# Patient Record
Sex: MALE | Race: WHITE | Employment: OTHER | URBAN - METROPOLITAN AREA
[De-identification: names, ages, dates, MRNs, and addresses within clinical notes are randomized per-mention and may not be internally consistent; named-entity substitution may affect disease eponyms.]

---

## 2017-01-03 ENCOUNTER — ANTI-COAG VISIT (OUTPATIENT)
Dept: INTERNAL MEDICINE | Age: 82
End: 2017-01-03

## 2017-01-03 DIAGNOSIS — I35.9 AORTIC VALVE DISORDER: ICD-10-CM

## 2017-01-03 LAB — INR BLD: 2.4

## 2017-01-10 ENCOUNTER — ANTI-COAG VISIT (OUTPATIENT)
Dept: INTERNAL MEDICINE | Age: 82
End: 2017-01-10

## 2017-01-10 DIAGNOSIS — I35.9 AORTIC VALVE DISORDER: ICD-10-CM

## 2017-01-10 LAB — INR BLD: 2.5

## 2017-01-16 ENCOUNTER — ANTI-COAG VISIT (OUTPATIENT)
Dept: INTERNAL MEDICINE | Age: 82
End: 2017-01-16

## 2017-01-16 DIAGNOSIS — I35.9 AORTIC VALVE DISORDER: ICD-10-CM

## 2017-01-16 LAB — INR BLD: 1.9

## 2017-01-25 ENCOUNTER — ANTI-COAG VISIT (OUTPATIENT)
Dept: INTERNAL MEDICINE | Age: 82
End: 2017-01-25

## 2017-01-25 DIAGNOSIS — I35.9 AORTIC VALVE DISORDER: ICD-10-CM

## 2017-01-25 LAB — INR BLD: 2.2

## 2017-01-30 ENCOUNTER — HOSPITAL ENCOUNTER (OUTPATIENT)
Dept: ONCOLOGY | Age: 82
Discharge: HOME | End: 2017-01-30
Attending: INTERNAL MEDICINE | Admitting: INTERNAL MEDICINE

## 2017-01-30 VITALS
HEART RATE: 60 BPM | DIASTOLIC BLOOD PRESSURE: 79 MMHG | RESPIRATION RATE: 16 BRPM | TEMPERATURE: 97 F | SYSTOLIC BLOOD PRESSURE: 135 MMHG

## 2017-01-31 ENCOUNTER — ANTI-COAG VISIT (OUTPATIENT)
Dept: INTERNAL MEDICINE | Age: 82
End: 2017-01-31

## 2017-01-31 DIAGNOSIS — I35.9 AORTIC VALVE DISORDER: ICD-10-CM

## 2017-01-31 LAB — INR BLD: 2.2

## 2017-01-31 RX ORDER — ATORVASTATIN CALCIUM 10 MG/1
TABLET, FILM COATED ORAL
Qty: 30 TABLET | Refills: 2 | Status: SHIPPED | OUTPATIENT
Start: 2017-01-31 | End: 2017-05-01 | Stop reason: SDUPTHER

## 2017-02-07 ENCOUNTER — ANTI-COAG VISIT (OUTPATIENT)
Dept: INTERNAL MEDICINE | Age: 82
End: 2017-02-07

## 2017-02-07 DIAGNOSIS — I35.9 AORTIC VALVE DISORDER: ICD-10-CM

## 2017-02-07 LAB — INR BLD: 2.3

## 2017-02-20 ENCOUNTER — ANTI-COAG VISIT (OUTPATIENT)
Dept: INTERNAL MEDICINE | Age: 82
End: 2017-02-20

## 2017-02-20 DIAGNOSIS — I35.9 AORTIC VALVE DISORDER: ICD-10-CM

## 2017-02-20 LAB — INR BLD: 2.3

## 2017-02-27 ENCOUNTER — ANTI-COAG VISIT (OUTPATIENT)
Dept: INTERNAL MEDICINE | Age: 82
End: 2017-02-27

## 2017-02-27 DIAGNOSIS — I35.9 AORTIC VALVE DISORDER: ICD-10-CM

## 2017-02-27 LAB — INR BLD: 2.6

## 2017-03-06 ENCOUNTER — ANTI-COAG VISIT (OUTPATIENT)
Dept: INTERNAL MEDICINE | Age: 82
End: 2017-03-06

## 2017-03-06 DIAGNOSIS — I35.9 AORTIC VALVE DISORDER: ICD-10-CM

## 2017-03-06 LAB — INR BLD: 1.5

## 2017-03-13 ENCOUNTER — ANTI-COAG VISIT (OUTPATIENT)
Dept: INTERNAL MEDICINE | Age: 82
End: 2017-03-13

## 2017-03-13 DIAGNOSIS — I35.9 AORTIC VALVE DISORDER: ICD-10-CM

## 2017-03-13 LAB — INR BLD: 2

## 2017-03-20 ENCOUNTER — ANTI-COAG VISIT (OUTPATIENT)
Dept: INTERNAL MEDICINE | Age: 82
End: 2017-03-20

## 2017-03-20 DIAGNOSIS — I35.9 AORTIC VALVE DISORDER: ICD-10-CM

## 2017-03-20 LAB — INR BLD: 2.4

## 2017-03-27 ENCOUNTER — ANTI-COAG VISIT (OUTPATIENT)
Dept: INTERNAL MEDICINE | Age: 82
End: 2017-03-27

## 2017-03-27 DIAGNOSIS — I35.9 AORTIC VALVE DISORDER: ICD-10-CM

## 2017-03-27 LAB — INR BLD: 1.9

## 2017-04-04 ENCOUNTER — ANTI-COAG VISIT (OUTPATIENT)
Dept: INTERNAL MEDICINE | Age: 82
End: 2017-04-04

## 2017-04-04 DIAGNOSIS — I35.9 AORTIC VALVE DISORDER: ICD-10-CM

## 2017-04-04 LAB — INR BLD: 2.2

## 2017-04-11 ENCOUNTER — ANTI-COAG VISIT (OUTPATIENT)
Dept: INTERNAL MEDICINE | Age: 82
End: 2017-04-11

## 2017-04-11 DIAGNOSIS — I35.9 AORTIC VALVE DISORDER: ICD-10-CM

## 2017-04-11 LAB — INR BLD: 2.7

## 2017-04-19 ENCOUNTER — ANTI-COAG VISIT (OUTPATIENT)
Dept: INTERNAL MEDICINE | Age: 82
End: 2017-04-19

## 2017-04-19 DIAGNOSIS — I35.9 AORTIC VALVE DISORDER: ICD-10-CM

## 2017-04-19 LAB — INR BLD: 1.9

## 2017-04-24 ENCOUNTER — ANTI-COAG VISIT (OUTPATIENT)
Dept: INTERNAL MEDICINE | Age: 82
End: 2017-04-24

## 2017-04-24 DIAGNOSIS — I35.9 AORTIC VALVE DISORDER: ICD-10-CM

## 2017-04-24 LAB — INR BLD: 2.3

## 2017-04-25 RX ORDER — WARFARIN SODIUM 5 MG/1
TABLET ORAL
Qty: 90 TABLET | Refills: 0 | Status: SHIPPED | OUTPATIENT
Start: 2017-04-25 | End: 2017-10-24 | Stop reason: SDUPTHER

## 2017-05-01 RX ORDER — ATORVASTATIN CALCIUM 10 MG/1
TABLET, FILM COATED ORAL
Qty: 30 TABLET | Refills: 1 | Status: SHIPPED | OUTPATIENT
Start: 2017-05-01 | End: 2017-06-28 | Stop reason: SDUPTHER

## 2017-05-04 DIAGNOSIS — Z85.46 HISTORY OF PROSTATE CANCER: Primary | ICD-10-CM

## 2017-05-09 ENCOUNTER — ANTI-COAG VISIT (OUTPATIENT)
Dept: INTERNAL MEDICINE | Age: 82
End: 2017-05-09

## 2017-05-09 DIAGNOSIS — I35.9 AORTIC VALVE DISORDER: ICD-10-CM

## 2017-05-09 LAB — INR BLD: 1.9

## 2017-05-15 ENCOUNTER — ANTI-COAG VISIT (OUTPATIENT)
Dept: INTERNAL MEDICINE | Age: 82
End: 2017-05-15

## 2017-05-15 DIAGNOSIS — I35.9 AORTIC VALVE DISORDER: ICD-10-CM

## 2017-05-15 LAB — INR BLD: 2

## 2017-05-16 RX ORDER — OMEPRAZOLE 40 MG/1
CAPSULE, DELAYED RELEASE ORAL
Qty: 30 CAPSULE | Refills: 3 | Status: SHIPPED | OUTPATIENT
Start: 2017-05-16 | End: 2017-09-13 | Stop reason: SDUPTHER

## 2017-05-17 DIAGNOSIS — Z85.46 HISTORY OF PROSTATE CANCER: ICD-10-CM

## 2017-05-17 LAB — PROSTATE SPECIFIC ANTIGEN: <0.01 NG/ML (ref 0–4)

## 2017-05-23 ENCOUNTER — ANTI-COAG VISIT (OUTPATIENT)
Dept: INTERNAL MEDICINE | Age: 82
End: 2017-05-23

## 2017-05-23 DIAGNOSIS — I35.9 AORTIC VALVE DISORDER: ICD-10-CM

## 2017-05-23 LAB — INR BLD: 2.5

## 2017-05-29 LAB — INR BLD: 1.9

## 2017-05-31 ENCOUNTER — ANTI-COAG VISIT (OUTPATIENT)
Dept: INTERNAL MEDICINE | Age: 82
End: 2017-05-31

## 2017-05-31 DIAGNOSIS — I35.9 AORTIC VALVE DISORDER: ICD-10-CM

## 2017-06-15 ENCOUNTER — TELEPHONE (OUTPATIENT)
Dept: INTERNAL MEDICINE | Age: 82
End: 2017-06-15

## 2017-06-16 ENCOUNTER — TELEPHONE (OUTPATIENT)
Dept: INTERNAL MEDICINE | Age: 82
End: 2017-06-16

## 2017-06-16 ENCOUNTER — ANTI-COAG VISIT (OUTPATIENT)
Dept: INTERNAL MEDICINE | Age: 82
End: 2017-06-16

## 2017-06-16 DIAGNOSIS — I35.9 AORTIC VALVE DISORDER: ICD-10-CM

## 2017-06-16 LAB — INR BLD: 2

## 2017-06-22 ENCOUNTER — OFFICE VISIT (OUTPATIENT)
Dept: INTERNAL MEDICINE | Age: 82
End: 2017-06-22

## 2017-06-22 VITALS
DIASTOLIC BLOOD PRESSURE: 82 MMHG | HEIGHT: 64 IN | WEIGHT: 125 LBS | SYSTOLIC BLOOD PRESSURE: 122 MMHG | BODY MASS INDEX: 21.34 KG/M2

## 2017-06-22 DIAGNOSIS — D63.1: Primary | ICD-10-CM

## 2017-06-22 DIAGNOSIS — J45.909 UNCOMPLICATED ASTHMA, UNSPECIFIED ASTHMA SEVERITY: ICD-10-CM

## 2017-06-22 DIAGNOSIS — M81.0 OSTEOPOROSIS, SENILE: ICD-10-CM

## 2017-06-22 DIAGNOSIS — N18.30 CHRONIC KIDNEY DISEASE, STAGE III (MODERATE) (HCC): ICD-10-CM

## 2017-06-22 DIAGNOSIS — N18.2: Primary | ICD-10-CM

## 2017-06-22 PROCEDURE — 4005F PHARM THX FOR OP RXD: CPT | Performed by: INTERNAL MEDICINE

## 2017-06-22 PROCEDURE — 99213 OFFICE O/P EST LOW 20 MIN: CPT | Performed by: INTERNAL MEDICINE

## 2017-06-22 PROCEDURE — G8420 CALC BMI NORM PARAMETERS: HCPCS | Performed by: INTERNAL MEDICINE

## 2017-06-22 PROCEDURE — 4040F PNEUMOC VAC/ADMIN/RCVD: CPT | Performed by: INTERNAL MEDICINE

## 2017-06-22 PROCEDURE — 1123F ACP DISCUSS/DSCN MKR DOCD: CPT | Performed by: INTERNAL MEDICINE

## 2017-06-22 PROCEDURE — 1036F TOBACCO NON-USER: CPT | Performed by: INTERNAL MEDICINE

## 2017-06-22 PROCEDURE — G8427 DOCREV CUR MEDS BY ELIG CLIN: HCPCS | Performed by: INTERNAL MEDICINE

## 2017-06-26 ENCOUNTER — ANTI-COAG VISIT (OUTPATIENT)
Dept: INTERNAL MEDICINE | Age: 82
End: 2017-06-26

## 2017-06-26 DIAGNOSIS — I35.9 AORTIC VALVE DISORDER: ICD-10-CM

## 2017-06-26 LAB
A/G RATIO: 1.4 (ref 1.1–2.2)
ALBUMIN SERPL-MCNC: 4.1 G/DL (ref 3.4–5)
ALP BLD-CCNC: 49 U/L (ref 40–129)
ALT SERPL-CCNC: 12 U/L (ref 10–40)
ANION GAP SERPL CALCULATED.3IONS-SCNC: 15 MMOL/L (ref 3–16)
AST SERPL-CCNC: 22 U/L (ref 15–37)
BILIRUB SERPL-MCNC: 0.5 MG/DL (ref 0–1)
BUN BLDV-MCNC: 38 MG/DL (ref 7–20)
CALCIUM SERPL-MCNC: 9.1 MG/DL (ref 8.3–10.6)
CHLORIDE BLD-SCNC: 101 MMOL/L (ref 99–110)
CO2: 24 MMOL/L (ref 21–32)
CREAT SERPL-MCNC: 1.4 MG/DL (ref 0.8–1.3)
GFR AFRICAN AMERICAN: 59
GFR NON-AFRICAN AMERICAN: 49
GLOBULIN: 2.9 G/DL
GLUCOSE BLD-MCNC: 99 MG/DL (ref 70–99)
INR BLD: 2.4
POTASSIUM SERPL-SCNC: 4.8 MMOL/L (ref 3.5–5.1)
SODIUM BLD-SCNC: 140 MMOL/L (ref 136–145)
TOTAL PROTEIN: 7 G/DL (ref 6.4–8.2)

## 2017-06-28 RX ORDER — ATORVASTATIN CALCIUM 10 MG/1
TABLET, FILM COATED ORAL
Qty: 30 TABLET | Refills: 0 | Status: SHIPPED | OUTPATIENT
Start: 2017-06-28 | End: 2017-07-28 | Stop reason: SDUPTHER

## 2017-07-03 ENCOUNTER — ANTI-COAG VISIT (OUTPATIENT)
Dept: INTERNAL MEDICINE | Age: 82
End: 2017-07-03

## 2017-07-03 DIAGNOSIS — I35.9 AORTIC VALVE DISORDER: ICD-10-CM

## 2017-07-03 LAB — INR BLD: 2.2

## 2017-07-07 ENCOUNTER — ANTI-COAG VISIT (OUTPATIENT)
Dept: INTERNAL MEDICINE | Age: 82
End: 2017-07-07

## 2017-07-07 DIAGNOSIS — I35.9 AORTIC VALVE DISORDER: ICD-10-CM

## 2017-07-07 LAB — INR BLD: 2.3

## 2017-07-14 ENCOUNTER — ANTI-COAG VISIT (OUTPATIENT)
Dept: INTERNAL MEDICINE | Age: 82
End: 2017-07-14

## 2017-07-14 DIAGNOSIS — I35.9 AORTIC VALVE DISORDER: ICD-10-CM

## 2017-07-14 LAB — INR BLD: 2.3

## 2017-07-21 ENCOUNTER — ANTI-COAG VISIT (OUTPATIENT)
Dept: INTERNAL MEDICINE | Age: 82
End: 2017-07-21

## 2017-07-21 DIAGNOSIS — I35.9 AORTIC VALVE DISORDER: ICD-10-CM

## 2017-07-21 LAB — INR BLD: 2.3

## 2017-07-31 ENCOUNTER — ANTI-COAG VISIT (OUTPATIENT)
Dept: INTERNAL MEDICINE | Age: 82
End: 2017-07-31

## 2017-07-31 DIAGNOSIS — I35.9 AORTIC VALVE DISORDER: ICD-10-CM

## 2017-07-31 LAB — INR BLD: 2.5

## 2017-07-31 RX ORDER — ATORVASTATIN CALCIUM 10 MG/1
TABLET, FILM COATED ORAL
Qty: 30 TABLET | Refills: 0 | Status: SHIPPED | OUTPATIENT
Start: 2017-07-31 | End: 2017-08-31 | Stop reason: SDUPTHER

## 2017-08-04 ENCOUNTER — TELEPHONE (OUTPATIENT)
Dept: INTERNAL MEDICINE | Age: 82
End: 2017-08-04

## 2017-08-04 ENCOUNTER — ANTI-COAG VISIT (OUTPATIENT)
Dept: INTERNAL MEDICINE | Age: 82
End: 2017-08-04

## 2017-08-04 DIAGNOSIS — I35.9 AORTIC VALVE DISORDER: ICD-10-CM

## 2017-08-04 LAB — INR BLD: 2.2

## 2017-08-11 LAB — INR BLD: 2.4

## 2017-08-18 ENCOUNTER — ANTI-COAG VISIT (OUTPATIENT)
Dept: INTERNAL MEDICINE | Age: 82
End: 2017-08-18

## 2017-08-25 ENCOUNTER — ANTI-COAG VISIT (OUTPATIENT)
Dept: INTERNAL MEDICINE | Age: 82
End: 2017-08-25

## 2017-08-25 DIAGNOSIS — I35.9 AORTIC VALVE DISORDER: ICD-10-CM

## 2017-08-25 LAB — INR BLD: 2

## 2017-08-28 ENCOUNTER — HOSPITAL ENCOUNTER (OUTPATIENT)
Dept: ONCOLOGY | Age: 82
Discharge: HOME | End: 2017-08-31
Attending: INTERNAL MEDICINE | Admitting: INTERNAL MEDICINE

## 2017-08-28 VITALS
HEART RATE: 58 BPM | SYSTOLIC BLOOD PRESSURE: 129 MMHG | TEMPERATURE: 97.6 F | RESPIRATION RATE: 18 BRPM | DIASTOLIC BLOOD PRESSURE: 76 MMHG

## 2017-08-31 RX ORDER — ATORVASTATIN CALCIUM 10 MG/1
TABLET, FILM COATED ORAL
Qty: 30 TABLET | Refills: 5 | Status: SHIPPED | OUTPATIENT
Start: 2017-08-31 | End: 2017-12-28 | Stop reason: SDUPTHER

## 2017-09-09 ENCOUNTER — ANTI-COAG VISIT (OUTPATIENT)
Dept: INTERNAL MEDICINE | Age: 82
End: 2017-09-09

## 2017-09-09 DIAGNOSIS — I35.9 AORTIC VALVE DISORDER: ICD-10-CM

## 2017-09-09 LAB — INR BLD: 2.3

## 2017-09-13 RX ORDER — OMEPRAZOLE 40 MG/1
CAPSULE, DELAYED RELEASE ORAL
Qty: 30 CAPSULE | Refills: 2 | Status: SHIPPED | OUTPATIENT
Start: 2017-09-13 | End: 2017-12-12 | Stop reason: SDUPTHER

## 2017-09-18 ENCOUNTER — ANTI-COAG VISIT (OUTPATIENT)
Dept: INTERNAL MEDICINE | Age: 82
End: 2017-09-18

## 2017-09-18 DIAGNOSIS — I35.9 AORTIC VALVE DISORDER: ICD-10-CM

## 2017-09-18 LAB — INR BLD: 2.4

## 2017-09-22 ENCOUNTER — ANTI-COAG VISIT (OUTPATIENT)
Dept: INTERNAL MEDICINE | Age: 82
End: 2017-09-22

## 2017-09-22 DIAGNOSIS — I35.9 AORTIC VALVE DISORDER: ICD-10-CM

## 2017-09-22 LAB — INR BLD: 2.9

## 2017-10-03 ENCOUNTER — ANTI-COAG VISIT (OUTPATIENT)
Dept: INTERNAL MEDICINE | Age: 82
End: 2017-10-03

## 2017-10-03 DIAGNOSIS — I35.9 AORTIC VALVE DISORDER: ICD-10-CM

## 2017-10-03 LAB — INR BLD: 1.6

## 2017-10-05 ENCOUNTER — OFFICE VISIT (OUTPATIENT)
Dept: CARDIOLOGY CLINIC | Age: 82
End: 2017-10-05

## 2017-10-05 ENCOUNTER — ANTI-COAG VISIT (OUTPATIENT)
Dept: INTERNAL MEDICINE | Age: 82
End: 2017-10-05

## 2017-10-05 VITALS
WEIGHT: 118 LBS | DIASTOLIC BLOOD PRESSURE: 70 MMHG | BODY MASS INDEX: 20.25 KG/M2 | HEART RATE: 68 BPM | SYSTOLIC BLOOD PRESSURE: 130 MMHG

## 2017-10-05 DIAGNOSIS — I35.9 AORTIC VALVE DISORDER: Primary | ICD-10-CM

## 2017-10-05 DIAGNOSIS — I35.9 AORTIC VALVE DISORDER: ICD-10-CM

## 2017-10-05 LAB — INR BLD: 2.4

## 2017-10-05 PROCEDURE — 99213 OFFICE O/P EST LOW 20 MIN: CPT | Performed by: INTERNAL MEDICINE

## 2017-10-05 PROCEDURE — G8420 CALC BMI NORM PARAMETERS: HCPCS | Performed by: INTERNAL MEDICINE

## 2017-10-05 PROCEDURE — G8484 FLU IMMUNIZE NO ADMIN: HCPCS | Performed by: INTERNAL MEDICINE

## 2017-10-05 PROCEDURE — G8427 DOCREV CUR MEDS BY ELIG CLIN: HCPCS | Performed by: INTERNAL MEDICINE

## 2017-10-05 PROCEDURE — 1036F TOBACCO NON-USER: CPT | Performed by: INTERNAL MEDICINE

## 2017-10-05 PROCEDURE — 1123F ACP DISCUSS/DSCN MKR DOCD: CPT | Performed by: INTERNAL MEDICINE

## 2017-10-05 PROCEDURE — 4040F PNEUMOC VAC/ADMIN/RCVD: CPT | Performed by: INTERNAL MEDICINE

## 2017-10-05 NOTE — MR AVS SNAPSHOT
warfarin (COUMADIN) 1 MG tablet Take 1 mg by mouth.    sildenafil (VIAGRA) 50 MG tablet Take 50 mg by mouth as needed for Erectile Dysfunction. vitamin B-12 (CYANOCOBALAMIN) 1000 MCG tablet Take 1,000 mcg by mouth daily. docusate sodium (COLACE) 100 MG capsule Take 100 mg by mouth daily. 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)    History of esophageal cancer    History of prostate cancer    Chronic kidney disease, stage III (moderate)    Personal history of esophageal cancer    Osteoporosis, senile    Hernia, femoral, bilateral    Patient underweight    Asthma    Aortic valve disorder    Psychosexual dysfunction with inhibited sexual excitement    Other and unspecified hyperlipidemia      Immunizations as of 10/5/2017     Name Date    Influenza Virus Vaccine 11/8/2011, 10/28/2010    Influenza, High Dose 9/16/2016, 10/14/2015, 11/6/2014, 10/14/2013, 10/18/2012    Pneumococcal 13-valent Conjugate ([id number]) 7/7/2015    Pneumococcal Polysaccharide ([id number]) 10/20/2013    Tdap (Boostrix, Adacel) 5/12/2014    Zoster 9/20/2013      Preventive Care        Date Due    Yearly Flu Vaccine (1) 9/1/2017    Tetanus Combination Vaccine (2 - Td) 5/12/2024            [employer] Signup           Our records indicate that you have an active [employer] account. You can view your After Visit Summary by going to [email or web address] and logging in with your [employer] username and password. If you don't have a [employer] username and password but a parent or guardian has access to your record, the parent or guardian should login with their own [employer] username and password and access your record to view the After Visit Summary. Additional Information  If you have questions, please contact the physician practice where you receive care. Remember, [employer] is NOT to be used for urgent needs. For medical emergencies, dial 911. For questions regarding your [employer] account call [phone]. If you have a clinical question, please call your doctor's office.

## 2017-10-06 NOTE — PROGRESS NOTES
Subjective:      Patient ID: Nikita Santizo is a [de-identified] y.o. male. CC S/P AVR  HPI Mr. Manuela Dougherty is here for a 1 year cardiac follow up. Patient is doing well overall, no cardiac complaints. Exercises daily. Denies chest pain, shortness of breath, syncope, orthopnea, palpitations, or dizziness. Still has issues with weight and inability to eat large meals. Allergies   Allergen Reactions    No Known Allergies         Social History     Social History    Marital status:      Spouse name: N/A    Number of children: N/A    Years of education: N/A     Occupational History    Not on file. Social History Main Topics    Smoking status: Never Smoker    Smokeless tobacco: Never Used    Alcohol use 0.0 oz/week     0 Standard drinks or equivalent per week      Comment: occassionally    Drug use: No    Sexual activity: Not on file     Other Topics Concern    Not on file     Social History Narrative        Patient has a family history includes Elevated Lipids in his father; Heart Disease in his mother; Hypertension in his father; Other in his father and mother; Stroke in his father. Patient  has a past medical history of Anemia; Aortic valve disorders; Aspiration pneumonia ([address] Erectile dysfunction; Esophageal cancer ([address] Hernia, femoral, bilateral; Hyperlipidemia; Osteoporosis, senile; Pancreatitis; Patient underweight; Prostate cancer ([address] and Unspecified essential hypertension. Current Outpatient Prescriptions   Medication Sig Dispense Refill    omeprazole (PRILOSEC) 40 MG delayed release capsule TAKE ONE CAPSULE BY MOUTH DAILY 30 capsule 2    atorvastatin (LIPITOR) 10 MG tablet TAKE ONE TABLET BY MOUTH DAILY 30 tablet 5    warfarin (COUMADIN) 5 MG tablet TAKE ONE TABLET BY MOUTH ONCE NIGHTLY 90 tablet 0    Magnesium 65 MG TABS Take by mouth      Coenzyme Q10 (COQ10) 400 MG CAPS Take 1 capsule by mouth      Simethicone 80 MG TABS Take 80 tablets by mouth 3 times daily.  (Patient taking

## 2017-10-13 LAB — INR BLD: 2.4

## 2017-10-17 ENCOUNTER — ANTI-COAG VISIT (OUTPATIENT)
Dept: INTERNAL MEDICINE | Age: 82
End: 2017-10-17

## 2017-10-17 DIAGNOSIS — I35.9 AORTIC VALVE DISORDER: ICD-10-CM

## 2017-10-25 ENCOUNTER — NURSE ONLY (OUTPATIENT)
Dept: INTERNAL MEDICINE | Age: 82
End: 2017-10-25

## 2017-10-25 DIAGNOSIS — Z23 NEED FOR INFLUENZA VACCINATION: Primary | ICD-10-CM

## 2017-10-25 PROCEDURE — G0008 ADMIN INFLUENZA VIRUS VAC: HCPCS | Performed by: INTERNAL MEDICINE

## 2017-10-25 PROCEDURE — 90662 IIV NO PRSV INCREASED AG IM: CPT | Performed by: INTERNAL MEDICINE

## 2017-10-26 RX ORDER — WARFARIN SODIUM 5 MG/1
TABLET ORAL
Qty: 90 TABLET | Refills: 0 | Status: SHIPPED | OUTPATIENT
Start: 2017-10-26 | End: 2018-04-11 | Stop reason: SDUPTHER

## 2017-10-27 LAB — INR BLD: 1.9

## 2017-10-30 ENCOUNTER — ANTI-COAG VISIT (OUTPATIENT)
Dept: INTERNAL MEDICINE | Age: 82
End: 2017-10-30

## 2017-10-30 DIAGNOSIS — I35.9 AORTIC VALVE DISORDER: ICD-10-CM

## 2017-11-17 ENCOUNTER — ANTI-COAG VISIT (OUTPATIENT)
Dept: INTERNAL MEDICINE | Age: 82
End: 2017-11-17

## 2017-11-17 DIAGNOSIS — I35.9 AORTIC VALVE DISORDER: ICD-10-CM

## 2017-11-17 LAB — INR BLD: 2.1

## 2017-11-25 LAB — INR BLD: 3.3

## 2017-11-27 ENCOUNTER — ANTI-COAG VISIT (OUTPATIENT)
Dept: INTERNAL MEDICINE | Age: 82
End: 2017-11-27

## 2017-11-27 DIAGNOSIS — I35.9 AORTIC VALVE DISORDER: ICD-10-CM

## 2017-12-01 ENCOUNTER — ANTI-COAG VISIT (OUTPATIENT)
Dept: INTERNAL MEDICINE | Age: 82
End: 2017-12-01

## 2017-12-01 DIAGNOSIS — I35.9 AORTIC VALVE DISORDER: ICD-10-CM

## 2017-12-01 LAB — INR BLD: 1.8

## 2017-12-12 RX ORDER — OMEPRAZOLE 40 MG/1
CAPSULE, DELAYED RELEASE ORAL
Qty: 30 CAPSULE | Refills: 3 | Status: SHIPPED | OUTPATIENT
Start: 2017-12-12 | End: 2018-04-11 | Stop reason: SDUPTHER

## 2017-12-14 ENCOUNTER — ANTI-COAG VISIT (OUTPATIENT)
Dept: INTERNAL MEDICINE | Age: 82
End: 2017-12-14

## 2017-12-14 DIAGNOSIS — I35.9 AORTIC VALVE DISORDER: ICD-10-CM

## 2017-12-14 LAB — INR BLD: 3.1

## 2017-12-15 LAB — INR BLD: 1.9

## 2017-12-18 ENCOUNTER — ANTI-COAG VISIT (OUTPATIENT)
Dept: INTERNAL MEDICINE | Age: 82
End: 2017-12-18

## 2017-12-18 DIAGNOSIS — I35.9 AORTIC VALVE DISORDER: ICD-10-CM

## 2017-12-22 ENCOUNTER — ANTI-COAG VISIT (OUTPATIENT)
Dept: INTERNAL MEDICINE | Age: 82
End: 2017-12-22

## 2017-12-22 DIAGNOSIS — I35.9 AORTIC VALVE DISORDER: ICD-10-CM

## 2017-12-22 LAB — INR BLD: 2.6

## 2017-12-28 ENCOUNTER — OFFICE VISIT (OUTPATIENT)
Dept: INTERNAL MEDICINE | Age: 82
End: 2017-12-28

## 2017-12-28 VITALS
WEIGHT: 122 LBS | BODY MASS INDEX: 20.83 KG/M2 | HEIGHT: 64 IN | SYSTOLIC BLOOD PRESSURE: 120 MMHG | DIASTOLIC BLOOD PRESSURE: 80 MMHG

## 2017-12-28 DIAGNOSIS — Z85.46 HISTORY OF PROSTATE CANCER: ICD-10-CM

## 2017-12-28 DIAGNOSIS — J45.909 UNCOMPLICATED ASTHMA, UNSPECIFIED ASTHMA SEVERITY, UNSPECIFIED WHETHER PERSISTENT: ICD-10-CM

## 2017-12-28 DIAGNOSIS — Z00.00 WELL ADULT EXAM: Primary | ICD-10-CM

## 2017-12-28 DIAGNOSIS — N18.30 CHRONIC KIDNEY DISEASE, STAGE III (MODERATE) (HCC): ICD-10-CM

## 2017-12-28 DIAGNOSIS — N18.9 ANEMIA ASSOCIATED WITH CHRONIC RENAL FAILURE: ICD-10-CM

## 2017-12-28 DIAGNOSIS — D63.1 ANEMIA ASSOCIATED WITH CHRONIC RENAL FAILURE: ICD-10-CM

## 2017-12-28 DIAGNOSIS — E78.5 HYPERLIPIDEMIA, UNSPECIFIED HYPERLIPIDEMIA TYPE: ICD-10-CM

## 2017-12-28 DIAGNOSIS — Z85.01 HISTORY OF ESOPHAGEAL CANCER: ICD-10-CM

## 2017-12-28 DIAGNOSIS — R53.83 OTHER FATIGUE: ICD-10-CM

## 2017-12-28 PROCEDURE — 93000 ELECTROCARDIOGRAM COMPLETE: CPT | Performed by: INTERNAL MEDICINE

## 2017-12-28 PROCEDURE — G0439 PPPS, SUBSEQ VISIT: HCPCS | Performed by: INTERNAL MEDICINE

## 2017-12-28 RX ORDER — ATORVASTATIN CALCIUM 10 MG/1
TABLET, FILM COATED ORAL
Qty: 90 TABLET | Refills: 3 | Status: SHIPPED | OUTPATIENT
Start: 2017-12-28 | End: 2018-12-27 | Stop reason: SDUPTHER

## 2017-12-28 ASSESSMENT — PATIENT HEALTH QUESTIONNAIRE - PHQ9
1. LITTLE INTEREST OR PLEASURE IN DOING THINGS: 0
SUM OF ALL RESPONSES TO PHQ QUESTIONS 1-9: 0
SUM OF ALL RESPONSES TO PHQ9 QUESTIONS 1 & 2: 0
2. FEELING DOWN, DEPRESSED OR HOPELESS: 0

## 2017-12-28 NOTE — PROGRESS NOTES
Annual Wellness Visit     Patient:  Randi Tate                                               : 1935  Age: [de-identified] y.o. MRN: [id number]  Date : 2017      CHIEF COMPLAINT: Randi Tate is a [de-identified] y.o. male who presents for : Physical exam    1. Well adult exam  Gen. he feels okay denies any chest pain shortness of breath or any other problems does have some instability in his knee request of brace for this  - EKG 12 lead  - CBC Auto Differential  - Comprehensive Metabolic Panel  - Lipid Panel  - PSA  - TSH without Reflex  - Urinalysis  - Vitamin D 25 Hydroxy  - Elastic Bandages & Supports (KNEE BRACE/HINGED BARS SMALL) MISC; 1 Device by Does not apply route daily  Dispense: 1 each; Refill: 0    2. Uncomplicated asthma, unspecified asthma severity, unspecified whether persistent  This problem is stable to require rescue inhaler    3. 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 History of esophageal cancer  His problem is been stable for a number of years and is been followed by oncology  - CBC Auto Differential  - Comprehensive Metabolic Panel  - Lipid Panel  - PSA  - TSH without Reflex  - Urinalysis  - Vitamin D 25 Hydroxy    5. History of prostate cancer  Problem is stable followed by urology  - PSA    6. Chronic kidney disease, stage III (moderate)  No urinary symptoms  - CBC Auto Differential  - Comprehensive Metabolic Panel  - Lipid Panel  - PSA  - TSH without Reflex  - Urinalysis  - Vitamin D 25 Hydroxy    7. Other fatigue  's note some chronic fatigue  - CBC Auto Differential  - Comprehensive Metabolic Panel  - Lipid Panel  - PSA  - TSH without Reflex  - Urinalysis  - Vitamin D 25 Hydroxy    8.  Hyperlipidemia, unspecified hyperlipidemia type  No complaints no myalgias  - CBC Auto Differential  - Comprehensive Metabolic Panel  - Lipid Panel  - PSA  - TSH without Reflex  - Urinalysis  - Vitamin D 25 Hydroxy        Patient Active Problem List    Diagnosis Date Noted    Anemia associated with chronic renal failure ([facility] 02/02/2011     Priority: High    History of esophageal cancer 09/26/2016    History of prostate cancer 09/26/2016    Chronic kidney disease, stage III (moderate) 11/30/2015    Personal history of esophageal cancer 11/30/2015    Osteoporosis, senile 05/20/2014    Hernia, femoral, bilateral 05/12/2014    Patient underweight 08/08/2013    Asthma 07/20/2010    Aortic valve disorder 07/20/2010     Mechanical aortic valve placed in 2006      Psychosexual dysfunction with inhibited sexual excitement 07/20/2010    Other and unspecified hyperlipidemia 07/20/2010       Constitutional:  Denies fever or chills   Eyes:  Denies change in visual acuity   HENT:  Denies nasal congestion or sore throat   Respiratory:  Denies cough or shortness of breath   Cardiovascular:  Denies chest pain or edema   GI:  Denies abdominal pain, nausea, vomiting, bloody stools or diarrhea   :  Denies dysuria   Musculoskeletal:  Denies back pain or joint pain   Integument:  Denies rash   Neurologic:  Denies headache, focal weakness or sensory changes   Endocrine:  Denies polyuria or polydipsia   Lymphatic:  Denies swollen glands   Psychiatric:  Denies depression or anxiety     Past Medical History:        Diagnosis Date    Anemia     Aortic valve disorders     stenosis    Aspiration pneumonia ([facility] 4/27/2015    Erectile dysfunction     Esophageal cancer ([facility] 10/18/2012    Hernia, femoral, bilateral 5/12/2014    Hyperlipidemia     Osteoporosis, senile 5/20/2014    Pancreatitis 8/1/2013    Patient underweight 8/8/2013    Prostate cancer ([facility]     Unspecified essential hypertension        Past Surgical History:        Procedure Laterality Date    APPENDECTOMY      as a child    BONE GRAFT      for saddle nose    CARDIAC VALVE REPLACEMENT

## 2017-12-28 NOTE — TELEPHONE ENCOUNTER
Refill request for atorvastatin medication. Name of [employer]    Last visit - 6/22/17     Pending visit - TODAY!      Last refill -8/31/17

## 2017-12-29 ENCOUNTER — ANTI-COAG VISIT (OUTPATIENT)
Dept: INTERNAL MEDICINE | Age: 82
End: 2017-12-29

## 2017-12-29 DIAGNOSIS — I35.9 AORTIC VALVE DISORDER: ICD-10-CM

## 2017-12-29 LAB — INR BLD: 2.3

## 2018-01-03 LAB
A/G RATIO: 1.2 (ref 1.1–2.2)
ALBUMIN SERPL-MCNC: 3.8 G/DL (ref 3.4–5)
ALP BLD-CCNC: 61 U/L (ref 40–129)
ALT SERPL-CCNC: 15 U/L (ref 10–40)
ANION GAP SERPL CALCULATED.3IONS-SCNC: 14 MMOL/L (ref 3–16)
AST SERPL-CCNC: 23 U/L (ref 15–37)
BASOPHILS ABSOLUTE: 0 K/UL (ref 0–0.2)
BASOPHILS RELATIVE PERCENT: 0.7 %
BILIRUB SERPL-MCNC: 0.5 MG/DL (ref 0–1)
BILIRUBIN URINE: NEGATIVE
BLOOD, URINE: NEGATIVE
BUN BLDV-MCNC: 39 MG/DL (ref 7–20)
CALCIUM SERPL-MCNC: 9.3 MG/DL (ref 8.3–10.6)
CHLORIDE BLD-SCNC: 100 MMOL/L (ref 99–110)
CHOLESTEROL, TOTAL: 165 MG/DL (ref 0–199)
CLARITY: CLEAR
CO2: 28 MMOL/L (ref 21–32)
COLOR: YELLOW
CREAT SERPL-MCNC: 1.4 MG/DL (ref 0.8–1.3)
EOSINOPHILS ABSOLUTE: 0.1 K/UL (ref 0–0.6)
EOSINOPHILS RELATIVE PERCENT: 2.5 %
GFR AFRICAN AMERICAN: 59
GFR NON-AFRICAN AMERICAN: 49
GLOBULIN: 3.1 G/DL
GLUCOSE BLD-MCNC: 76 MG/DL (ref 70–99)
GLUCOSE URINE: NEGATIVE MG/DL
HCT VFR BLD CALC: 40.8 % (ref 40.5–52.5)
HDLC SERPL-MCNC: 83 MG/DL (ref 40–60)
HEMOGLOBIN: 13.1 G/DL (ref 13.5–17.5)
KETONES, URINE: NEGATIVE MG/DL
LDL CHOLESTEROL CALCULATED: 62 MG/DL
LEUKOCYTE ESTERASE, URINE: NEGATIVE
LYMPHOCYTES ABSOLUTE: 1.8 K/UL (ref 1–5.1)
LYMPHOCYTES RELATIVE PERCENT: 33.4 %
MCH RBC QN AUTO: 27.6 PG (ref 26–34)
MCHC RBC AUTO-ENTMCNC: 32.1 G/DL (ref 31–36)
MCV RBC AUTO: 86.2 FL (ref 80–100)
MICROSCOPIC EXAMINATION: NORMAL
MONOCYTES ABSOLUTE: 0.5 K/UL (ref 0–1.3)
MONOCYTES RELATIVE PERCENT: 10.1 %
NEUTROPHILS ABSOLUTE: 2.9 K/UL (ref 1.7–7.7)
NEUTROPHILS RELATIVE PERCENT: 53.3 %
NITRITE, URINE: NEGATIVE
PDW BLD-RTO: 15.1 % (ref 12.4–15.4)
PH UA: 6.5
PLATELET # BLD: 123 K/UL (ref 135–450)
PMV BLD AUTO: 10.1 FL (ref 5–10.5)
POTASSIUM SERPL-SCNC: 4.7 MMOL/L (ref 3.5–5.1)
PROSTATE SPECIFIC ANTIGEN: <0.01 NG/ML (ref 0–4)
PROTEIN UA: NEGATIVE MG/DL
RBC # BLD: 4.73 M/UL (ref 4.2–5.9)
SODIUM BLD-SCNC: 142 MMOL/L (ref 136–145)
SPECIFIC GRAVITY UA: 1.02
TOTAL PROTEIN: 6.9 G/DL (ref 6.4–8.2)
TRIGL SERPL-MCNC: 101 MG/DL (ref 0–150)
TSH SERPL DL<=0.05 MIU/L-ACNC: 4.35 UIU/ML (ref 0.27–4.2)
URINE TYPE: NORMAL
UROBILINOGEN, URINE: 1 E.U./DL
VITAMIN D 25-HYDROXY: 45.4 NG/ML
VLDLC SERPL CALC-MCNC: 20 MG/DL
WBC # BLD: 5.4 K/UL (ref 4–11)

## 2018-01-08 ENCOUNTER — ANTI-COAG VISIT (OUTPATIENT)
Dept: INTERNAL MEDICINE | Age: 83
End: 2018-01-08

## 2018-01-08 DIAGNOSIS — I35.9 AORTIC VALVE DISORDER: ICD-10-CM

## 2018-01-08 LAB — INR BLD: 2.2

## 2018-01-12 ENCOUNTER — ANTI-COAG VISIT (OUTPATIENT)
Dept: INTERNAL MEDICINE | Age: 83
End: 2018-01-12

## 2018-01-12 DIAGNOSIS — I35.9 AORTIC VALVE DISORDER: ICD-10-CM

## 2018-01-12 LAB — INR BLD: 2.5

## 2018-01-19 ENCOUNTER — ANTI-COAG VISIT (OUTPATIENT)
Dept: INTERNAL MEDICINE | Age: 83
End: 2018-01-19

## 2018-01-19 DIAGNOSIS — I35.9 AORTIC VALVE DISORDER: ICD-10-CM

## 2018-01-19 LAB — INR BLD: 1.9

## 2018-01-26 LAB — INR BLD: 3.1

## 2018-01-29 ENCOUNTER — ANTI-COAG VISIT (OUTPATIENT)
Dept: INTERNAL MEDICINE | Age: 83
End: 2018-01-29

## 2018-01-29 DIAGNOSIS — I35.9 AORTIC VALVE DISORDER: ICD-10-CM

## 2018-02-02 ENCOUNTER — ANTI-COAG VISIT (OUTPATIENT)
Dept: INTERNAL MEDICINE | Age: 83
End: 2018-02-02

## 2018-02-02 DIAGNOSIS — I35.9 AORTIC VALVE DISORDER: ICD-10-CM

## 2018-02-02 LAB — INR BLD: 1.9

## 2018-02-12 ENCOUNTER — ANTI-COAG VISIT (OUTPATIENT)
Dept: INTERNAL MEDICINE | Age: 83
End: 2018-02-12

## 2018-02-12 ENCOUNTER — TELEPHONE (OUTPATIENT)
Dept: INTERNAL MEDICINE | Age: 83
End: 2018-02-12

## 2018-02-12 DIAGNOSIS — I35.9 AORTIC VALVE DISORDER: ICD-10-CM

## 2018-02-12 LAB — INR BLD: 2.9

## 2018-02-16 LAB — INR BLD: 2.3

## 2018-02-19 ENCOUNTER — ANTI-COAG VISIT (OUTPATIENT)
Dept: INTERNAL MEDICINE | Age: 83
End: 2018-02-19

## 2018-02-19 DIAGNOSIS — I35.9 AORTIC VALVE DISORDER: ICD-10-CM

## 2018-02-23 ENCOUNTER — ANTI-COAG VISIT (OUTPATIENT)
Dept: INTERNAL MEDICINE | Age: 83
End: 2018-02-23

## 2018-02-23 DIAGNOSIS — I35.9 AORTIC VALVE DISORDER: ICD-10-CM

## 2018-02-23 LAB — INR BLD: 2.7

## 2018-02-26 ENCOUNTER — HOSPITAL ENCOUNTER (OUTPATIENT)
Dept: ONCOLOGY | Age: 83
Discharge: HOME | End: 2018-02-28
Attending: INTERNAL MEDICINE | Admitting: INTERNAL MEDICINE

## 2018-02-26 VITALS
SYSTOLIC BLOOD PRESSURE: 130 MMHG | HEART RATE: 63 BPM | DIASTOLIC BLOOD PRESSURE: 76 MMHG | TEMPERATURE: 96.8 F | RESPIRATION RATE: 18 BRPM

## 2018-02-26 NOTE — PLAN OF CARE
Problem: SAFETY  Goal: Free from accidental physical injury    Intervention: ASSESS FOR FALL RISK  Pt is a medium fall risk. Explained fall risk precautions to pt and rationale behind their use to keep the patient safe. Belongings are in reach. Pt encouraged to notify staff for any and all assistance. Staff present in tx suite throughout entirety of pts treatment to monitor and protect from falls. Assistance provided when ambulating to restroom utilizing Stay With Me. Problem: KNOWLEDGE DEFICIT  Goal: Patient/S.O. demonstrates understanding of disease process, treatment plan, medications, and discharge instructions. Intervention: ASSESS BASELINE KNOWLEDGE  Pt seen and assessed [address] today for Prolia injection per orders from [facility]. Pt tolerated infusion well and without incident. Pt verbalizes understanding of discharge instructions. Discharged ambulatory to home with wife.

## 2018-03-01 ENCOUNTER — HOSPITAL ENCOUNTER (OUTPATIENT)
Dept: ONCOLOGY | Age: 83
Discharge: HOME | End: 2018-03-31
Attending: INTERNAL MEDICINE | Admitting: INTERNAL MEDICINE

## 2018-03-05 ENCOUNTER — ANTI-COAG VISIT (OUTPATIENT)
Dept: INTERNAL MEDICINE | Age: 83
End: 2018-03-05

## 2018-03-05 DIAGNOSIS — I35.9 AORTIC VALVE DISORDER: ICD-10-CM

## 2018-03-05 LAB — INR BLD: 1.9

## 2018-03-09 ENCOUNTER — ANTI-COAG VISIT (OUTPATIENT)
Dept: INTERNAL MEDICINE | Age: 83
End: 2018-03-09

## 2018-03-09 ENCOUNTER — TELEPHONE (OUTPATIENT)
Dept: INTERNAL MEDICINE | Age: 83
End: 2018-03-09

## 2018-03-09 DIAGNOSIS — I35.9 AORTIC VALVE DISORDER: ICD-10-CM

## 2018-03-09 LAB — INR BLD: 1.2

## 2018-03-19 ENCOUNTER — ANTI-COAG VISIT (OUTPATIENT)
Dept: INTERNAL MEDICINE | Age: 83
End: 2018-03-19

## 2018-03-19 DIAGNOSIS — I35.9 AORTIC VALVE DISORDER: ICD-10-CM

## 2018-03-19 LAB — INTERNATIONAL NORMALIZATION RATIO, POC: 2

## 2018-03-26 ENCOUNTER — ANTI-COAG VISIT (OUTPATIENT)
Dept: INTERNAL MEDICINE | Age: 83
End: 2018-03-26

## 2018-03-26 DIAGNOSIS — I35.9 AORTIC VALVE DISORDER: ICD-10-CM

## 2018-03-26 LAB — INTERNATIONAL NORMALIZATION RATIO, POC: 3.3

## 2018-04-02 DIAGNOSIS — R79.89 ABNORMAL TSH: Primary | ICD-10-CM

## 2018-04-03 ENCOUNTER — ANTI-COAG VISIT (OUTPATIENT)
Dept: INTERNAL MEDICINE | Age: 83
End: 2018-04-03

## 2018-04-03 DIAGNOSIS — I35.9 AORTIC VALVE DISORDER: ICD-10-CM

## 2018-04-03 DIAGNOSIS — R79.89 ABNORMAL TSH: ICD-10-CM

## 2018-04-03 LAB
INTERNATIONAL NORMALIZATION RATIO, POC: 2.3
TSH SERPL DL<=0.05 MIU/L-ACNC: 2.91 UIU/ML (ref 0.27–4.2)

## 2018-04-06 LAB — INTERNATIONAL NORMALIZATION RATIO, POC: 2.9

## 2018-04-10 ENCOUNTER — ANTI-COAG VISIT (OUTPATIENT)
Dept: INTERNAL MEDICINE | Age: 83
End: 2018-04-10

## 2018-04-10 DIAGNOSIS — I35.9 AORTIC VALVE DISORDER: ICD-10-CM

## 2018-04-11 RX ORDER — WARFARIN SODIUM 5 MG/1
TABLET ORAL
Qty: 90 TABLET | Refills: 0 | Status: SHIPPED | OUTPATIENT
Start: 2018-04-11 | End: 2018-09-21 | Stop reason: SDUPTHER

## 2018-04-11 RX ORDER — OMEPRAZOLE 40 MG/1
CAPSULE, DELAYED RELEASE ORAL
Qty: 30 CAPSULE | Refills: 2 | Status: SHIPPED | OUTPATIENT
Start: 2018-04-11 | End: 2018-05-10 | Stop reason: SDUPTHER

## 2018-04-12 ENCOUNTER — OFFICE VISIT (OUTPATIENT)
Dept: INTERNAL MEDICINE | Age: 83
End: 2018-04-12

## 2018-04-12 VITALS
SYSTOLIC BLOOD PRESSURE: 112 MMHG | BODY MASS INDEX: 20.83 KG/M2 | WEIGHT: 122 LBS | DIASTOLIC BLOOD PRESSURE: 80 MMHG | HEIGHT: 64 IN

## 2018-04-12 DIAGNOSIS — Z85.01 HISTORY OF ESOPHAGEAL CANCER: ICD-10-CM

## 2018-04-12 DIAGNOSIS — I35.9 AORTIC VALVE DISORDER: Primary | ICD-10-CM

## 2018-04-12 DIAGNOSIS — N18.30 CHRONIC KIDNEY DISEASE, STAGE III (MODERATE) (HCC): ICD-10-CM

## 2018-04-12 DIAGNOSIS — D63.1 ANEMIA ASSOCIATED WITH CHRONIC RENAL FAILURE: ICD-10-CM

## 2018-04-12 DIAGNOSIS — R79.89 ABNORMAL TSH: ICD-10-CM

## 2018-04-12 DIAGNOSIS — E78.5 HYPERLIPIDEMIA, UNSPECIFIED HYPERLIPIDEMIA TYPE: ICD-10-CM

## 2018-04-12 DIAGNOSIS — Z85.46 HISTORY OF PROSTATE CANCER: ICD-10-CM

## 2018-04-12 DIAGNOSIS — J45.909 UNCOMPLICATED ASTHMA, UNSPECIFIED ASTHMA SEVERITY, UNSPECIFIED WHETHER PERSISTENT: ICD-10-CM

## 2018-04-12 DIAGNOSIS — N18.9 ANEMIA ASSOCIATED WITH CHRONIC RENAL FAILURE: ICD-10-CM

## 2018-04-12 PROCEDURE — 99213 OFFICE O/P EST LOW 20 MIN: CPT | Performed by: INTERNAL MEDICINE

## 2018-04-12 PROCEDURE — 1036F TOBACCO NON-USER: CPT | Performed by: INTERNAL MEDICINE

## 2018-04-12 PROCEDURE — 4040F PNEUMOC VAC/ADMIN/RCVD: CPT | Performed by: INTERNAL MEDICINE

## 2018-04-12 PROCEDURE — G8427 DOCREV CUR MEDS BY ELIG CLIN: HCPCS | Performed by: INTERNAL MEDICINE

## 2018-04-12 PROCEDURE — 1123F ACP DISCUSS/DSCN MKR DOCD: CPT | Performed by: INTERNAL MEDICINE

## 2018-04-12 PROCEDURE — G8420 CALC BMI NORM PARAMETERS: HCPCS | Performed by: INTERNAL MEDICINE

## 2018-04-13 ENCOUNTER — ANTI-COAG VISIT (OUTPATIENT)
Dept: INTERNAL MEDICINE | Age: 83
End: 2018-04-13

## 2018-04-13 DIAGNOSIS — I35.9 AORTIC VALVE DISORDER: ICD-10-CM

## 2018-04-13 LAB — INTERNATIONAL NORMALIZATION RATIO, POC: 1.9

## 2018-04-20 ENCOUNTER — ANTI-COAG VISIT (OUTPATIENT)
Dept: INTERNAL MEDICINE | Age: 83
End: 2018-04-20

## 2018-04-20 DIAGNOSIS — I35.9 AORTIC VALVE DISORDER: ICD-10-CM

## 2018-04-20 LAB — INTERNATIONAL NORMALIZATION RATIO, POC: 2.5

## 2018-05-01 ENCOUNTER — ANTI-COAG VISIT (OUTPATIENT)
Dept: INTERNAL MEDICINE | Age: 83
End: 2018-05-01

## 2018-05-01 DIAGNOSIS — I35.9 AORTIC VALVE DISORDER: ICD-10-CM

## 2018-05-01 LAB — INR BLD: 2.2

## 2018-05-04 DIAGNOSIS — Z85.46 HISTORY OF PROSTATE CANCER: Primary | ICD-10-CM

## 2018-05-10 RX ORDER — OMEPRAZOLE 40 MG/1
CAPSULE, DELAYED RELEASE ORAL
Qty: 30 CAPSULE | Refills: 3 | Status: SHIPPED | OUTPATIENT
Start: 2018-05-10 | End: 2019-03-20 | Stop reason: CLARIF

## 2018-05-14 ENCOUNTER — ANTI-COAG VISIT (OUTPATIENT)
Dept: INTERNAL MEDICINE | Age: 83
End: 2018-05-14

## 2018-05-14 DIAGNOSIS — I35.9 AORTIC VALVE DISORDER: ICD-10-CM

## 2018-05-14 LAB — INTERNATIONAL NORMALIZATION RATIO, POC: 2

## 2018-05-14 PROCEDURE — 85610 PROTHROMBIN TIME: CPT | Performed by: INTERNAL MEDICINE

## 2018-05-17 DIAGNOSIS — Z85.46 HISTORY OF PROSTATE CANCER: ICD-10-CM

## 2018-05-17 LAB — PROSTATE SPECIFIC ANTIGEN: <0.01 NG/ML (ref 0–4)

## 2018-05-25 ENCOUNTER — ANTI-COAG VISIT (OUTPATIENT)
Dept: INTERNAL MEDICINE | Age: 83
End: 2018-05-25

## 2018-05-25 DIAGNOSIS — I35.9 AORTIC VALVE DISORDER: ICD-10-CM

## 2018-05-25 LAB — INR BLD: 2.5

## 2018-06-01 ENCOUNTER — ANTI-COAG VISIT (OUTPATIENT)
Dept: INTERNAL MEDICINE | Age: 83
End: 2018-06-01

## 2018-06-01 DIAGNOSIS — I35.9 AORTIC VALVE DISORDER: ICD-10-CM

## 2018-06-01 LAB — INR BLD: 2.2

## 2018-06-08 LAB — INR BLD: 2.9

## 2018-06-11 ENCOUNTER — ANTI-COAG VISIT (OUTPATIENT)
Dept: INTERNAL MEDICINE | Age: 83
End: 2018-06-11

## 2018-06-11 DIAGNOSIS — I35.9 AORTIC VALVE DISORDER: ICD-10-CM

## 2018-06-13 ENCOUNTER — OFFICE VISIT (OUTPATIENT)
Dept: INTERNAL MEDICINE | Age: 83
End: 2018-06-13

## 2018-06-13 VITALS
WEIGHT: 117 LBS | HEIGHT: 64 IN | SYSTOLIC BLOOD PRESSURE: 122 MMHG | DIASTOLIC BLOOD PRESSURE: 74 MMHG | BODY MASS INDEX: 19.97 KG/M2

## 2018-06-13 DIAGNOSIS — Z98.890 H/O AORTIC VALVE REPAIR: Primary | ICD-10-CM

## 2018-06-13 DIAGNOSIS — Z86.79 H/O AORTIC VALVE REPAIR: Primary | ICD-10-CM

## 2018-06-13 DIAGNOSIS — Z85.01 HISTORY OF ESOPHAGEAL CANCER: ICD-10-CM

## 2018-06-13 DIAGNOSIS — Z85.46 HISTORY OF PROSTATE CANCER: ICD-10-CM

## 2018-06-13 PROCEDURE — 4040F PNEUMOC VAC/ADMIN/RCVD: CPT | Performed by: INTERNAL MEDICINE

## 2018-06-13 PROCEDURE — G8420 CALC BMI NORM PARAMETERS: HCPCS | Performed by: INTERNAL MEDICINE

## 2018-06-13 PROCEDURE — G8427 DOCREV CUR MEDS BY ELIG CLIN: HCPCS | Performed by: INTERNAL MEDICINE

## 2018-06-13 PROCEDURE — 1036F TOBACCO NON-USER: CPT | Performed by: INTERNAL MEDICINE

## 2018-06-13 PROCEDURE — 99213 OFFICE O/P EST LOW 20 MIN: CPT | Performed by: INTERNAL MEDICINE

## 2018-06-13 PROCEDURE — 1123F ACP DISCUSS/DSCN MKR DOCD: CPT | Performed by: INTERNAL MEDICINE

## 2018-06-18 ENCOUNTER — ANTI-COAG VISIT (OUTPATIENT)
Dept: INTERNAL MEDICINE | Age: 83
End: 2018-06-18

## 2018-06-18 DIAGNOSIS — I35.9 AORTIC VALVE DISORDER: ICD-10-CM

## 2018-06-18 LAB — INR BLD: 2.4

## 2018-06-22 ENCOUNTER — ANTI-COAG VISIT (OUTPATIENT)
Dept: INTERNAL MEDICINE | Age: 83
End: 2018-06-22

## 2018-06-22 DIAGNOSIS — I35.9 AORTIC VALVE DISORDER: ICD-10-CM

## 2018-06-22 LAB — INR BLD: 2.4

## 2018-07-02 ENCOUNTER — ANTI-COAG VISIT (OUTPATIENT)
Dept: INTERNAL MEDICINE | Age: 83
End: 2018-07-02

## 2018-07-02 DIAGNOSIS — I35.9 AORTIC VALVE DISORDER: ICD-10-CM

## 2018-07-02 LAB — INTERNATIONAL NORMALIZATION RATIO, POC: 2.7

## 2018-07-06 ENCOUNTER — ANTI-COAG VISIT (OUTPATIENT)
Dept: INTERNAL MEDICINE | Age: 83
End: 2018-07-06

## 2018-07-06 DIAGNOSIS — I35.9 AORTIC VALVE DISORDER: ICD-10-CM

## 2018-07-06 LAB — INR BLD: 1.8

## 2018-07-13 ENCOUNTER — ANTI-COAG VISIT (OUTPATIENT)
Dept: INTERNAL MEDICINE | Age: 83
End: 2018-07-13

## 2018-07-13 DIAGNOSIS — I35.9 AORTIC VALVE DISORDER: ICD-10-CM

## 2018-07-13 LAB — INR BLD: 2.5

## 2018-07-19 ENCOUNTER — ANTI-COAG VISIT (OUTPATIENT)
Dept: INTERNAL MEDICINE | Age: 83
End: 2018-07-19

## 2018-07-19 ENCOUNTER — TELEPHONE (OUTPATIENT)
Dept: INTERNAL MEDICINE | Age: 83
End: 2018-07-19

## 2018-07-19 DIAGNOSIS — I35.9 AORTIC VALVE DISORDER: ICD-10-CM

## 2018-07-19 LAB — INR BLD: 1.4

## 2018-07-27 LAB — INR BLD: 2.6

## 2018-07-30 ENCOUNTER — ANTI-COAG VISIT (OUTPATIENT)
Dept: INTERNAL MEDICINE | Age: 83
End: 2018-07-30

## 2018-07-30 DIAGNOSIS — I35.9 AORTIC VALVE DISORDER: ICD-10-CM

## 2018-08-03 ENCOUNTER — ANTI-COAG VISIT (OUTPATIENT)
Dept: INTERNAL MEDICINE | Age: 83
End: 2018-08-03

## 2018-08-03 DIAGNOSIS — I35.9 AORTIC VALVE DISORDER: ICD-10-CM

## 2018-08-03 LAB — INR BLD: 2.1

## 2018-08-13 ENCOUNTER — ANTI-COAG VISIT (OUTPATIENT)
Dept: INTERNAL MEDICINE | Age: 83
End: 2018-08-13

## 2018-08-13 DIAGNOSIS — I35.9 AORTIC VALVE DISORDER: ICD-10-CM

## 2018-08-13 LAB — INR BLD: 2.4

## 2018-08-16 NOTE — TELEPHONE ENCOUNTER
From: Kelle Matias MD  Sent: 8/15/2018 6:25 PM EDT  Subject: Medication Renewal Request    Yoko Galvin. Mimi Power would like a refill of the following medications:     denosumab (PROLIA) 60 MG/ML SOLN SC injection Daylin Bean MD]   Patient Comment: Time for the next injextion for Bárbara Sanchez and me.     Preferred pharmacy: Other [facility]

## 2018-08-17 ENCOUNTER — ANTI-COAG VISIT (OUTPATIENT)
Dept: INTERNAL MEDICINE | Age: 83
End: 2018-08-17

## 2018-08-17 DIAGNOSIS — I35.9 AORTIC VALVE DISORDER: ICD-10-CM

## 2018-08-17 LAB — INR BLD: 2.6

## 2018-09-08 ENCOUNTER — HOSPITAL ENCOUNTER (OUTPATIENT)
Dept: ONCOLOGY | Age: 83
Setting detail: INFUSION SERIES
Discharge: HOME | End: 2018-09-08
Payer: MEDICARE

## 2018-09-08 VITALS
RESPIRATION RATE: 18 BRPM | HEART RATE: 63 BPM | SYSTOLIC BLOOD PRESSURE: 122 MMHG | DIASTOLIC BLOOD PRESSURE: 76 MMHG | TEMPERATURE: 97.6 F

## 2018-09-08 PROCEDURE — 6360000002 HC RX W HCPCS: Performed by: INTERNAL MEDICINE

## 2018-09-08 PROCEDURE — 96372 THER/PROPH/DIAG INJ SC/IM: CPT | Performed by: NURSE PRACTITIONER

## 2018-09-08 RX ADMIN — DENOSUMAB 60 MG: 60 INJECTION SUBCUTANEOUS at 11:32

## 2018-09-08 NOTE — PLAN OF CARE
Problem: SAFETY  Goal: Free from accidental physical injury  Pt is a medium fall risk. Explained fall risk precautions to pt and rationale behind their use to keep the patient safe. Belongings are in reach. Pt encouraged to notify staff for any and all assistance. Staff present in tx suite throughout entirety of pts treatment to monitor and protect from falls. Assistance provided when ambulating to restroom utilizing Stay With Me. Problem: KNOWLEDGE DEFICIT  Goal: Patient/S.O. demonstrates understanding of disease process, treatment plan, medications, and discharge instructions. Pt seen and assessed [address] today for Prolia injection per orders from [facility]. Pt tolerated infusion well and without incident. Pt verbalizes understanding of discharge instructions. Discharged ambulatory to home with wife.

## 2018-09-15 LAB — INR BLD: 2.6

## 2018-09-17 ENCOUNTER — ANTI-COAG VISIT (OUTPATIENT)
Dept: INTERNAL MEDICINE | Age: 83
End: 2018-09-17

## 2018-09-17 DIAGNOSIS — I35.9 AORTIC VALVE DISORDER: ICD-10-CM

## 2018-09-21 RX ORDER — WARFARIN SODIUM 5 MG/1
TABLET ORAL
Qty: 90 TABLET | Refills: 0 | Status: SHIPPED | OUTPATIENT
Start: 2018-09-21 | End: 2019-03-27 | Stop reason: SDUPTHER

## 2018-09-28 ENCOUNTER — ANTI-COAG VISIT (OUTPATIENT)
Dept: INTERNAL MEDICINE CLINIC | Age: 83
End: 2018-09-28

## 2018-09-28 DIAGNOSIS — I35.9 AORTIC VALVE DISORDER: ICD-10-CM

## 2018-09-28 LAB — INR BLD: 2.2

## 2018-10-05 ENCOUNTER — ANTI-COAG VISIT (OUTPATIENT)
Dept: INTERNAL MEDICINE CLINIC | Age: 83
End: 2018-10-05

## 2018-10-05 DIAGNOSIS — I35.9 AORTIC VALVE DISORDER: ICD-10-CM

## 2018-10-05 LAB — INR BLD: 2.5

## 2018-10-08 RX ORDER — OMEPRAZOLE 40 MG/1
CAPSULE, DELAYED RELEASE ORAL
Qty: 30 CAPSULE | Refills: 5 | Status: SHIPPED | OUTPATIENT
Start: 2018-10-08 | End: 2019-05-06 | Stop reason: SDUPTHER

## 2018-10-15 ENCOUNTER — OFFICE VISIT (OUTPATIENT)
Dept: CARDIOLOGY CLINIC | Age: 83
End: 2018-10-15
Payer: MEDICARE

## 2018-10-15 ENCOUNTER — ANTI-COAG VISIT (OUTPATIENT)
Dept: INTERNAL MEDICINE CLINIC | Age: 83
End: 2018-10-15

## 2018-10-15 VITALS
WEIGHT: 117 LBS | HEIGHT: 63 IN | BODY MASS INDEX: 20.73 KG/M2 | RESPIRATION RATE: 16 BRPM | DIASTOLIC BLOOD PRESSURE: 80 MMHG | OXYGEN SATURATION: 99 % | HEART RATE: 66 BPM | SYSTOLIC BLOOD PRESSURE: 102 MMHG

## 2018-10-15 DIAGNOSIS — Z95.2 S/P AVR (AORTIC VALVE REPLACEMENT): Primary | ICD-10-CM

## 2018-10-15 DIAGNOSIS — E78.5 HYPERLIPIDEMIA, UNSPECIFIED HYPERLIPIDEMIA TYPE: ICD-10-CM

## 2018-10-15 DIAGNOSIS — I35.9 AORTIC VALVE DISORDER: ICD-10-CM

## 2018-10-15 DIAGNOSIS — N18.30 STAGE 3 CHRONIC KIDNEY DISEASE (HCC): ICD-10-CM

## 2018-10-15 LAB — INR BLD: 2

## 2018-10-15 PROCEDURE — 1123F ACP DISCUSS/DSCN MKR DOCD: CPT | Performed by: INTERNAL MEDICINE

## 2018-10-15 PROCEDURE — G8420 CALC BMI NORM PARAMETERS: HCPCS | Performed by: INTERNAL MEDICINE

## 2018-10-15 PROCEDURE — G8482 FLU IMMUNIZE ORDER/ADMIN: HCPCS | Performed by: INTERNAL MEDICINE

## 2018-10-15 PROCEDURE — 99213 OFFICE O/P EST LOW 20 MIN: CPT | Performed by: INTERNAL MEDICINE

## 2018-10-15 PROCEDURE — 4040F PNEUMOC VAC/ADMIN/RCVD: CPT | Performed by: INTERNAL MEDICINE

## 2018-10-15 PROCEDURE — 93000 ELECTROCARDIOGRAM COMPLETE: CPT | Performed by: INTERNAL MEDICINE

## 2018-10-15 PROCEDURE — G8427 DOCREV CUR MEDS BY ELIG CLIN: HCPCS | Performed by: INTERNAL MEDICINE

## 2018-10-15 PROCEDURE — 1101F PT FALLS ASSESS-DOCD LE1/YR: CPT | Performed by: INTERNAL MEDICINE

## 2018-10-15 PROCEDURE — 1036F TOBACCO NON-USER: CPT | Performed by: INTERNAL MEDICINE

## 2018-10-15 NOTE — PROGRESS NOTES
Elastic Bandages & Supports (KNEE BRACE/HINGED BARS SMALL) MISC, 1 Device by Does not apply route daily, Disp: 1 each, Rfl: 0    Magnesium 65 MG TABS, Take by mouth, Disp: , Rfl:     Coenzyme Q10 (COQ10) 400 MG CAPS, Take 1 capsule by mouth, Disp: , Rfl:     Simethicone 80 MG TABS, Take 80 tablets by mouth 3 times daily. (Patient taking differently: Take 125 mg by mouth 3 times daily. ), Disp: 90 each, Rfl: 0    warfarin (COUMADIN) 1 MG tablet, Take 1 mg by mouth., Disp: , Rfl:     sildenafil (VIAGRA) 50 MG tablet, Take 50 mg by mouth as needed for Erectile Dysfunction. , Disp: , Rfl:     vitamin B-12 (CYANOCOBALAMIN) 1000 MCG tablet, Take 1,000 mcg by mouth daily. , Disp: , Rfl:     docusate sodium (COLACE) 100 MG capsule, Take 100 mg by mouth daily. One  Capsule daily, Disp: , Rfl:     polyethylene glycol (GLYCOLAX) powder, Take 17 g by mouth 2 times daily , Disp: , Rfl:     denosumab (PROLIA) 60 MG/ML SOLN SC injection, Inject 1 mL into the skin once for 1 dose SENILE OSTEOPOROSIS 733.01, Disp: 1 mL, Rfl: 0    Assessment:  82 yr old male here for new patient visit. 1. S/P AVR (aortic valve replacement)    2. Aortic valve disorder    3. Hyperlipidemia, unspecified hyperlipidemia type    4. Stage 3 chronic kidney disease ([address]      Plan:  -Get echo  -Ashley in 6 mo, me in 1 year  -Cont warfarin; goal INR 2-3  -Cont atorvastatin    Charlie Willams MD, [facility], [place]

## 2018-10-18 ENCOUNTER — ANTI-COAG VISIT (OUTPATIENT)
Dept: INTERNAL MEDICINE CLINIC | Age: 83
End: 2018-10-18

## 2018-10-18 DIAGNOSIS — I35.9 AORTIC VALVE DISORDER: ICD-10-CM

## 2018-10-18 LAB — INR BLD: 2.2

## 2018-10-26 ENCOUNTER — ANTI-COAG VISIT (OUTPATIENT)
Dept: INTERNAL MEDICINE CLINIC | Age: 83
End: 2018-10-26

## 2018-10-26 DIAGNOSIS — I35.9 AORTIC VALVE DISORDER: ICD-10-CM

## 2018-10-26 LAB — INR BLD: 3.9

## 2018-11-12 ENCOUNTER — ANTI-COAG VISIT (OUTPATIENT)
Dept: INTERNAL MEDICINE CLINIC | Age: 83
End: 2018-11-12

## 2018-11-12 DIAGNOSIS — I35.9 AORTIC VALVE DISORDER: ICD-10-CM

## 2018-11-12 LAB — INR BLD: 1.8

## 2018-11-16 LAB — INR BLD: 1.9

## 2018-11-19 ENCOUNTER — HOSPITAL ENCOUNTER (OUTPATIENT)
Dept: NON INVASIVE DIAGNOSTICS | Age: 83
Discharge: HOME | End: 2018-11-19
Payer: MEDICARE

## 2018-11-19 LAB
LV EF: 58 %
LVEF MODALITY: NORMAL

## 2018-11-19 PROCEDURE — 93306 TTE W/DOPPLER COMPLETE: CPT

## 2018-11-27 ENCOUNTER — ANTI-COAG VISIT (OUTPATIENT)
Dept: INTERNAL MEDICINE CLINIC | Age: 83
End: 2018-11-27

## 2018-11-27 DIAGNOSIS — I35.9 AORTIC VALVE DISORDER: ICD-10-CM

## 2018-12-07 LAB — INR BLD: 2.3

## 2018-12-10 ENCOUNTER — ANTI-COAG VISIT (OUTPATIENT)
Dept: INTERNAL MEDICINE CLINIC | Age: 83
End: 2018-12-10

## 2018-12-10 DIAGNOSIS — I35.9 AORTIC VALVE DISORDER: ICD-10-CM

## 2018-12-17 ENCOUNTER — OFFICE VISIT (OUTPATIENT)
Dept: INTERNAL MEDICINE CLINIC | Age: 83
End: 2018-12-17
Payer: MEDICARE

## 2018-12-17 VITALS
WEIGHT: 113 LBS | BODY MASS INDEX: 20.02 KG/M2 | DIASTOLIC BLOOD PRESSURE: 78 MMHG | SYSTOLIC BLOOD PRESSURE: 110 MMHG | HEIGHT: 63 IN

## 2018-12-17 DIAGNOSIS — E78.5 DYSLIPIDEMIA: ICD-10-CM

## 2018-12-17 DIAGNOSIS — N18.30 CHRONIC KIDNEY DISEASE, STAGE III (MODERATE) (HCC): ICD-10-CM

## 2018-12-17 DIAGNOSIS — D63.1 ANEMIA ASSOCIATED WITH CHRONIC RENAL FAILURE: ICD-10-CM

## 2018-12-17 DIAGNOSIS — N18.30 CHRONIC KIDNEY DISEASE, STAGE III (MODERATE) (HCC): Primary | ICD-10-CM

## 2018-12-17 DIAGNOSIS — N18.9 ANEMIA ASSOCIATED WITH CHRONIC RENAL FAILURE: ICD-10-CM

## 2018-12-17 DIAGNOSIS — Z00.00 ROUTINE GENERAL MEDICAL EXAMINATION AT A HEALTH CARE FACILITY: ICD-10-CM

## 2018-12-17 DIAGNOSIS — Z95.2 STATUS POST AORTIC VALVE REPLACEMENT: ICD-10-CM

## 2018-12-17 PROCEDURE — G0439 PPPS, SUBSEQ VISIT: HCPCS | Performed by: HOSPITALIST

## 2018-12-17 PROCEDURE — 4040F PNEUMOC VAC/ADMIN/RCVD: CPT | Performed by: HOSPITALIST

## 2018-12-17 PROCEDURE — G8482 FLU IMMUNIZE ORDER/ADMIN: HCPCS | Performed by: HOSPITALIST

## 2018-12-17 ASSESSMENT — PATIENT HEALTH QUESTIONNAIRE - PHQ9
SUM OF ALL RESPONSES TO PHQ QUESTIONS 1-9: 0
SUM OF ALL RESPONSES TO PHQ QUESTIONS 1-9: 0

## 2018-12-17 ASSESSMENT — ANXIETY QUESTIONNAIRES: GAD7 TOTAL SCORE: 0

## 2018-12-17 ASSESSMENT — LIFESTYLE VARIABLES: HOW OFTEN DO YOU HAVE A DRINK CONTAINING ALCOHOL: 0

## 2018-12-18 ENCOUNTER — TELEPHONE (OUTPATIENT)
Dept: INTERNAL MEDICINE CLINIC | Age: 83
End: 2018-12-18

## 2018-12-18 LAB
A/G RATIO: 1.7 (ref 1.1–2.2)
ALBUMIN SERPL-MCNC: 4.3 G/DL (ref 3.4–5)
ALP BLD-CCNC: 51 U/L (ref 40–129)
ALT SERPL-CCNC: 14 U/L (ref 10–40)
ANION GAP SERPL CALCULATED.3IONS-SCNC: 15 MMOL/L (ref 3–16)
AST SERPL-CCNC: 22 U/L (ref 15–37)
BASOPHILS ABSOLUTE: 0 K/UL (ref 0–0.2)
BASOPHILS RELATIVE PERCENT: 0.9 %
BILIRUB SERPL-MCNC: 0.5 MG/DL (ref 0–1)
BUN BLDV-MCNC: 42 MG/DL (ref 7–20)
CALCIUM SERPL-MCNC: 9.3 MG/DL (ref 8.3–10.6)
CHLORIDE BLD-SCNC: 100 MMOL/L (ref 99–110)
CHOLESTEROL, TOTAL: 157 MG/DL (ref 0–199)
CO2: 28 MMOL/L (ref 21–32)
CREAT SERPL-MCNC: 1.6 MG/DL (ref 0.8–1.3)
EOSINOPHILS ABSOLUTE: 0.1 K/UL (ref 0–0.6)
EOSINOPHILS RELATIVE PERCENT: 1.2 %
FOLATE: 19.41 NG/ML (ref 4.78–24.2)
GFR AFRICAN AMERICAN: 50
GFR NON-AFRICAN AMERICAN: 42
GLOBULIN: 2.6 G/DL
GLUCOSE BLD-MCNC: 81 MG/DL (ref 70–99)
HCT VFR BLD CALC: 41 % (ref 40.5–52.5)
HDLC SERPL-MCNC: 82 MG/DL (ref 40–60)
HEMOGLOBIN: 13.5 G/DL (ref 13.5–17.5)
IRON SATURATION: 25 % (ref 20–50)
IRON: 68 UG/DL (ref 59–158)
LDL CHOLESTEROL CALCULATED: 58 MG/DL
LYMPHOCYTES ABSOLUTE: 1.5 K/UL (ref 1–5.1)
LYMPHOCYTES RELATIVE PERCENT: 30.1 %
MCH RBC QN AUTO: 29.7 PG (ref 26–34)
MCHC RBC AUTO-ENTMCNC: 33 G/DL (ref 31–36)
MCV RBC AUTO: 90 FL (ref 80–100)
MONOCYTES ABSOLUTE: 0.4 K/UL (ref 0–1.3)
MONOCYTES RELATIVE PERCENT: 9 %
NEUTROPHILS ABSOLUTE: 2.9 K/UL (ref 1.7–7.7)
NEUTROPHILS RELATIVE PERCENT: 58.8 %
PDW BLD-RTO: 14.5 % (ref 12.4–15.4)
PLATELET # BLD: 113 K/UL (ref 135–450)
PMV BLD AUTO: 11.5 FL (ref 5–10.5)
POTASSIUM SERPL-SCNC: 4.6 MMOL/L (ref 3.5–5.1)
RBC # BLD: 4.55 M/UL (ref 4.2–5.9)
SODIUM BLD-SCNC: 143 MMOL/L (ref 136–145)
TOTAL IRON BINDING CAPACITY: 269 UG/DL (ref 260–445)
TOTAL PROTEIN: 6.9 G/DL (ref 6.4–8.2)
TRIGL SERPL-MCNC: 87 MG/DL (ref 0–150)
TSH SERPL DL<=0.05 MIU/L-ACNC: 3.33 UIU/ML (ref 0.27–4.2)
VLDLC SERPL CALC-MCNC: 17 MG/DL
WBC # BLD: 4.9 K/UL (ref 4–11)

## 2018-12-21 ENCOUNTER — ANTI-COAG VISIT (OUTPATIENT)
Dept: INTERNAL MEDICINE CLINIC | Age: 83
End: 2018-12-21

## 2018-12-21 DIAGNOSIS — I35.9 AORTIC VALVE DISORDER: ICD-10-CM

## 2018-12-21 LAB — INR BLD: 2.4

## 2018-12-28 LAB — INR BLD: 2.4

## 2018-12-28 RX ORDER — ATORVASTATIN CALCIUM 10 MG/1
TABLET, FILM COATED ORAL
Qty: 90 TABLET | Refills: 2 | Status: SHIPPED | OUTPATIENT
Start: 2018-12-28 | End: 2019-09-23 | Stop reason: SDUPTHER

## 2019-01-02 ENCOUNTER — ANTI-COAG VISIT (OUTPATIENT)
Dept: INTERNAL MEDICINE CLINIC | Age: 84
End: 2019-01-02

## 2019-01-02 DIAGNOSIS — I35.9 AORTIC VALVE DISORDER: ICD-10-CM

## 2019-01-04 ENCOUNTER — ANTI-COAG VISIT (OUTPATIENT)
Dept: INTERNAL MEDICINE CLINIC | Age: 84
End: 2019-01-04

## 2019-01-04 DIAGNOSIS — I35.9 AORTIC VALVE DISORDER: ICD-10-CM

## 2019-01-04 LAB — INR BLD: 1.7

## 2019-01-11 ENCOUNTER — ANTI-COAG VISIT (OUTPATIENT)
Dept: INTERNAL MEDICINE CLINIC | Age: 84
End: 2019-01-11

## 2019-01-11 DIAGNOSIS — I35.9 AORTIC VALVE DISORDER: ICD-10-CM

## 2019-01-11 LAB — INR BLD: 3

## 2019-01-15 ENCOUNTER — TELEPHONE (OUTPATIENT)
Dept: INTERNAL MEDICINE CLINIC | Age: 84
End: 2019-01-15

## 2019-01-18 ENCOUNTER — ANTI-COAG VISIT (OUTPATIENT)
Dept: INTERNAL MEDICINE CLINIC | Age: 84
End: 2019-01-18

## 2019-01-18 DIAGNOSIS — I35.9 AORTIC VALVE DISORDER: ICD-10-CM

## 2019-01-18 LAB — INR BLD: 1.8

## 2019-02-08 LAB — INR BLD: 2.6

## 2019-02-19 ENCOUNTER — ANTI-COAG VISIT (OUTPATIENT)
Dept: INTERNAL MEDICINE CLINIC | Age: 84
End: 2019-02-19

## 2019-02-19 DIAGNOSIS — I35.9 AORTIC VALVE DISORDER: ICD-10-CM

## 2019-02-22 ENCOUNTER — ANTI-COAG VISIT (OUTPATIENT)
Dept: INTERNAL MEDICINE CLINIC | Age: 84
End: 2019-02-22

## 2019-02-22 DIAGNOSIS — I35.9 AORTIC VALVE DISORDER: ICD-10-CM

## 2019-02-22 LAB — INR BLD: 2.5

## 2019-03-09 ENCOUNTER — HOSPITAL ENCOUNTER (OUTPATIENT)
Dept: ONCOLOGY | Age: 84
Setting detail: INFUSION SERIES
Discharge: HOME | End: 2019-03-09
Payer: MEDICARE

## 2019-03-09 VITALS
SYSTOLIC BLOOD PRESSURE: 125 MMHG | HEART RATE: 67 BPM | TEMPERATURE: 96.9 F | RESPIRATION RATE: 17 BRPM | DIASTOLIC BLOOD PRESSURE: 74 MMHG

## 2019-03-09 PROCEDURE — 6360000002 HC RX W HCPCS: Performed by: INTERNAL MEDICINE

## 2019-03-09 PROCEDURE — 96372 THER/PROPH/DIAG INJ SC/IM: CPT

## 2019-03-09 RX ADMIN — DENOSUMAB 60 MG: 60 INJECTION SUBCUTANEOUS at 11:41

## 2019-03-09 NOTE — PLAN OF CARE
Problem: Falls - Risk of:  Goal: Will remain free from falls  Description  Will remain free from falls  Outcome: Completed  Note:   Pt is a Med fall risk. Explained fall risk precautions to pt & pt's wife and rationale behind their use to keep the patient safe. Belongings are in reach. Pt encouraged to notify staff for any and all assistance. Staff present in tx suite throughout entirety of pts treatment to monitor and protect from falls. Assistance provided when ambulating to restroom utilizing Stay With Me. Problem: KNOWLEDGE DEFICIT  Goal: Patient/S.O. demonstrates understanding of disease process, treatment plan, medications, and discharge instructions. Outcome: Completed  Note:   Pt seen and assessed at [address] [facility] for Prolia, 60 mg injection per orders from Dr. Viky Tyler. Administered per [facility] policy. Pt tolerated injection well and without incident. Pt verbalizes understanding of discharge instructions. Discharged ambulatory to home with pt's wife.

## 2019-03-19 ENCOUNTER — ANTI-COAG VISIT (OUTPATIENT)
Dept: INTERNAL MEDICINE CLINIC | Age: 84
End: 2019-03-19

## 2019-03-19 DIAGNOSIS — I35.9 AORTIC VALVE DISORDER: ICD-10-CM

## 2019-03-19 LAB — INR BLD: 2.9

## 2019-03-20 ENCOUNTER — OFFICE VISIT (OUTPATIENT)
Dept: INTERNAL MEDICINE CLINIC | Age: 84
End: 2019-03-20
Payer: MEDICARE

## 2019-03-20 ENCOUNTER — TELEPHONE (OUTPATIENT)
Dept: INTERNAL MEDICINE CLINIC | Age: 84
End: 2019-03-20

## 2019-03-20 VITALS
WEIGHT: 118 LBS | HEIGHT: 63 IN | BODY MASS INDEX: 20.91 KG/M2 | DIASTOLIC BLOOD PRESSURE: 72 MMHG | SYSTOLIC BLOOD PRESSURE: 120 MMHG

## 2019-03-20 DIAGNOSIS — M79.662 TENDERNESS OF LEFT CALF: Primary | ICD-10-CM

## 2019-03-20 PROCEDURE — 99213 OFFICE O/P EST LOW 20 MIN: CPT | Performed by: INTERNAL MEDICINE

## 2019-03-20 PROCEDURE — 1101F PT FALLS ASSESS-DOCD LE1/YR: CPT | Performed by: INTERNAL MEDICINE

## 2019-03-20 PROCEDURE — G8427 DOCREV CUR MEDS BY ELIG CLIN: HCPCS | Performed by: INTERNAL MEDICINE

## 2019-03-20 PROCEDURE — 1036F TOBACCO NON-USER: CPT | Performed by: INTERNAL MEDICINE

## 2019-03-20 PROCEDURE — 1123F ACP DISCUSS/DSCN MKR DOCD: CPT | Performed by: INTERNAL MEDICINE

## 2019-03-20 PROCEDURE — G8482 FLU IMMUNIZE ORDER/ADMIN: HCPCS | Performed by: INTERNAL MEDICINE

## 2019-03-20 PROCEDURE — 4040F PNEUMOC VAC/ADMIN/RCVD: CPT | Performed by: INTERNAL MEDICINE

## 2019-03-20 PROCEDURE — G8420 CALC BMI NORM PARAMETERS: HCPCS | Performed by: INTERNAL MEDICINE

## 2019-03-20 ASSESSMENT — PATIENT HEALTH QUESTIONNAIRE - PHQ9
SUM OF ALL RESPONSES TO PHQ9 QUESTIONS 1 & 2: 0
SUM OF ALL RESPONSES TO PHQ QUESTIONS 1-9: 0
2. FEELING DOWN, DEPRESSED OR HOPELESS: 0
1. LITTLE INTEREST OR PLEASURE IN DOING THINGS: 0
SUM OF ALL RESPONSES TO PHQ QUESTIONS 1-9: 0

## 2019-03-21 ENCOUNTER — HOSPITAL ENCOUNTER (OUTPATIENT)
Dept: VASCULAR LAB | Age: 84
Discharge: HOME | End: 2019-03-21
Payer: MEDICARE

## 2019-03-21 DIAGNOSIS — M79.662 TENDERNESS OF LEFT CALF: ICD-10-CM

## 2019-03-21 PROCEDURE — 93971 EXTREMITY STUDY: CPT

## 2019-03-27 RX ORDER — WARFARIN SODIUM 5 MG/1
TABLET ORAL
Qty: 90 TABLET | Refills: 1 | Status: SHIPPED | OUTPATIENT
Start: 2019-03-27 | End: 2020-04-07

## 2019-03-29 ENCOUNTER — ANTI-COAG VISIT (OUTPATIENT)
Dept: INTERNAL MEDICINE CLINIC | Age: 84
End: 2019-03-29

## 2019-03-29 DIAGNOSIS — I35.9 AORTIC VALVE DISORDER: ICD-10-CM

## 2019-03-29 LAB — INR BLD: 2.4

## 2019-04-12 ENCOUNTER — ANTI-COAG VISIT (OUTPATIENT)
Dept: INTERNAL MEDICINE CLINIC | Age: 84
End: 2019-04-12

## 2019-04-12 DIAGNOSIS — I35.9 AORTIC VALVE DISORDER: ICD-10-CM

## 2019-04-12 LAB — INR BLD: 2.7

## 2019-04-19 LAB — INR BLD: 1.8

## 2019-04-22 ENCOUNTER — ANTI-COAG VISIT (OUTPATIENT)
Dept: INTERNAL MEDICINE CLINIC | Age: 84
End: 2019-04-22

## 2019-04-22 DIAGNOSIS — I35.9 AORTIC VALVE DISORDER: ICD-10-CM

## 2019-04-29 ENCOUNTER — OFFICE VISIT (OUTPATIENT)
Dept: CARDIOLOGY CLINIC | Age: 84
End: 2019-04-29
Payer: MEDICARE

## 2019-04-29 VITALS
DIASTOLIC BLOOD PRESSURE: 76 MMHG | OXYGEN SATURATION: 98 % | BODY MASS INDEX: 20.73 KG/M2 | WEIGHT: 117 LBS | SYSTOLIC BLOOD PRESSURE: 132 MMHG | HEART RATE: 81 BPM

## 2019-04-29 DIAGNOSIS — I35.9 AORTIC VALVE DISORDER: Primary | ICD-10-CM

## 2019-04-29 DIAGNOSIS — Z95.1 S/P CABG (CORONARY ARTERY BYPASS GRAFT): ICD-10-CM

## 2019-04-29 DIAGNOSIS — N18.30 STAGE 3 CHRONIC KIDNEY DISEASE (HCC): ICD-10-CM

## 2019-04-29 DIAGNOSIS — E78.5 HYPERLIPIDEMIA, UNSPECIFIED HYPERLIPIDEMIA TYPE: ICD-10-CM

## 2019-04-29 DIAGNOSIS — Z95.2 S/P AVR (AORTIC VALVE REPLACEMENT): ICD-10-CM

## 2019-04-29 PROCEDURE — 93000 ELECTROCARDIOGRAM COMPLETE: CPT | Performed by: INTERNAL MEDICINE

## 2019-04-29 PROCEDURE — 99214 OFFICE O/P EST MOD 30 MIN: CPT | Performed by: INTERNAL MEDICINE

## 2019-04-29 NOTE — PROGRESS NOTES
fatigue, or night sweats. No abnormal changes in weight. HEENT: No blurry or decreased vision. No changes in hearing, nasal discharge or sore throat. Cardiovascular: See HPI. No cramping in legs or buttocks when walking. Respiratory: Recovered from URI. Gastrointestinal: No abdominal pain, hematochezia, melana, or history of GI ulcers. Genito-Urinary: No dysuria or hematuria. No urgency or polyuria. Musculoskeletal: No complaints of joint pain, joint swelling or muscular weakness/soreness. Neurological: No dizziness or headaches. No numbness/tingling, speech problems or weakness. No history of a stroke or TIA. + gait instability. Psychological: No anxiety or depression  Hematological and Lymphatic: No abnormal bleeding or bruising, blood clots, jaundice. Endocrine: No malaise/lethargy, palpitations, polydipsia/polyuria, temperature intolerance or unexpected weight changes. Skin: No rashes or non-healing ulcers. Physical Exam:  /76   Pulse 81   Wt 117 lb (53.1 kg)   SpO2 98%   BMI 20.73 kg/m²   General (appearance):  Alert, awake, thin, NAD  Eyes: eomi, anicteric  Neck: supple, no JVD  Ears/Nose/Mouth/Thorat: No cyanosis  CV: RRR, Soft mechanical S2 and soft RADHA. Respiratory:  CTAB, no stridor, no w/r/c  GI: soft, NT, ND, no peritoneal signs  Skin: Warm, dry. No rashes  Neuro/Psych: Alert and oriented x3. Appropriate behavior  Ext:  No c/c.  No edema  Pulses:  +2 carotid, no carotid bruit    Lab Results   Component Value Date    WBC 4.9 12/17/2018    HGB 13.5 12/17/2018    HCT 41.0 12/17/2018    MCV 90.0 12/17/2018     (L) 12/17/2018     Lab Results   Component Value Date     12/17/2018    K 4.6 12/17/2018     12/17/2018    CO2 28 12/17/2018    BUN 42 (H) 12/17/2018    CREATININE 1.6 (H) 12/17/2018    GLUCOSE 81 12/17/2018    CALCIUM 9.3 12/17/2018    PROT 6.9 12/17/2018    LABALBU 4.3 12/17/2018    BILITOT 0.5 12/17/2018    ALKPHOS 51 12/17/2018    AST 22 12/17/2018 ALT 14 12/17/2018    LABGLOM 42 (A) 12/17/2018    GFRAA 50 (A) 12/17/2018    AGRATIO 1.7 12/17/2018    GLOB 2.6 12/17/2018     Lab Results   Component Value Date    INR 1.80 04/19/2019    INR 2.70 04/12/2019    INR 2.40 03/29/2019    PROTIME 13.0 (H) 09/21/2015    PROTIME 19.4 (H) 05/01/2015    PROTIME 22.9 (H) 04/30/2015       Lab Results   Component Value Date    CHOL 157 12/17/2018    CHOL 165 01/03/2018    CHOL 150 08/24/2015     Lab Results   Component Value Date    TRIG 87 12/17/2018    TRIG 101 01/03/2018    TRIG 84 08/24/2015     Lab Results   Component Value Date    HDL 82 (H) 12/17/2018    HDL 83 (H) 01/03/2018    HDL 84 (H) 08/24/2015     Lab Results   Component Value Date    LDLCALC 58 12/17/2018    LDLCALC 62 01/03/2018    LDLCALC 49 08/24/2015     Lab Results   Component Value Date    LABVLDL 17 12/17/2018    LABVLDL 20 01/03/2018    LABVLDL 17 08/24/2015     Lab Results   Component Value Date    CHOLHDLRATIO 1.8 (L) 05/14/2014    CHOLHDLRATIO 2.1 01/10/2012     11/2018 TTE: EF 55-60%. Mechanical valve. MG 7. Mild AR. RVSP 22    5/2012 Echo: EF 50-55%. Mechanical aortic valve. MAC. L pleural effusion      Current Outpatient Medications:     warfarin (COUMADIN) 5 MG tablet, TAKE ONE TABLET BY MOUTH ONCE NIGHTLY, Disp: 90 tablet, Rfl: 1    atorvastatin (LIPITOR) 10 MG tablet, TAKE ONE TABLET BY MOUTH DAILY, Disp: 90 tablet, Rfl: 2    omeprazole (PRILOSEC) 40 MG delayed release capsule, TAKE ONE CAPSULE BY MOUTH DAILY, Disp: 30 capsule, Rfl: 5    Elastic Bandages & Supports (KNEE BRACE/HINGED BARS SMALL) MISC, 1 Device by Does not apply route daily, Disp: 1 each, Rfl: 0    Magnesium 65 MG TABS, Take by mouth, Disp: , Rfl:     Coenzyme Q10 (COQ10) 400 MG CAPS, Take 1 capsule by mouth, Disp: , Rfl:     Simethicone 80 MG TABS, Take 80 tablets by mouth 3 times daily.  (Patient taking differently: Take 125 mg by mouth 3 times daily. ), Disp: 90 each, Rfl: 0    warfarin (COUMADIN) 1 MG tablet, Take 1 mg by mouth., Disp: , Rfl:     sildenafil (VIAGRA) 50 MG tablet, Take 50 mg by mouth as needed for Erectile Dysfunction. , Disp: , Rfl:     vitamin B-12 (CYANOCOBALAMIN) 1000 MCG tablet, Take 1,000 mcg by mouth daily. , Disp: , Rfl:     docusate sodium (COLACE) 100 MG capsule, Take 100 mg by mouth daily. One  Capsule daily, Disp: , Rfl:     polyethylene glycol (GLYCOLAX) powder, Take 17 g by mouth 2 times daily , Disp: , Rfl:     denosumab (PROLIA) 60 MG/ML SOLN SC injection, Inject 1 mL into the skin once for 1 dose SENILE OSTEOPOROSIS 733.01, Disp: 1 mL, Rfl: 0    Assessment:  82 yr old male here for f/u.  1. Aortic valve disorder    2. Stage 3 chronic kidney disease ([facility]    3. Hyperlipidemia, unspecified hyperlipidemia type    4. S/P AVR (aortic valve replacement)    5. S/P CABG (coronary artery bypass graft)        Plan:  -No med changes. Had GI bleeding issue in past, doing well on just warfarin (vavle and gi standpoint). Won't add asa  -Cont statin  -F/U 6 mo  -May get knee surgery, if so will need Lexiscan and need valve a/c addressed.

## 2019-05-03 ENCOUNTER — ANTI-COAG VISIT (OUTPATIENT)
Dept: INTERNAL MEDICINE CLINIC | Age: 84
End: 2019-05-03

## 2019-05-03 DIAGNOSIS — I35.9 AORTIC VALVE DISORDER: ICD-10-CM

## 2019-05-03 LAB — INR BLD: 1.8

## 2019-05-06 ENCOUNTER — TELEPHONE (OUTPATIENT)
Dept: INTERNAL MEDICINE CLINIC | Age: 84
End: 2019-05-06

## 2019-05-06 RX ORDER — OMEPRAZOLE 40 MG/1
CAPSULE, DELAYED RELEASE ORAL
Qty: 30 CAPSULE | Refills: 2 | Status: SHIPPED | OUTPATIENT
Start: 2019-05-06 | End: 2019-08-03 | Stop reason: SDUPTHER

## 2019-05-06 NOTE — LETTER
[facility] [address]  [address] [facility], [address] [place] [id number]  Phone: [phone]  Fax: [phone]    Joey Arauz MD        May 6, 2019     Patient: Moy Berkowitz MD   YOB: 1935   Date of Visit: 5/6/2019       To Whom It May Concern: It is my medical opinion that Juno Echols requires a disability parking placard for the following reasons: unable to walk 200 feet without assistance/assistive device due to: Osteoarthritis and history of patella fracture. Duration of need: 5 years    If you have any questions or concerns, please don't hesitate to call.     Sincerely,        Joey Arauz MD   State [phone]

## 2019-05-06 NOTE — TELEPHONE ENCOUNTER
Completed. Letter placed at  for . Patient notified through my chart. If he wants it mailed he can let us know.

## 2019-05-19 LAB — INR BLD: 2

## 2019-05-22 ENCOUNTER — ANTI-COAG VISIT (OUTPATIENT)
Dept: INTERNAL MEDICINE CLINIC | Age: 84
End: 2019-05-22

## 2019-05-22 DIAGNOSIS — I35.9 AORTIC VALVE DISORDER: ICD-10-CM

## 2019-05-25 LAB — INR BLD: 2.6

## 2019-06-03 ENCOUNTER — ANTI-COAG VISIT (OUTPATIENT)
Dept: INTERNAL MEDICINE CLINIC | Age: 84
End: 2019-06-03

## 2019-06-03 DIAGNOSIS — I35.9 AORTIC VALVE DISORDER: ICD-10-CM

## 2019-06-03 LAB — INR BLD: 1.7

## 2019-06-07 ENCOUNTER — ANTI-COAG VISIT (OUTPATIENT)
Dept: INTERNAL MEDICINE CLINIC | Age: 84
End: 2019-06-07

## 2019-06-07 DIAGNOSIS — I35.9 AORTIC VALVE DISORDER: ICD-10-CM

## 2019-06-07 LAB — INR BLD: 2.1

## 2019-06-17 ENCOUNTER — OFFICE VISIT (OUTPATIENT)
Dept: INTERNAL MEDICINE CLINIC | Age: 84
End: 2019-06-17
Payer: MEDICARE

## 2019-06-17 ENCOUNTER — ANTI-COAG VISIT (OUTPATIENT)
Dept: INTERNAL MEDICINE CLINIC | Age: 84
End: 2019-06-17

## 2019-06-17 VITALS
WEIGHT: 115 LBS | DIASTOLIC BLOOD PRESSURE: 64 MMHG | BODY MASS INDEX: 20.38 KG/M2 | SYSTOLIC BLOOD PRESSURE: 122 MMHG | HEIGHT: 63 IN

## 2019-06-17 DIAGNOSIS — M17.12 OSTEOARTHRITIS OF LEFT KNEE, UNSPECIFIED OSTEOARTHRITIS TYPE: ICD-10-CM

## 2019-06-17 DIAGNOSIS — N18.30 CHRONIC KIDNEY DISEASE, STAGE III (MODERATE) (HCC): ICD-10-CM

## 2019-06-17 DIAGNOSIS — Z95.2 STATUS POST AORTIC VALVE REPLACEMENT: ICD-10-CM

## 2019-06-17 DIAGNOSIS — G62.9 PERIPHERAL POLYNEUROPATHY: ICD-10-CM

## 2019-06-17 DIAGNOSIS — I25.708 CORONARY ARTERY DISEASE OF BYPASS GRAFT OF NATIVE HEART WITH STABLE ANGINA PECTORIS (HCC): Primary | ICD-10-CM

## 2019-06-17 DIAGNOSIS — Z12.5 PROSTATE CANCER SCREENING: ICD-10-CM

## 2019-06-17 DIAGNOSIS — Z85.46 HISTORY OF PROSTATE CANCER: ICD-10-CM

## 2019-06-17 DIAGNOSIS — Z85.01 HISTORY OF ESOPHAGEAL CANCER: ICD-10-CM

## 2019-06-17 DIAGNOSIS — I35.9 AORTIC VALVE DISORDER: ICD-10-CM

## 2019-06-17 LAB — INR BLD: 2.9

## 2019-06-17 PROCEDURE — 99213 OFFICE O/P EST LOW 20 MIN: CPT | Performed by: INTERNAL MEDICINE

## 2019-06-17 NOTE — PROGRESS NOTES
Follow Up Visit  Established Patient Visit    Patient:  Renee Fitch MD                                               : 1935  Age: 82 y.o. 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 male who presents for :  Scheduled followup     Chief Complaint   Patient presents with    Chronic Kidney Disease     CAD s/p CABG x2 in , aortic stenosis 2/2 bicuspid aortic valve s/p mechanical AVR in , HTN, HLD, CKD3, esophageal ca s/p esophagectomy, prostate ca. Renee Fitch MD states that he has been doing well. He was thinking about getting his left knee replaced, went to an orthopedist and then realized that he's got a few comorbid conditions that he wanted looked at prior to surgery. He also stated that he got an EMG test from his orthopedist for generalized muscular atrophy who found that the patient has peripheral neuropathy in his bilateral legs and some in his arms. He otherwise states that he is doing well and has no other acute issues. Denies chest pain, shortness of breath, nausea, vomiting, or diarrhea.      Past Medical History:   Diagnosis Date    Anemia     Aortic valve disorders     stenosis    Aspiration pneumonia ([address] 2015    Erectile dysfunction     Esophageal cancer ([address] 10/18/2012    Hernia, femoral, bilateral 2014    Hyperlipidemia     Osteoporosis, senile 2014    Pancreatitis 2013    Patient underweight 2013    Prostate cancer ([address]     Unspecified essential hypertension        Past Surgical History:   Procedure Laterality Date    APPENDECTOMY      as a child    BONE GRAFT      for saddle nose    CARDIAC VALVE REPLACEMENT  2006    aorta    CHOLECYSTECTOMY      COLONOSCOPY  2009    COLONOSCOPY  10/05/15    CORONARY ARTERY BYPASS GRAFT  2006    ESOPHAGUS SURGERY      EYE SURGERY  &     cataract bilat removal     GASTRECTOMY      PROSTATE SURGERY      seeds    TONSILLECTOMY  US PROSTATE/TRANSRECTAL VOL STUDY BRACHYTHERAPY         Current Outpatient Medications   Medication Sig Dispense Refill    omeprazole (PRILOSEC) 40 MG delayed release capsule TAKE ONE CAPSULE BY MOUTH DAILY 30 capsule 2    warfarin (COUMADIN) 5 MG tablet TAKE ONE TABLET BY MOUTH ONCE NIGHTLY 90 tablet 1    atorvastatin (LIPITOR) 10 MG tablet TAKE ONE TABLET BY MOUTH DAILY 90 tablet 2    Magnesium 65 MG TABS Take by mouth      Coenzyme Q10 (COQ10) 400 MG CAPS Take 1 capsule by mouth      Simethicone 80 MG TABS Take 80 tablets by mouth 3 times daily. (Patient taking differently: Take 125 mg by mouth 3 times daily. ) 90 each 0    warfarin (COUMADIN) 1 MG tablet Take 1 mg by mouth.  sildenafil (VIAGRA) 50 MG tablet Take 50 mg by mouth as needed for Erectile Dysfunction.  vitamin B-12 (CYANOCOBALAMIN) 1000 MCG tablet Take 1,000 mcg by mouth daily.  docusate sodium (COLACE) 100 MG capsule Take 100 mg by mouth daily. One  Capsule daily      polyethylene glycol (GLYCOLAX) powder Take 17 g by mouth 2 times daily       denosumab (PROLIA) 60 MG/ML SOLN SC injection Inject 1 mL into the skin once for 1 dose SENILE OSTEOPOROSIS 733.01 1 mL 0     No current facility-administered medications for this visit. Physical Exam   /64   Ht 5' 3\" (1.6 m)   Wt 115 lb (52.2 kg)   BMI 20.37 kg/m²     Physical Exam   Constitutional: He is oriented to person, place, and time. He appears well-developed and well-nourished. No distress. Eyes: Pupils are equal, round, and reactive to light. EOM are normal.   Neck: Normal range of motion. Neck supple. No JVD present. No tracheal deviation present. No thyromegaly present. Cardiovascular: Normal rate, regular rhythm, normal heart sounds and intact distal pulses. Mechanical heart sound appreciated    Pulmonary/Chest: Effort normal and breath sounds normal.   Abdominal: Soft. Bowel sounds are normal.   Lymphadenopathy:     He has no cervical adenopathy. Neurological: He is alert and oriented to person, place, and time. Skin: Skin is warm and dry. Capillary refill takes less than 2 seconds. No rash noted. He is not diaphoretic. No erythema. No pallor. Vitals reviewed. Assessment/ plan:     Cari Anguiano was seen today for chronic kidney disease. Diagnoses and all orders for this visit:    Coronary 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 ([facility]  -     Comprehensive Metabolic Panel; Future  -     CBC Auto Differential; Future  -     Lipid Panel; Future    Status post aortic valve replacement - stable    Peripheral polyneuropathy  -     VITAMIN B12 & FOLATE; Future  -     Comprehensive Metabolic Panel; Future  -     CBC Auto Differential; Future  -     TSH without Reflex; Future    Osteoarthritis of left knee, unspecified osteoarthritis type  -     [facility] [place]    Chronic kidney disease, stage III (moderate) (HCC) - stable    History of esophageal cancer    History of prostate cancer    Prostate cancer screening  -     Psa screening; Future      Juice Hoffmann, [address] [facility] [place]

## 2019-06-17 NOTE — PROGRESS NOTES
Subjective:      Patient ID: Chloe Cho MD is a 82 y.o. male with PMHx significant for CKD w/ 2o anemia, CAD s/p two vessel CABG, mechanical aortic valve replacement on coumadin and esophageal and prostate cancer who is here for a follow-up of his     HPI    Review of Systems    Objective:   Physical Exam    Assessment:      ***      Plan:      ***        Gina Rodriguez MD

## 2019-06-19 ENCOUNTER — TELEPHONE (OUTPATIENT)
Dept: INTERNAL MEDICINE CLINIC | Age: 84
End: 2019-06-19

## 2019-06-19 DIAGNOSIS — G62.9 PERIPHERAL POLYNEUROPATHY: ICD-10-CM

## 2019-06-19 DIAGNOSIS — I25.708 CORONARY ARTERY DISEASE OF BYPASS GRAFT OF NATIVE HEART WITH STABLE ANGINA PECTORIS (HCC): ICD-10-CM

## 2019-06-19 DIAGNOSIS — Z12.5 PROSTATE CANCER SCREENING: ICD-10-CM

## 2019-06-19 LAB
A/G RATIO: 1.4 (ref 1.1–2.2)
ALBUMIN SERPL-MCNC: 3.9 G/DL (ref 3.4–5)
ALP BLD-CCNC: 56 U/L (ref 40–129)
ALT SERPL-CCNC: 21 U/L (ref 10–40)
ANION GAP SERPL CALCULATED.3IONS-SCNC: 11 MMOL/L (ref 3–16)
AST SERPL-CCNC: 30 U/L (ref 15–37)
BASOPHILS ABSOLUTE: 0 K/UL (ref 0–0.2)
BASOPHILS RELATIVE PERCENT: 0.7 %
BILIRUB SERPL-MCNC: 0.4 MG/DL (ref 0–1)
BUN BLDV-MCNC: 36 MG/DL (ref 7–20)
CALCIUM SERPL-MCNC: 8.8 MG/DL (ref 8.3–10.6)
CHLORIDE BLD-SCNC: 103 MMOL/L (ref 99–110)
CHOLESTEROL, TOTAL: 162 MG/DL (ref 0–199)
CO2: 29 MMOL/L (ref 21–32)
CREAT SERPL-MCNC: 1.4 MG/DL (ref 0.8–1.3)
EOSINOPHILS ABSOLUTE: 0.1 K/UL (ref 0–0.6)
EOSINOPHILS RELATIVE PERCENT: 1.6 %
FOLATE: >20 NG/ML (ref 4.78–24.2)
GFR AFRICAN AMERICAN: 59
GFR NON-AFRICAN AMERICAN: 49
GLOBULIN: 2.7 G/DL
GLUCOSE BLD-MCNC: 86 MG/DL (ref 70–99)
HCT VFR BLD CALC: 40.4 % (ref 40.5–52.5)
HDLC SERPL-MCNC: 87 MG/DL (ref 40–60)
HEMOGLOBIN: 13.3 G/DL (ref 13.5–17.5)
LDL CHOLESTEROL CALCULATED: 64 MG/DL
LYMPHOCYTES ABSOLUTE: 1.6 K/UL (ref 1–5.1)
LYMPHOCYTES RELATIVE PERCENT: 29.9 %
MCH RBC QN AUTO: 29.8 PG (ref 26–34)
MCHC RBC AUTO-ENTMCNC: 32.9 G/DL (ref 31–36)
MCV RBC AUTO: 90.5 FL (ref 80–100)
MONOCYTES ABSOLUTE: 0.6 K/UL (ref 0–1.3)
MONOCYTES RELATIVE PERCENT: 10.6 %
NEUTROPHILS ABSOLUTE: 3.1 K/UL (ref 1.7–7.7)
NEUTROPHILS RELATIVE PERCENT: 57.2 %
PDW BLD-RTO: 14.9 % (ref 12.4–15.4)
PLATELET # BLD: 123 K/UL (ref 135–450)
PMV BLD AUTO: 11.3 FL (ref 5–10.5)
POTASSIUM SERPL-SCNC: 5 MMOL/L (ref 3.5–5.1)
PROSTATE SPECIFIC ANTIGEN: <0.01 NG/ML (ref 0–4)
RBC # BLD: 4.47 M/UL (ref 4.2–5.9)
SODIUM BLD-SCNC: 143 MMOL/L (ref 136–145)
TOTAL PROTEIN: 6.6 G/DL (ref 6.4–8.2)
TRIGL SERPL-MCNC: 54 MG/DL (ref 0–150)
TSH SERPL DL<=0.05 MIU/L-ACNC: 4.42 UIU/ML (ref 0.27–4.2)
VITAMIN B-12: 1468 PG/ML (ref 211–911)
VLDLC SERPL CALC-MCNC: 11 MG/DL
WBC # BLD: 5.4 K/UL (ref 4–11)

## 2019-06-19 NOTE — TELEPHONE ENCOUNTER
Non-Urgent Medical Question     From  Jen Bowden MD To  [facility] Practice Support Sent  6/19/2019  2:28 PM   ----- Message from [facility] [address], Processor sent at 6/19/2019  2:28 PM EDT -----     At Dr. Elana Schulte recommendation I contacted physical therapy to evaluate the potentail functional improvement that left knee replacement might provide.  I was told that they could suggest a strengthening program for me but no advice on benefits or suitability of knee replacement surgery.  Suggestions?

## 2019-07-05 LAB — INR BLD: 1.8

## 2019-07-08 ENCOUNTER — ANTI-COAG VISIT (OUTPATIENT)
Dept: INTERNAL MEDICINE CLINIC | Age: 84
End: 2019-07-08

## 2019-07-08 DIAGNOSIS — I35.9 AORTIC VALVE DISORDER: ICD-10-CM

## 2019-07-11 ENCOUNTER — HOSPITAL ENCOUNTER (OUTPATIENT)
Dept: PHYSICAL THERAPY | Age: 84
Setting detail: THERAPIES SERIES
Discharge: HOME | End: 2019-07-11
Payer: MEDICARE

## 2019-07-11 PROCEDURE — 97110 THERAPEUTIC EXERCISES: CPT

## 2019-07-11 PROCEDURE — 97161 PT EVAL LOW COMPLEX 20 MIN: CPT

## 2019-07-11 NOTE — FLOWSHEET NOTE
Physical Therapy Daily Treatment Note  Date:  2019    Patient Name:  Juan Granados MD  \"Terrance\"  :  1935  MRN: [id number]  Restrictions/Precautions:  anemia, esophageal CA, osteoporosis, prostate CA, HTN, aortic valve replacement, CABG, 2 small femoral hernias, peripheral neuropathy  Medical/Treatment Diagnosis Information:  · Diagnosis: Osteoarthritis of left knee, unspecified osteoarthritis type M17.12   · Treatment Diagnosis: weakness, gait abnormality  Insurance/Certification information:  PT Insurance Information: A[employer]na Medicare, $40 copay, BMN  Physician Information:  Referring Practitioner: Dr. Paul Montoya MD Follow-up:  ?  Plan of care signed (Y/N):  Sent to Dr via [facility]   Visit# / total visits:    Pain level: 0/10     Progress Note: []  Yes  [x]  No  Next due by: Visit #10      Subjective: 19: Pt reports about 10 years ago had aggressive chemo for esophageal CA and then started noticing generalized muscle atrophy about 8 years ago and also with decreased balance since radiation. However, in the last couple of years the weakness has been more progressive especially to L knee and in the last year has had to take steps one at a time (ascends leading with R and descends leading with L) and has used a cane on rare occasion outside home. He does have a 2WW but has not had to use yet. He states he uses the shopping cart at grocery as a walker and previously did not have to do this. Pt reports weakness specifically to L knee and had an x-ray which showed \"bone on bone\" per pt. Unsure if will have a knee replacement as is having no pain. Pt reports no pain or paresthesias. 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      Objective:19  Observation:    Palpation: no tenderness to palpation  Observation: pt ambulates without AD with gait speed: 1.88 ft/sec with increased B knee flexion L

## 2019-07-11 NOTE — PLAN OF CARE
Fair  [] Poor       Electronically signed by: Dionicio Ibarra PT, DPT [id number]        If you have any questions or concerns, please don't hesitate to call.   Thank you for your referral.      Physician Signature:________________________________Date:__________________  By signing above, therapists plan is approved by physician

## 2019-07-12 ENCOUNTER — ANTI-COAG VISIT (OUTPATIENT)
Dept: INTERNAL MEDICINE CLINIC | Age: 84
End: 2019-07-12

## 2019-07-12 DIAGNOSIS — I35.9 AORTIC VALVE DISORDER: ICD-10-CM

## 2019-07-12 LAB — INR BLD: 2.7

## 2019-07-19 ENCOUNTER — ANTI-COAG VISIT (OUTPATIENT)
Dept: INTERNAL MEDICINE CLINIC | Age: 84
End: 2019-07-19

## 2019-07-19 DIAGNOSIS — I35.9 AORTIC VALVE DISORDER: ICD-10-CM

## 2019-07-19 LAB — INR BLD: 1.8

## 2019-07-26 ENCOUNTER — ANTI-COAG VISIT (OUTPATIENT)
Dept: INTERNAL MEDICINE CLINIC | Age: 84
End: 2019-07-26

## 2019-07-26 DIAGNOSIS — I35.9 AORTIC VALVE DISORDER: ICD-10-CM

## 2019-07-26 LAB — INR BLD: 2.1

## 2019-07-31 ENCOUNTER — HOSPITAL ENCOUNTER (OUTPATIENT)
Dept: PHYSICAL THERAPY | Age: 84
Setting detail: THERAPIES SERIES
Discharge: HOME | End: 2019-07-31
Payer: MEDICARE

## 2019-07-31 PROCEDURE — 97110 THERAPEUTIC EXERCISES: CPT

## 2019-08-02 ENCOUNTER — ANTI-COAG VISIT (OUTPATIENT)
Dept: INTERNAL MEDICINE CLINIC | Age: 84
End: 2019-08-02

## 2019-08-02 DIAGNOSIS — I35.9 AORTIC VALVE DISORDER: ICD-10-CM

## 2019-08-02 LAB — INR BLD: 3

## 2019-08-05 RX ORDER — OMEPRAZOLE 40 MG/1
CAPSULE, DELAYED RELEASE ORAL
Qty: 30 CAPSULE | Refills: 1 | Status: SHIPPED | OUTPATIENT
Start: 2019-08-05 | End: 2019-10-02 | Stop reason: SDUPTHER

## 2019-08-16 LAB — INR BLD: 2

## 2019-08-19 ENCOUNTER — ANTI-COAG VISIT (OUTPATIENT)
Dept: INTERNAL MEDICINE CLINIC | Age: 84
End: 2019-08-19

## 2019-08-19 DIAGNOSIS — I35.9 AORTIC VALVE DISORDER: ICD-10-CM

## 2019-08-26 ENCOUNTER — HOSPITAL ENCOUNTER (OUTPATIENT)
Dept: PHYSICAL THERAPY | Age: 84
Setting detail: THERAPIES SERIES
Discharge: HOME | End: 2019-08-26
Payer: MEDICARE

## 2019-08-26 PROCEDURE — 97110 THERAPEUTIC EXERCISES: CPT

## 2019-08-26 NOTE — FLOWSHEET NOTE
Physical Therapy Daily Treatment Note  Date:  2019    Patient Name:  Marybel MD Cuate  \"Terrance\"  :  1935  MRN: [id number]  Restrictions/Precautions:  anemia, esophageal CA, osteoporosis, prostate CA, HTN, aortic valve replacement, CABG, 2 small femoral hernias, peripheral neuropathy  Medical/Treatment Diagnosis Information:  · Diagnosis: Osteoarthritis of left knee, unspecified osteoarthritis type M17.12   · Treatment Diagnosis: weakness, gait abnormality  Insurance/Certification information:  PT Insurance Information: A[employer]na Medicare, $40 copay, BMN  Physician Information:  Referring Practitioner: Dr. Nydia Jurado MD Follow-up:  19  Plan of care signed (Y/N):  Y  Visit# / total visits:  3/5  Pain level: 0/10     Progress Note: []  Yes  [x]  No  Next due by: Visit #10      Subjective: 19: Pt reports about 10 years ago had aggressive chemo for esophageal CA and then started noticing generalized muscle atrophy about 8 years ago and also with decreased balance since radiation. However, in the last couple of years the weakness has been more progressive especially to L knee and in the last year has had to take steps one at a time (ascends leading with R and descends leading with L) and has used a cane on rare occasion outside home. He does have a 2WW but has not had to use yet. He states he uses the shopping cart at grocery as a walker and previously did not have to do this. Pt reports weakness specifically to L knee and had an x-ray which showed \"bone on bone\" per pt. Unsure if will have a knee replacement as is having no pain. Pt reports no pain or paresthesias. 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 19: Pt reports no complaints after IE.      19: Pt reports that he feels his neuropathy is getting worse making his walking more difficult.   Has used walker on one occasion below    Prognosis: [x] Good [] Fair  [] Poor    Patient Requires Follow-up: [x] Yes  [] No    Goals:  Short term goals  Time Frame for Short term goals: 4 weeks  Short term goal 1: Pt will demo good understanding and knowledge of initial HEP MET  Short term goal 2: B HS 90-90 (-) 50 deg to allow for improved gait pattern with less knee flexion nearly met  Short term goal 3: Strength L HS 4+/5, hip flexion and quad 4/5 to allow for weakness sensation improved 50% MMT MET, weakness sensation not met  Long term goals  Time Frame for Long term goals : 8 weeks  Long term goal 1: Pt will demo good understanding and knowledge of HEP progressions MET  Long term goal 2: AROM B knee ext 5 deg, B HS (-) 35 deg, L prone knee flexion 110 deg to allow for improved gait pattern partially met  Long term goal 3: Strength L knee/hip flexion 5/5, B hip abd/ext/VMO 4+/5 to allow for reciprocal steps, no recent use of cane with ambulation partially met  Long term goal 4: Pt ambulates with gait speed 2.21 ft/sec with improved step lengths B gait speed partially met, step lengths not met     Plan:   [x] Continue per plan of care [] Alter current plan (see comments)  [] Plan of care initiated [x] Hold pending MD visit [] Discharge    Plan for Next Session:  Review HEP, add exercises as tolerated    Electronically signed by:   Guerda Harden, JOVANT [id number]

## 2019-08-26 NOTE — PROGRESS NOTES
Outpatient Physical Therapy  Phone: [phone] Fax: [phone]    To: Dr. Venkata Mascorro     From: Chapman Phoenix, PT, DPT [id number]   Date: 2019  Patient: Trinity Matthew MD     : 1935 MRN: [id number]  Medical Diagnosis:  Osteoarthritis of left knee, unspecified osteoarthritis type M17.12   Treatment Diagnosis: weakness, gait abnormality       Physical Therapy Progress Note    Time Period for Report:  19 - 19    Total Visits to date:   3 Cancels/No-shows to date:  0    Plan of Care/Treatment to date:  [x] Therapeutic Exercise (Review/Progress HEP and provide verbal/tactile cueing for activities related to strengthening, flexibility,  endurance, ROM.)       [] Therapeutic Activity (Provide verbal/tactile cueing for dynamic activities to promote functional tasks.)          [] Gait Training (Provide verbal/tactile/visual cueing for facilitation of normalized gait pattern without or with the least restrictive AD to decrease pain and/or risk for falling.)          [] Neuromuscular Re-education (Review/Progress HEP and provide verbal/tactile cueing for activities related to improving balance, coordination, kinesthetic sense, posture, motor skill, proprioception.)         [] Manual Therapy (Provide manual therapy to mobilize soft tissue/joints for the purpose of modulating pain, promoting relaxation, increasing ROM, reducing/eliminating soft tissue swelling/inflammation/tightness, improving soft tissue extensibility)                [] Modalities (For modulating pain/tenderness/paresthesias, reducing swelling/inflammation/tightness, improving soft tissue extensibility, and/or to increase muscle tone/strength):     [] Ultrasound  [] Electrical Stimulation        [] Cervical Traction [] Lumbar Traction    ? [] Cold/hotpack [] Iontophoresis   Other:      []          []     Significant Findings At Last Visit/Comments:    · No current pain.   Pt reports that he feels his neuropathy is getting worse

## 2019-08-30 ENCOUNTER — ANTI-COAG VISIT (OUTPATIENT)
Dept: INTERNAL MEDICINE CLINIC | Age: 84
End: 2019-08-30

## 2019-08-30 DIAGNOSIS — I35.9 AORTIC VALVE DISORDER: ICD-10-CM

## 2019-08-30 LAB — INR BLD: 2

## 2019-09-06 ENCOUNTER — ANTI-COAG VISIT (OUTPATIENT)
Dept: INTERNAL MEDICINE CLINIC | Age: 84
End: 2019-09-06

## 2019-09-06 DIAGNOSIS — I35.9 AORTIC VALVE DISORDER: ICD-10-CM

## 2019-09-06 LAB — INR BLD: 2.3

## 2019-09-13 ENCOUNTER — ANTI-COAG VISIT (OUTPATIENT)
Dept: INTERNAL MEDICINE CLINIC | Age: 84
End: 2019-09-13

## 2019-09-13 DIAGNOSIS — I35.9 AORTIC VALVE DISORDER: ICD-10-CM

## 2019-09-13 LAB — INR BLD: 2.2

## 2019-09-16 ENCOUNTER — OFFICE VISIT (OUTPATIENT)
Dept: INTERNAL MEDICINE CLINIC | Age: 84
End: 2019-09-16
Payer: MEDICARE

## 2019-09-16 VITALS
HEIGHT: 63 IN | WEIGHT: 114 LBS | DIASTOLIC BLOOD PRESSURE: 72 MMHG | BODY MASS INDEX: 20.2 KG/M2 | SYSTOLIC BLOOD PRESSURE: 120 MMHG

## 2019-09-16 DIAGNOSIS — N18.30 CHRONIC KIDNEY DISEASE, STAGE III (MODERATE) (HCC): ICD-10-CM

## 2019-09-16 DIAGNOSIS — Z23 NEED FOR INFLUENZA VACCINATION: ICD-10-CM

## 2019-09-16 DIAGNOSIS — J45.909 UNCOMPLICATED ASTHMA, UNSPECIFIED ASTHMA SEVERITY, UNSPECIFIED WHETHER PERSISTENT: ICD-10-CM

## 2019-09-16 DIAGNOSIS — Z95.2 S/P AVR (AORTIC VALVE REPLACEMENT): ICD-10-CM

## 2019-09-16 DIAGNOSIS — Z85.01 HISTORY OF ESOPHAGEAL CANCER: ICD-10-CM

## 2019-09-16 DIAGNOSIS — Z85.46 HISTORY OF PROSTATE CANCER: ICD-10-CM

## 2019-09-16 DIAGNOSIS — E55.9 VITAMIN D DEFICIENCY: ICD-10-CM

## 2019-09-16 DIAGNOSIS — Z00.00 ROUTINE GENERAL MEDICAL EXAMINATION AT A HEALTH CARE FACILITY: Primary | ICD-10-CM

## 2019-09-16 PROCEDURE — 90653 IIV ADJUVANT VACCINE IM: CPT | Performed by: INTERNAL MEDICINE

## 2019-09-16 PROCEDURE — G0008 ADMIN INFLUENZA VIRUS VAC: HCPCS | Performed by: INTERNAL MEDICINE

## 2019-09-16 PROCEDURE — G0439 PPPS, SUBSEQ VISIT: HCPCS | Performed by: INTERNAL MEDICINE

## 2019-09-16 ASSESSMENT — PATIENT HEALTH QUESTIONNAIRE - PHQ9
SUM OF ALL RESPONSES TO PHQ QUESTIONS 1-9: 0
SUM OF ALL RESPONSES TO PHQ QUESTIONS 1-9: 0

## 2019-09-16 ASSESSMENT — LIFESTYLE VARIABLES
HOW MANY STANDARD DRINKS CONTAINING ALCOHOL DO YOU HAVE ON A TYPICAL DAY: 0
HAS A RELATIVE, FRIEND, DOCTOR, OR ANOTHER HEALTH PROFESSIONAL EXPRESSED CONCERN ABOUT YOUR DRINKING OR SUGGESTED YOU CUT DOWN: 0
HOW OFTEN DURING THE LAST YEAR HAVE YOU NEEDED AN ALCOHOLIC DRINK FIRST THING IN THE MORNING TO GET YOURSELF GOING AFTER A NIGHT OF HEAVY DRINKING: 0
AUDIT-C TOTAL SCORE: 1
HOW OFTEN DO YOU HAVE A DRINK CONTAINING ALCOHOL: 1
HOW OFTEN DURING THE LAST YEAR HAVE YOU FOUND THAT YOU WERE NOT ABLE TO STOP DRINKING ONCE YOU HAD STARTED: 0
HOW OFTEN DURING THE LAST YEAR HAVE YOU HAD A FEELING OF GUILT OR REMORSE AFTER DRINKING: 0
HAVE YOU OR SOMEONE ELSE BEEN INJURED AS A RESULT OF YOUR DRINKING: 0
HOW OFTEN DURING THE LAST YEAR HAVE YOU FAILED TO DO WHAT WAS NORMALLY EXPECTED FROM YOU BECAUSE OF DRINKING: 0
HOW OFTEN DO YOU HAVE SIX OR MORE DRINKS ON ONE OCCASION: 0
HOW OFTEN DURING THE LAST YEAR HAVE YOU BEEN UNABLE TO REMEMBER WHAT HAPPENED THE NIGHT BEFORE BECAUSE YOU HAD BEEN DRINKING: 0
AUDIT TOTAL SCORE: 1

## 2019-09-16 NOTE — PROGRESS NOTES
Medicare Annual Wellness Visit  Name: Adelina Tolentino MD Chicho Pereyra Date: 2019   MRN: [id number] Sex: Male   Age: 82 y.o. Ethnicity: Non-/Non    : 1935 Race: Yash Iraheta MD is here for Medicare AWV    Screenings for behavioral, psychosocial and functional/safety risks, and cognitive dysfunction are all negative except as indicated below. These results, as well as other patient data from the [address] form, are documented in Flowsheets linked to this Encounter. Allergies   Allergen Reactions    No Known Allergies      Prior to Visit Medications    Medication Sig Taking? Authorizing Provider   omeprazole (PRILOSEC) 40 MG delayed release capsule TAKE ONE CAPSULE BY MOUTH DAILY Yes Joleen Billings MD   warfarin (COUMADIN) 5 MG tablet TAKE ONE TABLET BY MOUTH ONCE NIGHTLY Yes Joleen Billings MD   atorvastatin (LIPITOR) 10 MG tablet TAKE ONE TABLET BY MOUTH DAILY Yes Joleen Billings MD   Magnesium 65 MG TABS Take by mouth Yes Historical Provider, MD   Coenzyme Q10 (COQ10) 400 MG CAPS Take 1 capsule by mouth Yes Historical Provider, MD   Simethicone 80 MG TABS Take 80 tablets by mouth 3 times daily. Patient taking differently: Take 125 mg by mouth 3 times daily. Yes JS Andrade - CNS   warfarin (COUMADIN) 1 MG tablet Take 1 mg by mouth. Yes Historical Provider, MD   sildenafil (VIAGRA) 50 MG tablet Take 50 mg by mouth as needed for Erectile Dysfunction. Yes Historical Provider, MD   vitamin B-12 (CYANOCOBALAMIN) 1000 MCG tablet Take 1,000 mcg by mouth daily. Yes Historical Provider, MD   docusate sodium (COLACE) 100 MG capsule Take 100 mg by mouth daily.  One  Capsule daily Yes Historical Provider, MD   polyethylene glycol (GLYCOLAX) powder Take 17 g by mouth 2 times daily  Yes Historical Provider, MD   denosumab (PROLIA) 60 MG/ML SOLN SC injection Inject 1 mL into the skin once for 1 dose SENILE OSTEOPOROSIS 733.01  Joleen Billings MD     Past Medical History: 07/06/2018, 09/11/2018        Health Maintenance   Topic Date Due    Flu vaccine (1) 09/01/2019    Annual Wellness Visit (AWV)  12/17/2019    Potassium monitoring  06/19/2020    Creatinine monitoring  06/19/2020    DTaP/Tdap/Td vaccine (2 - Td) 05/12/2024    Shingles Vaccine  Completed    Pneumococcal 65+ years Vaccine  Completed     Recommendations for Preventive Services Due: see orders and patient instructions/AVS.  . Recommended screening schedule for the next 5-10 years is provided to the patient in written form: see Patient Instructions/AVS.    Claudia Posadas was seen today for medicare awv.     Diagnoses and all orders for this visit:    Routine general medical examination at a health care facility

## 2019-09-16 NOTE — PROGRESS NOTES
VALVE REPLACEMENT  2006    aorta    CHOLECYSTECTOMY      COLONOSCOPY  11/2009    COLONOSCOPY  10/05/15    CORONARY ARTERY BYPASS GRAFT  2006    ESOPHAGUS SURGERY      EYE SURGERY  1990& 1992    cataract bilat removal     GASTRECTOMY      PROSTATE SURGERY      seeds    TONSILLECTOMY      US PROSTATE/TRANSRECTAL VOL STUDY BRACHYTHERAPY         Family History:  Family History   Problem Relation Age of Onset    Other Mother         bleeding peptic ulcer    Heart Disease Mother     Other Father         cardiovascular disease    Stroke Father     Elevated Lipids Father     Hypertension Father        Social History:  Social History     Socioeconomic History    Marital status:      Spouse name: None    Number of children: None    Years of education: None    Highest education level: None   Occupational History    None   Social Needs    Financial resource strain: None    Food insecurity:     Worry: None     Inability: None    Transportation needs:     Medical: None     Non-medical: None   Tobacco Use    Smoking status: Never Smoker    Smokeless tobacco: Never Used   Substance and Sexual Activity    Alcohol use:  Yes     Alcohol/week: 0.0 standard drinks     Comment: occassionally    Drug use: No    Sexual activity: None   Lifestyle    Physical activity:     Days per week: None     Minutes per session: None    Stress: None   Relationships    Social connections:     Talks on phone: None     Gets together: None     Attends Rastafari service: None     Active member of club or organization: None     Attends meetings of clubs or organizations: None     Relationship status: None    Intimate partner violence:     Fear of current or ex partner: None     Emotionally abused: None     Physically abused: None     Forced sexual activity: None   Other Topics Concern    None   Social History Narrative    None         Allergies:  No known allergies    Current Medications:    Prior to Admission organomegaly, no mass, no rebound, no guarding   :  No costovertebral angle tenderness   Musculoskeletal:  No edema, no tenderness, no deformities. Back- no tenderness  Integument:  Well hydrated, no rash   Lymphatic:  No lymphadenopathy noted   Neurologic:  Alert & oriented x 3, CN 2-12 normal, normal motor function, normal sensory function, no focal deficits noted   Psychiatric:  Speech and behavior appropriate   Rectal exam reveals enlarged prostate heme-negative stool    Vitals: /72   Ht 5' 3\" (1.6 m)   Wt 114 lb (51.7 kg)   BMI 20.19 kg/m²     Body mass index is 20.19 kg/m².   Wt Readings from Last 3 Encounters:   09/16/19 114 lb (51.7 kg)   06/17/19 115 lb (52.2 kg)   04/29/19 117 lb (53.1 kg)         LABS:    CBC:   Lab Results   Component Value Date    WBC 5.4 06/19/2019    HGB 13.3 (L) 06/19/2019    HCT 40.4 (L) 06/19/2019    MCV 90.5 06/19/2019     (L) 06/19/2019           Lab Results   Component Value Date    IRON 68 12/17/2018    TIBC 269 12/17/2018    FERRITIN 13.5 (L) 03/27/2017    FOLATE >20.00 06/19/2019    GDJGVYHP15 1468 (H) 06/19/2019                                                             BMP:    Lab Results   Component Value Date     06/19/2019    K 5.0 06/19/2019     06/19/2019    CO2 29 06/19/2019       LFT's:   Lab Results   Component Value Date    ALT 21 06/19/2019    AST 30 06/19/2019    ALKPHOS 56 06/19/2019    BILITOT 0.4 06/19/2019       Lipids:   Lab Results   Component Value Date    CHOL 162 06/19/2019    HDL 87 (H) 06/19/2019    LDLCALC 64 06/19/2019    TRIG 54 06/19/2019       INR:   Lab Results   Component Value Date    INR 2.20 09/13/2019    INR 2.30 09/06/2019    INR 2.00 08/30/2019    PROTIME 13.0 (H) 09/21/2015    PROTIME 19.4 (H) 05/01/2015    PROTIME 22.9 (H) 04/30/2015       U/A:  Lab Results   Component Value Date    LABMICR Not Indicated 01/03/2018        No results found for: LABA1C     Lab Results   Component Value Date    CREATININE 1.4 (H) 06/19/2019       -----------------------------------------------------------------     Assessment/Plan:   1. Routine general medical examination at a health care facility  Screening labs he is to get a DEXA scan continue present meds encouraged to continue to exercise we will check a DEXA scan and if DEXA scan is stable we will stop Prolia  - DEXA BONE DENSITY 2 SITES; Future  - CBC Auto Differential; Future  - Comprehensive Metabolic Panel; Future  - Lipid Panel; Future  - TSH without Reflex; Future  - Urinalysis; Future  - Vitamin D 25 Hydroxy; Future  - PSA, Prostatic Specific Antigen; Future    2. S/P AVR (aortic valve replacement)  Problem is stable followed by cardiology  - DEXA BONE DENSITY 2 SITES; Future  - CBC Auto Differential; Future  - Comprehensive Metabolic Panel; Future  - Lipid Panel; Future  - TSH without Reflex; Future  - Urinalysis; Future  - Vitamin D 25 Hydroxy; Future  - PSA, Prostatic Specific Antigen; Future    3. Chronic kidney disease, stage III (moderate) (HCC)  Problem stable check above labs continue present meds  - DEXA BONE DENSITY 2 SITES; Future  - CBC Auto Differential; Future  - Comprehensive Metabolic Panel; Future  - Lipid Panel; Future  - TSH without Reflex; Future  - Urinalysis; Future  - Vitamin D 25 Hydroxy; Future  - PSA, Prostatic Specific Antigen; Future    4. Uncomplicated asthma, unspecified asthma severity, unspecified whether persistent  Problem is stable continue present meds  - DEXA BONE DENSITY 2 SITES; Future  - CBC Auto Differential; Future  - Comprehensive Metabolic Panel; Future  - Lipid Panel; Future  - TSH without Reflex; Future  - Urinalysis; Future  - Vitamin D 25 Hydroxy; Future  - PSA, Prostatic Specific Antigen; Future    5. History of prostate cancer  PSA  - PSA, Prostatic Specific Antigen; Future    6. History of esophageal cancer  Problem is stable continue to monitor    7.  Vitamin D deficiency  Problem is stable check above labs  - Vitamin D 25

## 2019-09-18 ENCOUNTER — HOSPITAL ENCOUNTER (OUTPATIENT)
Dept: GENERAL RADIOLOGY | Age: 84
Discharge: HOME | End: 2019-09-18
Payer: MEDICARE

## 2019-09-18 DIAGNOSIS — Z00.00 ROUTINE GENERAL MEDICAL EXAMINATION AT A HEALTH CARE FACILITY: ICD-10-CM

## 2019-09-18 DIAGNOSIS — Z85.46 HISTORY OF PROSTATE CANCER: ICD-10-CM

## 2019-09-18 DIAGNOSIS — Z95.2 S/P AVR (AORTIC VALVE REPLACEMENT): ICD-10-CM

## 2019-09-18 DIAGNOSIS — J45.909 UNCOMPLICATED ASTHMA, UNSPECIFIED ASTHMA SEVERITY, UNSPECIFIED WHETHER PERSISTENT: ICD-10-CM

## 2019-09-18 DIAGNOSIS — N18.30 CHRONIC KIDNEY DISEASE, STAGE III (MODERATE) (HCC): ICD-10-CM

## 2019-09-18 DIAGNOSIS — E55.9 VITAMIN D DEFICIENCY: ICD-10-CM

## 2019-09-18 LAB
A/G RATIO: 1.8 (ref 1.1–2.2)
ALBUMIN SERPL-MCNC: 4.2 G/DL (ref 3.4–5)
ALP BLD-CCNC: 65 U/L (ref 40–129)
ALT SERPL-CCNC: 16 U/L (ref 10–40)
ANION GAP SERPL CALCULATED.3IONS-SCNC: 14 MMOL/L (ref 3–16)
AST SERPL-CCNC: 23 U/L (ref 15–37)
BASOPHILS ABSOLUTE: 0 K/UL (ref 0–0.2)
BASOPHILS RELATIVE PERCENT: 0.6 %
BILIRUB SERPL-MCNC: 0.5 MG/DL (ref 0–1)
BILIRUBIN URINE: NEGATIVE
BLOOD, URINE: NEGATIVE
BUN BLDV-MCNC: 41 MG/DL (ref 7–20)
CALCIUM SERPL-MCNC: 8.8 MG/DL (ref 8.3–10.6)
CHLORIDE BLD-SCNC: 100 MMOL/L (ref 99–110)
CHOLESTEROL, TOTAL: 162 MG/DL (ref 0–199)
CLARITY: CLEAR
CO2: 26 MMOL/L (ref 21–32)
COLOR: YELLOW
CREAT SERPL-MCNC: 1.4 MG/DL (ref 0.8–1.3)
EOSINOPHILS ABSOLUTE: 0.1 K/UL (ref 0–0.6)
EOSINOPHILS RELATIVE PERCENT: 1.8 %
GFR AFRICAN AMERICAN: 59
GFR NON-AFRICAN AMERICAN: 48
GLOBULIN: 2.3 G/DL
GLUCOSE BLD-MCNC: 81 MG/DL (ref 70–99)
GLUCOSE URINE: NEGATIVE MG/DL
HCT VFR BLD CALC: 42.4 % (ref 40.5–52.5)
HDLC SERPL-MCNC: 87 MG/DL (ref 40–60)
HEMOGLOBIN: 13.9 G/DL (ref 13.5–17.5)
KETONES, URINE: NEGATIVE MG/DL
LDL CHOLESTEROL CALCULATED: 59 MG/DL
LEUKOCYTE ESTERASE, URINE: NEGATIVE
LYMPHOCYTES ABSOLUTE: 1.9 K/UL (ref 1–5.1)
LYMPHOCYTES RELATIVE PERCENT: 29.4 %
MCH RBC QN AUTO: 29.4 PG (ref 26–34)
MCHC RBC AUTO-ENTMCNC: 32.9 G/DL (ref 31–36)
MCV RBC AUTO: 89.3 FL (ref 80–100)
MICROSCOPIC EXAMINATION: NORMAL
MONOCYTES ABSOLUTE: 0.6 K/UL (ref 0–1.3)
MONOCYTES RELATIVE PERCENT: 9.3 %
NEUTROPHILS ABSOLUTE: 3.8 K/UL (ref 1.7–7.7)
NEUTROPHILS RELATIVE PERCENT: 58.9 %
NITRITE, URINE: NEGATIVE
PDW BLD-RTO: 14.2 % (ref 12.4–15.4)
PH UA: 7.5 (ref 5–8)
PLATELET # BLD: 104 K/UL (ref 135–450)
PMV BLD AUTO: 11.2 FL (ref 5–10.5)
POTASSIUM SERPL-SCNC: 4.7 MMOL/L (ref 3.5–5.1)
PROSTATE SPECIFIC ANTIGEN: <0.01 NG/ML (ref 0–4)
PROTEIN UA: NEGATIVE MG/DL
RBC # BLD: 4.74 M/UL (ref 4.2–5.9)
SODIUM BLD-SCNC: 140 MMOL/L (ref 136–145)
SPECIFIC GRAVITY UA: 1.02 (ref 1–1.03)
TOTAL PROTEIN: 6.5 G/DL (ref 6.4–8.2)
TRIGL SERPL-MCNC: 80 MG/DL (ref 0–150)
TSH SERPL DL<=0.05 MIU/L-ACNC: 5.3 UIU/ML (ref 0.27–4.2)
URINE TYPE: NORMAL
UROBILINOGEN, URINE: 1 E.U./DL
VITAMIN D 25-HYDROXY: 47.3 NG/ML
VLDLC SERPL CALC-MCNC: 16 MG/DL
WBC # BLD: 6.4 K/UL (ref 4–11)

## 2019-09-18 PROCEDURE — 77080 DXA BONE DENSITY AXIAL: CPT

## 2019-09-24 RX ORDER — ATORVASTATIN CALCIUM 10 MG/1
TABLET, FILM COATED ORAL
Qty: 90 TABLET | Refills: 1 | Status: SHIPPED | OUTPATIENT
Start: 2019-09-24 | End: 2020-03-23

## 2019-09-27 ENCOUNTER — ANTI-COAG VISIT (OUTPATIENT)
Dept: INTERNAL MEDICINE CLINIC | Age: 84
End: 2019-09-27

## 2019-09-27 DIAGNOSIS — I35.9 AORTIC VALVE DISORDER: ICD-10-CM

## 2019-09-27 LAB — INR BLD: 2

## 2019-09-30 ENCOUNTER — HOSPITAL ENCOUNTER (OUTPATIENT)
Dept: PHYSICAL THERAPY | Age: 84
Setting detail: THERAPIES SERIES
Discharge: HOME | End: 2019-09-30
Payer: MEDICARE

## 2019-10-02 RX ORDER — OMEPRAZOLE 40 MG/1
CAPSULE, DELAYED RELEASE ORAL
Qty: 30 CAPSULE | Refills: 3 | Status: SHIPPED | OUTPATIENT
Start: 2019-10-02 | End: 2020-02-03

## 2019-10-04 ENCOUNTER — ANTI-COAG VISIT (OUTPATIENT)
Dept: INTERNAL MEDICINE CLINIC | Age: 84
End: 2019-10-04

## 2019-10-04 DIAGNOSIS — I35.9 AORTIC VALVE DISORDER: ICD-10-CM

## 2019-10-04 LAB — INR BLD: 2

## 2019-10-11 LAB — INR BLD: 2.3

## 2019-10-15 ENCOUNTER — ANTI-COAG VISIT (OUTPATIENT)
Dept: INTERNAL MEDICINE CLINIC | Age: 84
End: 2019-10-15

## 2019-10-15 DIAGNOSIS — I35.9 AORTIC VALVE DISORDER: ICD-10-CM

## 2019-10-28 ENCOUNTER — ANTI-COAG VISIT (OUTPATIENT)
Dept: INTERNAL MEDICINE CLINIC | Age: 84
End: 2019-10-28

## 2019-10-28 DIAGNOSIS — I35.9 AORTIC VALVE DISORDER: ICD-10-CM

## 2019-10-28 LAB — INR BLD: 2.5

## 2019-11-18 ENCOUNTER — ANTI-COAG VISIT (OUTPATIENT)
Dept: INTERNAL MEDICINE CLINIC | Age: 84
End: 2019-11-18

## 2019-11-18 DIAGNOSIS — I35.9 AORTIC VALVE DISORDER: ICD-10-CM

## 2019-11-18 LAB — INR BLD: 2.2

## 2019-11-22 ENCOUNTER — ANTI-COAG VISIT (OUTPATIENT)
Dept: INTERNAL MEDICINE CLINIC | Age: 84
End: 2019-11-22

## 2019-11-22 DIAGNOSIS — I35.9 AORTIC VALVE DISORDER: ICD-10-CM

## 2019-11-22 LAB — INR BLD: 2.7

## 2019-11-27 ENCOUNTER — OFFICE VISIT (OUTPATIENT)
Dept: CARDIOLOGY CLINIC | Age: 84
End: 2019-11-27
Payer: MEDICARE

## 2019-11-27 VITALS
SYSTOLIC BLOOD PRESSURE: 120 MMHG | WEIGHT: 112.2 LBS | BODY MASS INDEX: 19.88 KG/M2 | HEART RATE: 60 BPM | DIASTOLIC BLOOD PRESSURE: 72 MMHG

## 2019-11-27 DIAGNOSIS — E78.5 HYPERLIPIDEMIA, UNSPECIFIED HYPERLIPIDEMIA TYPE: ICD-10-CM

## 2019-11-27 DIAGNOSIS — Z95.2 S/P AVR (AORTIC VALVE REPLACEMENT): Primary | ICD-10-CM

## 2019-11-27 DIAGNOSIS — I25.10 CORONARY ARTERY DISEASE INVOLVING NATIVE CORONARY ARTERY OF NATIVE HEART WITHOUT ANGINA PECTORIS: ICD-10-CM

## 2019-11-27 DIAGNOSIS — N18.30 STAGE 3 CHRONIC KIDNEY DISEASE (HCC): ICD-10-CM

## 2019-11-27 DIAGNOSIS — Z95.1 S/P CABG (CORONARY ARTERY BYPASS GRAFT): ICD-10-CM

## 2019-11-27 PROCEDURE — 99214 OFFICE O/P EST MOD 30 MIN: CPT | Performed by: INTERNAL MEDICINE

## 2019-11-29 LAB — INR BLD: 2.1

## 2019-12-02 ENCOUNTER — ANTI-COAG VISIT (OUTPATIENT)
Dept: INTERNAL MEDICINE CLINIC | Age: 84
End: 2019-12-02

## 2019-12-02 DIAGNOSIS — I35.9 AORTIC VALVE DISORDER: ICD-10-CM

## 2019-12-06 ENCOUNTER — ANTI-COAG VISIT (OUTPATIENT)
Dept: INTERNAL MEDICINE CLINIC | Age: 84
End: 2019-12-06

## 2019-12-06 DIAGNOSIS — I35.9 AORTIC VALVE DISORDER: ICD-10-CM

## 2019-12-06 LAB — INR BLD: 2

## 2019-12-20 ENCOUNTER — ANTI-COAG VISIT (OUTPATIENT)
Dept: INTERNAL MEDICINE CLINIC | Age: 84
End: 2019-12-20

## 2019-12-20 DIAGNOSIS — I35.9 AORTIC VALVE DISORDER: ICD-10-CM

## 2019-12-30 ENCOUNTER — ANTI-COAG VISIT (OUTPATIENT)
Dept: INTERNAL MEDICINE CLINIC | Age: 84
End: 2019-12-30

## 2019-12-30 DIAGNOSIS — I35.9 AORTIC VALVE DISORDER: ICD-10-CM

## 2019-12-30 LAB — INR BLD: 2.6

## 2020-01-06 ENCOUNTER — ANTI-COAG VISIT (OUTPATIENT)
Dept: INTERNAL MEDICINE CLINIC | Age: 85
End: 2020-01-06

## 2020-01-06 LAB — INR BLD: 2

## 2020-01-10 LAB — INR BLD: 2.3

## 2020-01-13 ENCOUNTER — ANTI-COAG VISIT (OUTPATIENT)
Dept: INTERNAL MEDICINE CLINIC | Age: 85
End: 2020-01-13

## 2020-01-17 ENCOUNTER — ANTI-COAG VISIT (OUTPATIENT)
Dept: INTERNAL MEDICINE CLINIC | Age: 85
End: 2020-01-17

## 2020-01-17 LAB — INR BLD: 2.7

## 2020-01-31 ENCOUNTER — ANTI-COAG VISIT (OUTPATIENT)
Dept: INTERNAL MEDICINE CLINIC | Age: 85
End: 2020-01-31

## 2020-01-31 LAB — INR BLD: 2.5

## 2020-02-03 RX ORDER — OMEPRAZOLE 40 MG/1
CAPSULE, DELAYED RELEASE ORAL
Qty: 30 CAPSULE | Refills: 2 | Status: SHIPPED | OUTPATIENT
Start: 2020-02-03 | End: 2020-04-30

## 2020-02-07 LAB — INR BLD: 1.6

## 2020-02-14 ENCOUNTER — ANTI-COAG VISIT (OUTPATIENT)
Dept: INTERNAL MEDICINE CLINIC | Age: 85
End: 2020-02-14

## 2020-02-14 ENCOUNTER — TELEPHONE (OUTPATIENT)
Dept: INTERNAL MEDICINE CLINIC | Age: 85
End: 2020-02-14

## 2020-02-14 LAB — INR BLD: 2.4

## 2020-02-17 ENCOUNTER — ANTI-COAG VISIT (OUTPATIENT)
Dept: INTERNAL MEDICINE CLINIC | Age: 85
End: 2020-02-17

## 2020-02-28 ENCOUNTER — ANTI-COAG VISIT (OUTPATIENT)
Dept: INTERNAL MEDICINE CLINIC | Age: 85
End: 2020-02-28

## 2020-02-28 LAB — INR BLD: 2.1

## 2020-03-02 ENCOUNTER — ANTI-COAG VISIT (OUTPATIENT)
Dept: INTERNAL MEDICINE CLINIC | Age: 85
End: 2020-03-02

## 2020-03-02 LAB — INR BLD: 2.8

## 2020-03-06 ENCOUNTER — ANTI-COAG VISIT (OUTPATIENT)
Dept: INTERNAL MEDICINE CLINIC | Age: 85
End: 2020-03-06

## 2020-03-06 LAB — INR BLD: 2.1

## 2020-03-23 RX ORDER — ATORVASTATIN CALCIUM 10 MG/1
TABLET, FILM COATED ORAL
Qty: 90 TABLET | Refills: 0 | Status: SHIPPED | OUTPATIENT
Start: 2020-03-23 | End: 2020-06-22

## 2020-03-27 ENCOUNTER — ANTI-COAG VISIT (OUTPATIENT)
Dept: INTERNAL MEDICINE CLINIC | Age: 85
End: 2020-03-27

## 2020-03-27 LAB — INR BLD: 2.1

## 2020-04-03 ENCOUNTER — ANTI-COAG VISIT (OUTPATIENT)
Dept: INTERNAL MEDICINE CLINIC | Age: 85
End: 2020-04-03

## 2020-04-03 LAB — INR BLD: 2.2

## 2020-04-07 RX ORDER — WARFARIN SODIUM 5 MG/1
TABLET ORAL
Qty: 90 TABLET | Refills: 1 | Status: SHIPPED | OUTPATIENT
Start: 2020-04-07 | End: 2021-03-30

## 2020-04-13 ENCOUNTER — ANTI-COAG VISIT (OUTPATIENT)
Dept: INTERNAL MEDICINE CLINIC | Age: 85
End: 2020-04-13

## 2020-04-13 LAB — INR BLD: 2

## 2020-04-17 ENCOUNTER — ANTI-COAG VISIT (OUTPATIENT)
Dept: INTERNAL MEDICINE CLINIC | Age: 85
End: 2020-04-17

## 2020-04-17 LAB — INR BLD: 2

## 2020-04-24 ENCOUNTER — ANTI-COAG VISIT (OUTPATIENT)
Dept: INTERNAL MEDICINE CLINIC | Age: 85
End: 2020-04-24

## 2020-04-24 LAB — INR BLD: 2.3

## 2020-04-30 RX ORDER — OMEPRAZOLE 40 MG/1
CAPSULE, DELAYED RELEASE ORAL
Qty: 30 CAPSULE | Refills: 1 | Status: SHIPPED | OUTPATIENT
Start: 2020-04-30 | End: 2020-06-29

## 2020-05-01 ENCOUNTER — ANTI-COAG VISIT (OUTPATIENT)
Dept: INTERNAL MEDICINE CLINIC | Age: 85
End: 2020-05-01

## 2020-05-01 LAB — INR BLD: 2.4

## 2020-05-18 ENCOUNTER — ANTI-COAG VISIT (OUTPATIENT)
Dept: INTERNAL MEDICINE CLINIC | Age: 85
End: 2020-05-18

## 2020-05-18 LAB — INR BLD: 1.9

## 2020-05-27 ENCOUNTER — VIRTUAL VISIT (OUTPATIENT)
Dept: INTERNAL MEDICINE CLINIC | Age: 85
End: 2020-05-27
Payer: MEDICARE

## 2020-05-27 VITALS — HEIGHT: 63 IN | BODY MASS INDEX: 18.78 KG/M2 | WEIGHT: 106 LBS

## 2020-05-27 PROCEDURE — 99213 OFFICE O/P EST LOW 20 MIN: CPT | Performed by: INTERNAL MEDICINE

## 2020-05-27 PROCEDURE — G8510 SCR DEP NEG, NO PLAN REQD: HCPCS | Performed by: INTERNAL MEDICINE

## 2020-05-27 SDOH — ECONOMIC STABILITY: FOOD INSECURITY: WITHIN THE PAST 12 MONTHS, THE FOOD YOU BOUGHT JUST DIDN'T LAST AND YOU DIDN'T HAVE MONEY TO GET MORE.: NEVER TRUE

## 2020-05-27 SDOH — ECONOMIC STABILITY: TRANSPORTATION INSECURITY
IN THE PAST 12 MONTHS, HAS LACK OF TRANSPORTATION KEPT YOU FROM MEETINGS, WORK, OR FROM GETTING THINGS NEEDED FOR DAILY LIVING?: NO

## 2020-05-27 SDOH — ECONOMIC STABILITY: INCOME INSECURITY: HOW HARD IS IT FOR YOU TO PAY FOR THE VERY BASICS LIKE FOOD, HOUSING, MEDICAL CARE, AND HEATING?: NOT HARD AT ALL

## 2020-05-27 SDOH — ECONOMIC STABILITY: FOOD INSECURITY: WITHIN THE PAST 12 MONTHS, YOU WORRIED THAT YOUR FOOD WOULD RUN OUT BEFORE YOU GOT MONEY TO BUY MORE.: NEVER TRUE

## 2020-05-27 SDOH — ECONOMIC STABILITY: TRANSPORTATION INSECURITY
IN THE PAST 12 MONTHS, HAS THE LACK OF TRANSPORTATION KEPT YOU FROM MEDICAL APPOINTMENTS OR FROM GETTING MEDICATIONS?: NO

## 2020-05-27 ASSESSMENT — PATIENT HEALTH QUESTIONNAIRE - PHQ9
SUM OF ALL RESPONSES TO PHQ QUESTIONS 1-9: 0
SUM OF ALL RESPONSES TO PHQ QUESTIONS 1-9: 0
2. FEELING DOWN, DEPRESSED OR HOPELESS: 0
SUM OF ALL RESPONSES TO PHQ9 QUESTIONS 1 & 2: 0
1. LITTLE INTEREST OR PLEASURE IN DOING THINGS: 0

## 2020-05-27 NOTE — PROGRESS NOTES
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 Authorizing Provider   omeprazole (PRILOSEC) 40 MG delayed release capsule TAKE 1 CAPSULE(S) BY MOUTH DAILY Yes Hermes Smith MD   warfarin (COUMADIN) 5 MG tablet TAKE ONE TABLET BY MOUTH ONCE NIGHTLY Yes Hermes Smith MD   atorvastatin (LIPITOR) 10 MG tablet TAKE ONE TABLET BY MOUTH DAILY Yes Hermes Smith MD   Magnesium 65 MG TABS Take by mouth Yes Historical Provider, MD   Coenzyme Q10 (COQ10) 400 MG CAPS Take 1 capsule by mouth Yes Historical Provider, MD   Simethicone 80 MG TABS Take 80 tablets by mouth 3 times daily. Patient taking differently: Take 125 mg by mouth 3 times daily. Yes JS Mathews - NAN   warfarin (COUMADIN) 1 MG tablet Take 1 mg by mouth. Yes Historical Provider, MD   sildenafil (VIAGRA) 50 MG tablet Take 50 mg by mouth as needed for Erectile Dysfunction. Yes Historical Provider, MD   vitamin B-12 (CYANOCOBALAMIN) 1000 MCG tablet Take 1,000 mcg by mouth daily. Yes Historical Provider, MD   docusate sodium (COLACE) 100 MG capsule Take 100 mg by mouth daily. One  Capsule daily Yes Historical Provider, MD   polyethylene glycol (GLYCOLAX) powder Take 17 g by mouth 2 times daily  Yes Historical Provider, MD       Social History     Tobacco Use    Smoking status: Never Smoker    Smokeless tobacco: Never Used   Substance Use Topics    Alcohol use:  Yes     Alcohol/week: 0.0 standard drinks     Comment: occassionally    Drug use: No        Past Medical History:   Diagnosis Date    Anemia     Aortic valve disorders     stenosis    Aspiration pneumonia ([facility] 4/27/2015    Erectile dysfunction     Esophageal cancer ([facility] 10/18/2012    Hernia, femoral, bilateral 5/12/2014    Hyperlipidemia     Osteoporosis, senile 5/20/2014    Pancreatitis 8/1/2013    Patient underweight 8/8/2013    Prostate cancer ([facility]     Unspecified essential hypertension        PHYSICAL EXAMINATION:  [ INSTRUCTIONS:  \"[x]\" Indicates a positive item  \"[]\" Indicates a negative item  -- DELETE ALL ITEMS NOT EXAMINED]  Vital Signs: (As obtained by patient/caregiver or practitioner observation)    Blood pressure-  Heart rate-    Respiratory rate-    Temperature-  Pulse oximetry-     Constitutional: [x] Appears well-developed and well-nourished [x] No apparent distress      [] Abnormal-   Mental status  [x] Alert and awake  [x] Oriented to person/place/time [x]Able to follow commands      Eyes:  EOM    []  Normal  [] Abnormal-  Sclera  []  Normal  [] Abnormal -         Discharge []  None visible  [] Abnormal -    HENT:   [] Normocephalic, atraumatic.   [] Abnormal   [] Mouth/Throat: Mucous membranes are moist.     External Ears [] Normal  [] Abnormal-     Neck: [] No visualized mass     Pulmonary/Chest: [x] Respiratory effort normal.  [x] No visualized signs of difficulty breathing or respiratory distress        [] Abnormal-      Musculoskeletal:   [] Normal gait with no signs of ataxia         [] Normal range of motion of neck        [] Abnormal-       Neurological:        [x] No Facial Asymmetry (Cranial nerve 7 motor function) (limited exam to video visit)          [] No gaze palsy        [] Abnormal-         Skin:        [] No significant exanthematous lesions or discoloration noted on facial skin         [] Abnormal-            Psychiatric:       [x] Normal Affect [x] No Hallucinations        [] Abnormal-     Other pertinent observable physical exam findings-     ASSESSMENT/PLAN:  Chronic kidney disease clinically stable at present  . Coronary artery disease status post aortic valve replacement stable at present  We will repeat blood tests in 3 months follow-up in 3 months we will get PSA and TSH as well    No follow-ups on file. 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 A caregiver was present when appropriate. Due to this being a TeleHealth encounter (During [facility] public [facility] emergency), evaluation of the following organ systems was limited: Vitals/Constitutional/EENT/Resp/CV/GI//MS/Neuro/Skin/Heme-Lymph-Imm. Pursuant to the emergency declaration under the [address] [place] [address], [address] [place] [address] authority and the [employer] Act, this Virtual Visit was conducted with patient's (and/or legal guardian's) consent, to reduce the patient's risk of exposure to COVID-19 and provide necessary medical care. The patient (and/or legal guardian) has also been advised to contact this office for worsening conditions or problems, and seek emergency medical treatment and/or call 911 if deemed necessary. Patient identification was verified at the start of the visit: No    Total time spent on this encounter: Not billed by time    Services were provided through a video synchronous discussion virtually to substitute for in-person clinic visit. Patient and provider were located at their individual homes. --Luis Boogie MD on 5/27/2020 at 1:08 PM    An electronic signature was used to authenticate this note.

## 2020-06-03 ENCOUNTER — VIRTUAL VISIT (OUTPATIENT)
Dept: CARDIOLOGY CLINIC | Age: 85
End: 2020-06-03
Payer: MEDICARE

## 2020-06-03 VITALS
HEART RATE: 55 BPM | BODY MASS INDEX: 18.1 KG/M2 | SYSTOLIC BLOOD PRESSURE: 122 MMHG | DIASTOLIC BLOOD PRESSURE: 76 MMHG | WEIGHT: 106 LBS | HEIGHT: 64 IN

## 2020-06-03 PROCEDURE — 99214 OFFICE O/P EST MOD 30 MIN: CPT | Performed by: INTERNAL MEDICINE

## 2020-06-03 NOTE — PROGRESS NOTES
distress        [] Abnormal-      Musculoskeletal:   [x] Normal gait with no signs of ataxia         [x] Normal range of motion of neck        [] Abnormal-       Neurological:        [x] No Facial Asymmetry (Cranial nerve 7 motor function) (limited exam to video visit)          [x] No gaze palsy        [] Abnormal-         Skin:        [x] No significant exanthematous lesions or discoloration noted on facial skin         [] Abnormal-            Psychiatric:       [x] Normal Affect [x] No Hallucinations        [] Abnormal-     Other pertinent observable physical exam findings:        Lab Results   Component Value Date    WBC 6.4 09/18/2019    HGB 13.9 09/18/2019    HCT 42.4 09/18/2019    MCV 89.3 09/18/2019     (L) 09/18/2019     Lab Results   Component Value Date     09/18/2019    K 4.7 09/18/2019     09/18/2019    CO2 26 09/18/2019    BUN 41 (H) 09/18/2019    CREATININE 1.4 (H) 09/18/2019    GLUCOSE 81 09/18/2019    CALCIUM 8.8 09/18/2019    PROT 6.5 09/18/2019    LABALBU 4.2 09/18/2019    BILITOT 0.5 09/18/2019    ALKPHOS 65 09/18/2019    AST 23 09/18/2019    ALT 16 09/18/2019    LABGLOM 48 (A) 09/18/2019    GFRAA 59 (A) 09/18/2019    AGRATIO 1.8 09/18/2019    GLOB 2.3 09/18/2019     Lab Results   Component Value Date    INR 1.90 05/18/2020    INR 2.40 05/01/2020    INR 2.30 04/24/2020    PROTIME 13.0 (H) 09/21/2015    PROTIME 19.4 (H) 05/01/2015    PROTIME 22.9 (H) 04/30/2015       Lab Results   Component Value Date    CHOL 162 09/18/2019    CHOL 162 06/19/2019    CHOL 157 12/17/2018     Lab Results   Component Value Date    TRIG 80 09/18/2019    TRIG 54 06/19/2019    TRIG 87 12/17/2018     Lab Results   Component Value Date    HDL 87 (H) 09/18/2019    HDL 87 (H) 06/19/2019    HDL 82 (H) 12/17/2018     Lab Results   Component Value Date    LDLCALC 59 09/18/2019    LDLCALC 64 06/19/2019    LDLCALC 58 12/17/2018     Lab Results   Component Value Date    LABVLDL 16 09/18/2019    LABVLDL 11

## 2020-06-10 ENCOUNTER — HOSPITAL ENCOUNTER (OUTPATIENT)
Dept: NON INVASIVE DIAGNOSTICS | Age: 85
Discharge: HOME | End: 2020-06-10
Payer: MEDICARE

## 2020-06-10 LAB
LV EF: 58 %
LVEF MODALITY: NORMAL

## 2020-06-10 PROCEDURE — 93306 TTE W/DOPPLER COMPLETE: CPT

## 2020-06-16 ENCOUNTER — TELEPHONE (OUTPATIENT)
Dept: INTERNAL MEDICINE CLINIC | Age: 85
End: 2020-06-16

## 2020-06-22 RX ORDER — ATORVASTATIN CALCIUM 10 MG/1
TABLET, FILM COATED ORAL
Qty: 90 TABLET | Refills: 1 | Status: SHIPPED | OUTPATIENT
Start: 2020-06-22 | End: 2020-06-24

## 2020-06-24 RX ORDER — ATORVASTATIN CALCIUM 10 MG/1
TABLET, FILM COATED ORAL
Qty: 90 TABLET | Refills: 2 | Status: SHIPPED | OUTPATIENT
Start: 2020-06-24 | End: 2020-12-29 | Stop reason: SDUPTHER

## 2020-06-29 RX ORDER — OMEPRAZOLE 40 MG/1
CAPSULE, DELAYED RELEASE ORAL
Qty: 30 CAPSULE | Refills: 3 | Status: SHIPPED | OUTPATIENT
Start: 2020-06-29 | End: 2020-10-30

## 2020-07-10 ENCOUNTER — ANTI-COAG VISIT (OUTPATIENT)
Dept: INTERNAL MEDICINE CLINIC | Age: 85
End: 2020-07-10

## 2020-07-10 LAB — INR BLD: 2.5

## 2020-07-13 NOTE — TELEPHONE ENCOUNTER
Prescription Question     From   Elisabeth Muhammad MD  To   [facility] Practice Support  Sent   7/10/2020  5:33 PM    I need an Amoxacillin Rx for Dental visits, 500 mg., #40 (Mechanical aortic valve). [facility] Dental office says they need a form from you. [facility] pharmacy phone number is [phone].      Thanks, Alexei Thrasher

## 2020-08-05 ENCOUNTER — TELEPHONE (OUTPATIENT)
Dept: INTERNAL MEDICINE CLINIC | Age: 85
End: 2020-08-05

## 2020-08-05 RX ORDER — AMOXICILLIN 500 MG/1
CAPSULE ORAL
Qty: 40 CAPSULE | Refills: 0 | Status: ON HOLD
Start: 2020-08-05 | End: 2020-11-19 | Stop reason: HOSPADM

## 2020-08-05 NOTE — TELEPHONE ENCOUNTER
Patients dental office is in need of a script re-faxed to [phone] for Amoxicillin before dental procedures. Please advise.

## 2020-08-07 ENCOUNTER — ANTI-COAG VISIT (OUTPATIENT)
Dept: INTERNAL MEDICINE CLINIC | Age: 85
End: 2020-08-07

## 2020-08-07 LAB — INR BLD: 1.9

## 2020-08-17 ENCOUNTER — ANTI-COAG VISIT (OUTPATIENT)
Dept: INTERNAL MEDICINE CLINIC | Age: 85
End: 2020-08-17

## 2020-08-17 LAB — INR BLD: 2.7

## 2020-08-18 ENCOUNTER — HOSPITAL ENCOUNTER (INPATIENT)
Age: 85
LOS: 2 days | Discharge: HOME | DRG: 389 | End: 2020-08-21
Attending: EMERGENCY MEDICINE | Admitting: SURGERY
Payer: MEDICARE

## 2020-08-18 LAB
ALBUMIN SERPL-MCNC: 4.3 G/DL (ref 3.4–5)
ALP BLD-CCNC: 84 U/L (ref 40–129)
ALT SERPL-CCNC: 13 U/L (ref 10–40)
ANION GAP SERPL CALCULATED.3IONS-SCNC: 14 MMOL/L (ref 3–16)
AST SERPL-CCNC: 23 U/L (ref 15–37)
BASE EXCESS VENOUS: 5.5 MMOL/L (ref -2–3)
BASOPHILS ABSOLUTE: 0 K/UL (ref 0–0.2)
BASOPHILS RELATIVE PERCENT: 0.6 %
BILIRUB SERPL-MCNC: 0.6 MG/DL (ref 0–1)
BILIRUBIN DIRECT: <0.2 MG/DL (ref 0–0.3)
BILIRUBIN, INDIRECT: NORMAL MG/DL (ref 0–1)
BUN BLDV-MCNC: 29 MG/DL (ref 7–20)
CALCIUM SERPL-MCNC: 10.4 MG/DL (ref 8.3–10.6)
CARBOXYHEMOGLOBIN: 1.1 % (ref 0–1.5)
CHLORIDE BLD-SCNC: 96 MMOL/L (ref 99–110)
CO2: 28 MMOL/L (ref 21–32)
CREAT SERPL-MCNC: 1.3 MG/DL (ref 0.8–1.3)
EOSINOPHILS ABSOLUTE: 0 K/UL (ref 0–0.6)
EOSINOPHILS RELATIVE PERCENT: 0.3 %
GFR AFRICAN AMERICAN: >60
GFR NON-AFRICAN AMERICAN: 53
GLUCOSE BLD-MCNC: 118 MG/DL (ref 70–99)
HCO3 VENOUS: 31.4 MMOL/L (ref 24–28)
HCT VFR BLD CALC: 42.6 % (ref 40.5–52.5)
HEMOGLOBIN, VEN, REDUCED: 63.4 %
HEMOGLOBIN: 14.3 G/DL (ref 13.5–17.5)
LACTIC ACID: 1.4 MMOL/L (ref 0.4–2)
LIPASE: 171 U/L (ref 13–60)
LYMPHOCYTES ABSOLUTE: 1.1 K/UL (ref 1–5.1)
LYMPHOCYTES RELATIVE PERCENT: 17.4 %
MCH RBC QN AUTO: 29.9 PG (ref 26–34)
MCHC RBC AUTO-ENTMCNC: 33.5 G/DL (ref 31–36)
MCV RBC AUTO: 89.3 FL (ref 80–100)
METHEMOGLOBIN VENOUS: 1 % (ref 0–1.5)
MONOCYTES ABSOLUTE: 0.4 K/UL (ref 0–1.3)
MONOCYTES RELATIVE PERCENT: 6.4 %
NEUTROPHILS ABSOLUTE: 4.9 K/UL (ref 1.7–7.7)
NEUTROPHILS RELATIVE PERCENT: 75.3 %
O2 SAT, VEN: 35 %
PCO2, VEN: 52.2 MMHG (ref 41–51)
PDW BLD-RTO: 14.1 % (ref 12.4–15.4)
PH VENOUS: 7.4 (ref 7.35–7.45)
PLATELET # BLD: 106 K/UL (ref 135–450)
PMV BLD AUTO: 10.6 FL (ref 5–10.5)
PO2, VEN: 27.9 MMHG (ref 25–40)
POTASSIUM REFLEX MAGNESIUM: 4.8 MMOL/L (ref 3.5–5.1)
RBC # BLD: 4.77 M/UL (ref 4.2–5.9)
SODIUM BLD-SCNC: 138 MMOL/L (ref 136–145)
TCO2 CALC VENOUS: 33 MMOL/L
TOTAL PROTEIN: 8 G/DL (ref 6.4–8.2)
WBC # BLD: 6.5 K/UL (ref 4–11)

## 2020-08-18 PROCEDURE — 83690 ASSAY OF LIPASE: CPT

## 2020-08-18 PROCEDURE — 80076 HEPATIC FUNCTION PANEL: CPT

## 2020-08-18 PROCEDURE — 99284 EMERGENCY DEPT VISIT MOD MDM: CPT

## 2020-08-18 PROCEDURE — 80048 BASIC METABOLIC PNL TOTAL CA: CPT

## 2020-08-18 PROCEDURE — 82803 BLOOD GASES ANY COMBINATION: CPT

## 2020-08-18 PROCEDURE — 2580000003 HC RX 258: Performed by: EMERGENCY MEDICINE

## 2020-08-18 PROCEDURE — 36415 COLL VENOUS BLD VENIPUNCTURE: CPT

## 2020-08-18 PROCEDURE — 85610 PROTHROMBIN TIME: CPT

## 2020-08-18 PROCEDURE — 83605 ASSAY OF LACTIC ACID: CPT

## 2020-08-18 PROCEDURE — 96374 THER/PROPH/DIAG INJ IV PUSH: CPT

## 2020-08-18 PROCEDURE — 96375 TX/PRO/DX INJ NEW DRUG ADDON: CPT

## 2020-08-18 PROCEDURE — 6360000002 HC RX W HCPCS: Performed by: EMERGENCY MEDICINE

## 2020-08-18 PROCEDURE — 85025 COMPLETE CBC W/AUTO DIFF WBC: CPT

## 2020-08-18 RX ORDER — ONDANSETRON 2 MG/ML
4 INJECTION INTRAMUSCULAR; INTRAVENOUS ONCE
Status: COMPLETED | OUTPATIENT
Start: 2020-08-18 | End: 2020-08-18

## 2020-08-18 RX ORDER — PROMETHAZINE HYDROCHLORIDE 25 MG/ML
12.5 INJECTION, SOLUTION INTRAMUSCULAR; INTRAVENOUS ONCE
Status: COMPLETED | OUTPATIENT
Start: 2020-08-18 | End: 2020-08-18

## 2020-08-18 RX ORDER — ONDANSETRON 2 MG/ML
4 INJECTION INTRAMUSCULAR; INTRAVENOUS ONCE
Status: DISCONTINUED | OUTPATIENT
Start: 2020-08-18 | End: 2020-08-21 | Stop reason: HOSPADM

## 2020-08-18 RX ORDER — 0.9 % SODIUM CHLORIDE 0.9 %
1000 INTRAVENOUS SOLUTION INTRAVENOUS ONCE
Status: COMPLETED | OUTPATIENT
Start: 2020-08-18 | End: 2020-08-18

## 2020-08-18 RX ADMIN — ONDANSETRON 4 MG: 2 INJECTION INTRAMUSCULAR; INTRAVENOUS at 21:20

## 2020-08-18 RX ADMIN — SODIUM CHLORIDE 1000 ML: 9 INJECTION, SOLUTION INTRAVENOUS at 21:20

## 2020-08-18 RX ADMIN — PROMETHAZINE HYDROCHLORIDE 12.5 MG: 25 INJECTION INTRAMUSCULAR; INTRAVENOUS at 23:15

## 2020-08-18 ASSESSMENT — PAIN DESCRIPTION - PAIN TYPE: TYPE: ACUTE PAIN

## 2020-08-18 ASSESSMENT — PAIN DESCRIPTION - LOCATION: LOCATION: ABDOMEN

## 2020-08-18 ASSESSMENT — ENCOUNTER SYMPTOMS
COUGH: 0
CONSTIPATION: 0
EYES NEGATIVE: 1
RESPIRATORY NEGATIVE: 1
DIARRHEA: 0
NAUSEA: 1
SHORTNESS OF BREATH: 0
ABDOMINAL PAIN: 1
VOMITING: 1

## 2020-08-18 ASSESSMENT — PAIN SCALES - GENERAL: PAINLEVEL_OUTOF10: 3

## 2020-08-18 ASSESSMENT — PAIN DESCRIPTION - ORIENTATION: ORIENTATION: LEFT;UPPER

## 2020-08-19 ENCOUNTER — APPOINTMENT (OUTPATIENT)
Dept: CT IMAGING | Age: 85
DRG: 389 | End: 2020-08-19
Payer: MEDICARE

## 2020-08-19 PROBLEM — K56.609 SBO (SMALL BOWEL OBSTRUCTION) (HCC): Status: ACTIVE | Noted: 2020-08-19

## 2020-08-19 LAB
ALBUMIN SERPL-MCNC: 3.5 G/DL (ref 3.4–5)
ANION GAP SERPL CALCULATED.3IONS-SCNC: 13 MMOL/L (ref 3–16)
ANTI-XA UNFRAC HEPARIN: 0.15 IU/ML (ref 0.3–0.7)
ANTI-XA UNFRAC HEPARIN: 0.53 IU/ML (ref 0.3–0.7)
APTT: 43.6 SEC (ref 24.2–36.2)
BUN BLDV-MCNC: 27 MG/DL (ref 7–20)
CALCIUM SERPL-MCNC: 9.6 MG/DL (ref 8.3–10.6)
CHLORIDE BLD-SCNC: 97 MMOL/L (ref 99–110)
CO2: 27 MMOL/L (ref 21–32)
CREAT SERPL-MCNC: 1.2 MG/DL (ref 0.8–1.3)
GFR AFRICAN AMERICAN: >60
GFR NON-AFRICAN AMERICAN: 58
GLUCOSE BLD-MCNC: 116 MG/DL (ref 70–99)
HCT VFR BLD CALC: 43.6 % (ref 40.5–52.5)
HEMOGLOBIN: 14.4 G/DL (ref 13.5–17.5)
INR BLD: 1.56 (ref 0.86–1.14)
INR BLD: 1.71 (ref 0.86–1.14)
MAGNESIUM: 1.9 MG/DL (ref 1.8–2.4)
MCH RBC QN AUTO: 30 PG (ref 26–34)
MCHC RBC AUTO-ENTMCNC: 33.1 G/DL (ref 31–36)
MCV RBC AUTO: 90.6 FL (ref 80–100)
PDW BLD-RTO: 13.9 % (ref 12.4–15.4)
PHOSPHORUS: 4.3 MG/DL (ref 2.5–4.9)
PLATELET # BLD: 101 K/UL (ref 135–450)
PMV BLD AUTO: 10.4 FL (ref 5–10.5)
POTASSIUM SERPL-SCNC: 4.1 MMOL/L (ref 3.5–5.1)
PROTHROMBIN TIME: 18.2 SEC (ref 10–13.2)
PROTHROMBIN TIME: 19.9 SEC (ref 10–13.2)
RBC # BLD: 4.81 M/UL (ref 4.2–5.9)
SODIUM BLD-SCNC: 137 MMOL/L (ref 136–145)
WBC # BLD: 10.3 K/UL (ref 4–11)

## 2020-08-19 PROCEDURE — 6360000002 HC RX W HCPCS: Performed by: STUDENT IN AN ORGANIZED HEALTH CARE EDUCATION/TRAINING PROGRAM

## 2020-08-19 PROCEDURE — 2580000003 HC RX 258: Performed by: STUDENT IN AN ORGANIZED HEALTH CARE EDUCATION/TRAINING PROGRAM

## 2020-08-19 PROCEDURE — 2700000000 HC OXYGEN THERAPY PER DAY

## 2020-08-19 PROCEDURE — 36415 COLL VENOUS BLD VENIPUNCTURE: CPT

## 2020-08-19 PROCEDURE — U0003 INFECTIOUS AGENT DETECTION BY NUCLEIC ACID (DNA OR RNA); SEVERE ACUTE RESPIRATORY SYNDROME CORONAVIRUS 2 (SARS-COV-2) (CORONAVIRUS DISEASE [COVID-19]), AMPLIFIED PROBE TECHNIQUE, MAKING USE OF HIGH THROUGHPUT TECHNOLOGIES AS DESCRIBED BY CMS-2020-01-R: HCPCS

## 2020-08-19 PROCEDURE — 85027 COMPLETE CBC AUTOMATED: CPT

## 2020-08-19 PROCEDURE — 80069 RENAL FUNCTION PANEL: CPT

## 2020-08-19 PROCEDURE — 6360000004 HC RX CONTRAST MEDICATION: Performed by: EMERGENCY MEDICINE

## 2020-08-19 PROCEDURE — 85520 HEPARIN ASSAY: CPT

## 2020-08-19 PROCEDURE — 85610 PROTHROMBIN TIME: CPT

## 2020-08-19 PROCEDURE — 94150 VITAL CAPACITY TEST: CPT

## 2020-08-19 PROCEDURE — 1200000000 HC SEMI PRIVATE

## 2020-08-19 PROCEDURE — 99221 1ST HOSP IP/OBS SF/LOW 40: CPT | Performed by: INTERNAL MEDICINE

## 2020-08-19 PROCEDURE — 2580000003 HC RX 258: Performed by: INTERNAL MEDICINE

## 2020-08-19 PROCEDURE — 99223 1ST HOSP IP/OBS HIGH 75: CPT | Performed by: SURGERY

## 2020-08-19 PROCEDURE — 85730 THROMBOPLASTIN TIME PARTIAL: CPT

## 2020-08-19 PROCEDURE — 94761 N-INVAS EAR/PLS OXIMETRY MLT: CPT

## 2020-08-19 PROCEDURE — 83735 ASSAY OF MAGNESIUM: CPT

## 2020-08-19 PROCEDURE — 74174 CTA ABD&PLVS W/CONTRAST: CPT

## 2020-08-19 RX ORDER — HEPARIN SODIUM 5000 [USP'U]/ML
60 INJECTION, SOLUTION INTRAVENOUS; SUBCUTANEOUS PRN
Status: DISCONTINUED | OUTPATIENT
Start: 2020-08-19 | End: 2020-08-21 | Stop reason: ALTCHOICE

## 2020-08-19 RX ORDER — SODIUM CHLORIDE, SODIUM LACTATE, POTASSIUM CHLORIDE, CALCIUM CHLORIDE 600; 310; 30; 20 MG/100ML; MG/100ML; MG/100ML; MG/100ML
INJECTION, SOLUTION INTRAVENOUS CONTINUOUS
Status: DISCONTINUED | OUTPATIENT
Start: 2020-08-19 | End: 2020-08-19

## 2020-08-19 RX ORDER — DEXTROSE, SODIUM CHLORIDE, SODIUM LACTATE, POTASSIUM CHLORIDE, AND CALCIUM CHLORIDE 5; .6; .31; .03; .02 G/100ML; G/100ML; G/100ML; G/100ML; G/100ML
INJECTION, SOLUTION INTRAVENOUS CONTINUOUS
Status: DISCONTINUED | OUTPATIENT
Start: 2020-08-19 | End: 2020-08-21

## 2020-08-19 RX ORDER — SODIUM CHLORIDE 0.9 % (FLUSH) 0.9 %
10 SYRINGE (ML) INJECTION EVERY 12 HOURS SCHEDULED
Status: DISCONTINUED | OUTPATIENT
Start: 2020-08-19 | End: 2020-08-21 | Stop reason: HOSPADM

## 2020-08-19 RX ORDER — HEPARIN SODIUM 5000 [USP'U]/ML
30 INJECTION, SOLUTION INTRAVENOUS; SUBCUTANEOUS PRN
Status: DISCONTINUED | OUTPATIENT
Start: 2020-08-19 | End: 2020-08-21 | Stop reason: ALTCHOICE

## 2020-08-19 RX ORDER — SODIUM CHLORIDE 0.9 % (FLUSH) 0.9 %
10 SYRINGE (ML) INJECTION PRN
Status: DISCONTINUED | OUTPATIENT
Start: 2020-08-19 | End: 2020-08-21 | Stop reason: HOSPADM

## 2020-08-19 RX ORDER — SODIUM CHLORIDE, SODIUM LACTATE, POTASSIUM CHLORIDE, CALCIUM CHLORIDE 600; 310; 30; 20 MG/100ML; MG/100ML; MG/100ML; MG/100ML
INJECTION, SOLUTION INTRAVENOUS CONTINUOUS
Status: DISCONTINUED | OUTPATIENT
Start: 2020-08-19 | End: 2020-08-19 | Stop reason: ALTCHOICE

## 2020-08-19 RX ORDER — MAGNESIUM SULFATE 1 G/100ML
1 INJECTION INTRAVENOUS ONCE
Status: COMPLETED | OUTPATIENT
Start: 2020-08-19 | End: 2020-08-19

## 2020-08-19 RX ORDER — ATORVASTATIN CALCIUM 10 MG/1
10 TABLET, FILM COATED ORAL DAILY
Status: DISCONTINUED | OUTPATIENT
Start: 2020-08-19 | End: 2020-08-19

## 2020-08-19 RX ORDER — HEPARIN SODIUM 10000 [USP'U]/100ML
16 INJECTION, SOLUTION INTRAVENOUS CONTINUOUS
Status: DISCONTINUED | OUTPATIENT
Start: 2020-08-19 | End: 2020-08-21

## 2020-08-19 RX ORDER — HEPARIN SODIUM 5000 [USP'U]/ML
5000 INJECTION, SOLUTION INTRAVENOUS; SUBCUTANEOUS EVERY 8 HOURS SCHEDULED
Status: DISCONTINUED | OUTPATIENT
Start: 2020-08-19 | End: 2020-08-19

## 2020-08-19 RX ADMIN — SODIUM CHLORIDE, POTASSIUM CHLORIDE, SODIUM LACTATE AND CALCIUM CHLORIDE: 600; 310; 30; 20 INJECTION, SOLUTION INTRAVENOUS at 04:21

## 2020-08-19 RX ADMIN — SODIUM CHLORIDE, POTASSIUM CHLORIDE, SODIUM LACTATE AND CALCIUM CHLORIDE: 600; 310; 30; 20 INJECTION, SOLUTION INTRAVENOUS at 13:46

## 2020-08-19 RX ADMIN — IOHEXOL 50 ML: 240 INJECTION, SOLUTION INTRATHECAL; INTRAVASCULAR; INTRAVENOUS; ORAL at 00:51

## 2020-08-19 RX ADMIN — MAGNESIUM SULFATE HEPTAHYDRATE 1 G: 1 INJECTION, SOLUTION INTRAVENOUS at 11:19

## 2020-08-19 RX ADMIN — SODIUM CHLORIDE, SODIUM LACTATE, POTASSIUM CHLORIDE, CALCIUM CHLORIDE, AND DEXTROSE MONOHYDRATE: 600; 310; 30; 20; 5 INJECTION, SOLUTION INTRAVENOUS at 21:37

## 2020-08-19 RX ADMIN — HEPARIN SODIUM 12 UNITS/KG/HR: 10000 INJECTION, SOLUTION INTRAVENOUS at 07:39

## 2020-08-19 RX ADMIN — HEPARIN SODIUM 1450 UNITS: 5000 INJECTION INTRAVENOUS; SUBCUTANEOUS at 12:36

## 2020-08-19 RX ADMIN — IOPAMIDOL 80 ML: 755 INJECTION, SOLUTION INTRAVENOUS at 00:52

## 2020-08-19 ASSESSMENT — PAIN DESCRIPTION - FREQUENCY: FREQUENCY: CONTINUOUS

## 2020-08-19 ASSESSMENT — PAIN DESCRIPTION - LOCATION: LOCATION: NOSE;THROAT

## 2020-08-19 ASSESSMENT — PAIN DESCRIPTION - ORIENTATION: ORIENTATION: RIGHT

## 2020-08-19 ASSESSMENT — PAIN SCALES - GENERAL
PAINLEVEL_OUTOF10: 0
PAINLEVEL_OUTOF10: 2
PAINLEVEL_OUTOF10: 0
PAINLEVEL_OUTOF10: 0

## 2020-08-19 ASSESSMENT — PAIN DESCRIPTION - PAIN TYPE: TYPE: ACUTE PAIN

## 2020-08-19 ASSESSMENT — PAIN DESCRIPTION - ONSET: ONSET: ON-GOING

## 2020-08-19 ASSESSMENT — PAIN DESCRIPTION - DESCRIPTORS: DESCRIPTORS: DULL

## 2020-08-19 NOTE — ED NOTES
Bed: A12-12  Expected date:   Expected time:   Means of arrival:   Comments:  Dami Sandhu RN  08/18/20 2027

## 2020-08-19 NOTE — ED PROVIDER NOTES
[address] [facility] ENCOUNTER          ATTENDING PHYSICIAN NOTE       Date of evaluation: 8/18/2020    ADDENDUM:      Care of this patient was assumed from Dr. Jamil Barrientos. The patient was seen for Abdominal Pain (LUQ); Emesis; and Nausea  . The patient's initial evaluation and plan have been discussed with the prior provider who initially evaluated the patient. Nursing Notes, Past Medical Hx, Past Surgical Hx, Social Hx, Allergies, and Family Hx were all reviewed. Diagnostic Results     RADIOLOGY:  CTA ABDOMEN PELVIS W CONTRAST   Final Result   1. Postsurgical changes consistent with esophagectomy and gastric pull-   through procedure again suspected. There is fluid distention of the    intrathoracic gastric conduit and fluid distention of the intraperitoneal    gastric remnant and proximal small bowel in the left abdomen with a    probable transition point noted in the presumed proximal jejunum    immediately beyond the ligament of Treitz. Findings are most consistent    with a proximal small bowel obstruction. No pneumoperitoneum. 2.  Patchy opacities in the posterior lung bases with fluid opacification    of several bronchial segments serving the left lower lobe. Findings are    more prominent than expected for subsegmental atelectasis and subtle    bibasilar pneumonia or aspiration is suspected. 3.  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 Eccentric atheromatous material    noted along the posterior margin. No evidence for extravasation or acute    abnormality.               LABS:   Results for orders placed or performed during the hospital encounter of 08/18/20   CBC auto differential   Result Value Ref Range    WBC 6.5 4.0 - 11.0 K/uL    RBC 4.77 4.20 - 5.90 M/uL    Hemoglobin 14.3 13.5 - 17.5 g/dL    Hematocrit 42.6 40.5 - 52.5 %    MCV 89.3 80.0 - 100.0 fL    MCH 29.9 26.0 - 34.0 pg    MCHC 33.5 31.0 - 36.0 g/dL    RDW 14.1 12.4 - 15.4 %    Platelets 914 (L) 126 - 450 K/uL    MPV 10.6 (H) 5.0 - 10.5 fL    Neutrophils % 75.3 %    Lymphocytes % 17.4 %    Monocytes % 6.4 %    Eosinophils % 0.3 %    Basophils % 0.6 %    Neutrophils Absolute 4.9 1.7 - 7.7 K/uL    Lymphocytes Absolute 1.1 1.0 - 5.1 K/uL    Monocytes Absolute 0.4 0.0 - 1.3 K/uL    Eosinophils Absolute 0.0 0.0 - 0.6 K/uL    Basophils Absolute 0.0 0.0 - 0.2 K/uL   Basic Metabolic Panel w/ Reflex to MG   Result Value Ref Range    Sodium 138 136 - 145 mmol/L    Potassium reflex Magnesium 4.8 3.5 - 5.1 mmol/L    Chloride 96 (L) 99 - 110 mmol/L    CO2 28 21 - 32 mmol/L    Anion Gap 14 3 - 16    Glucose 118 (H) 70 - 99 mg/dL    BUN 29 (H) 7 - 20 mg/dL    CREATININE 1.3 0.8 - 1.3 mg/dL    GFR Non- 53 (A) >60    GFR African American >60 >60    Calcium 10.4 8.3 - 10.6 mg/dL   Hepatic function panel (LFTs)   Result Value Ref Range    Total Protein 8.0 6.4 - 8.2 g/dL    Alb 4.3 3.4 - 5.0 g/dL    Alkaline Phosphatase 84 40 - 129 U/L    ALT 13 10 - 40 U/L    AST 23 15 - 37 U/L    Total Bilirubin 0.6 0.0 - 1.0 mg/dL    Bilirubin, Direct <0.2 0.0 - 0.3 mg/dL    Bilirubin, Indirect see below 0.0 - 1.0 mg/dL   Lipase   Result Value Ref Range    Lipase 171.0 (H) 13.0 - 60.0 U/L   Blood gas, venous (Lab)   Result Value Ref Range    pH, Star 7.397 7.350 - 7.450    pCO2, Star 52.2 (H) 41.0 - 51.0 mmHg    pO2, Star 27.9 25.0 - 40.0 mmHg    HCO3, Venous 31.4 (H) 24.0 - 28.0 mmol/L    Base Excess, Star 5.5 (H) -2.0 - 3.0 mmol/L    O2 Sat, Star 35 Not established %    Carboxyhemoglobin 1.1 0.0 - 1.5 %    MetHgb, Star 1.0 0.0 - 1.5 %    TC02 (Calc), Star 33 mmol/L    Hemoglobin, Star, Reduced 63.40 %   Lactate, plasma   Result Value Ref Range    Lactic Acid 1.4 0.4 - 2.0 mmol/L       RECENT VITALS:  BP: (!) 166/91, Temp: 97.9 °F (36.6 °C), Pulse: 77, Resp: 18, SpO2: 96 %     Procedures         ED Course     The patient was given the following medications:  Orders Placed

## 2020-08-19 NOTE — PROGRESS NOTES
Physician Progress Note      PATIENT:               Madelyn Molina  [facility] #:                  [id number]  :                       1935  ADMIT DATE:       2020 8:27 PM  [facility] DATE:  RESPONDING  PROVIDER #:        Enrico Tracey MD          QUERY TEXT:    Dear IM Consult,    Patient admitted with 18.19. If possible, please document in progress notes   and discharge summary if you are evaluating and /or treating any of the   following: The medical record reflects the following:  Risk Factors: SBO, presenting wt: 106 lbs, PMH underweight, hx esophageal and   prostate cancer, now NPO  Clinical Indicators: BMI 18.19,  4 days of decreased appetite and nausea  Treatment: I&O, wts,  Options provided:  -- Protein calorie malnutrition mild  -- Protein calorie malnutrition moderate  -- Protein calorie malnutrition severe  -- Underweight with BMI 18.19  -- Other - I will add my own diagnosis  -- Disagree - Not applicable / Not valid  -- Disagree - Clinically unable to determine / Unknown  -- Refer to Clinical Documentation Reviewer    PROVIDER RESPONSE TEXT:    This patient has mild protein calorie malnutrition.     Query created by: Stas Zhao on 2020 2:57 PM      Electronically signed by:  Enrico Tracey MD 2020 3:02 PM

## 2020-08-19 NOTE — PROGRESS NOTES
Pt A/O x4. VSS. Pt on 2L O2, lung sounds clear. Abdomen is soft, flat, and nontender. Bowel sounds hypoactive. NG to R nare to low continuous suction with moderate brown output. Pt denies having any pain at this time. Pt is voiding appropriately, has not been passing gas or BM at this time. Pt is x1 assist with walker. Pt is weak and unsteady on his feet. LR at 125, heparin drip currently infusing at 5.8ml/hr. Pt currently resting comfortably in chair with wife at bedside. Will continue to monitor.      Electronically signed by Xu Vo RN on 8/19/2020 at 10:34 AM

## 2020-08-19 NOTE — ED PROVIDER NOTES
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 He has a history of gastroparesis since his surgical procedure in 2010, although he states that his symptoms are generally short in duration, with vomiting that lasts only a few hours before resolving. He has been medically managed for this, but per his report has not required presentation to the emergency department or hospitalization for his gastroparesis symptoms. Patient states that he felt that he began to experience incomplete emptying of the stomach on Friday, and has had very little to eat or drink since the onset of his symptoms, but did not begin vomiting until today. He describes that he for some months now has been experiencing intermittent primarily right upper quadrant abdominal pain, which he feels is sometimes improved when he lies on his right side. Given the weight loss that he has experienced since his cancer diagnosis and treatments, he notes that he has some concern for the possibility of SMA syndrome. He currently is experiencing abdominal pain that he describes as being primarily in the epigastric to left upper quadrant, which she rates 3 out of 10 in intensity, and states has been present since he began vomiting.   He denies any changes in bowel habits, states that his last bowel movement was around 4:00 this afternoon and to normal, and states that he is passing gas. He denies any changes in urination. He denies any chest pain or shortness of breath. He denies any recent infectious symptoms. Patient states that the symptoms he is experiencing today and over the weekend are similar to prior episodes of gastroparesis, but more prolonged in duration. Review of Systems     Review of Systems   Constitutional: Positive for appetite change. Negative for activity change, chills, diaphoresis, fatigue and fever. HENT: Negative. Eyes: Negative. Respiratory: Negative. Negative for cough and shortness of breath. Cardiovascular: Negative. Negative for chest pain. Gastrointestinal: Positive for abdominal pain, nausea and vomiting. Negative for constipation and diarrhea. Genitourinary: Negative. Negative for difficulty urinating, frequency and urgency. Musculoskeletal: Negative. Skin: Negative. Neurological: Negative. Negative for dizziness, weakness, light-headedness and headaches. Hematological: Negative. Psychiatric/Behavioral: Negative. Past Medical, Surgical, Family, and Social History     He has a past medical history of Anemia, Aortic valve disorders, Aspiration pneumonia ([address] Erectile dysfunction, Esophageal cancer ([address] Hernia, femoral, bilateral, Hyperlipidemia, Osteoporosis, senile, Pancreatitis, Patient underweight, Prostate cancer ([address] and Unspecified essential hypertension. He has a past surgical history that includes Coronary artery bypass graft (2006); Cardiac valve replacement (2006); eye surgery (1990& 1992); Appendectomy; bone graft; gastrectomy; Cholecystectomy; Colonoscopy (11/2009); Prostate surgery; Tonsillectomy; Esophagus surgery; US Prostate/Transrectal/Vol Collins Brachyth; and Colonoscopy (10/05/15). His family history includes Elevated Lipids in his father; Heart Disease in his mother; Hypertension in his father;  Other heard.    Abdomen: Concave, soft and nondistended. There are well-healed but rather extensive surgical incisions from the patient's prior surgical interventions. There is moderate tenderness to palpation across the upper abdomen, somewhat more prominent in the left upper quadrant, without guarding or rebound tenderness. No masses or hepatosplenomegaly. Bowel sounds are somewhat decreased in the upper abdomen, but present in the lower abdomen. Skin: Warm and dry, without rashes or ecchymoses, lacerations or abrasions. Neuro: Alert and oriented x3. No focal neurologic deficits are noted. Extremities: Warm and well-perfused, without clubbing, cyanosis, or edema. The patient moves all extremities equally. Psych: The patient's mood and affect are generally within normal limits for their presentation.       Diagnostic Results       RADIOLOGY:  CTA ABDOMEN PELVIS W CONTRAST    (Results Pending)       LABS:   Results for orders placed or performed during the hospital encounter of 08/18/20   CBC auto differential   Result Value Ref Range    WBC 6.5 4.0 - 11.0 K/uL    RBC 4.77 4.20 - 5.90 M/uL    Hemoglobin 14.3 13.5 - 17.5 g/dL    Hematocrit 42.6 40.5 - 52.5 %    MCV 89.3 80.0 - 100.0 fL    MCH 29.9 26.0 - 34.0 pg    MCHC 33.5 31.0 - 36.0 g/dL    RDW 14.1 12.4 - 15.4 %    Platelets 769 (L) 460 - 450 K/uL    MPV 10.6 (H) 5.0 - 10.5 fL    Neutrophils % 75.3 %    Lymphocytes % 17.4 %    Monocytes % 6.4 %    Eosinophils % 0.3 %    Basophils % 0.6 %    Neutrophils Absolute 4.9 1.7 - 7.7 K/uL    Lymphocytes Absolute 1.1 1.0 - 5.1 K/uL    Monocytes Absolute 0.4 0.0 - 1.3 K/uL    Eosinophils Absolute 0.0 0.0 - 0.6 K/uL    Basophils Absolute 0.0 0.0 - 0.2 K/uL   Basic Metabolic Panel w/ Reflex to MG   Result Value Ref Range    Sodium 138 136 - 145 mmol/L    Potassium reflex Magnesium 4.8 3.5 - 5.1 mmol/L    Chloride 96 (L) 99 - 110 mmol/L    CO2 28 21 - 32 mmol/L    Anion Gap 14 3 - 16    Glucose 118 (H) 70 - 99 mg/dL BUN 29 (H) 7 - 20 mg/dL    CREATININE 1.3 0.8 - 1.3 mg/dL    GFR Non- 53 (A) >60    GFR African American >60 >60    Calcium 10.4 8.3 - 10.6 mg/dL   Hepatic function panel (LFTs)   Result Value Ref Range    Total Protein 8.0 6.4 - 8.2 g/dL    Alb 4.3 3.4 - 5.0 g/dL    Alkaline Phosphatase 84 40 - 129 U/L    ALT 13 10 - 40 U/L    AST 23 15 - 37 U/L    Total Bilirubin 0.6 0.0 - 1.0 mg/dL    Bilirubin, Direct <0.2 0.0 - 0.3 mg/dL    Bilirubin, Indirect see below 0.0 - 1.0 mg/dL   Lipase   Result Value Ref Range    Lipase 171.0 (H) 13.0 - 60.0 U/L   Blood gas, venous (Lab)   Result Value Ref Range    pH, Star 7.397 7.350 - 7.450    pCO2, Star 52.2 (H) 41.0 - 51.0 mmHg    pO2, Star 27.9 25.0 - 40.0 mmHg    HCO3, Venous 31.4 (H) 24.0 - 28.0 mmol/L    Base Excess, Star 5.5 (H) -2.0 - 3.0 mmol/L    O2 Sat, Star 35 Not established %    Carboxyhemoglobin 1.1 0.0 - 1.5 %    MetHgb, Star 1.0 0.0 - 1.5 %    TC02 (Calc), Star 33 mmol/L    Hemoglobin, Star, Reduced 63.40 %   Lactate, plasma   Result Value Ref Range    Lactic Acid 1.4 0.4 - 2.0 mmol/L         RECENT VITALS:  BP: (!) 190/112, Temp: 97.9 °F (36.6 °C), Pulse: 77, Resp: 18, SpO2: 97 %     Procedures         ED Course     Nursing Notes, Past Medical Hx, Past Surgical Hx, Social Hx, Allergies, and Family Hx were reviewed.     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 His laboratory evaluation shows a mildly elevated lipase, although this has been present in the past, and stable chronic kidney disease, but is otherwise unrevealing. The patient and his primary care provider are interested in cross-sectional imaging, which is reasonable. The patient himself is somewhat concerned for the possibility of SMA syndrome, therefore a contrast bolus will be timed to evaluate the mesenteric vasculature. At this time, the patient's care is being given over to the oncoming physician, who will follow-up on the results of this study, and will likely either further manage any surgical findings on imaging, or admit the patient to his primary care provider's service for further management of his symptoms and possible gastroenterology consultation. (Please note that portions of this note were completed with voice recognition software.   Efforts were made to edit the dictations but occasionally words are mis-transcribed.)     Berlin Wiley MD  08/18/20 4403

## 2020-08-19 NOTE — PLAN OF CARE
Problem: Falls - Risk of:  Goal: Will remain free from falls  Description: Will remain free from falls  8/19/2020 1035 by Castro Hurst RN  Outcome: Ongoing  Note: Pt is x1 assist with walker. Pt is weak and unsteady on his feet. Bed/chair alarm are on.  socks are on pt and call light is within reach. Problem: Skin Integrity:  Goal: Will show no infection signs and symptoms  Description: Will show no infection signs and symptoms  Outcome: Ongoing  Note: Pt is afebrile with no open wounds or skin breakdown. Pt repositions himself regularly. Will continue to monitor. Problem: Bowel/Gastric:  Goal: Ability to achieve a regular elimination pattern will improve  Description: Ability to achieve a regular elimination pattern will improve  8/19/2020 1035 by Castro Hurst RN  Outcome: Ongoing  Note: Pt has NG to R nare hooked up to low continuous suction. Pt has no passed gas or had a BM yet. Abdomen is soft and nontender. Will continue to monitor.

## 2020-08-19 NOTE — PLAN OF CARE
Problem: Falls - Risk of:  Goal: Will remain free from falls  Description: Will remain free from falls  Note: Pt high fall risk. A&O, VSS. Unsteady and weak. Bed in lowest position, call light and belongings within reach, bed alarm on, room free from clutter. Up w/ assistance w/ walker. Continuing to hourly round to reduce fall risks. Problem: Bowel/Gastric:  Goal: Ability to achieve a regular elimination pattern will improve  Description: Ability to achieve a regular elimination pattern will improve  Note: NG intact to R nare for SBO. To CLWS. Securement device in place. Hypoactive bowel tones.

## 2020-08-19 NOTE — H&P
allergies      Medications:   Home Meds  No current facility-administered medications on file prior to encounter. Current Outpatient Medications on File Prior to Encounter   Medication Sig Dispense Refill    amoxicillin (AMOXIL) 500 MG capsule Take 4 capsules by mouth one hour prior to each dental procedure. 40 capsule 0    omeprazole (PRILOSEC) 40 MG delayed release capsule TAKE ONE CAPSULE BY MOUTH DAILY 30 capsule 3    atorvastatin (LIPITOR) 10 MG tablet TAKE 1 TABLET BY MOUTH DAILY 90 tablet 2    warfarin (COUMADIN) 5 MG tablet TAKE ONE TABLET BY MOUTH ONCE NIGHTLY 90 tablet 1    Magnesium 65 MG TABS Take by mouth      Coenzyme Q10 (COQ10) 400 MG CAPS Take 1 capsule by mouth      Simethicone 80 MG TABS Take 80 tablets by mouth 3 times daily. (Patient taking differently: Take 125 mg by mouth 3 times daily. ) 90 each 0    warfarin (COUMADIN) 1 MG tablet Take 1 mg by mouth.  sildenafil (VIAGRA) 50 MG tablet Take 50 mg by mouth as needed for Erectile Dysfunction.  vitamin B-12 (CYANOCOBALAMIN) 1000 MCG tablet Take 1,000 mcg by mouth daily.  docusate sodium (COLACE) 100 MG capsule Take 100 mg by mouth daily. One  Capsule daily      polyethylene glycol (GLYCOLAX) powder Take 17 g by mouth 2 times daily          Current Meds  ondansetron (ZOFRAN) injection 4 mg, Once          Family History:   Family History   Problem Relation Age of Onset    Other Mother         bleeding peptic ulcer    Heart Disease Mother     Other Father         cardiovascular disease    Stroke Father     Elevated Lipids Father     Hypertension Father          Social History:   TOBACCO:   reports that he has never smoked. He has never used smokeless tobacco.  ETOH:   reports current alcohol use. DRUGS:   reports no history of drug use. Review of Systems:   A 14 point review of systems was conducted, significant findings as noted in HPI. All other systems negative.         Physical exam:    Vitals:    08/18/20 2130 08/18/20 2230 08/18/20 2300 08/19/20 0100   BP: (!) 170/87 (!) 164/94 (!) 168/98 (!) 166/91   Pulse:       Resp:       Temp:       TempSrc:       SpO2: 95% 98% 96%        General appearance: alert, vomiting in ED stretcher, grooming appropriate  Eyes: no scleral icterus  Neck: trachea midline, no JVD, no lymphadenopathy  Chest/Lungs: CTAB, normal effort  Cardiovascular: RRR, well perfused  Abdomen: soft, minimally tender, non-distended, no guarding/rigidity  Skin: warm and dry, no rashes  Extremities: no edema, no cyanosis  Neuro: A&Ox3, no focal deficits, sensation intact      Labs:    CBC:   Recent Labs     08/18/20 2127   WBC 6.5   HGB 14.3   HCT 42.6   MCV 89.3   *     BMP:   Recent Labs     08/18/20 2127      K 4.8   CL 96*   CO2 28   BUN 29*   CREATININE 1.3     PT/INR:   Recent Labs     08/17/20   INR 2.70     APTT: No results for input(s): APTT in the last 72 hours. Liver Profile:  Lab Results   Component Value Date    AST 23 08/18/2020    ALT 13 08/18/2020    BILIDIR <0.2 08/18/2020    BILITOT 0.6 08/18/2020    ALKPHOS 84 08/18/2020     Lab Results   Component Value Date    CHOL 162 09/18/2019    HDL 87 09/18/2019    TRIG 80 09/18/2019     UA:   Lab Results   Component Value Date    COLORU YELLOW 09/18/2019    PHUR 7.5 09/18/2019    LABCAST 0-1 Fine Gran. 04/27/2015    WBCUA 0-2 04/27/2015    RBCUA 3-5 04/27/2015    MUCUS PRESENT 05/14/2014    BACTERIA 2+ 04/27/2015    CLARITYU Clear 09/18/2019    SPECGRAV 1.019 09/18/2019    LEUKOCYTESUR Negative 09/18/2019    UROBILINOGEN 1.0 09/18/2019    BILIRUBINUR Negative 09/18/2019    BILIRUBINUR Small 05/28/2012    BLOODU Negative 09/18/2019    GLUCOSEU Negative 09/18/2019    GLUCOSEU Negative 05/28/2012    AMORPHOUS 1+ 04/27/2015         Imaging:   CTA ABDOMEN PELVIS W CONTRAST   Final Result   1. Postsurgical changes consistent with esophagectomy and gastric pull-   through procedure again suspected.   There is fluid distention of the intrathoracic gastric conduit and fluid distention of the intraperitoneal    gastric remnant and proximal small bowel in the left abdomen with a    probable transition point noted in the presumed proximal jejunum    immediately beyond the ligament of Treitz. Findings are most consistent    with a proximal small bowel obstruction. No pneumoperitoneum. 2.  Patchy opacities in the posterior lung bases with fluid opacification    of several bronchial segments serving the left lower lobe. Findings are    more prominent than expected for subsegmental atelectasis and subtle    bibasilar pneumonia or aspiration is suspected. 3.  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 Eccentric atheromatous material    noted along the posterior margin. No evidence for extravasation or acute    abnormality. Assessment/Plan: This is a 82 y.o. male with a history of osteoporosis, pancreatitis, prostate cancer, aortic valve replacement, and esophageal cancer s/p esophagectomy and gastric pull through procedure that presents with nausea and vomiting. CT abdomen and pelvis shows fluid distention in intrathoracic gastric conduit, intraperitoneal gastric remnant, and proximal small bowel consistent with small bowel obstruction.     - Patient will be admitted to the surgery service.  - NPO. NG tube placed by surgery team in ED. NG to LWS. - IVF: LR @ 125. Strict I&Os. - Discontinue coumadin. Ordered INR. Will F/U. INR should be kept between 2 and 3 for hx of aortic valve replacement. Possibly will start heparin gtt. - Consult Dr. Kwasi Wetzel for small bowel obstruction.   - Encourage ambulation and IS use. Anne Miner MD  General Surgery, PGY1  08/19/20  3:31 AM  [phone]     I saw and independently examined the patient today.  I agree with the history of present illness, past medical/surgical histories, family history, social history,

## 2020-08-19 NOTE — PROGRESS NOTES
06/26/2017    HGB 14.4 08/19/2020    HCT 43.6 08/19/2020    MCV 90.6 08/19/2020    MCH 30.0 08/19/2020    MCHC 33.1 08/19/2020    RDW 13.9 08/19/2020     08/19/2020    MPV 10.4 08/19/2020     CMP:    Lab Results   Component Value Date     08/19/2020    K 4.1 08/19/2020    K 4.8 08/18/2020    CL 97 08/19/2020    CO2 27 08/19/2020    BUN 27 08/19/2020    CREATININE 1.2 08/19/2020    GFRAA >60 08/19/2020    GFRAA >60 05/10/2013    AGRATIO 1.8 09/18/2019    LABGLOM 58 08/19/2020    LABGLOM >60>60 01/10/2012    GLUCOSE 116 08/19/2020    GLUCOSE 123 09/26/2016    PROT 8.0 08/18/2020    PROT 6.8 09/26/2016    LABALBU 3.5 08/19/2020    CALCIUM 9.6 08/19/2020    BILITOT 0.6 08/18/2020    ALKPHOS 84 08/18/2020    AST 23 08/18/2020    ALT 13 08/18/2020         ASSESSMENT AND PLAN      Active Problems:    SBO (small bowel obstruction) (HCC)  Plan: Conservative treatment  AVR agree with heparin while npo

## 2020-08-19 NOTE — CARE COORDINATION
Case Management Assessment           Initial Evaluation                Date / Time of Evaluation: 8/19/2020 4:53 PM                 Assessment Completed by: Mary Lu    Patient Name: Wendy Lowery MD     YOB: 1935  Diagnosis: SBO (small bowel obstruction) ([facility] [U91.546]  SBO (small bowel obstruction) ([facility] [I43.705]     Date / Time: 8/18/2020  8:27 PM    Patient Admission Status: Inpatient    If patient is discharged prior to next notation, then this note serves as note for discharge by case management. Current PCP: Brittany Chaudhari MD  Clinic Patient: No    Chart Reviewed: Yes  Patient/ Family Interviewed: Yes    Initial assessment completed at bedside with: pt    Hospitalization in the last 30 days: No    Emergency Contacts:  Extended Emergency Contact Information  Primary Emergency Contact: Yvrose Ricketts  Address: [address] [place] [address], [address] [place] [address] Phone: [phone]  Work Phone: [phone]  Mobile Phone: [phone]  Relation: Spouse  Secondary Emergency Contact: [address] [facility] Phone: [phone]  Relation: Child    Advance Directives:   Code Status: Full [address]: No    Financial  Payor: Keon Costello / Plan: Amee Diego PPO / Product Type: Medicare /     Pre-cert required for SNF: Yes    Pharmacy    [facility] [address], [place]  [place]  [address] [id number]  Phone: [phone] Fax: [address] [place] [address], [address] [facility] [phone] Basia Wilksand [phone]  [address] [address]  Phone: [phone] Fax: [phone]      Potential assistance Purchasing Medications: Potential Assistance Purchasing Medications: No  Does Patient want to participate in local refill/ meds to beds program?: No    Meds To Beds General Rules:  1.  Can ONLY be done Monday- Friday between Transition of Care is related to the following treatment goals of SBO (small bowel obstruction) ([facility] [K56.609]  SBO (small bowel obstruction) ([facility] [K56.609]    The Patient and/or patient representative Britney Monterroso and his family were provided with a choice of provider and agrees with the discharge plan Yes    Freedom of choice list was provided with basic dialogue that supports the patient's individualized plan of care/goals and shares the quality data associated with the providers.  Not Indicated    Care Transition patient: Yes    Catalina Moreno RN  [facility]  Case Management Department  Ph: [phone]   Fax: [phone]

## 2020-08-20 LAB
ABO/RH: NORMAL
ALBUMIN SERPL-MCNC: 3.3 G/DL (ref 3.4–5)
ANION GAP SERPL CALCULATED.3IONS-SCNC: 8 MMOL/L (ref 3–16)
ANTI-XA UNFRAC HEPARIN: 0.08 IU/ML (ref 0.3–0.7)
ANTI-XA UNFRAC HEPARIN: 0.24 IU/ML (ref 0.3–0.7)
ANTI-XA UNFRAC HEPARIN: 0.51 IU/ML (ref 0.3–0.7)
ANTIBODY SCREEN: NORMAL
BASOPHILS ABSOLUTE: 0 K/UL (ref 0–0.2)
BASOPHILS RELATIVE PERCENT: 0.6 %
BUN BLDV-MCNC: 25 MG/DL (ref 7–20)
CALCIUM SERPL-MCNC: 9.3 MG/DL (ref 8.3–10.6)
CHLORIDE BLD-SCNC: 101 MMOL/L (ref 99–110)
CO2: 30 MMOL/L (ref 21–32)
CREAT SERPL-MCNC: 1.1 MG/DL (ref 0.8–1.3)
EOSINOPHILS ABSOLUTE: 0 K/UL (ref 0–0.6)
EOSINOPHILS RELATIVE PERCENT: 0.4 %
GFR AFRICAN AMERICAN: >60
GFR NON-AFRICAN AMERICAN: >60
GLUCOSE BLD-MCNC: 126 MG/DL (ref 70–99)
HCT VFR BLD CALC: 37.6 % (ref 40.5–52.5)
HEMOGLOBIN: 12.5 G/DL (ref 13.5–17.5)
LYMPHOCYTES ABSOLUTE: 1 K/UL (ref 1–5.1)
LYMPHOCYTES RELATIVE PERCENT: 13.5 %
MAGNESIUM: 2 MG/DL (ref 1.8–2.4)
MCH RBC QN AUTO: 30 PG (ref 26–34)
MCHC RBC AUTO-ENTMCNC: 33.2 G/DL (ref 31–36)
MCV RBC AUTO: 90.3 FL (ref 80–100)
MONOCYTES ABSOLUTE: 0.5 K/UL (ref 0–1.3)
MONOCYTES RELATIVE PERCENT: 6.3 %
NEUTROPHILS ABSOLUTE: 5.7 K/UL (ref 1.7–7.7)
NEUTROPHILS RELATIVE PERCENT: 79.2 %
PDW BLD-RTO: 14 % (ref 12.4–15.4)
PHOSPHORUS: 3 MG/DL (ref 2.5–4.9)
PLATELET # BLD: 74 K/UL (ref 135–450)
PLATELET SLIDE REVIEW: ABNORMAL
PMV BLD AUTO: 11.1 FL (ref 5–10.5)
POTASSIUM SERPL-SCNC: 4 MMOL/L (ref 3.5–5.1)
RBC # BLD: 4.17 M/UL (ref 4.2–5.9)
SODIUM BLD-SCNC: 139 MMOL/L (ref 136–145)
WBC # BLD: 7.2 K/UL (ref 4–11)

## 2020-08-20 PROCEDURE — 97116 GAIT TRAINING THERAPY: CPT

## 2020-08-20 PROCEDURE — 86900 BLOOD TYPING SEROLOGIC ABO: CPT

## 2020-08-20 PROCEDURE — 94150 VITAL CAPACITY TEST: CPT

## 2020-08-20 PROCEDURE — 83735 ASSAY OF MAGNESIUM: CPT

## 2020-08-20 PROCEDURE — 97161 PT EVAL LOW COMPLEX 20 MIN: CPT

## 2020-08-20 PROCEDURE — 99231 SBSQ HOSP IP/OBS SF/LOW 25: CPT | Performed by: INTERNAL MEDICINE

## 2020-08-20 PROCEDURE — 85025 COMPLETE CBC W/AUTO DIFF WBC: CPT

## 2020-08-20 PROCEDURE — 97530 THERAPEUTIC ACTIVITIES: CPT

## 2020-08-20 PROCEDURE — 36415 COLL VENOUS BLD VENIPUNCTURE: CPT

## 2020-08-20 PROCEDURE — 1200000000 HC SEMI PRIVATE

## 2020-08-20 PROCEDURE — 86901 BLOOD TYPING SEROLOGIC RH(D): CPT

## 2020-08-20 PROCEDURE — 2580000003 HC RX 258: Performed by: STUDENT IN AN ORGANIZED HEALTH CARE EDUCATION/TRAINING PROGRAM

## 2020-08-20 PROCEDURE — 85520 HEPARIN ASSAY: CPT

## 2020-08-20 PROCEDURE — 6360000002 HC RX W HCPCS: Performed by: STUDENT IN AN ORGANIZED HEALTH CARE EDUCATION/TRAINING PROGRAM

## 2020-08-20 PROCEDURE — 86850 RBC ANTIBODY SCREEN: CPT

## 2020-08-20 PROCEDURE — 99232 SBSQ HOSP IP/OBS MODERATE 35: CPT | Performed by: SURGERY

## 2020-08-20 PROCEDURE — 80069 RENAL FUNCTION PANEL: CPT

## 2020-08-20 RX ADMIN — SODIUM CHLORIDE, SODIUM LACTATE, POTASSIUM CHLORIDE, CALCIUM CHLORIDE, AND DEXTROSE MONOHYDRATE: 600; 310; 30; 20; 5 INJECTION, SOLUTION INTRAVENOUS at 06:43

## 2020-08-20 RX ADMIN — HEPARIN SODIUM 2900 UNITS: 5000 INJECTION INTRAVENOUS; SUBCUTANEOUS at 11:01

## 2020-08-20 RX ADMIN — HEPARIN SODIUM 1450 UNITS: 5000 INJECTION INTRAVENOUS; SUBCUTANEOUS at 18:53

## 2020-08-20 RX ADMIN — HEPARIN SODIUM 16 UNITS/KG/HR: 10000 INJECTION, SOLUTION INTRAVENOUS at 18:53

## 2020-08-20 ASSESSMENT — PAIN SCALES - GENERAL
PAINLEVEL_OUTOF10: 0

## 2020-08-20 ASSESSMENT — PAIN DESCRIPTION - ORIENTATION: ORIENTATION: RIGHT

## 2020-08-20 ASSESSMENT — PAIN DESCRIPTION - ONSET: ONSET: ON-GOING

## 2020-08-20 ASSESSMENT — PAIN DESCRIPTION - FREQUENCY: FREQUENCY: CONTINUOUS

## 2020-08-20 ASSESSMENT — PAIN DESCRIPTION - DESCRIPTORS: DESCRIPTORS: DULL

## 2020-08-20 ASSESSMENT — PAIN DESCRIPTION - PAIN TYPE: TYPE: ACUTE PAIN

## 2020-08-20 ASSESSMENT — PAIN DESCRIPTION - LOCATION: LOCATION: NOSE;THROAT

## 2020-08-20 NOTE — PROGRESS NOTES
POC:    Evaluated NG tube after 4 hours of clamping. < 100 cc residual evacuated after placing back to suction. NGT removed at bedside without difficulty. Patient will be placed on a CLD. Instructed to go slow with diet. Will continue to monitor    ALLA Novak NP-C  [phone]  Resident Support Staff

## 2020-08-20 NOTE — PLAN OF CARE
Nutrition Problem #1: Inadequate oral intake  Intervention: Food and/or Nutrient Delivery: Continue Current Diet, Start Oral Nutrition Supplement  Nutritional Goals: Patient will tolerate and consume 50% or greater of all meals and supplements

## 2020-08-20 NOTE — PROGRESS NOTES
General Surgery   Daily Progress Note  Patient: Carol Ospina MD      CC: Nausea and vomiting    SUBJECTIVE:  Patient rested well overnight. He denies nausea or vomiting this morning and has been taking sips over the day yesterday. Passing flatus but denies BM at this time. ROS:   A 14 point review of systems was conducted, significant findings as noted above. All other systems negative. OBJECTIVE:    PHYSICAL EXAM:    Vitals:    08/19/20 2012 08/19/20 2145 08/19/20 2349 08/20/20 0354   BP: 130/85  123/75 119/72   Pulse: 70  74 73   Resp: 16 16 16 16   Temp: 97.2 °F (36.2 °C)  97.4 °F (36.3 °C) 98 °F (36.7 °C)   TempSrc: Axillary  Oral Oral   SpO2: 97% 97% 93% 93%   Weight:       Height:           General appearance: alert, no acute distress, grooming appropriate  Neuro: A&Ox3, no focal deficits, sensation intact  HEENT: NG tube to LWS, with gastric output, EOMI, trachea midline, symmetric, no JVD, neck supple  Chest/Lungs: CTAB, normal effort, no accessory muscle use, on RA  Cardiovascular: RRR,  well perfused. Abdomen: soft, non-tender, non-distended, no guarding/rigidity  : No giraldo, grossly normal.  Extremities: no edema, no cyanosis    LABS:   Recent Labs     08/18/20 2127 08/19/20  0528   WBC 6.5 10.3   HGB 14.3 14.4   HCT 42.6 43.6   MCV 89.3 90.6   * 101*        Recent Labs     08/18/20 2127 08/19/20  0626    137   K 4.8 4.1   CL 96* 97*   CO2 28 27   PHOS  --  4.3   BUN 29* 27*   CREATININE 1.3 1.2        Recent Labs     08/18/20 2127   AST 23   ALT 13   BILIDIR <0.2   BILITOT 0.6   ALKPHOS 84        Recent Labs     08/18/20 2127   LIPASE 171.0*        Recent Labs     08/18/20 2127 08/19/20  0626   PROT 8.0  --    INR 1.56* 1.71*   APTT  --  43.6*      No results for input(s): CKTOTAL, CKMB, CKMBINDEX, TROPONINI in the last 72 hours.       ASSESSMENT & PLAN:   This is a 82 y.o. male with a history of osteoporosis, pancreatitis, prostate cancer, aortic valve replacement, and esophageal cancer s/p esophagectomy and gastric pull through procedure that presents with nausea and vomiting. CT abdomen and pelvis shows fluid distention in intrathoracic gastric conduit, intraperitoneal gastric remnant, and proximal small bowel consistent with small bowel obstruction. - will consider CT vs. Removing NG tube later today. - Patient on Heparin gtt for history of AVR  - Continue to monitor for return of bowel funtion    Lou Cox DO, MS  PGY1, General Surgery  08/20/20  6:22 AM  [phone]    I have personally performed the medical history, physical exam and medical decision making and agree with all pertinent clinical information unless otherwise noted.      Leonel Curiel MD  Surgery Attending

## 2020-08-20 NOTE — FLOWSHEET NOTE
08/19/20 2103   Encounter Summary   Services provided to: Patient   Referral/Consult From: Rody   Continue Visiting   (es 8/19)   Complexity of Encounter Moderate   Length of Encounter 45 minutes   Routine   Type Initial   Assessment Approachable; Hopeful   Intervention Active listening;Explored feelings, thoughts, concerns; Discussed illness/injury and it's impact; Discussed belief system/[employer] practices/gabby;Prayer   Outcome Receptive;Engaged in conversation

## 2020-08-20 NOTE — PLAN OF CARE
Problem: Bowel/Gastric:  Goal: Ability to achieve a regular elimination pattern will improve  Description: Ability to achieve a regular elimination pattern will improve  8/20/2020 1309 by Forest Crump, RN  Outcome: Ongoing  Note: Pt has passed gas. Will continue to monitor. Problem: Falls - Risk of:  Goal: Will remain free from falls  Description: Will remain free from falls  8/20/2020 1309 by Forest Crump, RN  Outcome: Ongoing  Note: Pt bed in low position and alarm on. Alarm on chair as well. Non skid socks on. Belongings, bedside table, and call light within reach. 2/4 side rails up. Will continue to monitor.

## 2020-08-20 NOTE — PROGRESS NOTES
24-hr.       Objective          AROM RLE (degrees)  RLE AROM: WNL  AROM LLE (degrees)  LLE AROM : WNL  Strength RLE  Strength RLE: WFL  Strength LLE  Strength LLE: WFL        Bed mobility  Supine to Sit: Stand by assistance(HOB elevated)  Scooting: Stand by assistance(seated)  Transfers  Sit to Stand: Contact guard assistance(verbal cues)  Stand to sit: Contact guard assistance(verbal cues)  Ambulation  Ambulation?: Yes  Ambulation 1  Surface: level tile  Device: Rolling Walker  Assistance: Contact guard assistance  Quality of Gait: flexed posture (pt has scoliosis)  Gait Deviations: Decreased step length  Distance: 15 ft x 3, 150 ft  Comments: cues for positioning in RW, no LOB. pt stated he felt \"a littile weak\" & that he was functioning at  \"70%\" of his baseline.   Stairs/Curb  Stairs?: No     Balance  Sitting - Static: Good  Sitting - Dynamic: Good  Standing - Static: Good  Standing - Dynamic: Good;-(with RW)        Plan   Plan  Times per week: 2-5  Current Treatment Recommendations: Functional Mobility Training, Transfer Training, Endurance Training, Gait Training, Stair training, Equipment Evaluation, Education, & procurement  Safety Devices  Type of devices: Call light within reach, Chair alarm in place, Left in chair, Nurse notified                                                     AM-PAC Score  AM-PAC Inpatient Mobility Raw Score : 19 (08/20/20 1411)  -PAC Inpatient T-Scale Score : 45.44 (08/20/20 1411)  Mobility Inpatient CMS 0-100% Score: 41.77 (08/20/20 1411)  Mobility Inpatient CMS G-Code Modifier : CK (08/20/20 1411)          Goals  Short term goals  Time Frame for Short term goals: D/C  Short term goal 1: supine<->sit SUPV  Short term goal 2: sit<->stand SBA  Short term goal 3: Amb 150 ft with RW SBA  Short term goal 4: pt will tolerate stair assessment as able  Patient Goals   Patient goals : to go home       Therapy Time   Individual Concurrent Group Co-treatment   Time In 1213         Time Out

## 2020-08-20 NOTE — PROGRESS NOTES
Pt is alert and oriented. Vsstable. No c/o pain. Iv removed from Left ac d/t bleeding. Will attempt second iv unless surgery agrees. SBA with walker. Voiding without issue. Surgery took NG out this am. Pt passing gas. Will continue to monitor.

## 2020-08-20 NOTE — CARE COORDINATION
CM following, pt had NG removed started on CLD, treating SBO conservatively, pt will need PT OT evals for DC recs. Awaiting return GI function.    Electronically signed by Jonny Rosales RN on 8/20/2020 at 1:36 PM  [phone]

## 2020-08-20 NOTE — PROGRESS NOTES
Hospital Medicine  Progress Note    Chief Complaint: Abdominal Pain (LUQ); Emesis; and Nausea      SUBJECTIVE: Patient is improved. There are not new complaints. Tolerating NG clamping    OBJECTIVE      Medications    Infusion Medications    heparin (porcine) 10 Units/kg/hr (20 0212)    dextrose 5% in lactated ringers 100 mL/hr at 20 0643     Scheduled Medications    sodium chloride flush  10 mL Intravenous 2 times per day    ondansetron  4 mg Intravenous Once       Physical   Temperature:  Current - Temp: 97.8 °F (36.6 °C); Max - Temp  Av.7 °F (36.5 °C)  Min: 97.2 °F (36.2 °C)  Max: 98 °F (36.7 °C)    Respiratory Rate : Resp  Av.2  Min: 16  Max: 18    Pulse Range: Pulse  Av  Min: 70  Max: 87    Blood Presuure Range:  Systolic (36VCY), BKL:784 , Min:119 , IIW:822   ; Diastolic (88BKO), JDU:15, Min:66, Max:85      Pulse ox Range: SpO2  Av.9 %  Min: 93 %  Max: 100 %    24hr I & O:      Intake/Output Summary (Last 24 hours) at 2020 0844  Last data filed at 2020 9652  Gross per 24 hour   Intake 90 ml   Output 1700 ml   Net -1610 ml       Constitutional:  awake, alert, cooperative, no apparent distress, and appears stated age  Eyes:  pupils equal, round and reactive to light  ENT:  normocepalic, without obvious abnormality  NECK:  supple, symmetrical, trachea midline  HEMATOLOGIC/LYMPHATICS:  no cervical lymphadenopathy  LUNGS:  No increased work of breathing, good air exchange, clear to auscultation bilaterally, no crackles or wheezing  CARDIOVASCULAR: regular rate and rhythm, S1, S2 normal, no murmur, click, rub or gallop  ABDOMEN:  soft, nondistended and  nontenderness MUSCULOSKELETAL:  There is no redness, warmth, or swelling of the joints. Full range of motion noted. Motor strength is 5 out of 5 all extremities bilaterally.   Tone is normal.  SKIN:  normal skin color, texture, turgor    Data      CBC:   Lab Results   Component Value Date    WBC 10.3 2020    RBC 4.81 08/19/2020    RBC 4.41 06/26/2017    HGB 14.4 08/19/2020    HCT 43.6 08/19/2020    MCV 90.6 08/19/2020    MCH 30.0 08/19/2020    MCHC 33.1 08/19/2020    RDW 13.9 08/19/2020     08/19/2020    MPV 10.4 08/19/2020     CMP:    Lab Results   Component Value Date     08/20/2020    K 4.0 08/20/2020    K 4.8 08/18/2020     08/20/2020    CO2 30 08/20/2020    BUN 25 08/20/2020    CREATININE 1.1 08/20/2020    GFRAA >60 08/20/2020    GFRAA >60 05/10/2013    AGRATIO 1.8 09/18/2019    LABGLOM >60 08/20/2020    LABGLOM >60>60 01/10/2012    GLUCOSE 126 08/20/2020    GLUCOSE 123 09/26/2016    PROT 8.0 08/18/2020    PROT 6.8 09/26/2016    LABALBU 3.3 08/20/2020    CALCIUM 9.3 08/20/2020    BILITOT 0.6 08/18/2020    ALKPHOS 84 08/18/2020    AST 23 08/18/2020    ALT 13 08/18/2020         ASSESSMENT AND PLAN      Active Problems:    SBO (small bowel obstruction) ([facility])  Plan: Improved as per general surgery  S/p AVR continue heparin

## 2020-08-20 NOTE — PROGRESS NOTES
EVALUATION:  Evaluation:Goals set   Goals:Goals: Patient will tolerate and consume 50% or greater of all meals and supplements  Monitoring: Diet Tolerance  or Nutrition Progression      OBJECTIVE DATA:  · Nutrition-Focused Physical Findings: NG removed  · Wounds None      Past Medical History:   Diagnosis Date    Anemia     Aortic valve disorders     stenosis    Aspiration pneumonia (HCC) 4/27/2015    Erectile dysfunction     Esophageal cancer ([facility] 10/18/2012    Hernia, femoral, bilateral 5/12/2014    Hyperlipidemia     Osteoporosis, senile 5/20/2014    Pancreatitis 8/1/2013    Patient underweight 8/8/2013    Prostate cancer ([facility]     Unspecified essential hypertension         ANTHROPOMETRICS  Current Height: 5' 4\" (162.6 cm)  Current Weight: 106 lb (48.1 kg)    Admission weight: 106 lb (48.1 kg)  Ideal Bodyweight 130 lb       BMI BMI (Calculated): 18.2    Wt Readings from Last 50 Encounters:   08/19/20 106 lb (48.1 kg)   06/03/20 106 lb (48.1 kg)   05/27/20 106 lb (48.1 kg)   11/27/19 112 lb 3.2 oz (50.9 kg)   09/16/19 114 lb (51.7 kg)     COMPARATIVE STANDARDS  Estimated Total Kcals/Day : 30-35 Current Bodyweight (48 kg) 2086-5993 kcal    Estimated Total Protein (g/day) : 1.3-1.5 Current Bodyweight (48 kg) 62-72g/day  Estimated Daily Total Fluid (ml/day): 1500 mL per day     Food / Nutrition-Related History  Pre-Admission / Home Diet:  Pre-Admission/Home Diet: General   Home Supplements / Herbals:    none noted  Food Restrictions / Cultural Requests:    none noted    Diet Orders / Intake / Nutrition Support  Current diet/supplement order: DIET CLEAR LIQUID;     NSG Recorded PO:   PO Fluids P.O.: 120 mL  PO Intake: 1-25%  PO Supplement: None   PO Supplement Intake: None   IVF: D5 and LR @100 ml/hr     NUTRITION RISK LEVEL: Risk Level: High     Lori Fam RD, LD  [place]:  [phone]  Office:  [phone]

## 2020-08-20 NOTE — PROGRESS NOTES
POC Note  Clamp Trial Progress    Patient denies nausea and vomiting at this time. Patient's NG tube removed from suction and patient NG tube secured on his gown. I explained the clamp trial to the patient. Encouraged to ambulate. Told to keep the head of bed elevated during the course of the clamp trial.    - Clamp trial start time: 0740  - Plan to hook back up to suction for residuals in 4 hours at 1140  - Collection chamber reading 150 at the beginning of the clamp trial.  - Patient advised to let his nurse know if he feels nausea during the course of the clamp trial.  - Please call with any questions or issues.     Emiliano Casillas DO, MS  PGY1, General Surgery  08/20/20  7:41 AM  [phone]

## 2020-08-20 NOTE — PLAN OF CARE
Problem: Falls - Risk of:  Goal: Will remain free from falls  Description: Will remain free from falls  Outcome: Ongoing  Note: Pt is A&Ox4, is high fall risk, Pt educated and encouraged to use call light to notify and wait for assistance from staff before getting OOB, pt uses call light appropriately, has made no unsafe movements, no attempts to get OOB without assistance. Pt's bed alarm remained on throughout shift, bed in lowest position, 2/4 side rails up, call light and bedside table within reach. No falls so far this shift, will continue to monitor. Problem: Bowel/Gastric:  Goal: Ability to achieve a regular elimination pattern will improve  Description: Ability to achieve a regular elimination pattern will improve  Outcome: Ongoing  Note: NGT remains in place to [facility]. Pt remains NPO. Abd flat on assessment. Hypoactive bowel sounds.  Will cont to monitor

## 2020-08-20 NOTE — PROGRESS NOTES
VSS, a/o x4. Pt denies pain overnight/ nausea. Verbalizing hungry, wanting a soda. Reminded pt of NPO w ice chips. Provided w ice chips/ popsicle. NGT to R nare in place to CLWS. Using urinal @ bedside, OOB x1 w walker. Tolerated fairly well, unsteady on feet- bed alarm engaged while pt in bed. Pt w heparin gtt in place- q6h antiXa per protocol. IVF infusing through L forearm IV. Resting comfortably w eyes closed @ this time. Bed remains in lowest position, call light at side. Continues to call out appropriately when needing assistance. All needs met.  Will cont to monitor

## 2020-08-21 VITALS
HEART RATE: 97 BPM | TEMPERATURE: 98.5 F | BODY MASS INDEX: 18.1 KG/M2 | DIASTOLIC BLOOD PRESSURE: 67 MMHG | HEIGHT: 64 IN | RESPIRATION RATE: 16 BRPM | WEIGHT: 106 LBS | SYSTOLIC BLOOD PRESSURE: 109 MMHG | OXYGEN SATURATION: 94 %

## 2020-08-21 LAB
ALBUMIN SERPL-MCNC: 3.3 G/DL (ref 3.4–5)
ANION GAP SERPL CALCULATED.3IONS-SCNC: 7 MMOL/L (ref 3–16)
ANTI-XA UNFRAC HEPARIN: 0.31 IU/ML (ref 0.3–0.7)
ANTI-XA UNFRAC HEPARIN: 0.33 IU/ML (ref 0.3–0.7)
BASOPHILS ABSOLUTE: 0.1 K/UL (ref 0–0.2)
BASOPHILS RELATIVE PERCENT: 1.1 %
BUN BLDV-MCNC: 18 MG/DL (ref 7–20)
CALCIUM SERPL-MCNC: 9.2 MG/DL (ref 8.3–10.6)
CHLORIDE BLD-SCNC: 102 MMOL/L (ref 99–110)
CO2: 31 MMOL/L (ref 21–32)
CREAT SERPL-MCNC: 1.1 MG/DL (ref 0.8–1.3)
EOSINOPHILS ABSOLUTE: 0 K/UL (ref 0–0.6)
EOSINOPHILS RELATIVE PERCENT: 0.6 %
GFR AFRICAN AMERICAN: >60
GFR NON-AFRICAN AMERICAN: >60
GLUCOSE BLD-MCNC: 115 MG/DL (ref 70–99)
HCT VFR BLD CALC: 35.4 % (ref 40.5–52.5)
HEMOGLOBIN: 11.8 G/DL (ref 13.5–17.5)
INR BLD: 1.66 (ref 0.86–1.14)
LYMPHOCYTES ABSOLUTE: 1.1 K/UL (ref 1–5.1)
LYMPHOCYTES RELATIVE PERCENT: 16.4 %
MAGNESIUM: 1.7 MG/DL (ref 1.8–2.4)
MCH RBC QN AUTO: 29.9 PG (ref 26–34)
MCHC RBC AUTO-ENTMCNC: 33.4 G/DL (ref 31–36)
MCV RBC AUTO: 89.3 FL (ref 80–100)
MONOCYTES ABSOLUTE: 0.5 K/UL (ref 0–1.3)
MONOCYTES RELATIVE PERCENT: 7.9 %
NEUTROPHILS ABSOLUTE: 4.9 K/UL (ref 1.7–7.7)
NEUTROPHILS RELATIVE PERCENT: 74 %
PDW BLD-RTO: 13.8 % (ref 12.4–15.4)
PHOSPHORUS: 2.5 MG/DL (ref 2.5–4.9)
PLATELET # BLD: 82 K/UL (ref 135–450)
PMV BLD AUTO: 10.2 FL (ref 5–10.5)
POTASSIUM SERPL-SCNC: 3.9 MMOL/L (ref 3.5–5.1)
PROTHROMBIN TIME: 19.3 SEC (ref 10–13.2)
RBC # BLD: 3.97 M/UL (ref 4.2–5.9)
SODIUM BLD-SCNC: 140 MMOL/L (ref 136–145)
WBC # BLD: 6.6 K/UL (ref 4–11)

## 2020-08-21 PROCEDURE — 36415 COLL VENOUS BLD VENIPUNCTURE: CPT

## 2020-08-21 PROCEDURE — 85025 COMPLETE CBC W/AUTO DIFF WBC: CPT

## 2020-08-21 PROCEDURE — 99232 SBSQ HOSP IP/OBS MODERATE 35: CPT | Performed by: SURGERY

## 2020-08-21 PROCEDURE — 2580000003 HC RX 258: Performed by: STUDENT IN AN ORGANIZED HEALTH CARE EDUCATION/TRAINING PROGRAM

## 2020-08-21 PROCEDURE — 99231 SBSQ HOSP IP/OBS SF/LOW 25: CPT | Performed by: INTERNAL MEDICINE

## 2020-08-21 PROCEDURE — 80069 RENAL FUNCTION PANEL: CPT

## 2020-08-21 PROCEDURE — 97165 OT EVAL LOW COMPLEX 30 MIN: CPT

## 2020-08-21 PROCEDURE — 83735 ASSAY OF MAGNESIUM: CPT

## 2020-08-21 PROCEDURE — 85610 PROTHROMBIN TIME: CPT

## 2020-08-21 PROCEDURE — 6360000002 HC RX W HCPCS: Performed by: INTERNAL MEDICINE

## 2020-08-21 PROCEDURE — 85520 HEPARIN ASSAY: CPT

## 2020-08-21 RX ORDER — DOCUSATE SODIUM 100 MG/1
100 CAPSULE, LIQUID FILLED ORAL 2 TIMES DAILY
Status: DISCONTINUED | OUTPATIENT
Start: 2020-08-21 | End: 2020-08-21 | Stop reason: HOSPADM

## 2020-08-21 RX ORDER — POLYETHYLENE GLYCOL 3350 17 G/17G
17 POWDER, FOR SOLUTION ORAL 2 TIMES DAILY
Status: DISCONTINUED | OUTPATIENT
Start: 2020-08-21 | End: 2020-08-21 | Stop reason: HOSPADM

## 2020-08-21 RX ORDER — BISACODYL 10 MG
10 SUPPOSITORY, RECTAL RECTAL ONCE
Status: DISCONTINUED | OUTPATIENT
Start: 2020-08-21 | End: 2020-08-21 | Stop reason: HOSPADM

## 2020-08-21 RX ORDER — WARFARIN SODIUM 5 MG/1
5 TABLET ORAL DAILY
Status: DISCONTINUED | OUTPATIENT
Start: 2020-08-21 | End: 2020-08-21 | Stop reason: HOSPADM

## 2020-08-21 RX ADMIN — SODIUM CHLORIDE, SODIUM LACTATE, POTASSIUM CHLORIDE, CALCIUM CHLORIDE, AND DEXTROSE MONOHYDRATE: 600; 310; 30; 20; 5 INJECTION, SOLUTION INTRAVENOUS at 00:11

## 2020-08-21 RX ADMIN — ENOXAPARIN SODIUM 40 MG: 40 INJECTION SUBCUTANEOUS at 12:04

## 2020-08-21 ASSESSMENT — PAIN SCALES - GENERAL: PAINLEVEL_OUTOF10: 0

## 2020-08-21 NOTE — PROGRESS NOTES
Occupational Therapy   Occupational Therapy Initial Assessment/tx/discharge  Date: 2020   Patient Name: Elisabeth Muhammad MD  MRN: [id number]     : 1935    Date of Service: 2020    Discharge Recommendations:  Elisabeth Muhammad MD scored a  on the -[facility] ADL Inpatient form. At this time, no further OT is recommended upon discharge due to pt's level of indep. Recommend patient returns to prior setting with prior services. OT Equipment Recommendations  Other: OT educated pt on shower chair and where to obtain, if interested in it for the future. Assessment   Assessment: Pt is functioning close to baseline level:  indep with ADLs, indep with funtional transfers/mobility with rolling walker. No acute OT needs, d/c OT. Recommend discharge home with PRN A from wife-no OT at discharge. Decision Making: Low Complexity  OT Education: OT Role;Transfer Training  Patient Education: pt verbalized and demonstrated understanding  REQUIRES OT FOLLOW UP: No           Patient Diagnosis(es): The encounter diagnosis was Small bowel obstruction ([address] has a past medical history of Anemia, Aortic valve disorders, Aspiration pneumonia (HCC), Erectile dysfunction, Esophageal cancer (HCC), Hernia, femoral, bilateral, Hyperlipidemia, Osteoporosis, senile, Pancreatitis, Patient underweight, Prostate cancer ([address] and Unspecified essential hypertension. has a past surgical history that includes Coronary artery bypass graft (); Cardiac valve replacement (); eye surgery (& ); Appendectomy; bone graft; gastrectomy; Cholecystectomy; Colonoscopy (2009); Prostate surgery; Tonsillectomy; Esophagus surgery;  Prostate/Transrectal/Vol Collins Brachyth; and Colonoscopy (10/05/15).            Restrictions  Position Activity Restriction  Other position/activity restrictions: up as tolerated    Subjective   General  Chart Reviewed: Yes  Patient assessed for rehabilitation services?: Yes  Additional Pertinent Hx: 82 y.o. M to ED w/ c/o abdominal pain, nausea vomiting. Hospital Course: Small bowel obstruction on CT scan. General surgery consulted. NG to LWS. PMH: osteoporosis, pancreatitis, prostate cancer, aortic valve replacement, and esophageal cancer s/p esophagectomy and gastric pull through procedure. Family / Caregiver Present: Yes(wife)  Referring Practitioner: Dr. Nabor Crawley  Diagnosis: SBO  Subjective  Subjective: Pt in chair, donning socks and shoes, stating he is being discharged soon. Pt asked to go through quick OT eval.  Pt's pain-denies pain  Social/Functional History  Social/Functional History  Lives With: Spouse  Type of Home: (condo)  Home Layout: One level(pt goes to basement for exercie room)  Home Access: Stairs to enter without rails  Entrance Stairs - Number of Steps: 1  Bathroom Shower/Tub: Walk-in shower  Bathroom Toilet: Handicap height  Bathroom Equipment: Grab bars in shower  Home Equipment: Cane, 4 wheeled walker, Standard walker  ADL Assistance: Independent  Homemaking Responsibilities: No(wife does all)  Ambulation Assistance: Independent  Transfer Assistance: Independent  Active : Yes  Occupation: Retired  Type of occupation: pathologist, 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 Objective              Toilet Transfers  Toilet - Technique: Ambulating(with rolling walker and S-to/from bathroom)  Equipment Used: Standard toilet  Toilet Transfer: Independent  Shower Transfers  Shower - Transfer Type: To and From  Shower - Technique: Ambulating  Shower Transfers: Independent  Shower Transfers Comments: with use of grab bar  ADL  Grooming: Independent(washing hands at sink)  LE Dressing: Independent(donning B socks and shoes)           Transfers  Sit to stand: Independent(cues for hand placement)  Stand to sit:  Independent                       LUE AROM (degrees)  LUE General AROM: ~100 shoulder flex, elbow to hand=WNL  RUE AROM (degrees)  RUE General AROM: ~100 shoulder flex, elbow to hand =WNL  LUE Strength  Gross LUE Strength: WFL  RUE Strength  Gross RUE Strength: WFL     Hand Dominance  Hand Dominance: Right     Patient participated in OT eval and tx including ADLs and transfer        Plan   Plan  Plan Comment: d/c OT    G-Code     OutComes Score                                                  AM-PAC Score        AM-PAC Inpatient Daily Activity Raw Score: 24 (08/21/20 1331)  AM-PAC Inpatient ADL T-Scale Score : 57.54 (08/21/20 1331)  ADL Inpatient CMS 0-100% Score: 0 (08/21/20 1331)  ADL Inpatient CMS G-Code Modifier : CH (08/21/20 1331)              Therapy Time   Individual Concurrent Group Co-treatment   Time In 1315         Time Out 1330         Minutes 15           Timed Code Treatment Minutes:   15    Total Treatment Minutes:  [facility], OTR/L [address]

## 2020-08-21 NOTE — PROGRESS NOTES
Hospital Medicine  Progress Note    Chief Complaint: Abdominal Pain (LUQ); Emesis; and Nausea,s/p AVR      SUBJECTIVE: Patient is improved. There are not new complaints. NG out and tolerating po    OBJECTIVE      Medications    Infusion Medications    heparin (porcine) 16 Units/kg/hr (20 5873)     Scheduled Medications    polyethylene glycol  17 g Oral BID    docusate sodium  100 mg Oral BID    bisacodyl  10 mg Rectal Once    warfarin  5 mg Oral Daily    sodium chloride flush  10 mL Intravenous 2 times per day    ondansetron  4 mg Intravenous Once       Physical   Temperature:  Current - Temp: 97.5 °F (36.4 °C); Max - Temp  Av.9 °F (36.6 °C)  Min: 97.5 °F (36.4 °C)  Max: 98.3 °F (36.8 °C)    Respiratory Rate : Resp  Avg: 15.6  Min: 14  Max: 16    Pulse Range: Pulse  Av.5  Min: 68  Max: 84    Blood Presuure Range:  Systolic (41MZX), PFK:166 , Min:113 , QLA:977   ; Diastolic (38WPL), AUSTYN:13, Min:72, Max:78      Pulse ox Range: SpO2  Av %  Min: 93 %  Max: 97 %    24hr I & O:      Intake/Output Summary (Last 24 hours) at 2020 0856  Last data filed at 2020 0558  Gross per 24 hour   Intake 540 ml   Output 1125 ml   Net -585 ml       Constitutional:  awake, alert, cooperative, no apparent distress, and appears stated age  Eyes:  pupils equal, round and reactive to light  ENT:  normocepalic, without obvious abnormality  NECK:  supple, symmetrical, trachea midline  HEMATOLOGIC/LYMPHATICS:  no cervical lymphadenopathy  LUNGS:  No increased work of breathing, good air exchange, clear to auscultation bilaterally, no crackles or wheezing  CARDIOVASCULAR: regular rate and rhythm, S1, S2 normal, no murmur, click, rub or gallop  ABDOMEN:  soft and nondistended  MUSCULOSKELETAL:  There is no redness, warmth, or swelling of the joints. Full range of motion noted. Motor strength is 5 out of 5 all extremities bilaterally.   Tone is normal.  SKIN:  normal skin color, texture, turgor    Data CBC:   Lab Results   Component Value Date    WBC 6.6 08/21/2020    RBC 3.97 08/21/2020    RBC 4.41 06/26/2017    HGB 11.8 08/21/2020    HCT 35.4 08/21/2020    MCV 89.3 08/21/2020    MCH 29.9 08/21/2020    MCHC 33.4 08/21/2020    RDW 13.8 08/21/2020    PLT 82 08/21/2020    MPV 10.2 08/21/2020     BMP:    Lab Results   Component Value Date     08/21/2020    K 3.9 08/21/2020    K 4.8 08/18/2020     08/21/2020    CO2 31 08/21/2020    BUN 18 08/21/2020    LABALBU 3.3 08/21/2020    CREATININE 1.1 08/21/2020    CALCIUM 9.2 08/21/2020    GFRAA >60 08/21/2020    GFRAA >60 05/10/2013    LABGLOM >60 08/21/2020    LABGLOM >60>60 01/10/2012    GLUCOSE 115 08/21/2020    GLUCOSE 123 09/26/2016         ASSESSMENT AND PLAN      Active Problems:    SBO (small bowel obstruction) ([facility])  Plan: Imporved and tolerating diet  S/p AVR if discharged will need bridging of Lovenox for 5 days and restart coumadin

## 2020-08-21 NOTE — PROGRESS NOTES
Patient Aox4 and able to make needs known; compliant with all medications as ordered; VSS; RRR: bowel sounds 4Q; loose soft bowl movement as of note; ambulates with walker SBA +1; heparin gtt d/c'd per MAR; bed in lowest position call light within reach; increased frequency of rounds; meds-to -beds ordered; will monitor for d/c.       Electronically signed by Peyton Suero RN on 8/21/20 at 12:56 PM EDT

## 2020-08-21 NOTE — PROGRESS NOTES
CLINICAL PHARMACY NOTE: MEDS TO [address] Select Patient?: No  Total # of Prescriptions Filled: 1   The following medications were delivered to the patient:  · Enoxaparin 40mg/0.4ml  Total # of Interventions Completed: 1  Time Spent (min): 60    Additional Documentation:

## 2020-08-21 NOTE — DISCHARGE SUMMARY
Discharge Summary      Patient:    Luis Montanez MD    Admit Date:   8/18/2020  8:27 PM    Discharge Date:   8/21/2020    Admitting Physician:   Stephanie Ojeda MD     Discharge Physician:   same    Admitting Diagnosis:  SBO    Discharge Diagnosis:   Same    Past Medical History:   Diagnosis Date    Anemia     Aortic valve disorders     stenosis    Aspiration pneumonia ([facility] 4/27/2015    Erectile dysfunction     Esophageal cancer ([facility] 10/18/2012    Hernia, femoral, bilateral 5/12/2014    Hyperlipidemia     Osteoporosis, senile 5/20/2014    Pancreatitis 8/1/2013    Patient underweight 8/8/2013    Prostate cancer ([facility]     Unspecified essential hypertension         Indication for Admission:   SBO with nause and vomiting. Hospital Course:   HD1: 82 y. o. male with history osteoporosis, pancreatitis, prostate cancer, aortic valve replacement, and esophageal cancer s/p esophagectomy and gastric pull through procedure. Patient presents with 4 days of decreased appetite and nausea. NG tube was placed, IVF started. Started on heparin gtt for his AVR history. HD2: Patient began to pass flatus but denied bowel movements. Stated that nausea had improved. Patient passed a clamp trial for 4 hours and NG tube was removed. We continued to monitor for bowel function. HD3: Patient continued on Hep gtt, and was started on Coumadin. The patient will be sent home with Lovenox to bridge his Coumdin for 5 days at which point he will only take Coumadin. Patient was discharged in stable condition. Will leave home with his wife. Discharge physical exam:  General appearance: alert, no acute distress, grooming appropriate  Neuro: A&Ox3, no focal deficits, sensation intact  HEENT: Trachea midline, symmetric, no JVD, neck supple  Chest/Lungs: CTAB, normal effort, no accessory muscle use, on RA  Cardiovascular: RRR,  well perfused.    Abdomen: soft, non-tender, non-distended, no guarding/rigidity  : No giraldo, grossly normal.  Extremities: no edema, no cyanosis, mild ecchymosis on upper extremities. Disposition:    Home    Condition at discharge:  Stable    Discharge Instructions:  See separate form    Patient Instructions:      Medication List      START taking these medications    enoxaparin 60 MG/0.6ML injection  Commonly known as:  LOVENOX  Inject 0.5 mLs into the skin every 12 hours for 5 days        CHANGE how you take these medications    Simethicone 80 MG Tabs  Take 80 tablets by mouth 3 times daily. What changed:  how much to take        CONTINUE taking these medications    amoxicillin 500 MG capsule  Commonly known as:  AMOXIL  Take 4 capsules by mouth one hour prior to each dental procedure. atorvastatin 10 MG tablet  Commonly known as:  LIPITOR  TAKE 1 TABLET BY MOUTH DAILY     CoQ10 400 MG Caps     docusate sodium 100 MG capsule  Commonly known as:  COLACE     Magnesium 65 MG Tabs     omeprazole 40 MG delayed release capsule  Commonly known as:  PRILOSEC  TAKE ONE CAPSULE BY MOUTH DAILY     polyethylene glycol 17 GM/SCOOP powder  Commonly known as:  GLYCOLAX     Viagra 50 MG tablet  Generic drug:  sildenafil     vitamin B-12 1000 MCG tablet  Commonly known as:  CYANOCOBALAMIN     * warfarin 1 MG tablet  Commonly known as:  COUMADIN  Take as directed. If you are unsure how to take this medication, talk to your nurse or doctor. * warfarin 5 MG tablet  Commonly known as:  COUMADIN  Take as directed. If you are unsure how to take this medication, talk to your nurse or doctor. Original instructions:  TAKE ONE TABLET BY MOUTH ONCE NIGHTLY         * This list has 2 medication(s) that are the same as other medications prescribed for you. Read the directions carefully, and ask your doctor or other care provider to review them with you.                Where to Get Your Medications      You can get these medications from any pharmacy    Bring a paper prescription for each of these

## 2020-08-21 NOTE — PROGRESS NOTES
VSS, a/o x4. Denies pain/nausea. Tolerating clear liquid diet, verbalizing appetite. Using urinal @ bedside. abd soft/flat on assessment. Endorses passing gas, no BM overnight. IVF infusing. Heparin gtt in place-latest antiXA 0.31; is @ goal, no rate change indicated @ this time. Pt resting comfortably overnight. Bed remains in lowest position, call light at side. Bed alarm engaged. No unsafe movements made. Pt OOB x1 w walker in place. Pt continues to call out appropriately when needing assistance. All needs met.  Will cont to monitor

## 2020-08-21 NOTE — PROGRESS NOTES
General Surgery   Daily Progress Note  Patient: eWndy Lowery MD      CC: Nausea and vomiting    SUBJECTIVE:  Patient rested well overnight. Tolerating a CLD without any nausea and vomiting. Denies bowel movements. Passing flatus. Making appropriate urine. ROS:   A 14 point review of systems was conducted, significant findings as noted above. All other systems negative. OBJECTIVE:    PHYSICAL EXAM:    Vitals:    08/20/20 1518 08/20/20 1931 08/20/20 2320 08/21/20 0304   BP: 127/75 113/75 132/75 120/72   Pulse: 73 73 84 80   Resp: 15 16 16 16   Temp: 97.7 °F (36.5 °C) 98.1 °F (36.7 °C) 98.3 °F (36.8 °C) 97.9 °F (36.6 °C)   TempSrc: Oral Oral Oral Oral   SpO2: 96% 93% 96% 95%   Weight:       Height:           General appearance: alert, no acute distress, grooming appropriate  Neuro: A&Ox3, no focal deficits, sensation intact  HEENT: Trachea midline, symmetric, no JVD, neck supple  Chest/Lungs: CTAB, normal effort, no accessory muscle use, on RA  Cardiovascular: RRR,  well perfused. Abdomen: soft, non-tender, non-distended, no guarding/rigidity  : No giraldo, grossly normal.  Extremities: no edema, no cyanosis    LABS:   Recent Labs     08/19/20  0528 08/20/20  0510   WBC 10.3 7.2   HGB 14.4 12.5*   HCT 43.6 37.6*   MCV 90.6 90.3   * 74*        Recent Labs     08/19/20  0626 08/20/20  0510    139   K 4.1 4.0   CL 97* 101   CO2 27 30   PHOS 4.3 3.0   BUN 27* 25*   CREATININE 1.2 1.1        Recent Labs     08/18/20 2127   AST 23   ALT 13   BILIDIR <0.2   BILITOT 0.6   ALKPHOS 84        Recent Labs     08/18/20 2127   LIPASE 171.0*        Recent Labs     08/18/20 2127 08/19/20 0626   PROT 8.0  --    INR 1.56* 1.71*   APTT  --  43.6*      No results for input(s): CKTOTAL, CKMB, CKMBINDEX, TROPONINI in the last 72 hours.       ASSESSMENT & PLAN:   This is a 82 y.o. male with a history of osteoporosis, pancreatitis, prostate cancer, aortic valve replacement, and esophageal cancer s/p esophagectomy and gastric pull through procedure with SBO.     - NG tube removed yesterday. On soft diet. - Patient on Heparin gtt for history of AVR. Starting coumadin today. Checking INR this morning. If patient leaves, will send on therapeutic Lovenox. - Continue to monitor for return of bowel function. Miralax suppository today. - Anticipate discharge today. Candice Gay MD  General Surgery, PGY1  08/21/20  6:37 AM  [phone]    I have personally performed the medical history, physical exam and medical decision making and agree with all pertinent clinical information unless otherwise noted.      Diet modification discussed    Sagar Beaver MD  Surgery Attending

## 2020-08-21 NOTE — TELEPHONE ENCOUNTER
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 Call returned, spoke to Aaron Caller.

## 2020-08-21 NOTE — PLAN OF CARE
Problem: Falls - Risk of:  Goal: Will remain free from falls  Description: Will remain free from falls  8/21/2020 1223 by Debbie Gillette RN  Outcome: Ongoing  Note: Call light within reach; bed/chair alarm engaged  8/21/2020 0252 by Mayra Dash RN  Outcome: Ongoing  Note: Pt is A&Ox4, is high fall risk, Pt educated and encouraged to use call light to notify and wait for assistance from staff before getting OOB, pt uses call light appropriately, has made no unsafe movements, no attempts to get OOB without assistance. Pt's bed alarm remained on throughout shift, bed in lowest position, 2/4 side rails up, call light and bedside table within reach. No falls so far this shift, will continue to monitor. Problem: Skin Integrity:  Goal: Will show no infection signs and symptoms  Description: Will show no infection signs and symptoms  8/21/2020 1223 by Debbie Gillette RN  Outcome: Ongoing  Note: VSS; RRR  8/21/2020 0252 by Mayra Dash RN  Outcome: Ongoing  Note: Pt's skin was assessed this shift and was found to be intact. Pt is able to turn themselves as needed and encouraged to turn frequently while awake. Pt is continent and is rarely moist. No evidence of any skin breakdown so far this shift. Will continue to monitor. Problem: Bowel/Gastric:  Goal: Ability to achieve a regular elimination pattern will improve  Description: Ability to achieve a regular elimination pattern will improve  8/21/2020 0252 by Mayra Dash RN  Outcome: Ongoing  Note: Pt tolerating clear liquid diet, verbalizing appetite and eager for breakfast. Denies nausea/emesis. No BM overnight per pt.

## 2020-08-21 NOTE — PROGRESS NOTES
Patient assisted with this RN to bathroom with safe use of walker.        Electronically signed by Shelby Mccartney RN on 8/21/20 at 12:13 PM EDT

## 2020-08-21 NOTE — CARE COORDINATION
Case Management Assessment            Discharge Note                    Date / Time of Note: 8/21/2020 12:40 PM                  Discharge Note Completed by: Zayra Gan    Patient Name: Arjun Baer MD   YOB: 1935  Diagnosis: SBO (small bowel obstruction) ([facility] [J11.028]  SBO (small bowel obstruction) ([facility] [V71.483]   Date / Time: 8/18/2020  8:27 PM    Current PCP: Jarek Bailey MD  Clinic patient: No    Hospitalization in the last 30 days: No    Advance Directives:  Code Status: Full Code  [place] DNR form completed and on chart: No    Financial:  Payor: Sangeetha Half / Plan: Alexsandra Bang PPO / Product Type: Medicare /      Pharmacy:    [facility] [place] [facility] Zafar  [place]  [phone]  Phone: [phone] Fax: [address] [place] [address], [facility] [phone] Trey Leader [phone]  [address] [place] [address]  Phone: [phone] Fax: [phone]      Assistance purchasing medications?: Potential Assistance Purchasing Medications: No  Assistance provided by Case Management: None at this time    Does patient want to participate in local refill/ meds to beds program?: No    Meds To Beds General Rules:  1. Can ONLY be done Monday- Friday between 8:30am-5pm  2. Prescription(s) must be in pharmacy by 3pm to be filled same day  3. Copy of patient's insurance/ prescription drug card and patient face sheet must be sent along with the prescription(s)  4. Cost of Rx cannot be added to hospital bill. If financial assistance is needed, please contact unit  or ;  or  CANNOT provide pharmacy voucher for patients co-pays  5.  Patients can then  the prescription on their way out of the hospital at discharge, or pharmacy can deliver to the bedside if staff is available. (payment due at time of pick-up or delivery - cash, check, or card accepted)     Able to afford home medications/ co-pay costs: Yes    ADLS:  Current PT AM-PAC Score: 19 /24  Current OT AM-PAC Score:   /24      DISCHARGE Disposition: Home with [address] [facility] [address] [facility] [address]: [facility]     LOC at discharge: Not Applicable  [address] Completed: Not Indicated    Notification completed in HENS/PAS?:  Not Applicable    IMM Completed:   Not Indicated    Transportation:  Transportation PLAN for discharge: family   Mode of Transport: [address] ordered at discharge: Yes  [address]: [facility] care  Phone: [phone]  Fax: [phone]  Orders faxed: Yes  Spoke with Myrna [facility]:  DME Provider: Jose Rodriguez obtained during hospitalization: walker    Home Oxygen and Respiratory Equipment:  Oxygen needed at discharge?: Not [address]: Not Applicable  Portable tank available for discharge?: Not Indicated    Dialysis:  Dialysis patient: No    Dialysis Center:  Not Applicable    Additional CM Notes: pt from home with wife, walker delivered from [facility] set up with [facility], spoke with Myrna they will follow at ID    The Plan for Transition of Care is related to the following treatment goals of SBO (small bowel obstruction) ([address] [K56.609]  SBO (small bowel obstruction) ([address] [D10.870]    The Patient and/or patient representative Stu Becker and his family were provided with a choice of provider and agrees with the discharge plan Yes    Freedom of choice list was provided with basic dialogue that supports the patient's individualized plan of care/goals and shares the quality data associated with the providers.  Yes    Care Transitions patient: Yes    Christa Jimenez RN  [facility]  Case Management Department  Ph: [phone]  Fax: [phone]

## 2020-08-24 ENCOUNTER — TELEPHONE (OUTPATIENT)
Dept: INTERNAL MEDICINE CLINIC | Age: 85
End: 2020-08-24

## 2020-08-24 LAB — SARS-COV-2, NAA: NOT DETECTED

## 2020-08-26 ENCOUNTER — OFFICE VISIT (OUTPATIENT)
Dept: INTERNAL MEDICINE CLINIC | Age: 85
End: 2020-08-26
Payer: MEDICARE

## 2020-08-26 VITALS
DIASTOLIC BLOOD PRESSURE: 68 MMHG | TEMPERATURE: 98.2 F | WEIGHT: 105 LBS | BODY MASS INDEX: 18.61 KG/M2 | SYSTOLIC BLOOD PRESSURE: 119 MMHG | HEART RATE: 75 BPM | HEIGHT: 63 IN

## 2020-08-26 PROBLEM — K56.609 SBO (SMALL BOWEL OBSTRUCTION) (HCC): Status: RESOLVED | Noted: 2020-08-19 | Resolved: 2020-08-26

## 2020-08-26 PROBLEM — I25.708 CORONARY ARTERY DISEASE OF BYPASS GRAFT OF NATIVE HEART WITH STABLE ANGINA PECTORIS (HCC): Status: ACTIVE | Noted: 2020-08-26

## 2020-08-26 PROCEDURE — G0439 PPPS, SUBSEQ VISIT: HCPCS | Performed by: INTERNAL MEDICINE

## 2020-08-26 RX ORDER — SODIUM CHLORIDE 9 MG/ML
INJECTION, SOLUTION INTRAVENOUS CONTINUOUS
Status: CANCELLED | OUTPATIENT
Start: 2020-08-27

## 2020-08-26 RX ORDER — 0.9 % SODIUM CHLORIDE 0.9 %
250 INTRAVENOUS SOLUTION INTRAVENOUS ONCE
Status: CANCELLED | OUTPATIENT
Start: 2020-08-27

## 2020-08-26 RX ORDER — SODIUM CHLORIDE 0.9 % (FLUSH) 0.9 %
5 SYRINGE (ML) INJECTION PRN
Status: CANCELLED | OUTPATIENT
Start: 2020-08-27

## 2020-08-26 RX ORDER — HEPARIN SODIUM (PORCINE) LOCK FLUSH IV SOLN 100 UNIT/ML 100 UNIT/ML
500 SOLUTION INTRAVENOUS PRN
Status: CANCELLED | OUTPATIENT
Start: 2020-08-27

## 2020-08-26 RX ORDER — DIPHENHYDRAMINE HYDROCHLORIDE 50 MG/ML
50 INJECTION INTRAMUSCULAR; INTRAVENOUS ONCE
Status: CANCELLED | OUTPATIENT
Start: 2020-08-27

## 2020-08-26 RX ORDER — EPINEPHRINE 1 MG/ML
0.3 INJECTION, SOLUTION, CONCENTRATE INTRAVENOUS PRN
Status: CANCELLED | OUTPATIENT
Start: 2020-08-27

## 2020-08-26 RX ORDER — ZOLEDRONIC ACID 5 MG/100ML
5 INJECTION, SOLUTION INTRAVENOUS ONCE
Status: CANCELLED | OUTPATIENT
Start: 2020-08-27

## 2020-08-26 RX ORDER — SODIUM CHLORIDE 0.9 % (FLUSH) 0.9 %
10 SYRINGE (ML) INJECTION PRN
Status: CANCELLED | OUTPATIENT
Start: 2020-08-27

## 2020-08-26 RX ORDER — METHYLPREDNISOLONE SODIUM SUCCINATE 125 MG/2ML
125 INJECTION, POWDER, LYOPHILIZED, FOR SOLUTION INTRAMUSCULAR; INTRAVENOUS ONCE
Status: CANCELLED | OUTPATIENT
Start: 2020-08-27

## 2020-08-26 ASSESSMENT — PATIENT HEALTH QUESTIONNAIRE - PHQ9
SUM OF ALL RESPONSES TO PHQ QUESTIONS 1-9: 0
SUM OF ALL RESPONSES TO PHQ QUESTIONS 1-9: 0

## 2020-08-26 ASSESSMENT — LIFESTYLE VARIABLES: HOW OFTEN DO YOU HAVE A DRINK CONTAINING ALCOHOL: 0

## 2020-08-26 NOTE — PROGRESS NOTES
Annual Wellness Visit     Patient:  Sandy Mena MD                                               : 1935  Age: 82 y.o. 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 S/P AVR (aortic valve replacement)  This problem is stable he is currently on Coumadin after bridging with Lovenox after he had a small bowel obstruction this is completely resolved  - CBC Auto Differential; Future  - Comprehensive Metabolic Panel; Future  - Lipid Panel; Future  - Psa screening; Future  - TSH without Reflex; Future  - Vitamin D 25 Hydroxy; Future    2. Coronary 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 ([facility]  Problem is stable no cardiac symptoms  - CBC Auto Differential; Future  - Comprehensive Metabolic Panel; Future  - Lipid Panel; Future  - Psa screening; Future  - TSH without Reflex; Future  - Vitamin D 25 Hydroxy; Future    3. Uncomplicated asthma, unspecified asthma severity, unspecified whether persistent  Problem is stable    4. Chronic kidney disease, stage III (moderate) (HCC)  Problem is gotten better last creatinine is 1.1  - CBC Auto Differential; Future  - Comprehensive Metabolic Panel; Future  - Lipid Panel; Future  - Psa screening; Future  - TSH without Reflex; Future  - Vitamin D 25 Hydroxy; Future    5. Aortic valve disorder  As above status post aortic valve replacement  - CBC Auto Differential; Future  - Comprehensive Metabolic Panel; Future  - Lipid Panel; Future  - Psa screening; Future  - TSH without Reflex; Future  - Vitamin D 25 Hydroxy; Future    6. Osteoporosis, senile  Has not continued his Prolia will start him on Reclast    7. History of prostate cancer  This problem is stable    8. Personal history of esophageal cancer  Problem is stable status post resection    9.  PE (physical exam), annual  Only feels okay recently admitted for small bowel obstruction now resolved he has disorders     stenosis    Aspiration pneumonia ([facility] 4/27/2015    Erectile dysfunction     [facility] ([facility] 10/18/2012    Hernia, femoral, bilateral 5/12/2014    Hyperlipidemia     Osteoporosis, senile 5/20/2014    Pancreatitis 8/1/2013    Patient underweight 8/8/2013    Prostate cancer ([facility]     Unspecified essential hypertension        Past Surgical History:        Procedure Laterality Date    APPENDECTOMY      as a child    BONE GRAFT      for saddle nose    CARDIAC VALVE REPLACEMENT  2006    aorta    CHOLECYSTECTOMY      COLONOSCOPY  11/2009    COLONOSCOPY  10/05/15    CORONARY ARTERY BYPASS GRAFT  2006    ESOPHAGUS SURGERY      EYE SURGERY  1990& 1992    cataract bilat removal     GASTRECTOMY      PROSTATE SURGERY      seeds    TONSILLECTOMY      US PROSTATE/TRANSRECTAL VOL STUDY BRACHYTHERAPY         Family History:  Family History   Problem Relation Age of Onset    Other Mother         bleeding peptic ulcer    Heart Disease Mother     Other Father         cardiovascular disease    Stroke Father     Elevated Lipids Father     Hypertension Father        Social History:  Social History     Socioeconomic History    Marital status:      Spouse name: None    Number of children: None    Years of education: None    Highest education level: None   Occupational History    None   Social Needs    Financial resource strain: Not hard at all   Alexander-Yonis insecurity     Worry: Never true     Inability: Never true    Transportation needs     Medical: No     Non-medical: No   Tobacco Use    Smoking status: Never Smoker    Smokeless tobacco: Never Used   Substance and Sexual Activity    Alcohol use:  Yes     Alcohol/week: 0.0 standard drinks     Comment: occassionally    Drug use: No    Sexual activity: None   Lifestyle    Physical activity     Days per week: None     Minutes per session: None    Stress: None   Relationships    Social connections     Talks on phone: None Gets together: None     Attends Mosque service: None     Active member of club or organization: None     Attends meetings of clubs or organizations: None     Relationship status: None    Intimate partner violence     Fear of current or ex partner: None     Emotionally abused: None     Physically abused: None     Forced sexual activity: None   Other Topics Concern    None   Social History Narrative    None         Allergies:  No known allergies    Current Medications:    Prior to Admission medications    Medication Sig Start Date End Date Taking? Authorizing Provider   amoxicillin (AMOXIL) 500 MG capsule Take 4 capsules by mouth one hour prior to each dental procedure. 8/5/20  Yes Vicky Everett MD   omeprazole (PRILOSEC) 40 MG delayed release capsule TAKE ONE CAPSULE BY MOUTH DAILY 6/29/20  Yes Vicky Everett MD   atorvastatin (LIPITOR) 10 MG tablet TAKE 1 TABLET BY MOUTH DAILY 6/24/20  Yes Vicky Everett MD   warfarin (COUMADIN) 5 MG tablet TAKE ONE TABLET BY MOUTH ONCE NIGHTLY 4/7/20  Yes Vicky Everett MD   Magnesium 65 MG TABS Take by mouth   Yes Historical Provider, MD   Coenzyme Q10 (COQ10) 400 MG CAPS Take 1 capsule by mouth   Yes Historical Provider, MD   Simethicone 80 MG TABS Take 80 tablets by mouth 3 times daily. Patient taking differently: Take 125 mg by mouth 3 times daily. 3/23/15  Yes JS Go - CNS   warfarin (COUMADIN) 1 MG tablet Take 1 mg by mouth. Yes Historical Provider, MD   sildenafil (VIAGRA) 50 MG tablet Take 50 mg by mouth as needed for Erectile Dysfunction. Yes Historical Provider, MD   vitamin B-12 (CYANOCOBALAMIN) 1000 MCG tablet Take 1,000 mcg by mouth daily. Yes Historical Provider, MD   docusate sodium (COLACE) 100 MG capsule Take 100 mg by mouth daily.  One  Capsule daily 7/20/10  Yes Historical Provider, MD   polyethylene glycol (GLYCOLAX) powder Take 17 g by mouth 2 times daily    Yes Historical Provider, MD           Physical Exam:      Constitutional:  Well developed, thin d, no acute distress, non-toxic appearance   Eyes:  PERRL, conjunctiva normal   HENT:  Atraumatic, external ears normal, nose normal, oropharynx moist, no pharyngeal exudates. Neck- normal range of motion, no tenderness, supple   Respiratory:  No respiratory distress, normal breath sounds, no rales, no wheezing   Cardiovascular:  Normal rate, normal rhythm, no murmurs, no gallops, no rubs   GI:  Soft, nondistended, normal bowel sounds, nontender, no organomegaly, no mass, no rebound, no guarding   :  No costovertebral angle tenderness   Musculoskeletal:  No edema, no tenderness, no deformities. Back- no tenderness  Integument:  Well hydrated, no rash   Lymphatic:  No lymphadenopathy noted   Neurologic:  Alert & oriented x 3, CN 2-12 normal, normal motor function, normal sensory function, no focal deficits noted gait appears osteoporotic  Psychiatric:  Speech and behavior appropriate   Rectal exam reveals no prostate heme-negative stool    Vitals: /68   Pulse 75   Temp 98.2 °F (36.8 °C)   Ht 5' 3\" (1.6 m)   Wt 105 lb (47.6 kg)   BMI 18.60 kg/m²     Body mass index is 18.6 kg/m².   Wt Readings from Last 3 Encounters:   08/26/20 105 lb (47.6 kg)   08/19/20 106 lb (48.1 kg)   06/03/20 106 lb (48.1 kg)         LABS:    CBC:   Lab Results   Component Value Date    WBC 6.6 08/21/2020    HGB 11.8 (L) 08/21/2020    HCT 35.4 (L) 08/21/2020    MCV 89.3 08/21/2020    PLT 82 (L) 08/21/2020           Lab Results   Component Value Date    IRON 68 12/17/2018    TIBC 269 12/17/2018    FERRITIN 13.5 (L) 03/27/2017    FOLATE >20.00 06/19/2019    JSVILEPA47 1468 (H) 06/19/2019                                                             BMP:    Lab Results   Component Value Date     08/21/2020    K 3.9 08/21/2020     08/21/2020    CO2 31 08/21/2020       LFT's:   Lab Results   Component Value Date    ALT 13 08/18/2020    AST 23 08/18/2020    ALKPHOS 84 08/18/2020    BILITOT 0.6 08/18/2020 of prostate cancer  Check PSA    8. Personal history of esophageal cancer  This problem is stable had a CT scan with a small bowel obstruction without evidence of recurrence    9. PE (physical exam), annual  Check screening labs he is up-to-date on his immunizations and will get a flu shot once available as well as a COVID vaccine  - CBC Auto Differential; Future  - Comprehensive Metabolic Panel; Future  - Lipid Panel; Future  - Psa screening; Future  - TSH without Reflex; Future  - Vitamin D 25 Hydroxy; Future    10. Vitamin D deficiency  Check above labs  - Vitamin D 25 Hydroxy; Future    11. Fatigue, unspecified type  Check above labs  - TSH without Reflex;  Future

## 2020-08-26 NOTE — PROGRESS NOTES
Medicare Annual Wellness Visit  Name: Arjun Baer MD [address] [place] Date: 2020   MRN: <[id number]> Sex: Male   Age: 82 y.o. Ethnicity: Non-/Non    : 1935 Race: Eddie Guadarrama MD is here for Medicare AWV    Screenings for behavioral, psychosocial and functional/safety risks, and cognitive dysfunction are all negative except as indicated below. These results, as well as other patient data from the [address] form, are documented in Flowsheets linked to this Encounter. Allergies   Allergen Reactions    No Known Allergies        Prior to Visit Medications    Medication Sig Taking? Authorizing Provider   amoxicillin (AMOXIL) 500 MG capsule Take 4 capsules by mouth one hour prior to each dental procedure. Yes Jarek Bailey MD   omeprazole (PRILOSEC) 40 MG delayed release capsule TAKE ONE CAPSULE BY MOUTH DAILY Yes Jarek Bailey MD   atorvastatin (LIPITOR) 10 MG tablet TAKE 1 TABLET BY MOUTH DAILY Yes Jarek Bailey MD   warfarin (COUMADIN) 5 MG tablet TAKE ONE TABLET BY MOUTH ONCE NIGHTLY Yes Jarek Bailey MD   Magnesium 65 MG TABS Take by mouth Yes Historical Provider, MD   Coenzyme Q10 (COQ10) 400 MG CAPS Take 1 capsule by mouth Yes Historical Provider, MD   Simethicone 80 MG TABS Take 80 tablets by mouth 3 times daily. Patient taking differently: Take 125 mg by mouth 3 times daily. Yes JS Freeman   warfarin (COUMADIN) 1 MG tablet Take 1 mg by mouth. Yes Historical Provider, MD   sildenafil (VIAGRA) 50 MG tablet Take 50 mg by mouth as needed for Erectile Dysfunction. Yes Historical Provider, MD   vitamin B-12 (CYANOCOBALAMIN) 1000 MCG tablet Take 1,000 mcg by mouth daily. Yes Historical Provider, MD   docusate sodium (COLACE) 100 MG capsule Take 100 mg by mouth daily.  One  Capsule daily Yes Historical Provider, MD   polyethylene glycol (GLYCOLAX) powder Take 17 g by mouth 2 times daily  Yes Historical Provider, MD       Past Medical History: nourished, in no acute distress  Skin: warm and dry, no rash or erythema  Head: normocephalic and atraumatic  Eyes: pupils equal, round, and reactive to light, extraocular eye movements intact, conjunctivae normal  ENT: tympanic membrane, external ear and ear canal normal bilaterally, nose without deformity, nasal mucosa and turbinates normal without polyps  Neck: supple and non-tender without mass, no thyromegaly or thyroid nodules, no cervical lymphadenopathy  Pulmonary/Chest: clear to auscultation bilaterally- no wheezes, rales or rhonchi, normal air movement, no respiratory distress  Cardiovascular: normal rate, regular rhythm, normal S1 and S2, no murmurs, rubs, clicks, or gallops, distal pulses intact, no carotid bruits  Abdomen: soft, non-tender, non-distended, normal bowel sounds, no masses or organomegaly  Extremities: no cyanosis, clubbing or edema  Musculoskeletal: normal range of motion, no joint swelling, deformity or tenderness  Neurologic: reflexes normal and symmetric, no cranial nerve deficit, gait, coordination and speech normal    Patient's complete Health Risk Assessment and screening values have been reviewed and are found in Flowsheets. The following problems were reviewed today and where indicated follow up appointments were made and/or referrals ordered. Positive Risk Factor Screenings with Interventions:     ADL:  ADLs  In the past 7 days, did you need help from others to perform any of the following everyday activities? Eating, dressing, grooming, bathing, toileting, or walking/balance?: (!) Walking/Balance  In the past 7 days, did you need help from others to take care of any of the following?  Laundry, housekeeping, banking/finances, shopping, telephone use, food preparation, transportation, or taking medications?: (!) Shopping  ADL Interventions:  · Patient declines any further evaluation/treatment for this issue    Personalized Preventive Plan   Current Health Maintenance

## 2020-08-27 DIAGNOSIS — Z00.00 PE (PHYSICAL EXAM), ANNUAL: ICD-10-CM

## 2020-08-27 DIAGNOSIS — I25.708 CORONARY ARTERY DISEASE OF BYPASS GRAFT OF NATIVE HEART WITH STABLE ANGINA PECTORIS (HCC): ICD-10-CM

## 2020-08-27 DIAGNOSIS — I35.9 AORTIC VALVE DISORDER: ICD-10-CM

## 2020-08-27 DIAGNOSIS — R53.83 FATIGUE, UNSPECIFIED TYPE: ICD-10-CM

## 2020-08-27 DIAGNOSIS — Z95.2 S/P AVR (AORTIC VALVE REPLACEMENT): ICD-10-CM

## 2020-08-27 DIAGNOSIS — E55.9 VITAMIN D DEFICIENCY: ICD-10-CM

## 2020-08-27 DIAGNOSIS — N18.30 CHRONIC KIDNEY DISEASE, STAGE III (MODERATE) (HCC): ICD-10-CM

## 2020-08-27 LAB
A/G RATIO: 1.2 (ref 1.1–2.2)
ALBUMIN SERPL-MCNC: 3.7 G/DL (ref 3.4–5)
ALP BLD-CCNC: 65 U/L (ref 40–129)
ALT SERPL-CCNC: 13 U/L (ref 10–40)
ANION GAP SERPL CALCULATED.3IONS-SCNC: 13 MMOL/L (ref 3–16)
AST SERPL-CCNC: 21 U/L (ref 15–37)
BASOPHILS ABSOLUTE: 0 K/UL (ref 0–0.2)
BASOPHILS RELATIVE PERCENT: 0.8 %
BILIRUB SERPL-MCNC: 0.6 MG/DL (ref 0–1)
BUN BLDV-MCNC: 32 MG/DL (ref 7–20)
CALCIUM SERPL-MCNC: 9.5 MG/DL (ref 8.3–10.6)
CHLORIDE BLD-SCNC: 98 MMOL/L (ref 99–110)
CHOLESTEROL, TOTAL: 143 MG/DL (ref 0–199)
CO2: 28 MMOL/L (ref 21–32)
CREAT SERPL-MCNC: 1.3 MG/DL (ref 0.8–1.3)
EOSINOPHILS ABSOLUTE: 0.2 K/UL (ref 0–0.6)
EOSINOPHILS RELATIVE PERCENT: 3.2 %
GFR AFRICAN AMERICAN: >60
GFR NON-AFRICAN AMERICAN: 53
GLOBULIN: 3 G/DL
GLUCOSE BLD-MCNC: 92 MG/DL (ref 70–99)
HCT VFR BLD CALC: 35.9 % (ref 40.5–52.5)
HDLC SERPL-MCNC: 62 MG/DL (ref 40–60)
HEMOGLOBIN: 11.9 G/DL (ref 13.5–17.5)
LDL CHOLESTEROL CALCULATED: 62 MG/DL
LYMPHOCYTES ABSOLUTE: 2 K/UL (ref 1–5.1)
LYMPHOCYTES RELATIVE PERCENT: 34.6 %
MCH RBC QN AUTO: 29.7 PG (ref 26–34)
MCHC RBC AUTO-ENTMCNC: 33.2 G/DL (ref 31–36)
MCV RBC AUTO: 89.4 FL (ref 80–100)
MONOCYTES ABSOLUTE: 0.6 K/UL (ref 0–1.3)
MONOCYTES RELATIVE PERCENT: 10.7 %
NEUTROPHILS ABSOLUTE: 2.9 K/UL (ref 1.7–7.7)
NEUTROPHILS RELATIVE PERCENT: 50.7 %
PDW BLD-RTO: 13.7 % (ref 12.4–15.4)
PLATELET # BLD: 166 K/UL (ref 135–450)
PMV BLD AUTO: 10 FL (ref 5–10.5)
POTASSIUM SERPL-SCNC: 4.4 MMOL/L (ref 3.5–5.1)
PROSTATE SPECIFIC ANTIGEN: <0.01 NG/ML (ref 0–4)
RBC # BLD: 4.02 M/UL (ref 4.2–5.9)
SODIUM BLD-SCNC: 139 MMOL/L (ref 136–145)
TOTAL PROTEIN: 6.7 G/DL (ref 6.4–8.2)
TRIGL SERPL-MCNC: 95 MG/DL (ref 0–150)
TSH SERPL DL<=0.05 MIU/L-ACNC: 8.18 UIU/ML (ref 0.27–4.2)
VITAMIN D 25-HYDROXY: 90.4 NG/ML
VLDLC SERPL CALC-MCNC: 19 MG/DL
WBC # BLD: 5.7 K/UL (ref 4–11)

## 2020-08-28 ENCOUNTER — TELEPHONE (OUTPATIENT)
Dept: INTERNAL MEDICINE CLINIC | Age: 85
End: 2020-08-28

## 2020-08-28 DIAGNOSIS — R53.83 FATIGUE, UNSPECIFIED TYPE: ICD-10-CM

## 2020-08-28 LAB
INR BLD: 1.9
T4 FREE: 1.1 NG/DL (ref 0.9–1.8)
THYROID PEROXIDASE (TPO) ABS: 9 IU/ML

## 2020-08-28 NOTE — TELEPHONE ENCOUNTER
Non-Urgent Medical Question     From   Merlinda Comas, MD  To   [facility] Practice Support  Sent   8/28/2020  9:02 AM    My TSH result is high.  Do I need to take Thyroid medication?      Thanks, Sheila Frost

## 2020-08-31 ENCOUNTER — PATIENT MESSAGE (OUTPATIENT)
Dept: INTERNAL MEDICINE CLINIC | Age: 85
End: 2020-08-31

## 2020-08-31 ENCOUNTER — TELEPHONE (OUTPATIENT)
Dept: INTERNAL MEDICINE CLINIC | Age: 85
End: 2020-08-31

## 2020-08-31 NOTE — TELEPHONE ENCOUNTER
Non-Urgent Medical Question     From   Estefani Hines MD  To   [facility] Practice Support  Sent   8/31/2020  9:33 AM    What are your suggestions for flu shots?  Did I see that [facility] had a flu shot center?      Thanks, Marietta Mora

## 2020-09-01 ENCOUNTER — ANTI-COAG VISIT (OUTPATIENT)
Dept: INTERNAL MEDICINE CLINIC | Age: 85
End: 2020-09-01

## 2020-09-02 ENCOUNTER — TELEPHONE (OUTPATIENT)
Dept: INTERNAL MEDICINE CLINIC | Age: 85
End: 2020-09-02

## 2020-09-02 NOTE — TELEPHONE ENCOUNTER
From   Jia Zhao MD  To   [facility]   9/1/2020  5:47 PM    Any news?      Thanks, Gwen Ospina

## 2020-09-03 ENCOUNTER — HOSPITAL ENCOUNTER (OUTPATIENT)
Dept: ONCOLOGY | Age: 85
Setting detail: INFUSION SERIES
Discharge: HOME | End: 2020-09-03
Payer: MEDICARE

## 2020-09-03 VITALS — WEIGHT: 104.72 LBS | BODY MASS INDEX: 18.55 KG/M2

## 2020-09-03 DIAGNOSIS — M81.0 OSTEOPOROSIS, SENILE: Primary | ICD-10-CM

## 2020-09-03 DIAGNOSIS — R63.6 PATIENT UNDERWEIGHT: ICD-10-CM

## 2020-09-03 PROBLEM — D50.9 IRON DEFICIENCY ANEMIA: Status: ACTIVE | Noted: 2020-09-03

## 2020-09-03 PROCEDURE — 96365 THER/PROPH/DIAG IV INF INIT: CPT

## 2020-09-03 RX ORDER — DIPHENHYDRAMINE HYDROCHLORIDE 50 MG/ML
50 INJECTION INTRAMUSCULAR; INTRAVENOUS ONCE
Status: CANCELLED | OUTPATIENT
Start: 2020-09-03

## 2020-09-03 RX ORDER — SODIUM CHLORIDE 9 MG/ML
INJECTION, SOLUTION INTRAVENOUS CONTINUOUS
Status: CANCELLED | OUTPATIENT
Start: 2020-09-03

## 2020-09-03 RX ORDER — ZOLEDRONIC ACID 5 MG/100ML
5 INJECTION, SOLUTION INTRAVENOUS ONCE
Status: CANCELLED | OUTPATIENT
Start: 2020-09-03

## 2020-09-03 RX ORDER — 0.9 % SODIUM CHLORIDE 0.9 %
250 INTRAVENOUS SOLUTION INTRAVENOUS ONCE
Status: DISCONTINUED | OUTPATIENT
Start: 2020-09-03 | End: 2020-09-04 | Stop reason: HOSPADM

## 2020-09-03 RX ORDER — ZOLEDRONIC ACID 5 MG/100ML
5 INJECTION, SOLUTION INTRAVENOUS ONCE
Status: DISCONTINUED | OUTPATIENT
Start: 2020-09-03 | End: 2020-09-03

## 2020-09-03 RX ORDER — SODIUM CHLORIDE 0.9 % (FLUSH) 0.9 %
5 SYRINGE (ML) INJECTION PRN
Status: CANCELLED | OUTPATIENT
Start: 2020-09-03

## 2020-09-03 RX ORDER — HEPARIN SODIUM (PORCINE) LOCK FLUSH IV SOLN 100 UNIT/ML 100 UNIT/ML
500 SOLUTION INTRAVENOUS PRN
Status: CANCELLED | OUTPATIENT
Start: 2020-09-03

## 2020-09-03 RX ORDER — METHYLPREDNISOLONE SODIUM SUCCINATE 125 MG/2ML
125 INJECTION, POWDER, LYOPHILIZED, FOR SOLUTION INTRAMUSCULAR; INTRAVENOUS ONCE
Status: CANCELLED | OUTPATIENT
Start: 2020-09-03

## 2020-09-03 RX ORDER — SODIUM CHLORIDE 0.9 % (FLUSH) 0.9 %
10 SYRINGE (ML) INJECTION PRN
Status: CANCELLED | OUTPATIENT
Start: 2020-09-03

## 2020-09-03 RX ORDER — 0.9 % SODIUM CHLORIDE 0.9 %
250 INTRAVENOUS SOLUTION INTRAVENOUS ONCE
Status: CANCELLED | OUTPATIENT
Start: 2020-09-03

## 2020-09-03 RX ORDER — CALCIUM CARBONATE 500(1250)
500 TABLET ORAL DAILY
Status: ON HOLD | COMMUNITY
End: 2021-06-13

## 2020-09-03 NOTE — TELEPHONE ENCOUNTER
subclinic hypo   Does not need treated unless greater then 10  If fatgiue will treat   Recheck in 6 months

## 2020-09-04 ENCOUNTER — ANTI-COAG VISIT (OUTPATIENT)
Dept: INTERNAL MEDICINE CLINIC | Age: 85
End: 2020-09-04

## 2020-09-04 LAB — INR BLD: 1.8

## 2020-09-10 ENCOUNTER — TELEPHONE (OUTPATIENT)
Dept: INTERNAL MEDICINE CLINIC | Age: 85
End: 2020-09-10

## 2020-09-10 ENCOUNTER — NURSE ONLY (OUTPATIENT)
Dept: INTERNAL MEDICINE CLINIC | Age: 85
End: 2020-09-10
Payer: MEDICARE

## 2020-09-10 VITALS — TEMPERATURE: 97.8 F

## 2020-09-10 PROCEDURE — 90694 VACC AIIV4 NO PRSRV 0.5ML IM: CPT | Performed by: INTERNAL MEDICINE

## 2020-09-10 PROCEDURE — G0008 ADMIN INFLUENZA VIRUS VAC: HCPCS | Performed by: INTERNAL MEDICINE

## 2020-09-10 RX ORDER — METHYLPREDNISOLONE SODIUM SUCCINATE 125 MG/2ML
125 INJECTION, POWDER, LYOPHILIZED, FOR SOLUTION INTRAMUSCULAR; INTRAVENOUS ONCE
Status: CANCELLED | OUTPATIENT
Start: 2020-09-10

## 2020-09-10 RX ORDER — DIPHENHYDRAMINE HYDROCHLORIDE 50 MG/ML
50 INJECTION INTRAMUSCULAR; INTRAVENOUS ONCE
Status: CANCELLED | OUTPATIENT
Start: 2020-09-10

## 2020-09-10 RX ORDER — EPINEPHRINE 1 MG/ML
0.3 INJECTION, SOLUTION, CONCENTRATE INTRAVENOUS PRN
Status: CANCELLED | OUTPATIENT
Start: 2020-09-10

## 2020-09-10 RX ORDER — SODIUM CHLORIDE 9 MG/ML
INJECTION, SOLUTION INTRAVENOUS CONTINUOUS
Status: CANCELLED | OUTPATIENT
Start: 2020-09-10

## 2020-09-10 NOTE — PROGRESS NOTES
Vaccine Information Sheet, \"Influenza - Inactivated\"  given to Rose Mary Barba MD, or parent/legal guardian of  Rose Mary Barba MD and verbalized understanding. Patient responses:    Have you ever had a reaction to a flu vaccine? No  Do you have any current illness? No  Have you ever had [place] Syndrome? No  Do you have a serious allergy to any of the follow: Neomycin, Polymyxin, Thimerosal, eggs or egg products? No    Flu vaccine given per order. Please see immunization tab. Risks and benefits explained. Current VIS given.       Immunizations Administered     Name Date Dose Route    Influenza, Quadv, adjuvanted, 65 yrs +, IM, PF (Fluad) 9/10/2020 0.5 mL Intramuscular    Site: Deltoid- Left    Lot: [id number]    NDC: 55027-810-22

## 2020-09-10 NOTE — TELEPHONE ENCOUNTER
Pt received flu shot today. Pt states Dr. Patrick Rodrigez wanted him to start getting Prolia injections again. Pt states he has not had this shot in 1 year. Not sure what he needs to do to start getting them again. Please call and let pt know.

## 2020-09-10 NOTE — TELEPHONE ENCOUNTER
Non-Urgent Medical Question     From   Dayna Chance MD  To   [facility] Practice Support  Sent   9/10/2020  2:27 PM    I need a visit for a Prolia shot.      Thanks, Rosenda Bradford

## 2020-09-10 NOTE — TELEPHONE ENCOUNTER
Message has been sent to the patient regarding how to schedule his Prolia injection. Therapy plan has been placed.

## 2020-09-10 NOTE — PROGRESS NOTES
[address]  Ade Hastings MD    HPI: Edson Zhang is here today as a hospital follow up for a small bowel obstruction. Patient presented to the ER with complaint's of decreased appetite and nausea times 4 days with vomiting starting at around noon on the day of admission and present for 8 or 9 hours. Patient is a retired pathologist, with a history of esophageal cancer (T3 N1) S/P resection status post a pull-through procedure in 2010. He also has a history of gastroparesis since his surgical procedure in 2010. Patient recently started on Reclast for Osteoporosis. Currently patient denies abdominal pain, nausea, vomiting and constipation. Past Medical History:   Diagnosis Date    Anemia     Aortic valve disorders     stenosis    Aspiration pneumonia (HCC) 4/27/2015    Erectile dysfunction     Esophageal cancer ([address] 10/18/2012    Hernia, femoral, bilateral 5/12/2014    Hyperlipidemia     Osteoporosis, senile 5/20/2014    Pancreatitis 8/1/2013    Patient underweight 8/8/2013    Prostate cancer ([address]     Unspecified essential hypertension      Past Surgical History:   Procedure Laterality Date    APPENDECTOMY      as a child    BONE GRAFT      for saddle nose    CARDIAC VALVE REPLACEMENT  2006    aorta    CHOLECYSTECTOMY      COLONOSCOPY  11/2009    COLONOSCOPY  10/05/15    CORONARY ARTERY BYPASS GRAFT  2006    ESOPHAGUS SURGERY      EYE SURGERY  1990& 1992    cataract bilat removal     GASTRECTOMY      PROSTATE SURGERY      seeds    TONSILLECTOMY      US PROSTATE/TRANSRECTAL VOL STUDY BRACHYTHERAPY       Social:   Social History     Tobacco Use    Smoking status: Never Smoker    Smokeless tobacco: Never Used   Substance Use Topics    Alcohol use:  Yes     Alcohol/week: 0.0 standard drinks     Comment: occassionally    Drug use: No     Family History   Problem Relation Age of Onset    Other Mother         bleeding peptic ulcer    Heart Disease Mother    Edin Matos Other Father         cardiovascular disease    Stroke Father     Elevated Lipids Father     Hypertension Father      Allergies: No known allergies  Current Outpatient Medications   Medication Sig Dispense Refill    calcium carbonate (OSCAL) 500 MG TABS tablet Take 500 mg by mouth daily      amoxicillin (AMOXIL) 500 MG capsule Take 4 capsules by mouth one hour prior to each dental procedure. 40 capsule 0    omeprazole (PRILOSEC) 40 MG delayed release capsule TAKE ONE CAPSULE BY MOUTH DAILY 30 capsule 3    atorvastatin (LIPITOR) 10 MG tablet TAKE 1 TABLET BY MOUTH DAILY 90 tablet 2    warfarin (COUMADIN) 5 MG tablet TAKE ONE TABLET BY MOUTH ONCE NIGHTLY 90 tablet 1    Magnesium 65 MG TABS Take by mouth      Coenzyme Q10 (COQ10) 400 MG CAPS Take 1 capsule by mouth      Simethicone 80 MG TABS Take 80 tablets by mouth 3 times daily. (Patient taking differently: Take 125 mg by mouth 3 times daily. ) 90 each 0    warfarin (COUMADIN) 1 MG tablet Take 1 mg by mouth.  sildenafil (VIAGRA) 50 MG tablet Take 50 mg by mouth as needed for Erectile Dysfunction.  vitamin B-12 (CYANOCOBALAMIN) 1000 MCG tablet Take 1,000 mcg by mouth daily.  docusate sodium (COLACE) 100 MG capsule Take 100 mg by mouth daily. One  Capsule daily      polyethylene glycol (GLYCOLAX) powder Take 17 g by mouth 2 times daily        No current facility-administered medications for this visit. Review of Systems: See HPI  All other systems reviewed and are negative. There were no vitals filed for this visit. Wt Readings from Last 3 Encounters:   09/03/20 104 lb 11.5 oz (47.5 kg)   08/26/20 105 lb (47.6 kg)   08/19/20 106 lb (48.1 kg)     There is no height or weight on file to calculate BMI. Physical Exam:   Constitutional: He is oriented to person, place, and time. He appears well-developed and well-nourished. No distress. HENT: Moist mucus membranes  Head: Normocephalic and atraumatic.    Eyes: EOM are normal. Pupils

## 2020-09-11 ENCOUNTER — ANTI-COAG VISIT (OUTPATIENT)
Dept: INTERNAL MEDICINE CLINIC | Age: 85
End: 2020-09-11

## 2020-09-11 LAB — INR BLD: 3

## 2020-09-15 ENCOUNTER — OFFICE VISIT (OUTPATIENT)
Dept: SURGERY | Age: 85
End: 2020-09-15
Payer: MEDICARE

## 2020-09-15 VITALS
OXYGEN SATURATION: 99 % | BODY MASS INDEX: 18.27 KG/M2 | HEART RATE: 59 BPM | DIASTOLIC BLOOD PRESSURE: 76 MMHG | HEIGHT: 64 IN | SYSTOLIC BLOOD PRESSURE: 134 MMHG | WEIGHT: 107 LBS | TEMPERATURE: 96.9 F

## 2020-09-15 PROBLEM — Z87.898 HISTORY OF DISEASE: Status: ACTIVE | Noted: 2020-09-15

## 2020-09-15 PROBLEM — D68.2 FACTOR XII DEFICIENCY (HCC): Status: ACTIVE | Noted: 2020-09-15

## 2020-09-15 PROBLEM — D64.9 ANEMIA: Status: ACTIVE | Noted: 2020-09-15

## 2020-09-15 PROCEDURE — 99214 OFFICE O/P EST MOD 30 MIN: CPT | Performed by: SURGERY

## 2020-09-15 RX ORDER — CALCIUM CARBONATE/VITAMIN D3 600MG-62.5
600 CAPSULE ORAL 2 TIMES DAILY
COMMUNITY

## 2020-09-17 ENCOUNTER — HOSPITAL ENCOUNTER (OUTPATIENT)
Dept: ONCOLOGY | Age: 85
Setting detail: INFUSION SERIES
Discharge: HOME | End: 2020-09-17
Payer: MEDICARE

## 2020-09-17 VITALS
HEART RATE: 66 BPM | RESPIRATION RATE: 16 BRPM | SYSTOLIC BLOOD PRESSURE: 136 MMHG | DIASTOLIC BLOOD PRESSURE: 79 MMHG | TEMPERATURE: 98 F

## 2020-09-17 DIAGNOSIS — N18.30 CHRONIC KIDNEY DISEASE, STAGE III (MODERATE) (HCC): Primary | ICD-10-CM

## 2020-09-17 DIAGNOSIS — M81.0 OSTEOPOROSIS, SENILE: ICD-10-CM

## 2020-09-17 PROCEDURE — 96372 THER/PROPH/DIAG INJ SC/IM: CPT

## 2020-09-17 PROCEDURE — 6360000002 HC RX W HCPCS: Performed by: INTERNAL MEDICINE

## 2020-09-17 RX ORDER — SODIUM CHLORIDE 9 MG/ML
INJECTION, SOLUTION INTRAVENOUS CONTINUOUS
Status: CANCELLED | OUTPATIENT
Start: 2021-03-18

## 2020-09-17 RX ORDER — DIPHENHYDRAMINE HYDROCHLORIDE 50 MG/ML
50 INJECTION INTRAMUSCULAR; INTRAVENOUS ONCE
Status: CANCELLED | OUTPATIENT
Start: 2021-03-18

## 2020-09-17 RX ORDER — METHYLPREDNISOLONE SODIUM SUCCINATE 125 MG/2ML
125 INJECTION, POWDER, LYOPHILIZED, FOR SOLUTION INTRAMUSCULAR; INTRAVENOUS ONCE
Status: CANCELLED | OUTPATIENT
Start: 2021-03-18

## 2020-09-17 RX ADMIN — DENOSUMAB 60 MG: 60 INJECTION SUBCUTANEOUS at 14:25

## 2020-09-17 NOTE — PLAN OF CARE
Problem: SAFETY  Goal: Free from accidental physical injury  Outcome: Completed  Note: Pt is a High fall risk, pt using walker  Explained fall risk precautions to pt and rationale behind their use to keep the patient safe. Belongings are in reach. Pt encouraged to notify staff for any and all assistance. Staff present in tx suite throughout entirety of pts treatment to monitor and protect from falls. Assistance provided when ambulating to restroom utilizing Stay With Me. Problem: KNOWLEDGE DEFICIT  Goal: Patient/S.O. demonstrates understanding of disease process, treatment plan, medications, and discharge instructions. Outcome: Completed  Note: Pt seen and assessed at [address] [facility] for Prolia, 60mg injection per orders from Dr. BR[facility] Administered per [facility] policy. Pt tolerated 1 injection to Right arm well and without incident. Pt verbalizes understanding of discharge instructions. Discharged ambulatory using four-wheel walker per self.

## 2020-10-09 ENCOUNTER — ANTI-COAG VISIT (OUTPATIENT)
Dept: INTERNAL MEDICINE CLINIC | Age: 85
End: 2020-10-09

## 2020-10-09 LAB — INR BLD: 1.7

## 2020-10-30 RX ORDER — OMEPRAZOLE 40 MG/1
CAPSULE, DELAYED RELEASE ORAL
Qty: 30 CAPSULE | Refills: 2 | Status: SHIPPED | OUTPATIENT
Start: 2020-10-30 | End: 2021-02-08

## 2020-11-17 ENCOUNTER — HOSPITAL ENCOUNTER (OUTPATIENT)
Dept: CT IMAGING | Age: 85
Discharge: HOME | DRG: 641 | End: 2020-11-17
Payer: MEDICARE

## 2020-11-17 LAB
GFR AFRICAN AMERICAN: 50
GFR NON-AFRICAN AMERICAN: 41
PERFORMED ON: ABNORMAL
POC CREATININE: 1.6 MG/DL (ref 0.8–1.3)
POC SAMPLE TYPE: ABNORMAL

## 2020-11-17 PROCEDURE — 6360000004 HC RX CONTRAST MEDICATION: Performed by: SURGERY

## 2020-11-17 PROCEDURE — 82565 ASSAY OF CREATININE: CPT

## 2020-11-17 PROCEDURE — 74177 CT ABD & PELVIS W/CONTRAST: CPT

## 2020-11-17 RX ADMIN — IOPAMIDOL 80 ML: 755 INJECTION, SOLUTION INTRAVENOUS at 14:04

## 2020-11-18 ENCOUNTER — APPOINTMENT (OUTPATIENT)
Dept: GENERAL RADIOLOGY | Age: 85
DRG: 641 | End: 2020-11-18
Payer: MEDICARE

## 2020-11-18 ENCOUNTER — TELEPHONE (OUTPATIENT)
Dept: INTERNAL MEDICINE CLINIC | Age: 85
End: 2020-11-18

## 2020-11-18 ENCOUNTER — HOSPITAL ENCOUNTER (INPATIENT)
Age: 85
LOS: 1 days | Discharge: NURSING, REHAB OR LONG-TERM CARE | DRG: 641 | End: 2020-11-19
Attending: EMERGENCY MEDICINE | Admitting: SURGERY
Payer: MEDICARE

## 2020-11-18 ENCOUNTER — TELEPHONE (OUTPATIENT)
Dept: SURGERY | Age: 85
End: 2020-11-18

## 2020-11-18 PROBLEM — R10.9 ABDOMINAL PAIN: Status: ACTIVE | Noted: 2020-11-18

## 2020-11-18 LAB
A/G RATIO: 1.1 (ref 1.1–2.2)
A/G RATIO: 1.2 (ref 1.1–2.2)
ALBUMIN SERPL-MCNC: 3.9 G/DL (ref 3.4–5)
ALBUMIN SERPL-MCNC: 3.9 G/DL (ref 3.4–5)
ALP BLD-CCNC: 52 U/L (ref 40–129)
ALP BLD-CCNC: 58 U/L (ref 40–129)
ALT SERPL-CCNC: 11 U/L (ref 10–40)
ALT SERPL-CCNC: 14 U/L (ref 10–40)
ANION GAP SERPL CALCULATED.3IONS-SCNC: 10 MMOL/L (ref 3–16)
ANION GAP SERPL CALCULATED.3IONS-SCNC: 9 MMOL/L (ref 3–16)
AST SERPL-CCNC: 21 U/L (ref 15–37)
AST SERPL-CCNC: 38 U/L (ref 15–37)
BASOPHILS ABSOLUTE: 0 K/UL (ref 0–0.2)
BASOPHILS RELATIVE PERCENT: 0.5 %
BILIRUB SERPL-MCNC: 0.7 MG/DL (ref 0–1)
BILIRUB SERPL-MCNC: 0.7 MG/DL (ref 0–1)
BUN BLDV-MCNC: 31 MG/DL (ref 7–20)
BUN BLDV-MCNC: 34 MG/DL (ref 7–20)
CALCIUM SERPL-MCNC: 9.2 MG/DL (ref 8.3–10.6)
CALCIUM SERPL-MCNC: 9.6 MG/DL (ref 8.3–10.6)
CHLORIDE BLD-SCNC: 97 MMOL/L (ref 99–110)
CHLORIDE BLD-SCNC: 98 MMOL/L (ref 99–110)
CO2: 28 MMOL/L (ref 21–32)
CO2: 29 MMOL/L (ref 21–32)
CREAT SERPL-MCNC: 1.2 MG/DL (ref 0.8–1.3)
CREAT SERPL-MCNC: 1.3 MG/DL (ref 0.8–1.3)
EOSINOPHILS ABSOLUTE: 0 K/UL (ref 0–0.6)
EOSINOPHILS RELATIVE PERCENT: 0.1 %
GFR AFRICAN AMERICAN: >60
GFR AFRICAN AMERICAN: >60
GFR NON-AFRICAN AMERICAN: 53
GFR NON-AFRICAN AMERICAN: 58
GLOBULIN: 3.3 G/DL
GLOBULIN: 3.6 G/DL
GLUCOSE BLD-MCNC: 86 MG/DL (ref 70–99)
GLUCOSE BLD-MCNC: 95 MG/DL (ref 70–99)
HCT VFR BLD CALC: 40.9 % (ref 40.5–52.5)
HEMOGLOBIN: 13 G/DL (ref 13.5–17.5)
INR BLD: 2.38 (ref 0.86–1.14)
LACTIC ACID: 1.1 MMOL/L (ref 0.4–2)
LIPASE: 21 U/L (ref 13–60)
LYMPHOCYTES ABSOLUTE: 1.5 K/UL (ref 1–5.1)
LYMPHOCYTES RELATIVE PERCENT: 17 %
MCH RBC QN AUTO: 29 PG (ref 26–34)
MCHC RBC AUTO-ENTMCNC: 31.8 G/DL (ref 31–36)
MCV RBC AUTO: 91.1 FL (ref 80–100)
MONOCYTES ABSOLUTE: 0.7 K/UL (ref 0–1.3)
MONOCYTES RELATIVE PERCENT: 7.6 %
NEUTROPHILS ABSOLUTE: 6.6 K/UL (ref 1.7–7.7)
NEUTROPHILS RELATIVE PERCENT: 74.8 %
PDW BLD-RTO: 14.8 % (ref 12.4–15.4)
PLATELET # BLD: 107 K/UL (ref 135–450)
PMV BLD AUTO: 10.7 FL (ref 5–10.5)
POTASSIUM REFLEX MAGNESIUM: 5.6 MMOL/L (ref 3.5–5.1)
POTASSIUM SERPL-SCNC: 3.9 MMOL/L (ref 3.5–5.1)
PROTHROMBIN TIME: 27.9 SEC (ref 10–13.2)
RBC # BLD: 4.49 M/UL (ref 4.2–5.9)
SODIUM BLD-SCNC: 134 MMOL/L (ref 136–145)
SODIUM BLD-SCNC: 137 MMOL/L (ref 136–145)
TOTAL PROTEIN: 7.2 G/DL (ref 6.4–8.2)
TOTAL PROTEIN: 7.5 G/DL (ref 6.4–8.2)
WBC # BLD: 8.8 K/UL (ref 4–11)

## 2020-11-18 PROCEDURE — 96361 HYDRATE IV INFUSION ADD-ON: CPT

## 2020-11-18 PROCEDURE — 85610 PROTHROMBIN TIME: CPT

## 2020-11-18 PROCEDURE — 99223 1ST HOSP IP/OBS HIGH 75: CPT | Performed by: SURGERY

## 2020-11-18 PROCEDURE — 99283 EMERGENCY DEPT VISIT LOW MDM: CPT

## 2020-11-18 PROCEDURE — 85025 COMPLETE CBC W/AUTO DIFF WBC: CPT

## 2020-11-18 PROCEDURE — 74022 RADEX COMPL AQT ABD SERIES: CPT

## 2020-11-18 PROCEDURE — C9113 INJ PANTOPRAZOLE SODIUM, VIA: HCPCS | Performed by: STUDENT IN AN ORGANIZED HEALTH CARE EDUCATION/TRAINING PROGRAM

## 2020-11-18 PROCEDURE — 96365 THER/PROPH/DIAG IV INF INIT: CPT

## 2020-11-18 PROCEDURE — 6360000002 HC RX W HCPCS: Performed by: STUDENT IN AN ORGANIZED HEALTH CARE EDUCATION/TRAINING PROGRAM

## 2020-11-18 PROCEDURE — 6370000000 HC RX 637 (ALT 250 FOR IP): Performed by: STUDENT IN AN ORGANIZED HEALTH CARE EDUCATION/TRAINING PROGRAM

## 2020-11-18 PROCEDURE — G0378 HOSPITAL OBSERVATION PER HR: HCPCS

## 2020-11-18 PROCEDURE — 83605 ASSAY OF LACTIC ACID: CPT

## 2020-11-18 PROCEDURE — 36415 COLL VENOUS BLD VENIPUNCTURE: CPT

## 2020-11-18 PROCEDURE — 83690 ASSAY OF LIPASE: CPT

## 2020-11-18 PROCEDURE — 96375 TX/PRO/DX INJ NEW DRUG ADDON: CPT

## 2020-11-18 PROCEDURE — 80053 COMPREHEN METABOLIC PANEL: CPT

## 2020-11-18 PROCEDURE — 2580000003 HC RX 258: Performed by: STUDENT IN AN ORGANIZED HEALTH CARE EDUCATION/TRAINING PROGRAM

## 2020-11-18 PROCEDURE — 1200000000 HC SEMI PRIVATE

## 2020-11-18 PROCEDURE — 96366 THER/PROPH/DIAG IV INF ADDON: CPT

## 2020-11-18 RX ORDER — SODIUM CHLORIDE 0.9 % (FLUSH) 0.9 %
10 SYRINGE (ML) INJECTION EVERY 12 HOURS SCHEDULED
Status: DISCONTINUED | OUTPATIENT
Start: 2020-11-18 | End: 2020-11-19 | Stop reason: HOSPADM

## 2020-11-18 RX ORDER — SODIUM CHLORIDE, SODIUM LACTATE, POTASSIUM CHLORIDE, CALCIUM CHLORIDE 600; 310; 30; 20 MG/100ML; MG/100ML; MG/100ML; MG/100ML
INJECTION, SOLUTION INTRAVENOUS CONTINUOUS
Status: DISCONTINUED | OUTPATIENT
Start: 2020-11-18 | End: 2020-11-19

## 2020-11-18 RX ORDER — 0.9 % SODIUM CHLORIDE 0.9 %
1000 INTRAVENOUS SOLUTION INTRAVENOUS ONCE
Status: COMPLETED | OUTPATIENT
Start: 2020-11-18 | End: 2020-11-18

## 2020-11-18 RX ORDER — WARFARIN SODIUM 5 MG/1
2.5 TABLET ORAL
Status: COMPLETED | OUTPATIENT
Start: 2020-11-18 | End: 2020-11-18

## 2020-11-18 RX ORDER — HYDRALAZINE HYDROCHLORIDE 20 MG/ML
5 INJECTION INTRAMUSCULAR; INTRAVENOUS EVERY 6 HOURS PRN
Status: DISCONTINUED | OUTPATIENT
Start: 2020-11-18 | End: 2020-11-18

## 2020-11-18 RX ORDER — LABETALOL HYDROCHLORIDE 5 MG/ML
5 INJECTION, SOLUTION INTRAVENOUS EVERY 6 HOURS PRN
Status: DISCONTINUED | OUTPATIENT
Start: 2020-11-18 | End: 2020-11-19

## 2020-11-18 RX ORDER — ONDANSETRON 2 MG/ML
4 INJECTION INTRAMUSCULAR; INTRAVENOUS EVERY 6 HOURS PRN
Status: DISCONTINUED | OUTPATIENT
Start: 2020-11-18 | End: 2020-11-19 | Stop reason: HOSPADM

## 2020-11-18 RX ORDER — SODIUM CHLORIDE 0.9 % (FLUSH) 0.9 %
10 SYRINGE (ML) INJECTION PRN
Status: DISCONTINUED | OUTPATIENT
Start: 2020-11-18 | End: 2020-11-19 | Stop reason: HOSPADM

## 2020-11-18 RX ORDER — PANTOPRAZOLE SODIUM 40 MG/10ML
40 INJECTION, POWDER, LYOPHILIZED, FOR SOLUTION INTRAVENOUS DAILY
Status: DISCONTINUED | OUTPATIENT
Start: 2020-11-18 | End: 2020-11-19

## 2020-11-18 RX ORDER — ONDANSETRON 4 MG/1
4 TABLET, ORALLY DISINTEGRATING ORAL EVERY 8 HOURS PRN
Status: DISCONTINUED | OUTPATIENT
Start: 2020-11-18 | End: 2020-11-19 | Stop reason: HOSPADM

## 2020-11-18 RX ORDER — WARFARIN SODIUM 5 MG/1
5 TABLET ORAL NIGHTLY
Status: DISCONTINUED | OUTPATIENT
Start: 2020-11-18 | End: 2020-11-18 | Stop reason: DRUGHIGH

## 2020-11-18 RX ADMIN — SODIUM CHLORIDE 1000 ML: 0.9 INJECTION, SOLUTION INTRAVENOUS at 14:31

## 2020-11-18 RX ADMIN — SODIUM CHLORIDE, SODIUM LACTATE, POTASSIUM CHLORIDE, AND CALCIUM CHLORIDE: 600; 310; 30; 20 INJECTION, SOLUTION INTRAVENOUS at 20:34

## 2020-11-18 RX ADMIN — Medication 10 ML: at 21:35

## 2020-11-18 RX ADMIN — WARFARIN SODIUM 2.5 MG: 5 TABLET ORAL at 23:16

## 2020-11-18 RX ADMIN — PANTOPRAZOLE SODIUM 40 MG: 40 INJECTION, POWDER, FOR SOLUTION INTRAVENOUS at 21:35

## 2020-11-18 ASSESSMENT — ENCOUNTER SYMPTOMS
VOMITING: 1
FACIAL SWELLING: 0
TROUBLE SWALLOWING: 0
RESPIRATORY NEGATIVE: 1
ABDOMINAL PAIN: 1
ABDOMINAL DISTENTION: 0
NAUSEA: 1
APNEA: 0
EYE DISCHARGE: 0

## 2020-11-18 NOTE — H&P
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 He also noted some dull epigastric pain associated with his symptoms. He has had only liquids to eat/drink since his symptoms began. He doesn't believe he is passing flatus but did have a large BM yesterday. Today his symptoms persisted but have gradually improved over the past several hours. He denies any fever, chills, chest pain, or hematemesis. 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    Pancreatitis 8/1/2013    Patient underweight 8/8/2013    Prostate cancer ([address]     Unspecified essential hypertension        Past Surgical History:        Procedure Laterality Date    APPENDECTOMY      as a child    BONE GRAFT      for saddle nose    CARDIAC VALVE REPLACEMENT  2006    aorta    CHOLECYSTECTOMY      COLONOSCOPY  11/2009    COLONOSCOPY  10/05/15    CORONARY ARTERY BYPASS GRAFT  2006    ESOPHAGUS SURGERY      EYE SURGERY  1990& 1992    cataract bilat removal     GASTRECTOMY      PROSTATE SURGERY      seeds    TONSILLECTOMY      US PROSTATE/TRANSRECTAL VOL STUDY BRACHYTHERAPY         Allergies:    No known allergies    Medications:   Home Meds  No current facility-administered medications on file prior to encounter.       Current Outpatient Medications on File Prior to Encounter   Medication Sig Dispense Refill    omeprazole (PRILOSEC) 40 MG delayed release capsule TAKE ONE CAPSULE BY MOUTH DAILY 30 capsule 2    Alpha Lipoic Acid-Biotin 300-333 MG-MCG CAPS 300 mg      calcium carbonate (OSCAL) 500 MG TABS tablet Take 500 mg by mouth daily      amoxicillin (AMOXIL) 500 MG capsule Take 4 capsules by mouth one hour prior to each dental procedure. (Patient not taking: Reported on 9/15/2020) 40 capsule 0    atorvastatin (LIPITOR) 10 MG tablet TAKE 1 TABLET BY MOUTH DAILY 90 tablet 2    warfarin (COUMADIN) 5 MG tablet TAKE ONE TABLET BY MOUTH ONCE NIGHTLY 90 tablet 1    Magnesium 65 MG TABS Take by mouth      Coenzyme Q10 (COQ10) 400 MG CAPS Take 1 capsule by mouth      Simethicone 80 MG TABS Take 80 tablets by mouth 3 times daily. (Patient taking differently: Take 125 mg by mouth 3 times daily. ) 90 each 0    warfarin (COUMADIN) 1 MG tablet Take 1 mg by mouth.  sildenafil (VIAGRA) 50 MG tablet Take 50 mg by mouth as needed for Erectile Dysfunction.  vitamin B-12 (CYANOCOBALAMIN) 1000 MCG tablet Take 1,000 mcg by mouth daily.  docusate sodium (COLACE) 100 MG capsule Take 100 mg by mouth daily. One  Capsule daily      polyethylene glycol (GLYCOLAX) powder Take 17 g by mouth 2 times daily          Current Meds  0.9 % sodium chloride bolus, Once        Family History:   Family History   Problem Relation Age of Onset    Other Mother         bleeding peptic ulcer    Heart Disease Mother     Other Father         cardiovascular disease    Stroke Father     Elevated Lipids Father     Hypertension Father        Social History:   TOBACCO:   reports that he has never smoked. He has never used smokeless tobacco.  ETOH:   reports current alcohol use. DRUGS:   reports no history of drug use. Review of Systems:      Constitutional: Negative except for weakness. HENT: Negative. Eyes: Negative. Respiratory: Negative. Cardiovascular: Negative. Gastrointestinal: Negative except for nausea, vomiting, pain  Genitourinary: Negative. Musculoskeletal: Negative. Skin: Negative.   Endocrine: Negative. Allergic/Immunologic: Negative. Neurological: Negative. Hematological: Negative. Psychiatric/Behavioral: Negative. Physical exam:    Vitals:    20 1230 20 1247   BP: (!) 179/95    Pulse: 68    Resp: 16    SpO2: 98%    Weight:  107 lb (48.5 kg)   Height:  5' 3.5\" (1.613 m)       General appearance: alert, no acute distress, grooming appropriate  HEENT: Normocephalic, atraumatic; EOMI; moist mucous membranes  Neck: trachea midline, no JVD, no lymphadenopathy  Chest/Lungs: Normal inspiratory effort, symmetric chest rise, no accessory muscle use  Cardiovascular: Regular rate and rhythm; perfusing extremities  Abdomen: soft, non-tender, non-distended, no guarding/rigidity; well-healed upper midline and right subcostal scars  Skin: warm and dry, no rashes  Extremities: no edema, no cyanosis  Neuro: A&Ox3, no gross sensory or motor neuro deficits      Labs:    CBC:   Recent Labs     20  1258   WBC 8.8   HGB 13.0*   HCT 40.9   MCV 91.1   *     BMP:   Recent Labs     20  1411 20  1258   NA  --  134*   K  --  5.6*   CL  --  97*   CO2  --  28   BUN  --  34*   CREATININE 1.6* 1.2     Liver Profile:   Lab Results   Component Value Date    AST 38 2020    ALT 14 2020    BILIDIR <0.2 2020    BILITOT 0.7 2020    ALKPHOS 52 2020     Lab Results   Component Value Date    CHOL 143 2020    HDL 62 2020    TRIG 95 2020     PT/INR:   Recent Labs     20  1258   PROTIME 27.9*   INR 2.38*       Imagin20 - CT Abd/Pelvis w IV/PO Contrast: Slightly increased distention involving the stomach and proximal small bowel with redemonstration of a likely transition point in the left mid abdomen.        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 Hypertensive today but otherwise unremarkable vitals. Labs significant for hyperkalemia with INR within therapeutic range. - Labs within normal limits with mild dehydration give 1 liter bolus with improvement in symptoms   - improved abdominal pain and no emesis and tolerating p.o. intake in the ED   -  abdominal XR with improved dilation of bowel however noted to have dilated loop of colon. - Discussed with patient option for admission vs. Outpatient management. Given COVID pandemic will plan to manage as an outpatient. Plan to follow up with Dr. Gabriella Barclay  - Recommend liquids at this time and advancing to soft diet with low fiber once abdominal symptoms have totally resolved. - Patient, plan discuss. d and evlauted with  Dr. Gabriella Barclay. Plan to call Dr. Gabriella Barclay with an update tomorrow     Amina Justin MD, PGY-1  11/18/20  1:54 PM  [phone]    Addendum: Patient trying to ambulate to the bathroom but noted to be extremely weak and unable to ambulate. Therefore patient is agreeable to stay overnight for observation. Will have PT/OT eval patient     Tung Valles MD  11/18/2020  4:50 PM  [phone]    I saw and independently examined the patient today. I agree with the history of present illness, past medical/surgical histories, family history, social history, medication list and allergies as listed. The review of systems is as noted above. My physical exam confirms the findings listed above. Review of labs, pathology reports, radiology reports and medical records confirm the findings noted above. I edited the note where appropriate.     Discussed about diagnosis and management    Rom Garcia MD  Surgery Attending

## 2020-11-18 NOTE — ED TRIAGE NOTES
Had a CT of abdomen yesterday for vomiting which started yesterday.   Patient states that Dr. Leesa Pride phoned him last night and said that he had a possible GI and instructed him to come here and have MD call him (GI )for instructions

## 2020-11-18 NOTE — ED NOTES
Patient stood up out of bed to use urinal, patient extremely weak unable to stand without 2 assist. Unsteady gait and unsteady on his feet. Constantly shaking legs/arms with movement. Patient assisted back into bed by RN, cleaned and changed into depends. MD notified, see new orders.       Allanesha Smith-Narcisse, RN  11/18/20 0465

## 2020-11-18 NOTE — TELEPHONE ENCOUNTER
Spoke with pt and his wife. They will come to ER as soon as possible for labs, IVF and likely admission.

## 2020-11-18 NOTE — CONSULTS
Internal Medicine  PGY 1  History & Physical      CC     History Obtained From:  patient    HISTORY OF PRESENT ILLNESS:  Patient is a 81 yo M w/ a pmhx of aortic valve replacement and esophageal adenocarcinoma w/ esophagectomy and gastrectomy followed by chemo & radiation tx. Pt states he has had peripheral neuropathy since tx, and recurrent SBOs since August 2020. Pt presents today to the ED after having 4 episodes of vomiting, the first one at 5 AM yesterday associated w/ nausea and muscle weakness. Pt states he fell on his way to the bathroom yesterday and was too weak to get back into bed. Had a CT completed yesterday after his second episode of vomiting which was concerning for SBO. Pt felt significant weakness this morning and states its been the most weak he has ever felt which prompted him to come into the ED. Pt reports peripheral neuropathy since treatment w/ cisplatin but states this weakness is unrelated and is more significant. Upon arrival, pt reports he had a bowel movement w/ some resolution of weakness after receiving 1 L of IV bolus normal saline.  Pt reports he was able to drink OJ, and carnation this morning and intermittent fluid throughout today Pt denies fever, chills, hematochezia, numbness or tingling, dizziness.          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    Pancreatitis 8/1/2013    Patient underweight 8/8/2013    Prostate cancer ([address]     Unspecified essential hypertension    ·     Past Surgical History:        Procedure Laterality Date    APPENDECTOMY      as a child    BONE GRAFT      for saddle nose    CARDIAC VALVE REPLACEMENT  2006    aorta    CHOLECYSTECTOMY      COLONOSCOPY  11/2009    COLONOSCOPY  10/05/15    CORONARY ARTERY BYPASS GRAFT  2006    ESOPHAGUS SURGERY      EYE SURGERY  1990& 1992    cataract bilat removal     GASTRECTOMY      PROSTATE SURGERY      seeds    TONSILLECTOMY      US PROSTATE/TRANSRECTAL VOL STUDY BRACHYTHERAPY     ·     Medications Priorto Admission:    · Not in a hospital admission. Allergies:  No known allergies    Social History:   · TOBACCO:   reports that he has never smoked. He has never used smokeless tobacco.  · ETOH:   reports current alcohol use. · DRUGS : none  · Patient currently lives with family wife  ·   Family History:       Problem Relation Age of Onset    Other Mother         bleeding peptic ulcer    Heart Disease Mother     Other Father         cardiovascular disease    Stroke Father     Elevated Lipids Father     Hypertension Father    ·     Review of Systems   Constitutional: Positive for appetite change and fatigue. HENT: Negative. Respiratory: Negative. Cardiovascular: Negative. Gastrointestinal: Positive for nausea. Endocrine: Negative. Musculoskeletal: Negative. Neurological: Positive for weakness. Psychiatric/Behavioral: Negative. ROS: A 10 point review of systems was conducted, significant findings as noted in HPI. Physical Exam  Constitutional:       Appearance: Normal appearance. HENT:      Head: Normocephalic and atraumatic. Nose: Nose normal.   Eyes:      Extraocular Movements: Extraocular movements intact. Conjunctiva/sclera: Conjunctivae normal.      Pupils: Pupils are equal, round, and reactive to light. Neck:      Musculoskeletal: Normal range of motion. Cardiovascular:      Rate and Rhythm: Normal rate and regular rhythm. Pulmonary:      Effort: Pulmonary effort is normal.      Breath sounds: Normal breath sounds. Abdominal:      General: Bowel sounds are normal.      Palpations: Abdomen is soft. Tenderness: There is no abdominal tenderness. Musculoskeletal: Normal range of motion. Neurological:      General: No focal deficit present. Mental Status: He is alert and oriented to person, place, and time. Psychiatric:         Mood and Affect: Mood normal.         Behavior: Behavior normal.         Thought Content: Thought content normal.         Judgment: Judgment normal.       Physical exam:       Vitals:    11/18/20 1715   BP:    Pulse:    Resp:    SpO2: 95%       DATA:    Labs:  CBC:   Recent Labs     11/18/20  1258   WBC 8.8   HGB 13.0*   HCT 40.9   *       BMP:   Recent Labs     11/17/20  1411 11/18/20  1258 11/18/20  1359   NA  --  134* 137   K  --  5.6* 3.9   CL  --  97* 98*   CO2  --  28 29   BUN  --  34* 31*   CREATININE 1.6* 1.2 1.3   GLUCOSE  --  95 86     LFT's:   Recent Labs     11/18/20  1258 11/18/20  1359   AST 38* 21   ALT 14 11   BILITOT 0.7 0.7   ALKPHOS 52 58     Troponin: No results for input(s): TROPONINI in the last 72 hours. BNP:No results for input(s): BNP in the last 72 hours. ABGs: No results for input(s): PHART, BIP2YXV, PO2ART in the last 72 hours. INR:   Recent Labs     11/18/20  1258   INR 2.38*       U/A:No results for input(s): NITRITE, COLORU, PHUR, LABCAST, WBCUA, RBCUA, MUCUS, TRICHOMONAS, YEAST, BACTERIA, CLARITYU, SPECGRAV, LEUKOCYTESUR, UROBILINOGEN, BILIRUBINUR, BLOODU, GLUCOSEU, AMORPHOUS in the last 72 hours. Invalid input(s): KETONESU    XR ACUTE ABD SERIES CHEST 1 VW   Final Result      1. Significantly elevated left hemidiaphragm with left basilar mild atelectasis. 2. Oral contrast in nondilated small bowel loops in the left upper quadrant. 3. Diffuse gaseous distention of large bowel suggestive of nonspecific ileus. ASSESSMENT AND PLAN:      Mr. Magi George is an 82 y.o M w/ pmhx esophageal adenocarcinoma s/p esophagectomy/chemoradiation and aortic valve replacement on [facility] presenting with weakness, N/V.      Small bowel obstruction  Pt has hx of SBO with most recent one on 08/2018  - Surgery following   - patient has extensive hx of SBO that usually resolve with supportive care  - 1 L IVF given in ED for mild dehydration  - Abdominal XR showed improved dilation of bowel  - Patient was able to have normal BM in ED  - on clear liquid diet per surgery   - LR IVF 75 ml/hr      Generalized weakness  TSH was 8 @ 8/27/2020  - PTOT eval  - f/u TSH   - will monitor overnight      HTN   - does not take anything at home   - hydralazine prn for SBP > 160    GERD  - Protonix 40 mg daily     Aortic valve replacement for Bicuspid Aortic valve  Patient underwent this procedure at the age of 73  - on warfarin at home    Will discuss with attending physician      Code Status:Full code  FEN: Clear liquid diet  PPX: warfarin   DISPO: Bard Simon MD PGY-1   11/18/2020,  5:47 PM

## 2020-11-18 NOTE — ED NOTES
Report given to [facility] [place], RN. All questions asked, answered.       Allanesha Smith-Narcisse, RN  11/18/20 4723

## 2020-11-18 NOTE — TELEPHONE ENCOUNTER
Patient states that he has been very weak and thinks that he has low potassium.     Please call patient at [phone]

## 2020-11-19 VITALS
OXYGEN SATURATION: 95 % | WEIGHT: 107 LBS | DIASTOLIC BLOOD PRESSURE: 74 MMHG | TEMPERATURE: 98 F | BODY MASS INDEX: 18.27 KG/M2 | RESPIRATION RATE: 16 BRPM | HEIGHT: 64 IN | HEART RATE: 88 BPM | SYSTOLIC BLOOD PRESSURE: 122 MMHG

## 2020-11-19 PROBLEM — R55 POSTURAL DIZZINESS WITH NEAR SYNCOPE: Status: ACTIVE | Noted: 2020-11-19

## 2020-11-19 PROBLEM — R42 POSTURAL DIZZINESS WITH NEAR SYNCOPE: Status: ACTIVE | Noted: 2020-11-19

## 2020-11-19 LAB
ALBUMIN SERPL-MCNC: 3.2 G/DL (ref 3.4–5)
ANION GAP SERPL CALCULATED.3IONS-SCNC: 8 MMOL/L (ref 3–16)
BASOPHILS ABSOLUTE: 0 K/UL (ref 0–0.2)
BASOPHILS RELATIVE PERCENT: 0.4 %
BUN BLDV-MCNC: 27 MG/DL (ref 7–20)
BURR CELLS: ABNORMAL
CALCIUM SERPL-MCNC: 8.6 MG/DL (ref 8.3–10.6)
CHLORIDE BLD-SCNC: 103 MMOL/L (ref 99–110)
CO2: 27 MMOL/L (ref 21–32)
CREAT SERPL-MCNC: 1.1 MG/DL (ref 0.8–1.3)
EOSINOPHILS ABSOLUTE: 0 K/UL (ref 0–0.6)
EOSINOPHILS RELATIVE PERCENT: 0.8 %
GFR AFRICAN AMERICAN: >60
GFR NON-AFRICAN AMERICAN: >60
GLUCOSE BLD-MCNC: 82 MG/DL (ref 70–99)
HCT VFR BLD CALC: 35.3 % (ref 40.5–52.5)
HEMOGLOBIN: 11.5 G/DL (ref 13.5–17.5)
INR BLD: 2.42 (ref 0.86–1.14)
LYMPHOCYTES ABSOLUTE: 1.1 K/UL (ref 1–5.1)
LYMPHOCYTES RELATIVE PERCENT: 18.9 %
MAGNESIUM: 1.8 MG/DL (ref 1.8–2.4)
MCH RBC QN AUTO: 29.2 PG (ref 26–34)
MCHC RBC AUTO-ENTMCNC: 32.7 G/DL (ref 31–36)
MCV RBC AUTO: 89.3 FL (ref 80–100)
MONOCYTES ABSOLUTE: 0.6 K/UL (ref 0–1.3)
MONOCYTES RELATIVE PERCENT: 10.1 %
NEUTROPHILS ABSOLUTE: 4.1 K/UL (ref 1.7–7.7)
NEUTROPHILS RELATIVE PERCENT: 69.8 %
OVALOCYTES: ABNORMAL
PDW BLD-RTO: 14.7 % (ref 12.4–15.4)
PHOSPHORUS: 3.2 MG/DL (ref 2.5–4.9)
PLATELET # BLD: 91 K/UL (ref 135–450)
PLATELET SLIDE REVIEW: ABNORMAL
PMV BLD AUTO: 10.3 FL (ref 5–10.5)
POTASSIUM SERPL-SCNC: 4 MMOL/L (ref 3.5–5.1)
PROTHROMBIN TIME: 28.3 SEC (ref 10–13.2)
RBC # BLD: 3.96 M/UL (ref 4.2–5.9)
REASON FOR REJECTION: NORMAL
REJECTED TEST: NORMAL
SARS-COV-2, NAAT: NOT DETECTED
SODIUM BLD-SCNC: 138 MMOL/L (ref 136–145)
WBC # BLD: 5.9 K/UL (ref 4–11)

## 2020-11-19 PROCEDURE — 85025 COMPLETE CBC W/AUTO DIFF WBC: CPT

## 2020-11-19 PROCEDURE — 85610 PROTHROMBIN TIME: CPT

## 2020-11-19 PROCEDURE — U0002 COVID-19 LAB TEST NON-CDC: HCPCS

## 2020-11-19 PROCEDURE — 80069 RENAL FUNCTION PANEL: CPT

## 2020-11-19 PROCEDURE — 97162 PT EVAL MOD COMPLEX 30 MIN: CPT

## 2020-11-19 PROCEDURE — 6360000002 HC RX W HCPCS: Performed by: STUDENT IN AN ORGANIZED HEALTH CARE EDUCATION/TRAINING PROGRAM

## 2020-11-19 PROCEDURE — 99232 SBSQ HOSP IP/OBS MODERATE 35: CPT | Performed by: SURGERY

## 2020-11-19 PROCEDURE — 83735 ASSAY OF MAGNESIUM: CPT

## 2020-11-19 PROCEDURE — 94664 DEMO&/EVAL PT USE INHALER: CPT

## 2020-11-19 PROCEDURE — 99221 1ST HOSP IP/OBS SF/LOW 40: CPT | Performed by: INTERNAL MEDICINE

## 2020-11-19 PROCEDURE — 36415 COLL VENOUS BLD VENIPUNCTURE: CPT

## 2020-11-19 PROCEDURE — 97116 GAIT TRAINING THERAPY: CPT

## 2020-11-19 PROCEDURE — 6370000000 HC RX 637 (ALT 250 FOR IP): Performed by: STUDENT IN AN ORGANIZED HEALTH CARE EDUCATION/TRAINING PROGRAM

## 2020-11-19 PROCEDURE — 97530 THERAPEUTIC ACTIVITIES: CPT

## 2020-11-19 PROCEDURE — 97535 SELF CARE MNGMENT TRAINING: CPT

## 2020-11-19 PROCEDURE — 94640 AIRWAY INHALATION TREATMENT: CPT

## 2020-11-19 PROCEDURE — G0378 HOSPITAL OBSERVATION PER HR: HCPCS

## 2020-11-19 PROCEDURE — 97166 OT EVAL MOD COMPLEX 45 MIN: CPT

## 2020-11-19 RX ORDER — CASTOR OIL AND BALSAM, PERU 788; 87 MG/G; MG/G
OINTMENT TOPICAL 2 TIMES DAILY
Status: DISCONTINUED | OUTPATIENT
Start: 2020-11-19 | End: 2020-11-19 | Stop reason: HOSPADM

## 2020-11-19 RX ORDER — MAGNESIUM SULFATE IN WATER 40 MG/ML
2 INJECTION, SOLUTION INTRAVENOUS ONCE
Status: COMPLETED | OUTPATIENT
Start: 2020-11-19 | End: 2020-11-19

## 2020-11-19 RX ORDER — ATORVASTATIN CALCIUM 10 MG/1
10 TABLET, FILM COATED ORAL DAILY
Status: DISCONTINUED | OUTPATIENT
Start: 2020-11-19 | End: 2020-11-19 | Stop reason: HOSPADM

## 2020-11-19 RX ORDER — PANTOPRAZOLE SODIUM 40 MG/1
40 TABLET, DELAYED RELEASE ORAL
Status: DISCONTINUED | OUTPATIENT
Start: 2020-11-19 | End: 2020-11-19 | Stop reason: HOSPADM

## 2020-11-19 RX ORDER — DOCUSATE SODIUM 100 MG/1
100 CAPSULE, LIQUID FILLED ORAL DAILY
Status: DISCONTINUED | OUTPATIENT
Start: 2020-11-19 | End: 2020-11-19 | Stop reason: HOSPADM

## 2020-11-19 RX ORDER — CALCIUM CARBONATE 500(1250)
500 TABLET ORAL DAILY
Status: DISCONTINUED | OUTPATIENT
Start: 2020-11-19 | End: 2020-11-19 | Stop reason: HOSPADM

## 2020-11-19 RX ADMIN — PANTOPRAZOLE SODIUM 40 MG: 40 TABLET, DELAYED RELEASE ORAL at 06:01

## 2020-11-19 RX ADMIN — MAGNESIUM SULFATE HEPTAHYDRATE 2 G: 40 INJECTION, SOLUTION INTRAVENOUS at 08:23

## 2020-11-19 RX ADMIN — DOCUSATE SODIUM 100 MG: 100 CAPSULE, LIQUID FILLED ORAL at 08:24

## 2020-11-19 RX ADMIN — CALCIUM 500 MG: 500 TABLET ORAL at 08:24

## 2020-11-19 RX ADMIN — ATORVASTATIN CALCIUM 10 MG: 10 TABLET, FILM COATED ORAL at 08:23

## 2020-11-19 NOTE — CARE COORDINATION
CM following, after working with PT OT recs for SNF, spoke with pt wants to go to [facility], referral made to [facility]# [id number], Rapid COVID ordered.   Electronically signed by Nanci Morris RN on 11/19/2020 at 10:45 AM  [phone]

## 2020-11-19 NOTE — PROGRESS NOTES
Hospital Medicine  Progress Note    Chief Complaint: Emesis, nearsyncope      SUBJECTIVE: Patient is improved. There are not new complaints. No resurrent vomiting    OBJECTIVE      Medications    Infusion Medications   Scheduled Medications    pantoprazole  40 mg Oral QAM AC    atorvastatin  10 mg Oral Daily    calcium elemental  500 mg Oral Daily    docusate sodium  100 mg Oral Daily    magnesium oxide  400 mg Oral Daily    magnesium sulfate  2 g Intravenous Once    sodium chloride flush  10 mL Intravenous 2 times per day    warfarin (COUMADIN) daily dosing (placeholder)   Other See Admin Instructions       Physical   Temperature:  Current - Temp: 97.9 °F (36.6 °C); Max - Temp  Av.1 °F (36.7 °C)  Min: 97.7 °F (36.5 °C)  Max: 99 °F (37.2 °C)    Respiratory Rate : Resp  Av  Min: 16  Max: 16    Pulse Range: Pulse  Av.8  Min: 60  Max: 79    Blood Presuure Range:  Systolic (29EKK), WSH:866 , Min:127 , PNL:484   ; Diastolic (27QWY), CGC:60, Min:70, Max:106      Pulse ox Range: SpO2  Av.8 %  Min: 92 %  Max: 98 %    24hr I & O:      Intake/Output Summary (Last 24 hours) at 2020 0831  Last data filed at 2020 6736  Gross per 24 hour   Intake 700 ml   Output 900 ml   Net -200 ml       Constitutional:  awake, alert, cooperative, no apparent distress, and appears stated age  Eyes:  pupils equal, round and reactive to light  ENT:  normocepalic, without obvious abnormality  NECK:  supple, symmetrical, trachea midline  HEMATOLOGIC/LYMPHATICS:  no cervical lymphadenopathy  LUNGS:  No increased work of breathing, good air exchange, clear to auscultation bilaterally, no crackles or wheezing  CARDIOVASCULAR: regular rate and rhythm, S1, S2 normal, no murmur, click, rub or gallop  ABDOMEN:  soft, non-tender, without masses or organomegaly  MUSCULOSKELETAL:  There is no redness, warmth, or swelling of the joints. Full range of motion noted.   Motor strength is 5 out of 5 all extremities bilaterally.   Tone is normal.  SKIN:  normal skin color, texture, turgor    Data      CBC:   Lab Results   Component Value Date    WBC 8.8 11/18/2020    RBC 4.49 11/18/2020    RBC 4.41 06/26/2017    HGB 13.0 11/18/2020    HCT 40.9 11/18/2020    MCV 91.1 11/18/2020    MCH 29.0 11/18/2020    MCHC 31.8 11/18/2020    RDW 14.8 11/18/2020     11/18/2020    MPV 10.7 11/18/2020     CMP:    Lab Results   Component Value Date     11/19/2020    K 4.0 11/19/2020    K 5.6 11/18/2020     11/19/2020    CO2 27 11/19/2020    BUN 27 11/19/2020    CREATININE 1.1 11/19/2020    GFRAA >60 11/19/2020    GFRAA >60 05/10/2013    AGRATIO 1.2 11/18/2020    LABGLOM >60 11/19/2020    LABGLOM >60>60 01/10/2012    GLUCOSE 82 11/19/2020    GLUCOSE 123 09/26/2016    PROT 7.2 11/18/2020    PROT 6.8 09/26/2016    LABALBU 3.2 11/19/2020    CALCIUM 8.6 11/19/2020    BILITOT 0.7 11/18/2020    ALKPHOS 58 11/18/2020    AST 21 11/18/2020    ALT 11 11/18/2020         ASSESSMENT AND PLAN      Principal Problem:    Abdominal pain  Plan: Now resolved  Active Problems:    Nausea & vomiting  Plan:resolved    Postural dizziness with near syncope  Plan: improved, with fluids, if OK with PT/OT can discharge and will follow up in 2 weeks in the office

## 2020-11-19 NOTE — DISCHARGE INSTR - COC
Continuity of Care Form    Patient Name: Armando Crawley MD   :  1935  MRN:  [id number]    Admit date:  2020  Discharge date:  2020    Code Status Order: Full Code   Advance Directives:   [facility] Documentation       Date/Time Healthcare Directive Type of Healthcare Directive Copy in [address] Agent's Name Healthcare Agent's Phone Number    203  --  --  --  --  Patricia Carpio  --    20  Yes, patient has an advance directive for healthcare treatment  Durable power of  for health care; Health care treatment directive; Living will  Yes, copy in chart  Healthcare power of   --  --            Admitting Physician:  Jessica Canela MD  PCP: Toni Hein MD    Discharging Nurse: [facility] Unit/Room#: 9801/2411-22  Discharging Unit Phone Number: [phone]    Emergency Contact:   Extended Emergency Contact Information  Primary Emergency Contact: Yvrose Ricketts  Address: [address] [place] [address] Phone: [phone]  Work Phone: [phone]  Mobile Phone: [phone]  Relation: Spouse  Secondary Emergency Contact: [address] [place] [address] Phone: [phone]  Relation: Child    Past Surgical History:  Past Surgical History:   Procedure Laterality Date    APPENDECTOMY      as a child    BONE GRAFT      for saddle nose    CARDIAC VALVE REPLACEMENT  2006    aorta    CHOLECYSTECTOMY      COLONOSCOPY  2009    COLONOSCOPY  10/05/15    CORONARY ARTERY BYPASS GRAFT  2006    ESOPHAGUS SURGERY      EYE SURGERY  &     cataract bilat removal     GASTRECTOMY      PROSTATE SURGERY      seeds    TONSILLECTOMY      US PROSTATE/TRANSRECTAL VOL STUDY BRACHYTHERAPY         Immunization History:   Immunization History   Administered Date(s) Administered    Influenza 10/01/2009    Influenza Virus Vaccine 10/28/2010, 2011    Influenza, High Dose (Fluzone 65 yrs and older) 10/18/2012, 10/14/2013, 11/06/2014, 10/14/2015, 09/16/2016, 10/25/2017, 09/11/2018    Influenza, Marybel Finner, adjuvanted, 65 yrs +, IM, PF (Fluad) 09/10/2020    Influenza, Triv, inactivated, subunit, adjuvanted, IM (Fluad 65 yrs and older) 09/16/2019    Pneumococcal Conjugate 13-valent ([id number]) 07/07/2015    Pneumococcal Polysaccharide ([id number]) 01/01/2006, 01/01/2007, 10/20/2013    Tdap (Boostrix, Adacel) 05/12/2014    Zoster Live (Zostavax) 09/20/2013    Zoster Recombinant (Shingrix) 07/06/2018, 09/11/2018       Active Problems:  Patient Active Problem List   Diagnosis Code    Asthma J45.909    Aortic valve disorder I35.9    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    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, D63.1    Nausea & vomiting R11.2    Patient underweight R63.6    Hernia, femoral, bilateral K41.20    Osteoporosis, senile M81.0    Chronic kidney disease, stage III (moderate) N18.30    Personal history of esophageal cancer Z85.01    History of esophageal cancer Z85.01    History of prostate cancer Z85.46    S/P AVR (aortic valve replacement) Z95.2    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    Gastroparesis K31.84    Iron deficiency anemia D50.9    Anemia D64.9    Essential hypertension I10    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    Foreign body accidentally left during a procedure T81.509A    History of disease Z87.898    Abdominal pain R10.9    Postural dizziness with near syncope R42, R55       Isolation/Infection:   Isolation            No Isolation          Patient Infection Status       Infection Onset Added Last Indicated Last Indicated By Review Planned Expiration Resolved Resolved By    None active    Resolved    COVID-19 Rule Out 08/19/20 08/19/20 08/19/20 COVID-19 (Ordered)   08/20/20 Natali Victor RN            Nurse Assessment:  Last Vital Signs: /80   Pulse 79   Temp 97.9 °F (36.6 °C) (Oral)   Resp 16   Ht 5' 3.5\" (1.613 m)   Wt 107 lb (48.5 kg)   SpO2 96%   BMI 18.66 kg/m²     Last documented pain score (0-10 scale):    Last Weight:   Wt Readings from Last 1 Encounters:   11/18/20 107 lb (48.5 kg)     Mental Status:  oriented, alert, coherent, logical, thought processes intact and able to concentrate and follow conversation    IV Access:  - None    Nursing Mobility/ADLs:  Walking   Assisted  Transfer  Assisted  Bathing  Assisted  Dressing  Assisted  Toileting  Assisted  Feeding  [facility] Delivery   whole    Wound Care Documentation and Therapy:        Elimination:  Continence:   · Bowel: Yes  · Bladder: Yes  Urinary Catheter: None   Colostomy/Ileostomy/Ileal Conduit: No        Date of Last BM: 11/19/2020    Intake/Output Summary (Last 24 hours) at 11/19/2020 0832  Last data filed at 11/19/2020 0817  Gross per 24 hour   Intake 700 ml   Output 900 ml   Net -200 ml     I/O last 3 completed shifts: In: 360 [P.O.:360]  Out: 900 [Urine:900]    Safety Concerns: At Risk for Falls    Impairments/Disabilities:      None    Nutrition Therapy:  Current Nutrition Therapy:   - Oral Diet:  Low Fiber    Routes of Feeding: Oral  Liquids: No Restrictions  Daily Fluid Restriction: no  Last Modified Barium Swallow with Video (Video Swallowing Test): not done    Treatments at the Time of Hospital Discharge:   Respiratory Treatments: none  Oxygen Therapy:  is not on home oxygen therapy.   Ventilator:    - No ventilator support    Rehab Therapies: Physical Therapy and Occupational Therapy  Weight Bearing Status/Restrictions: No weight bearing restirctions  Other Medical Equipment (for information only, NOT a DME order):  walker  Other Treatments: none    Patient's personal belongings (please select all that are sent with patient):  None    RN SIGNATURE:  Electronically signed by Reinaldo Lindo RN on 11/19/20 at 12:09 PM EST    CASE MANAGEMENT/SOCIAL WORK SECTION    Inpatient Status Date: ***    Readmission Risk Assessment Score:  Readmission Risk              Risk of Unplanned Readmission:        17           Discharging to Facility/ [address], [address] [place] [address]       Phone: [phone]       Fax: [phone]        ·     / signature: Electronically signed by Flavia Blair RN on 11/19/20 at 11:24 AM EST    PHYSICIAN SECTION    Prognosis: Good    Condition at Discharge: Stable    Rehab Potential (if transferring to Rehab): Good    Recommended Labs or Other Treatments After Discharge: PT OT eval and treat. Patient will need to continue to work with PT/OT 3-5 times a week while inpatient    Physician Certification: I certify the above information and transfer of Gifty Yoo MD  is necessary for the continuing treatment of the diagnosis listed and that he requires [facility] for less 30 days.      Update Admission H&P: No change in H&P    PHYSICIAN SIGNATURE:  Rom Garcia MD

## 2020-11-19 NOTE — PROGRESS NOTES
Occupational Therapy   Occupational Therapy Initial Assessment and Treatment  Date: 2020   Patient Name: Beena Maravilla MD  MRN: [id number]     : 1935    Date of Service: 2020    Discharge Recommendations: Beena Maravilla MD scored a 16/24 on the AM-PAC ADL Inpatient form. Current research shows that an AM-PAC score of 17 or less is typically not associated with a discharge to the patient's home setting. Based on the patient's AM-PAC score and their current ADL deficits, it is recommended that the patient have 3-5 sessions per week of Occupational Therapy at d/c to increase the patient's independence. Please see assessment section for further patient specific details. If patient discharges prior to next session this note will serve as a discharge summary. Please see below for the latest assessment towards goals. Assessment   Performance deficits / Impairments: Decreased functional mobility ; Decreased ADL status; Decreased endurance;Decreased high-level IADLs;Decreased balance  Assessment: pt functioning below baseline at this time. Pt requires CGA to [address] with ambulation using a RW, Min-ModA with functional mobility/transfers sit<>stand. Pt decreased endurance and activity tolerance with mobility. Pt requires assist with most ADLs at this time. Pt would benefit from continued skilled OT services to increase mobility, safety, ADL/IADL, and overall independence. Treatment Diagnosis: above listed deficits  Prognosis: Good  Decision Making: Medium Complexity  OT Education: OT Role;Plan of Care  Patient Education: verb understanding  REQUIRES OT FOLLOW UP: Yes  Activity Tolerance  Activity Tolerance: Patient limited by fatigue  Safety Devices  Safety Devices in place: Yes  Type of devices: All fall risk precautions in place;Gait belt;Call light within reach; Chair alarm in place; Left in chair;Nurse notified           Patient Diagnosis(es): The primary encounter diagnosis was Bilious vomiting with nausea. A diagnosis of Generalized abdominal pain was also pertinent to this visit. has a past medical history of Anemia, Aortic valve disorders, Aspiration pneumonia (HCC), Erectile dysfunction, Esophageal cancer (HCC), Hernia, femoral, bilateral, Hyperlipidemia, Osteoporosis, senile, Pancreatitis, Patient underweight, Prostate cancer ([address] and Unspecified essential hypertension. has a past surgical history that includes Coronary artery bypass graft (2006); Cardiac valve replacement (2006); eye surgery (1990& 1992); Appendectomy; bone graft; gastrectomy; Cholecystectomy; Colonoscopy (11/2009); Prostate surgery; Tonsillectomy; Esophagus surgery; US Prostate/Transrectal/Vol Collins Brachyth; and Colonoscopy (10/05/15). Treatment Diagnosis: above listed deficits      Restrictions  Restrictions/Precautions  Restrictions/Precautions: Fall Risk, Up as Tolerated  Position Activity Restriction  Other position/activity restrictions: up as isauro prn    Subjective   General  Additional Pertinent Hx: Pt. is a 82 y.o. male  male with history of esophageal adenocarcinoma s/p esophagectomy/chemoradiation and aortic valve replacement on AC who presents to [facility] with nausea, vomiting, and abdominal pain with outside CT concerning for SBO (small bowel obstruction). Also presenting with AFIB and Peripheral neuropathy.   Diagnosis: abdominal pain  Subjective  Subjective: pt in bed upon arrival, agreeable to therapy session  Patient Currently in Pain: Denies  Vital Signs  Temp: 97.8 °F (36.6 °C)  Temp Source: Oral  Pulse: 93  Heart Rate Source: Monitor  Resp: 16  BP: 121/69  BP Location: Left upper arm  BP Upper/Lower: Upper  MAP (mmHg): 86  Patient Position: Sitting  Level of Consciousness: Alert  MEWS Score: 1  Patient Currently in Pain: Denies  Oxygen Therapy  SpO2: 95 %  Pulse Oximeter Device Mode: Intermittent  Pulse Oximeter Device Location: Finger  O2 Device: None (Room air)  Social/Functional History  Social/Functional History  Lives With: Spouse  Type of Home: (CONDO)  Home Layout: Two level, Able to Live on Main level with bedroom/bathroom(exercise room at basement)  Home Access: Stairs to enter without rails  Entrance Stairs - Number of Steps: 1 GEORGES; flight to lower level that he doesn't need to use right away  Bathroom Shower/Tub: Walk-in shower  Bathroom Toilet: Handicap height  Bathroom Equipment: Grab bars in shower  Home Equipment: Cane, 4 wheeled walker, Rolling walker, Reacher  ADL Assistance: Independent  Homemaking Assistance: Needs assistance(wife does most- he makes his own breakfast/lunch; once every 2 weeks cleaning lady)  Ambulation Assistance: Independent(short distances with a cane when out; no device in house but often furniture walks)  Transfer Assistance: Independent  Active : Yes  Occupation: Retired  Leisure & Hobbies: art/portraits  Additional Comments: 1 fall prior to admit- too weak to get up & had to lie on floor. Normally exercises daily at home- rowing machine, weights       Objective        Orientation  Overall Orientation Status: Within Normal Limits     Balance  Sitting Balance: Supervision  Standing Balance: Contact guard assistance(CGA with RW)  Standing Balance  Time: ~5-7 min  Activity: to and from bathroom x2, fxl mob in room, sit to stand from bed, chair, toilet  Functional Mobility  Functional - Mobility Device: Rolling Walker  Assist Level: Moderate assistance(CGA to ModA every ~10' pt knees would buckle/fatigue with RW.)  Functional Mobility Comments: Pt CGA to [address] to and from bathroom x2, fxl mob in room, sit to stand from bed, chair, toilet. Pt CGA to [address] with fxl mobility/transfers, every ~10' pt knees would buckle/fatigue with RW. Toilet Transfers  Toilet - Technique: Ambulating  Equipment Used: Standard toilet  Toilet Transfer: Minimal assistance; Moderate assistance  ADL  UE Dressing: Minimal assistance(gown change)  LE Dressing: (donned/doffed Treatment Minutes:  Paulo Glasgow, OTR/L

## 2020-11-19 NOTE — PROGRESS NOTES
Patient alert and oriented x4. VSS. Patient denies any pain at this time. Patient has had two bowel movements this shift and voiding without difficulty. Patient tolerated low fiber diet. Patient IV NSL. Now resting comfortably with no needs. Will continue to monitor.

## 2020-11-19 NOTE — PROGRESS NOTES
Surgery Daily Progress Note  Sujey Quevedo MD  CC:  Generalized weakness  Subjective :  Had a BM. Passing flatus. No nausea or vomiting. Has a n appetite. Voiding      Objective    Infusions:   lactated ringers 75 mL/hr at 11/18/20 2034        I/O:I/O last 3 completed shifts: In: 240 [P.O.:240]  Out: 400 [Urine:400]           Wt Readings from Last 1 Encounters:   11/18/20 107 lb (48.5 kg)                 LABS:    Recent Labs     11/18/20  1258   WBC 8.8   HGB 13.0*   HCT 40.9   MCV 91.1   *        Recent Labs     11/18/20  1258 11/18/20  1359   * 137   K 5.6* 3.9   CL 97* 98*   CO2 28 29   BUN 34* 31*   CREATININE 1.2 1.3        Recent Labs     11/18/20  1258 11/18/20  1359   AST 38* 21   ALT 14 11   BILITOT 0.7 0.7   ALKPHOS 52 58        Recent Labs     11/18/20  1258   LIPASE 21.0        Recent Labs     11/18/20  1258 11/18/20  1359 11/19/20  0518   PROT 7.5 7.2  --    INR 2.38*  --  2.42*      No results for input(s): CKTOTAL, CKMB, CKMBINDEX, TROPONINI in the last 72 hours. Exam:/70   Pulse 60   Temp 98 °F (36.7 °C) (Oral)   Resp 16   Ht 5' 3.5\" (1.613 m)   Wt 107 lb (48.5 kg)   SpO2 95%   BMI 18.66 kg/m²   General appearance: alert, appears stated age and cooperative  Lungs: clear to auscultation bilaterally  Heart: regular rate and rhythm, S1, S2  Abdomen: soft,  nontender; bowel sounds present      ASSESSMENT/PLAN: Pt. is a 82 y.o. male  male with history of esophageal adenocarcinoma s/p esophagectomy/chemoradiation and aortic valve replacement on AC who presents to [facility] with nausea, vomiting, and abdominal pain with outside CT concerning for SBO. Hypertensive today but otherwise unremarkable vitals. Labs significant for hyperkalemia with INR within therapeutic range.       PT/OT today  Low fiber diet with supplement  Dr. Martha Pavon to see  D/C once cleared with PT/OT      Jaime Noel 11/19/2020 6:19 AM  [phone]    I have personally performed the medical history, physical exam and medical decision making and agree with all pertinent clinical information unless otherwise noted.      Yin Seaman MD  Surgery Attending

## 2020-11-19 NOTE — PROGRESS NOTES
Report called to [facility]. Report given to [facility]. No questions at this time.

## 2020-11-19 NOTE — PROGRESS NOTES
Physical Therapy    Facility/Department: [address] [place] [address] 5 [facility]  Initial Assessment/Treatment    NAME: David Jacques MD  : 1935  MRN: [id number]    Date of Service: 2020    Discharge Recommendations: David Jacques MD scored a 15/24 on the AM-PAC short mobility form. Current research shows that an AM-PAC score of 17 or less is typically not associated with a discharge to the patient's home setting. Based on the patient's AM-PAC score and their current functional mobility deficits, it is recommended that the patient have 3-5 sessions per week of Physical Therapy at d/c to increase the patient's independence. Please see assessment section for further patient specific details. If patient discharges prior to next session this note will serve as a discharge summary. Please see below for the latest assessment towards goals. PT Equipment Recommendations  Equipment Needed: (defer)    Assessment   Body structures, Functions, Activity limitations: Decreased functional mobility ; Decreased strength;Decreased endurance;Decreased balance  Assessment: 83 yo admittied with SBO, weakness & recent fall. Pt's level of assist fluctuated during PT eval today- at times needing CGA, but at times needing moderate A. LEs appear to occasionally give out with no notice & tremble at times. Pt appears well below his baseline of independent ambulator at home & someone who exercises frequently. Currently needing A to stand & walk with walker. Did have a fall recently at home & pt states was too fatigued to get off the floor & had to sleep there. Recommend ongoing IP PT at IN. Treatment Diagnosis: impaired mobility  Prognosis: Good  Decision Making: Medium Complexity  PT Education: PT Role;Goals; General Safety;Gait Training;Transfer Training  Patient Education: verbalized understanding  REQUIRES PT FOLLOW UP: Yes  Activity Tolerance  Activity Tolerance: Patient Tolerated treatment well  Activity Tolerance: did not appear overly fatigued with activity;large diarrhea 2x during session- required A cleaning up/changing brief       Patient Diagnosis(es): The primary encounter diagnosis was Bilious vomiting with nausea. A diagnosis of Generalized abdominal pain was also pertinent to this visit. has a past medical history of Anemia, Aortic valve disorders, Aspiration pneumonia (HCC), Erectile dysfunction, Esophageal cancer (HCC), Hernia, femoral, bilateral, Hyperlipidemia, Osteoporosis, senile, Pancreatitis, Patient underweight, Prostate cancer ([address] and Unspecified essential hypertension. has a past surgical history that includes Coronary artery bypass graft (2006); Cardiac valve replacement (2006); eye surgery (1990& 1992); Appendectomy; bone graft; gastrectomy; Cholecystectomy; Colonoscopy (11/2009); Prostate surgery; Tonsillectomy; Esophagus surgery; US Prostate/Transrectal/Vol Collins Brachyth; and Colonoscopy (10/05/15). Restrictions  Restrictions/Precautions  Restrictions/Precautions: Fall Risk, Up as Tolerated  Position Activity Restriction  Other position/activity restrictions: up as isauro prn  Vision/Hearing  Vision: Impaired  Vision Exceptions: Wears glasses for reading  Hearing: Within functional limits     Subjective  General  Chart Reviewed: Yes  Additional Pertinent Hx: Adm 11/18- 82 y.o. male who presents nausea vomiting abdominal pain. pmhx of aortic valve replacement and esophageal adenocarcinoma w/ esophagectomy and gastrectomy followed by chemo & radiation tx. Recurrent SBO since August.  Family / Caregiver Present: No  Referring Practitioner: Quynh Lira MD  Diagnosis: abdominal pain  Follows Commands: Within Functional Limits  Subjective  Subjective: Found in bed, agreeable to therapy. 'I was expecting you. \"  Pain Screening  Patient Currently in Pain: Denies  Vital Signs  Patient Currently in Pain: Denies       Orientation  Orientation  Overall Orientation Status: Within Functional Limits  Social/Functional History  Social/Functional History  Lives With: Spouse  Type of Home: (CONDO)  Home Layout: Two level, Able to Live on Main level with bedroom/bathroom(exercise room at basement)  Home Access: Stairs to enter without rails  Entrance Stairs - Number of Steps: 1 GEORGES; flight to lower level that he doesn't need to use right away  Bathroom Shower/Tub: Walk-in shower  Bathroom Toilet: Handicap height  Bathroom Equipment: Grab bars in shower  Home Equipment: Cane, 4 wheeled walker, Rolling walker, Reacher  ADL Assistance: Independent  Homemaking Assistance: Needs assistance(wife does most- he makes his own breakfast/lunch; once every 2 weeks cleaning lady)  Ambulation Assistance: Independent(short distances with a cane when out; no device in house but often furniture walks)  Transfer Assistance: Independent  Active : Yes  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 Normally exercises daily at home- rowing machine, weights  Cognition        Objective          AROM RLE (degrees)  RLE AROM: WFL  AROM LLE (degrees)  LLE AROM : WFL  LLE General AROM: lacks full knee extension  Strength RLE  Strength RLE: WFL  Strength LLE  Strength LLE: WFL        Bed mobility  Supine to Sit: Stand by assistance(HOB elev)  Transfers  Sit to Stand: Minimal Assistance; Moderate Assistance;Contact guard assistance(CGA from chair; Min/Mod A from toilet 2x)  Stand to sit: Contact guard assistance(cues for hand placement)  Bed to Chair: Minimal assistance; Moderate assistance(MIn-Mod A stand pivot bed>chair)  Ambulation  Ambulation?: Yes  More Ambulation?: Yes  Ambulation 1  Surface: level tile  Device: Rolling Walker  Assistance: Contact guard assistance; Moderate assistance  Quality of Gait: flexed knees, flexed posture overall; occasionally knees will give out requiring mod A to help pt maintain balance  Gait Deviations: Decreased step length;Decreased step height  Distance: 20', 10' x 3, 50'  Comments: Pt reports his knees giving out is fairly new & thinks it might be related to his neuropathy. Ambulation 2  Surface - 2: level tile  Device 2: No device  Assistance 2: Minimal assistance; Moderate assistance  Quality of Gait 2: wide CHRIS; knees giving out & shaking  Distance: 3' bed>chair  Stairs/Curb  Stairs?: No     Balance  Sitting - Static: Good(independent sitting EOB)    Treatment included gait/transfer training, pt education.       Plan   Plan  Times per week: 2-5  Current Treatment Recommendations: Balance Training, Functional Mobility Training, Transfer Training, Gait Training, Stair training, Strengthening  Safety Devices  Type of devices: Call light within reach, Chair alarm in place, Nurse notified, Left in chair                                                      AM-PAC Score  AM-PAC Inpatient Mobility Raw Score : 15 (11/19/20 1242)  AM-PAC Inpatient T-Scale Score : 39.45 (11/19/20 1242)  Mobility Inpatient CMS 0-100% Score: 57.7 (11/19/20 1242)  Mobility Inpatient CMS G-Code Modifier : CK (11/19/20 1242)          Goals  Short term goals  Time Frame for Short term goals: dc  Short term goal 1: Transfers S from all surfaces  Short term goal 2: Ambulate [de-identified]' with RW S  Short term goal 3: up/down 1 step CGA  Patient Goals   Patient goals : prefers to go home but willing to try SNF if that is felt to be best       Therapy Time   Individual Concurrent Group Co-treatment   Time In 0915         Time Out 1020         Minutes 65           Timed Code Treatment Minutes:   40    Total Treatment Minutes:  69 Leilani Dennis, [facility]

## 2020-11-19 NOTE — CONSULTS
Clinical Pharmacy Consult Note    Admit date: 11/18/2020    Subjective/Objective:  Pharmacy is consulted to dose warfarin per Dr. Angela Vaz anticoagulation regimen:  2.5mg daily      Date INR Warfarin Dose   11/18 2.38                       Recent Labs     11/18/20  1258 11/18/20  1359   * 137   K 5.6* 3.9   CL 97* 98*   CO2 28 29   BUN 34* 31*   CREATININE 1.2 1.3   GLUCOSE 95 86       CrCl cannot be calculated (Unknown ideal weight.). Lab Results   Component Value Date    WBC 8.8 11/18/2020    HGB 13.0 (L) 11/18/2020    HCT 40.9 11/18/2020    MCV 91.1 11/18/2020     (L) 11/18/2020       Lab Results   Component Value Date    PROTIME 27.9 (H) 11/18/2020    INR 2.38 (H) 11/18/2020       Height:  5' 3.5\" (161.3 cm)  Weight:  107 lb (48.5 kg)        Assessment/Plan:  1. Anticoagulation:  Aortic valve   · 2-3  · 2.5mg x1  · Medication profile reviewed for potential drug interactions. No significant drug interactions with warfarin noted. · Daily INR will be monitored and dose adjustments made as needed. Please call with any questions. Thanks for consulting pharmacy!   Campbell Jacobs, PharmDAVIDE   11/18/2020 10:30 PM

## 2020-11-19 NOTE — CONSULTS
Consulted to dose Warfarin by Dr. Clementina Noel - see pharmacy progress note from [facility] (pharmacy student) with my attestation re: plan for today.   Please call with questions--  Thanks--  Gary Ansari, PharmD, [place], [facility]  [phone] or [id number] (\[facility] [place] [facility]\"")   11/19/2020 2:55 PM

## 2020-11-19 NOTE — PLAN OF CARE
Problem: Falls - Risk of:  Goal: Will remain free from falls  Description: Will remain free from falls  Outcome: Ongoing  Note: Patient in chair with chair alarm on. Patient has call light and bedside table next to him. Patient denies any pain or needs at this time. Will continue to monitor.

## 2020-11-19 NOTE — PROGRESS NOTES
Clinical Pharmacy Consult Note    Admit date: 11/18/2020    Subjective/Objective:   81 yo M w/ a pmhx of aortic valve replacement and esophageal adenocarcinoma w/ esophagectomy and gastrectomy followed by chemo & radiation tx. Presented to the ED after having 4 episodes of vomiting, the first one at 5 AM on 11/17associated w/ nausea and muscle weakness. Pt states he fell on his way to the bathroom and was too weak to get back into bed. He also noted some dull epigastric pain associated with his symptoms. He has had only liquids to eat/drink since his symptoms began. Pharmacy is consulted to dose warfarin per Dr. Luis Galo anticoagulation regimen:  2.5mg daily      Date INR Warfarin Dose   11/18 2.38 2.5 mg   11/19 2.42 2.5 mg                  Recent Labs     11/18/20  1359 11/19/20  0518    138   K 3.9 4.0   CL 98* 103   CO2 29 27   BUN 31* 27*   CREATININE 1.3 1.1   GLUCOSE 86 82       CrCl cannot be calculated (Unknown ideal weight.). Lab Results   Component Value Date    WBC 5.9 11/19/2020    HGB 11.5 (L) 11/19/2020    HCT 35.3 (L) 11/19/2020    MCV 89.3 11/19/2020    PLT 91 (L) 11/19/2020       Lab Results   Component Value Date    PROTIME 28.3 (H) 11/19/2020    INR 2.42 (H) 11/19/2020       Height:  5' 3.5\" (161.3 cm)  Weight:  107 lb (48.5 kg)        Assessment/Plan:  1. Anticoagulation:  Aortic valve disorder  · INR goal: 2-3  · Continue home dose of 2.5 mg   · INR today and yesterday in goal range  · Status improving- no nausea or vomiting and has appetite   · Medication profile reviewed for potential drug interactions. No significant drug interactions with warfarin noted. · Daily INR will be monitored and dose adjustments made as needed. · At discharge, recommend continuing home dose of 2.5 mg daily. Recommend INR be checked on Monday at [facility]. Please call with any questions. Thanks for consulting pharmacy!   Nael Beck  PharmD Candidate 2021 11/19/2020 9:09 AM

## 2020-11-19 NOTE — PROGRESS NOTES
Pt resting comfortably overnight, denies pain. VSS, afib on telemetry monitoring. A/o x4. Pt w x1 large soft, formed BM overnight. Voiding freely. Pt tolerating clear liquid diet. Denies nausea/emesis. Pt w IVF infusing. Resting comfortably w eyes closed. Bed remains in lowest position, call light within reach. All needs met.  Will cont to monitor

## 2020-11-19 NOTE — PROGRESS NOTES
.4 Eyes Admission Assessment     I agree as the admission nurse that 2 RN's have performed a thorough Head to Toe Skin Assessment on the patient. ALL assessment sites listed below have been assessed on admission. Areas assessed by both nurses:   [x]   Head, Face, and Ears   [x]   Shoulders, Back, and Chest  [x]   Arms, Elbows, and Hands   [x]   Coccyx, Sacrum, and Ischium (blanchable redness to coccyx, skin tear noted on center of coccyx)  [x]   Legs, Feet, and Heels        Does the Patient have Skin Breakdown?   Yes        Hunter Prevention initiated:  Yes   Wound Care Orders initiated:  Yes      [address]  nurse consulted for Pressure Injury (Stage 3,4, Unstageable, DTI, NWPT, and Complex wounds) or Hunter score 18 or lower:  NA      Nurse 1 eSignature: Electronically signed by Norma Nieves RN on 11/18/20 at 11:55 PM EST    SHARE this note so that the co-signing nurse is able to place an eSignature    Nurse 2 eSignature: Electronically signed by Anna So RN on 11/19/20 at 6:07 AM EST

## 2020-11-19 NOTE — CARE COORDINATION
Case Management Assessment            Discharge Note                    Date / Time of Note: 11/19/2020 11:25 AM                  Discharge Note Completed by: Gucci Virk    Patient Name: Sujey Quevedo MD   YOB: 1935  Diagnosis: Abdominal pain [R10.9]   Date / Time: 11/18/2020 12:20 PM    Current PCP: Michael Mendez MD  Clinic patient: No    Hospitalization in the last 30 days: No    Advance Directives:  Code Status: Full Code  [place] DNR form completed and on chart: Not Indicated    Financial:  Payor: Calista Sorenson / Plan: Andrew Weston PPO / Product Type: Medicare /      Pharmacy:    [facility] [address], [place]  [place]  [address] [id number]  Phone: [phone] Fax: 190 [facility], 4 [facility] [phone] Richie Stokes [phone]  6 [facility]  [address] [place] [address]  Phone: [phone] Fax: [phone]      Assistance purchasing medications?: Potential Assistance Purchasing Medications: No  Assistance provided by Case Management: None at this time    Does patient want to participate in local refill/ meds to beds program?: Yes    Meds To Beds General Rules:  1. Can ONLY be done Monday- Friday between 8:30am-5pm  2. Prescription(s) must be in pharmacy by 3pm to be filled same day  3. Copy of patient's insurance/ prescription drug card and patient face sheet must be sent along with the prescription(s)  4. Cost of Rx cannot be added to hospital bill. If financial assistance is needed, please contact unit  or ;  or  CANNOT provide pharmacy voucher for patients co-pays  5.  Patients can then  the prescription on their way out of the hospital at discharge, or pharmacy can deliver to the bedside if staff is available. (payment due at time of pick-up or delivery - cash, check, or card accepted)     Able to afford home medications/ co-pay costs: Yes    ADLS:  Current PT AM-PAC Score:   /24  Current OT AM-PAC Score:   /24      DISCHARGE Disposition: [place] (SNF): [facility]. Phone: [phone] Fax: [phone]    LOC at discharge: Skilled  [address] Completed: Yes    Notification completed in HENS/PAS?:  Yes : CM has completed HENS online through secure website [facility] Document ID #: [id number]    IMM Completed:   NA    Transportation:  Transportation PLAN for discharge: EMS transportation   Mode of Transport: Ambulance stretcher - BLS  Reason for medical transport: Other: [place] Deconditioned, high fall risk  Name of [address]: [address]  Phone: [phone]  Time of Transport: 330pm    Transport form completed: Yes    Home Care: Additional CM Notes: PT from home with wife, will DC to [facility] for SNF, HENS completed #[id number], COVID r/o, First care to transport at [address] [place] [address] for Transition of Care is related to the following treatment goals of Abdominal pain [R10.9]    The Patient and/or patient representative [place] and his family were provided with a choice of provider and agrees with the discharge plan Yes    Freedom of choice list was provided with basic dialogue that supports the patient's individualized plan of care/goals and shares the quality data associated with the providers.  Yes    Care Transitions patient: Yes    Maria E Avila RN  [facility] [email or web address]  Case Management Department  Ph: [phone]  Fax: [phone]

## 2020-11-23 ENCOUNTER — TELEPHONE (OUTPATIENT)
Dept: INTERNAL MEDICINE CLINIC | Age: 85
End: 2020-11-23

## 2020-11-23 ENCOUNTER — ANTI-COAG VISIT (OUTPATIENT)
Dept: INTERNAL MEDICINE CLINIC | Age: 85
End: 2020-11-23

## 2020-11-23 ENCOUNTER — TELEPHONE (OUTPATIENT)
Dept: SURGERY | Age: 85
End: 2020-11-23

## 2020-11-23 LAB — INR BLD: 3

## 2020-11-23 NOTE — TELEPHONE ENCOUNTER
Pt was admitted to [facility] on 11.18.20 for abdominal pain  Pt was discharged to [facility]  Pt's wife, Jose Martin Tate, said pt is doing really well and would like to come home  She is not sure how to get him home? Does our office need to call the care center?     Pt has f/u with Dr Elva Stanton on 12.4    Please call Andreea Roy at: [phone]  Or Cell: [phone]

## 2020-11-23 NOTE — TELEPHONE ENCOUNTER
----- Message from Cristino Emmaunels sent at 11/21/2020 10:33 AM EST -----  Subject: Message to Provider    QUESTIONS  Information for Provider? Admitted to [facility] on 11/   then he was transferred to nursing home 11/20/20. pt. not getting PT in   nursing home. patient wants to know if he would be better off at home   where he can get PT.  ---------------------------------------------------------------------------  --------------  [address]  What is the best way for the office to contact you? OK to leave message on   voicemail  Preferred Call Back Phone Number? [phone]  ---------------------------------------------------------------------------  --------------  SCRIPT ANSWERS  Relationship to Patient?  Self

## 2020-11-24 ENCOUNTER — PATIENT MESSAGE (OUTPATIENT)
Dept: INTERNAL MEDICINE CLINIC | Age: 85
End: 2020-11-24

## 2020-11-24 NOTE — TELEPHONE ENCOUNTER
Call returned to patient. Why are you not receiving physical therapy at nursing facility? Yes you would be better off at home where you could receive physical therapy/    Message left for the patient.

## 2020-11-30 ENCOUNTER — TELEPHONE (OUTPATIENT)
Dept: INTERNAL MEDICINE CLINIC | Age: 85
End: 2020-11-30

## 2020-11-30 RX ORDER — PHYTONADIONE 5 MG/1
5 TABLET ORAL ONCE
Qty: 1 TABLET | Refills: 0 | Status: ON HOLD | OUTPATIENT
Start: 2020-11-30 | End: 2021-02-18 | Stop reason: HOSPADM

## 2020-11-30 NOTE — TELEPHONE ENCOUNTER
Prescription Question     From   Lajune Dubin, MD  To   [facility] [address]   11/24/2020  4:01 PM    I'm in a re-hab place.  My INR is 4.7, FAR too long.  I will go home on Thursday morning.  Can you write a script for vitamin K at [facility] ([phone])?  I will check my INR daily at home.

## 2020-12-07 ENCOUNTER — ANTI-COAG VISIT (OUTPATIENT)
Dept: INTERNAL MEDICINE CLINIC | Age: 85
End: 2020-12-07

## 2020-12-07 LAB — INR BLD: 1.8

## 2020-12-07 NOTE — DISCHARGE SUMMARY
Physician Discharge Summary     Patient ID:  John Alvarenga MD  [id number]  82 y.o.  1935    Admit date: 11/18/2020    Discharge date and time: 11/19/2020  4:54 PM     Admitting Physician: Rita Salazar MD     Discharge Physician: same    Admission Diagnoses: Abdominal pain [R10.9]    Discharge Diagnoses: same    Admission Condition: fair    Discharged Condition: good    Indication for Admission: weakness    Hospital Course: 81 y/o gentlemen admitted with the c/o abdominal pain. The patient has a history of esophageal adenocarcinoma s/o esophagectomy/chemoradiation and aortic valve replacement on AC. He presented to an outside hospital with the above complaints where a CT was obtained. It was concerning for Sbo. The patient receives his care here and decided to come to the ER. It was attempted to treat this patient as an outpatient. Unfortunately, he became very weak in the ER. Attempted to ambulate to the bathroom by himself and was found to be unable to ambulate self. He was admitted for possible placement. Admit day # 1 - PT/OT evaluated patient and was given a score of 15/24. Case management interviewed patient to determine where he would like to be placed. The patient decided on [place] ([facility]. for SNF. The patient was discharged without difficulty. He understands he will need a follow up appointment with Dr. Stone Campbell in 2 weeks    Consults: none        Treatments: IV hydration    Discharge Exam:  Exam:/70   Pulse 60   Temp 98 °F (36.7 °C) (Oral)   Resp 16   Ht 5' 3.5\" (1.613 m)   Wt 107 lb (48.5 kg)   SpO2 95%   BMI 18.66 kg/m²   General appearance: alert, appears stated age and cooperative  Lungs: clear to auscultation bilaterally  Heart: regular rate and rhythm, S1, S2  Abdomen: soft,  nontender; bowel sounds present    Disposition: SNF    In process/preliminary results:  Outstanding Order Results     No orders found from 10/20/2020 to 11/19/2020.           Patient Instructions:   Discharge Medication List as of 11/19/2020 12:24 PM      CONTINUE these medications which have NOT CHANGED    Details   omeprazole (PRILOSEC) 40 MG delayed release capsule TAKE ONE CAPSULE BY MOUTH DAILY, Disp-30 capsule,R-2Normal      Alpha Lipoic Acid-Biotin 300-333 MG-MCG CAPS 300 mgHistorical Med      calcium carbonate (OSCAL) 500 MG TABS tablet Take 500 mg by mouth dailyHistorical Med      atorvastatin (LIPITOR) 10 MG tablet TAKE 1 TABLET BY MOUTH DAILY, Disp-90 tablet,R-2Normal      !! warfarin (COUMADIN) 5 MG tablet TAKE ONE TABLET BY MOUTH ONCE NIGHTLY, Disp-90 tablet, R-1Normal      Magnesium 65 MG TABS Take by mouth      Coenzyme Q10 (COQ10) 400 MG CAPS Take 1 capsule by mouth      Simethicone 80 MG TABS Take 80 tablets by mouth 3 times daily. , Disp-90 each, R-0      !! warfarin (COUMADIN) 1 MG tablet Take 1 mg by mouth.      sildenafil (VIAGRA) 50 MG tablet Take 50 mg by mouth as needed for Erectile Dysfunction. vitamin B-12 (CYANOCOBALAMIN) 1000 MCG tablet Take 1,000 mcg by mouth daily. docusate sodium (COLACE) 100 MG capsule Take 100 mg by mouth daily. One  Capsule daily      polyethylene glycol (GLYCOLAX) powder Take 17 g by mouth 2 times daily        ! ! - Potential duplicate medications found. Please discuss with provider. STOP taking these medications       amoxicillin (AMOXIL) 500 MG capsule Comments:   Reason for Stopping:             Activity: Patient continues with daily PT/OT 3 x a week for endurence/strength  Diet: regular diet  Wound Care: none needed    Follow-up with Dr. Wandy Watson in 2 weeks. Signed:      ALLA Manzanares NP-C  [phone]  Resident Support Staff    12/7/2020  1:04 PM

## 2020-12-08 ENCOUNTER — TELEMEDICINE (OUTPATIENT)
Dept: SURGERY | Age: 85
End: 2020-12-08

## 2020-12-08 ENCOUNTER — VIRTUAL VISIT (OUTPATIENT)
Dept: SURGERY | Age: 85
End: 2020-12-08
Payer: MEDICARE

## 2020-12-08 DIAGNOSIS — K56.609 SBO (SMALL BOWEL OBSTRUCTION) (HCC): Primary | ICD-10-CM

## 2020-12-08 DIAGNOSIS — K56.609 SMALL BOWEL OBSTRUCTION (HCC): Primary | ICD-10-CM

## 2020-12-08 PROCEDURE — 99441 PR PHYS/QHP TELEPHONE EVALUATION 5-10 MIN: CPT | Performed by: SURGERY

## 2020-12-10 ENCOUNTER — TELEPHONE (OUTPATIENT)
Dept: INTERNAL MEDICINE CLINIC | Age: 85
End: 2020-12-10

## 2020-12-10 NOTE — TELEPHONE ENCOUNTER
Trupti Denton with [facility] called stating patient is requesting discharge from home care, as of today. She states he is doing fine and there is no real problems.     Please call to advise

## 2020-12-11 ENCOUNTER — ANTI-COAG VISIT (OUTPATIENT)
Dept: INTERNAL MEDICINE CLINIC | Age: 85
End: 2020-12-11

## 2020-12-11 LAB — INR BLD: 2.1

## 2020-12-18 ENCOUNTER — OFFICE VISIT (OUTPATIENT)
Dept: INTERNAL MEDICINE CLINIC | Age: 85
End: 2020-12-18
Payer: MEDICARE

## 2020-12-18 VITALS
SYSTOLIC BLOOD PRESSURE: 120 MMHG | DIASTOLIC BLOOD PRESSURE: 70 MMHG | BODY MASS INDEX: 19.18 KG/M2 | WEIGHT: 110 LBS | TEMPERATURE: 97.3 F

## 2020-12-18 DIAGNOSIS — E03.9 HYPOTHYROIDISM, UNSPECIFIED TYPE: ICD-10-CM

## 2020-12-18 DIAGNOSIS — R10.13 EPIGASTRIC PAIN: ICD-10-CM

## 2020-12-18 PROCEDURE — 99213 OFFICE O/P EST LOW 20 MIN: CPT | Performed by: INTERNAL MEDICINE

## 2020-12-18 NOTE — PROGRESS NOTES
Outpatient Clinic Established Patient Note    Patient: Bernard Whalen MD  : 1935 (10 y.o.)  Date: 2020    CC: Follow-up hospitalization    HPI:      Follow-up of his intermittent bowel obstructions none MiraLAX twice daily and feels much better    Home Meds:  Prior to Visit Medications    Medication Sig Taking? Authorizing Provider   phytonadione (VITAMIN K) 5 MG tablet Take 1 tablet by mouth once for 1 dose  Naun Pendleton MD   omeprazole (PRILOSEC) 40 MG delayed release capsule TAKE ONE CAPSULE BY MOUTH DAILY  Naun Pendleton MD   Alpha Lipoic Acid-Biotin 300-333 MG-MCG CAPS 300 mg  Historical Provider, MD   calcium carbonate (OSCAL) 500 MG TABS tablet Take 500 mg by mouth daily  Historical Provider, MD   atorvastatin (LIPITOR) 10 MG tablet TAKE 1 TABLET BY MOUTH DAILY  Naun Pendleton MD   warfarin (COUMADIN) 5 MG tablet TAKE ONE TABLET BY MOUTH ONCE NIGHTLY  Naun Pendleton MD   Magnesium 65 MG TABS Take by mouth  Historical Provider, MD   Coenzyme Q10 (COQ10) 400 MG CAPS Take 1 capsule by mouth  Historical Provider, MD   Simethicone 80 MG TABS Take 80 tablets by mouth 3 times daily. Patient taking differently: Take 125 mg by mouth 3 times daily. JS Foley - CNS   warfarin (COUMADIN) 1 MG tablet Take 1 mg by mouth. Historical Provider, MD   sildenafil (VIAGRA) 50 MG tablet Take 50 mg by mouth as needed for Erectile Dysfunction. Historical Provider, MD   vitamin B-12 (CYANOCOBALAMIN) 1000 MCG tablet Take 1,000 mcg by mouth daily. Historical Provider, MD   docusate sodium (COLACE) 100 MG capsule Take 100 mg by mouth daily. One  Capsule daily  Historical Provider, MD   polyethylene glycol (GLYCOLAX) powder Take 17 g by mouth 2 times daily   Historical Provider, MD       Allergies:    No known allergies    There are no preventive care reminders to display for this patient.     Immunization History   Administered Date(s) Administered    Influenza 10/01/2009    Influenza Virus Vaccine 10/28/2010, 11/08/2011    Influenza, High Dose (Fluzone 65 yrs and older) 10/18/2012, 10/14/2013, 11/06/2014, 10/14/2015, 09/16/2016, 10/25/2017, 09/11/2018    Influenza, Quadv, adjuvanted, 65 yrs +, IM, PF (Fluad) 09/10/2020    Influenza, Triv, inactivated, subunit, adjuvanted, IM (Fluad 65 yrs and older) 09/16/2019    Pneumococcal Conjugate 13-valent ([id number]) 07/07/2015    Pneumococcal Polysaccharide ([id number]) 01/01/2006, 01/01/2007, 10/20/2013    Tdap (Boostrix, Adacel) 05/12/2014    Zoster Live (Zostavax) 09/20/2013    Zoster Recombinant (Shingrix) 07/06/2018, 09/11/2018       Review of Systems  A 10-organ Review Of Systems was obtained and otherwise unremarkable except as per HPI. Data: Old records have been reviewed electronically. Past Medical History:    Past Medical History:   Diagnosis Date    Anemia     Aortic valve disorders     stenosis    Aspiration pneumonia ([address] 4/27/2015    Erectile dysfunction     Esophageal cancer ([facility] 10/18/2012    Hernia, femoral, bilateral 5/12/2014    Hyperlipidemia     Osteoporosis, senile 5/20/2014    Pancreatitis 8/1/2013    Patient underweight 8/8/2013    Prostate cancer ([facility]     Unspecified essential hypertension        Past Surgical History:  Past Surgical History:   Procedure Laterality Date    APPENDECTOMY      as a child    BONE GRAFT      for saddle nose    CARDIAC VALVE REPLACEMENT  2006    aorta    CHOLECYSTECTOMY      COLONOSCOPY  11/2009    COLONOSCOPY  10/05/15    CORONARY ARTERY BYPASS GRAFT  2006    ESOPHAGUS SURGERY      EYE SURGERY  1990& 1992    cataract bilat removal     GASTRECTOMY      PROSTATE SURGERY      seeds    TONSILLECTOMY      US PROSTATE/TRANSRECTAL VOL STUDY BRACHYTHERAPY         Home Meds:  Prior to Visit Medications    Medication Sig Taking?  Authorizing Provider   phytonadione (VITAMIN K) 5 MG tablet Take 1 tablet by mouth once for 1 dose  Santino Thomas MD   omeprazole (PRILOSEC) 40 MG delayed release capsule TAKE ONE CAPSULE BY MOUTH DAILY  Beverlee Gowers, MD   Alpha Lipoic Acid-Biotin 300-333 MG-MCG CAPS 300 mg  Historical Provider, MD   calcium carbonate (OSCAL) 500 MG TABS tablet Take 500 mg by mouth daily  Historical Provider, MD   atorvastatin (LIPITOR) 10 MG tablet TAKE 1 TABLET BY MOUTH DAILY  Beverlee Gowers, MD   warfarin (COUMADIN) 5 MG tablet TAKE ONE TABLET BY MOUTH ONCE NIGHTLY  Beverlee Gowers, MD   Magnesium 65 MG TABS Take by mouth  Historical Provider, MD   Coenzyme Q10 (COQ10) 400 MG CAPS Take 1 capsule by mouth  Historical Provider, MD   Simethicone 80 MG TABS Take 80 tablets by mouth 3 times daily. Patient taking differently: Take 125 mg by mouth 3 times daily. JS Paniagua - CNS   warfarin (COUMADIN) 1 MG tablet Take 1 mg by mouth. Historical Provider, MD   sildenafil (VIAGRA) 50 MG tablet Take 50 mg by mouth as needed for Erectile Dysfunction. Historical Provider, MD   vitamin B-12 (CYANOCOBALAMIN) 1000 MCG tablet Take 1,000 mcg by mouth daily. Historical Provider, MD   docusate sodium (COLACE) 100 MG capsule Take 100 mg by mouth daily. One  Capsule daily  Historical Provider, MD   polyethylene glycol (GLYCOLAX) powder Take 17 g by mouth 2 times daily   Historical Provider, MD       Allergies:    No known allergies    Family History:       Problem Relation Age of Onset    Other Mother         bleeding peptic ulcer    Heart Disease Mother     Other Father         cardiovascular disease    Stroke Father     Elevated Lipids Father     Hypertension Father          PHYSICAL EXAM:  There were no vitals taken for this visit.   Physical Exam  HEENT negative  Lungs clear decreased breath sounds at the base  Heart exam grade 1/6 systolic ejection murmur  Abdomen soft without tenderness  Assessment & Plan:    Stable postop we will get blood studies and follow-up in 3 months if labs okay  Problem List Items Addressed This Visit     None          Follow-up 3 months    There are no Patient Instructions on file for this visit.      _______________  Esme Morocho MD, 12/18/2020 2:54 PM

## 2020-12-19 LAB
A/G RATIO: 1.4 (ref 1.1–2.2)
ALBUMIN SERPL-MCNC: 4.1 G/DL (ref 3.4–5)
ALP BLD-CCNC: 77 U/L (ref 40–129)
ALT SERPL-CCNC: 13 U/L (ref 10–40)
ANION GAP SERPL CALCULATED.3IONS-SCNC: 13 MMOL/L (ref 3–16)
AST SERPL-CCNC: 20 U/L (ref 15–37)
BASOPHILS ABSOLUTE: 0 K/UL (ref 0–0.2)
BASOPHILS RELATIVE PERCENT: 0.6 %
BILIRUB SERPL-MCNC: 0.4 MG/DL (ref 0–1)
BUN BLDV-MCNC: 36 MG/DL (ref 7–20)
CALCIUM SERPL-MCNC: 9.2 MG/DL (ref 8.3–10.6)
CHLORIDE BLD-SCNC: 101 MMOL/L (ref 99–110)
CO2: 28 MMOL/L (ref 21–32)
CREAT SERPL-MCNC: 1.1 MG/DL (ref 0.8–1.3)
EOSINOPHILS ABSOLUTE: 0 K/UL (ref 0–0.6)
EOSINOPHILS RELATIVE PERCENT: 0.5 %
GFR AFRICAN AMERICAN: >60
GFR NON-AFRICAN AMERICAN: >60
GLOBULIN: 3 G/DL
GLUCOSE BLD-MCNC: 99 MG/DL (ref 70–99)
HCT VFR BLD CALC: 35 % (ref 40.5–52.5)
HEMOGLOBIN: 11.5 G/DL (ref 13.5–17.5)
LYMPHOCYTES ABSOLUTE: 1.5 K/UL (ref 1–5.1)
LYMPHOCYTES RELATIVE PERCENT: 19.5 %
MCH RBC QN AUTO: 29 PG (ref 26–34)
MCHC RBC AUTO-ENTMCNC: 32.8 G/DL (ref 31–36)
MCV RBC AUTO: 88.5 FL (ref 80–100)
MONOCYTES ABSOLUTE: 0.9 K/UL (ref 0–1.3)
MONOCYTES RELATIVE PERCENT: 11.2 %
NEUTROPHILS ABSOLUTE: 5.4 K/UL (ref 1.7–7.7)
NEUTROPHILS RELATIVE PERCENT: 68.2 %
PDW BLD-RTO: 14.7 % (ref 12.4–15.4)
PLATELET # BLD: 127 K/UL (ref 135–450)
PMV BLD AUTO: 10.4 FL (ref 5–10.5)
POTASSIUM SERPL-SCNC: 4.4 MMOL/L (ref 3.5–5.1)
RBC # BLD: 3.96 M/UL (ref 4.2–5.9)
SODIUM BLD-SCNC: 142 MMOL/L (ref 136–145)
TOTAL PROTEIN: 7.1 G/DL (ref 6.4–8.2)
TSH SERPL DL<=0.05 MIU/L-ACNC: 3.33 UIU/ML (ref 0.27–4.2)
WBC # BLD: 7.9 K/UL (ref 4–11)

## 2020-12-29 RX ORDER — ATORVASTATIN CALCIUM 10 MG/1
TABLET, FILM COATED ORAL
Qty: 90 TABLET | Refills: 3 | Status: SHIPPED | OUTPATIENT
Start: 2020-12-29 | End: 2020-12-30

## 2020-12-30 RX ORDER — ATORVASTATIN CALCIUM 10 MG/1
TABLET, FILM COATED ORAL
Qty: 90 TABLET | Refills: 0 | Status: SHIPPED | OUTPATIENT
Start: 2020-12-30 | End: 2022-01-03 | Stop reason: SDUPTHER

## 2021-01-01 LAB — INR BLD: 2.8

## 2021-01-03 NOTE — PROGRESS NOTES
TELEHEALTH EVALUATION -- Audio/Visual (During - public health emergency)    HPI:    Porter Jain MD (:  1935) has requested an audio/video evaluation for the following concern(s): hospital follow up    Couldn't connect on DOXY due to technical issues. Visit performed over phone. No other complaints. Review of systems is otherwise negative    Past medical history, Past surgical history, Social history, Family history and allergies reviewed and updated. O/E:   Constitutional: Patient is oriented to person, place, and time. No distress. Pulmonary/Chest: Effort normal. No respiratory distress. No audible additional breath sounds. Neurological: Grossly intact motor and sensory systems on limited exam  Psychiatric: He has a normal mood and affect. His behavior is normal. Judgment and thought content normal.     ASSESSMENT/PLAN:     Diagnosis Orders   1. SBO (small bowel obstruction) ([facility]       I discussed with the Porter Jain MD about the diagnosis and management options. Based on the available information patient appears to be doing well from his recent hospitalization for bowel obstruction. I explained him about diet modification and symptoms to watch for. All the questions of the patient are answered to his apparent satisfaction. Patient verbalized understanding of the management plan. Porter Jain MD is a 82 y.o. male being evaluated by a Virtual Visit encounter to address concerns as mentioned above. A caregiver was present when appropriate. Due to this being a TeleHealth encounter (During - public health emergency), evaluation of the some organ systems was limited. This Virtual Visit was conducted with patient's (and/or legal guardian's) consent. The patient (and/or legal guardian) has also been advised to contact this office for worsening conditions or problems, and seek emergency medical treatment and/or call 911 if deemed necessary.      An electronic signature was used to authenticate this note.

## 2021-01-04 ENCOUNTER — ANTI-COAG VISIT (OUTPATIENT)
Dept: INTERNAL MEDICINE CLINIC | Age: 86
End: 2021-01-04

## 2021-01-04 DIAGNOSIS — I35.9 AORTIC VALVE DISORDER: ICD-10-CM

## 2021-01-12 ENCOUNTER — ANTI-COAG VISIT (OUTPATIENT)
Dept: INTERNAL MEDICINE CLINIC | Age: 86
End: 2021-01-12

## 2021-01-12 DIAGNOSIS — I35.9 AORTIC VALVE DISORDER: ICD-10-CM

## 2021-01-12 LAB — INR BLD: 2

## 2021-01-15 ENCOUNTER — ANTI-COAG VISIT (OUTPATIENT)
Dept: INTERNAL MEDICINE CLINIC | Age: 86
End: 2021-01-15

## 2021-01-15 DIAGNOSIS — I35.9 AORTIC VALVE DISORDER: ICD-10-CM

## 2021-01-15 LAB — INR BLD: 2.5

## 2021-02-07 NOTE — PROGRESS NOTES
Unable to connect over virtual video platform. Called over phone. Discussed at length. Feeling well. No symptoms of obstruction. Symptoms that need attention discussed.

## 2021-02-08 ENCOUNTER — ANTI-COAG VISIT (OUTPATIENT)
Dept: INTERNAL MEDICINE CLINIC | Age: 86
End: 2021-02-08

## 2021-02-08 DIAGNOSIS — I35.9 AORTIC VALVE DISORDER: ICD-10-CM

## 2021-02-08 LAB — INR BLD: 2.8

## 2021-02-08 RX ORDER — OMEPRAZOLE 40 MG/1
CAPSULE, DELAYED RELEASE ORAL
Qty: 30 CAPSULE | Refills: 3 | Status: SHIPPED | OUTPATIENT
Start: 2021-02-08 | End: 2021-05-10

## 2021-02-10 ENCOUNTER — TELEPHONE (OUTPATIENT)
Dept: INTERNAL MEDICINE CLINIC | Age: 86
End: 2021-02-10

## 2021-02-10 DIAGNOSIS — E03.9 HYPOTHYROIDISM, UNSPECIFIED TYPE: Primary | ICD-10-CM

## 2021-02-10 DIAGNOSIS — N18.30 STAGE 3 CHRONIC KIDNEY DISEASE, UNSPECIFIED WHETHER STAGE 3A OR 3B CKD (HCC): ICD-10-CM

## 2021-02-10 DIAGNOSIS — N18.9 ANEMIA ASSOCIATED WITH CHRONIC RENAL FAILURE: ICD-10-CM

## 2021-02-10 DIAGNOSIS — D63.1 ANEMIA ASSOCIATED WITH CHRONIC RENAL FAILURE: ICD-10-CM

## 2021-02-10 RX ORDER — METHYLPREDNISOLONE SODIUM SUCCINATE 125 MG/2ML
125 INJECTION, POWDER, LYOPHILIZED, FOR SOLUTION INTRAMUSCULAR; INTRAVENOUS ONCE
Status: CANCELLED | OUTPATIENT
Start: 2021-02-10 | End: 2021-02-10

## 2021-02-10 RX ORDER — SODIUM CHLORIDE 9 MG/ML
INJECTION, SOLUTION INTRAVENOUS CONTINUOUS
Status: CANCELLED | OUTPATIENT
Start: 2021-02-10

## 2021-02-10 RX ORDER — DIPHENHYDRAMINE HYDROCHLORIDE 50 MG/ML
50 INJECTION INTRAMUSCULAR; INTRAVENOUS ONCE
Status: CANCELLED | OUTPATIENT
Start: 2021-02-10 | End: 2021-02-10

## 2021-02-10 RX ORDER — EPINEPHRINE 1 MG/ML
0.3 INJECTION, SOLUTION, CONCENTRATE INTRAVENOUS PRN
Status: CANCELLED | OUTPATIENT
Start: 2021-02-10

## 2021-02-10 NOTE — TELEPHONE ENCOUNTER
Non-Urgent Medical Question    From   Darnell Black MD \"[place]\" To   [facility] [address]   2/9/2021  6:10 PM   I need a Prolia appointment. Darlin Santana also want to get blood tests prior to my 2/26 appointment with Dr. Alex Nino include a CBC.  I think that I am anemic (very little energy and dark stools for over one month).      Thanks, Ok Sachin

## 2021-02-12 ENCOUNTER — ANTI-COAG VISIT (OUTPATIENT)
Dept: INTERNAL MEDICINE CLINIC | Age: 86
End: 2021-02-12

## 2021-02-12 DIAGNOSIS — I35.9 AORTIC VALVE DISORDER: ICD-10-CM

## 2021-02-12 LAB — INR BLD: 2.4

## 2021-02-15 DIAGNOSIS — E03.9 HYPOTHYROIDISM, UNSPECIFIED TYPE: ICD-10-CM

## 2021-02-15 DIAGNOSIS — N18.30 STAGE 3 CHRONIC KIDNEY DISEASE, UNSPECIFIED WHETHER STAGE 3A OR 3B CKD (HCC): ICD-10-CM

## 2021-02-15 DIAGNOSIS — N18.9 ANEMIA ASSOCIATED WITH CHRONIC RENAL FAILURE: ICD-10-CM

## 2021-02-15 DIAGNOSIS — D63.1 ANEMIA ASSOCIATED WITH CHRONIC RENAL FAILURE: ICD-10-CM

## 2021-02-16 ENCOUNTER — ANESTHESIA EVENT (OUTPATIENT)
Dept: ENDOSCOPY | Age: 86
DRG: 378 | End: 2021-02-16
Payer: MEDICARE

## 2021-02-16 ENCOUNTER — HOSPITAL ENCOUNTER (INPATIENT)
Age: 86
LOS: 2 days | Discharge: HOME | DRG: 378 | End: 2021-02-18
Attending: EMERGENCY MEDICINE | Admitting: EMERGENCY MEDICINE
Payer: MEDICARE

## 2021-02-16 ENCOUNTER — APPOINTMENT (OUTPATIENT)
Dept: GENERAL RADIOLOGY | Age: 86
DRG: 378 | End: 2021-02-16
Payer: MEDICARE

## 2021-02-16 ENCOUNTER — ANESTHESIA (OUTPATIENT)
Dept: ENDOSCOPY | Age: 86
DRG: 378 | End: 2021-02-16
Payer: MEDICARE

## 2021-02-16 VITALS — DIASTOLIC BLOOD PRESSURE: 71 MMHG | OXYGEN SATURATION: 100 % | TEMPERATURE: 96.8 F | SYSTOLIC BLOOD PRESSURE: 135 MMHG

## 2021-02-16 DIAGNOSIS — K92.2 UPPER GI BLEED: Primary | ICD-10-CM

## 2021-02-16 DIAGNOSIS — D64.9 ANEMIA, UNSPECIFIED TYPE: ICD-10-CM

## 2021-02-16 LAB
A/G RATIO: 1.2 (ref 1.1–2.2)
ABO/RH: NORMAL
ALBUMIN SERPL-MCNC: 3.8 G/DL (ref 3.4–5)
ALP BLD-CCNC: 51 U/L (ref 40–129)
ALT SERPL-CCNC: 11 U/L (ref 10–40)
ANION GAP SERPL CALCULATED.3IONS-SCNC: 13 MMOL/L (ref 3–16)
ANION GAP SERPL CALCULATED.3IONS-SCNC: 7 MMOL/L (ref 3–16)
ANTIBODY SCREEN: NORMAL
APTT: 49.3 SEC (ref 24.2–36.2)
AST SERPL-CCNC: 27 U/L (ref 15–37)
BASOPHILS ABSOLUTE: 0 K/UL (ref 0–0.2)
BASOPHILS ABSOLUTE: 0 K/UL (ref 0–0.2)
BASOPHILS ABSOLUTE: 0.1 K/UL (ref 0–0.2)
BASOPHILS RELATIVE PERCENT: 0.6 %
BASOPHILS RELATIVE PERCENT: 0.7 %
BASOPHILS RELATIVE PERCENT: 0.7 %
BILIRUB SERPL-MCNC: <0.2 MG/DL (ref 0–1)
BLOOD BANK DISPENSE STATUS: NORMAL
BLOOD BANK DISPENSE STATUS: NORMAL
BLOOD BANK PRODUCT CODE: NORMAL
BLOOD BANK PRODUCT CODE: NORMAL
BPU ID: NORMAL
BPU ID: NORMAL
BUN BLDV-MCNC: 42 MG/DL (ref 7–20)
BUN BLDV-MCNC: 44 MG/DL (ref 7–20)
CALCIUM SERPL-MCNC: 9 MG/DL (ref 8.3–10.6)
CALCIUM SERPL-MCNC: 9 MG/DL (ref 8.3–10.6)
CHLORIDE BLD-SCNC: 101 MMOL/L (ref 99–110)
CHLORIDE BLD-SCNC: 103 MMOL/L (ref 99–110)
CO2: 25 MMOL/L (ref 21–32)
CO2: 28 MMOL/L (ref 21–32)
CREAT SERPL-MCNC: 1.3 MG/DL (ref 0.8–1.3)
CREAT SERPL-MCNC: 1.4 MG/DL (ref 0.8–1.3)
DESCRIPTION BLOOD BANK: NORMAL
DESCRIPTION BLOOD BANK: NORMAL
EOSINOPHILS ABSOLUTE: 0 K/UL (ref 0–0.6)
EOSINOPHILS ABSOLUTE: 0.1 K/UL (ref 0–0.6)
EOSINOPHILS ABSOLUTE: 0.1 K/UL (ref 0–0.6)
EOSINOPHILS RELATIVE PERCENT: 0.7 %
EOSINOPHILS RELATIVE PERCENT: 0.8 %
EOSINOPHILS RELATIVE PERCENT: 1.5 %
GFR AFRICAN AMERICAN: 58
GFR AFRICAN AMERICAN: >60
GFR NON-AFRICAN AMERICAN: 48
GFR NON-AFRICAN AMERICAN: 53
GLOBULIN: 3.1 G/DL
GLUCOSE BLD-MCNC: 109 MG/DL (ref 70–99)
GLUCOSE BLD-MCNC: 131 MG/DL (ref 70–99)
HCT VFR BLD CALC: 22.5 % (ref 40.5–52.5)
HCT VFR BLD CALC: 22.7 % (ref 40.5–52.5)
HCT VFR BLD CALC: 24.7 % (ref 40.5–52.5)
HEMOGLOBIN: 6.9 G/DL (ref 13.5–17.5)
HEMOGLOBIN: 7 G/DL (ref 13.5–17.5)
HEMOGLOBIN: 7.8 G/DL (ref 13.5–17.5)
INR BLD: 2.43 (ref 0.86–1.14)
IRON SATURATION: 5 % (ref 20–50)
IRON: 20 UG/DL (ref 59–158)
LYMPHOCYTES ABSOLUTE: 1.2 K/UL (ref 1–5.1)
LYMPHOCYTES ABSOLUTE: 1.7 K/UL (ref 1–5.1)
LYMPHOCYTES ABSOLUTE: 2.5 K/UL (ref 1–5.1)
LYMPHOCYTES RELATIVE PERCENT: 27.4 %
LYMPHOCYTES RELATIVE PERCENT: 29 %
LYMPHOCYTES RELATIVE PERCENT: 30.5 %
MCH RBC QN AUTO: 27.2 PG (ref 26–34)
MCH RBC QN AUTO: 27.2 PG (ref 26–34)
MCH RBC QN AUTO: 27.3 PG (ref 26–34)
MCHC RBC AUTO-ENTMCNC: 30.5 G/DL (ref 31–36)
MCHC RBC AUTO-ENTMCNC: 30.9 G/DL (ref 31–36)
MCHC RBC AUTO-ENTMCNC: 31.6 G/DL (ref 31–36)
MCV RBC AUTO: 86.3 FL (ref 80–100)
MCV RBC AUTO: 88.3 FL (ref 80–100)
MCV RBC AUTO: 89.2 FL (ref 80–100)
MONOCYTES ABSOLUTE: 0.4 K/UL (ref 0–1.3)
MONOCYTES ABSOLUTE: 0.5 K/UL (ref 0–1.3)
MONOCYTES ABSOLUTE: 0.6 K/UL (ref 0–1.3)
MONOCYTES RELATIVE PERCENT: 7.4 %
MONOCYTES RELATIVE PERCENT: 8.6 %
MONOCYTES RELATIVE PERCENT: 8.7 %
NEUTROPHILS ABSOLUTE: 2.8 K/UL (ref 1.7–7.7)
NEUTROPHILS ABSOLUTE: 3.6 K/UL (ref 1.7–7.7)
NEUTROPHILS ABSOLUTE: 5 K/UL (ref 1.7–7.7)
NEUTROPHILS RELATIVE PERCENT: 60.2 %
NEUTROPHILS RELATIVE PERCENT: 60.6 %
NEUTROPHILS RELATIVE PERCENT: 62.6 %
PDW BLD-RTO: 14.7 % (ref 12.4–15.4)
PDW BLD-RTO: 15.5 % (ref 12.4–15.4)
PDW BLD-RTO: 15.6 % (ref 12.4–15.4)
PLATELET # BLD: 141 K/UL (ref 135–450)
PLATELET # BLD: 201 K/UL (ref 135–450)
PLATELET # BLD: 203 K/UL (ref 135–450)
PMV BLD AUTO: 10.1 FL (ref 5–10.5)
PMV BLD AUTO: 9.3 FL (ref 5–10.5)
PMV BLD AUTO: 9.5 FL (ref 5–10.5)
POC OCCULT BLOOD STOOL: POSITIVE
POTASSIUM REFLEX MAGNESIUM: 4.2 MMOL/L (ref 3.5–5.1)
POTASSIUM SERPL-SCNC: 4.6 MMOL/L (ref 3.5–5.1)
PROTHROMBIN TIME: 28.5 SEC (ref 10–13.2)
RBC # BLD: 2.53 M/UL (ref 4.2–5.9)
RBC # BLD: 2.57 M/UL (ref 4.2–5.9)
RBC # BLD: 2.86 M/UL (ref 4.2–5.9)
SODIUM BLD-SCNC: 136 MMOL/L (ref 136–145)
SODIUM BLD-SCNC: 141 MMOL/L (ref 136–145)
TOTAL IRON BINDING CAPACITY: 391 UG/DL (ref 260–445)
TOTAL PROTEIN: 6.9 G/DL (ref 6.4–8.2)
WBC # BLD: 4.4 K/UL (ref 4–11)
WBC # BLD: 6 K/UL (ref 4–11)
WBC # BLD: 8.2 K/UL (ref 4–11)

## 2021-02-16 PROCEDURE — 3700000001 HC ADD 15 MINUTES (ANESTHESIA): Performed by: INTERNAL MEDICINE

## 2021-02-16 PROCEDURE — 85610 PROTHROMBIN TIME: CPT

## 2021-02-16 PROCEDURE — 7100000000 HC PACU RECOVERY - FIRST 15 MIN: Performed by: INTERNAL MEDICINE

## 2021-02-16 PROCEDURE — 6370000000 HC RX 637 (ALT 250 FOR IP): Performed by: INTERNAL MEDICINE

## 2021-02-16 PROCEDURE — 36430 TRANSFUSION BLD/BLD COMPNT: CPT

## 2021-02-16 PROCEDURE — 0DB68ZX EXCISION OF STOMACH, VIA NATURAL OR ARTIFICIAL OPENING ENDOSCOPIC, DIAGNOSTIC: ICD-10-PCS | Performed by: INTERNAL MEDICINE

## 2021-02-16 PROCEDURE — 0DB98ZX EXCISION OF DUODENUM, VIA NATURAL OR ARTIFICIAL OPENING ENDOSCOPIC, DIAGNOSTIC: ICD-10-PCS | Performed by: INTERNAL MEDICINE

## 2021-02-16 PROCEDURE — 85025 COMPLETE CBC W/AUTO DIFF WBC: CPT

## 2021-02-16 PROCEDURE — 2709999900 HC NON-CHARGEABLE SUPPLY: Performed by: INTERNAL MEDICINE

## 2021-02-16 PROCEDURE — 2580000003 HC RX 258: Performed by: INTERNAL MEDICINE

## 2021-02-16 PROCEDURE — 2580000003 HC RX 258: Performed by: STUDENT IN AN ORGANIZED HEALTH CARE EDUCATION/TRAINING PROGRAM

## 2021-02-16 PROCEDURE — 6360000002 HC RX W HCPCS: Performed by: STUDENT IN AN ORGANIZED HEALTH CARE EDUCATION/TRAINING PROGRAM

## 2021-02-16 PROCEDURE — 7100000001 HC PACU RECOVERY - ADDTL 15 MIN: Performed by: INTERNAL MEDICINE

## 2021-02-16 PROCEDURE — 74018 RADEX ABDOMEN 1 VIEW: CPT

## 2021-02-16 PROCEDURE — 86850 RBC ANTIBODY SCREEN: CPT

## 2021-02-16 PROCEDURE — 86923 COMPATIBILITY TEST ELECTRIC: CPT

## 2021-02-16 PROCEDURE — 2060000000 HC ICU INTERMEDIATE R&B

## 2021-02-16 PROCEDURE — 85730 THROMBOPLASTIN TIME PARTIAL: CPT

## 2021-02-16 PROCEDURE — 88305 TISSUE EXAM BY PATHOLOGIST: CPT

## 2021-02-16 PROCEDURE — 99283 EMERGENCY DEPT VISIT LOW MDM: CPT

## 2021-02-16 PROCEDURE — 6360000002 HC RX W HCPCS: Performed by: NURSE ANESTHETIST, CERTIFIED REGISTERED

## 2021-02-16 PROCEDURE — 6370000000 HC RX 637 (ALT 250 FOR IP): Performed by: STUDENT IN AN ORGANIZED HEALTH CARE EDUCATION/TRAINING PROGRAM

## 2021-02-16 PROCEDURE — G0378 HOSPITAL OBSERVATION PER HR: HCPCS

## 2021-02-16 PROCEDURE — C9113 INJ PANTOPRAZOLE SODIUM, VIA: HCPCS | Performed by: STUDENT IN AN ORGANIZED HEALTH CARE EDUCATION/TRAINING PROGRAM

## 2021-02-16 PROCEDURE — 86901 BLOOD TYPING SEROLOGIC RH(D): CPT

## 2021-02-16 PROCEDURE — 99222 1ST HOSP IP/OBS MODERATE 55: CPT | Performed by: INTERNAL MEDICINE

## 2021-02-16 PROCEDURE — 3609012400 HC EGD TRANSORAL BIOPSY SINGLE/MULTIPLE: Performed by: INTERNAL MEDICINE

## 2021-02-16 PROCEDURE — 80048 BASIC METABOLIC PNL TOTAL CA: CPT

## 2021-02-16 PROCEDURE — 2580000003 HC RX 258: Performed by: NURSE ANESTHETIST, CERTIFIED REGISTERED

## 2021-02-16 PROCEDURE — P9016 RBC LEUKOCYTES REDUCED: HCPCS

## 2021-02-16 PROCEDURE — 86900 BLOOD TYPING SEROLOGIC ABO: CPT

## 2021-02-16 PROCEDURE — 88342 IMHCHEM/IMCYTCHM 1ST ANTB: CPT

## 2021-02-16 PROCEDURE — 82272 OCCULT BLD FECES 1-3 TESTS: CPT

## 2021-02-16 PROCEDURE — 88341 IMHCHEM/IMCYTCHM EA ADD ANTB: CPT

## 2021-02-16 PROCEDURE — 3700000000 HC ANESTHESIA ATTENDED CARE: Performed by: INTERNAL MEDICINE

## 2021-02-16 PROCEDURE — 36415 COLL VENOUS BLD VENIPUNCTURE: CPT

## 2021-02-16 PROCEDURE — 2500000003 HC RX 250 WO HCPCS: Performed by: NURSE ANESTHETIST, CERTIFIED REGISTERED

## 2021-02-16 RX ORDER — SODIUM CHLORIDE 0.9 % (FLUSH) 0.9 %
10 SYRINGE (ML) INJECTION PRN
Status: DISCONTINUED | OUTPATIENT
Start: 2021-02-16 | End: 2021-02-18 | Stop reason: HOSPADM

## 2021-02-16 RX ORDER — ONDANSETRON 2 MG/ML
INJECTION INTRAMUSCULAR; INTRAVENOUS PRN
Status: DISCONTINUED | OUTPATIENT
Start: 2021-02-16 | End: 2021-02-16 | Stop reason: SDUPTHER

## 2021-02-16 RX ORDER — SODIUM CHLORIDE, SODIUM LACTATE, POTASSIUM CHLORIDE, CALCIUM CHLORIDE 600; 310; 30; 20 MG/100ML; MG/100ML; MG/100ML; MG/100ML
INJECTION, SOLUTION INTRAVENOUS CONTINUOUS PRN
Status: DISCONTINUED | OUTPATIENT
Start: 2021-02-16 | End: 2021-02-16 | Stop reason: SDUPTHER

## 2021-02-16 RX ORDER — LIDOCAINE HYDROCHLORIDE 20 MG/ML
INJECTION, SOLUTION INFILTRATION; PERINEURAL PRN
Status: DISCONTINUED | OUTPATIENT
Start: 2021-02-16 | End: 2021-02-16 | Stop reason: SDUPTHER

## 2021-02-16 RX ORDER — ONDANSETRON 2 MG/ML
4 INJECTION INTRAMUSCULAR; INTRAVENOUS
Status: DISCONTINUED | OUTPATIENT
Start: 2021-02-16 | End: 2021-02-16 | Stop reason: HOSPADM

## 2021-02-16 RX ORDER — POLYETHYLENE GLYCOL 3350 17 G/17G
17 POWDER, FOR SOLUTION ORAL DAILY PRN
Status: DISCONTINUED | OUTPATIENT
Start: 2021-02-16 | End: 2021-02-18 | Stop reason: HOSPADM

## 2021-02-16 RX ORDER — FENTANYL CITRATE 50 UG/ML
25 INJECTION, SOLUTION INTRAMUSCULAR; INTRAVENOUS EVERY 5 MIN PRN
Status: DISCONTINUED | OUTPATIENT
Start: 2021-02-16 | End: 2021-02-16 | Stop reason: HOSPADM

## 2021-02-16 RX ORDER — ACETAMINOPHEN 650 MG/1
650 SUPPOSITORY RECTAL EVERY 6 HOURS PRN
Status: DISCONTINUED | OUTPATIENT
Start: 2021-02-16 | End: 2021-02-18 | Stop reason: HOSPADM

## 2021-02-16 RX ORDER — PROMETHAZINE HYDROCHLORIDE 25 MG/1
12.5 TABLET ORAL EVERY 6 HOURS PRN
Status: DISCONTINUED | OUTPATIENT
Start: 2021-02-16 | End: 2021-02-18 | Stop reason: HOSPADM

## 2021-02-16 RX ORDER — SODIUM CHLORIDE 9 MG/ML
INJECTION, SOLUTION INTRAVENOUS PRN
Status: DISCONTINUED | OUTPATIENT
Start: 2021-02-16 | End: 2021-02-18 | Stop reason: HOSPADM

## 2021-02-16 RX ORDER — ATORVASTATIN CALCIUM 10 MG/1
10 TABLET, FILM COATED ORAL DAILY
Status: DISCONTINUED | OUTPATIENT
Start: 2021-02-16 | End: 2021-02-18 | Stop reason: HOSPADM

## 2021-02-16 RX ORDER — DEXAMETHASONE SODIUM PHOSPHATE 4 MG/ML
INJECTION, SOLUTION INTRA-ARTICULAR; INTRALESIONAL; INTRAMUSCULAR; INTRAVENOUS; SOFT TISSUE PRN
Status: DISCONTINUED | OUTPATIENT
Start: 2021-02-16 | End: 2021-02-16 | Stop reason: SDUPTHER

## 2021-02-16 RX ORDER — SUCCINYLCHOLINE CHLORIDE 20 MG/ML
INJECTION INTRAMUSCULAR; INTRAVENOUS PRN
Status: DISCONTINUED | OUTPATIENT
Start: 2021-02-16 | End: 2021-02-16 | Stop reason: SDUPTHER

## 2021-02-16 RX ORDER — GLYCOPYRROLATE 0.2 MG/ML
INJECTION INTRAMUSCULAR; INTRAVENOUS PRN
Status: DISCONTINUED | OUTPATIENT
Start: 2021-02-16 | End: 2021-02-16 | Stop reason: SDUPTHER

## 2021-02-16 RX ORDER — PROPOFOL 10 MG/ML
INJECTION, EMULSION INTRAVENOUS PRN
Status: DISCONTINUED | OUTPATIENT
Start: 2021-02-16 | End: 2021-02-16 | Stop reason: SDUPTHER

## 2021-02-16 RX ORDER — ACETAMINOPHEN 325 MG/1
650 TABLET ORAL EVERY 6 HOURS PRN
Status: DISCONTINUED | OUTPATIENT
Start: 2021-02-16 | End: 2021-02-18 | Stop reason: HOSPADM

## 2021-02-16 RX ORDER — LABETALOL HYDROCHLORIDE 5 MG/ML
5 INJECTION, SOLUTION INTRAVENOUS EVERY 10 MIN PRN
Status: DISCONTINUED | OUTPATIENT
Start: 2021-02-16 | End: 2021-02-16 | Stop reason: HOSPADM

## 2021-02-16 RX ORDER — ONDANSETRON 2 MG/ML
4 INJECTION INTRAMUSCULAR; INTRAVENOUS EVERY 6 HOURS PRN
Status: DISCONTINUED | OUTPATIENT
Start: 2021-02-16 | End: 2021-02-18 | Stop reason: HOSPADM

## 2021-02-16 RX ORDER — ROCURONIUM BROMIDE 10 MG/ML
INJECTION, SOLUTION INTRAVENOUS PRN
Status: DISCONTINUED | OUTPATIENT
Start: 2021-02-16 | End: 2021-02-16 | Stop reason: SDUPTHER

## 2021-02-16 RX ORDER — ENALAPRILAT 2.5 MG/2ML
1.25 INJECTION INTRAVENOUS
Status: DISCONTINUED | OUTPATIENT
Start: 2021-02-16 | End: 2021-02-16 | Stop reason: HOSPADM

## 2021-02-16 RX ORDER — HYDRALAZINE HYDROCHLORIDE 20 MG/ML
5 INJECTION INTRAMUSCULAR; INTRAVENOUS EVERY 5 MIN PRN
Status: DISCONTINUED | OUTPATIENT
Start: 2021-02-16 | End: 2021-02-16 | Stop reason: HOSPADM

## 2021-02-16 RX ORDER — SODIUM CHLORIDE 0.9 % (FLUSH) 0.9 %
10 SYRINGE (ML) INJECTION EVERY 12 HOURS SCHEDULED
Status: DISCONTINUED | OUTPATIENT
Start: 2021-02-16 | End: 2021-02-18 | Stop reason: HOSPADM

## 2021-02-16 RX ORDER — FENTANYL CITRATE 50 UG/ML
50 INJECTION, SOLUTION INTRAMUSCULAR; INTRAVENOUS EVERY 5 MIN PRN
Status: DISCONTINUED | OUTPATIENT
Start: 2021-02-16 | End: 2021-02-16 | Stop reason: HOSPADM

## 2021-02-16 RX ORDER — SODIUM CHLORIDE 9 MG/ML
INJECTION, SOLUTION INTRAVENOUS CONTINUOUS
Status: DISCONTINUED | OUTPATIENT
Start: 2021-02-16 | End: 2021-02-18 | Stop reason: HOSPADM

## 2021-02-16 RX ADMIN — ATORVASTATIN CALCIUM 10 MG: 10 TABLET, FILM COATED ORAL at 14:30

## 2021-02-16 RX ADMIN — POLYETHYLENE GLYCOL 3350, SODIUM SULFATE ANHYDROUS, SODIUM BICARBONATE, SODIUM CHLORIDE, POTASSIUM CHLORIDE 4000 ML: 236; 22.74; 6.74; 5.86; 2.97 POWDER, FOR SOLUTION ORAL at 21:13

## 2021-02-16 RX ADMIN — PANTOPRAZOLE SODIUM 8 MG/HR: 40 INJECTION, POWDER, FOR SOLUTION INTRAVENOUS at 23:19

## 2021-02-16 RX ADMIN — ONDANSETRON 4 MG: 2 INJECTION INTRAMUSCULAR; INTRAVENOUS at 15:08

## 2021-02-16 RX ADMIN — ROCURONIUM BROMIDE 20 MG: 10 INJECTION INTRAVENOUS at 15:03

## 2021-02-16 RX ADMIN — SODIUM CHLORIDE, SODIUM LACTATE, POTASSIUM CHLORIDE, AND CALCIUM CHLORIDE: .6; .31; .03; .02 INJECTION, SOLUTION INTRAVENOUS at 14:31

## 2021-02-16 RX ADMIN — PROPOFOL 80 MG: 10 INJECTION, EMULSION INTRAVENOUS at 15:03

## 2021-02-16 RX ADMIN — DEXAMETHASONE SODIUM PHOSPHATE 4 MG: 4 INJECTION, SOLUTION INTRAMUSCULAR; INTRAVENOUS at 15:08

## 2021-02-16 RX ADMIN — LIDOCAINE HYDROCHLORIDE 100 MG: 20 INJECTION, SOLUTION INFILTRATION; PERINEURAL at 15:03

## 2021-02-16 RX ADMIN — PHENYLEPHRINE HYDROCHLORIDE 200 MCG: 10 INJECTION, SOLUTION INTRAMUSCULAR; INTRAVENOUS; SUBCUTANEOUS at 15:12

## 2021-02-16 RX ADMIN — GLYCOPYRROLATE 0.2 MG: 0.2 INJECTION INTRAMUSCULAR; INTRAVENOUS at 15:10

## 2021-02-16 RX ADMIN — SUCCINYLCHOLINE CHLORIDE 100 MG: 20 INJECTION, SOLUTION INTRAMUSCULAR; INTRAVENOUS; PARENTERAL at 15:04

## 2021-02-16 RX ADMIN — Medication 10 ML: at 21:13

## 2021-02-16 RX ADMIN — SODIUM CHLORIDE: 9 INJECTION, SOLUTION INTRAVENOUS at 14:26

## 2021-02-16 RX ADMIN — PANTOPRAZOLE SODIUM 8 MG/HR: 40 INJECTION, POWDER, FOR SOLUTION INTRAVENOUS at 14:29

## 2021-02-16 RX ADMIN — SUGAMMADEX 200 MG: 100 INJECTION, SOLUTION INTRAVENOUS at 15:20

## 2021-02-16 RX ADMIN — SODIUM CHLORIDE: 9 INJECTION, SOLUTION INTRAVENOUS at 23:02

## 2021-02-16 ASSESSMENT — ENCOUNTER SYMPTOMS
DIARRHEA: 0
ABDOMINAL PAIN: 1
WHEEZING: 0
CHEST TIGHTNESS: 0
SHORTNESS OF BREATH: 1
VOMITING: 0
NAUSEA: 0
COUGH: 0
BLOOD IN STOOL: 1

## 2021-02-16 ASSESSMENT — PULMONARY FUNCTION TESTS
PIF_VALUE: 17
PIF_VALUE: 14
PIF_VALUE: 2
PIF_VALUE: 1
PIF_VALUE: 1
PIF_VALUE: 17
PIF_VALUE: 2
PIF_VALUE: 1
PIF_VALUE: 16
PIF_VALUE: 17
PIF_VALUE: 6
PIF_VALUE: 16
PIF_VALUE: 18

## 2021-02-16 ASSESSMENT — PAIN SCALES - GENERAL: PAINLEVEL_OUTOF10: 0

## 2021-02-16 NOTE — CONSULTS
Consult Note      Dinah Laughlin MD  1935    Consultant: Smooth [place]  Reason for Consult:  GI bleed  Requesting Physician:  Tru Savage COMPLAINT:  melena    History Obtained From:  patient, electronic medical record    HISTORY OF PRESENT ILLNESS:                The patient is a 82 y.o. male with significant past medical history of EsoCA s/p chemo/xrt/surgery in 2010 who presents with melena for a few months. On coumadin for an aortic valve replacement. Has frequent regurgitation. No dysphagia. On prilosec daily. No change in GI symptoms    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    Pancreatitis 8/1/2013    Patient underweight 8/8/2013    [facility]     Unspecified essential hypertension      Past Surgical History:        Procedure Laterality Date    APPENDECTOMY      as a child    BONE GRAFT      for saddle nose    CARDIAC VALVE REPLACEMENT  2006    aorta    CHOLECYSTECTOMY      COLONOSCOPY  11/2009    COLONOSCOPY  10/05/15    CORONARY ARTERY BYPASS GRAFT  2006    ESOPHAGUS SURGERY      EYE SURGERY  1990& 1992    cataract bilat removal     GASTRECTOMY      PROSTATE SURGERY      seeds    TONSILLECTOMY      US PROSTATE/TRANSRECTAL VOL STUDY BRACHYTHERAPY       Medications at Home:  Medications Prior to Admission: omeprazole (PRILOSEC) 40 MG delayed release capsule, TAKE ONE CAPSULE BY MOUTH DAILY  atorvastatin (LIPITOR) 10 MG tablet, TAKE ONE TABLET BY MOUTH DAILY  Alpha Lipoic Acid-Biotin 300-333 MG-MCG CAPS, 300 mg  warfarin (COUMADIN) 5 MG tablet, TAKE ONE TABLET BY MOUTH ONCE NIGHTLY  Coenzyme Q10 (COQ10) 400 MG CAPS, Take 1 capsule by mouth  Simethicone 80 MG TABS, Take 80 tablets by mouth 3 times daily.  (Patient taking differently: Take 125 mg by mouth 3 times daily. )  warfarin (COUMADIN) 1 MG tablet, Take 1 mg by mouth.  vitamin B-12 (CYANOCOBALAMIN) 1000 MCG tablet, Take 1,000 mcg by mouth daily. docusate sodium (COLACE) 100 MG capsule, Take 100 mg by mouth daily. One  Capsule daily  polyethylene glycol (GLYCOLAX) powder, Take 17 g by mouth 2 times daily   phytonadione (VITAMIN K) 5 MG tablet, Take 1 tablet by mouth once for 1 dose  calcium carbonate (OSCAL) 500 MG TABS tablet, Take 500 mg by mouth daily  Magnesium 65 MG TABS, Take by mouth  sildenafil (VIAGRA) 50 MG tablet, Take 50 mg by mouth as needed for Erectile Dysfunction. Current Medications:    Current Facility-Administered Medications: 0.9 % sodium chloride infusion, , Intravenous, PRN  atorvastatin (LIPITOR) tablet 10 mg, 10 mg, Oral, Daily  sodium chloride flush 0.9 % injection 10 mL, 10 mL, Intravenous, 2 times per day  sodium chloride flush 0.9 % injection 10 mL, 10 mL, Intravenous, PRN  promethazine (PHENERGAN) tablet 12.5 mg, 12.5 mg, Oral, Q6H PRN **OR** ondansetron (ZOFRAN) injection 4 mg, 4 mg, Intravenous, Q6H PRN  polyethylene glycol (GLYCOLAX) packet 17 g, 17 g, Oral, Daily PRN  acetaminophen (TYLENOL) tablet 650 mg, 650 mg, Oral, Q6H PRN **OR** acetaminophen (TYLENOL) suppository 650 mg, 650 mg, Rectal, Q6H PRN  pantoprazole (PROTONIX) 80 mg in sodium chloride 0.9 % 100 mL infusion, 8 mg/hr, Intravenous, Continuous  0.9 % sodium chloride infusion, , Intravenous, Continuous  Facility-Administered Medications Ordered in Other Encounters: lactated ringers infusion, , , Continuous PRN  Allergies:  No known allergies    Social History:    TOBACCO:   reports that he has never smoked. He has never used smokeless tobacco.  ETOH:   reports current alcohol use. DRUGS:   reports no history of drug use.     Family History:       Problem Relation Age of Onset    Other Mother         bleeding peptic ulcer    Heart Disease Mother    Elo Smith Other Father cardiovascular disease    Stroke Father     Elevated Lipids Father     Hypertension Father      REVIEW OF SYSTEMS:     A comprehensive 12 point review of systems is negative except as documented above. PHYSICAL EXAM:      Vitals:    BP (!) 122/57   Pulse 64   Temp 97.7 °F (36.5 °C) (Oral) Comment: post prbs vitals  Resp 18   SpO2 98%     General: No acute distress  HEENT: PERRL, EOMI, oral mucosa moist and intact, no sclericterus  Neck: no thyromegaly  Lymphatic: no cervical or supraclavicular lymphadenopathy  Heart: no m/r/g; +s1/s2 rrr  Lungs: CTA bilaterally  Abdomen: soft, nt, nd +BS +scar  Extremities: 2+pulses, no edema  Skin: no rashes or lesions  Neuro: a&o x 3; no gross deficit  DATA:    Recent Labs     02/15/21  1044 02/16/21  0937   WBC 8.2 6.0   HGB 7.0* 6.9*   HCT 22.7* 22.5*   MCV 88.3 89.2    201     Recent Labs     02/15/21  1044 02/16/21  0937    136   K 4.6 4.2    101   CO2 25 28   BUN 44* 42*   CREATININE 1.4* 1.3     Recent Labs     02/15/21  1044   AST 27   ALT 11   BILITOT <0.2   ALKPHOS 51     No results for input(s): LIPASE, AMYLASE in the last 72 hours. Recent Labs     02/15/21  1044 02/16/21  0937   PROT 6.9  --    INR  --  2.43*     No results for input(s): PTT in the last 72 hours. No results for input(s): OCCULTBLD in the last 72 hours. Imaging: CT 11/17/20; UGI 2015; enteroclysis 2013  Previous endoscopy: EUS 2009; EGD 2013; normal colon 2015    IMPRESSION/RECOMMENDATIONS:      Melena  Anemia    EGD to evaluate; if negative will do colonoscopy tomorrow; may have small bowel angioectasia given history of aortic valve disease      Thank you for allowing me to participate in Griselda Redwood, MD's care. Ludivina Humphries.  Fredis Vázquez MD

## 2021-02-16 NOTE — ANESTHESIA POSTPROCEDURE EVALUATION
Department of Anesthesiology  Postprocedure Note    Patient: Maria A Roman MD  MRN: [id number]  YOB: 1935  Date of evaluation: 2/16/2021  Time:  5:34 PM     Procedure Summary     Date: 02/16/21 Room / Location: [facility]    Anesthesia Start: 1501 Anesthesia Stop: 3843    Procedure: EGD BIOPSY (N/A ) Diagnosis: (GI BLEED)    Surgeons: Bhupendra Nicholas MD Responsible Provider: Keyur Navarrete DO    Anesthesia Type: general, MAC ASA Status: 4          Anesthesia Type: general, MAC    Nimisha Phase I: Nimisha Score: 10    Nimisha Phase II:      Last vitals: Reviewed and per EMR flowsheets.        Anesthesia Post Evaluation    Patient location during evaluation: PACU  Patient participation: complete - patient participated  Level of consciousness: awake and alert  Pain score: 0  Airway patency: patent  Nausea & Vomiting: no nausea and no vomiting  Cardiovascular status: blood pressure returned to baseline  Respiratory status: acceptable  Hydration status: euvolemic

## 2021-02-16 NOTE — ANESTHESIA POSTPROCEDURE EVALUATION
Department of Anesthesiology  Postprocedure Note    Patient: Hudson Zavala MD  MRN: [id number]  YOB: 1935  Date of evaluation: 2/16/2021  Time:  5:33 PM     Procedure Summary     Date: 02/17/21 Room / Location: [address] [place] [address]    Anesthesia Start:  Anesthesia Stop:     Procedure: COLONOSCOPY DIAGNOSTIC/STOMA (N/A ) Diagnosis: (GI Bleed)    Surgeons: Fco Lanier MD Responsible Provider:     Anesthesia Type: Not recorded ASA Status: Not recorded          Anesthesia Type: No value filed. Nimisha Phase I: Nimisha Score: 10    Nimisha Phase II:      Last vitals: Reviewed and per EMR flowsheets.        Anesthesia Post Evaluation    Patient location during evaluation: PACU  Patient participation: complete - patient participated  Level of consciousness: awake and alert  Pain score: 0  Airway patency: patent  Nausea & Vomiting: no nausea and no vomiting  Cardiovascular status: blood pressure returned to baseline  Respiratory status: acceptable  Hydration status: euvolemic

## 2021-02-16 NOTE — PROGRESS NOTES
PACU Transfer Note    Vitals:    02/16/21 1630   BP: 131/77   Pulse: 80   Resp: 16   Temp: 97 °F (36.1 °C)   SpO2: 95%       In: 455 [P.O.:120;  I.V.:335]  Out: -     Pain assessment:    Pain Level: 0    Report given to Receiving unit RN.    2/16/2021 4:32 PM

## 2021-02-16 NOTE — ANESTHESIA PRE PROCEDURE
Department of Anesthesiology  Preprocedure Note       Name:  Pradeep Smith MD   Age:  82 y.o.  :  1935                                          MRN:  [id number]         Date:  2021      Surgeon: Israel Kelly):  Vickie Grace MD    Procedure: Procedure(s):  EGD ESOPHAGOGASTRODUODENOSCOPY    Medications prior to admission:   Prior to Admission medications    Medication Sig Start Date End Date Taking? Authorizing Provider   omeprazole (PRILOSEC) 40 MG delayed release capsule TAKE ONE CAPSULE BY MOUTH DAILY 21  Yes Suad Paniagua MD   atorvastatin (LIPITOR) 10 MG tablet TAKE ONE TABLET BY MOUTH DAILY 20  Yes Suad Paniagua MD   warfarin (COUMADIN) 5 MG tablet TAKE ONE TABLET BY MOUTH ONCE NIGHTLY 20  Yes Suad Paniagua MD   Coenzyme Q10 (COQ10) 400 MG CAPS Take 1 capsule by mouth   Yes Historical Provider, MD   Simethicone 80 MG TABS Take 80 tablets by mouth 3 times daily. Patient taking differently: Take 125 mg by mouth 3 times daily. 3/23/15  Yes JS Go - CNS   warfarin (COUMADIN) 1 MG tablet Take 1 mg by mouth. Yes Historical Provider, MD   docusate sodium (COLACE) 100 MG capsule Take 100 mg by mouth daily. One  Capsule daily 7/20/10  Yes Historical Provider, MD   polyethylene glycol (GLYCOLAX) powder Take 17 g by mouth 2 times daily    Yes Historical Provider, MD   phytonadione (VITAMIN K) 5 MG tablet Take 1 tablet by mouth once for 1 dose 20  Suad Paniagua MD   Alpha Lipoic Acid-Biotin 300-333 MG-MCG CAPS 300 mg    Historical Provider, MD   calcium carbonate (OSCAL) 500 MG TABS tablet Take 500 mg by mouth daily    Historical Provider, MD   Magnesium 65 MG TABS Take by mouth    Historical Provider, MD   sildenafil (VIAGRA) 50 MG tablet Take 50 mg by mouth as needed for Erectile Dysfunction. Historical Provider, MD   vitamin B-12 (CYANOCOBALAMIN) 1000 MCG tablet Take 1,000 mcg by mouth daily.     Historical Provider, MD       Current medications: Current Facility-Administered Medications   Medication Dose Route Frequency Provider Last Rate Last Admin    0.9 % sodium chloride infusion   Intravenous PRN ARTURO Capone        atorvastatin (LIPITOR) tablet 10 mg  10 mg Oral Daily Sofiya Li MD        sodium chloride flush 0.9 % injection 10 mL  10 mL Intravenous 2 times per day Sofiya Li MD        sodium chloride flush 0.9 % injection 10 mL  10 mL Intravenous PRN Sofiya Li MD        promethazine (PHENERGAN) tablet 12.5 mg  12.5 mg Oral Q6H PRN Sofiya Li MD        Or    ondansetron [facility]) injection 4 mg  4 mg Intravenous Q6H PRN Sofiya Li MD        polyethylene glycol (GLYCOLAX) packet 17 g  17 g Oral Daily PRN Sofiya Li MD        acetaminophen (TYLENOL) tablet 650 mg  650 mg Oral Q6H PRN Sofiya Li MD        Or    acetaminophen (TYLENOL) suppository 650 mg  650 mg Rectal Q6H PRN Sofiya Li MD        pantoprazole (PROTONIX) 80 mg in sodium chloride 0.9 % 100 mL infusion  8 mg/hr Intravenous Continuous Sofiya Li MD           Allergies:     Allergies   Allergen Reactions    No Known Allergies        Problem List:    Patient Active Problem List   Diagnosis Code    [facility] C15.9    Asthma J45.909    Aortic valve disorder I35.9    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    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, D63.1    Nausea & vomiting R11.2    Patient underweight R63.6    Hernia, femoral, bilateral K41.20    Osteoporosis, senile M81.0    Chronic kidney disease, stage III (moderate) N18.30    Personal history of esophageal cancer Z85.01    History of esophageal cancer Z85.01    History of prostate cancer Z85.46    S/P AVR (aortic valve replacement) Z95.2    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    Gastroparesis K31.84    Iron deficiency anemia D50.9    Anemia D64.9    Essential hypertension I10    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    Foreign body accidentally left during a procedure T81.509A    History of disease Z87.898    Abdominal pain R10.9    Postural dizziness with near syncope R42, R55    SBO (small bowel obstruction) ([facility] K56.609    UGI bleed K92.2    GI bleed K92.2       Past Medical History:        Diagnosis Date    Anemia     Aortic valve disorders     stenosis    Aspiration pneumonia ([facility] 4/27/2015    Erectile dysfunction     Esophageal cancer ([facility] 10/18/2012    Hernia, femoral, bilateral 5/12/2014    Hyperlipidemia     Osteoporosis, senile 5/20/2014    Pancreatitis 8/1/2013    Patient underweight 8/8/2013    Prostate cancer ([facility]     Unspecified essential hypertension        Past Surgical History:        Procedure Laterality Date    APPENDECTOMY      as a child    BONE GRAFT      for saddle nose    CARDIAC VALVE REPLACEMENT  2006    aorta    CHOLECYSTECTOMY      COLONOSCOPY  11/2009    COLONOSCOPY  10/05/15    CORONARY ARTERY BYPASS GRAFT  2006    ESOPHAGUS SURGERY      EYE SURGERY  1990& 1992    cataract bilat removal     GASTRECTOMY      PROSTATE SURGERY      seeds    TONSILLECTOMY      US PROSTATE/TRANSRECTAL VOL STUDY BRACHYTHERAPY         Social History:    Social History     Tobacco Use    Smoking status: Never Smoker    Smokeless tobacco: Never Used   Substance Use Topics    Alcohol use:  Yes     Alcohol/week: 0.0 standard drinks     Comment: occassionally                                Counseling given: Not Answered      Vital Signs (Current):   Vitals:    02/16/21 1115 02/16/21 1119 02/16/21 1130 02/16/21 1213   BP: 137/74 (!) 140/79 132/75 138/72   Pulse: 72 80 70 66   Resp: 16 16 16 16   Temp: 97.6 °F (36.4 °C) 97.7 °F (36.5 °C) 98 °F (36.7 °C) 97.7 °F (36.5 °C)   TempSrc:    Oral   SpO2:  100% 100% 100%                                              BP Readings from Last 3 Encounters:   02/16/21 138/72   12/18/20 120/70 11/19/20 122/74       NPO Status:                                                                                 BMI:   Wt Readings from Last 3 Encounters:   12/18/20 110 lb (49.9 kg)   11/18/20 107 lb (48.5 kg)   09/15/20 107 lb (48.5 kg)     There is no height or weight on file to calculate BMI.    CBC:   Lab Results   Component Value Date    WBC 6.0 02/16/2021    RBC 2.53 02/16/2021    RBC 4.41 06/26/2017    HGB 6.9 02/16/2021    HCT 22.5 02/16/2021    MCV 89.2 02/16/2021    RDW 15.6 02/16/2021     02/16/2021       CMP:   Lab Results   Component Value Date     02/16/2021    K 4.2 02/16/2021     02/16/2021    CO2 28 02/16/2021    BUN 42 02/16/2021    CREATININE 1.3 02/16/2021    GFRAA >60 02/16/2021    GFRAA >60 05/10/2013    AGRATIO 1.2 02/15/2021    LABGLOM 53 02/16/2021    LABGLOM >60>60 01/10/2012    GLUCOSE 109 02/16/2021    GLUCOSE 123 09/26/2016    PROT 6.9 02/15/2021    PROT 6.8 09/26/2016    CALCIUM 9.0 02/16/2021    BILITOT <0.2 02/15/2021    ALKPHOS 51 02/15/2021    AST 27 02/15/2021    ALT 11 02/15/2021       POC Tests: No results for input(s): POCGLU, POCNA, POCK, POCCL, POCBUN, POCHEMO, POCHCT in the last 72 hours.     Coags:   Lab Results   Component Value Date    PROTIME 28.5 02/16/2021    INR 2.43 02/16/2021    APTT 49.3 02/16/2021       HCG (If Applicable): No results found for: PREGTESTUR, PREGSERUM, HCG, HCGQUANT     ABGs:   Lab Results   Component Value Date    PHART 7.35 04/27/2015    PO2ART 104 04/27/2015    LEK6GBZ 40 04/27/2015    PLX3TXG 21 04/27/2015    BEART -3.5 04/27/2015    B1PNMXGZ 98 04/27/2015        Type & Screen (If Applicable):  Lab Results   Component Value Date    LABABO O 05/29/2012    [address] Positive 05/29/2012       Drug/Infectious Status (If Applicable):  No results found for: HIV, HEPCAB    COVID-19 Screening (If Applicable):   Lab Results   Component Value Date    COVID19 Not Detected 11/19/2020    COVID19 NOT DETECTED 08/19/2020         Anesthesia Evaluation  Patient summary reviewed no history of anesthetic complications:   Airway: Mallampati: III  TM distance: >3 FB   Neck ROM: full  Mouth opening: > = 3 FB Dental: normal exam         Pulmonary:   (+) asthma:                            Cardiovascular:    (+) valvular problems/murmurs (AVR):, CAD:, CABG/stent:,                ROS comment:  Normal left ventricle size, wall thickness, and systolic function with an   estimated ejection fraction of 55-60%. No regional wall motion abnormalities   are seen. Indeterminate diastolic function. A mechanical artificial aortic valve appears well seated with a maximum   velocity of 1.7m/s and a mean gradient of 7mmHg. There was a mild aortic regurgitation, possibly radha valvular. Valve is well   seated, no rocking motion noted. LV diastolic diameter within normal limits. Mild tricuspid regurgitation. Estimated pulmonary artery systolic pressure is normal at 22 mmHg assuming a   right atrial pressure of 3 mmHg. Normal right ventricular size. RV function is reduced. TAPSE 1.63 cm   RV S velocity 8.24cm/s     Neuro/Psych:               GI/Hepatic/Renal:   (+) renal disease: CRI,          ROS comment: Hx of prostate and esophageal Cancer  And gastroparesis  Pancreatitis . Endo/Other:        Blood dyscrasia: factor xll deficiency                Abdominal:           Vascular:                                        Anesthesia Plan      general and MAC     ASA 4       Induction: intravenous. Anesthetic plan and risks discussed with patient.                       Shayy Tejada MD   2/16/2021

## 2021-02-16 NOTE — H&P
Internal Medicine  PGY 1  History & Physical      CC abd pain, dark stools    History Obtained From:  patient    HISTORY OF PRESENT ILLNESS:  81 y/o M w pmhx of CAD s/p CABG x2 in 2006, prostate ca, aortic valve replacement and esophageal adenocarcinoma w/ esophagectomy and gastrectomy followed by chemo & radiation tx, Erectile dysfunction presents w dark stools. Patient reports that for approximately the past 1 month he has been experiencing dark stools. States that for the past several weeks, he has been having increasing fatigue and generalized malaise. He was notified bvy his PCP  that his hemoglobin was low at 7.0, prompting presentation to the emergency department. Patient states that he always has abdominal pain, although it has mildly increased recently. States that he feels mildly short of breath while walking, likely secondary to anemia. Denies any wheezing, cough, or fever. Denies any chest pain, urinary symptoms. In the ED patient's vital vitals were stable.  His FOBT was positive, his CBC showed a hemoglobin of 6.9, MCV 89.2.  His renal function panel was within normal limits. Taran Albe was 2.43. Ney Sharma showed a diaphragmatic hernia on the left side, no significant bowel dilation.  In the ED he received 1 unit of RBCs.     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    Pancreatitis 8/1/2013    Patient underweight 8/8/2013    Prostate cancer ([address]     Unspecified essential hypertension        Past Surgical History:        Procedure Laterality Date    APPENDECTOMY      as a child    BONE GRAFT      for saddle nose    CARDIAC VALVE REPLACEMENT  2006    aorta    CHOLECYSTECTOMY      COLONOSCOPY  11/2009    COLONOSCOPY  10/05/15    CORONARY ARTERY BYPASS GRAFT  2006    ESOPHAGUS SURGERY      EYE SURGERY  1990& 1992    cataract bilat removal     GASTRECTOMY      PROSTATE SURGERY      seeds    TONSILLECTOMY      US PROSTATE/TRANSRECTAL VOL STUDY BRACHYTHERAPY         Medications Priorto Admission:    Not in a hospital admission. Allergies:  No known allergies    Social History:   · TOBACCO:   reports that he has never smoked. He has never used smokeless tobacco.  · ETOH:   reports current alcohol use. ·   Family History:       Problem Relation Age of Onset    Other Mother         bleeding peptic ulcer    Heart Disease Mother     Other Father         cardiovascular disease    Stroke Father     Elevated Lipids Father     Hypertension Father      ·   Physical Exam  Constitutional:       General: He is not in acute distress. Appearance: Normal appearance. He is well-developed. He is not ill-appearing or diaphoretic. HENT:      Head: Normocephalic and atraumatic. Eyes:      Conjunctiva/sclera: Conjunctivae normal.      Pupils: Pupils are equal, round, and reactive to light. Neck:      Musculoskeletal: Normal range of motion. Cardiovascular:      Rate and Rhythm: Normal rate and regular rhythm. Heart sounds: Normal heart sounds. No murmur. No friction rub. Pulmonary:      Effort: Pulmonary effort is normal. No respiratory distress. Breath sounds: Normal breath sounds. No wheezing or rales. Abdominal:      General: There is no distension. Palpations: Abdomen is soft. Tenderness: There is no abdominal tenderness. There is no guarding or rebound. Genitourinary:     Rectum: Normal. Guaiac result positive. Comments: One small external, non-thrombosed hemorrhoid noted  Musculoskeletal: Normal range of motion. Skin:     General: Skin is warm and dry. Neurological:      Mental Status: He is alert and oriented to person, place, and time.    Psychiatric:         Behavior: Behavior normal.         Thought Content: Thought content normal.         Judgment: Judgment normal       Vitals:    02/16/21 1119   BP: (!) 140/79   Pulse: 80   Resp: 16   Temp: 97.7 °F (36.5 °C)   SpO2: 100%       DATA:    Labs:  CBC:   Recent Labs     02/15/21  1044 02/16/21  0937   WBC 8.2 6.0   HGB 7.0* 6.9*   HCT 22.7* 22.5*    201       BMP:   Recent Labs     02/15/21  1044 02/16/21  0937    136   K 4.6 4.2    101   CO2 25 28   BUN 44* 42*   CREATININE 1.4* 1.3   GLUCOSE 131* 109*     LFT's:   Recent Labs     02/15/21  1044   AST 27   ALT 11   BILITOT <0.2   ALKPHOS 51     INR:   Recent Labs     02/16/21  0937   INR 2.43*       XR ABDOMEN (KUB) (SINGLE AP VIEW)   Final Result      Diaphragmatic hernia on the left as described      No significant bowel dilatation      If concern for obstruction CT recommended.           ASSESSMENT AND PLAN:     Suspected GI bleed  -2 large-bore IVs 18-gauge  -IVF  -Trend H&H every 8  -Transfuse for hemoglobin less than 8  -Protonix drip  -GI consulted for possible scope  -N.p.o.  -INR less than 3.5  -Hold warfarin, aspirin, all kinds of anticoagulation    Esophageal adenocarcinoma with esophagectomy  -EGD to assess for esophageal cancer versus ulcer causing low hemoglobin.  -As above    Aortic valve replacement for bicuspid aortic valve  -Patient underwent this procedure LDL 69  -On warfarin at home  -Hold warfarin in light of suspected GI bleed    Coronary artery disease s/p CABG in 2006  -No chest pain on this admission  -Trend H&H every 8 hours  -Transfuse for hemoglobin less than 8  -Monitor vitals    Prostate cancer  -Last PSA on 8/27/2020 was normal      Will discuss with attending physician Dr. Sally Matta Status:Full code  FEN: NPO  PPX: protonix gtt, SCD  DISPO: Liliane Cobb MD PGY-1  2/16/2021,  11:29 AM

## 2021-02-16 NOTE — ED NOTES
Pt's blood infusion has started. V/S stable. Call placed to endo to notify. Cata Coulter stated she will notify the CN.       Jayda Vicente RN  02/16/21 7170

## 2021-02-16 NOTE — ED PROVIDER NOTES
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 Patient has history of esophageal cancer status post surgical resection and chemoradiation, CABG, mechanical aortic valve currently on Coumadin therapy, cholecystectomy. Patient reports that for approximately the past 1 month he has been experiencing dark stools. States that for the past several weeks, he has been having increasing fatigue and generalized malaise, concerning him for anemia. He called his primary care physician to obtain his Prolia injection as well as to have blood work be performed. He was notified that his hemoglobin was low at 7.0, prompting presentation to the emergency department. Patient states that he always has abdominal pain, although it has mildly increased recently. Does have history of several small bowel obstructions, does not feel necessarily the same. Is concerned that he may have developed an ulcer. States that he feels mildly short of breath while walking, likely secondary to anemia. Denies any wheezing, cough, or fever. Denies any chest pain, urinary symptoms. Review of Systems     Review of Systems   Constitutional: Positive for fatigue. Negative for chills, diaphoresis and fever. Respiratory: Positive for shortness of breath. Negative for cough, chest tightness and wheezing. Cardiovascular: Negative for chest pain, palpitations and leg swelling. Gastrointestinal: Positive for abdominal pain and blood in stool. Negative for diarrhea, nausea and vomiting. Genitourinary: Negative for difficulty urinating, dysuria and hematuria. Musculoskeletal: Negative. Skin: Negative for rash and wound. Neurological: Positive for light-headedness.  Negative for dizziness, weakness and headaches. Past Medical, Surgical, Family, and Social History     He has a past medical history of Anemia, Aortic valve disorders, Aspiration pneumonia ([address] Erectile dysfunction, Esophageal cancer ([address] Hernia, femoral, bilateral, Hyperlipidemia, Osteoporosis, senile, Pancreatitis, Patient underweight, Prostate cancer ([address] and Unspecified essential hypertension. He has a past surgical history that includes Coronary artery bypass graft (2006); Cardiac valve replacement (2006); eye surgery (1990& 1992); Appendectomy; bone graft; gastrectomy; Cholecystectomy; Colonoscopy (11/2009); Prostate surgery; Tonsillectomy; Esophagus surgery; US Prostate/Transrectal/Vol Collins Brachyth; and Colonoscopy (10/05/15). His family history includes Elevated Lipids in his father; Heart Disease in his mother; Hypertension in his father; Other in his father and mother; Stroke in his father. He reports that he has never smoked. He has never used smokeless tobacco. He reports current alcohol use. He reports that he does not use drugs. Medications     Previous Medications    ALPHA LIPOIC ACID-BIOTIN 300-333 MG-MCG CAPS    300 mg    ATORVASTATIN (LIPITOR) 10 MG TABLET    TAKE ONE TABLET BY MOUTH DAILY    CALCIUM CARBONATE (OSCAL) 500 MG TABS TABLET    Take 500 mg by mouth daily    COENZYME Q10 (COQ10) 400 MG CAPS    Take 1 capsule by mouth    DOCUSATE SODIUM (COLACE) 100 MG CAPSULE    Take 100 mg by mouth daily. One  Capsule daily    MAGNESIUM 65 MG TABS    Take by mouth    OMEPRAZOLE (PRILOSEC) 40 MG DELAYED RELEASE CAPSULE    TAKE ONE CAPSULE BY MOUTH DAILY    PHYTONADIONE (VITAMIN K) 5 MG TABLET    Take 1 tablet by mouth once for 1 dose    POLYETHYLENE GLYCOL (GLYCOLAX) POWDER    Take 17 g by mouth 2 times daily     SILDENAFIL (VIAGRA) 50 MG TABLET    Take 50 mg by mouth as needed for Erectile Dysfunction. SIMETHICONE 80 MG TABS    Take 80 tablets by mouth 3 times daily.     VITAMIN B-12 (CYANOCOBALAMIN) 1000 MCG TABLET    Take 1,000 mcg by mouth daily. WARFARIN (COUMADIN) 1 MG TABLET    Take 1 mg by mouth. WARFARIN (COUMADIN) 5 MG TABLET    TAKE ONE TABLET BY MOUTH ONCE NIGHTLY       Allergies     He is allergic to no known allergies. Physical Exam     INITIAL VITALS: BP: (!) 149/74, Temp: 97.9 °F (36.6 °C), Pulse: 79, Resp: 18, SpO2: 98 %  Physical Exam  Vitals signs and nursing note reviewed. Constitutional:       General: He is not in acute distress. Appearance: Normal appearance. He is well-developed. He is not ill-appearing or diaphoretic. HENT:      Head: Normocephalic and atraumatic. Eyes:      Conjunctiva/sclera: Conjunctivae normal.      Pupils: Pupils are equal, round, and reactive to light. Neck:      Musculoskeletal: Normal range of motion. Cardiovascular:      Rate and Rhythm: Normal rate and regular rhythm. Heart sounds: Normal heart sounds. No murmur. No friction rub. Pulmonary:      Effort: Pulmonary effort is normal. No respiratory distress. Breath sounds: Normal breath sounds. No wheezing or rales. Abdominal:      General: There is no distension. Palpations: Abdomen is soft. Tenderness: There is no abdominal tenderness. There is no guarding or rebound. Genitourinary:     Rectum: Normal. Guaiac result positive. Comments: One small external, non-thrombosed hemorrhoid noted  Musculoskeletal: Normal range of motion. Skin:     General: Skin is warm and dry. Neurological:      Mental Status: He is alert and oriented to person, place, and time. Psychiatric:         Behavior: Behavior normal.         Thought Content: Thought content normal.         Judgment: Judgment normal.       Diagnostic Results     EKG   none    RADIOLOGY:  XR ABDOMEN (KUB) (SINGLE AP VIEW)   Final Result      Diaphragmatic hernia on the left as described      No significant bowel dilatation      If concern for obstruction CT recommended.         LABS: Results for orders placed or performed during the hospital encounter of 02/16/21   CBC Auto Differential   Result Value Ref Range    WBC 6.0 4.0 - 11.0 K/uL    RBC 2.53 (L) 4.20 - 5.90 M/uL    Hemoglobin 6.9 (LL) 13.5 - 17.5 g/dL    Hematocrit 22.5 (L) 40.5 - 52.5 %    MCV 89.2 80.0 - 100.0 fL    MCH 27.2 26.0 - 34.0 pg    MCHC 30.5 (L) 31.0 - 36.0 g/dL    RDW 15.6 (H) 12.4 - 15.4 %    Platelets 751 098 - 583 K/uL    MPV 9.5 5.0 - 10.5 fL    Neutrophils % 60.2 %    Lymphocytes % 29.0 %    Monocytes % 8.6 %    Eosinophils % 1.5 %    Basophils % 0.7 %    Neutrophils Absolute 3.6 1.7 - 7.7 K/uL    Lymphocytes Absolute 1.7 1.0 - 5.1 K/uL    Monocytes Absolute 0.5 0.0 - 1.3 K/uL    Eosinophils Absolute 0.1 0.0 - 0.6 K/uL    Basophils Absolute 0.0 0.0 - 0.2 K/uL   Basic Metabolic Panel w/ Reflex to MG   Result Value Ref Range    Sodium 136 136 - 145 mmol/L    Potassium reflex Magnesium 4.2 3.5 - 5.1 mmol/L    Chloride 101 99 - 110 mmol/L    CO2 28 21 - 32 mmol/L    Anion Gap 7 3 - 16    Glucose 109 (H) 70 - 99 mg/dL    BUN 42 (H) 7 - 20 mg/dL    CREATININE 1.3 0.8 - 1.3 mg/dL    GFR Non- 53 (A) >60    GFR African American >60 >60    Calcium 9.0 8.3 - 10.6 mg/dL   Protime-INR   Result Value Ref Range    Protime 28.5 (H) 10.0 - 13.2 sec    INR 2.43 (H) 0.86 - 1.14   APTT   Result Value Ref Range    aPTT 49.3 (H) 24.2 - 36.2 sec   POCT Blood Occult   Result Value Ref Range    POC Occult Blood Stool Positive Negative   TYPE AND SCREEN   Result Value Ref Range    ABO/Rh O POS     Antibody Screen NEG    PREPARE RBC (CROSSMATCH), 1 Units   Result Value Ref Range    Product Code Blood Bank [id number]     Description Blood Bank Red Blood Cells, Leuko-reduced     Unit Number [id number]     Dispense Status Blood Bank selected        ED BEDSIDE ULTRASOUND:  none    RECENT VITALS:  BP: 138/79, Temp: 97.9 °F (36.6 °C), Pulse: 79, Resp: 18, SpO2: 98 %     Procedures     none    ED Course     Nursing Notes, Past Medical Hx,Past Surgical Hx, Social Hx, Allergies, and Family Hx were reviewed. The patient was given the following medications:  Orders Placed This Encounter   Medications    0.9 % sodium chloride infusion       CONSULTS:  Jose Denton / JASMIN / Graham Rizzo MD is a 82 y.o. male presenting with abnormal laboratory value of a hemoglobin of 7.0. Patient has had dark stools for approximately the past 1 month, is anticoagulated on Coumadin. Patient arrived hemodynamically stable and overall well-appearing, is in no acute distress and nontoxic in appearance. Cardiopulmonary evaluation unremarkable. Abdomen is overall nontender to palpation. Rectal examination with 1 small, nonthrombosed hemorrhoid identified, guaiac positive. Patient was subsequently evaluated by his primary care physician, Dr. Janine Juárez. Patient will therefore be admitted to his service with AOD. Dr. Tavia Boggs, has already been notified of patient's presentation and will take patient for EGD later today pending INR <3.5. Patient's labs were notable for hemoglobin of 6.9, 1 unit of packed red blood cells will be transfused. Patient's laboratory studies otherwise were consistent with GI bleed with a BUN of 42. Patient was made NPO. Patient will be monitored in the emergency department until bed is available upstairs. This patient was also evaluated by the attending physician. All care plans were discussed and agreed upon. Clinical Impression     1. Upper GI bleed    2. Anemia, unspecified type        Disposition     PATIENT REFERRED TO:  No follow-up provider specified.     DISCHARGE MEDICATIONS:  New Prescriptions    No medications on file       DISPOSITION Admitted 02/16/2021 09:54:12 AM        ARTURO Ewing  02/16/21 3582

## 2021-02-16 NOTE — PROGRESS NOTES
Pt arrived form ENDO, report received form CRNA and Endo Nurse, pt awakens easily and follows commands, waiting for CBC results and for Dr. Yelena Casper to updated the pt

## 2021-02-16 NOTE — ED PROVIDER NOTES
ED Attending Attestation Note     Date of evaluation: 2/16/2021    This patient was seen by the advance practice provider. I have seen and examined the patient, agree with the workup, evaluation, management and diagnosis. The care plan has been discussed. My assessment reveals a chronically ill-appearing gentleman complaining of low hemoglobin and dark stools for the past month. He is well-appearing here in no acute distress. Did have a hemoglobin of 6.9, GI has been called by his primary care physician, Dr. Lenka Pemberton.        Gabriel Daigle MD  02/16/21 1120

## 2021-02-16 NOTE — PROCEDURES
EGD REPORT    Patient:  Wilmer Hernandez MD                  1935    Referring Physician:  Edith Dupont: Giulia Lund     Indication:  Melena; anemia     Medications:  GET      Pre-Anesthesia Assessment:  I have reviewed and am in agreement with patient history and medication, including previous response to sedation. Prior to the procedure, a History and Physical was performed, and patient medications and allergies were reviewed. The patient is competent. The risks and benefits of the procedure and the sedation options and risks were discussed with the patient. Risks discussed included but were not limited to infection, bleeding, perforation, death, and missed lesions. All questions were answered and informed consent was obtained. Patient identification and proposed procedure were verified by the physician and the nurse in the pre-procedure area in the procedure room. Mallampatti: III  ASA Grade Assessment: 4     After reviewing the risks and benefits, the patient was deemed in satisfactory condition to undergo the procedure. The anesthesia plan was to use MAC anesthesia. Immediately prior to administration of medications, the patient was re-assessed for adequacy to receive sedatives and a time out was performed. Patient and healthcare providers were in agreement it was the correct patient and procedure. The heart rate, respiratory rate, oxygen saturations, blood pressure, adequacy of pulmonary ventilation, and response to care were monitored throughout the procedure. The physical status of the patient was re-assessed after the procedure. After obtaining informed consent, the endoscope was passed under direct vision. The endoscope was introduced through the mouth, and advanced to the second part of duodenum. The EGD was accomplished without difficulty. The patient tolerated the procedure well. Duodenum: I reached nearly 40cms from the pyloric channel. There was bilious fluid present.  At 35cms from the pylorus are two 8mm white lesions which appear to be lymphangiomas. Biopsies obtained. Mild scattered minimal diverticulosis. Normal major papilla  Stomach: erythema throughout along with bilious fluid which was suctioned out. Friability in the proximal stomach without blood or bleeding. Superficial biopsies obtained. Retroflexion showed a healthy anastomosis. Esophagus: anastomosis at 20cms; No Evidence of Castaneda's or esophagitis.     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

## 2021-02-16 NOTE — ED TRIAGE NOTES
Pt arrived to ED with hgb of 7, told to come to ED for blood by Dr. Liam Melendrez. Pt reports abdominal pain and dark stools, thinks he has an ulcer. History of esophageal cancer.

## 2021-02-17 ENCOUNTER — ANESTHESIA (OUTPATIENT)
Dept: ENDOSCOPY | Age: 86
DRG: 378 | End: 2021-02-17
Payer: MEDICARE

## 2021-02-17 ENCOUNTER — TELEPHONE (OUTPATIENT)
Dept: INTERNAL MEDICINE CLINIC | Age: 86
End: 2021-02-17

## 2021-02-17 VITALS — SYSTOLIC BLOOD PRESSURE: 94 MMHG | OXYGEN SATURATION: 100 % | DIASTOLIC BLOOD PRESSURE: 52 MMHG

## 2021-02-17 LAB
ALBUMIN SERPL-MCNC: 3.2 G/DL (ref 3.4–5)
ANION GAP SERPL CALCULATED.3IONS-SCNC: 7 MMOL/L (ref 3–16)
BUN BLDV-MCNC: 34 MG/DL (ref 7–20)
CALCIUM SERPL-MCNC: 8.1 MG/DL (ref 8.3–10.6)
CHLORIDE BLD-SCNC: 104 MMOL/L (ref 99–110)
CO2: 27 MMOL/L (ref 21–32)
CREAT SERPL-MCNC: 1.3 MG/DL (ref 0.8–1.3)
GFR AFRICAN AMERICAN: >60
GFR NON-AFRICAN AMERICAN: 53
GLUCOSE BLD-MCNC: 121 MG/DL (ref 70–99)
HCT VFR BLD CALC: 27.5 % (ref 40.5–52.5)
HCT VFR BLD CALC: 27.9 % (ref 40.5–52.5)
HCT VFR BLD CALC: 29.2 % (ref 40.5–52.5)
HEMOGLOBIN: 9 G/DL (ref 13.5–17.5)
HEMOGLOBIN: 9.1 G/DL (ref 13.5–17.5)
HEMOGLOBIN: 9.5 G/DL (ref 13.5–17.5)
INR BLD: 2.18 (ref 0.86–1.14)
MAGNESIUM: 2 MG/DL (ref 1.8–2.4)
PHOSPHORUS: 4.4 MG/DL (ref 2.5–4.9)
POTASSIUM SERPL-SCNC: 4.7 MMOL/L (ref 3.5–5.1)
PROTHROMBIN TIME: 25.5 SEC (ref 10–13.2)
SODIUM BLD-SCNC: 138 MMOL/L (ref 136–145)

## 2021-02-17 PROCEDURE — 0DJD8ZZ INSPECTION OF LOWER INTESTINAL TRACT, VIA NATURAL OR ARTIFICIAL OPENING ENDOSCOPIC: ICD-10-PCS | Performed by: INTERNAL MEDICINE

## 2021-02-17 PROCEDURE — 2580000003 HC RX 258: Performed by: INTERNAL MEDICINE

## 2021-02-17 PROCEDURE — 3700000000 HC ANESTHESIA ATTENDED CARE: Performed by: INTERNAL MEDICINE

## 2021-02-17 PROCEDURE — 85018 HEMOGLOBIN: CPT

## 2021-02-17 PROCEDURE — 99232 SBSQ HOSP IP/OBS MODERATE 35: CPT | Performed by: INTERNAL MEDICINE

## 2021-02-17 PROCEDURE — 6360000002 HC RX W HCPCS: Performed by: NURSE ANESTHETIST, CERTIFIED REGISTERED

## 2021-02-17 PROCEDURE — 7100000011 HC PHASE II RECOVERY - ADDTL 15 MIN: Performed by: INTERNAL MEDICINE

## 2021-02-17 PROCEDURE — 80069 RENAL FUNCTION PANEL: CPT

## 2021-02-17 PROCEDURE — G0378 HOSPITAL OBSERVATION PER HR: HCPCS

## 2021-02-17 PROCEDURE — 85610 PROTHROMBIN TIME: CPT

## 2021-02-17 PROCEDURE — 36415 COLL VENOUS BLD VENIPUNCTURE: CPT

## 2021-02-17 PROCEDURE — 6370000000 HC RX 637 (ALT 250 FOR IP): Performed by: INTERNAL MEDICINE

## 2021-02-17 PROCEDURE — 3609009600 HC COLONOSCOPY STOMA DX INCLUDING COLLJ SPEC SPX: Performed by: INTERNAL MEDICINE

## 2021-02-17 PROCEDURE — 83735 ASSAY OF MAGNESIUM: CPT

## 2021-02-17 PROCEDURE — 2060000000 HC ICU INTERMEDIATE R&B

## 2021-02-17 PROCEDURE — 2580000003 HC RX 258: Performed by: NURSE ANESTHETIST, CERTIFIED REGISTERED

## 2021-02-17 PROCEDURE — 7100000010 HC PHASE II RECOVERY - FIRST 15 MIN: Performed by: INTERNAL MEDICINE

## 2021-02-17 PROCEDURE — 6370000000 HC RX 637 (ALT 250 FOR IP): Performed by: STUDENT IN AN ORGANIZED HEALTH CARE EDUCATION/TRAINING PROGRAM

## 2021-02-17 PROCEDURE — 85014 HEMATOCRIT: CPT

## 2021-02-17 RX ORDER — PANTOPRAZOLE SODIUM 40 MG/1
40 TABLET, DELAYED RELEASE ORAL
Status: DISCONTINUED | OUTPATIENT
Start: 2021-02-17 | End: 2021-02-18 | Stop reason: HOSPADM

## 2021-02-17 RX ORDER — PROPOFOL 10 MG/ML
INJECTION, EMULSION INTRAVENOUS PRN
Status: DISCONTINUED | OUTPATIENT
Start: 2021-02-17 | End: 2021-02-17 | Stop reason: SDUPTHER

## 2021-02-17 RX ORDER — SODIUM CHLORIDE 9 MG/ML
INJECTION, SOLUTION INTRAVENOUS CONTINUOUS PRN
Status: DISCONTINUED | OUTPATIENT
Start: 2021-02-17 | End: 2021-02-17 | Stop reason: SDUPTHER

## 2021-02-17 RX ADMIN — PANTOPRAZOLE SODIUM 40 MG: 40 TABLET, DELAYED RELEASE ORAL at 08:34

## 2021-02-17 RX ADMIN — Medication 10 ML: at 08:35

## 2021-02-17 RX ADMIN — Medication 10 ML: at 21:38

## 2021-02-17 RX ADMIN — PROPOFOL 40 MG: 10 INJECTION, EMULSION INTRAVENOUS at 12:32

## 2021-02-17 RX ADMIN — SODIUM CHLORIDE: 900 INJECTION, SOLUTION INTRAVENOUS at 12:21

## 2021-02-17 RX ADMIN — ATORVASTATIN CALCIUM 10 MG: 10 TABLET, FILM COATED ORAL at 21:51

## 2021-02-17 RX ADMIN — POLYETHYLENE GLYCOL 3350, SODIUM SULFATE ANHYDROUS, SODIUM BICARBONATE, SODIUM CHLORIDE, POTASSIUM CHLORIDE 4000 ML: 236; 22.74; 6.74; 5.86; 2.97 POWDER, FOR SOLUTION ORAL at 18:34

## 2021-02-17 RX ADMIN — PROPOFOL 20 MG: 10 INJECTION, EMULSION INTRAVENOUS at 12:33

## 2021-02-17 RX ADMIN — PROPOFOL 20 MG: 10 INJECTION, EMULSION INTRAVENOUS at 12:34

## 2021-02-17 RX ADMIN — SODIUM CHLORIDE: 9 INJECTION, SOLUTION INTRAVENOUS at 11:14

## 2021-02-17 ASSESSMENT — PAIN SCALES - GENERAL
PAINLEVEL_OUTOF10: 0

## 2021-02-17 ASSESSMENT — PULMONARY FUNCTION TESTS
PIF_VALUE: 0

## 2021-02-17 NOTE — PROGRESS NOTES
pneumonia ([facility] 4/27/2015    Erectile dysfunction     Esophageal cancer ([facility] 10/18/2012    Hernia, femoral, bilateral 5/12/2014    Hyperlipidemia     Osteoporosis, senile 5/20/2014    Pancreatitis 8/1/2013    Patient underweight 8/8/2013    [facility] ([facility]     Unspecified essential hypertension        Past Surgical History:        Procedure Laterality Date    APPENDECTOMY      as a child    BONE GRAFT      for saddle nose    CARDIAC VALVE REPLACEMENT  2006    aorta    CHOLECYSTECTOMY      COLONOSCOPY  11/2009    COLONOSCOPY  10/05/15    CORONARY ARTERY BYPASS GRAFT  2006    ESOPHAGUS SURGERY      EYE SURGERY  1990& 1992    cataract bilat removal     GASTRECTOMY      PROSTATE SURGERY      seeds    TONSILLECTOMY      UPPER GASTROINTESTINAL ENDOSCOPY N/A 2/16/2021    EGD BIOPSY performed by Nestor Davis MD at [address] [facility] PROSTATE/TRANSRECTAL VOL STUDY BRACHYTHERAPY         Medications Priorto Admission:    Medications Prior to Admission: omeprazole (PRILOSEC) 40 MG delayed release capsule, TAKE ONE CAPSULE BY MOUTH DAILY  atorvastatin (LIPITOR) 10 MG tablet, TAKE ONE TABLET BY MOUTH DAILY  Alpha Lipoic Acid-Biotin 300-333 MG-MCG CAPS, 300 mg  warfarin (COUMADIN) 5 MG tablet, TAKE ONE TABLET BY MOUTH ONCE NIGHTLY  Coenzyme Q10 (COQ10) 400 MG CAPS, Take 1 capsule by mouth  Simethicone 80 MG TABS, Take 80 tablets by mouth 3 times daily. (Patient taking differently: Take 125 mg by mouth 3 times daily. )  warfarin (COUMADIN) 1 MG tablet, Take 1 mg by mouth.  vitamin B-12 (CYANOCOBALAMIN) 1000 MCG tablet, Take 1,000 mcg by mouth daily. docusate sodium (COLACE) 100 MG capsule, Take 100 mg by mouth daily.  One  Capsule daily  polyethylene glycol (GLYCOLAX) powder, Take 17 g by mouth 2 times daily   phytonadione (VITAMIN K) 5 MG tablet, Take 1 tablet by mouth once for 1 dose  calcium carbonate (OSCAL) 500 MG TABS tablet, Take 500 mg by mouth daily  Magnesium 65 MG TABS, Take by mouth  sildenafil (VIAGRA) 50 MG tablet, Take 50 mg by mouth as needed for Erectile Dysfunction. Allergies:  No known allergies    Social History:   · TOBACCO:   reports that he has never smoked. He has never used smokeless tobacco.  · ETOH:   reports current alcohol use. ·   Family History:       Problem Relation Age of Onset    Other Mother         bleeding peptic ulcer    Heart Disease Mother     Other Father         cardiovascular disease    Stroke Father     Elevated Lipids Father     Hypertension Father      ·   Physical Exam  Constitutional:       General: He is not in acute distress. Appearance: Normal appearance. He is well-developed. He is not ill-appearing or diaphoretic. HENT:      Head: Normocephalic and atraumatic. Eyes:      Conjunctiva/sclera: Conjunctivae normal.      Pupils: Pupils are equal, round, and reactive to light. Neck:      Musculoskeletal: Normal range of motion. Cardiovascular:      Rate and Rhythm: Normal rate and regular rhythm. Heart sounds: Normal heart sounds. 1/6 systolic ejection murmur. No friction rub. Pulmonary:      Effort: Pulmonary effort is normal. No respiratory distress. Breath sounds: Normal breath sounds. No wheezing or rales. Abdominal:      General: There is no distension. Palpations: Abdomen is soft. Tenderness: There is no abdominal tenderness. There is no guarding or rebound. Genitourinary:     no hematuria  Musculoskeletal: Normal range of motion. Skin:     General: Skin is warm and dry. Neurological:      Mental Status: He is alert and oriented to person, place, and time. Psychiatric:         Behavior: Behavior normal.         Thought Content:  Thought content normal.         Judgment: Judgment normal       Vitals:    02/17/21 0330   BP: 116/72   Pulse: 80   Resp: 18   Temp: 98 °F (36.7 °C)   SpO2:        DATA:    Labs:  CBC:   Recent Labs     02/15/21  1044 02/16/21  0937 02/16/21  1510 02/17/21  0316   WBC 8.2 6.0 4.4  --    HGB 7.0* 6.9* 7.8* 9.1*   HCT 22.7* 22.5* 24.7* 27.9*    201 141  --        BMP:   Recent Labs     02/15/21  1044 02/16/21  0937 02/17/21  0324    136 138   K 4.6 4.2 4.7    101 104   CO2 25 28 27   BUN 44* 42* 34*   CREATININE 1.4* 1.3 1.3   GLUCOSE 131* 109* 121*   PHOS  --   --  4.4     LFT's:   Recent Labs     02/15/21  1044   AST 27   ALT 11   BILITOT <0.2   ALKPHOS 51     INR:   Recent Labs     02/16/21  0937 02/17/21  0324   INR 2.43* 2.18*       XR ABDOMEN (KUB) (SINGLE AP VIEW)   Final Result      Diaphragmatic hernia on the left as described      No significant bowel dilatation      If concern for obstruction CT recommended. 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  colonoscopy today  -N.p.o.  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  -As above    Aortic valve replacement for bicuspid aortic valve  -Patient underwent this procedure LDL 69  -On warfarin at home  -Hold warfarin in light of suspected GI bleed    Coronary artery disease s/p CABG in 2006  -No chest pain on this admission  -Trend H&H every 8 hours  -Transfuse for hemoglobin less than 8  -Monitor vitals    Prostate cancer  -Last PSA on 8/27/2020 was normal      Will discuss with attending physician Dr. Fadi Ortiz Status:Full code  FEN: NPO  PPX: protonix, SCD  DISPO: Ovi Whitney MD PGY-1  2/17/2021,  6:48 AM

## 2021-02-17 NOTE — PROGRESS NOTES
Pre-operative History and Physical    Patient: Kristal Matta MD  : 1935     History Obtained From:  patient, electronic medical record    HISTORY OF PRESENT ILLNESS:    The patient is a 82 y.o. male who presents for a colonoscopy for melena. Past Medical History:        Diagnosis Date    Anemia     Aortic valve disorders     stenosis    Aspiration pneumonia ([facility] [address] 2015    Erectile dysfunction     Esophageal cancer ([facility] [address] 10/18/2012    Hernia, femoral, bilateral 2014    Hyperlipidemia     Osteoporosis, senile 2014    Pancreatitis 2013    Patient underweight 2013    Prostate cancer ([facility] [facility]     Unspecified essential hypertension      Past Surgical History:        Procedure Laterality Date    APPENDECTOMY      as a child    BONE GRAFT      for saddle nose    CARDIAC VALVE REPLACEMENT      aorta    CHOLECYSTECTOMY      COLONOSCOPY  2009    COLONOSCOPY  10/05/15    CORONARY ARTERY BYPASS GRAFT  2006    ESOPHAGUS SURGERY      EYE SURGERY  &     cataract bilat removal     GASTRECTOMY      PROSTATE SURGERY      seeds    TONSILLECTOMY      UPPER GASTROINTESTINAL ENDOSCOPY N/A 2021    EGD BIOPSY performed by Joe Marie MD at [address] PROSTATE/TRANSRECTAL VOL STUDY BRACHYTHERAPY       Medications Prior to Admission:   No current facility-administered medications on file prior to encounter.       Current Outpatient Medications on File Prior to Encounter   Medication Sig Dispense Refill    omeprazole (PRILOSEC) 40 MG delayed release capsule TAKE ONE CAPSULE BY MOUTH DAILY 30 capsule 3    atorvastatin (LIPITOR) 10 MG tablet TAKE ONE TABLET BY MOUTH DAILY 90 tablet 0    Alpha Lipoic Acid-Biotin 300-333 MG-MCG CAPS 300 mg      warfarin (COUMADIN) 5 MG tablet TAKE ONE TABLET BY MOUTH ONCE NIGHTLY 90 tablet 1    Coenzyme Q10 (COQ10) 400 MG CAPS Take 1 capsule by mouth      Simethicone 80 MG TABS Take 80 tablets by mouth 3 times daily. (Patient taking differently: Take 125 mg by mouth 3 times daily. ) 90 each 0    warfarin (COUMADIN) 1 MG tablet Take 1 mg by mouth.  vitamin B-12 (CYANOCOBALAMIN) 1000 MCG tablet Take 1,000 mcg by mouth daily.  docusate sodium (COLACE) 100 MG capsule Take 100 mg by mouth daily. One  Capsule daily      polyethylene glycol (GLYCOLAX) powder Take 17 g by mouth 2 times daily       phytonadione (VITAMIN K) 5 MG tablet Take 1 tablet by mouth once for 1 dose 1 tablet 0    calcium carbonate (OSCAL) 500 MG TABS tablet Take 500 mg by mouth daily      Magnesium 65 MG TABS Take by mouth      sildenafil (VIAGRA) 50 MG tablet Take 50 mg by mouth as needed for Erectile Dysfunction. Allergies:  No known allergies    History of allergic reaction to anesthesia:  No    Social History:   TOBACCO:   reports that he has never smoked. He has never used smokeless tobacco.  ETOH:   reports current alcohol use. DRUGS:   reports no history of drug use. Family History:       Problem Relation Age of Onset    Other Mother         bleeding peptic ulcer    Heart Disease Mother     Other Father         cardiovascular disease    Stroke Father     Elevated Lipids Father     Hypertension Father        PHYSICAL EXAM:      /69   Pulse 70   Temp 97.6 °F (36.4 °C) (Oral)   Resp 17   SpO2 100%  I        Heart:  No m/r/g +s1/s2 RRR    Lungs:  CTA bilaterally    Abdomen:  Soft, nontender, non distended; +bs    ASA Grade:  ASA 3 - Patient with moderate systemic disease with functional limitations    Mallampati Class:  Class I: Soft palate, uvula, fauces, pillars visible  __________  Class II: Soft palate, uvula, fauces visible  __________   Class III: Soft palate, base of uvula visible  ____x______  Class IV: Hard palate only visible   __________      ASSESSMENT AND PLAN:    1. Patient is a 82 y.o. male here for colonoscopy with deep sedation  2. Procedure options, risks and benefits reviewed with patient.   We specifically discussed that risks include, but are not limited to infection, bleeding, perforation, death, and missed lesions. Patient expresses understanding.

## 2021-02-17 NOTE — PROCEDURES
Colonoscopy REPORT    Patient:  Nir Sahu MD                  1935    Referring Physician:  Danette Hernandes    Endoscopist: Jaime Murray     Indication:  GI bleed     Medications:  MAC      Pre-Anesthesia Assessment:  I have reviewed and am in agreement with patient history and medication, including previous response to sedation. Prior to the procedure, a History and Physical was performed, and patient medications and allergies were reviewed. The risks and benefits of the procedure and the sedation options and risks were discussed with the patient. Complications included but were not limited to infection, bleeding, perforation, death, and missed lesions. All questions were answered and informed consent was obtained by the patient. Patient identification and proposed procedure were verified by the physician and the nurse in the pre-procedure area and in the procedure room. Mallampatti: III  ASA Grade Assessment: 3    After reviewing the risks and benefits, the patient was deemed in satisfactory condition to undergo the procedure. The anesthesia plan was to use MAC sedation. Immediately prior to administration of medications, the patient was re-assessed for adequacy to receive sedatives and a time out was performed. Patient and healthcare providers were in agreement it was the correct patient and procedure. The heart rate, respiratory rate, oxygen saturations, blood pressure, adequacy of pulmonary ventilation, and response to care were monitored throughout the procedure. The physical status of the patient was re-assessed after the procedure. After adequate sedation was achieved in stepwise fashion a rectal examination was performed and showed stool. The pediatric colonoscope was then advanced atraumatically into the rectum and advanced to 15cms. There was too much solid stool present and the procedure was aborted. All stool was brown    Retroflexed views of the rectum not done due to stool.      No immediate complications. The preparation was poor. Estimated blood loss none    Impression:  Brown stool in rectum which is solid and could not advance scope.    Plan:  Repeat prep tonight

## 2021-02-17 NOTE — ANESTHESIA POSTPROCEDURE EVALUATION
Department of Anesthesiology  Postprocedure Note    Patient: Ibis Guzman MD  MRN: [id number]  YOB: 1935  Date of evaluation: 2/17/2021  Time:  3:00 PM     Procedure Summary     Date: 02/17/21 Room / Location: [address] [place] [address] [facility]    Anesthesia Start: 4292 Anesthesia Stop: 1706    Procedure: COLONOSCOPY DIAGNOSTIC/STOMA (N/A ) Diagnosis: (GI Bleed)    Surgeons: Zion Aguirre MD Responsible Provider: Miri Patel MD    Anesthesia Type: MAC ASA Status: 3          Anesthesia Type: MAC    Nimisha Phase I: Nimisha Score: 10    Nimisha Phase II: Nimisha Score: 7    Last vitals: Reviewed and per EMR flowsheets.        Anesthesia Post Evaluation    Patient location during evaluation: PACU  Patient participation: complete - patient participated  Level of consciousness: awake and alert  Pain score: 0  Airway patency: patent  Nausea & Vomiting: no nausea and no vomiting  Complications: no  Cardiovascular status: hemodynamically stable  Respiratory status: acceptable  Hydration status: euvolemic

## 2021-02-17 NOTE — TELEPHONE ENCOUNTER
Pt is in the [facility] and was wondering if dr. Azam Bowens would be coming to see him before he leaves today.  Please advise [phone]

## 2021-02-17 NOTE — PROGRESS NOTES
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 1. Upper GI bleed    2. Anemia, unspecified type        Admit Date: 2/16/2021    Subjective:       No acute events overnight. Pt seen at bedside resting comfortably. Pt denies F/C/N/V, HA, fatigue, CP, dyspnea, abdominal pain, constipation/diarrhea, and urinary symptoms. He states that he was able to drink a portion of the bowel prep until her no longer able to drink more. He has not had any BM since admission.      Scheduled Meds:   pantoprazole  40 mg Oral QAM AC    polyethylene glycol  2,000 mL Oral Once    polyethylene glycol  2,000 mL Oral Once    atorvastatin  10 mg Oral Daily    sodium chloride flush  10 mL Intravenous 2 times per day       Continuous Infusions:   sodium chloride      sodium chloride 75 mL/hr at 02/16/21 2302    sodium chloride         PRN Meds:  sodium chloride, sodium chloride flush, promethazine **OR** ondansetron, polyethylene glycol, acetaminophen **OR** acetaminophen, sodium chloride      Objective:       Patient Vitals for the past 24 hrs:   BP Temp Temp src Pulse Resp SpO2   02/17/21 0840 127/69 97.6 °F (36.4 °C) Oral 70 17 100 %   02/17/21 0330 116/72 98 °F (36.7 °C) Oral 80 18 --   02/16/21 2300 125/80 97.8 °F (36.6 °C) Oral 82 16 98 %   02/16/21 2230 125/75 98 °F (36.7 °C) Oral 82 18 --   02/16/21 2215 133/73 97.5 °F (36.4 °C) Oral 80 16 --   02/16/21 2200 138/75 98 °F (36.7 °C) Oral 81 16 --   02/16/21 2145 139/81 97.8 °F (36.6 °C) Oral 84 17 --   02/16/21 2130 (!) 143/76 98 °F (36.7 °C) Oral 82 18 --   02/16/21 2115 129/74 97.8 °F (36.6 °C) Oral 81 18 --   02/16/21 2100 129/72 98 °F (36.7 °C) Oral 82 18 --   02/16/21 2045 98/72 98 °F (36.7 °C) Oral 84 18 --   02/16/21 2030 123/78 98 °F (36.7 °C) Oral 83 18 99 %   02/16/21 2026 123/78 97.8 °F (36.6 °C) Oral 82 16 98 %   02/16/21 1648 116/69 97.8 °F (36.6 °C) Oral 84 16 97 %   02/16/21 1630 131/77 97 °F (36.1 °C) -- 80 16 95 %   21 1615 131/78 -- -- 80 18 100 %   21 1600 138/77 -- -- 80 13 100 %   21 1558 -- -- -- 80 -- --   21 1545 130/78 -- -- 88 12 100 %   21 1537 135/78 97.2 °F (36.2 °C) Temporal 89 14 99 %   21 1338 (!) 122/57 97.7 °F (36.5 °C) Oral 64 18 98 %   21 1213 138/72 97.7 °F (36.5 °C) Oral 66 16 100 %   21 1130 132/75 98 °F (36.7 °C) -- 70 16 100 %   21 1119 (!) 140/79 97.7 °F (36.5 °C) -- 80 16 100 %   21 1115 137/74 97.6 °F (36.4 °C) -- 72 16 --   21 1109 137/74 97.6 °F (36.4 °C) -- 72 16 100 %   21 0930 138/79 -- -- -- -- --   21 0924 (!) 149/74 97.9 °F (36.6 °C) Oral 79 18 98 %       Exam:  VITALS:  /69   Pulse 70   Temp 97.6 °F (36.4 °C) (Oral)   Resp 17   SpO2 100%   TEMPERATURE:  Current - Temp: 97.6 °F (36.4 °C); Max - Temp  Av.7 °F (36.5 °C)  Min: 96.8 °F (36 °C)  Max: 98 °F (36.7 °C)    NAD  General appearance: alert, appears stated age, cooperative and no distress  Head: Normocephalic, without obvious abnormality, atraumatic  Neck: supple, symmetrical, trachea midline and thyroid not enlarged, symmetric, no tenderness/mass/nodules  CVS:  RRR, Nl s1s2  Lungs CTA Bilaterally, normal effort  Abdomen: soft, non-tender; bowel sounds normal; no masses,  no organomegaly  AAOx3, No asterixis or encephalopathy  Extremities: No edema. Recent Labs     02/15/21  1044 21  0937 21  1510 21  0316   WBC 8.2 6.0 4.4  --    HGB 7.0* 6.9* 7.8* 9.1*   HCT 22.7* 22.5* 24.7* 27.9*   MCV 88.3 89.2 86.3  --     201 141  --      Recent Labs     02/15/21  1044 21  0937 21  0324    136 138   K 4.6 4.2 4.7    101 104   CO2 25 28 27   PHOS  --   --  4.4   BUN 44* 42* 34*   CREATININE 1.4* 1.3 1.3     Recent Labs     02/15/21  1044   AST 27   ALT 11   BILITOT <0.2   ALKPHOS 51     No results for input(s): LIPASE, AMYLASE in the last 72 hours.   Recent Labs     02/15/21  1044

## 2021-02-17 NOTE — FLOWSHEET NOTE
02/17/21 1216   Encounter Summary   Services provided to: Patient and family together   Referral/Consult From: Patient; Family   Continue Visiting   (es 2/17)   Complexity of Encounter Moderate   Length of Encounter 15 minutes   Routine   Type Initial   Assessment Approachable   Intervention Active listening;Prayer   Outcome Engaged in conversation;Receptive

## 2021-02-17 NOTE — ANESTHESIA PRE PROCEDURE
Department of Anesthesiology  Preprocedure Note       Name:  Terence Olivier MD   Age:  82 y.o.  :  1935                                          MRN:  [id number]         Date:  2021      Surgeon: Zacahry Velazquez):  Иван Daniels MD    Procedure: Procedure(s):  COLONOSCOPY DIAGNOSTIC/STOMA    Medications prior to admission:   Prior to Admission medications    Medication Sig Start Date End Date Taking? Authorizing Provider   omeprazole (PRILOSEC) 40 MG delayed release capsule TAKE ONE CAPSULE BY MOUTH DAILY 21  Yes Elsa Heaton MD   atorvastatin (LIPITOR) 10 MG tablet TAKE ONE TABLET BY MOUTH DAILY 20  Yes Elsa Heaton MD   Alpha Lipoic Acid-Biotin 300-333 MG-MCG CAPS 300 mg   Yes Historical Provider, MD   warfarin (COUMADIN) 5 MG tablet TAKE ONE TABLET BY MOUTH ONCE NIGHTLY 20  Yes Elsa Heaton MD   Coenzyme Q10 (COQ10) 400 MG CAPS Take 1 capsule by mouth   Yes Historical Provider, MD   Simethicone 80 MG TABS Take 80 tablets by mouth 3 times daily. Patient taking differently: Take 125 mg by mouth 3 times daily. 3/23/15  Yes JS Go - CNS   warfarin (COUMADIN) 1 MG tablet Take 1 mg by mouth. Yes Historical Provider, MD   vitamin B-12 (CYANOCOBALAMIN) 1000 MCG tablet Take 1,000 mcg by mouth daily. Yes Historical Provider, MD   docusate sodium (COLACE) 100 MG capsule Take 100 mg by mouth daily. One  Capsule daily 7/20/10  Yes Historical Provider, MD   polyethylene glycol (GLYCOLAX) powder Take 17 g by mouth 2 times daily    Yes Historical Provider, MD   phytonadione (VITAMIN K) 5 MG tablet Take 1 tablet by mouth once for 1 dose 20  Elsa Heaton MD   calcium carbonate (OSCAL) 500 MG TABS tablet Take 500 mg by mouth daily    Historical Provider, MD   Magnesium 65 MG TABS Take by mouth    Historical Provider, MD   sildenafil (VIAGRA) 50 MG tablet Take 50 mg by mouth as needed for Erectile Dysfunction.     Historical Provider, MD       Current medications: Current Facility-Administered Medications   Medication Dose Route Frequency Provider Last Rate Last Admin    pantoprazole (PROTONIX) tablet 40 mg  40 mg Oral QAM AC Alex Magaña MD   40 mg at 02/17/21 7961    polyethylene glycol (GoLYTELY) solution 2,000 mL  2,000 mL Oral Once Alex Magaña MD        polyethylene glycol (GoLYTELY) solution 2,000 mL  2,000 mL Oral Once Alex Magaña MD        0.9 % sodium chloride infusion   Intravenous PRN Alex Magñaa MD        atorvastatin (LIPITOR) tablet 10 mg  10 mg Oral Daily Alex Magaña MD   10 mg at 02/16/21 1430    sodium chloride flush 0.9 % injection 10 mL  10 mL Intravenous 2 times per day Alex Magaña MD   10 mL at 02/17/21 0835    sodium chloride flush 0.9 % injection 10 mL  10 mL Intravenous PRN Alex Magaña MD        promethazine (PHENERGAN) tablet 12.5 mg  12.5 mg Oral Q6H PRN Alex Magaña MD        Or    [facility]ron [facility] injection 4 mg  4 mg Intravenous Q6H PRN Alex Magaña MD        polyethylene glycol [facility]) packet 17 g  17 g Oral Daily PRN Alex Magaña MD        acetaminophen (TYLENOL) tablet 650 mg  650 mg Oral Q6H PRN Alex Magaña MD        Or    acetaminophen (TYLENOL) suppository 650 mg  650 mg Rectal Q6H PRN Alex Magaña MD        0.9 % sodium chloride infusion   Intravenous Continuous Alex Magaña MD 75 mL/hr at 02/16/21 2302 New Bag at 02/16/21 2302    0.9 % sodium chloride infusion   Intravenous PRN Mirian Cowden, MD           Allergies:     Allergies   Allergen Reactions    No Known Allergies        Problem List:    Patient Active Problem List   Diagnosis Code    Esophageal cancer ([address] C15.9    Asthma J45.909    Aortic valve disorder I35.9    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    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, 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    Nausea & vomiting R11.2    Patient underweight R63.6    Hernia, femoral, Substance Use Topics    Alcohol use: Yes     Alcohol/week: 0.0 standard drinks     Comment: occassionally                                Counseling given: Not Answered      Vital Signs (Current):   Vitals:    02/16/21 2230 02/16/21 2300 02/17/21 0330 02/17/21 0840   BP: 125/75 125/80 116/72 127/69   Pulse: 82 82 80 70   Resp: 18 16 18 17   Temp: 98 °F (36.7 °C) 97.8 °F (36.6 °C) 98 °F (36.7 °C) 97.6 °F (36.4 °C)   TempSrc: Oral Oral Oral Oral   SpO2:  98%  100%                                              BP Readings from Last 3 Encounters:   02/17/21 127/69   02/16/21 135/71   12/18/20 120/70       NPO Status: Time of last liquid consumption: 0000                        Time of last solid consumption: 0000                        Date of last liquid consumption: 02/15/21                        Date of last solid food consumption: 02/15/21    BMI:   Wt Readings from Last 3 Encounters:   12/18/20 110 lb (49.9 kg)   11/18/20 107 lb (48.5 kg)   09/15/20 107 lb (48.5 kg)     There is no height or weight on file to calculate BMI.    CBC:   Lab Results   Component Value Date    WBC 4.4 02/16/2021    RBC 2.86 02/16/2021    RBC 4.41 06/26/2017    HGB 9.1 02/17/2021    HCT 27.9 02/17/2021    MCV 86.3 02/16/2021    RDW 14.7 02/16/2021     02/16/2021       CMP:   Lab Results   Component Value Date     02/17/2021    K 4.7 02/17/2021    K 4.2 02/16/2021     02/17/2021    CO2 27 02/17/2021    BUN 34 02/17/2021    CREATININE 1.3 02/17/2021    GFRAA >60 02/17/2021    GFRAA >60 05/10/2013    AGRATIO 1.2 02/15/2021    LABGLOM 53 02/17/2021    LABGLOM >60>60 01/10/2012    GLUCOSE 121 02/17/2021    GLUCOSE 123 09/26/2016    PROT 6.9 02/15/2021    PROT 6.8 09/26/2016    CALCIUM 8.1 02/17/2021    BILITOT <0.2 02/15/2021    ALKPHOS 51 02/15/2021    AST 27 02/15/2021    ALT 11 02/15/2021       POC Tests: No results for input(s): POCGLU, POCNA, POCK, POCCL, POCBUN, POCHEMO, POCHCT in the last 72 hours.     Coags:   Lab Results   Component Value Date    PROTIME 25.5 02/17/2021    INR 2.18 02/17/2021    APTT 49.3 02/16/2021       HCG (If Applicable): No results found for: PREGTESTUR, PREGSERUM, HCG, HCGQUANT     ABGs:   Lab Results   Component Value Date    PHART 7.35 04/27/2015    PO2ART 104 04/27/2015    BER5MEY 40 04/27/2015    SAS5FEK 21 04/27/2015    BEART -3.5 04/27/2015    S9KLGBIV 98 04/27/2015        Type & Screen (If Applicable):  Lab Results   Component Value Date    LABABO O 05/29/2012    [address] Positive 05/29/2012       Drug/Infectious Status (If Applicable):  No results found for: HIV, HEPCAB    COVID-19 Screening (If Applicable):   Lab Results   Component Value Date    COVID19 Not Detected 11/19/2020    COVID19 NOT DETECTED 08/19/2020         Anesthesia Evaluation  Patient summary reviewed history of anesthetic complications:   Airway: Mallampati: III  TM distance: >3 FB   Neck ROM: full  Mouth opening: > = 3 FB Dental: normal exam         Pulmonary:   (+) asthma:                            Cardiovascular:    (+) hypertension:, CAD:, CABG/stent:,                ROS comment:  Summary   Normal left ventricle size, wall thickness, and systolic function with an   estimated ejection fraction of 55-60%. No regional wall motion abnormalities   are seen. Indeterminate diastolic function. A mechanical artificial aortic valve appears well seated with a maximum   velocity of 1.7m/s and a mean gradient of 7mmHg. There was a mild aortic regurgitation, possibly radha valvular. Valve is well   seated, no rocking motion noted. LV diastolic diameter within normal limits. Mild tricuspid regurgitation. Estimated pulmonary artery systolic pressure is normal at 22 mmHg assuming a   right atrial pressure of 3 mmHg. Normal right ventricular size. RV function is reduced.    TAPSE 1.63 cm   RV S velocity 8.24cm/s      Signature      ------------------------------------------------------------------   Electronically signed by Nataliya Zavaleta William Sal MD (Interpreting   physician) on 11/20/2018 at 12:12 PM   ------------------------------------------------------------------     Neuro/Psych:               GI/Hepatic/Renal:   (+) renal disease: CRI,          TYRA comment: Hx of esophageal ca   Gastroparesis hx prostate Ca  GI bleed   Pancreatitis. Endo/Other:                      ROS comment: 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 Abdominal:           Vascular:                                        Anesthesia Plan      MAC     ASA 3       Induction: intravenous. Anesthetic plan and risks discussed with patient.                       Dannie Norton MD   2/17/2021

## 2021-02-17 NOTE — CONSULTS
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 He presented with concerns for GI bleed. He has  history of esophageal adenocarcinoma s/p partial esophagectomy and gastrectomy and chemoradiation, CABG, and mechanical aortic valve, currently on Coumadin therapy. Patient reports that for approximately the past 1 month he has been experiencing dark stools. For the past several weeks, he has been having increasing fatigue and generalized malaise. Recently had blood work completed at PCP office and was found to have a hemoglobin level of 7.0. Patient was then instructed to go the ED . Seen by GI and had EGD yesterday without signs of active bleeding. He did receive 1 unit PRBCs. He continued to feel generally weak even after receiving blood product per the consulting team.     Patient tells me that does have peripheral neuropathy folowing chemotherapy. He has never seen a neurologist for this. He believes he started developing weakness in his arms and legs following chemotherapy in 2009 . He believes his weakness is due to peripheral neuropathy. Denies any sensory deficits. States he feels the ground well when he walks. Over the past year, he noticed that his knees would buckle after he initially stood up and went to see ortho for this. Also reports balance difficulty with walking. Apparently an EMG was ordered and was interpreted as peripheral neuropathy of the BUE and BLE extremities. He states that the neuropathy has also caused atrophy of his hand muscles. There was question that his neuropathy may be worsening and neurology was then consulted.   Per the patient, he thinks that his feeling of worsening generalized weakness is likely related to his anemia and GI bleed. He is unsure if he feels improvement after receiving blood products because he has not been up to walk around after. I am unable to find documentation of his visit with ortho last year. I did find a note from a visit with ortho in 2013. At that time, he was seen for healing nondisplaced vertical lateral 1/3 patella fracture on the left. Unfortunately , I am unable to find EMG results with chart review. Per PCP note in 2019 \" He was thinking about getting his left knee replaced, went to an orthopedist and then realized that he's got a few comorbid conditions that he wanted looked at prior to surgery. He also stated that he got an EMG test from his orthopedist for generalized muscular atrophy who found that the patient has peripheral neuropathy in his bilateral legs and some in his arms\". He is not on any medication for his neuropathy. He does carry a diagnosis of osteoporosis per PCP notes, and receives injections for this.      Medical History:  Past Medical History:   Diagnosis Date    Anemia     Aortic valve disorders     stenosis    Aspiration pneumonia ([address] 4/27/2015    Erectile dysfunction     Esophageal cancer ([address] 10/18/2012    Hernia, femoral, bilateral 5/12/2014    Hyperlipidemia     Osteoporosis, senile 5/20/2014    Pancreatitis 8/1/2013    Patient underweight 8/8/2013    Prostate cancer ([address]     Unspecified essential hypertension      Past Surgical History:   Procedure Laterality Date    APPENDECTOMY      as a child    BONE GRAFT      for saddle nose    CARDIAC VALVE REPLACEMENT  2006    aorta    CHOLECYSTECTOMY      COLONOSCOPY  11/2009    COLONOSCOPY  10/05/15    CORONARY ARTERY BYPASS GRAFT  2006    ESOPHAGUS SURGERY      EYE SURGERY  1990& 1992    cataract bilat removal     GASTRECTOMY      PROSTATE SURGERY      seeds    TONSILLECTOMY      UPPER GASTROINTESTINAL ENDOSCOPY N/A 2/16/2021    EGD BIOPSY performed by Irma Ruffin MD at [facility] PROSTATE/TRANSRECTAL VOL STUDY BRACHYTHERAPY       Medications Prior to Admission: omeprazole (PRILOSEC) 40 MG delayed release capsule, TAKE ONE CAPSULE BY MOUTH DAILY  atorvastatin (LIPITOR) 10 MG tablet, TAKE ONE TABLET BY MOUTH DAILY  Alpha Lipoic Acid-Biotin 300-333 MG-MCG CAPS, 300 mg  warfarin (COUMADIN) 5 MG tablet, TAKE ONE TABLET BY MOUTH ONCE NIGHTLY  Coenzyme Q10 (COQ10) 400 MG CAPS, Take 1 capsule by mouth  Simethicone 80 MG TABS, Take 80 tablets by mouth 3 times daily. (Patient taking differently: Take 125 mg by mouth 3 times daily. )  warfarin (COUMADIN) 1 MG tablet, Take 1 mg by mouth.  vitamin B-12 (CYANOCOBALAMIN) 1000 MCG tablet, Take 1,000 mcg by mouth daily. docusate sodium (COLACE) 100 MG capsule, Take 100 mg by mouth daily. One  Capsule daily  polyethylene glycol (GLYCOLAX) powder, Take 17 g by mouth 2 times daily   phytonadione (VITAMIN K) 5 MG tablet, Take 1 tablet by mouth once for 1 dose  calcium carbonate (OSCAL) 500 MG TABS tablet, Take 500 mg by mouth daily  Magnesium 65 MG TABS, Take by mouth  sildenafil (VIAGRA) 50 MG tablet, Take 50 mg by mouth as needed for Erectile Dysfunction. Allergies   Allergen Reactions    No Known Allergies      Family History   Problem Relation Age of Onset    Other Mother         bleeding peptic ulcer    Heart Disease Mother     Other Father         cardiovascular disease    Stroke Father     Elevated Lipids Father     Hypertension Father      Social History     Tobacco Use   Smoking Status Never Smoker   Smokeless Tobacco Never Used     Social History     Substance and Sexual Activity   Drug Use No     Social History     Substance and Sexual Activity   Alcohol Use Yes    Alcohol/week: 0.0 standard drinks    Comment: occassionally       ROS:  Constitutional- No weight loss or fevers  Eyes- No diplopia. No photophobia. Ears/nose/throat- No dysphagia. No Dysarthria  Cardiovascular- No palpitations.  No chest pain  Respiratory- No dyspnea. No Cough  Gastrointestinal- No Abdominal pain. No Vomiting. Genitourinary- No incontinence. No urinary retention  Musculoskeletal- No myalgia. No arthralgia  Skin- No rash. No easy bruising. Psychiatric- No depression. No anxiety  Endocrine- No diabetes. No thyroid issues. Hematologic- + bleeding difficulty. + fatigue  Neurologic- + weakness. No Headache. Exam:  Blood pressure 129/73, pulse 62, temperature 97.4 °F (36.3 °C), temperature source Tympanic, resp. rate 12, SpO2 97 %. Constitutional    Vital signs: BP, HR, and RR reviewed   General Alert, no distress, well-nourished  Eyes: fundoscopic exam not attempted   Cardiovascular: pulses symmetric in all 4 extremities. No peripheral edema. Psychiatric: cooperative with examination, no  psychotic behavior noted. Neurologic  Mental status: Eyes open spontaneously   orientation to person, place, month, year    General fund of knowledge grossly intact   Memory grossly intact   Attention intact as able to attend well to the exam   Language fluent in conversation   Comprehension intact; follows simple commands  Cranial nerves:   CN2: Visual Fields full w/o extinction on confrontational testing  CN 3,4,6: extraocular muscles intact,  CN5: facial sensation symmetric   CN7:face symmetric without dysarthria  CN9: palate elevated symmetrically  CN11: trap full strength on shoulder shrug  CN12: tongue midline with protrusion  Strength: No prontator drift. 5/5 strength in all 4 extremities   Deep tendon reflexes: 2+ BUEs. 1+ BLEs  Sensory: light touch intact in all 4 extremities, decreased vibratory sensation distally BLEs- does not feel anything in feet when tested, patchy sensation to sharp in BUE and BLE that does not follow a specific dermatome   Cerebellar/coordination: finger nose finger normal without ataxia  Tone: normal in all 4 extremities  Gait: Deferred for safety       Labs  BUN: 34  Creatinine: 1.   Glucose: 121  LFTs ok  WBC: 4.4      Studies  None    Impression:  1. Peripheral neuropathy  2. GI bleed, suspected  3. Generalized weakness  4. 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 He has exam findings consistent with large fiber peripheral neuropathy. 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 Strength is 5/5 throughout. however.     Recommendations:  - No further inpatient neurology recommendations  - PT/OT, rehab as able  - Can follow up with Dr. Gema Nieves at [facility] Neurology shortly after discharge for peripheral neuropathy        A copy of this note was provided for MD Alejandro Vuong [address] Neurology  [phone]

## 2021-02-17 NOTE — DISCHARGE SUMMARY
[address] [place] [address]  DISCHARGE SUMMARY    Patient ID: Terence Olivier MD                                             Discharge Date: 2/18/2021   Patient's PCP: Elsa Heaton MD                                          Discharge Physician: Natalio Viera MD  Admit Date: 2/16/2021   Admitting Physician: Jc Valdez MD    PROBLEMS DURING HOSPITALIZATION:  Hospital Problems           Last Modified POA    * (Principal) UGI bleed 2/16/2021 Yes    Aortic valve disorder 2/16/2021 Yes    Overview Signed 5/28/2012  7:42 PM by James Anderson MD     Mechanical aortic valve placed in 2006         History of esophageal cancer 2/16/2021 Yes    S/P AVR (aortic valve replacement) 2/16/2021 Yes    Coronary 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 ([address] 2/16/2021 Yes    Iron deficiency anemia 2/16/2021 Yes    Anemia 2/16/2021 Yes    GI bleed 2/16/2021 Yes        DISCHARGE DIAGNOSES:   Suspected GI bleed  Esophageal adenocarcinoma with esophagectomy  Aortic valve replacement for bicuspid aortic valve  Coronary artery disease s/p CABG in 2006  Prostate cancer    Hospital Course:    60-year-old male with a past medical history of coronary artery disease status post CABG in 2006, prostate cancer, aortic valve replacement w warfarin and esophageal adenocarcinoma with esophagectomy followed by chemo and radiation therapy who presented with dark stools.  Patient reported that he has always had weakness from his neuropathy due to chemo however over the past 2 to 3 weeks he has developed this new onset weakness where he cannot walk  to his bathroom.  His hemoglobin on presentation was 6.9 which went up to 9.1 after 2 units.  An EGD was done which showed gastric changes secondary to radiation however there was no active bleeding.  There were some small bowel lesions likely lymphangioma was for which biopsies were collected.  Patient had a colonoscopy done which showed 2 small polyps that were removed. No obstructive lesions or large lesions identified. The exam was not adequate to detect angioectasias etc however the suspicion for bleeding from small bowel angiectasias remained high.  He was instructed to follow up with his PCP or GI doctor to schedule outpatient pill endoscopy. For his neuropathy, neurology was consulted who commented weakness was secondary to anemia and if he does not get better with control of anemia he can follow up with neurology as outpatient for PT. His last folate in 2019 was normal as was his thyroid function in 12/18/20 and he was on Vit B 12 tablets at home. After stabilization of his condition he was discharged home with instructions to start iron tablets every other day and follow up with his PCP in 3 days to adjust warfarin dose and schedule outpatient pill endoscopy. Physical Exam:  /63   Pulse 62   Temp 97.3 °F (36.3 °C) (Temporal)   Resp 16   Wt 111 lb (50.3 kg)   SpO2 99%   BMI 19.35 kg/m²   Constitutional:       General: He is not in acute distress.     Appearance: Normal appearance. He is well-developed. He is not ill-appearing or diaphoretic. HENT:      Head: Normocephalic and atraumatic. Eyes:      Conjunctiva/sclera: Conjunctivae normal.      Pupils: Pupils are equal, round, and reactive to light. Neck:      Musculoskeletal: Normal range of motion. Cardiovascular:      Rate and Rhythm: Normal rate and regular rhythm.      Heart sounds: Normal heart OIKLAO. 7/8 systolic ejection murmur. No friction rub. Pulmonary:      Effort: Pulmonary effort is normal. No respiratory distress.      Breath sounds: Normal breath sounds. No wheezing or rales. Abdominal:      General: There is no distension.      Palpations: Abdomen is soft.      Tenderness: There is no abdominal tenderness. There is no guarding or rebound. Genitourinary:     no hematuria  Musculoskeletal: Normal range of motion.    Skin:     General: Skin is warm and dry.   Neurological:      Mental Status: He is alert and oriented to person, place, and time. Psychiatric:         BehaviorMaryellen Dimes     Thought Content: Thought content normal.         Judgment: Judgment normal      Consults: GI, neurology  Significant Diagnostic Studies:   XR ABDOMEN (KUB) (SINGLE AP VIEW)   Final Result      Diaphragmatic hernia on the left as described      No significant bowel dilatation      If concern for obstruction CT recommended. Disposition: home  Discharged Condition: Stable  Follow Up: Primary Care Physician in three days, GI in thee days    DISCHARGE MEDICATION:     Medication List      START taking these medications    ferrous sulfate 325 (65 Fe) MG tablet  Commonly known as: Daniele-Markie  Take 1 tablet by mouth every 48 hours        CHANGE how you take these medications    Simethicone 80 MG Tabs  Take 80 tablets by mouth 3 times daily. What changed: how much to take     warfarin 5 MG tablet  Commonly known as: COUMADIN  Take as directed. If you are unsure how to take this medication, talk to your nurse or doctor. Original instructions: TAKE ONE TABLET BY MOUTH ONCE NIGHTLY  What changed: Another medication with the same name was removed. Continue taking this medication, and follow the directions you see here.         CONTINUE taking these medications    Alpha Lipoic Acid-Biotin 300-333 MG-MCG Caps     atorvastatin 10 MG tablet  Commonly known as: LIPITOR  TAKE ONE TABLET BY MOUTH DAILY     calcium carbonate 500 MG Tabs tablet  Commonly known as: OSCAL     CoQ10 400 MG Caps     docusate sodium 100 MG capsule  Commonly known as: COLACE     Magnesium 65 MG Tabs     omeprazole 40 MG delayed release capsule  Commonly known as: PRILOSEC  TAKE ONE CAPSULE BY MOUTH DAILY     polyethylene glycol 17 GM/SCOOP powder  Commonly known as: GLYCOLAX     Viagra 50 MG tablet  Generic drug: sildenafil     vitamin B-12 1000 MCG tablet  Commonly known as: CYANOCOBALAMIN        STOP taking these medications    phytonadione 5 MG tablet  Commonly known as: VITAMIN K           Where to Get Your Medications      These medications were sent to [facility] [address], [place]  [place], [address]    Phone: [phone]   · ferrous sulfate 325 (65 Fe) MG tablet       Activity: activity as tolerated  Diet: regular diet  Wound Care: none needed    Time Spent on discharge is more than 30 minutes    Signed:  Carlton Charles MD   2/18/2021

## 2021-02-18 ENCOUNTER — ANESTHESIA (OUTPATIENT)
Dept: ENDOSCOPY | Age: 86
DRG: 378 | End: 2021-02-18
Payer: MEDICARE

## 2021-02-18 ENCOUNTER — ANESTHESIA EVENT (OUTPATIENT)
Dept: ENDOSCOPY | Age: 86
DRG: 378 | End: 2021-02-18
Payer: MEDICARE

## 2021-02-18 VITALS
RESPIRATION RATE: 16 BRPM | OXYGEN SATURATION: 100 % | SYSTOLIC BLOOD PRESSURE: 112 MMHG | DIASTOLIC BLOOD PRESSURE: 59 MMHG

## 2021-02-18 VITALS
HEART RATE: 62 BPM | SYSTOLIC BLOOD PRESSURE: 132 MMHG | BODY MASS INDEX: 19.35 KG/M2 | WEIGHT: 111 LBS | OXYGEN SATURATION: 99 % | TEMPERATURE: 97.3 F | DIASTOLIC BLOOD PRESSURE: 63 MMHG

## 2021-02-18 LAB
ALBUMIN SERPL-MCNC: 3.2 G/DL (ref 3.4–5)
ANION GAP SERPL CALCULATED.3IONS-SCNC: 10 MMOL/L (ref 3–16)
BUN BLDV-MCNC: 25 MG/DL (ref 7–20)
CALCIUM SERPL-MCNC: 7.5 MG/DL (ref 8.3–10.6)
CHLORIDE BLD-SCNC: 106 MMOL/L (ref 99–110)
CO2: 26 MMOL/L (ref 21–32)
CREAT SERPL-MCNC: 1.2 MG/DL (ref 0.8–1.3)
GFR AFRICAN AMERICAN: >60
GFR NON-AFRICAN AMERICAN: 58
GLUCOSE BLD-MCNC: 71 MG/DL (ref 70–99)
HCT VFR BLD CALC: 27.6 % (ref 40.5–52.5)
HCT VFR BLD CALC: 27.8 % (ref 40.5–52.5)
HEMOGLOBIN: 8.8 G/DL (ref 13.5–17.5)
HEMOGLOBIN: 8.8 G/DL (ref 13.5–17.5)
MAGNESIUM: 1.9 MG/DL (ref 1.8–2.4)
PHOSPHORUS: 3.2 MG/DL (ref 2.5–4.9)
POTASSIUM SERPL-SCNC: 4 MMOL/L (ref 3.5–5.1)
SODIUM BLD-SCNC: 142 MMOL/L (ref 136–145)

## 2021-02-18 PROCEDURE — 0DBE8ZZ EXCISION OF LARGE INTESTINE, VIA NATURAL OR ARTIFICIAL OPENING ENDOSCOPIC: ICD-10-PCS | Performed by: INTERNAL MEDICINE

## 2021-02-18 PROCEDURE — 6360000002 HC RX W HCPCS: Performed by: NURSE ANESTHETIST, CERTIFIED REGISTERED

## 2021-02-18 PROCEDURE — 88305 TISSUE EXAM BY PATHOLOGIST: CPT

## 2021-02-18 PROCEDURE — 85018 HEMOGLOBIN: CPT

## 2021-02-18 PROCEDURE — 85014 HEMATOCRIT: CPT

## 2021-02-18 PROCEDURE — 83735 ASSAY OF MAGNESIUM: CPT

## 2021-02-18 PROCEDURE — 3700000001 HC ADD 15 MINUTES (ANESTHESIA): Performed by: INTERNAL MEDICINE

## 2021-02-18 PROCEDURE — 36415 COLL VENOUS BLD VENIPUNCTURE: CPT

## 2021-02-18 PROCEDURE — G0378 HOSPITAL OBSERVATION PER HR: HCPCS

## 2021-02-18 PROCEDURE — 7100000010 HC PHASE II RECOVERY - FIRST 15 MIN: Performed by: INTERNAL MEDICINE

## 2021-02-18 PROCEDURE — 80069 RENAL FUNCTION PANEL: CPT

## 2021-02-18 PROCEDURE — 3609010600 HC COLONOSCOPY POLYPECTOMY SNARE/COLD BIOPSY: Performed by: INTERNAL MEDICINE

## 2021-02-18 PROCEDURE — 2500000003 HC RX 250 WO HCPCS: Performed by: NURSE ANESTHETIST, CERTIFIED REGISTERED

## 2021-02-18 PROCEDURE — 7100000011 HC PHASE II RECOVERY - ADDTL 15 MIN: Performed by: INTERNAL MEDICINE

## 2021-02-18 PROCEDURE — 3700000000 HC ANESTHESIA ATTENDED CARE: Performed by: INTERNAL MEDICINE

## 2021-02-18 PROCEDURE — 2709999900 HC NON-CHARGEABLE SUPPLY: Performed by: INTERNAL MEDICINE

## 2021-02-18 PROCEDURE — 99238 HOSP IP/OBS DSCHRG MGMT 30/<: CPT | Performed by: INTERNAL MEDICINE

## 2021-02-18 RX ORDER — LIDOCAINE HYDROCHLORIDE 20 MG/ML
INJECTION, SOLUTION INFILTRATION; PERINEURAL PRN
Status: DISCONTINUED | OUTPATIENT
Start: 2021-02-18 | End: 2021-02-18 | Stop reason: SDUPTHER

## 2021-02-18 RX ORDER — LANOLIN ALCOHOL/MO/W.PET/CERES
200 CREAM (GRAM) TOPICAL ONCE
Status: DISCONTINUED | OUTPATIENT
Start: 2021-02-18 | End: 2021-02-18 | Stop reason: HOSPADM

## 2021-02-18 RX ORDER — FERROUS SULFATE 325(65) MG
325 TABLET ORAL
Qty: 30 TABLET | Refills: 1 | Status: SHIPPED | OUTPATIENT
Start: 2021-02-18 | End: 2022-03-28 | Stop reason: CLARIF

## 2021-02-18 RX ORDER — PROPOFOL 10 MG/ML
INJECTION, EMULSION INTRAVENOUS PRN
Status: DISCONTINUED | OUTPATIENT
Start: 2021-02-18 | End: 2021-02-18 | Stop reason: SDUPTHER

## 2021-02-18 RX ORDER — LANOLIN ALCOHOL/MO/W.PET/CERES
200 CREAM (GRAM) TOPICAL DAILY
Status: DISCONTINUED | OUTPATIENT
Start: 2021-02-18 | End: 2021-02-18

## 2021-02-18 RX ORDER — ONDANSETRON 2 MG/ML
4 INJECTION INTRAMUSCULAR; INTRAVENOUS
Status: DISCONTINUED | OUTPATIENT
Start: 2021-02-18 | End: 2021-02-18 | Stop reason: HOSPADM

## 2021-02-18 RX ORDER — POTASSIUM CHLORIDE 20 MEQ/1
20 TABLET, EXTENDED RELEASE ORAL ONCE
Status: DISCONTINUED | OUTPATIENT
Start: 2021-02-18 | End: 2021-02-18 | Stop reason: HOSPADM

## 2021-02-18 RX ADMIN — PROPOFOL 20 MG: 10 INJECTION, EMULSION INTRAVENOUS at 13:59

## 2021-02-18 RX ADMIN — PROPOFOL 50 MG: 10 INJECTION, EMULSION INTRAVENOUS at 13:53

## 2021-02-18 RX ADMIN — LIDOCAINE HYDROCHLORIDE 50 MG: 20 INJECTION, SOLUTION INFILTRATION; PERINEURAL at 13:53

## 2021-02-18 RX ADMIN — PHENYLEPHRINE HYDROCHLORIDE 100 MCG: 10 INJECTION, SOLUTION INTRAMUSCULAR; INTRAVENOUS; SUBCUTANEOUS at 14:06

## 2021-02-18 RX ADMIN — PROPOFOL 20 MG: 10 INJECTION, EMULSION INTRAVENOUS at 14:07

## 2021-02-18 ASSESSMENT — PULMONARY FUNCTION TESTS
PIF_VALUE: 1
PIF_VALUE: 0
PIF_VALUE: 1

## 2021-02-18 ASSESSMENT — PAIN - FUNCTIONAL ASSESSMENT
PAIN_FUNCTIONAL_ASSESSMENT: 0-10
PAIN_FUNCTIONAL_ASSESSMENT: FACES

## 2021-02-18 ASSESSMENT — PAIN SCALES - GENERAL: PAINLEVEL_OUTOF10: 0

## 2021-02-18 NOTE — PLAN OF CARE
Problem: Falls - Risk of:  Goal: Will remain free from falls  Description: Will remain free from falls  2/18/2021 0008 by Madeline Aguilar RN  Outcome: Met This Shift  Note: Pt a high fall risk due to weakness. Bed is in low position, side rails up, call light and belongings are in reach. Fall wristband present on pts wrist.  Bed alert on, Fall precautions in place. Will continue to monitor. Problem: Bleeding:  Goal: Will show no signs and symptoms of excessive bleeding  Description: Will show no signs and symptoms of excessive bleeding  2/18/2021 0008 by Madeline Aguilar RN  Outcome: Met This Shift  Note: Patient's hemoglobin this AM:   Recent Labs     02/17/21  1537   HGB 9.5*     Patient's platelet count this AM:   Recent Labs     02/16/21  1510       Thrombocytopenia not present at this time. Patient showing no signs or symptoms of active bleeding. Transfusion not indicated at this time. Patient verbalizes understanding of all instructions. Will continue to assess and implement POC. Call light within reach and hourly rounding in place.

## 2021-02-18 NOTE — PROCEDURES
Colonoscopy REPORT    Patient:  Wilson Barrientos MD                  1935    Referring Physician:  Michelle Lowery    Endoscopist: Nicole     Indication:  GI bleed     Medications:  MAC      Pre-Anesthesia Assessment:  I have reviewed and am in agreement with patient history and medication, including previous response to sedation. Prior to the procedure, a History and Physical was performed, and patient medications and allergies were reviewed. The risks and benefits of the procedure and the sedation options and risks were discussed with the patient. Complications included but were not limited to infection, bleeding, perforation, death, and missed lesions. All questions were answered and informed consent was obtained by patient. Patient identification and proposed procedure were verified by the physician and the nurse in the pre-procedure area and in the procedure room. [place]ampatti: III  ASA Grade Assessment: 3    After reviewing the risks and benefits, the patient was deemed in satisfactory condition to undergo the procedure. The anesthesia plan was to use MAC sedation. Immediately prior to administration of medications, the patient was re-assessed for adequacy to receive sedatives and a time out was performed. Patient and healthcare providers were in agreement it was the correct patient and procedure. The heart rate, respiratory rate, oxygen saturations, blood pressure, adequacy of pulmonary ventilation, and response to care were monitored throughout the procedure. The physical status of the patient was re-assessed after the procedure. After adequate sedation was achieved in stepwise fashion a rectal examination was performed and showed stool. The pediatric colonoscope was then advanced atraumatically into the rectum and advanced without difficulty to the cecum. The cecum was identified by the appendiceal orifice the ileocecal valve and other normal anatomy and a picture was obtained.       The scope

## 2021-02-18 NOTE — PROGRESS NOTES
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 1. Upper GI bleed    2. Anemia, unspecified type        Admit Date: 2/16/2021    Subjective:       No acute events overnight. Pt seen at bedside resting comfortably. Pt had poor bowel prep yesterday hence colonosocopy aborted. Reattempt today; pt was able to tolerate 75% of his bowel prep and had 4 BMs. Pt denies F/C/N/V, HA, fatigue, CP, dyspnea, abdominal pain, and urinary symptoms.      Scheduled Meds:   magnesium oxide  200 mg Oral Daily    potassium chloride  20 mEq Oral Once    pantoprazole  40 mg Oral QAM AC    atorvastatin  10 mg Oral Daily    sodium chloride flush  10 mL Intravenous 2 times per day       Continuous Infusions:   sodium chloride      sodium chloride 75 mL/hr at 02/17/21 1114    sodium chloride         PRN Meds:  sodium chloride, sodium chloride flush, promethazine **OR** ondansetron, polyethylene glycol, acetaminophen **OR** acetaminophen, sodium chloride      Objective:       Patient Vitals for the past 24 hrs:   BP Temp Temp src Pulse Resp SpO2 Weight   02/18/21 0821 133/81 98 °F (36.7 °C) Oral 67 18 96 % --   02/18/21 0748 -- -- -- -- -- -- 111 lb (50.3 kg)   02/18/21 0351 126/80 97.5 °F (36.4 °C) Oral 76 16 94 % --   02/17/21 2319 124/75 97.6 °F (36.4 °C) Oral 71 18 95 % --   02/17/21 2104 126/83 98.4 °F (36.9 °C) Oral 69 16 98 % --   02/17/21 1402 129/72 97.8 °F (36.6 °C) Oral 71 18 91 % --   02/17/21 1320 (!) 103/52 98.4 °F (36.9 °C) Tympanic 60 18 100 % --   02/17/21 1305 (!) 95/52 -- -- 61 18 96 % --   02/17/21 1250 (!) 82/44 -- -- 60 18 100 % --   02/17/21 1235 (!) 117/95 -- -- 58 18 100 % --   02/17/21 1201 129/73 97.4 °F (36.3 °C) Tympanic 62 12 97 % --   02/17/21 1049 115/71 97.6 °F (36.4 °C) Oral 64 16 97 % --       Exam:  VITALS:  /81   Pulse 67   Temp 98 °F (36.7 °C) (Oral)   Resp 18   Wt 111 lb (50.3 kg)   SpO2 96%   BMI 19.35 kg/m²   TEMPERATURE: Current - Temp: 98 °F (36.7 °C); Max - Temp  Av.8 °F (36.6 °C)  Min: 97.4 °F (36.3 °C)  Max: 98.4 °F (36.9 °C)    NAD  General appearance: alert, appears stated age, cooperative and no distress  Head: Normocephalic, without obvious abnormality, atraumatic  Neck: supple, symmetrical, trachea midline and thyroid not enlarged, symmetric, no tenderness/mass/nodules  CVS:  RRR, Nl s1s2  Lungs CTA Bilaterally, normal effort  Abdomen: soft, non-tender; bowel sounds normal; no masses,  no organomegaly  AAOx3, No asterixis or encephalopathy  Extremities: No edema. Recent Labs     02/15/21  1044 21  0937 21  1510 21  1510 21  1537 21  0033 21  0836   WBC 8.2 6.0 4.4  --   --   --   --    HGB 7.0* 6.9* 7.8*   < > 9.5* 8.8* 8.8*   HCT 22.7* 22.5* 24.7*   < > 29.2* 27.6* 27.8*   MCV 88.3 89.2 86.3  --   --   --   --     201 141  --   --   --   --     < > = values in this interval not displayed. Recent Labs     21  0937 21  0324 21  0403    138 142   K 4.2 4.7 4.0    104 106   CO2 28 27 26   PHOS  --  4.4 3.2   BUN 42* 34* 25*   CREATININE 1.3 1.3 1.2     Recent Labs     02/15/21  1044   AST 27   ALT 11   BILITOT <0.2   ALKPHOS 51     No results for input(s): LIPASE, AMYLASE in the last 72 hours. Recent Labs     02/15/21  1044 21  0937 21  0324   PROT 6.9  --   --    INR  --  2.43* 2.18*     No results for input(s): PTT in the last 72 hours. No results for input(s): OCCULTBLD in the last 72 hours. Radiology review:   KUB (21)  Impression       Diaphragmatic hernia on the left as described       No significant bowel dilatation       If concern for obstruction CT recommended. Assessment:       Principal Problem:    UGI bleed  Active Problems:     Aortic valve disorder    History of esophageal cancer    S/P AVR (aortic valve replacement)    Coronary 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 ([address]    Iron deficiency anemia    Anemia    GI bleed  Resolved Problems:    * No resolved hospital problems.  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

## 2021-02-18 NOTE — PROGRESS NOTES
RN gave report to floor nurse, Jenifer Camargo. Pt resting quietly on stretcher with no complaints of pain. Stretcher in lowest/locked position with side rails up x2. Endo RN at bedside and will continue to monitor.

## 2021-02-18 NOTE — PROGRESS NOTES
Reviewed discharge instructions with patient and  spouse. Reviewed discharge medications including dosing, schedule, indication, and adverse reactions. Reviewed which medications were already taken today and next dosage due for each medication. Patient verbalized understanding of all instructions and questions were answered to his. satisfaction. Signed discharge instructions were given to the patient and a copy placed in the paper-lite chart. Patient discharged to home per wheelchair with spouse.       Tuyet Mackay

## 2021-02-18 NOTE — CARE COORDINATION
Case Management Assessment           Initial Evaluation                Date / Time of Evaluation: 2/18/2021 3:43 PM                 Assessment Completed by: Ginger Sousa    Patient Name: Griselda Redwood, MD     YOB: 1935  Diagnosis: GI bleed [K92.2]     Date / Time: 2/16/2021  9:18 AM    Patient Admission Status: Inpatient    If patient is discharged prior to next notation, then this note serves as note for discharge by case management. Current PCP: Christopher Granados MD  Clinic Patient: No    Chart Reviewed: Yes  Patient/ Family Interviewed: Yes    Initial assessment completed at bedside with: patient    Hospitalization in the last 30 days: No    Emergency Contacts:  Extended Emergency Contact Information  Primary Emergency Contact: Yvrose Ricketts  Address: [address] [place] [address], [address] [place] [address] Phone: [phone]  Work Phone: [phone]  Mobile Phone: [phone]  Relation: Spouse  Secondary Emergency Contact: [address] Phone: [phone]  Relation: Child    Advance Directives:   Code Status: Full Code    Financial  Payor: Eran Elder / Plan: [employer] PPO / Product Type: Medicare /     Pre-cert required for SNF: Yes    Pharmacy    [facility] [address], [place]  [place]  [address] [address]  Phone: [phone] Fax: [address] [place] [address], [address] [facility] [phone] [facility] [phone]  [address] [place] [address]  Phone: [phone] Fax: [phone]      Potential assistance Purchasing Medications: Potential Assistance Purchasing Medications: No  Does Patient want to participate in local refill/ meds to beds program?: Not Assessed    Meds To Beds General Rules:  1. Can ONLY be done Monday- Friday between 8:30am-5pm  2. Prescription(s) must be in pharmacy by 3pm to be filled same day  3. Copy of patient's insurance/ prescription drug card and patient face sheet must be sent along with the prescription(s)  4. Cost of Rx cannot be added to hospital bill. If financial assistance is needed, please contact unit  or ;  or  CANNOT provide pharmacy voucher for patients co-pays  5.  Patients can then  the prescription on their way out of the hospital at discharge, or pharmacy can deliver to the bedside if staff is available. (payment due at time of pick-up or delivery - cash, check, or card accepted)     Able to afford home medications/ co-pay costs: Yes    ADLS  Support Systems: Spouse/Significant Other, Children, Family Members    PT AM-PAC:   /24  OT AM-PAC:   /24    [place]: two stories, but live on the first floor with his wife  Steps: yes    Plans to RETURN to current housing: Yes  Barriers to RETURNING to current housing: medical clearance    Home Care Information  Currently ACTIVE with [address]: No  Home Care Agency: Not Applicable    Currently ACTIVE with [place] on Aging: No  Passport/ Waiver: No  Passport/ Waiver Services: Not Applicable      Durable Medical Equipment  DME Provider: none  Equipment: none    Home Oxygen and [address] [place] prior to admission: No  [address]: Not Applicable  Other Respiratory Equipment: none    Informed of need to bring portable home O2 tank on day of DISCHARGE for nursing to connect prior to leaving: No  Verbalized agreement/Understanding: No  Person to bring portable tank at discharge: none    Dialysis  Active with HD/PD prior to admission: No  Nephrologist: none    HD Center:  Not Applicable    DISCHARGE PLAN:  Disposition: Home- No Services Needed    Transportation PLAN for discharge: family     Factors facilitating achievement of predicted outcomes: Family support, Motivated and Cooperative    Barriers to discharge: medical clearance    Additional Case Management Notes: Patient is from home with his wife. He is independent at baseline, but has a walker at home in the event that he needs it. He declines any needs at home at this time. The Plan for Transition of Care is related to the following treatment goals of GI bleed [K92.2]    The Patient and/or patient representative Vladimir Coleman and his family were provided with a choice of provider and agrees with the discharge plan Yes    Freedom of choice list was provided with basic dialogue that supports the patient's individualized plan of care/goals and shares the quality data associated with the providers.  Yes    Care Transition patient: Yes    Yarelis Camargo [facility]  Case Management Department  Ph: [phone]   Fax: [phone]

## 2021-02-18 NOTE — ANESTHESIA PRE PROCEDURE
Department of Anesthesiology  Preprocedure Note       Name:  Pradeep Smith MD   Age:  82 y.o.  :  1935                                          MRN:  [id number]         Date:  2021      Surgeon: Israel Kelly):  Vickie Grace MD    Procedure: Procedure(s):  COLONOSCOPY DIAGNOSTIC    Medications prior to admission:   Prior to Admission medications    Medication Sig Start Date End Date Taking? Authorizing Provider   omeprazole (PRILOSEC) 40 MG delayed release capsule TAKE ONE CAPSULE BY MOUTH DAILY 21  Yes Suad Paniagua MD   atorvastatin (LIPITOR) 10 MG tablet TAKE ONE TABLET BY MOUTH DAILY 20  Yes Suad Paniagua MD   Alpha Lipoic Acid-Biotin 300-333 MG-MCG CAPS 300 mg   Yes Historical Provider, MD   warfarin (COUMADIN) 5 MG tablet TAKE ONE TABLET BY MOUTH ONCE NIGHTLY 20  Yes Suad Paniagua MD   Coenzyme Q10 (COQ10) 400 MG CAPS Take 1 capsule by mouth   Yes Historical Provider, MD   Simethicone 80 MG TABS Take 80 tablets by mouth 3 times daily. Patient taking differently: Take 125 mg by mouth 3 times daily. 3/23/15  Yes JS Go - CNS   warfarin (COUMADIN) 1 MG tablet Take 1 mg by mouth. Yes Historical Provider, MD   vitamin B-12 (CYANOCOBALAMIN) 1000 MCG tablet Take 1,000 mcg by mouth daily. Yes Historical Provider, MD   docusate sodium (COLACE) 100 MG capsule Take 100 mg by mouth daily. One  Capsule daily 7/20/10  Yes Historical Provider, MD   polyethylene glycol (GLYCOLAX) powder Take 17 g by mouth 2 times daily    Yes Historical Provider, MD   phytonadione (VITAMIN K) 5 MG tablet Take 1 tablet by mouth once for 1 dose 20  Suad Paniagua MD   calcium carbonate (OSCAL) 500 MG TABS tablet Take 500 mg by mouth daily    Historical Provider, MD   Magnesium 65 MG TABS Take by mouth    Historical Provider, MD   sildenafil (VIAGRA) 50 MG tablet Take 50 mg by mouth as needed for Erectile Dysfunction.     Historical Provider, MD       Current medications:    Current Facility-Administered Medications   Medication Dose Route Frequency Provider Last Rate Last Admin    potassium chloride (KLOR-CON M) extended release tablet 20 mEq  20 mEq Oral Once Livia Hale MD        magnesium oxide (MAG-OX) tablet 200 mg  200 mg Oral Once Livia Hale MD        pantoprazole (PROTONIX) tablet 40 mg  40 mg Oral QAM AC Alex Magaña MD   40 mg at 02/17/21 0834    0.9 % sodium chloride infusion   Intravenous PRN Alex Magaña MD        atorvastatin (LIPITOR) tablet 10 mg  10 mg Oral Daily Alex Magaña MD   10 mg at 02/17/21 2151    sodium chloride flush 0.9 % injection 10 mL  10 mL Intravenous 2 times per day Alex Magaña MD   10 mL at 02/17/21 2138    sodium chloride flush 0.9 % injection 10 mL  10 mL Intravenous PRN Alex Magaña MD        promethazine (PHENERGAN) tablet 12.5 mg  12.5 mg Oral Q6H PRN Alex Magaña MD        Or    [facility] injection 4 mg  4 mg Intravenous Q6H PRN Alex Magaña MD        polyethylene glycol [facility]) packet 17 g  17 g Oral Daily PRN Alex Magaña MD        acetaminophen (TYLENOL) tablet 650 mg  650 mg Oral Q6H PRN Alex Magaña MD        Or    acetaminophen (TYLENOL) suppository 650 mg  650 mg Rectal Q6H PRN Alex Magaña MD        0.9 % sodium chloride infusion   Intravenous Continuous Alex Magaña MD 75 mL/hr at 02/17/21 1114 New Bag at 02/17/21 1114    0.9 % sodium chloride infusion   Intravenous PRN Mirian Cowden, MD           Allergies:     Allergies   Allergen Reactions    No Known Allergies        Problem List:    Patient Active Problem List   Diagnosis Code    Esophageal cancer (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    Gastroparesis K31.84    Iron deficiency anemia D50.9    Anemia D64.9    Essential hypertension I10    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    Foreign body accidentally left during a procedure T81.509A    History of disease Z87.898    Abdominal pain R10.9    Postural dizziness with near syncope R42, R55    SBO (small bowel obstruction) ([address] K56.609    UGI bleed K92.2    GI bleed K92.2       Past Medical History:        Diagnosis Date    Anemia     Aortic valve disorders     stenosis    Aspiration pneumonia (HCC) 4/27/2015    Erectile dysfunction     Esophageal cancer ([facility] [address] 10/18/2012    Hernia, femoral, bilateral 5/12/2014    Hyperlipidemia     Osteoporosis, senile 5/20/2014    Pancreatitis 8/1/2013    Patient underweight 8/8/2013    Prostate cancer ([address]     Unspecified essential hypertension        Past Surgical History:        Procedure Laterality Date    APPENDECTOMY      as a child    BONE GRAFT      for saddle nose    CARDIAC VALVE REPLACEMENT  2006    aorta    CHOLECYSTECTOMY      COLONOSCOPY  11/2009    COLONOSCOPY  10/05/15    COLONOSCOPY N/A 2/17/2021    COLONOSCOPY DIAGNOSTIC/STOMA performed by Bhupendra Nicholas MD at [address]  2006    ESOPHAGUS SURGERY      EYE SURGERY  1990& 1992    cataract bilat removal     GASTRECTOMY      PROSTATE SURGERY      seeds    TONSILLECTOMY      UPPER GASTROINTESTINAL ENDOSCOPY N/A 2/16/2021    EGD BIOPSY performed by Bhupendra Nicholas MD at [address] PROSTATE/TRANSRECTAL VOL STUDY BRACHYTHERAPY         Social History:    Social History     Tobacco Use    Smoking status: Never Smoker    Smokeless tobacco: Never Used   Substance Use Topics    Alcohol use: Yes     Alcohol/week: 0.0 standard drinks     Comment: occassionally                                Counseling given: Not Answered      Vital Signs (Current):   Vitals:    02/18/21 0748 02/18/21 0821 02/18/21 1138 02/18/21 1300   BP:  133/81 137/74 (!) 155/98   Pulse:  67 69 71   Resp:  18 16 16   Temp:  98 °F (36.7 °C) 97.5 °F (36.4 °C)    TempSrc:  Oral Oral    SpO2:  96% 96% 97%   Weight: 111 lb (50.3 kg)                                                 BP Readings from Last 3 Encounters:   02/18/21 (!) 155/98   02/17/21 (!) 94/52   02/16/21 135/71       NPO Status: Time of last liquid consumption: 1059                        Time of last solid consumption: 2000                        Date of last liquid consumption: 02/18/21                        Date of last solid food consumption: 02/15/21    BMI:   Wt Readings from Last 3 Encounters:   02/18/21 111 lb (50.3 kg)   12/18/20 110 lb (49.9 kg)   11/18/20 107 lb (48.5 kg)     Body mass index is 19.35 kg/m².     CBC:   Lab Results   Component Value Date    WBC 4.4 02/16/2021    RBC 2.86 02/16/2021    RBC 4.41 06/26/2017    HGB 8.8 02/18/2021    HCT 27.8 02/18/2021    MCV 86.3 02/16/2021    RDW 14.7 02/16/2021     02/16/2021       CMP:   Lab Results   Component Value Date     02/18/2021    K 4.0 02/18/2021    K 4.2 02/16/2021     02/18/2021    CO2 26 02/18/2021    BUN 25 02/18/2021    CREATININE 1.2 02/18/2021    GFRAA >60 02/18/2021    GFRAA >60 05/10/2013    AGRATIO 1.2 02/15/2021    LABGLOM 58 02/18/2021    LABGLOM >60>60 01/10/2012    GLUCOSE 71 02/18/2021    GLUCOSE 123 09/26/2016    PROT 6.9 02/15/2021    PROT 6.8 09/26/2016    CALCIUM 7.5 02/18/2021    BILITOT <0.2 02/15/2021    ALKPHOS 51 02/15/2021    AST 27 02/15/2021    ALT 11 02/15/2021       POC Tests: No results for input(s): POCGLU, POCNA, POCK, POCCL, Sourav Stafford, POCHCT in the last 72 hours. Coags:   Lab Results   Component Value Date    PROTIME 25.5 02/17/2021    INR 2.18 02/17/2021    APTT 49.3 02/16/2021       HCG (If Applicable): No results found for: PREGTESTUR, PREGSERUM, HCG, HCGQUANT     ABGs:   Lab Results   Component Value Date    PHART 7.35 04/27/2015    PO2ART 104 04/27/2015    GQJ7XOD 40 04/27/2015    YWA8KUV 21 04/27/2015    BEART -3.5 04/27/2015    F8BOIDRC 98 04/27/2015        Type & Screen (If Applicable):  Lab Results   Component Value Date    LABABO O 05/29/2012    [address] Positive 05/29/2012       Drug/Infectious Status (If Applicable):  No results found for: HIV, HEPCAB    COVID-19 Screening (If Applicable):   Lab Results   Component Value Date    COVID19 Not Detected 11/19/2020    COVID19 NOT DETECTED 08/19/2020         Anesthesia Evaluation  Patient summary reviewed and Nursing notes reviewed  Airway: Mallampati: II  TM distance: >3 FB   Neck ROM: full  Mouth opening: > = 3 FB Dental: normal exam         Pulmonary:normal exam    (+) pneumonia: resolved,  asthma: seasonal asthma,           Patient did not smoke on day of surgery. Cardiovascular:    (+) hypertension: mild, valvular problems/murmurs: no interval change, angina: no interval change, CAD: no interval change, murmur,       ECG reviewed  Rhythm: regular  Rate: normal  Echocardiogram reviewed         Beta Blocker:  Dose within 24 Hrs         Neuro/Psych:   Negative Neuro/Psych ROS              GI/Hepatic/Renal: Neg GI/Hepatic/Renal ROS            Endo/Other: Negative Endo/Other ROS                    Abdominal:       Abdomen: soft. Vascular: negative vascular ROS. Anesthesia Plan      MAC     ASA 4       Induction: intravenous. MIPS: Postoperative opioids intended and Prophylactic antiemetics administered. Anesthetic plan and risks discussed with patient.     Use of blood products discussed with patient whom consented to blood products. Plan discussed with attending and CRNA.     Attending anesthesiologist reviewed and agrees with Pre Eval content              Jodine Nyhan, DO   2/18/2021

## 2021-02-18 NOTE — ANESTHESIA POSTPROCEDURE EVALUATION
Department of Anesthesiology  Postprocedure Note    Patient: Tiffanie Foss MD  MRN: [id number]  YOB: 1935  Date of evaluation: 2/18/2021  Time:  3:18 PM     Procedure Summary     Date: 02/18/21 Room / Location: Pending [facility] to [facility]    Anesthesia Start: 8649 Anesthesia Stop: 3898    Procedure: COLONOSCOPY POLYPECTOMY SNARE/COLD BIOPSY Diagnosis: (GI BLEED)    Surgeons: Rachna Ledbetter MD Responsible Provider: Patit Lovell DO    Anesthesia Type: MAC ASA Status: 4          Anesthesia Type: MAC    Nimisha Phase I: Nimisha Score: 10    Nimisha Phase II: Nimisha Score: 10    Last vitals: Reviewed and per EMR flowsheets.        Anesthesia Post Evaluation    Patient location during evaluation: bedside  Patient participation: complete - patient participated  Level of consciousness: awake  Pain score: 0  Airway patency: patent  Nausea & Vomiting: no nausea and no vomiting  Complications: no  Cardiovascular status: hemodynamically stable  Respiratory status: acceptable  Hydration status: euvolemic

## 2021-02-18 NOTE — PROGRESS NOTES
Coenzyme Q10 (COQ10) 400 MG CAPS Take 1 capsule by mouth      Simethicone 80 MG TABS Take 80 tablets by mouth 3 times daily. (Patient taking differently: Take 125 mg by mouth 3 times daily. ) 90 each 0    warfarin (COUMADIN) 1 MG tablet Take 1 mg by mouth.  vitamin B-12 (CYANOCOBALAMIN) 1000 MCG tablet Take 1,000 mcg by mouth daily.  docusate sodium (COLACE) 100 MG capsule Take 100 mg by mouth daily. One  Capsule daily      polyethylene glycol (GLYCOLAX) powder Take 17 g by mouth 2 times daily       phytonadione (VITAMIN K) 5 MG tablet Take 1 tablet by mouth once for 1 dose 1 tablet 0    calcium carbonate (OSCAL) 500 MG TABS tablet Take 500 mg by mouth daily      Magnesium 65 MG TABS Take by mouth      sildenafil (VIAGRA) 50 MG tablet Take 50 mg by mouth as needed for Erectile Dysfunction. Allergies:  No known allergies    History of allergic reaction to anesthesia:  No    Social History:   TOBACCO:   reports that he has never smoked. He has never used smokeless tobacco.  DRUGS:   reports no history of drug use. Family History:       Problem Relation Age of Onset    Other Mother         bleeding peptic ulcer    Heart Disease Mother     Other Father         cardiovascular disease    Stroke Father     Elevated Lipids Father     Hypertension Father        PHYSICAL EXAM:      /81   Pulse 67   Temp 98 °F (36.7 °C) (Oral)   Resp 18   Wt 111 lb (50.3 kg)   SpO2 96%   BMI 19.35 kg/m²  I        Heart:  No m/r/g +s1/s2 RRR; mech sounds    Lungs:  CTA bilaterally    Abdomen:  Soft, nontender, non distended; +bs    ASA Grade:  ASA 3 - Patient with moderate systemic disease with functional limitations    Mallampati Class:  Class I: Soft palate, uvula, fauces, pillars visible  __________  Class II: Soft palate, uvula, fauces visible  __________   Class III: Soft palate, base of uvula visible  ______x____  Class IV: Hard palate only visible   __________      ASSESSMENT AND PLAN:    1. Patient is a 82 y.o. male here for colonoscopy with deep sedation  2. Procedure options, risks and benefits reviewed with patient. We specifically discussed that risks include, but are not limited to infection, bleeding, perforation, death, and missed lesions. Patient expresses understanding.

## 2021-02-18 NOTE — DISCHARGE INSTR - COC
Continuity of Care Form    Patient Name: Aleyda Vera MD   :  1935  MRN:  [id number]    Admit date:  2021  Discharge date:  21      Code Status Order: Full Code   Advance Directives:   Advance Care Flowsheet Documentation       Date/Time Healthcare Directive Type of Healthcare Directive Copy in [address] Agent's Name Healthcare Agent's Phone Number    21 1253  Yes, patient has an advance directive for healthcare treatment --  Yes, copy in chart -- -- --    21 1205  Yes, patient has an advance directive for healthcare treatment --  -- -- -- --    21 1500  Yes, patient has an advance directive for healthcare treatment --  -- -- -- --            Admitting Physician:  Gutierrez Bennett MD  PCP: Chrisandra Ormond, MD    Discharging Nurse: [address] [place] [address] Unit/Room#: Endo Pool/NONE  Discharging Unit Phone Number: ***    Emergency Contact:   Extended Emergency Contact Information  Primary Emergency Contact: Yvrose Ricketts  Address: [address] [place] [address] Phone: [phone]  Work Phone: [phone]  Mobile Phone: [phone]  Relation: Spouse  Secondary Emergency Contact: [address] [place] [address] Phone: [phone]  Relation: Child    Past Surgical History:  Past Surgical History:   Procedure Laterality Date    APPENDECTOMY      as a child    BONE GRAFT      for saddle nose    CARDIAC VALVE REPLACEMENT      aorta    CHOLECYSTECTOMY      COLONOSCOPY  2009    COLONOSCOPY  10/05/15    COLONOSCOPY N/A 2021    COLONOSCOPY DIAGNOSTIC/STOMA performed by rIma Ruffin MD at [address]  &     cataract bilat removal     GASTRECTOMY      PROSTATE SURGERY      seeds    TONSILLECTOMY      UPPER GASTROINTESTINAL ENDOSCOPY N/A 2021    EGD BIOPSY [facility]in [facility]    US PROSTATE/TRANSRECTAL VOL STUDY BRACHYTHERAPY         Immunization History:   Immunization History   Administered Date(s) Administered    Influenza 10/01/2009    Influenza Virus Vaccine 10/28/2010, 11/08/2011    Influenza, High Dose (Fluzone 65 yrs and older) 10/18/2012, 10/14/2013, 11/06/2014, 10/14/2015, 09/16/2016, 10/25/2017, 09/11/2018    Influenza, Quadv, adjuvanted, 65 yrs +, IM, PF (Fluad) 09/10/2020    Influenza, Triv, inactivated, subunit, adjuvanted, IM (Fluad 65 yrs and older) 09/16/2019    Pneumococcal Conjugate 13-valent (Ixmmnoz81) 07/07/2015    Pneumococcal Polysaccharide (Kbhsayzce80) 01/01/2006, 01/01/2007, 10/20/2013    Tdap (Boostrix, Adacel) 05/12/2014    Zoster Live (Zostavax) 09/20/2013    Zoster Recombinant (Shingrix) 07/06/2018, 09/11/2018       Active Problems:  Patient Active Problem List   Diagnosis Code    [facility] ([facility] C15.9    Asthma J45.909    Aortic valve disorder I35.9    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    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, D63.1    Nausea & vomiting R11.2    Patient underweight R63.6    Hernia, femoral, bilateral K41.20    Osteoporosis, senile M81.0    Chronic kidney disease, stage III (moderate) N18.30    Personal history of esophageal cancer Z85.01    History of esophageal cancer Z85.01    History of prostate cancer Z85.46    S/P AVR (aortic valve replacement) Z95.2    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    Gastroparesis K31.84    Iron deficiency anemia D50.9    Anemia D64.9    Essential hypertension I10    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    Foreign body accidentally left during a procedure T81.509A    History of disease Z87.898    Abdominal pain R10.9    Postural dizziness with near syncope R42, R55    SBO (small bowel obstruction) ([facility] K56.609    UGI bleed K92.2    GI bleed K92.2       Isolation/Infection:   Isolation            No Isolation          Patient Infection Status       Infection Onset Added Last Indicated Last Indicated By Review Planned Expiration Resolved Resolved By    None active    Resolved    COVID-19 Rule Out 11/19/20 11/19/20 11/19/20 COVID-19 (Ordered)   11/19/20 Rule-Out Test Resulted    COVID-19 Rule Out 08/19/20 08/19/20 08/19/20 COVID-19 (Ordered)   08/20/20 John Carlin RN            Nurse Assessment:  Last Vital Signs: /63   Pulse 62   Temp 97.3 °F (36.3 °C) (Temporal)   Resp (!) 0   Wt 111 lb (50.3 kg)   SpO2 99%   BMI 19.35 kg/m²     Last documented pain score (0-10 scale): Pain Level: 0  Last Weight:   Wt Readings from Last 1 Encounters:   02/18/21 111 lb (50.3 kg)     Mental Status:  {IP PT MENTAL STATUS:20030:::0}        Nursing Mobility/ADLs:  Walking   Assisted  Transfer  Assisted  Bathing  Assisted  Dressing  Assisted  Toileting  Assisted  Feeding  Assisted  Med Admin  Independent  Med Delivery   whole    Wound Care Documentation and Therapy:        Elimination:  Continence:   · Bowel: Yes  · Bladder: Yes  Urinary Catheter: None   Colostomy/Ileostomy/Ileal Conduit: No       Date of Last BM: 2/18/21    Intake/Output Summary (Last 24 hours) at 2/18/2021 1503  Last data filed at 2/18/2021 1503  Gross per 24 hour   Intake 3197 ml   Output 2125 ml   Net 1072 ml     I/O last 3 completed shifts: In: 3197 [P.O.:2000; I.V.:1197]  Out: 1775 [MCHYT:5085; Stool:200]    Safety Concerns: At Risk for Falls    Impairments/Disabilities:      None    Nutrition Therapy:  Current Nutrition Therapy:   - Oral Diet:  General    Routes of Feeding: Oral  Liquids: No Restrictions  Daily Fluid Restriction: no  Last Modified Barium Swallow with Video (Video Swallowing Test): not done    Treatments at the Time of Hospital Discharge:   Respiratory Treatments: no  Oxygen Therapy:  is not on home oxygen therapy.   Ventilator:    - No ventilator support    Rehab Therapies: {THERAPEUTIC INTERVENTION:3225138150}  Weight Bearing Status/Restrictions: No weight bearing restirctions  Other Medical Equipment (for information only, NOT a DME order):  walker  Other Treatments:     Patient's personal belongings (please select all that are sent with patient):  Glasses    RN SIGNATURE:  Electronically signed by Margarita Robles RN on 2/18/21 at 4:13 PM EST    CASE MANAGEMENT/SOCIAL WORK SECTION    Inpatient Status Date: ***    Readmission Risk Assessment Score:  Readmission Risk              Risk of Unplanned Readmission:        28           Discharging to Facility/ Agency   · Name:   · Address:  · Phone:  · Fax:    Dialysis Facility (if applicable)   · Name:  · Address:  · Dialysis Schedule:  · Phone:  · Fax:    / signature: {Esignature:465391019:::0}    PHYSICIAN SECTION    Prognosis: {Prognosis:4583038443:::0}    Condition at Discharge: Rene Garcia Patient Condition:440102970:::0}    Rehab Potential (if transferring to Rehab): {Prognosis:9282824085:::0}    Recommended Labs or Other Treatments After Discharge: ***    Physician Certification: I certify the above information and transfer of Yvette Adamson MD  is necessary for the continuing treatment of the diagnosis listed and that he requires {Admit to Appropriate Level of Care:87968:::0} for {GREATER/LESS:590073685} 30 days.      Update Admission H&P: {CHP DME Changes in HandP:264064179:::0}    PHYSICIAN SIGNATURE:  {Esignature:545951529:::0}

## 2021-02-18 NOTE — PLAN OF CARE
Problem: Falls - Risk of:  Goal: Will remain free from falls  Description: Will remain free from falls  Outcome: Met This Shift     Problem: Bleeding:  Goal: Will show no signs and symptoms of excessive bleeding  Description: Will show no signs and symptoms of excessive bleeding  Outcome: Met This Shift

## 2021-02-19 ENCOUNTER — TELEPHONE (OUTPATIENT)
Dept: INTERNAL MEDICINE CLINIC | Age: 86
End: 2021-02-19

## 2021-02-19 ENCOUNTER — CARE COORDINATION (OUTPATIENT)
Dept: CASE MANAGEMENT | Age: 86
End: 2021-02-19

## 2021-02-19 NOTE — CARE COORDINATION
[facility] Transitions Initial Follow Up Call    Call within 2 business days of discharge: Yes    Patient:  Nataly Sawyer MD Patient :  1935  MRN:  [id number]    Reason for Admission:  GIB  Discharge Date:  21   RARS: 28      CTC attempt to reach Pt regarding recent hospital discharge. CTC left voice recording with call back number requesting a call back. Follow up appointments:    Future Appointments   Date Time Provider Miladis Mchugh   2021  2:15 PM [facility]   3/18/2021  1:15 PM MD TIGIST Collado [facility]   2021  3:00 PM Howard Joaquin MD [employer] [facility]       Jihan Steen.  APRIL Cornelius, RN  Care Transition Coordinator  Contact Number:  [phone]

## 2021-02-19 NOTE — TELEPHONE ENCOUNTER
[facility] Transitions Initial Follow Up Call    Outreach made within 2 business days of discharge: Yes    Patient: Nir Sahu MD Patient : 1935   MRN: <[id number]>  Reason for Admission: There are no discharge diagnoses documented for the most recent discharge. Discharge Date: 21       Spoke with: patient    Discharge department/facility: [facility] Interactive Patient Contact:  Was patient able to fill all prescriptions: Yes  Was patient instructed to bring all medications to the follow-up visit: Yes  Is patient taking all medications as directed in the discharge summary?  Yes  Does patient understand their discharge instructions: Yes  Does patient have questions or concerns that need addressed prior to 7-14 day follow up office visit: no    Scheduled appointment with PCP within 7-14 days- pt has an appt sched for 2021    Follow Up  Future Appointments   Date Time Provider Miladis Mchugh   2021  2:15 PM MD TIGIST Kim [facility]   3/18/2021  1:15 PM MD TIGIST Kim 111 [facility]   2021  3:00 PM Dianelys Buenrostro MD [place], [place]

## 2021-02-23 ENCOUNTER — CARE COORDINATION (OUTPATIENT)
Dept: CASE MANAGEMENT | Age: 86
End: 2021-02-23

## 2021-02-23 NOTE — CARE COORDINATION
[facility] Transitions Initial Follow Up Call    21    Patient:  Celestina Parker MD Patient :  1935  MRN:  [id number]    Reason for Admission:  covid 19 monitoring / GIB   Discharge Date:  21   RARS: 28      CTC attempt to reach Pt regarding recent hospital discharge. CTC left voice recording with call back number requesting a call back. Follow up appointments:    Future Appointments   Date Time Provider Miladis Mchugh   2021  2:15 PM MD TIGIST Galarza 111 IM [facility]   3/18/2021  1:15 PM MD TIGIST Galarza 111 [facility]   2021  3:00 PM Cornelia Orozco MD [facility]       Tim Hensley.  APRIL Vazquez, RN  Care Transition Coordinator  Contact Number:  [phone]

## 2021-02-26 ENCOUNTER — ANTI-COAG VISIT (OUTPATIENT)
Dept: INTERNAL MEDICINE CLINIC | Age: 86
End: 2021-02-26

## 2021-02-26 ENCOUNTER — OFFICE VISIT (OUTPATIENT)
Dept: INTERNAL MEDICINE CLINIC | Age: 86
End: 2021-02-26
Payer: MEDICARE

## 2021-02-26 VITALS
BODY MASS INDEX: 21.97 KG/M2 | SYSTOLIC BLOOD PRESSURE: 110 MMHG | WEIGHT: 126 LBS | TEMPERATURE: 97.1 F | DIASTOLIC BLOOD PRESSURE: 74 MMHG

## 2021-02-26 DIAGNOSIS — K92.2 GASTROINTESTINAL HEMORRHAGE, UNSPECIFIED GASTROINTESTINAL HEMORRHAGE TYPE: ICD-10-CM

## 2021-02-26 DIAGNOSIS — I35.9 AORTIC VALVE DISORDER: ICD-10-CM

## 2021-02-26 DIAGNOSIS — D50.0 IRON DEFICIENCY ANEMIA DUE TO CHRONIC BLOOD LOSS: ICD-10-CM

## 2021-02-26 DIAGNOSIS — K92.2 GASTROINTESTINAL HEMORRHAGE, UNSPECIFIED GASTROINTESTINAL HEMORRHAGE TYPE: Primary | ICD-10-CM

## 2021-02-26 LAB — INR BLD: 2.9

## 2021-02-26 PROCEDURE — 1111F DSCHRG MED/CURRENT MED MERGE: CPT | Performed by: INTERNAL MEDICINE

## 2021-02-26 PROCEDURE — 99214 OFFICE O/P EST MOD 30 MIN: CPT | Performed by: INTERNAL MEDICINE

## 2021-02-26 ASSESSMENT — ENCOUNTER SYMPTOMS
SHORTNESS OF BREATH: 0
WHEEZING: 0
BLOOD IN STOOL: 0
CHEST TIGHTNESS: 0
COUGH: 0
ABDOMINAL PAIN: 0
DIARRHEA: 0
CONSTIPATION: 0

## 2021-02-26 ASSESSMENT — PATIENT HEALTH QUESTIONNAIRE - PHQ9
1. LITTLE INTEREST OR PLEASURE IN DOING THINGS: 0
SUM OF ALL RESPONSES TO PHQ QUESTIONS 1-9: 0

## 2021-02-26 NOTE — PROGRESS NOTES
Ibis Guzman MD (:  1935) is a 82 y.o. male,Established patient, here for evaluation of the following chief complaint(s):  Follow-Up from Hospital      ASSESSMENT/PLAN:  1. Gastrointestinal hemorrhage, unspecified gastrointestinal hemorrhage type  No melena. Gastroenterology advised outpatient pill endoscopy, patient prefers not to have as he does not want surgery. Will check labs every month for next 3 months, he will return for follow up in 3 months. See labs below. -     CBC Auto Differential; Future  -     COMPREHENSIVE METABOLIC PANEL; Future  2. Iron deficiency anemia due to chronic blood loss  Weakness is improving, will continue to take ferrous sulfate every other day. See labs below, will check every month for 3 months.   -     CBC Auto Differential; Future  -     COMPREHENSIVE METABOLIC PANEL; Future  -     IRON AND TIBC; Future      No follow-ups on file. SUBJECTIVE/OBJECTIVE:  Ibis Guzman MD is an 81 yo male who presents for follow-up post hospitalization for anemia and GI bleed. He reports feeling tired, but weakness has improved and has no melena since d/c. INR today was 2.9. Gastroenterology suspects small bowel angioectasia and advised him to follow up with PCP to schedule outpatient pill endoscopy and to potentially adjust his warfarin dose. Neurology says weakness is secondary to anemia, and will clear as he becomes less anemic. He has been taking ferrous sulfate every other day since d/c on 21. He does not want to do endoscopy as he does not want surgery. Review of Systems   Constitutional: Positive for fatigue. Negative for chills, diaphoresis and fever. Respiratory: Negative for cough, chest tightness, shortness of breath and wheezing. Cardiovascular: Negative for chest pain and palpitations. Gastrointestinal: Negative for abdominal pain, blood in stool, constipation and diarrhea. Genitourinary: Negative for dysuria.    Neurological: Negative for dizziness, syncope, weakness, light-headedness and headaches. Physical Exam  Constitutional:       General: He is not in acute distress. Appearance: Normal appearance. He is normal weight. He is not ill-appearing, toxic-appearing or diaphoretic. HENT:      Head: Normocephalic and atraumatic. Cardiovascular:      Rate and Rhythm: Normal rate and regular rhythm. Pulses: Normal pulses. Heart sounds: Murmur present. No friction rub. No gallop. Comments: 1/6 systolic ejection murmur with mechanical click  Skin:     General: Skin is warm and dry. Capillary Refill: Capillary refill takes less than 2 seconds. Neurological:      Mental Status: He is alert and oriented to person, place, and time. Psychiatric:         Behavior: Behavior normal.                 An electronic signature was used to authenticate this note.     --Kimberly Wilks MD

## 2021-02-27 LAB
A/G RATIO: 1.2 (ref 1.1–2.2)
ALBUMIN SERPL-MCNC: 3.7 G/DL (ref 3.4–5)
ALP BLD-CCNC: 55 U/L (ref 40–129)
ALT SERPL-CCNC: 15 U/L (ref 10–40)
ANION GAP SERPL CALCULATED.3IONS-SCNC: 9 MMOL/L (ref 3–16)
AST SERPL-CCNC: 21 U/L (ref 15–37)
BASOPHILS ABSOLUTE: 0 K/UL (ref 0–0.2)
BASOPHILS RELATIVE PERCENT: 0.9 %
BILIRUB SERPL-MCNC: <0.2 MG/DL (ref 0–1)
BUN BLDV-MCNC: 33 MG/DL (ref 7–20)
CALCIUM SERPL-MCNC: 9.1 MG/DL (ref 8.3–10.6)
CHLORIDE BLD-SCNC: 104 MMOL/L (ref 99–110)
CO2: 30 MMOL/L (ref 21–32)
CREAT SERPL-MCNC: 1.2 MG/DL (ref 0.8–1.3)
EOSINOPHILS ABSOLUTE: 0.1 K/UL (ref 0–0.6)
EOSINOPHILS RELATIVE PERCENT: 1.5 %
GFR AFRICAN AMERICAN: >60
GFR NON-AFRICAN AMERICAN: 58
GLOBULIN: 3 G/DL
GLUCOSE BLD-MCNC: 67 MG/DL (ref 70–99)
HCT VFR BLD CALC: 30.9 % (ref 40.5–52.5)
HEMOGLOBIN: 10 G/DL (ref 13.5–17.5)
IRON SATURATION: 12 % (ref 20–50)
IRON: 44 UG/DL (ref 59–158)
LYMPHOCYTES ABSOLUTE: 1.4 K/UL (ref 1–5.1)
LYMPHOCYTES RELATIVE PERCENT: 25.4 %
MCH RBC QN AUTO: 28 PG (ref 26–34)
MCHC RBC AUTO-ENTMCNC: 32.4 G/DL (ref 31–36)
MCV RBC AUTO: 86.1 FL (ref 80–100)
MONOCYTES ABSOLUTE: 0.6 K/UL (ref 0–1.3)
MONOCYTES RELATIVE PERCENT: 10.4 %
NEUTROPHILS ABSOLUTE: 3.5 K/UL (ref 1.7–7.7)
NEUTROPHILS RELATIVE PERCENT: 61.8 %
PDW BLD-RTO: 16.1 % (ref 12.4–15.4)
PLATELET # BLD: 149 K/UL (ref 135–450)
PMV BLD AUTO: 9.8 FL (ref 5–10.5)
POTASSIUM SERPL-SCNC: 4.2 MMOL/L (ref 3.5–5.1)
RBC # BLD: 3.59 M/UL (ref 4.2–5.9)
SODIUM BLD-SCNC: 143 MMOL/L (ref 136–145)
TOTAL IRON BINDING CAPACITY: 372 UG/DL (ref 260–445)
TOTAL PROTEIN: 6.7 G/DL (ref 6.4–8.2)
WBC # BLD: 5.6 K/UL (ref 4–11)

## 2021-03-12 NOTE — PROGRESS NOTES
Physician Progress Note      PATIENT:               Sascha Bender  [facility] #:                  [id number]  :                       1935  ADMIT DATE:       2021 9:18 AM  DISCH DATE:        2021 4:49 PM  RESPONDING  PROVIDER #:        Maxim Dorantes MD          QUERY TEXT:    Pt admitted with GIB. Pt noted to have angiodysplasia . 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 hemoglobin on presentation was 6.9 which went up to 9.1 after 2 units PRBC. Treatment: As Above, GI Consult, EGD and colonoscopy  Options provided:  -- Acute on chronic blood loss anemia  -- Acute blood loss anemia  -- Iron deficiency anemia  -- Other - I will add my own diagnosis  -- Disagree - Not applicable / Not valid  -- Disagree - Clinically unable to determine / Unknown  -- Refer to Clinical Documentation Reviewer    PROVIDER RESPONSE TEXT:    This patient has acute on chronic blood loss anemia.     Query created by: Socrates Pierre on 3/12/2021 12:38 PM      Electronically signed by:  Maxim Dorantes MD 3/12/2021 2:35 PM

## 2021-03-15 ENCOUNTER — ANTI-COAG VISIT (OUTPATIENT)
Dept: INTERNAL MEDICINE CLINIC | Age: 86
End: 2021-03-15

## 2021-03-15 DIAGNOSIS — I35.9 AORTIC VALVE DISORDER: ICD-10-CM

## 2021-03-15 LAB — INR BLD: 2.7

## 2021-03-18 ENCOUNTER — HOSPITAL ENCOUNTER (OUTPATIENT)
Dept: ONCOLOGY | Age: 86
Setting detail: INFUSION SERIES
Discharge: HOME | End: 2021-03-18
Payer: MEDICARE

## 2021-03-18 VITALS
RESPIRATION RATE: 18 BRPM | OXYGEN SATURATION: 99 % | SYSTOLIC BLOOD PRESSURE: 169 MMHG | HEART RATE: 66 BPM | DIASTOLIC BLOOD PRESSURE: 85 MMHG

## 2021-03-18 DIAGNOSIS — M81.0 OSTEOPOROSIS, SENILE: Primary | ICD-10-CM

## 2021-03-18 DIAGNOSIS — R63.6 PATIENT UNDERWEIGHT: ICD-10-CM

## 2021-03-18 PROCEDURE — 6360000002 HC RX W HCPCS: Performed by: INTERNAL MEDICINE

## 2021-03-18 PROCEDURE — 96372 THER/PROPH/DIAG INJ SC/IM: CPT

## 2021-03-18 RX ORDER — METHYLPREDNISOLONE SODIUM SUCCINATE 125 MG/2ML
125 INJECTION, POWDER, LYOPHILIZED, FOR SOLUTION INTRAMUSCULAR; INTRAVENOUS ONCE
Status: CANCELLED | OUTPATIENT
Start: 2021-09-16 | End: 2021-09-16

## 2021-03-18 RX ORDER — SODIUM CHLORIDE 9 MG/ML
INJECTION, SOLUTION INTRAVENOUS CONTINUOUS
Status: CANCELLED | OUTPATIENT
Start: 2021-09-16

## 2021-03-18 RX ORDER — DIPHENHYDRAMINE HYDROCHLORIDE 50 MG/ML
50 INJECTION INTRAMUSCULAR; INTRAVENOUS ONCE
Status: CANCELLED | OUTPATIENT
Start: 2021-09-16 | End: 2021-09-16

## 2021-03-18 RX ADMIN — DENOSUMAB 60 MG: 60 INJECTION SUBCUTANEOUS at 15:07

## 2021-03-18 NOTE — PLAN OF CARE
Problem: Falls - Risk of:  Goal: Will remain free from falls  Outcome: Met This Shift  Note:   Explained fall risk precautions to patient and rationale behind their use to keep the patient safe. Belongings are in reach. Pt encouraged to notify staff for any and all assistance. Staff present in tx suite throughout entirety of pts treatment to monitor and protect from falls. Assistance provided when ambulating to restroom utilizing Stay With Me. Problem: KNOWLEDGE DEFICIT  Goal: Patient/S.O. demonstrates understanding of disease process, treatment plan, medications, and discharge instructions. Outcome: Met This Shift  Note: Patient received Prolia 60 mg subcut as ordered which he tolerated well. Verbalized understanding of d/c instructions. D/C'd ambulatory/walker.

## 2021-03-22 ENCOUNTER — PATIENT MESSAGE (OUTPATIENT)
Dept: INTERNAL MEDICINE CLINIC | Age: 86
End: 2021-03-22

## 2021-03-22 DIAGNOSIS — D64.9 ANEMIA, UNSPECIFIED TYPE: Primary | ICD-10-CM

## 2021-03-25 DIAGNOSIS — D64.9 ANEMIA, UNSPECIFIED TYPE: ICD-10-CM

## 2021-03-25 LAB
BASOPHILS ABSOLUTE: 0 K/UL (ref 0–0.2)
BASOPHILS RELATIVE PERCENT: 0.7 %
EOSINOPHILS ABSOLUTE: 0.1 K/UL (ref 0–0.6)
EOSINOPHILS RELATIVE PERCENT: 0.9 %
HCT VFR BLD CALC: 32.9 % (ref 40.5–52.5)
HEMOGLOBIN: 10.5 G/DL (ref 13.5–17.5)
IRON SATURATION: 11 % (ref 20–50)
IRON: 36 UG/DL (ref 59–158)
LYMPHOCYTES ABSOLUTE: 1.6 K/UL (ref 1–5.1)
LYMPHOCYTES RELATIVE PERCENT: 29.3 %
MCH RBC QN AUTO: 26.9 PG (ref 26–34)
MCHC RBC AUTO-ENTMCNC: 31.8 G/DL (ref 31–36)
MCV RBC AUTO: 84.5 FL (ref 80–100)
MONOCYTES ABSOLUTE: 0.6 K/UL (ref 0–1.3)
MONOCYTES RELATIVE PERCENT: 11.2 %
NEUTROPHILS ABSOLUTE: 3.2 K/UL (ref 1.7–7.7)
NEUTROPHILS RELATIVE PERCENT: 57.9 %
PDW BLD-RTO: 16.6 % (ref 12.4–15.4)
PLATELET # BLD: 112 K/UL (ref 135–450)
PMV BLD AUTO: 10.3 FL (ref 5–10.5)
RBC # BLD: 3.9 M/UL (ref 4.2–5.9)
TOTAL IRON BINDING CAPACITY: 337 UG/DL (ref 260–445)
WBC # BLD: 5.5 K/UL (ref 4–11)

## 2021-03-30 RX ORDER — WARFARIN SODIUM 5 MG/1
TABLET ORAL
Qty: 90 TABLET | Refills: 0 | Status: SHIPPED | OUTPATIENT
Start: 2021-03-30 | End: 2021-05-10

## 2021-04-05 ENCOUNTER — ANTI-COAG VISIT (OUTPATIENT)
Dept: INTERNAL MEDICINE CLINIC | Age: 86
End: 2021-04-05

## 2021-04-05 DIAGNOSIS — I35.9 AORTIC VALVE DISORDER: ICD-10-CM

## 2021-04-05 LAB — INR BLD: 2.4

## 2021-04-09 ENCOUNTER — ANTI-COAG VISIT (OUTPATIENT)
Dept: INTERNAL MEDICINE CLINIC | Age: 86
End: 2021-04-09

## 2021-04-09 DIAGNOSIS — I35.9 AORTIC VALVE DISORDER: ICD-10-CM

## 2021-04-09 LAB — INR BLD: 2.4

## 2021-04-16 ENCOUNTER — ANTI-COAG VISIT (OUTPATIENT)
Dept: INTERNAL MEDICINE CLINIC | Age: 86
End: 2021-04-16

## 2021-04-16 DIAGNOSIS — I35.9 AORTIC VALVE DISORDER: ICD-10-CM

## 2021-04-16 LAB — INR BLD: 2.4

## 2021-04-20 ENCOUNTER — PATIENT MESSAGE (OUTPATIENT)
Dept: INTERNAL MEDICINE CLINIC | Age: 86
End: 2021-04-20

## 2021-04-20 DIAGNOSIS — N18.9 ANEMIA ASSOCIATED WITH CHRONIC RENAL FAILURE: Primary | ICD-10-CM

## 2021-04-20 DIAGNOSIS — D63.1 ANEMIA ASSOCIATED WITH CHRONIC RENAL FAILURE: Primary | ICD-10-CM

## 2021-04-21 NOTE — TELEPHONE ENCOUNTER
From: Obey Flannery MD  To: Ekaterina Alfonso MD  Sent: 4/20/2021 6:48 PM EDT  Subject: Non-Urgent Medical Question    Dr. Marya Navarro wants me to get a CBC and iron studies as a follow up to GI bleeding and taking iron pills. Please send the order to the blood drawing lab in your building.     Thanks, Jerry Muller

## 2021-04-22 LAB
BASOPHILS ABSOLUTE: 0.1 K/UL (ref 0–0.2)
BASOPHILS RELATIVE PERCENT: 1 %
EOSINOPHILS ABSOLUTE: 0.1 K/UL (ref 0–0.6)
EOSINOPHILS RELATIVE PERCENT: 1 %
HCT VFR BLD CALC: 38.5 % (ref 40.5–52.5)
HEMOGLOBIN: 12.3 G/DL (ref 13.5–17.5)
LYMPHOCYTES ABSOLUTE: 1.7 K/UL (ref 1–5.1)
LYMPHOCYTES RELATIVE PERCENT: 31.5 %
MCH RBC QN AUTO: 26.5 PG (ref 26–34)
MCHC RBC AUTO-ENTMCNC: 31.8 G/DL (ref 31–36)
MCV RBC AUTO: 83.4 FL (ref 80–100)
MONOCYTES ABSOLUTE: 0.5 K/UL (ref 0–1.3)
MONOCYTES RELATIVE PERCENT: 10 %
NEUTROPHILS ABSOLUTE: 3.1 K/UL (ref 1.7–7.7)
NEUTROPHILS RELATIVE PERCENT: 56.5 %
PDW BLD-RTO: 16.3 % (ref 12.4–15.4)
PLATELET # BLD: 103 K/UL (ref 135–450)
PMV BLD AUTO: 10.4 FL (ref 5–10.5)
RBC # BLD: 4.62 M/UL (ref 4.2–5.9)
WBC # BLD: 5.5 K/UL (ref 4–11)

## 2021-04-23 ENCOUNTER — ANTI-COAG VISIT (OUTPATIENT)
Dept: INTERNAL MEDICINE CLINIC | Age: 86
End: 2021-04-23

## 2021-04-23 DIAGNOSIS — I35.9 AORTIC VALVE DISORDER: ICD-10-CM

## 2021-04-23 LAB
INR BLD: 2.7
IRON SATURATION: 14 % (ref 20–50)
IRON: 51 UG/DL (ref 59–158)
TOTAL IRON BINDING CAPACITY: 362 UG/DL (ref 260–445)

## 2021-05-04 ENCOUNTER — ANTI-COAG VISIT (OUTPATIENT)
Dept: INTERNAL MEDICINE CLINIC | Age: 86
End: 2021-05-04

## 2021-05-04 DIAGNOSIS — I35.9 AORTIC VALVE DISORDER: ICD-10-CM

## 2021-05-04 LAB — INR BLD: 2.2

## 2021-05-10 RX ORDER — OMEPRAZOLE 40 MG/1
CAPSULE, DELAYED RELEASE ORAL
Qty: 90 CAPSULE | Refills: 2 | Status: SHIPPED | OUTPATIENT
Start: 2021-05-10 | End: 2022-02-16

## 2021-05-10 RX ORDER — WARFARIN SODIUM 5 MG/1
TABLET ORAL
Qty: 90 TABLET | Refills: 0 | Status: SHIPPED | OUTPATIENT
Start: 2021-05-10 | End: 2021-06-29

## 2021-05-14 ENCOUNTER — ANTI-COAG VISIT (OUTPATIENT)
Dept: INTERNAL MEDICINE CLINIC | Age: 86
End: 2021-05-14

## 2021-05-14 DIAGNOSIS — I35.9 AORTIC VALVE DISORDER: ICD-10-CM

## 2021-05-14 LAB — INR BLD: 2.2

## 2021-05-20 ENCOUNTER — TELEPHONE (OUTPATIENT)
Dept: INTERNAL MEDICINE CLINIC | Age: 86
End: 2021-05-20

## 2021-05-20 DIAGNOSIS — D63.1 ANEMIA ASSOCIATED WITH CHRONIC RENAL FAILURE: Primary | ICD-10-CM

## 2021-05-20 DIAGNOSIS — N18.9 ANEMIA ASSOCIATED WITH CHRONIC RENAL FAILURE: Primary | ICD-10-CM

## 2021-05-20 DIAGNOSIS — D64.9 ANEMIA, UNSPECIFIED TYPE: ICD-10-CM

## 2021-05-20 DIAGNOSIS — Z85.46 HISTORY OF PROSTATE CANCER: ICD-10-CM

## 2021-05-20 DIAGNOSIS — I10 ESSENTIAL HYPERTENSION: ICD-10-CM

## 2021-05-21 DIAGNOSIS — D64.9 ANEMIA, UNSPECIFIED TYPE: ICD-10-CM

## 2021-05-21 DIAGNOSIS — Z85.46 HISTORY OF PROSTATE CANCER: ICD-10-CM

## 2021-05-21 DIAGNOSIS — I10 ESSENTIAL HYPERTENSION: ICD-10-CM

## 2021-05-22 LAB
A/G RATIO: 1.4 (ref 1.1–2.2)
ALBUMIN SERPL-MCNC: 4.2 G/DL (ref 3.4–5)
ALP BLD-CCNC: 56 U/L (ref 40–129)
ALT SERPL-CCNC: 21 U/L (ref 10–40)
ANION GAP SERPL CALCULATED.3IONS-SCNC: 12 MMOL/L (ref 3–16)
AST SERPL-CCNC: 28 U/L (ref 15–37)
BASOPHILS ABSOLUTE: 0 K/UL (ref 0–0.2)
BASOPHILS RELATIVE PERCENT: 0.8 %
BILIRUB SERPL-MCNC: 0.4 MG/DL (ref 0–1)
BUN BLDV-MCNC: 35 MG/DL (ref 7–20)
CALCIUM SERPL-MCNC: 9.3 MG/DL (ref 8.3–10.6)
CHLORIDE BLD-SCNC: 101 MMOL/L (ref 99–110)
CO2: 29 MMOL/L (ref 21–32)
CREAT SERPL-MCNC: 1.3 MG/DL (ref 0.8–1.3)
EOSINOPHILS ABSOLUTE: 0.1 K/UL (ref 0–0.6)
EOSINOPHILS RELATIVE PERCENT: 1.1 %
GFR AFRICAN AMERICAN: >60
GFR NON-AFRICAN AMERICAN: 53
GLOBULIN: 3 G/DL
GLUCOSE BLD-MCNC: 65 MG/DL (ref 70–99)
HCT VFR BLD CALC: 36.8 % (ref 40.5–52.5)
HEMOGLOBIN: 12 G/DL (ref 13.5–17.5)
IRON SATURATION: 16 % (ref 20–50)
IRON: 51 UG/DL (ref 59–158)
LYMPHOCYTES ABSOLUTE: 1.7 K/UL (ref 1–5.1)
LYMPHOCYTES RELATIVE PERCENT: 29.6 %
MCH RBC QN AUTO: 27.1 PG (ref 26–34)
MCHC RBC AUTO-ENTMCNC: 32.7 G/DL (ref 31–36)
MCV RBC AUTO: 82.9 FL (ref 80–100)
MONOCYTES ABSOLUTE: 0.6 K/UL (ref 0–1.3)
MONOCYTES RELATIVE PERCENT: 10 %
NEUTROPHILS ABSOLUTE: 3.4 K/UL (ref 1.7–7.7)
NEUTROPHILS RELATIVE PERCENT: 58.5 %
PDW BLD-RTO: 17.9 % (ref 12.4–15.4)
PLATELET # BLD: 93 K/UL (ref 135–450)
PLATELET SLIDE REVIEW: ABNORMAL
PMV BLD AUTO: 10.5 FL (ref 5–10.5)
POTASSIUM SERPL-SCNC: 4.5 MMOL/L (ref 3.5–5.1)
PROSTATE SPECIFIC ANTIGEN: <0.01 NG/ML (ref 0–4)
RBC # BLD: 4.44 M/UL (ref 4.2–5.9)
SLIDE REVIEW: ABNORMAL
SODIUM BLD-SCNC: 142 MMOL/L (ref 136–145)
TOTAL IRON BINDING CAPACITY: 317 UG/DL (ref 260–445)
TOTAL PROTEIN: 7.2 G/DL (ref 6.4–8.2)
TSH SERPL DL<=0.05 MIU/L-ACNC: 3.33 UIU/ML (ref 0.27–4.2)
WBC # BLD: 5.8 K/UL (ref 4–11)

## 2021-05-24 ENCOUNTER — TELEPHONE (OUTPATIENT)
Dept: INTERNAL MEDICINE CLINIC | Age: 86
End: 2021-05-24

## 2021-05-24 ENCOUNTER — ANTI-COAG VISIT (OUTPATIENT)
Dept: INTERNAL MEDICINE CLINIC | Age: 86
End: 2021-05-24

## 2021-05-24 DIAGNOSIS — I35.9 AORTIC VALVE DISORDER: Primary | ICD-10-CM

## 2021-05-24 LAB — INR BLD: 1.9

## 2021-05-26 ENCOUNTER — OFFICE VISIT (OUTPATIENT)
Dept: INTERNAL MEDICINE CLINIC | Age: 86
End: 2021-05-26
Payer: MEDICARE

## 2021-05-26 VITALS — BODY MASS INDEX: 18.48 KG/M2 | SYSTOLIC BLOOD PRESSURE: 130 MMHG | DIASTOLIC BLOOD PRESSURE: 78 MMHG | WEIGHT: 106 LBS

## 2021-05-26 DIAGNOSIS — K56.609 RECURRENT INTESTINAL OBSTRUCTION (HCC): ICD-10-CM

## 2021-05-26 DIAGNOSIS — R26.81 UNSTEADY GAIT WHEN WALKING: ICD-10-CM

## 2021-05-26 DIAGNOSIS — N18.31 STAGE 3A CHRONIC KIDNEY DISEASE (HCC): ICD-10-CM

## 2021-05-26 DIAGNOSIS — D64.9 ANEMIA, UNSPECIFIED TYPE: Primary | ICD-10-CM

## 2021-05-26 DIAGNOSIS — E46 MALNUTRITION, UNSPECIFIED TYPE (HCC): ICD-10-CM

## 2021-05-26 DIAGNOSIS — I35.9 AORTIC VALVE DISORDER: ICD-10-CM

## 2021-05-26 PROCEDURE — 99213 OFFICE O/P EST LOW 20 MIN: CPT | Performed by: INTERNAL MEDICINE
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
## 2021-06-03 ENCOUNTER — OFFICE VISIT (OUTPATIENT)
Dept: INTERNAL MEDICINE CLINIC | Age: 86
End: 2021-06-03

## 2021-06-03 DIAGNOSIS — Z71.9 HEALTH EDUCATION/COUNSELING: Primary | ICD-10-CM

## 2021-06-03 PROCEDURE — 99999 PR OFFICE/OUTPT VISIT,PROCEDURE ONLY: CPT | Performed by: DIETITIAN, REGISTERED

## 2021-06-03 NOTE — PROGRESS NOTES
Medical Nutrition Therapy    Ann Quinonez MD  Gabriella 3, 2021      Reason for visit Recent weight loss  Patient Care Team:  Gibran Titus MD as PCP - General  Gibran Titus MD as PCP - [facility]  Gibran Titus MD as Referring Physician (Internal Medicine)  Cyndee Riley MD as Consulting Physician (Otolaryngology)      ASSESSMENT/PLAN:   NUTRITION DIAGNOSIS    #1 Problem: Underweight (NC-3.1)  Related to: Inadequate energy intake   As Evidenced by: BMI less than normative standard for age and sex (BMI=25.48)    NUTRITION INTERVENTION  Nutrition Prescription: Plan meals to follow Plate Guide, including 1/2 plate vegetables, 1/4 plate protein, and 1/4 plate starchy foods.       Interventions:  Achieving adequate protein intake    OUTCOME/INDICATORS TO EVALUATE:   Maintenance of weight, stamina             Patient Active Problem List   Diagnosis    Esophageal cancer ([address]    Asthma    Aortic valve disorder    Psychosexual dysfunction with inhibited sexual excitement    Anemia associated with chronic renal failure (HCC)    Nausea & vomiting    Patient underweight    Hernia, femoral, bilateral    Osteoporosis, senile    Chronic kidney disease, stage III (moderate)    Personal history of esophageal cancer    History of esophageal cancer    History of prostate cancer    S/P AVR (aortic valve replacement)    Coronary 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 ([facility])    Gastroparesis    Iron deficiency anemia    Anemia    Essential hypertension    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[address]    Foreign body accidentally left during a procedure    History of disease    Abdominal pain    Postural dizziness with near syncope    SBO (small bowel obstruction) ([facility])    UGI bleed    GI bleed       Current Outpatient Medications   Medication Sig Dispense Refill    omeprazole (PRILOSEC) 40 MG delayed release capsule TAKE ONE CAPSULE BY MOUTH DAILY 90 capsule 2    warfarin (COUMADIN) 5 MG tablet TAKE ONE TABLET BY MOUTH ONCE NIGHTLY 90 tablet 0    ferrous sulfate (ANTHONY-THEO) 325 (65 Fe) MG tablet Take 1 tablet by mouth every 48 hours 30 tablet 1    atorvastatin (LIPITOR) 10 MG tablet TAKE ONE TABLET BY MOUTH DAILY 90 tablet 0    Alpha Lipoic Acid-Biotin 300-333 MG-MCG CAPS 300 mg      calcium carbonate (OSCAL) 500 MG TABS tablet Take 500 mg by mouth daily      Magnesium 65 MG TABS Take by mouth      Coenzyme Q10 (COQ10) 400 MG CAPS Take 1 capsule by mouth      Simethicone 80 MG TABS Take 80 tablets by mouth 3 times daily. (Patient taking differently: Take 125 mg by mouth 3 times daily. ) 90 each 0    sildenafil (VIAGRA) 50 MG tablet Take 50 mg by mouth as needed for Erectile Dysfunction.  vitamin B-12 (CYANOCOBALAMIN) 1000 MCG tablet Take 1,000 mcg by mouth daily.  docusate sodium (COLACE) 100 MG capsule Take 100 mg by mouth daily. One  Capsule daily      polyethylene glycol (GLYCOLAX) powder Take 17 g by mouth 2 times daily        No current facility-administered medications for this visit.          NUTRITION ASSESSMENT    Biochemical Data, Medical Tests and Procedures:    No results found for: LABA1C  No results found for: EAG    Lab Results   Component Value Date    CHOL 143 08/27/2020    CHOL 162 09/18/2019    CHOL 162 06/19/2019     Lab Results   Component Value Date    TRIG 95 08/27/2020    TRIG 80 09/18/2019    TRIG 54 06/19/2019     Lab Results   Component Value Date    HDL 62 (H) 08/27/2020    HDL 87 (H) 09/18/2019    HDL 87 (H) 06/19/2019     Lab Results   Component Value Date    LDLCALC 62 08/27/2020    LDLCALC 59 09/18/2019    LDLCALC 64 06/19/2019     Lab Results   Component Value Date    LABVLDL 19 08/27/2020    LABVLDL 16 09/18/2019    LABVLDL 11 06/19/2019     Lab Results   Component Value Date    CHOLHDLRATIO 1.8 (L) 05/14/2014    CHOLHDLRATIO 2.1 01/10/2012       Lab Results   Component Value Date    WBC 5.8 05/21/2021    HGB 12.0 (L) 05/21/2021 in AM, and 3 in evening       Snack  none    Motivational Interviewing     Follow Up: declined    Referring Provider: Sen Lr MD  Time spent with patient: 35 minutes

## 2021-06-11 ENCOUNTER — ANTI-COAG VISIT (OUTPATIENT)
Dept: INTERNAL MEDICINE CLINIC | Age: 86
End: 2021-06-11

## 2021-06-11 DIAGNOSIS — I35.9 AORTIC VALVE DISORDER: Primary | ICD-10-CM

## 2021-06-11 LAB — INR BLD: 2.1

## 2021-06-13 ENCOUNTER — APPOINTMENT (OUTPATIENT)
Dept: GENERAL RADIOLOGY | Age: 86
DRG: 388 | End: 2021-06-13
Payer: MEDICARE

## 2021-06-13 ENCOUNTER — APPOINTMENT (OUTPATIENT)
Dept: CT IMAGING | Age: 86
DRG: 388 | End: 2021-06-13
Payer: MEDICARE

## 2021-06-13 ENCOUNTER — HOSPITAL ENCOUNTER (INPATIENT)
Age: 86
LOS: 9 days | Discharge: HOME HEALTH | DRG: 388 | End: 2021-06-22
Attending: EMERGENCY MEDICINE
Payer: MEDICARE

## 2021-06-13 DIAGNOSIS — K56.609 SBO (SMALL BOWEL OBSTRUCTION) (HCC): Primary | ICD-10-CM

## 2021-06-13 LAB
ALBUMIN SERPL-MCNC: 3.5 G/DL (ref 3.4–5)
ALP BLD-CCNC: 64 U/L (ref 40–129)
ALT SERPL-CCNC: 15 U/L (ref 10–40)
AMORPHOUS: ABNORMAL /HPF
ANION GAP SERPL CALCULATED.3IONS-SCNC: 12 MMOL/L (ref 3–16)
AST SERPL-CCNC: 23 U/L (ref 15–37)
BACTERIA: ABNORMAL /HPF
BASE EXCESS VENOUS: 3.1 MMOL/L (ref -2–3)
BASOPHILS ABSOLUTE: 0 K/UL (ref 0–0.2)
BASOPHILS RELATIVE PERCENT: 0.2 %
BILIRUB SERPL-MCNC: 0.7 MG/DL (ref 0–1)
BILIRUBIN DIRECT: <0.2 MG/DL (ref 0–0.3)
BILIRUBIN URINE: NEGATIVE
BILIRUBIN, INDIRECT: NORMAL MG/DL (ref 0–1)
BLOOD, URINE: ABNORMAL
BUN BLDV-MCNC: 35 MG/DL (ref 7–20)
CALCIUM SERPL-MCNC: 9.6 MG/DL (ref 8.3–10.6)
CARBOXYHEMOGLOBIN: 0.3 % (ref 0–1.5)
CHLORIDE BLD-SCNC: 98 MMOL/L (ref 99–110)
CLARITY: CLEAR
CO2: 23 MMOL/L (ref 21–32)
COLOR: YELLOW
CREAT SERPL-MCNC: 1.6 MG/DL (ref 0.8–1.3)
D DIMER: 351 NG/ML DDU (ref 0–229)
EKG ATRIAL RATE: 83 BPM
EKG DIAGNOSIS: NORMAL
EKG P AXIS: 70 DEGREES
EKG P-R INTERVAL: 158 MS
EKG Q-T INTERVAL: 374 MS
EKG QRS DURATION: 74 MS
EKG QTC CALCULATION (BAZETT): 439 MS
EKG R AXIS: 33 DEGREES
EKG T AXIS: 64 DEGREES
EKG VENTRICULAR RATE: 83 BPM
EOSINOPHILS ABSOLUTE: 0 K/UL (ref 0–0.6)
EOSINOPHILS RELATIVE PERCENT: 0.2 %
EPITHELIAL CELLS, UA: ABNORMAL /HPF (ref 0–5)
GFR AFRICAN AMERICAN: 50
GFR NON-AFRICAN AMERICAN: 41
GLUCOSE BLD-MCNC: 162 MG/DL (ref 70–99)
GLUCOSE URINE: NEGATIVE MG/DL
HCO3 VENOUS: 29.2 MMOL/L (ref 24–28)
HCT VFR BLD CALC: 43.8 % (ref 40.5–52.5)
HEMOGLOBIN, VEN, REDUCED: 40.5 %
HEMOGLOBIN: 14 G/DL (ref 13.5–17.5)
INR BLD: 2.14 (ref 0.86–1.14)
KETONES, URINE: ABNORMAL MG/DL
LACTIC ACID: 2 MMOL/L (ref 0.4–2)
LEUKOCYTE ESTERASE, URINE: NEGATIVE
LIPASE: 14 U/L (ref 13–60)
LYMPHOCYTES ABSOLUTE: 1.1 K/UL (ref 1–5.1)
LYMPHOCYTES RELATIVE PERCENT: 10.3 %
MAGNESIUM: 2.5 MG/DL (ref 1.8–2.4)
MCH RBC QN AUTO: 27.5 PG (ref 26–34)
MCHC RBC AUTO-ENTMCNC: 32.1 G/DL (ref 31–36)
MCV RBC AUTO: 85.8 FL (ref 80–100)
METHEMOGLOBIN VENOUS: 0.4 % (ref 0–1.5)
MICROSCOPIC EXAMINATION: YES
MONOCYTES ABSOLUTE: 0.6 K/UL (ref 0–1.3)
MONOCYTES RELATIVE PERCENT: 6.1 %
NEUTROPHILS ABSOLUTE: 8.7 K/UL (ref 1.7–7.7)
NEUTROPHILS RELATIVE PERCENT: 83.2 %
NITRITE, URINE: NEGATIVE
O2 SAT, VEN: 59 %
PCO2, VEN: 48.9 MMHG (ref 41–51)
PDW BLD-RTO: 17.9 % (ref 12.4–15.4)
PH UA: 6 (ref 5–8)
PH VENOUS: 7.38 (ref 7.35–7.45)
PHOSPHORUS: 3.3 MG/DL (ref 2.5–4.9)
PLATELET # BLD: 157 K/UL (ref 135–450)
PMV BLD AUTO: 9.4 FL (ref 5–10.5)
PO2, VEN: 35.8 MMHG (ref 25–40)
POTASSIUM SERPL-SCNC: 5 MMOL/L (ref 3.5–5.1)
PRO-BNP: 1944 PG/ML (ref 0–449)
PROTEIN UA: 100 MG/DL
PROTHROMBIN TIME: 25 SEC (ref 10–13.2)
RBC # BLD: 5.1 M/UL (ref 4.2–5.9)
RBC UA: ABNORMAL /HPF (ref 0–4)
SODIUM BLD-SCNC: 133 MMOL/L (ref 136–145)
SPECIFIC GRAVITY UA: >=1.03 (ref 1–1.03)
TCO2 CALC VENOUS: 31 MMOL/L
TOTAL PROTEIN: 8.2 G/DL (ref 6.4–8.2)
TROPONIN: 0.02 NG/ML
TROPONIN: 0.02 NG/ML
TROPONIN: 0.03 NG/ML
URINE TYPE: ABNORMAL
UROBILINOGEN, URINE: 0.2 E.U./DL
WBC # BLD: 10.4 K/UL (ref 4–11)
WBC UA: ABNORMAL /HPF (ref 0–5)

## 2021-06-13 PROCEDURE — 2580000003 HC RX 258: Performed by: SURGERY

## 2021-06-13 PROCEDURE — 74176 CT ABD & PELVIS W/O CONTRAST: CPT

## 2021-06-13 PROCEDURE — 80076 HEPATIC FUNCTION PANEL: CPT

## 2021-06-13 PROCEDURE — 99284 EMERGENCY DEPT VISIT MOD MDM: CPT

## 2021-06-13 PROCEDURE — 94669 MECHANICAL CHEST WALL OSCILL: CPT

## 2021-06-13 PROCEDURE — 84484 ASSAY OF TROPONIN QUANT: CPT

## 2021-06-13 PROCEDURE — 83735 ASSAY OF MAGNESIUM: CPT

## 2021-06-13 PROCEDURE — 85610 PROTHROMBIN TIME: CPT

## 2021-06-13 PROCEDURE — 94150 VITAL CAPACITY TEST: CPT

## 2021-06-13 PROCEDURE — 2500000003 HC RX 250 WO HCPCS: Performed by: STUDENT IN AN ORGANIZED HEALTH CARE EDUCATION/TRAINING PROGRAM

## 2021-06-13 PROCEDURE — 96374 THER/PROPH/DIAG INJ IV PUSH: CPT

## 2021-06-13 PROCEDURE — 2700000000 HC OXYGEN THERAPY PER DAY

## 2021-06-13 PROCEDURE — 83605 ASSAY OF LACTIC ACID: CPT

## 2021-06-13 PROCEDURE — 74019 RADEX ABDOMEN 2 VIEWS: CPT

## 2021-06-13 PROCEDURE — 2580000003 HC RX 258: Performed by: EMERGENCY MEDICINE

## 2021-06-13 PROCEDURE — 82803 BLOOD GASES ANY COMBINATION: CPT

## 2021-06-13 PROCEDURE — 99223 1ST HOSP IP/OBS HIGH 75: CPT | Performed by: SURGERY

## 2021-06-13 PROCEDURE — 71045 X-RAY EXAM CHEST 1 VIEW: CPT

## 2021-06-13 PROCEDURE — 94664 DEMO&/EVAL PT USE INHALER: CPT

## 2021-06-13 PROCEDURE — 94761 N-INVAS EAR/PLS OXIMETRY MLT: CPT

## 2021-06-13 PROCEDURE — 6370000000 HC RX 637 (ALT 250 FOR IP): Performed by: STUDENT IN AN ORGANIZED HEALTH CARE EDUCATION/TRAINING PROGRAM

## 2021-06-13 PROCEDURE — 6360000002 HC RX W HCPCS: Performed by: EMERGENCY MEDICINE

## 2021-06-13 PROCEDURE — 85025 COMPLETE CBC W/AUTO DIFF WBC: CPT

## 2021-06-13 PROCEDURE — 84100 ASSAY OF PHOSPHORUS: CPT

## 2021-06-13 PROCEDURE — 6360000002 HC RX W HCPCS: Performed by: STUDENT IN AN ORGANIZED HEALTH CARE EDUCATION/TRAINING PROGRAM

## 2021-06-13 PROCEDURE — 80048 BASIC METABOLIC PNL TOTAL CA: CPT

## 2021-06-13 PROCEDURE — 1200000000 HC SEMI PRIVATE

## 2021-06-13 PROCEDURE — 81001 URINALYSIS AUTO W/SCOPE: CPT

## 2021-06-13 PROCEDURE — 83690 ASSAY OF LIPASE: CPT

## 2021-06-13 PROCEDURE — 93005 ELECTROCARDIOGRAM TRACING: CPT | Performed by: STUDENT IN AN ORGANIZED HEALTH CARE EDUCATION/TRAINING PROGRAM

## 2021-06-13 PROCEDURE — 36415 COLL VENOUS BLD VENIPUNCTURE: CPT

## 2021-06-13 PROCEDURE — 85379 FIBRIN DEGRADATION QUANT: CPT

## 2021-06-13 PROCEDURE — 83880 ASSAY OF NATRIURETIC PEPTIDE: CPT

## 2021-06-13 PROCEDURE — 99221 1ST HOSP IP/OBS SF/LOW 40: CPT | Performed by: INTERNAL MEDICINE

## 2021-06-13 PROCEDURE — 93005 ELECTROCARDIOGRAM TRACING: CPT | Performed by: EMERGENCY MEDICINE

## 2021-06-13 RX ORDER — SODIUM CHLORIDE 9 MG/ML
25 INJECTION, SOLUTION INTRAVENOUS PRN
Status: DISCONTINUED | OUTPATIENT
Start: 2021-06-13 | End: 2021-06-22 | Stop reason: HOSPADM

## 2021-06-13 RX ORDER — ONDANSETRON 2 MG/ML
4 INJECTION INTRAMUSCULAR; INTRAVENOUS ONCE
Status: COMPLETED | OUTPATIENT
Start: 2021-06-13 | End: 2021-06-13

## 2021-06-13 RX ORDER — IPRATROPIUM BROMIDE AND ALBUTEROL SULFATE 2.5; .5 MG/3ML; MG/3ML
1 SOLUTION RESPIRATORY (INHALATION) 4 TIMES DAILY
Status: DISCONTINUED | OUTPATIENT
Start: 2021-06-13 | End: 2021-06-19

## 2021-06-13 RX ORDER — SODIUM CHLORIDE, SODIUM LACTATE, POTASSIUM CHLORIDE, AND CALCIUM CHLORIDE .6; .31; .03; .02 G/100ML; G/100ML; G/100ML; G/100ML
500 INJECTION, SOLUTION INTRAVENOUS ONCE
Status: COMPLETED | OUTPATIENT
Start: 2021-06-13 | End: 2021-06-13

## 2021-06-13 RX ORDER — HEPARIN SODIUM 1000 [USP'U]/ML
40 INJECTION, SOLUTION INTRAVENOUS; SUBCUTANEOUS PRN
Status: DISCONTINUED | OUTPATIENT
Start: 2021-06-13 | End: 2021-06-20

## 2021-06-13 RX ORDER — HEPARIN SODIUM 1000 [USP'U]/ML
80 INJECTION, SOLUTION INTRAVENOUS; SUBCUTANEOUS PRN
Status: DISCONTINUED | OUTPATIENT
Start: 2021-06-13 | End: 2021-06-20

## 2021-06-13 RX ORDER — PROCHLORPERAZINE EDISYLATE 5 MG/ML
10 INJECTION INTRAMUSCULAR; INTRAVENOUS ONCE
Status: DISCONTINUED | OUTPATIENT
Start: 2021-06-13 | End: 2021-06-13

## 2021-06-13 RX ORDER — SODIUM CHLORIDE 9 MG/ML
INJECTION, SOLUTION INTRAVENOUS CONTINUOUS
Status: DISCONTINUED | OUTPATIENT
Start: 2021-06-13 | End: 2021-06-13

## 2021-06-13 RX ORDER — HEPARIN SODIUM 1000 [USP'U]/ML
80 INJECTION, SOLUTION INTRAVENOUS; SUBCUTANEOUS ONCE
Status: DISCONTINUED | OUTPATIENT
Start: 2021-06-13 | End: 2021-06-13

## 2021-06-13 RX ORDER — WARFARIN SODIUM 5 MG/1
5 TABLET ORAL EVERY EVENING
Status: DISCONTINUED | OUTPATIENT
Start: 2021-06-13 | End: 2021-06-13

## 2021-06-13 RX ORDER — FUROSEMIDE 10 MG/ML
40 INJECTION INTRAMUSCULAR; INTRAVENOUS ONCE
Status: COMPLETED | OUTPATIENT
Start: 2021-06-13 | End: 2021-06-13

## 2021-06-13 RX ORDER — SODIUM CHLORIDE 0.9 % (FLUSH) 0.9 %
10 SYRINGE (ML) INJECTION EVERY 12 HOURS SCHEDULED
Status: DISCONTINUED | OUTPATIENT
Start: 2021-06-13 | End: 2021-06-22 | Stop reason: HOSPADM

## 2021-06-13 RX ORDER — PROCHLORPERAZINE EDISYLATE 5 MG/ML
10 INJECTION INTRAMUSCULAR; INTRAVENOUS EVERY 6 HOURS PRN
Status: DISCONTINUED | OUTPATIENT
Start: 2021-06-13 | End: 2021-06-22 | Stop reason: HOSPADM

## 2021-06-13 RX ORDER — METOPROLOL TARTRATE 5 MG/5ML
2.5 INJECTION INTRAVENOUS ONCE
Status: COMPLETED | OUTPATIENT
Start: 2021-06-13 | End: 2021-06-13

## 2021-06-13 RX ORDER — SODIUM CHLORIDE, SODIUM LACTATE, POTASSIUM CHLORIDE, CALCIUM CHLORIDE 600; 310; 30; 20 MG/100ML; MG/100ML; MG/100ML; MG/100ML
INJECTION, SOLUTION INTRAVENOUS CONTINUOUS
Status: DISCONTINUED | OUTPATIENT
Start: 2021-06-13 | End: 2021-06-13

## 2021-06-13 RX ORDER — SODIUM CHLORIDE 0.9 % (FLUSH) 0.9 %
10 SYRINGE (ML) INJECTION PRN
Status: DISCONTINUED | OUTPATIENT
Start: 2021-06-13 | End: 2021-06-22 | Stop reason: HOSPADM

## 2021-06-13 RX ORDER — HEPARIN SODIUM 10000 [USP'U]/100ML
26 INJECTION, SOLUTION INTRAVENOUS CONTINUOUS
Status: DISCONTINUED | OUTPATIENT
Start: 2021-06-13 | End: 2021-06-20

## 2021-06-13 RX ORDER — ONDANSETRON 2 MG/ML
4 INJECTION INTRAMUSCULAR; INTRAVENOUS EVERY 6 HOURS PRN
Status: DISCONTINUED | OUTPATIENT
Start: 2021-06-13 | End: 2021-06-22 | Stop reason: HOSPADM

## 2021-06-13 RX ADMIN — SODIUM CHLORIDE: 9 INJECTION, SOLUTION INTRAVENOUS at 09:55

## 2021-06-13 RX ADMIN — Medication 10 ML: at 08:10

## 2021-06-13 RX ADMIN — PROCHLORPERAZINE EDISYLATE 10 MG: 5 INJECTION INTRAMUSCULAR; INTRAVENOUS at 13:00

## 2021-06-13 RX ADMIN — HEPARIN SODIUM 18 UNITS/KG/HR: 10000 INJECTION, SOLUTION INTRAVENOUS at 22:06

## 2021-06-13 RX ADMIN — ONDANSETRON 4 MG: 2 INJECTION INTRAMUSCULAR; INTRAVENOUS at 02:25

## 2021-06-13 RX ADMIN — FUROSEMIDE 40 MG: 10 INJECTION, SOLUTION INTRAMUSCULAR; INTRAVENOUS at 19:15

## 2021-06-13 RX ADMIN — Medication 10 ML: at 22:13

## 2021-06-13 RX ADMIN — IPRATROPIUM BROMIDE AND ALBUTEROL SULFATE 1 AMPULE: .5; 2.5 SOLUTION RESPIRATORY (INHALATION) at 20:00

## 2021-06-13 RX ADMIN — SODIUM CHLORIDE, POTASSIUM CHLORIDE, SODIUM LACTATE AND CALCIUM CHLORIDE: 600; 310; 30; 20 INJECTION, SOLUTION INTRAVENOUS at 08:10

## 2021-06-13 RX ADMIN — ONDANSETRON 4 MG: 2 INJECTION INTRAMUSCULAR; INTRAVENOUS at 07:23

## 2021-06-13 RX ADMIN — METOPROLOL TARTRATE 2.5 MG: 5 INJECTION INTRAVENOUS at 19:16

## 2021-06-13 RX ADMIN — SODIUM CHLORIDE, SODIUM LACTATE, POTASSIUM CHLORIDE, AND CALCIUM CHLORIDE 500 ML: .6; .31; .03; .02 INJECTION, SOLUTION INTRAVENOUS at 02:24

## 2021-06-13 RX ADMIN — SODIUM CHLORIDE, SODIUM LACTATE, POTASSIUM CHLORIDE, AND CALCIUM CHLORIDE 500 ML: .6; .31; .03; .02 INJECTION, SOLUTION INTRAVENOUS at 05:56

## 2021-06-13 ASSESSMENT — ENCOUNTER SYMPTOMS
NAUSEA: 1
ABDOMINAL PAIN: 1
COUGH: 1
VOMITING: 1
DIARRHEA: 1
EYES NEGATIVE: 1
SHORTNESS OF BREATH: 0

## 2021-06-13 ASSESSMENT — PAIN SCALES - GENERAL
PAINLEVEL_OUTOF10: 8
PAINLEVEL_OUTOF10: 0

## 2021-06-13 ASSESSMENT — PAIN DESCRIPTION - DESCRIPTORS: DESCRIPTORS: ACHING

## 2021-06-13 ASSESSMENT — PAIN DESCRIPTION - PAIN TYPE: TYPE: ACUTE PAIN

## 2021-06-13 ASSESSMENT — PAIN DESCRIPTION - LOCATION: LOCATION: ABDOMEN

## 2021-06-13 NOTE — PROGRESS NOTES
4 Eyes Admission Assessment     I agree as the admission nurse that 2 RN's have performed a thorough Head to Toe Skin Assessment on the patient. ALL assessment sites listed below have been assessed on admission. Areas assessed by both nurses: [x]   Head, Face, and Ears   [x]   Shoulders, Back, and Chest  [x]   Arms, Elbows, and Hands   [x]   Coccyx, Sacrum, and Ischium- bruising noted on coccyx, skin intact. [x]   Legs, Feet, and Heels- scattered small scabs on shins; bruising R foot/heel, skin intact. Does the Patient have Skin Breakdown?   No         Hunter Prevention initiated:  No   Wound Care Orders initiated:  No      [facility] nurse consulted for Pressure Injury (Stage 3,4, Unstageable, DTI, NWPT, and Complex wounds) or Hunter score 18 or lower:  No      Nurse 1 eSignature: Electronically signed by Sotero Reyes RN on 6/13/21 at 2:31 PM EDT    **SHARE this note so that the co-signing nurse is able to place an eSignature**    Nurse 2 eSignature: Electronically signed by Geronimo Olivas RN on 6/13/21 at 2:34 PM EDT

## 2021-06-13 NOTE — ED TRIAGE NOTES
Patient complains of weakness that started a few days ago along with abdominal pain. He states that earlier tonight he had episodes of nausea and vomiting. Denies hematemesis. He has a prior history of bowel obstructions and states this feels similar.

## 2021-06-13 NOTE — PROGRESS NOTES
RESPIRATORY THERAPY ASSESSMENT    Name:  Linda Dominguez MD  Medical Record Number:  [id number]  Age: 82 y.o. Gender: male  : 1935  Today's Date:  2021  Room:  [facility]-    Assessment     Is the patient being admitted for a COPD or Asthma exacerbation? No   (If yes the patient will be seen every 4 hours for the first 24 hours and then reassessed)    Patient Admission Diagnosis      Allergies  Allergies   Allergen Reactions    No Known Allergies        Minimum Predicted Vital Capacity:     Unable to perform          Actual Vital Capacity:      N/A              Pulmonary History:No history  Home Oxygen Therapy:  room air  Home Respiratory Therapy:None   Current Respiratory Therapy:  [facility] QID  Treatment Type: Positive expiratory pressure therapy       Respiratory Severity Index(RSI)   Patients with orders for inhalation medications, oxygen, or any therapeutic treatment modality will be placed on Respiratory Protocol. They will be assessed with the first treatment and at least every 72 hours thereafter. The following severity scale will be used to determine frequency of treatment intervention. Smoking History: No Smoking History = 0    Social History  Social History     Tobacco Use    Smoking status: Never Smoker    Smokeless tobacco: Never Used   Substance Use Topics    Alcohol use:  Yes     Alcohol/week: 0.0 standard drinks     Comment: occassionally    Drug use: No       Recent Surgical History: Lower abdominal = 2  Past Surgical History  Past Surgical History:   Procedure Laterality Date    APPENDECTOMY      as a child    BONE GRAFT      for saddle nose    CARDIAC VALVE REPLACEMENT      aorta    CHOLECYSTECTOMY      COLONOSCOPY  2009    COLONOSCOPY  10/05/15    COLONOSCOPY N/A 2021    COLONOSCOPY DIAGNOSTIC/STOMA performed by Nestor Menendez MD at [facility]  2021    COLONOSCOPY POLYPECTOMY SNARE/COLD BIOPSY performed by Wilson Charles MD Nicole at [address] [facility] GRAFT  2006    ESOPHAGUS SURGERY      EYE SURGERY  1990& 1992    cataract bilat removal     GASTRECTOMY      PROSTATE SURGERY      seeds    TONSILLECTOMY      UPPER GASTROINTESTINAL ENDOSCOPY N/A 2/16/2021    EGD BIOPSY performed by Jewel Hammond MD at [address] PROSTATE/TRANSRECTAL VOL STUDY BRACHYTHERAPY         Level of Consciousness: Alert, Oriented, and Cooperative = 0    Level of Activity: Mostly sedentary, minimal walking = 2    Respiratory Pattern: Increased; RR 21-30 = 1    Breath Sounds: Diminshed bilaterally and/or crackles = 2    Sputum   ,  ,    Cough: Strong, productive = 1    Vital Signs   BP (!) 150/88   Pulse 112   Temp 98.5 °F (36.9 °C) (Oral)   Resp 22   Ht 5' 3.5\" (1.613 m)   Wt 106 lb (48.1 kg)   SpO2 95%   BMI 18.48 kg/m²   SPO2 (COPD values may differ): 86-87% on room air or greater than 92% on FiO2 35- 50% = 3    Peak Flow (asthma only): not applicable = 0    RSI: 61-80 = Q6H or QID and Q4HPRN for dyspnea        Plan       Goals: medication delivery    Patient/caregiver was educated on the proper method of use for Respiratory Care Devices:  Yes      Level of patient/caregiver understanding able to:   ? Verbalize understanding   ? Demonstrate understanding       ? Teach back        ? Needs reinforcement       ? No available caregiver               ? Other:     Response to education:  Good     Is patient being placed on Home Treatment Regimen? No     Does the patient have everything they need prior to discharge? NA     Comments: HHN QID as per protocol    Plan of Care: [facility] QID and prn    Electronically signed by Madina Middleton RCP on 6/13/2021 at 5:18 PM    Respiratory Protocol Guidelines     1. Assessment and treatment by Respiratory Therapy will be initiated for medication and therapeutic interventions upon initiation of aerosolized medication.   2. Physician will be contacted for respiratory rate secretions have lessened to the point that the patient is able to clear them with a cough. Anti-inflammatory and Combination Medications:    1. If the patient lacks prior history of lung disease, is not using inhaled anti-inflammatory medication at home, and lacks wheezing by examination or by history for at least 24 hours, contact physician for possible discontinuation.

## 2021-06-13 NOTE — PROGRESS NOTES
POC:     100 cc gastrografin was given PO which pt tolerates well. AXR at 1200.      Freddy Mishra MD  General Surgery Resident  06/13/21 8:32 AM  [phone]

## 2021-06-13 NOTE — ED PROVIDER NOTES
[address] [facility]          ATTENDING PHYSICIAN NOTE       Date of evaluation: 6/13/2021    Chief Complaint     Abdominal Pain (started a few days ago. 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 Patient states that over the last 3 days he has been having lower abdominal pain and tonight started helping nausea and vomiting. He describes the vomiting as bilious. He states he did have a bowel movement approximately an hour ago that was diarrhea. He denies any fevers or chills. He does note a cough that has been minimally productive of sputum. She denies any chest pain or pressure. He denies any burning with urination, pain with urination, or increased urinary frequency. He did not try taking anything for his symptoms. He does have history of bowel obstructions in the past was concerned that he may be obstructed again. Review of Systems     Review of Systems   Constitutional: Negative. HENT: Negative. Eyes: Negative. Respiratory: Positive for cough. Negative for shortness of breath. Cardiovascular: Negative. Gastrointestinal: Positive for abdominal pain, diarrhea, nausea and vomiting. Genitourinary: Negative. Musculoskeletal: Negative. Neurological: Negative. All other systems reviewed and are negative. Past Medical, Surgical, Family, and Social History     He has a past medical history of Anemia, Aortic valve disorders, Aspiration pneumonia ([address] Erectile dysfunction, Esophageal cancer ([address] Hernia, femoral, bilateral, Hyperlipidemia, Osteoporosis, senile, Pancreatitis, Patient underweight, Prostate cancer ([address] and Unspecified essential hypertension.   He has a past surgical history that includes Coronary artery bypass graft (2006); Cardiac valve replacement (2006); eye surgery (1990& 1992); Appendectomy; bone graft; gastrectomy; Cholecystectomy; Colonoscopy (11/2009); Prostate surgery; Tonsillectomy; Esophagus surgery; US Prostate/Transrectal/Vol Collins Brachyth; Colonoscopy (10/05/15); Upper gastrointestinal endoscopy (N/A, 2/16/2021); Colonoscopy (N/A, 2/17/2021); and Colonoscopy (2/18/2021). His family history includes Elevated Lipids in his father; Heart Disease in his mother; Hypertension in his father; Other in his father and mother; Stroke in his father. He reports that he has never smoked. He has never used smokeless tobacco. He reports current alcohol use. He reports that he does not use drugs. Medications     Previous Medications    ALPHA LIPOIC ACID-BIOTIN 300-333 MG-MCG CAPS    300 mg    ATORVASTATIN (LIPITOR) 10 MG TABLET    TAKE ONE TABLET BY MOUTH DAILY    CALCIUM CARBONATE (OSCAL) 500 MG TABS TABLET    Take 500 mg by mouth daily    COENZYME Q10 (COQ10) 400 MG CAPS    Take 1 capsule by mouth    DOCUSATE SODIUM (COLACE) 100 MG CAPSULE    Take 100 mg by mouth daily. One  Capsule daily    FERROUS SULFATE (ANTHONY-THEO) 325 (65 FE) MG TABLET    Take 1 tablet by mouth every 48 hours    MAGNESIUM 65 MG TABS    Take by mouth    OMEPRAZOLE (PRILOSEC) 40 MG DELAYED RELEASE CAPSULE    TAKE ONE CAPSULE BY MOUTH DAILY    POLYETHYLENE GLYCOL (GLYCOLAX) POWDER    Take 17 g by mouth 2 times daily     SILDENAFIL (VIAGRA) 50 MG TABLET    Take 50 mg by mouth as needed for Erectile Dysfunction. SIMETHICONE 80 MG TABS    Take 80 tablets by mouth 3 times daily. VITAMIN B-12 (CYANOCOBALAMIN) 1000 MCG TABLET    Take 1,000 mcg by mouth daily. WARFARIN (COUMADIN) 5 MG TABLET    TAKE ONE TABLET BY MOUTH ONCE NIGHTLY       Allergies     He is allergic to no known allergies.     Physical Exam     INITIAL VITALS: BP: 132/87, Temp: 98.3 °F (36.8 °C), Pulse: 89, Resp: 17, SpO2: 93 %   Physical Exam  Vitals of 06/13/21   CBC auto differential   Result Value Ref Range    WBC 10.4 4.0 - 11.0 K/uL    RBC 5.10 4.20 - 5.90 M/uL    Hemoglobin 14.0 13.5 - 17.5 g/dL    Hematocrit 43.8 40.5 - 52.5 %    MCV 85.8 80.0 - 100.0 fL    MCH 27.5 26.0 - 34.0 pg    MCHC 32.1 31.0 - 36.0 g/dL    RDW 17.9 (H) 12.4 - 15.4 %    Platelets 549 547 - 377 K/uL    MPV 9.4 5.0 - 10.5 fL    Neutrophils % 83.2 %    Lymphocytes % 10.3 %    Monocytes % 6.1 %    Eosinophils % 0.2 %    Basophils % 0.2 %    Neutrophils Absolute 8.7 (H) 1.7 - 7.7 K/uL    Lymphocytes Absolute 1.1 1.0 - 5.1 K/uL    Monocytes Absolute 0.6 0.0 - 1.3 K/uL    Eosinophils Absolute 0.0 0.0 - 0.6 K/uL    Basophils Absolute 0.0 0.0 - 0.2 K/uL   Basic Metabolic Panel (EP - 1)   Result Value Ref Range    Sodium 133 (L) 136 - 145 mmol/L    Potassium 5.0 3.5 - 5.1 mmol/L    Chloride 98 (L) 99 - 110 mmol/L    CO2 23 21 - 32 mmol/L    Anion Gap 12 3 - 16    Glucose 162 (H) 70 - 99 mg/dL    BUN 35 (H) 7 - 20 mg/dL    CREATININE 1.6 (H) 0.8 - 1.3 mg/dL    GFR Non- 41 (A) >60    GFR  50 (A) >60    Calcium 9.6 8.3 - 10.6 mg/dL   Magnesium   Result Value Ref Range    Magnesium 2.50 (H) 1.80 - 2.40 mg/dL   Phosphorus   Result Value Ref Range    Phosphorus 3.3 2.5 - 4.9 mg/dL   Hepatic function panel (LFTs)   Result Value Ref Range    Total Protein 8.2 6.4 - 8.2 g/dL    Albumin 3.5 3.4 - 5.0 g/dL    Alkaline Phosphatase 64 40 - 129 U/L    ALT 15 10 - 40 U/L    AST 23 15 - 37 U/L    Total Bilirubin 0.7 0.0 - 1.0 mg/dL    Bilirubin, Direct <0.2 0.0 - 0.3 mg/dL    Bilirubin, Indirect see below 0.0 - 1.0 mg/dL   Lipase   Result Value Ref Range    Lipase 14.0 13.0 - 60.0 U/L   Troponin   Result Value Ref Range    Troponin 0.02 (H) <0.01 ng/mL   Lactic Acid, Plasma   Result Value Ref Range    Lactic Acid 2.0 0.4 - 2.0 mmol/L   Blood Gas, Venous   Result Value Ref Range    pH, Star 7.385 7.350 - 7.450    pCO2, Star 48.9 41.0 - 51.0 mmHg    pO2, Star 35.8 25 - 40 mmHg HCO3, Venous 29.2 (H) 24.0 - 28.0 mmol/L    Base Excess, Star 3.1 (H) -2.0 - 3.0 mmol/L    O2 Sat, Star 59 Not established %    Carboxyhemoglobin 0.3 0.0 - 1.5 %    MetHgb, Star 0.4 0.0 - 1.5 %    TC02 (Calc), Star 31 mmol/L    Hemoglobin, Star, Reduced 40.50 %   PT - INR   Result Value Ref Range    Protime 25.0 (H) 10.0 - 13.2 sec    INR 2.14 (H) 0.86 - 1.14     RECENT VITALS:  BP: (!) 161/89,Temp: 98.6 °F (37 °C), Pulse: 87, Resp: 16, SpO2: 95 %     Procedures     N/A    ED Course     Nursing Notes, Past Medical Hx, Past Surgical Hx, Social Hx,Allergies, and Family Hx were reviewed. patient was given the following medications:  Orders Placed This Encounter   Medications    ondansetron (ZOFRAN) injection 4 mg    lactated ringers bolus    lactated ringers bolus    ondansetron (ZOFRAN) injection 4 mg       CONSULTS:  Kike Nunes DECISIONMAKING / Angela Fisher / Maurice Salas MD is a 82 y.o. male with history of chronic renal insufficiency and esophageal carcinoma status post esophagectomy with gastric pull-through presents to the emergency department complaining of nausea, vomiting, and abdominal pain. Patient states that his symptoms have been going on for the last 2 days, though today was when he started developing vomiting. On arrival, patient is hemodynamically stable. He does have mild tenderness predominantly the left side of the abdomen with no rebound or guarding present. Laboratory studies are significant for a creatinine of 1.6 which is slightly above patient's baseline of 1.3. CBC is unremarkable. INR is therapeutic. Chest x-ray shows no acute airspace disease. CT scan of the abdomen pelvis is limited due to motion artifact and lack of contrast but does show evidence of small bowel obstruction. Patient was seen by general surgery service and will be admitted to the surgical service for further management. Clinical Impression     1.  SBO (small bowel obstruction) ([facility]        Disposition     PATIENT REFERRED TO:  No follow-up provider specified.     DISCHARGE MEDICATIONS:  New Prescriptions    No medications on file       [id number] Oscar Holland MD  06/13/21 [id number]

## 2021-06-13 NOTE — PROGRESS NOTES
Gen Surg  POC    NG tube placed given increased O2 requirement, 450 cc bilious output immediately. Placed to [facility]. CXR reviewed with new interstitial infiltrate concerning for edema vs infection. ECG showed ST changes, concerning for inferior infarct, patient asymptomatic without chest pain or SOB. No hypotension. Trop ordered stable at 0.02, cardiology consulted, discussed with Dr. Celena Norton, recommending SLIV, lasix 40mg once, BNP, DDimer, and trending trops at 4 and 6 hours from now, as well as 2.5 mg of metoprolol to slow him down in the setting of ST depression. 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 IF trops trend up will start heparin gtt this evening, however will check an INR in the am regardless to ensure absorption of his coumadin this evening. Ok to put down NG and clamp for 30 mins. Patient with urinary retention requiring straight cath earlier for bladder scan > 400, catheterized for 200, still unable to void, will continue to monitor if unable to void, will place giraldo at this point at 930pm.     Remains mildly tachycardic in the 110's, blood pressure stable, abdomen soft, non tender, non distended. Will continue to monitor closely.     Pamela Villaseñor MD   Gen Surg PGY 3  6/13/2021  5:52 PM  [phone]

## 2021-06-13 NOTE — PROGRESS NOTES
Gen Surg  POC    Discussed AXR findings with patient, patient's nausea currently well controlled with medication. Discussed with patient plan for NG Tube if nausea worsens, intractable or if patient just overall wants the tube for respite. Patient reports being ok at the moment and will inform me and his nurse if nausea returns, or uncontrolled. Also patient unable to void, bladder scanned for over 400 cc, patient denies any hx of urinary retention, will straight cath once, if unable to void again will place a giraldo. Patient also happy with this plan and in agreement.      Geeta Faust MD   Gen Surg PGY 3  6/13/2021  2:32 PM  [phone]

## 2021-06-13 NOTE — H&P
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 male with Hx of prostate cancer status post brachytherapy, aortic valve replacement on warfarin, CAD s/p CABG, esophageal cancer status post chemoradiation and esophagectomy with gastric pull through in [facility], presents with 2 days of abdominal pain in the periumbilical and upper abdomen. This pain is initially worsening and patient developed nausea, bilious vomiting and multiple diarrhea episodes last night. Patient endorses flatus. Patient stated at this time his abdominal pain and nausea are improved. Patient denies chills or fever. He denies chest pain, dyspnea, or dysuria. He endorses cough for 3 weeks. He is well-known to our service for recurrent small bowel obstruction that has been managed nonoperatively. Patient was last seen by Dr. Alicja Hale in January. Of note, patient was hospitalized for lower GI bleed back in February. Patient had EGD which shows gastric changes secondary to radiation and colonoscopy which showed 2 small  benign polyps and telangectasia.        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    Pancreatitis 8/1/2013    Patient underweight 8/8/2013    Prostate cancer ([address]     Unspecified essential hypertension        Past Surgical History:           Procedure Laterality Date    APPENDECTOMY      as a child    BONE GRAFT      for saddle nose    CARDIAC VALVE REPLACEMENT  2006    aorta    CHOLECYSTECTOMY      COLONOSCOPY  11/2009    COLONOSCOPY  10/05/15    COLONOSCOPY N/A 2/17/2021    COLONOSCOPY DIAGNOSTIC/STOMA performed by Patti Kincaid MD at [address] COLORU YELLOW 09/18/2019    PHUR 7.5 09/18/2019    LABCAST 0-1 Fine Gran. 04/27/2015    WBCUA 0-2 04/27/2015    RBCUA 3-5 04/27/2015    MUCUS PRESENT 05/14/2014    BACTERIA 2+ 04/27/2015    CLARITYU Clear 09/18/2019    SPECGRAV 1.019 09/18/2019    LEUKOCYTESUR Negative 09/18/2019    UROBILINOGEN 1.0 09/18/2019    BILIRUBINUR Negative 09/18/2019    BILIRUBINUR Small 05/28/2012    BLOODU Negative 09/18/2019    GLUCOSEU Negative 09/18/2019    GLUCOSEU Negative 05/28/2012    AMORPHOUS 1+ 04/27/2015       Imaging:   CT ABDOMEN PELVIS WO CONTRAST Additional Contrast? None   Final Result     Limited study. Findings suggestive of bowel obstruction. XR CHEST PORTABLE   Final Result   No evidence of acute cardiopulmonary disease. Elevated left hemidiaphragm. Assessment/Plan: This is a 82 y.o. male with Hx of remote esophagectomy and cholecystectomy with intermittent small bowel obstruction who presents with abdominal pain, diarrhea, nausea, and vomiting. Labs are within normal limits except for elevated creatinine. Pt was given bolus in the ED. Continue IVF, follow up UA, gastrografin challenge this AM. Continue warfarin for mechanical aortic valve, pt is unlikely to require surgical intervention.        Discussed with staff     J Carlos Kwon MD  General Surgery Surgery  06/13/21  7:07 AM  [phone]

## 2021-06-14 ENCOUNTER — APPOINTMENT (OUTPATIENT)
Dept: GENERAL RADIOLOGY | Age: 86
DRG: 388 | End: 2021-06-14
Payer: MEDICARE

## 2021-06-14 PROBLEM — R00.0 TACHYCARDIA: Status: ACTIVE | Noted: 2021-06-14

## 2021-06-14 LAB
ALBUMIN SERPL-MCNC: 3.3 G/DL (ref 3.4–5)
ANION GAP SERPL CALCULATED.3IONS-SCNC: 11 MMOL/L (ref 3–16)
ANTI-XA UNFRAC HEPARIN: 0.44 IU/ML (ref 0.3–0.7)
ANTI-XA UNFRAC HEPARIN: 0.47 IU/ML (ref 0.3–0.7)
ANTI-XA UNFRAC HEPARIN: <0.04 IU/ML (ref 0.3–0.7)
ANTI-XA UNFRAC HEPARIN: >1.8 IU/ML (ref 0.3–0.7)
APTT: 110.9 SEC (ref 24.2–36.2)
BASOPHILS ABSOLUTE: 0 K/UL (ref 0–0.2)
BASOPHILS RELATIVE PERCENT: 0.2 %
BUN BLDV-MCNC: 42 MG/DL (ref 7–20)
CALCIUM SERPL-MCNC: 8.5 MG/DL (ref 8.3–10.6)
CHLORIDE BLD-SCNC: 102 MMOL/L (ref 99–110)
CO2: 29 MMOL/L (ref 21–32)
CREAT SERPL-MCNC: 1.8 MG/DL (ref 0.8–1.3)
EKG ATRIAL RATE: 118 BPM
EKG ATRIAL RATE: 92 BPM
EKG ATRIAL RATE: 93 BPM
EKG DIAGNOSIS: NORMAL
EKG P AXIS: 52 DEGREES
EKG P AXIS: 65 DEGREES
EKG P AXIS: 78 DEGREES
EKG P-R INTERVAL: 144 MS
EKG P-R INTERVAL: 164 MS
EKG P-R INTERVAL: 166 MS
EKG Q-T INTERVAL: 316 MS
EKG Q-T INTERVAL: 372 MS
EKG Q-T INTERVAL: 372 MS
EKG QRS DURATION: 74 MS
EKG QRS DURATION: 80 MS
EKG QRS DURATION: 90 MS
EKG QTC CALCULATION (BAZETT): 442 MS
EKG QTC CALCULATION (BAZETT): 460 MS
EKG QTC CALCULATION (BAZETT): 462 MS
EKG R AXIS: 10 DEGREES
EKG R AXIS: 24 DEGREES
EKG R AXIS: 62 DEGREES
EKG T AXIS: 65 DEGREES
EKG T AXIS: 70 DEGREES
EKG T AXIS: 81 DEGREES
EKG VENTRICULAR RATE: 118 BPM
EKG VENTRICULAR RATE: 92 BPM
EKG VENTRICULAR RATE: 93 BPM
EOSINOPHILS ABSOLUTE: 0 K/UL (ref 0–0.6)
EOSINOPHILS RELATIVE PERCENT: 0 %
GFR AFRICAN AMERICAN: 44
GFR NON-AFRICAN AMERICAN: 36
GLUCOSE BLD-MCNC: 147 MG/DL (ref 70–99)
HCT VFR BLD CALC: 40.4 % (ref 40.5–52.5)
HEMOGLOBIN: 13 G/DL (ref 13.5–17.5)
INR BLD: 2.71 (ref 0.86–1.14)
LYMPHOCYTES ABSOLUTE: 1.1 K/UL (ref 1–5.1)
LYMPHOCYTES RELATIVE PERCENT: 11.9 %
MAGNESIUM: 2.2 MG/DL (ref 1.8–2.4)
MCH RBC QN AUTO: 27.3 PG (ref 26–34)
MCHC RBC AUTO-ENTMCNC: 32.2 G/DL (ref 31–36)
MCV RBC AUTO: 84.8 FL (ref 80–100)
MONOCYTES ABSOLUTE: 0.7 K/UL (ref 0–1.3)
MONOCYTES RELATIVE PERCENT: 7.6 %
NEUTROPHILS ABSOLUTE: 7.1 K/UL (ref 1.7–7.7)
NEUTROPHILS RELATIVE PERCENT: 80.3 %
PDW BLD-RTO: 17.6 % (ref 12.4–15.4)
PHOSPHORUS: 5.3 MG/DL (ref 2.5–4.9)
PLATELET # BLD: 138 K/UL (ref 135–450)
PMV BLD AUTO: 9.6 FL (ref 5–10.5)
POTASSIUM SERPL-SCNC: 4.1 MMOL/L (ref 3.5–5.1)
PROTHROMBIN TIME: 31.8 SEC (ref 10–13.2)
RBC # BLD: 4.76 M/UL (ref 4.2–5.9)
SODIUM BLD-SCNC: 142 MMOL/L (ref 136–145)
TROPONIN: 0.04 NG/ML
WBC # BLD: 8.9 K/UL (ref 4–11)

## 2021-06-14 PROCEDURE — 2580000003 HC RX 258: Performed by: SURGERY

## 2021-06-14 PROCEDURE — 2700000000 HC OXYGEN THERAPY PER DAY

## 2021-06-14 PROCEDURE — 93005 ELECTROCARDIOGRAM TRACING: CPT | Performed by: INTERNAL MEDICINE

## 2021-06-14 PROCEDURE — 2580000003 HC RX 258: Performed by: STUDENT IN AN ORGANIZED HEALTH CARE EDUCATION/TRAINING PROGRAM

## 2021-06-14 PROCEDURE — 80069 RENAL FUNCTION PANEL: CPT

## 2021-06-14 PROCEDURE — 85520 HEPARIN ASSAY: CPT

## 2021-06-14 PROCEDURE — 6370000000 HC RX 637 (ALT 250 FOR IP): Performed by: STUDENT IN AN ORGANIZED HEALTH CARE EDUCATION/TRAINING PROGRAM

## 2021-06-14 PROCEDURE — 85730 THROMBOPLASTIN TIME PARTIAL: CPT

## 2021-06-14 PROCEDURE — 6360000002 HC RX W HCPCS: Performed by: NURSE PRACTITIONER

## 2021-06-14 PROCEDURE — 93010 ELECTROCARDIOGRAM REPORT: CPT | Performed by: INTERNAL MEDICINE

## 2021-06-14 PROCEDURE — 94640 AIRWAY INHALATION TREATMENT: CPT

## 2021-06-14 PROCEDURE — 1200000000 HC SEMI PRIVATE

## 2021-06-14 PROCEDURE — 94761 N-INVAS EAR/PLS OXIMETRY MLT: CPT

## 2021-06-14 PROCEDURE — 36415 COLL VENOUS BLD VENIPUNCTURE: CPT

## 2021-06-14 PROCEDURE — 99232 SBSQ HOSP IP/OBS MODERATE 35: CPT | Performed by: INTERNAL MEDICINE

## 2021-06-14 PROCEDURE — 83735 ASSAY OF MAGNESIUM: CPT

## 2021-06-14 PROCEDURE — 85610 PROTHROMBIN TIME: CPT

## 2021-06-14 PROCEDURE — 94669 MECHANICAL CHEST WALL OSCILL: CPT

## 2021-06-14 PROCEDURE — 74019 RADEX ABDOMEN 2 VIEWS: CPT

## 2021-06-14 PROCEDURE — 84484 ASSAY OF TROPONIN QUANT: CPT

## 2021-06-14 PROCEDURE — C9113 INJ PANTOPRAZOLE SODIUM, VIA: HCPCS | Performed by: NURSE PRACTITIONER

## 2021-06-14 PROCEDURE — 85025 COMPLETE CBC W/AUTO DIFF WBC: CPT

## 2021-06-14 PROCEDURE — 99232 SBSQ HOSP IP/OBS MODERATE 35: CPT | Performed by: SURGERY

## 2021-06-14 PROCEDURE — 99223 1ST HOSP IP/OBS HIGH 75: CPT | Performed by: INTERNAL MEDICINE

## 2021-06-14 RX ORDER — SODIUM CHLORIDE, SODIUM LACTATE, POTASSIUM CHLORIDE, CALCIUM CHLORIDE 600; 310; 30; 20 MG/100ML; MG/100ML; MG/100ML; MG/100ML
INJECTION, SOLUTION INTRAVENOUS CONTINUOUS
Status: DISCONTINUED | OUTPATIENT
Start: 2021-06-14 | End: 2021-06-15

## 2021-06-14 RX ORDER — PANTOPRAZOLE SODIUM 40 MG/10ML
40 INJECTION, POWDER, LYOPHILIZED, FOR SOLUTION INTRAVENOUS DAILY
Status: DISCONTINUED | OUTPATIENT
Start: 2021-06-14 | End: 2021-06-21

## 2021-06-14 RX ADMIN — IPRATROPIUM BROMIDE AND ALBUTEROL SULFATE 1 AMPULE: .5; 2.5 SOLUTION RESPIRATORY (INHALATION) at 11:56

## 2021-06-14 RX ADMIN — IPRATROPIUM BROMIDE AND ALBUTEROL SULFATE 1 AMPULE: .5; 2.5 SOLUTION RESPIRATORY (INHALATION) at 15:17

## 2021-06-14 RX ADMIN — PANTOPRAZOLE SODIUM 40 MG: 40 INJECTION, POWDER, FOR SOLUTION INTRAVENOUS at 15:27

## 2021-06-14 RX ADMIN — SODIUM CHLORIDE, POTASSIUM CHLORIDE, SODIUM LACTATE AND CALCIUM CHLORIDE: 600; 310; 30; 20 INJECTION, SOLUTION INTRAVENOUS at 10:22

## 2021-06-14 RX ADMIN — IPRATROPIUM BROMIDE AND ALBUTEROL SULFATE 1 AMPULE: .5; 2.5 SOLUTION RESPIRATORY (INHALATION) at 08:09

## 2021-06-14 RX ADMIN — Medication 10 ML: at 22:00

## 2021-06-14 RX ADMIN — IPRATROPIUM BROMIDE AND ALBUTEROL SULFATE 1 AMPULE: .5; 2.5 SOLUTION RESPIRATORY (INHALATION) at 21:45

## 2021-06-14 ASSESSMENT — PAIN SCALES - GENERAL: PAINLEVEL_OUTOF10: 0

## 2021-06-14 NOTE — PLAN OF CARE
Problem: Nutrition  Goal: Optimal nutrition therapy  Outcome: Ongoing  Note: Nutrition Problem #1: Severe malnutrition  Intervention: Food and/or Nutrient Delivery: Continue NPO  Nutritional Goals: Pt will tolerate diet advancement ot meet >50% of nutrition needs from po intakes

## 2021-06-14 NOTE — PROGRESS NOTES
Pt remains A&Ox4 and VSS. NG tube remains in place with ~200 mL brown output during shift. Mir remains in place draining yellow, clear urine. IVF initiated and infusing per orders. Heparin gtt paused at this time per cariology. Pt denies nausea or pain at this time. Fall precautions remain in place and call light within reach.  Will continue to monitor

## 2021-06-14 NOTE — CONSULTS
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 Hx CAD/CABG/AVR. Adm with abd pain and N/V.  3 day hx of same. Had diarrhea prior to admit. No fever chills. No cp/sob. Hx of bowel obstruction. Admitted with bowel obstruction. Noted last pm to have hypoxia. O2 requirements jumped to 8 liters. Noted tachycardia. Had ST changes by EKG. He was asx. Denied chest pain/sob. Noted elevated BNP. Given lasix/metoprolol with improvement in O2 sats and resolution of ST changes. HR returned to normal.  O2 requirements decreasing. .       Past Medical History:   has a past medical history of Anemia, Aortic valve disorders, Aspiration pneumonia ([address] Erectile dysfunction, Esophageal cancer ([facility] [address] Hernia, femoral, bilateral, Hyperlipidemia, Osteoporosis, senile, Pancreatitis, Patient underweight, Prostate cancer ([facility] [address] and Unspecified essential hypertension. Surgical History:   has a past surgical history that includes Coronary artery bypass graft (2006); Cardiac valve replacement (2006); eye surgery (1990& 1992); Appendectomy; bone graft; gastrectomy; Cholecystectomy; Colonoscopy (11/2009); Prostate surgery; Tonsillectomy; Esophagus surgery;  Prostate/Transrectal/Vol Collins Brachyth; Colonoscopy (10/05/15); Upper gastrointestinal endoscopy (N/A, 2/16/2021); Colonoscopy (N/A, 2/17/2021); and Colonoscopy (2/18/2021). Social History:   reports that he has never smoked. He has never used smokeless tobacco. He reports current alcohol use. He reports that he does not use drugs. Family History:  No evidence for sudden cardiac death or premature CAD    Home Medications:  Were reviewed and are listed in nursing record. and/or listed below  Prior to Admission medications    Medication Sig Start Date End Date Taking?  Authorizing Provider   omeprazole (PRILOSEC) 40 MG delayed release capsule TAKE ONE CAPSULE BY MOUTH DAILY 5/10/21  Yes Stormy Ruffin Oliver Titus MD   ferrous sulfate (ANTHONY-THEO) 325 (65 Fe) MG tablet Take 1 tablet by mouth every 48 hours 2/18/21  Yes Arcelia Read MD   atorvastatin (LIPITOR) 10 MG tablet TAKE ONE TABLET BY MOUTH DAILY 12/30/20  Yes Heraclio Moses MD   Alpha Lipoic Acid-Biotin 300-333 MG-MCG CAPS 300 mg 2 times daily    Yes Historical Provider, MD   Coenzyme Q10 (COQ10) 400 MG CAPS Take 1 capsule by mouth   Yes Historical Provider, MD   Simethicone 80 MG TABS Take 80 tablets by mouth 3 times daily. Patient taking differently: Take 160 mg by mouth 2 times daily  3/23/15  Yes JS Montes De Oca - CNS   vitamin B-12 (CYANOCOBALAMIN) 1000 MCG tablet Take 1,000 mcg by mouth daily. Yes Historical Provider, MD   docusate sodium (COLACE) 100 MG capsule Take 100 mg by mouth nightly One  Capsule daily 7/20/10  Yes Historical Provider, MD   polyethylene glycol (GLYCOLAX) powder Take 17 g by mouth 2 times daily    Yes Historical Provider, MD   warfarin (COUMADIN) 5 MG tablet TAKE ONE TABLET BY MOUTH ONCE NIGHTLY  Patient taking differently: 2.5-5 mg Performs test at home prior to dose 5/10/21   Heraclio Moses MD   sildenafil (VIAGRA) 50 MG tablet Take 50 mg by mouth as needed for Erectile Dysfunction. Historical Provider, MD        Allergies:  No known allergies     Review of Systems:       · Constitutional: there has been no unanticipated weight loss. There's been no change in energy level, sleep pattern, or activity level. · Eyes: No visual changes or diplopia. No scleral icterus. · ENT: No Headaches, hearing loss or vertigo. No mouth sores or sore throat. · Cardiovascular: No loss of consciousness. No hemoptysis, pleuritic pain, or phlebitis. · Respiratory: No cough or wheezing, no sputum production. No hematemesis. · Gastrointestinal: No abdominal pain, appetite loss, blood in stools. No change in bowel or bladder habits. · Genitourinary: No dysuria, trouble voiding, or hematuria.   · Musculoskeletal:  No gait disturbance, weakness or joint complaints. · Integumentary: No rash or pruritis. · All other systems reviewed negative as done.      Physical Examination:    Vitals:    06/14/21 0810   BP:    Pulse:    Resp: 18   Temp:    SpO2: 95%    Weight: 98 lb 5.2 oz (44.6 kg)         General Appearance:  Alert, cooperative, no distress, appears stated age   Head:  Normocephalic, without obvious abnormality, atraumatic   Eyes:  PERRL, conjunctiva/corneas clear       Nose: Nares normal, no drainage or sinus tenderness   Throat: Lips, mucosa, and tongue normal   Neck: Supple, symmetrical, trachea midline       Lungs:   Decreased Bs, respirations unlabored   Chest Wall:  No tenderness or deformity   Heart:  Regular rate and rhythm, S1, S2 normal, no murmur, rub or gallop   Abdomen:   Soft, quiet           Extremities: Extremities normal, atraumatic, no cyanosis or edema       Skin: Skin color, texture, turgor normal, no rashes or lesions   Pysch: Normal mood and affect   Neurologic: Normal gross motor and sensory exam.         Labs  CBC:   Lab Results   Component Value Date    WBC 8.9 06/14/2021    RBC 4.76 06/14/2021    RBC 4.41 06/26/2017    HGB 13.0 06/14/2021    HCT 40.4 06/14/2021    MCV 84.8 06/14/2021    RDW 17.6 06/14/2021     06/14/2021     CMP:    Lab Results   Component Value Date     06/14/2021    K 4.1 06/14/2021    K 4.2 02/16/2021     06/14/2021    CO2 29 06/14/2021    BUN 42 06/14/2021    CREATININE 1.8 06/14/2021    GFRAA 44 06/14/2021    GFRAA >60 05/10/2013    AGRATIO 1.4 05/21/2021    LABGLOM 36 06/14/2021    LABGLOM >60>60 01/10/2012    GLUCOSE 147 06/14/2021    GLUCOSE 123 09/26/2016    PROT 8.2 06/13/2021    PROT 6.8 09/26/2016    CALCIUM 8.5 06/14/2021    BILITOT 0.7 06/13/2021    ALKPHOS 64 06/13/2021    AST 23 06/13/2021    ALT 15 06/13/2021     PT/INR:  No results found for: PTINR  Lab Results   Component Value Date    TROPONINI 0.04 (H) 06/14/2021       EKG:  I have reviewed EKG with the following interpretation:  Impression:  NSR, ST depression inf/lat.> NSR, Poor R wave prog    Assessment  Patient Active Problem List   Diagnosis    Esophageal cancer ([facility] [address]    Asthma    Aortic valve disorder    Psychosexual dysfunction with inhibited sexual excitement    Anemia associated with chronic renal failure (HCC)    Nausea & vomiting    Patient underweight    Hernia, femoral, bilateral    Osteoporosis, senile    Hypoxia    Chronic kidney disease, stage III (moderate) (HCC)    Personal history of esophageal cancer    History of esophageal cancer    History of prostate cancer    S/P AVR (aortic valve replacement)    Coronary 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 (HCC)    Gastroparesis    Iron deficiency anemia    Anemia    Essential hypertension    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  Also with tachycardia. Some ST changes by EKG but asx. No sob/chest pain. BNP elevated. Given IV lasix. Metoprolol for tachycardia. Had resolution of ST changes. Trop elevated on admit likely due to renal insufficiency./  Trop trending with cr. Has no angina/sob. O2 requirements improved after diuresis. Wean O2 as tolerated. Watch cr.  Echo.

## 2021-06-14 NOTE — CARE COORDINATION
CM following, pt from home with wife prev stay at [facility] and Prev active with [facility] skilled care,   Pt wants to go home with wife and Resume care with [facility] skilled care referral sent. Pt had NG in place IVF, pending PT OT evals for DC recs, will need precert if SNF needed.      Electronically signed by Mayda Cervantes RN on 6/14/2021 at 5:04 PM  [phone]

## 2021-06-14 NOTE — PROGRESS NOTES
Internal Medicine Progress Note    Admit Date: 6/13/2021  Day: 1  Diet: Diet NPO    CC: abdominal pain    Interval history:  Patient examined and has no new complaints. Abdominal pain improving with placement of NG tube. No bowel movement or passing of flatus but felt his \"bowels were moving this morning. \"  Increased oxygen requirements with EKG changes. Denies any chest pain or shortness of breath. Medications:     Scheduled Meds:   sodium chloride flush  10 mL Intravenous 2 times per day    ipratropium-albuterol  1 ampule Inhalation 4x daily     Continuous Infusions:   lactated ringers      sodium chloride      heparin (PORCINE) Infusion 18 Units/kg/hr (06/13/21 2206)     PRN Meds:sodium chloride flush, sodium chloride, ondansetron, prochlorperazine, heparin (porcine), heparin (porcine)    Objective:   Vitals:   T-max:  Patient Vitals for the past 8 hrs:   BP Temp Temp src Pulse Resp SpO2 Weight   06/14/21 0810 -- -- -- -- 18 95 % --   06/14/21 0749 137/87 98 °F (36.7 °C) Oral 85 18 97 % 98 lb 5.2 oz (44.6 kg)   06/14/21 0412 132/72 97.6 °F (36.4 °C) Oral 92 18 96 % --       Intake/Output Summary (Last 24 hours) at 6/14/2021 0936  Last data filed at 6/14/2021 0752  Gross per 24 hour   Intake 250 ml   Output 1900 ml   Net -1650 ml       Physical Exam  Constitutional:       Appearance: Normal appearance. HENT:      Nose:      Comments: NGT with 750 cc of black fluid  Cardiovascular:      Rate and Rhythm: Normal rate and regular rhythm. Pulses: Normal pulses. Heart sounds: Normal heart sounds. Pulmonary:      Effort: Pulmonary effort is normal.      Breath sounds: Normal breath sounds. Abdominal:      General: Abdomen is flat. Bowel sounds are normal.      Palpations: Abdomen is soft. Skin:     General: Skin is warm and dry. Capillary Refill: Capillary refill takes less than 2 seconds. Neurological:      General: No focal deficit present.       Mental Status: He is alert and oriented to person, place, and time. Mental status is at baseline. LABS:    CBC:   Recent Labs     06/13/21 0210 06/14/21 0421   WBC 10.4 8.9   HGB 14.0 13.0*   HCT 43.8 40.4*    138   MCV 85.8 84.8     Renal:    Recent Labs     06/13/21 0210 06/14/21 0421   * 142   K 5.0 4.1   CL 98* 102   CO2 23 29   BUN 35* 42*   CREATININE 1.6* 1.8*   GLUCOSE 162* 147*   CALCIUM 9.6 8.5   MG 2.50* 2.20   PHOS 3.3 5.3*   ANIONGAP 12 11     Hepatic:   Recent Labs     06/13/21 0210 06/14/21 0421   AST 23  --    ALT 15  --    BILITOT 0.7  --    BILIDIR <0.2  --    PROT 8.2  --    LABALBU 3.5 3.3*   ALKPHOS 64  --      Troponin:   Recent Labs     06/13/21 1628 06/13/21 1948 06/14/21 0209   TROPONINI 0.02* 0.03* 0.04*     BNP: No results for input(s): BNP in the last 72 hours. Lipids: No results for input(s): CHOL, HDL in the last 72 hours. Invalid input(s): LDLCALCU, TRIGLYCERIDE  ABGs:  No results for input(s): PHART, CLU6EUF, PO2ART, VGA1WDU, BEART, THGBART, P5MVXZGI, JOH0SZA in the last 72 hours. INR:   Recent Labs     06/13/21 0227 06/14/21 0421   INR 2.14* 2.71*     Lactate: No results for input(s): LACTATE in the last 72 hours. Cultures:  -----------------------------------------------------------------  RAD:   XR ABDOMEN (2 VIEWS)   Final Result   Previously administered oral contrast is within the colon. XR CHEST PORTABLE   Final Result   New patchy bilateral interstitial and airspace opacities representing edema or infection. Stable left diaphragmatic elevation. XR ABDOMEN (2 VIEWS)   Final Result      Diffuse distention of small bowel, with oral contrast throughout loops extending into the pelvis. CT ABDOMEN PELVIS WO CONTRAST Additional Contrast? None   Final Result     Limited study. Findings suggestive of bowel obstruction. XR CHEST PORTABLE   Final Result   No evidence of acute cardiopulmonary disease. Elevated left hemidiaphragm. Assessment/Plan:     Small bowel obstruction  - Likely due to adhesions  - Continue with gastrografin & conservative management  - Bowel regimen    Acute Kidney Injury  - Baseline creatinine ~1.1-1.2   - LR at 50 mL/hr  - Continue to monitor RF & UOP    EKG changes  - Repeat EKG  - Consult cardiology, appreciate recs    History of esophageal cancer  - s/p resection    History of prostate cancer  - Stable, continue to monitor    Essential Hypertension  - Stable, continue current medications    Aortic valve repair  - Continue to monitor PT/INR    Code Status:Full Code  FEN: Diet NPO  PPX:  Therapeutic heparin gtt  DISPO: CIARA Simms MD, PGY-1  06/14/21  9:36 AM    This patient will be staffed and discussed with Regis Encarnacion MD.

## 2021-06-14 NOTE — PROGRESS NOTES
Gen Surg  POC    Patient received 2.5 mg of metoprolol, , will give second dose 2.5 mg metoprolol, if HR < 100 will order ECG per Dr. Yolis Tucker request.    BNP elevated  D-Dimer elevated      Bebe Pallas, MD   Gen Surg PGY 3  6/13/2021  8:14 PM  [phone]    Addendum:  Reviewed and agree with above documentation, to add:    - transitioned to heparin gtt given NG tube and likely lack of absorption given pSBO.      Bebe Pallas, MD  PGY-3 General Surgery  06/13/21  9:06 PM

## 2021-06-14 NOTE — PROGRESS NOTES
Pt a/o x4, VSS. Pt weaned to 3L O2 nasal cannula. NG remains to suction with moderate brown output- n/v resolved since placement. Pt denies pain. Fall precautions in place. Will continue with plan of care.

## 2021-06-14 NOTE — PROGRESS NOTES
Surgery Daily Progress Note      CC: SBO    SUBJECTIVE:  Patient with increased O2 requirement along with EKG changes overnight  Patient admits to improved pain and distension this AM after NGT placed last night  Afebrile and stable vital signs this AM  Patient denies any abdominal pain this AM. Denies flatus or passage of bowel movement. ROS:   A 14 point review of systems was conducted, significant findings as noted above. All other systems negative. OBJECTIVE:    PHYSICAL EXAM:  Vitals:    06/13/21 2006 06/13/21 2016 06/13/21 2327 06/14/21 0412   BP:  112/67 106/66 132/72   Pulse:  90 88 92   Resp: 18 18 18 18   Temp:  98.3 °F (36.8 °C) 98.1 °F (36.7 °C) 97.6 °F (36.4 °C)   TempSrc:  Oral Axillary Oral   SpO2: 91% 93% 93% 96%   Weight:       Height:           General appearance: alert, no acute distress, grooming appropriate  Eyes: PERRLA, no scleral icterus  HEENT: trachea midline, no JVD, NGT in place  Chest/Lungs:  normal effort  Cardiovascular: RRR  Abdomen: soft, non-tender, non-distended, no guarding/rigidity, previous incisional scar noted, no bulging. Skin: warm and dry, no rashes, small skin tear on R elbow noted  Extremities: no edema, no cyanosis  Neuro: A&Ox3, no focal deficits, sensation intact      ASSESSMENT & PLAN:   This is a 82 y.o. male with Hx of remote esophagectomy and cholecystectomy with intermittent small bowel obstruction who presents with abdominal pain, diarrhea, nausea, and vomiting.    - Continue NGT decompression, will await return of bowel function, Abdominal x ray with contrast to colon  - Continue giraldo given urinary retention and need for strict I/Os given diuresis  - Follow up Cardiology consult for EKG changes overnight  - Continue heparin drip while NGT in place given mechanical valve and on warfarin outpatient    Jeffry Suárez DO  06/14/21  6:23 AM  [phone]  Please call on call resident with questions as I am off campus today.     I have personally performed the medical history, physical exam and medical decision making and agree with all pertinent clinical information unless otherwise noted. Reviewed all the imaging and discussed over further management.     Bean Tracey MD  Surgery Attending

## 2021-06-14 NOTE — CONSULTS
NUTRITION ASSESSMENT  Admission Date: 6/13/2021     Type and Reason for Visit: Initial, Positive Nutrition Screen    NUTRITION RECOMMENDATIONS:   1. PO Diet: Continue  NPO  2. ONS: Start magic cup and ensure supplements when diet able to be advanced. NUTRITION ASSESSMENT:  Nutritional summary & status: Pt +screen for low BMI. Pt reports minimal to no intakes the past 3 days d/t abdominal pain, nausea and vomiting. Pt reports weight + muscle loss over the past few years. When diet able to be advanced pt agreeable to taking magic cup and ensure clear supplements to increase protein/calorie intakes. Noted 12.7 lb (11%) wt loss in last 4 months. Pt meets criteria for severe malnutrition. RD to follow to monitor diet advancement/ po intakes. Patient admitted d/t: abdominal pain, nausea, vomiting - SBO    PMH significant for: LIOR, esophageal cancer s/p resection, prostate cancer, HTN    MALNUTRITION ASSESSMENT  Context of Malnutrition: Chronic Illness   Malnutrition Status: Severe malnutrition  Findings of the 6 clinical characteristics of malnutrition (Minimum of 2 out of 6 clinical characteristics is required to make the diagnosis of moderate or severe Protein Calorie Malnutrition based on AND/ASPEN Guidelines):  Energy Intake: Less than/equal to 75% of estimated energy requirements    Energy Intake Time: 3 days   Weight Loss %: 10% loss or greater    Weight loss Time: Greater than or equal to 3 months   Body Fat Loss: Moderate Loss    Body Fat Location: Triceps   Body Muscle Loss: Severe Loss   Body Muscle Loss Location: Clavicles  and Temples    Fluid Accumulation: No significant    Fluid Accumulation Location: No significant     Strength: Not Performed;  Not Measured     NUTRITION DIAGNOSIS   Problem: Problem #1: Severe malnutrition   Etiology: Decreased ability to consume sufficient energy   Signs & Symptoms: BMI, Fat Loss , Muscle loss  and Weight loss     NUTRITION INTERVENTION  Food and/or Nutrient Delivery: Continue NPO   Nutrition Education/Counseling: No recommendation at this time   Coordination of Nutrition Care: Continue to monitor while inpatient     NUTRITION MONITORING & EVALUATION:  Evaluation:Goals set   Goals:Goals: Pt will tolerate diet advancement ot meet >50% of nutrition needs from po intakes  Monitoring: Diet advancement , Diet Tolerance , Meal Intake , Nutrition Progression  or Supplement Intake      OBJECTIVE DATA: Significant to nutrition assessment  · Nutrition-Focused Physical Findings: cachetic appearance   · Labs: Reviewed;  Phos 5.3  · Meds: Reviewed  · Wounds None      ANTHROPOMETRICS  Current Height: 5' 3.5\" (161.3 cm)  Current Weight: 98 lb 5.2 oz (44.6 kg)    Admission weight: 106 lb (48.1 kg)  Ideal Bodyweight 127 lbs   Usual Bodyweight ~106-107 per EMR   Weight Changes  -12.7 lb (11%) in last 4 months      BMI BMI (Calculated): 17.2    Wt Readings from Last 50 Encounters:   06/14/21 98 lb 5.2 oz (44.6 kg)   05/26/21 106 lb (48.1 kg)   02/26/21 126 lb (57.2 kg)   02/18/21 111 lb (50.3 kg)   12/18/20 110 lb (49.9 kg)   11/18/20 107 lb (48.5 kg)   09/15/20 107 lb (48.5 kg)   09/03/20 104 lb 11.5 oz (47.5 kg)   08/26/20 105 lb (47.6 kg)   08/19/20 106 lb (48.1 kg)   06/03/20 106 lb (48.1 kg)   05/27/20 106 lb (48.1 kg)       COMPARATIVE STANDARDS  Estimated Total Kcals/Day : 30-35 Current Bodyweight (45 kg) 2335-1862 kcal/day    Estimated Total Protein (g/day) : 1.2-1.5 Current Bodyweight (45 kg) 54-68 g/day  Estimated Daily Total Fluid (ml/day): 3316-5590 mL per day     Food / Nutrition-Related History  Pre-Admission / Home Diet:  Pre-Admission/Home Diet: General, Low Fiber (skinless fruit)   Home Supplements / Herbals:    none noted  Food Restrictions / Cultural Requests:    none noted    Current Nutrition Therapies   Diet NPO     PO Intake: NPO  PO Supplement: NPO   PO Supplement Intake: NPO  IVF: 50 ml/hr     NUTRITION RISK LEVEL: Risk Level: High     Daryl Vo RD, LD  [place]: [phone]  Office:  [phone]

## 2021-06-15 ENCOUNTER — APPOINTMENT (OUTPATIENT)
Dept: GENERAL RADIOLOGY | Age: 86
DRG: 388 | End: 2021-06-15
Payer: MEDICARE

## 2021-06-15 ENCOUNTER — APPOINTMENT (OUTPATIENT)
Dept: CT IMAGING | Age: 86
DRG: 388 | End: 2021-06-15
Payer: MEDICARE

## 2021-06-15 LAB
ALBUMIN SERPL-MCNC: 2.9 G/DL (ref 3.4–5)
ANION GAP SERPL CALCULATED.3IONS-SCNC: 12 MMOL/L (ref 3–16)
ANTI-XA UNFRAC HEPARIN: <0.04 IU/ML (ref 0.3–0.7)
BASOPHILS ABSOLUTE: 0 K/UL (ref 0–0.2)
BASOPHILS RELATIVE PERCENT: 0.2 %
BUN BLDV-MCNC: 42 MG/DL (ref 7–20)
CALCIUM SERPL-MCNC: 8.1 MG/DL (ref 8.3–10.6)
CHLORIDE BLD-SCNC: 101 MMOL/L (ref 99–110)
CO2: 27 MMOL/L (ref 21–32)
CREAT SERPL-MCNC: 1.3 MG/DL (ref 0.8–1.3)
EOSINOPHILS ABSOLUTE: 0 K/UL (ref 0–0.6)
EOSINOPHILS RELATIVE PERCENT: 0 %
GFR AFRICAN AMERICAN: >60
GFR NON-AFRICAN AMERICAN: 53
GLUCOSE BLD-MCNC: 101 MG/DL (ref 70–99)
HCT VFR BLD CALC: 37.7 % (ref 40.5–52.5)
HEMOGLOBIN: 12.3 G/DL (ref 13.5–17.5)
INR BLD: 2.68 (ref 0.86–1.14)
LYMPHOCYTES ABSOLUTE: 0.8 K/UL (ref 1–5.1)
LYMPHOCYTES RELATIVE PERCENT: 12 %
MAGNESIUM: 2.1 MG/DL (ref 1.8–2.4)
MCH RBC QN AUTO: 27.5 PG (ref 26–34)
MCHC RBC AUTO-ENTMCNC: 32.5 G/DL (ref 31–36)
MCV RBC AUTO: 84.4 FL (ref 80–100)
MONOCYTES ABSOLUTE: 0.5 K/UL (ref 0–1.3)
MONOCYTES RELATIVE PERCENT: 8.5 %
NEUTROPHILS ABSOLUTE: 5 K/UL (ref 1.7–7.7)
NEUTROPHILS RELATIVE PERCENT: 79.3 %
PDW BLD-RTO: 17.6 % (ref 12.4–15.4)
PHOSPHORUS: 3 MG/DL (ref 2.5–4.9)
PLATELET # BLD: 114 K/UL (ref 135–450)
PMV BLD AUTO: 9.9 FL (ref 5–10.5)
POTASSIUM SERPL-SCNC: 3.9 MMOL/L (ref 3.5–5.1)
PRO-BNP: 2662 PG/ML (ref 0–449)
PROTHROMBIN TIME: 31.4 SEC (ref 10–13.2)
RBC # BLD: 4.46 M/UL (ref 4.2–5.9)
SODIUM BLD-SCNC: 140 MMOL/L (ref 136–145)
WBC # BLD: 6.3 K/UL (ref 4–11)

## 2021-06-15 PROCEDURE — 2580000003 HC RX 258: Performed by: STUDENT IN AN ORGANIZED HEALTH CARE EDUCATION/TRAINING PROGRAM

## 2021-06-15 PROCEDURE — 93325 DOPPLER ECHO COLOR FLOW MAPG: CPT

## 2021-06-15 PROCEDURE — 97530 THERAPEUTIC ACTIVITIES: CPT

## 2021-06-15 PROCEDURE — 97166 OT EVAL MOD COMPLEX 45 MIN: CPT

## 2021-06-15 PROCEDURE — 6370000000 HC RX 637 (ALT 250 FOR IP): Performed by: STUDENT IN AN ORGANIZED HEALTH CARE EDUCATION/TRAINING PROGRAM

## 2021-06-15 PROCEDURE — 93308 TTE F-UP OR LMTD: CPT

## 2021-06-15 PROCEDURE — 97535 SELF CARE MNGMENT TRAINING: CPT

## 2021-06-15 PROCEDURE — 93320 DOPPLER ECHO COMPLETE: CPT

## 2021-06-15 PROCEDURE — 36415 COLL VENOUS BLD VENIPUNCTURE: CPT

## 2021-06-15 PROCEDURE — 97116 GAIT TRAINING THERAPY: CPT

## 2021-06-15 PROCEDURE — 2580000003 HC RX 258: Performed by: SURGERY

## 2021-06-15 PROCEDURE — 2700000000 HC OXYGEN THERAPY PER DAY

## 2021-06-15 PROCEDURE — 99232 SBSQ HOSP IP/OBS MODERATE 35: CPT | Performed by: SURGERY

## 2021-06-15 PROCEDURE — 99232 SBSQ HOSP IP/OBS MODERATE 35: CPT | Performed by: INTERNAL MEDICINE

## 2021-06-15 PROCEDURE — 94669 MECHANICAL CHEST WALL OSCILL: CPT

## 2021-06-15 PROCEDURE — 83880 ASSAY OF NATRIURETIC PEPTIDE: CPT

## 2021-06-15 PROCEDURE — 83735 ASSAY OF MAGNESIUM: CPT

## 2021-06-15 PROCEDURE — 1200000000 HC SEMI PRIVATE

## 2021-06-15 PROCEDURE — 80069 RENAL FUNCTION PANEL: CPT

## 2021-06-15 PROCEDURE — 6360000002 HC RX W HCPCS: Performed by: NURSE PRACTITIONER

## 2021-06-15 PROCEDURE — 99233 SBSQ HOSP IP/OBS HIGH 50: CPT | Performed by: INTERNAL MEDICINE

## 2021-06-15 PROCEDURE — 6360000002 HC RX W HCPCS: Performed by: STUDENT IN AN ORGANIZED HEALTH CARE EDUCATION/TRAINING PROGRAM

## 2021-06-15 PROCEDURE — 97162 PT EVAL MOD COMPLEX 30 MIN: CPT

## 2021-06-15 PROCEDURE — 71045 X-RAY EXAM CHEST 1 VIEW: CPT

## 2021-06-15 PROCEDURE — 85520 HEPARIN ASSAY: CPT

## 2021-06-15 PROCEDURE — 85610 PROTHROMBIN TIME: CPT

## 2021-06-15 PROCEDURE — 71250 CT THORAX DX C-: CPT

## 2021-06-15 PROCEDURE — 85025 COMPLETE CBC W/AUTO DIFF WBC: CPT

## 2021-06-15 PROCEDURE — 94640 AIRWAY INHALATION TREATMENT: CPT

## 2021-06-15 PROCEDURE — C9113 INJ PANTOPRAZOLE SODIUM, VIA: HCPCS | Performed by: NURSE PRACTITIONER

## 2021-06-15 PROCEDURE — 94761 N-INVAS EAR/PLS OXIMETRY MLT: CPT

## 2021-06-15 RX ORDER — TAMSULOSIN HYDROCHLORIDE 0.4 MG/1
0.4 CAPSULE ORAL DAILY
Status: DISCONTINUED | OUTPATIENT
Start: 2021-06-15 | End: 2021-06-22 | Stop reason: HOSPADM

## 2021-06-15 RX ORDER — DEXTROSE, SODIUM CHLORIDE, SODIUM LACTATE, POTASSIUM CHLORIDE, AND CALCIUM CHLORIDE 5; .6; .31; .03; .02 G/100ML; G/100ML; G/100ML; G/100ML; G/100ML
INJECTION, SOLUTION INTRAVENOUS CONTINUOUS
Status: DISCONTINUED | OUTPATIENT
Start: 2021-06-15 | End: 2021-06-18

## 2021-06-15 RX ORDER — DEXTROSE, SODIUM CHLORIDE, SODIUM LACTATE, POTASSIUM CHLORIDE, AND CALCIUM CHLORIDE 5; .6; .31; .03; .02 G/100ML; G/100ML; G/100ML; G/100ML; G/100ML
INJECTION, SOLUTION INTRAVENOUS CONTINUOUS
Status: DISCONTINUED | OUTPATIENT
Start: 2021-06-15 | End: 2021-06-15

## 2021-06-15 RX ADMIN — PIPERACILLIN AND TAZOBACTAM 3375 MG: 3; .375 INJECTION, POWDER, LYOPHILIZED, FOR SOLUTION INTRAVENOUS at 18:14

## 2021-06-15 RX ADMIN — SODIUM CHLORIDE, SODIUM LACTATE, POTASSIUM CHLORIDE, CALCIUM CHLORIDE AND DEXTROSE MONOHYDRATE: 5; 600; 310; 30; 20 INJECTION, SOLUTION INTRAVENOUS at 18:14

## 2021-06-15 RX ADMIN — IPRATROPIUM BROMIDE AND ALBUTEROL SULFATE 1 AMPULE: .5; 2.5 SOLUTION RESPIRATORY (INHALATION) at 08:29

## 2021-06-15 RX ADMIN — IPRATROPIUM BROMIDE AND ALBUTEROL SULFATE 1 AMPULE: .5; 2.5 SOLUTION RESPIRATORY (INHALATION) at 22:08

## 2021-06-15 RX ADMIN — IPRATROPIUM BROMIDE AND ALBUTEROL SULFATE 1 AMPULE: .5; 2.5 SOLUTION RESPIRATORY (INHALATION) at 17:42

## 2021-06-15 RX ADMIN — Medication 10 ML: at 22:05

## 2021-06-15 RX ADMIN — PANTOPRAZOLE SODIUM 40 MG: 40 INJECTION, POWDER, FOR SOLUTION INTRAVENOUS at 09:02

## 2021-06-15 RX ADMIN — TAMSULOSIN HYDROCHLORIDE 0.4 MG: 0.4 CAPSULE ORAL at 09:02

## 2021-06-15 RX ADMIN — Medication 10 ML: at 09:02

## 2021-06-15 RX ADMIN — IPRATROPIUM BROMIDE AND ALBUTEROL SULFATE 1 AMPULE: .5; 2.5 SOLUTION RESPIRATORY (INHALATION) at 12:00

## 2021-06-15 RX ADMIN — SODIUM CHLORIDE, SODIUM LACTATE, POTASSIUM CHLORIDE, CALCIUM CHLORIDE AND DEXTROSE MONOHYDRATE: 5; 600; 310; 30; 20 INJECTION, SOLUTION INTRAVENOUS at 09:06

## 2021-06-15 ASSESSMENT — PAIN SCALES - GENERAL
PAINLEVEL_OUTOF10: 0

## 2021-06-15 NOTE — PROGRESS NOTES
[facility] Daily Progress Note      Admit Date:  6/13/2021    Subjective:  Mr. Rita Pierson was seen and examined. F/U tachy/hypoxia/SBO. Also with AVR, CABG. No complaints. Denies sob/cp.   abd feeling better. Objective:   BP (!) 141/88   Pulse 106   Temp 97.8 °F (36.6 °C) (Oral)   Resp 16   Ht 5' 3.5\" (1.613 m)   Wt 98 lb 5.2 oz (44.6 kg)   SpO2 90%   BMI 17.14 kg/m²       Intake/Output Summary (Last 24 hours) at 6/15/2021 1030  Last data filed at 6/15/2021 0401  Gross per 24 hour   Intake 0 ml   Output 1200 ml   Net -1200 ml       TELEMETRY: Sinus     Physical Exam:  General:  Awake, alert, NAD  Skin:  Warm and dry  Neck:  JVP difficult  Chest:  Decreased BS, respiration normal  Cardiovascular:  RRR S1S2  Abdomen:  Soft , few BS present now.    Extremities:  no edema    Medications:    tamsulosin  0.4 mg Oral Daily    pantoprazole  40 mg Intravenous Daily    sodium chloride flush  10 mL Intravenous 2 times per day    ipratropium-albuterol  1 ampule Inhalation 4x daily      dextrose 5% in lactated ringers 75 mL/hr at 06/15/21 0906    sodium chloride      heparin (PORCINE) Infusion Stopped (06/14/21 1453)     perflutren lipid microspheres, sodium chloride flush, sodium chloride, ondansetron, prochlorperazine, heparin (porcine), heparin (porcine)    Lab Data:  CBC:   Recent Labs     06/13/21  0210 06/14/21  0421 06/15/21  0510   WBC 10.4 8.9 6.3   HGB 14.0 13.0* 12.3*   HCT 43.8 40.4* 37.7*   MCV 85.8 84.8 84.4    138 114*     BMP:   Recent Labs     06/13/21 0210 06/14/21  0421 06/15/21  0510   * 142 140   K 5.0 4.1 3.9   CL 98* 102 101   CO2 23 29 27   PHOS 3.3 5.3* 3.0   BUN 35* 42* 42*   CREATININE 1.6* 1.8* 1.3     LIVER PROFILE:   Recent Labs     06/13/21  0210   AST 23   ALT 15   LIPASE 14.0   BILIDIR <0.2   BILITOT 0.7   ALKPHOS 64     PT/INR:   Recent Labs     06/13/21  0227 06/14/21  0421 06/15/21  0510   PROTIME 25.0* 31.8* 31.4*   INR 2.14* 2.71* 2.68*     APTT: Recent Labs     06/14/21  0209   APTT 110.9*     BNP:  No results for input(s): BNP in the last 72 hours. IMAGING:     Assessment:  Patient Active Problem List    Diagnosis Date Noted    Anemia associated with chronic renal failure ([facility] 02/02/2011    Esophageal cancer ([facility] 07/20/2010    Tachycardia 06/14/2021    GI bleed 02/16/2021    UGI bleed     SBO (small bowel obstruction) (HCC)     Postural dizziness with near syncope 11/19/2020    Abdominal pain 11/18/2020    Anemia 09/15/2020    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[facility] 09/15/2020    History of disease 09/15/2020    Iron deficiency anemia 09/03/2020    Coronary 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 ([facility] 08/26/2020    S/P AVR (aortic valve replacement) 10/15/2018    History of esophageal cancer 09/26/2016    History of prostate cancer 09/26/2016    Chronic kidney disease, stage III (moderate) ([facility] 11/30/2015    Personal history of esophageal cancer 11/30/2015    Hypoxia     Gastroparesis 03/06/2015    Osteoporosis, senile 05/20/2014    Hernia, femoral, bilateral 05/12/2014    Patient underweight 08/08/2013    Nausea & vomiting 05/28/2012    Asthma 07/20/2010    Aortic valve disorder 07/20/2010    Psychosexual dysfunction with inhibited sexual excitement 07/20/2010    Foreign body accidentally left during a procedure 04/26/2010    Essential hypertension 09/18/2007       Plan:  1. Feeling better. No sob/cp. Wean O2 as tolerated. BS present today.    HR normal.  Cr back to normal.        Core Measures:  · Discharge instructions:   · LVEF documented:   · ACEI for LV dysfunction:   · Smoking Cessation:    Aissatou Sosa MD, MD 6/15/2021 10:30 AM

## 2021-06-15 NOTE — PROGRESS NOTES
Occupational Therapy   Occupational Therapy Initial Assessment and Treatment  Date: 6/15/2021   Patient Name: Owen Shah MD  MRN: [id number]     : 1935    Date of Service: 6/15/2021    Discharge Recommendations:  Owen Shah MD scored a 14/24 on the AM-PAC ADL Inpatient form. Current research shows that an AM-PAC score of 17 or less is typically not associated with a discharge to the patient's home setting. Based on the patient's AM-PAC score and their current ADL deficits, it is recommended that the patient have 3-5 sessions per week of Occupational Therapy at d/c to increase the patient's independence. Please see assessment section for further patient specific details. If patient discharges prior to next session this note will serve as a discharge summary. Please see below for the latest assessment towards goals. OT Equipment Recommendations  Equipment Needed: No  Other: defer    Assessment   Performance deficits / Impairments: Decreased functional mobility ; Decreased ADL status; Decreased strength;Decreased endurance;Decreased balance;Decreased high-level IADLs;Decreased posture  After evaluation and treatment, pt found to be presenting with the above mentioned deficits. Pt would benefit from continued skilled occupational therapy to address these deficits, increasing safety and independence with ADL and functional mobility. He is currently CGA level to walk ~15ft to/from bathroom with RW, CGA for functional t/fs, and for standing level ADL. Pt is requiring up to 6L supplemental O2 to keep SPO2 close to 90% with activity. He needs cues for PLB and energy conservation. Will continue to assess for discharge needs.      Treatment Diagnosis: decreased ability to perform ADL 2/2 SBO  Prognosis: Good  Decision Making: Medium Complexity  REQUIRES OT FOLLOW UP: Yes  Activity Tolerance  Activity Tolerance: Patient Tolerated treatment well  Activity Tolerance: Pt on 4L O2 via NC during SBO  Subjective  Subjective: RN cleared pt for tx. Pt supine at session start.      Patient Currently in Pain: Denies    Social/Functional History  Social/Functional History  Lives With: Spouse  Type of Home: House (condo)  Home Layout: One level, Performs ADL's on one level, Able to Live on Main level with bedroom/bathroom (exercise room in basement)  Home Access: Stairs to enter without rails  Entrance Stairs - Number of Steps: 2 GEORGES  Bathroom Shower/Tub: Walk-in shower  Bathroom Toilet: Handicap height (sink nearby)  Bathroom Equipment: Grab bars in [address]: Cane, Rolling walker, Standard walker, 4 wheeled walker  ADL Assistance: [address]: Independent (Pt shares cooking, cleaning service every 2 weeks, wife completes daily cleaning and laundry and grocery shopping)  Ambulation Assistance: Independent (usually used cane plus supports from wife for community, furniture walks at home)  Transfer Assistance: Independent  Active : Yes  Occupation: Retired  Type of occupation: research pathologist  Leisure & Hobbies: exercises almost daily, used to draw portraits and edit videos       Objective   Vision: Impaired  Vision Exceptions: Wears glasses for reading (diplopia for a few years, has not sought medical evaluation)  Hearing: Within functional limits (reports L ear currently blocked up)      Orientation  Overall Orientation Status: Within Functional Limits        Balance  Sitting Balance: Stand by assistance  Standing Balance: Contact guard assistance (with RW)  Functional Mobility  Functional - Mobility Device: Rolling Walker  Activity: To/from bathroom  Assist Level: Contact guard assistance  Toilet Transfers  Toilet - Technique: Ambulating (with RW)  Equipment Used: Standard toilet (with R grab bar)  Toilet Transfer: Contact guard assistance     ADL  Feeding: Contact guard assistance (able to bring cup to mouth standing to rinse during oral hygiene)  Grooming: Contact

## 2021-06-15 NOTE — PROGRESS NOTES
Internal Medicine Progress Note    Admit Date: 6/13/2021  Day: 2  Diet: Diet NPO    CC: shortness of breath    Interval history:   Patient seen and examined in bed this AM.  He endorses his shortness of breath is improving but has increased oxygen demands. He feels he feels his bowels moving and is passing gas but no bowel movement yet. Denies any fever, chills, abdominal pain, chest pain, shortness of breath, nausea, vomiting. Medications:     Scheduled Meds:   tamsulosin  0.4 mg Oral Daily    pantoprazole  40 mg Intravenous Daily    sodium chloride flush  10 mL Intravenous 2 times per day    ipratropium-albuterol  1 ampule Inhalation 4x daily     Continuous Infusions:   dextrose 5% in lactated ringers      sodium chloride      heparin (PORCINE) Infusion Stopped (06/14/21 1453)     PRN Meds:perflutren lipid microspheres, sodium chloride flush, sodium chloride, ondansetron, prochlorperazine, heparin (porcine), heparin (porcine)    Objective:   Vitals:   T-max:  Patient Vitals for the past 8 hrs:   BP Temp Temp src Pulse Resp SpO2   06/15/21 0836 -- -- -- 106 16 90 %   06/15/21 0831 -- -- -- -- 16 91 %   06/15/21 0749 (!) 141/88 97.8 °F (36.6 °C) Oral 104 18 94 %   06/15/21 0319 128/73 97.8 °F (36.6 °C) Oral 87 16 91 %       Intake/Output Summary (Last 24 hours) at 6/15/2021 0905  Last data filed at 6/15/2021 0401  Gross per 24 hour   Intake 0 ml   Output 1200 ml   Net -1200 ml       Physical Exam  Constitutional:       Appearance: Normal appearance. HENT:      Nose:      Comments: NGT  Cardiovascular:      Rate and Rhythm: Normal rate and regular rhythm. Pulses: Normal pulses. Heart sounds: Normal heart sounds. Pulmonary:      Breath sounds: Rhonchi (bilateral lower lobes) present. Skin:     General: Skin is warm and dry. Capillary Refill: Capillary refill takes less than 2 seconds. Neurological:      General: No focal deficit present.       Mental Status: He is alert and oriented to person, place, and time. Mental status is at baseline. LABS:    CBC:   Recent Labs     06/13/21  0210 06/14/21  0421 06/15/21  0510   WBC 10.4 8.9 6.3   HGB 14.0 13.0* 12.3*   HCT 43.8 40.4* 37.7*    138 114*   MCV 85.8 84.8 84.4     Renal:    Recent Labs     06/13/21  0210 06/14/21  0421 06/15/21  0510   * 142 140   K 5.0 4.1 3.9   CL 98* 102 101   CO2 23 29 27   BUN 35* 42* 42*   CREATININE 1.6* 1.8* 1.3   GLUCOSE 162* 147* 101*   CALCIUM 9.6 8.5 8.1*   MG 2.50* 2.20 2.10   PHOS 3.3 5.3* 3.0   ANIONGAP 12 11 12     Hepatic:   Recent Labs     06/13/21  0210 06/14/21  0421 06/15/21  0510   AST 23  --   --    ALT 15  --   --    BILITOT 0.7  --   --    BILIDIR <0.2  --   --    PROT 8.2  --   --    LABALBU 3.5 3.3* 2.9*   ALKPHOS 64  --   --      Troponin:   Recent Labs     06/13/21 1628 06/13/21 1948 06/14/21  0209   TROPONINI 0.02* 0.03* 0.04*     BNP: No results for input(s): BNP in the last 72 hours. Lipids: No results for input(s): CHOL, HDL in the last 72 hours. Invalid input(s): LDLCALCU, TRIGLYCERIDE  ABGs:  No results for input(s): PHART, DPI0JNA, PO2ART, GAD1UCV, BEART, THGBART, K0TTSZRT, NCT9IET in the last 72 hours. INR:   Recent Labs     06/13/21  0227 06/14/21  0421 06/15/21  0510   INR 2.14* 2.71* 2.68*     Lactate: No results for input(s): LACTATE in the last 72 hours. Cultures:  -----------------------------------------------------------------  RAD:   XR ABDOMEN (2 VIEWS)   Final Result   Previously administered oral contrast is within the colon. XR CHEST PORTABLE   Final Result   New patchy bilateral interstitial and airspace opacities representing edema or infection. Stable left diaphragmatic elevation. XR ABDOMEN (2 VIEWS)   Final Result      Diffuse distention of small bowel, with oral contrast throughout loops extending into the pelvis. CT ABDOMEN PELVIS WO CONTRAST Additional Contrast? None   Final Result     Limited study.   Findings

## 2021-06-15 NOTE — CARE COORDINATION
CM following, cont NG to CLWS, cards following pending ECHO. PT OT evals completed, pt wished to go home with wife but will speak to wife about SNF but feels he will approve once GI function returns. Will cont to have PT OT assess as Gi function returns.   Electronically signed by Sandra Velazquez RN on 6/15/2021 at 5:24 PM  [phone]

## 2021-06-15 NOTE — PROGRESS NOTES
Training;Energy Conservation  Patient Education: Pt verbalized understanding, but will benefit from reinforcement. REQUIRES PT FOLLOW UP: Yes  Activity Tolerance  Activity Tolerance: Patient limited by endurance; Patient limited by fatigue;Treatment limited secondary to medical complications (free text)  Activity Tolerance: SpO2 dropped to 86% with mobility, and increased supplemental O2 to 5 L and marcus to 90%; after sitting in chair, decreasd to 81% on 5 L, bumped up to 6 L d/t unable to increase SpO2 without assist.       Patient Diagnosis(es): The encounter diagnosis was SBO (small bowel obstruction) ([facility] has a past medical history of Anemia, Aortic valve disorders, Aspiration pneumonia (HCC), Erectile dysfunction, Esophageal cancer (HCC), Hernia, femoral, bilateral, Hyperlipidemia, Osteoporosis, senile, Pancreatitis, Patient underweight, Prostate cancer ([facility] [address] and Unspecified essential hypertension. has a past surgical history that includes Coronary artery bypass graft (2006); Cardiac valve replacement (2006); eye surgery (1990& 1992); Appendectomy; bone graft; gastrectomy; Cholecystectomy; Colonoscopy (11/2009); Prostate surgery; Tonsillectomy; Esophagus surgery;  Prostate/Transrectal/Vol Collins Brachyth; Colonoscopy (10/05/15); Upper gastrointestinal endoscopy (N/A, 2/16/2021); Colonoscopy (N/A, 2/17/2021); and Colonoscopy (2/18/2021).     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 Response To Previous Treatment: Not applicable  Family / Caregiver Present: Yes (Wife entered during session)  Referring Practitioner: Jc Jaime MD  Referral Date : 06/15/21  Subjective  Subjective: Pt agreeable to therapy.   Pain Screening  Patient Currently in Pain: Denies  Vital Signs  Patient Currently in Pain: Denies       Orientation  Orientation  Overall Orientation Status: Within Functional Limits  Social/Functional History  Social/Functional History  Lives With: Spouse  Type of Home: House (condo)  Home Layout: One level, Performs ADL's on one level, Able to Live on Main level with bedroom/bathroom (exercise room in basement)  Home Access: Stairs to enter without rails  Entrance Stairs - Number of Steps: 2 GEORGES  Bathroom Shower/Tub: Walk-in shower  Bathroom Toilet: Handicap height (sink nearby)  Bathroom Equipment: Grab bars in shower  Home Equipment: Cane, Rolling walker, Standard walker, 4 wheeled walker  ADL Assistance: [address]: Independent (Pt shares cooking, cleaning service every 2 weeks, wife completes daily cleaning and laundry and grocery shopping)  Ambulation Assistance: Independent (usually used cane plus supports from wife for community, furniture walks at home)  Transfer Assistance: Independent  Active : Yes  Occupation: Retired  Type of occupation: research pathologist  Leisure & Hobbies: exercises almost daily, used to draw portraits and edit [address] [place] [address]  Overall Cognitive Status: WFL    Objective          AROM RLE (degrees)  RLE AROM: Exceptions  RLE General AROM: With mobility, unable to achieve full knee extension  AROM LLE 2-5x  Times per day: Daily  Current Treatment Recommendations: Strengthening, Balance Training, Functional Mobility Training, ROM, Transfer Training, Gait Training, Stair training, Endurance Training, Pain Management, Home Exercise Program, Safety Education & Training, Equipment Evaluation, Education, & procurement, Patient/Caregiver Education & Training  Safety Devices  Type of devices: All fall risk precautions in place, Call light within reach, Left in chair, Chair alarm in place, Patient at risk for falls, Gait belt, Nurse notified  AM-PAC Score  AM-PAC Inpatient Mobility Raw Score : 16 (06/15/21 1109)  AM-PAC Inpatient T-Scale Score : 40.78 (06/15/21 1109)  Mobility Inpatient CMS 0-100% Score: 54.16 (06/15/21 1109)  Mobility Inpatient CMS G-Code Modifier : CK (06/15/21 1109)        Goals  Short term goals  Time Frame for Short term goals: D/c. Short term goal 1: Pt will complete bed mobility with supervision. Short term goal 2: Pt will complete sit<>stands with SBA to LRAD. Short term goal 3: Pt will ambulate 50 feet with SpO2 remaining >90% and SBA with LRAD. Short term goal 4: Pt will complete 2 steps with Min A with rail. Patient Goals   Patient goals : To feel better and get off oxygen.      Therapy Time   Individual Concurrent Group Co-treatment   Time In 0928         Time Out 1013         Minutes 45         Timed Code Treatment Minutes: 30 Minutes (15 minute eval)     Inez Galloway, PT

## 2021-06-15 NOTE — PLAN OF CARE
Problem: Falls - Risk of:  Goal: Will remain free from falls  Description: Will remain free from falls  Outcome: Ongoing  Note: Patient remains free from physical injury. Fall precautions in place: Patient in bed lowest position,wheels locked. 2/4 side rails up Call light and beside table within reach. Will continue to monitor       Problem: Pain:  Goal: Pain level will decrease  Description: Pain level will decrease  Outcome: Ongoing  Note: Patient denies any pain. Will continue to monitor     Problem: Nausea/Vomiting:  Goal: Absence of nausea/vomiting  Description: Absence of nausea/vomiting  Outcome: Ongoing  Note: Patient denies any nausea/vomiting. NGTube in place.  Will continue to monitor

## 2021-06-15 NOTE — PROGRESS NOTES
Surgery Daily Progress Note      CC: SBO    SUBJECTIVE:  Afebrile and HDS overnight  Patient denies any abdominal pain this AM.   Passage of minimal gas this AM Denies passage of bowel movement. States he uses walker and cane at home    ROS:   A 14 point review of systems was conducted, significant findings as noted above. All other systems negative. OBJECTIVE:    PHYSICAL EXAM:  Vitals:    06/14/21 1930 06/14/21 2145 06/14/21 2300 06/15/21 0319   BP: 129/70   128/73   Pulse: 95   87   Resp: 18 16  16   Temp: 99.3 °F (37.4 °C)  97.7 °F (36.5 °C) 97.8 °F (36.6 °C)   TempSrc: Oral  Oral Oral   SpO2: 91% 92%  91%   Weight:       Height:           General appearance: alert, no acute distress, grooming appropriate  Eyes: PERRLA, no scleral icterus  HEENT: trachea midline, no JVD, NGT in place with gastric output  Chest/Lungs:  normal effort  Cardiovascular: RRR  Abdomen: soft, non-tender, non-distended, no guarding/rigidity, previous incisional scar noted, no bulging. Skin: warm and dry, no rashes, small skin tear on R elbow noted  Extremities: no edema, no cyanosis  Neuro: A&Ox3, no focal deficits, sensation intact      ASSESSMENT & PLAN:   This is a 82 y.o. male with Hx of remote esophagectomy and cholecystectomy with intermittent small bowel obstruction who presents with abdominal pain, diarrhea, nausea, and vomiting.    - Continue NGT decompression, will await return of bowel function  - Will keep giraldo until return of bowel function, start flomax.  - PT/OT ordered  - Follow up INR  - Follow up ECHO per cardiology they recommends we continue to hold the heparin until his INR is < 2.5. Then restart heparin as weight based. Santi Navarro,   06/15/21  6:28 AM  [id number]    I have personally performed the medical history, physical exam and medical decision making and agree with all pertinent clinical information unless otherwise noted.      Emmy Yuen MD  Surgery Attending

## 2021-06-15 NOTE — PROGRESS NOTES
Patient alert and oriented x4. VSS and patient on 6L of O2 and sating in low 90s. Patient with giraldo in place draining adequate yellow clear urine with no issues. Patient with bowel sounds present but denies any gas or bowel movement. NG to continuous suction. Denies any pain or nausea. Patient denies any other needs at this time. Bed is in the lowest position, call light and bedside table within reach. Patient bed alarm is on. Will continue to monitor for changes in patient status.      Electronically signed by Matilde Penn RN on 6/15/2021 at 6:45 PM

## 2021-06-16 ENCOUNTER — APPOINTMENT (OUTPATIENT)
Dept: GENERAL RADIOLOGY | Age: 86
DRG: 388 | End: 2021-06-16
Payer: MEDICARE

## 2021-06-16 LAB
ALBUMIN SERPL-MCNC: 2.7 G/DL (ref 3.4–5)
ANION GAP SERPL CALCULATED.3IONS-SCNC: 7 MMOL/L (ref 3–16)
ANTI-XA UNFRAC HEPARIN: <0.04 IU/ML (ref 0.3–0.7)
BASOPHILS ABSOLUTE: 0 K/UL (ref 0–0.2)
BASOPHILS RELATIVE PERCENT: 0.1 %
BUN BLDV-MCNC: 35 MG/DL (ref 7–20)
CALCIUM SERPL-MCNC: 8 MG/DL (ref 8.3–10.6)
CHLORIDE BLD-SCNC: 104 MMOL/L (ref 99–110)
CO2: 30 MMOL/L (ref 21–32)
CREAT SERPL-MCNC: 1.2 MG/DL (ref 0.8–1.3)
EOSINOPHILS ABSOLUTE: 0 K/UL (ref 0–0.6)
EOSINOPHILS RELATIVE PERCENT: 0 %
GFR AFRICAN AMERICAN: >60
GFR NON-AFRICAN AMERICAN: 58
GLUCOSE BLD-MCNC: 173 MG/DL (ref 70–99)
HCT VFR BLD CALC: 34.3 % (ref 40.5–52.5)
HEMOGLOBIN: 11.1 G/DL (ref 13.5–17.5)
INR BLD: 3.41 (ref 0.86–1.14)
LYMPHOCYTES ABSOLUTE: 0.6 K/UL (ref 1–5.1)
LYMPHOCYTES RELATIVE PERCENT: 10.5 %
MAGNESIUM: 2.2 MG/DL (ref 1.8–2.4)
MCH RBC QN AUTO: 27.4 PG (ref 26–34)
MCHC RBC AUTO-ENTMCNC: 32.5 G/DL (ref 31–36)
MCV RBC AUTO: 84.3 FL (ref 80–100)
MONOCYTES ABSOLUTE: 0.4 K/UL (ref 0–1.3)
MONOCYTES RELATIVE PERCENT: 6.2 %
NEUTROPHILS ABSOLUTE: 5.1 K/UL (ref 1.7–7.7)
NEUTROPHILS RELATIVE PERCENT: 83.2 %
PDW BLD-RTO: 17.4 % (ref 12.4–15.4)
PHOSPHORUS: 1.5 MG/DL (ref 2.5–4.9)
PLATELET # BLD: 114 K/UL (ref 135–450)
PMV BLD AUTO: 10 FL (ref 5–10.5)
POTASSIUM SERPL-SCNC: 3.5 MMOL/L (ref 3.5–5.1)
PROTHROMBIN TIME: 40.1 SEC (ref 10–13.2)
RBC # BLD: 4.07 M/UL (ref 4.2–5.9)
SODIUM BLD-SCNC: 141 MMOL/L (ref 136–145)
WBC # BLD: 6.1 K/UL (ref 4–11)

## 2021-06-16 PROCEDURE — 94640 AIRWAY INHALATION TREATMENT: CPT

## 2021-06-16 PROCEDURE — 2580000003 HC RX 258: Performed by: STUDENT IN AN ORGANIZED HEALTH CARE EDUCATION/TRAINING PROGRAM

## 2021-06-16 PROCEDURE — 1200000000 HC SEMI PRIVATE

## 2021-06-16 PROCEDURE — 2500000003 HC RX 250 WO HCPCS: Performed by: STUDENT IN AN ORGANIZED HEALTH CARE EDUCATION/TRAINING PROGRAM

## 2021-06-16 PROCEDURE — 99223 1ST HOSP IP/OBS HIGH 75: CPT | Performed by: INTERNAL MEDICINE

## 2021-06-16 PROCEDURE — 74018 RADEX ABDOMEN 1 VIEW: CPT

## 2021-06-16 PROCEDURE — 85025 COMPLETE CBC W/AUTO DIFF WBC: CPT

## 2021-06-16 PROCEDURE — 6370000000 HC RX 637 (ALT 250 FOR IP): Performed by: STUDENT IN AN ORGANIZED HEALTH CARE EDUCATION/TRAINING PROGRAM

## 2021-06-16 PROCEDURE — 85520 HEPARIN ASSAY: CPT

## 2021-06-16 PROCEDURE — 36415 COLL VENOUS BLD VENIPUNCTURE: CPT

## 2021-06-16 PROCEDURE — 85610 PROTHROMBIN TIME: CPT

## 2021-06-16 PROCEDURE — 6360000002 HC RX W HCPCS: Performed by: STUDENT IN AN ORGANIZED HEALTH CARE EDUCATION/TRAINING PROGRAM

## 2021-06-16 PROCEDURE — 80069 RENAL FUNCTION PANEL: CPT

## 2021-06-16 PROCEDURE — 99232 SBSQ HOSP IP/OBS MODERATE 35: CPT | Performed by: SURGERY

## 2021-06-16 PROCEDURE — 99233 SBSQ HOSP IP/OBS HIGH 50: CPT | Performed by: INTERNAL MEDICINE

## 2021-06-16 PROCEDURE — 94669 MECHANICAL CHEST WALL OSCILL: CPT

## 2021-06-16 PROCEDURE — 2700000000 HC OXYGEN THERAPY PER DAY

## 2021-06-16 PROCEDURE — 83735 ASSAY OF MAGNESIUM: CPT

## 2021-06-16 PROCEDURE — 6360000002 HC RX W HCPCS: Performed by: NURSE PRACTITIONER

## 2021-06-16 PROCEDURE — C9113 INJ PANTOPRAZOLE SODIUM, VIA: HCPCS | Performed by: NURSE PRACTITIONER

## 2021-06-16 PROCEDURE — 2580000003 HC RX 258: Performed by: SURGERY

## 2021-06-16 PROCEDURE — 99232 SBSQ HOSP IP/OBS MODERATE 35: CPT | Performed by: INTERNAL MEDICINE

## 2021-06-16 RX ORDER — POTASSIUM CHLORIDE 7.45 MG/ML
10 INJECTION INTRAVENOUS
Status: DISCONTINUED | OUTPATIENT
Start: 2021-06-16 | End: 2021-06-16

## 2021-06-16 RX ORDER — POTASSIUM CHLORIDE 7.45 MG/ML
10 INJECTION INTRAVENOUS
Status: COMPLETED | OUTPATIENT
Start: 2021-06-16 | End: 2021-06-16

## 2021-06-16 RX ORDER — BISACODYL 10 MG
10 SUPPOSITORY, RECTAL RECTAL ONCE
Status: COMPLETED | OUTPATIENT
Start: 2021-06-16 | End: 2021-06-16

## 2021-06-16 RX ORDER — 0.9 % SODIUM CHLORIDE 0.9 %
250 INTRAVENOUS SOLUTION INTRAVENOUS ONCE
Status: COMPLETED | OUTPATIENT
Start: 2021-06-16 | End: 2021-06-16

## 2021-06-16 RX ADMIN — Medication 10 ML: at 23:00

## 2021-06-16 RX ADMIN — BISACODYL 10 MG: 10 SUPPOSITORY RECTAL at 15:46

## 2021-06-16 RX ADMIN — IPRATROPIUM BROMIDE AND ALBUTEROL SULFATE 1 AMPULE: .5; 2.5 SOLUTION RESPIRATORY (INHALATION) at 11:41

## 2021-06-16 RX ADMIN — SODIUM CHLORIDE, SODIUM LACTATE, POTASSIUM CHLORIDE, CALCIUM CHLORIDE AND DEXTROSE MONOHYDRATE: 5; 600; 310; 30; 20 INJECTION, SOLUTION INTRAVENOUS at 00:43

## 2021-06-16 RX ADMIN — PIPERACILLIN AND TAZOBACTAM 3375 MG: 3; .375 INJECTION, POWDER, LYOPHILIZED, FOR SOLUTION INTRAVENOUS at 17:52

## 2021-06-16 RX ADMIN — PIPERACILLIN AND TAZOBACTAM 3375 MG: 3; .375 INJECTION, POWDER, LYOPHILIZED, FOR SOLUTION INTRAVENOUS at 03:39

## 2021-06-16 RX ADMIN — IPRATROPIUM BROMIDE AND ALBUTEROL SULFATE 1 AMPULE: .5; 2.5 SOLUTION RESPIRATORY (INHALATION) at 08:37

## 2021-06-16 RX ADMIN — POTASSIUM PHOSPHATE, MONOBASIC AND POTASSIUM PHOSPHATE, DIBASIC 20 MMOL: 224; 236 INJECTION, SOLUTION, CONCENTRATE INTRAVENOUS at 15:46

## 2021-06-16 RX ADMIN — Medication 10 ML: at 10:44

## 2021-06-16 RX ADMIN — POTASSIUM CHLORIDE 10 MEQ: 10 INJECTION, SOLUTION INTRAVENOUS at 12:15

## 2021-06-16 RX ADMIN — IPRATROPIUM BROMIDE AND ALBUTEROL SULFATE 1 AMPULE: .5; 2.5 SOLUTION RESPIRATORY (INHALATION) at 20:48

## 2021-06-16 RX ADMIN — POTASSIUM CHLORIDE 10 MEQ: 10 INJECTION, SOLUTION INTRAVENOUS at 10:43

## 2021-06-16 RX ADMIN — PIPERACILLIN AND TAZOBACTAM 3375 MG: 3; .375 INJECTION, POWDER, LYOPHILIZED, FOR SOLUTION INTRAVENOUS at 10:43

## 2021-06-16 RX ADMIN — TAMSULOSIN HYDROCHLORIDE 0.4 MG: 0.4 CAPSULE ORAL at 10:43

## 2021-06-16 RX ADMIN — PANTOPRAZOLE SODIUM 40 MG: 40 INJECTION, POWDER, FOR SOLUTION INTRAVENOUS at 10:42

## 2021-06-16 RX ADMIN — SODIUM CHLORIDE, SODIUM LACTATE, POTASSIUM CHLORIDE, CALCIUM CHLORIDE AND DEXTROSE MONOHYDRATE: 5; 600; 310; 30; 20 INJECTION, SOLUTION INTRAVENOUS at 10:57

## 2021-06-16 RX ADMIN — SODIUM CHLORIDE 250 ML: 9 INJECTION, SOLUTION INTRAVENOUS at 13:13

## 2021-06-16 RX ADMIN — IPRATROPIUM BROMIDE AND ALBUTEROL SULFATE 1 AMPULE: .5; 2.5 SOLUTION RESPIRATORY (INHALATION) at 15:13

## 2021-06-16 ASSESSMENT — PAIN SCALES - GENERAL
PAINLEVEL_OUTOF10: 0

## 2021-06-16 ASSESSMENT — PAIN DESCRIPTION - LOCATION: LOCATION: ABDOMEN

## 2021-06-16 ASSESSMENT — PAIN DESCRIPTION - PAIN TYPE: TYPE: CHRONIC PAIN

## 2021-06-16 NOTE — FLOWSHEET NOTE
06/16/21 1518   Encounter Summary   Services provided to: Patient and family together   Referral/Consult From: Rounding   Continue Visiting   (6/16, yeimi )   Complexity of Encounter Moderate   Length of Encounter 30 minutes   Routine   Type Initial   Assessment Calm; Approachable; Hopeful   Intervention Active listening;Explored feelings, thoughts, concerns;Nurtured hope   Outcome Comfort;Expressed gratitude   Staff Levada Chimes, [place]

## 2021-06-16 NOTE — CONSULTS
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 Dory Sauer for evaluation of pneumonia. He was admitted to the hospital with complaints of abdominal pain and vomiting, and was found to have a small bowel obstruction. He has been treated with NGT drainage, now on hospital day 4. Abdominal discomfort is better and he does not feel nauseated. Late on the day of admission admission, he developed hypoxemia, requiring supplemental O2. He denied shortness of breath at that time, and still does not have dyspnea. He denies any chest pain. He has had cough, which even had started about 2 weeks prior to admission. He feels congested but does not clear any sputum. He does not have a history of chronic lung disease. He has had problems in the past with respiratory secretions, presumably from aspiration. While he denies chronic GI symptoms, appetite remains low, he cannot eat very much at a meal, and he has had a very slow but progressive weight loss. He takes precautions to stay upright after eating    Past medical history:  1. Gastroesophageal cancer, status post gastrectomy with esophageal pull-through, occurring several years ago. No evidence of recurrent disease. 2.  Status post aortic valve replacement and CABG in 2006  3. History of prostate cancer, status post resection and brachytherapy. Social history: Patient lives in his own home with wife. 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  Is a lifelong non-smoker. Unspecified alcohol use    Review of systems: 12 point review is negative except as noted above    Physical examination reveals a pleasant alert gentleman who appears his stated age resting comfortably in bed. He is in no evident distress, he appears very tired. He has a very thin body habitus with BMI 17. He exhibits a congested cough without sputum production. Pulse 94 and regular. Blood pressure 130/80. Respirations 12. Temperature 96.6 °F.  O2Hgb 96% on O2 at 9 L/min  HEENT: Eyes nose normal.  No oral lesions. Mucous membranes moist.  Dentition intact. NGT in place  Neck: No cervical lymphadenopathy or thyromegaly. No JVD. Thorax: Normal configuration. Dullness with decreased breath sounds over the lower half of the left thorax. Medium crackles at right lung base  Cardiovascular: Radial pulses 1+ bilaterally. S1 normal, S2 mechanical.  No murmur appreciated  Abdomen: Soft, no tenderness. Distended. Extremities: Decreased muscle mass. No edema. Neurologic: Patient moves all extremities symmetrically. Normal tactile sensation bilaterally. Chest x-ray on admission showed elevation of the left hemidiaphragm, chronic change. Radiograph taken later that day, and subsequently demonstrates alveolar opacities in the left upper lung zone and on the right. Chest CT on third hospital day shows increased consolidation in the left lung, and patchy alveolar opacities in the right lower lung field. Assessment and plan: Small bowel obstruction present on admission, with accompanying vomiting. He then developed hypoxemia with pulmonary infiltrates, and this is most consistent with aspiration. Hypoxemia is compensated. Antibiotic therapy has been started. He has adequate cough effort. He is not clearing secretions, but he does not have evidence of mucoid impaction of airways. Respiratory therapy may be helpful in this regard  Small bowel obstruction appears to be improving, has had contrast agent passing on into the colon. Patient and wife updated regarding progress and plan.   Discussed with medical team

## 2021-06-16 NOTE — PROGRESS NOTES
Progress Note    Admit Date: 6/13/2021  Diet: Diet NPO    CC: SOB    Interval history: SONDRA. Remains on 6L NC. Overall feeling tired and extremely hungry. Passing gas, no BMs yet. Making good urine. Denies any fever, chills, cough, abdominal pain, chest pain, shortness of breath, nausea, vomiting. Medications:     Scheduled Meds:   potassium phosphate IVPB  20 mmol Intravenous Once    sodium chloride  250 mL Intravenous Once    tamsulosin  0.4 mg Oral Daily    piperacillin-tazobactam  3,375 mg Intravenous Q8H    pantoprazole  40 mg Intravenous Daily    sodium chloride flush  10 mL Intravenous 2 times per day    ipratropium-albuterol  1 ampule Inhalation 4x daily     Continuous Infusions:   dextrose 5% in lactated ringers 125 mL/hr at 06/16/21 1057    sodium chloride      heparin (PORCINE) Infusion Stopped (06/14/21 1453)     PRN Meds:perflutren lipid microspheres, sodium chloride flush, sodium chloride, ondansetron, prochlorperazine, heparin (porcine), heparin (porcine)    Objective:   Vitals:   T-max:  Patient Vitals for the past 8 hrs:   BP Temp Temp src Pulse Resp SpO2   06/16/21 0839 -- -- -- -- 8 (!) 89 %   06/16/21 0803 129/78 98.8 °F (37.1 °C) Oral 98 16 93 %   06/16/21 0330 138/79 98.1 °F (36.7 °C) Oral 104 18 92 %       Intake/Output Summary (Last 24 hours) at 6/16/2021 1110  Last data filed at 6/16/2021 1050  Gross per 24 hour   Intake 483.33 ml   Output 1250 ml   Net -766.67 ml     Physical Exam  Constitutional:       General: He is not in acute distress. Appearance: He is ill-appearing. He is not toxic-appearing. Comments: Frail appearing. Cachetic    HENT:      Mouth/Throat:      Mouth: Mucous membranes are dry. Eyes:      Pupils: Pupils are equal, round, and reactive to light. Cardiovascular:      Rate and Rhythm: Normal rate and regular rhythm. Pulses: Normal pulses. Heart sounds: Normal heart sounds. No murmur heard.      Pulmonary:      Effort: Pulmonary effort is normal.      Comments: Poor air entry. Diminished lung sounds. No wheezing appreciated  Abdominal:      General: Bowel sounds are normal.      Palpations: Abdomen is soft. Tenderness: There is no abdominal tenderness. Comments: Scaphoid   Musculoskeletal:         General: No swelling. Neurological:      Mental Status: He is alert and oriented to person, place, and time. Mental status is at baseline. LABS:    CBC:   Recent Labs     06/14/21  0421 06/15/21  0510 06/16/21  0456   WBC 8.9 6.3 6.1   HGB 13.0* 12.3* 11.1*   HCT 40.4* 37.7* 34.3*    114* 114*   MCV 84.8 84.4 84.3     Renal:    Recent Labs     06/14/21  0421 06/15/21  0510 06/16/21  0456    140 141   K 4.1 3.9 3.5    101 104   CO2 29 27 30   BUN 42* 42* 35*   CREATININE 1.8* 1.3 1.2   GLUCOSE 147* 101* 173*   CALCIUM 8.5 8.1* 8.0*   MG 2.20 2.10 2.20   PHOS 5.3* 3.0 1.5*   ANIONGAP 11 12 7     Hepatic:   Recent Labs     06/14/21  0421 06/15/21  0510 06/16/21 0456   LABALBU 3.3* 2.9* 2.7*     Troponin:   Recent Labs     06/13/21  1628 06/13/21  1948 06/14/21  0209   TROPONINI 0.02* 0.03* 0.04*     BNP: No results for input(s): BNP in the last 72 hours. Lipids: No results for input(s): CHOL, HDL in the last 72 hours. Invalid input(s): LDLCALCU, TRIGLYCERIDE  ABGs:  No results for input(s): PHART, GEN6XXR, PO2ART, ASV3CUP, BEART, THGBART, U5UNVMEC, PZQ9AXJ in the last 72 hours. INR:   Recent Labs     06/14/21  0421 06/15/21  0510 06/16/21  0456   INR 2.71* 2.68* 3.41*     Lactate: No results for input(s): LACTATE in the last 72 hours. Cultures:  -----------------------------------------------------------------  RAD:   CT CHEST WO CONTRAST   Final Result   1. Extensive consolidation of the left lower lobe pleural fluid extending posteriorly and in the major fissure to the apex. 2. Left upper lobe bronchiectasis with anterior perihilar airspace disease   3.  Interval development of right lower lobe pneumonia when management  - Passing gas, + BS  - General surg following     Acute Kidney Injury, resolved  - Cr at baseline  - IVF: D5 & LR at 125 cc/hr  - 500 mL UOP     NSTEMI Type II  - Asymptomatic with initial EKG changes. Repeat EKG shown resolution of ST changes. Troponins wnl  - Echocardiogram wnl. EF 60-65%, no wall motion abnls. Pulm art pressure 43. Mech aortic valve well seated  - Cardiology following; appreciate recs     History of esophageal cancer  - s/p resection with gastric pull through     History of prostate cancer  - Stable, continue to monitor      Aortic valve repair, on warfarin  - Continue to monitor PT/INR, INR elevated at 3.41.  On heparin gtt     Code Status:Full Code  FEN: Diet NPO  PPX:  Therapeutic heparin gtt  DISPO: GMF     Radha Kruger MD, PGY-1  06/16/21  11:10 AM    This patient has been staffed and discussed with Apolonia Garrett MD.

## 2021-06-16 NOTE — PROGRESS NOTES
Surgery   Daily Progress Note    CC: SBO    SUBJECTIVE:  Patient started on IV ABx yesterday secondary to concern for pneumonia. Afebrile and hemodynamically stable this AM. Passing flatus but no BM as of yet. Reports no pain. ROS:   A 14 point review of systems was conducted, significant findings as noted above. All other systems negative.     OBJECTIVE:  PHYSICAL EXAM:  Vitals:    06/15/21 1744 06/15/21 2153 06/16/21 0033 06/16/21 0330   BP:  137/80 126/77 138/79   Pulse:  104 99 104   Resp: 24 20 20 18   Temp:  98.4 °F (36.9 °C) 98 °F (36.7 °C) 98.1 °F (36.7 °C)   TempSrc:  Oral Oral Oral   SpO2: 93% 94% 96% 92%   Weight:       Height:         General appearance: alert, no acute distress, grooming appropriate  Chest/Lungs: Breathing 6L supplemental oxygen  Cardiovascular: Mildly tachycardic, regular rhythm  Abdomen: soft, non-tender, non-distended, no guarding/rigidity, previous incisional scar noted, no bulging, hypoactive bowel sounds   Skin: warm and dry, no rashes, small skin tear on R elbow noted  Extremities: no edema, no cyanosis  Neuro: A&Ox3, no focal deficits, sensation intact    ASSESSMENT & PLAN:   This is a 82 y.o. male with Hx of remote esophagectomy and cholecystectomy with intermittent small bowel obstruction who presents with abdominal pain, diarrhea, nausea, and vomiting.    - Started on Zosyn for PNA per Internal Medicine team      - Continue supplemental O2 as needed -- wean as tolerated     - Pulmonology consulted  - ECHO with 60-65% LVEF      - Cardiology recommend continuing to hold the heparin until his INR is < 2.5.       - Once INR < 2.5 will restart heparin as weight based  - Awaiting return of bowel function      - Continue NGT decompression, will await return of bowel function  - Will keep giraldo until return of bowel function     - Continue flomax  - Case management on board for dispo planning     - PT 16/24; OT 14/24     -  Possible SNF placement     Romi Glaser MD, MPH  PGY-2

## 2021-06-16 NOTE — PROGRESS NOTES
[address] Daily Progress Note      Admit Date:  6/13/2021    Subjective:  Mr. Martina Duron was seen and examined. F/U tachy/hypoxia/SBO. Also with AVR, CABG. No complaints. Denies sob/cp.   abd feeling better. Objective:   /81   Pulse 92   Temp 96.6 °F (35.9 °C) (Oral)   Resp 12   Ht 5' 3.5\" (1.613 m)   Wt 98 lb 5.2 oz (44.6 kg)   SpO2 96%   BMI 17.14 kg/m²       Intake/Output Summary (Last 24 hours) at 6/16/2021 1155  Last data filed at 6/16/2021 1050  Gross per 24 hour   Intake 483.33 ml   Output 1250 ml   Net -766.67 ml       TELEMETRY: Sinus     Physical Exam:  General:  Awake, alert, NAD  Skin:  Warm and dry  Neck:  JVP difficult  Chest:  Decreased BS, respiration normal  Cardiovascular:  RRR S1S2  Abdomen:  Soft , few BS present now.    Extremities:  no edema    Medications:    potassium phosphate IVPB  20 mmol Intravenous Once    sodium chloride  250 mL Intravenous Once    tamsulosin  0.4 mg Oral Daily    piperacillin-tazobactam  3,375 mg Intravenous Q8H    pantoprazole  40 mg Intravenous Daily    sodium chloride flush  10 mL Intravenous 2 times per day    ipratropium-albuterol  1 ampule Inhalation 4x daily      dextrose 5% in lactated ringers 125 mL/hr at 06/16/21 1057    sodium chloride      heparin (PORCINE) Infusion Stopped (06/14/21 1453)     perflutren lipid microspheres, sodium chloride flush, sodium chloride, ondansetron, prochlorperazine, heparin (porcine), heparin (porcine)    Lab Data:  CBC:   Recent Labs     06/14/21  0421 06/15/21  0510 06/16/21  0456   WBC 8.9 6.3 6.1   HGB 13.0* 12.3* 11.1*   HCT 40.4* 37.7* 34.3*   MCV 84.8 84.4 84.3    114* 114*     BMP:   Recent Labs     06/14/21  0421 06/15/21  0510 06/16/21  0456    140 141   K 4.1 3.9 3.5    101 104   CO2 29 27 30   PHOS 5.3* 3.0 1.5*   BUN 42* 42* 35*   CREATININE 1.8* 1.3 1.2     LIVER PROFILE:   No results for input(s): AST, ALT, LIPASE, BILIDIR, BILITOT, ALKPHOS in the last 72 hours.    Invalid input(s): AMYLASE,  ALB  PT/INR:   Recent Labs     06/14/21  0421 06/15/21  0510 06/16/21  0456   PROTIME 31.8* 31.4* 40.1*   INR 2.71* 2.68* 3.41*     APTT:   Recent Labs     06/14/21  0209   APTT 110.9*     BNP:  No results for input(s): BNP in the last 72 hours. 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    History of disease 09/15/2020    Iron deficiency anemia 09/03/2020    Coronary 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 ([facility] [facility] 08/26/2020    S/P AVR (aortic valve replacement) 10/15/2018    History of esophageal cancer 09/26/2016    History of prostate cancer 09/26/2016    Chronic kidney disease, stage III (moderate) ([facility] [facility] 11/30/2015    Personal history of esophageal cancer 11/30/2015    Aspiration pneumonia ([facility] [address] [facility] 04/27/2015    Hypoxia     Gastroparesis 03/06/2015    Osteoporosis, senile 05/20/2014    Hernia, femoral, bilateral 05/12/2014    Patient underweight 08/08/2013    Nausea & vomiting 05/28/2012    Asthma 07/20/2010    Aortic valve disorder 07/20/2010    Psychosexual dysfunction with inhibited sexual excitement 07/20/2010    Foreign body accidentally left during a procedure 04/26/2010    Essential hypertension 09/18/2007       Plan:  1. No sob/cp. Wean O2 as tolerated. HR normal.  Cr  normal.  Negative fluid balance. Poss PNA by CT. Atbx. Pulmonary to see.        Core Measures:  · Discharge instructions:   · LVEF documented:   · ACEI for LV dysfunction:   · Smoking Cessation:    Lindie Leyden, MD, MD 6/16/2021 11:55 AM

## 2021-06-16 NOTE — PLAN OF CARE
Problem: Falls - Risk of:  Goal: Will remain free from falls  Description: Will remain free from falls  Outcome: Ongoing  Note: Weak . x2 assist. Will attempt to get out of bed this afternoon     Problem: Nutrition  Goal: Optimal nutrition therapy  Outcome: Ongoing  Note: Ng remains in place (NPO)     Problem: Pain:  Goal: Pain level will decrease  Description: Pain level will decrease  Outcome: Ongoing  Note: Denies pain

## 2021-06-16 NOTE — CARE COORDINATION
CM following, spoke with pt and wife, they are agreeable to referral to [facility] for [facility] SN[facility] placement, [address] skilled care following [facility] Pt cont with NG in place, wean O2 pulm consult, FC in place on Flomax. Pending return GI function.   Electronically signed by Jasiel Leonard RN on 6/16/2021 at 3:49 PM  [phone]

## 2021-06-16 NOTE — PROGRESS NOTES
VSS - afebrile; tachycardic. Pt is alert and oriented x 4 with history of falls. Assessment completed as charted. Bed is in lowest position with 2/4 bed rails raised, bed alarm turned on, wheels locked and call light within reach - patient wearing non-skid socks and verbalizes understanding to call out for assistance. No further requests at this time. Will continue to monitor.      Vitals:    06/15/21 2153   BP: 137/80   Pulse: 104   Resp: 20   Temp: 98.4 °F (36.9 °C)   SpO2: 94%

## 2021-06-17 LAB
ALBUMIN SERPL-MCNC: 2.2 G/DL (ref 3.4–5)
ANION GAP SERPL CALCULATED.3IONS-SCNC: 7 MMOL/L (ref 3–16)
ANTI-XA UNFRAC HEPARIN: <0.04 IU/ML (ref 0.3–0.7)
ANTI-XA UNFRAC HEPARIN: <0.04 IU/ML (ref 0.3–0.7)
BASOPHILS ABSOLUTE: 0 K/UL (ref 0–0.2)
BASOPHILS RELATIVE PERCENT: 0.2 %
BUN BLDV-MCNC: 25 MG/DL (ref 7–20)
CALCIUM SERPL-MCNC: 7.5 MG/DL (ref 8.3–10.6)
CHLORIDE BLD-SCNC: 105 MMOL/L (ref 99–110)
CO2: 28 MMOL/L (ref 21–32)
CREAT SERPL-MCNC: 1.1 MG/DL (ref 0.8–1.3)
EOSINOPHILS ABSOLUTE: 0 K/UL (ref 0–0.6)
EOSINOPHILS RELATIVE PERCENT: 0.2 %
GFR AFRICAN AMERICAN: >60
GFR NON-AFRICAN AMERICAN: >60
GLUCOSE BLD-MCNC: 158 MG/DL (ref 70–99)
HCT VFR BLD CALC: 31.9 % (ref 40.5–52.5)
HEMOGLOBIN: 10.4 G/DL (ref 13.5–17.5)
INR BLD: 3.9 (ref 0.86–1.14)
LYMPHOCYTES ABSOLUTE: 0.7 K/UL (ref 1–5.1)
LYMPHOCYTES RELATIVE PERCENT: 11.5 %
MAGNESIUM: 2 MG/DL (ref 1.8–2.4)
MCH RBC QN AUTO: 27.8 PG (ref 26–34)
MCHC RBC AUTO-ENTMCNC: 32.6 G/DL (ref 31–36)
MCV RBC AUTO: 85.3 FL (ref 80–100)
MONOCYTES ABSOLUTE: 0.4 K/UL (ref 0–1.3)
MONOCYTES RELATIVE PERCENT: 7.7 %
NEUTROPHILS ABSOLUTE: 4.5 K/UL (ref 1.7–7.7)
NEUTROPHILS RELATIVE PERCENT: 80.4 %
PDW BLD-RTO: 17.7 % (ref 12.4–15.4)
PHOSPHORUS: 2.1 MG/DL (ref 2.5–4.9)
PLATELET # BLD: 103 K/UL (ref 135–450)
PMV BLD AUTO: 9.8 FL (ref 5–10.5)
POTASSIUM SERPL-SCNC: 3.7 MMOL/L (ref 3.5–5.1)
PROTHROMBIN TIME: 45.9 SEC (ref 10–13.2)
RBC # BLD: 3.74 M/UL (ref 4.2–5.9)
SODIUM BLD-SCNC: 140 MMOL/L (ref 136–145)
WBC # BLD: 5.7 K/UL (ref 4–11)

## 2021-06-17 PROCEDURE — 85610 PROTHROMBIN TIME: CPT

## 2021-06-17 PROCEDURE — C9113 INJ PANTOPRAZOLE SODIUM, VIA: HCPCS | Performed by: NURSE PRACTITIONER

## 2021-06-17 PROCEDURE — 94761 N-INVAS EAR/PLS OXIMETRY MLT: CPT

## 2021-06-17 PROCEDURE — 6360000002 HC RX W HCPCS: Performed by: NURSE PRACTITIONER

## 2021-06-17 PROCEDURE — 99233 SBSQ HOSP IP/OBS HIGH 50: CPT | Performed by: INTERNAL MEDICINE

## 2021-06-17 PROCEDURE — 6370000000 HC RX 637 (ALT 250 FOR IP): Performed by: STUDENT IN AN ORGANIZED HEALTH CARE EDUCATION/TRAINING PROGRAM

## 2021-06-17 PROCEDURE — 85025 COMPLETE CBC W/AUTO DIFF WBC: CPT

## 2021-06-17 PROCEDURE — 2580000003 HC RX 258: Performed by: SURGERY

## 2021-06-17 PROCEDURE — 2700000000 HC OXYGEN THERAPY PER DAY

## 2021-06-17 PROCEDURE — 94669 MECHANICAL CHEST WALL OSCILL: CPT

## 2021-06-17 PROCEDURE — 97530 THERAPEUTIC ACTIVITIES: CPT

## 2021-06-17 PROCEDURE — 2580000003 HC RX 258: Performed by: STUDENT IN AN ORGANIZED HEALTH CARE EDUCATION/TRAINING PROGRAM

## 2021-06-17 PROCEDURE — 80069 RENAL FUNCTION PANEL: CPT

## 2021-06-17 PROCEDURE — 6360000002 HC RX W HCPCS: Performed by: STUDENT IN AN ORGANIZED HEALTH CARE EDUCATION/TRAINING PROGRAM

## 2021-06-17 PROCEDURE — 36415 COLL VENOUS BLD VENIPUNCTURE: CPT

## 2021-06-17 PROCEDURE — 97535 SELF CARE MNGMENT TRAINING: CPT

## 2021-06-17 PROCEDURE — 1200000000 HC SEMI PRIVATE

## 2021-06-17 PROCEDURE — 94640 AIRWAY INHALATION TREATMENT: CPT

## 2021-06-17 PROCEDURE — 85520 HEPARIN ASSAY: CPT

## 2021-06-17 PROCEDURE — 83735 ASSAY OF MAGNESIUM: CPT

## 2021-06-17 PROCEDURE — 99232 SBSQ HOSP IP/OBS MODERATE 35: CPT | Performed by: INTERNAL MEDICINE

## 2021-06-17 RX ORDER — POTASSIUM CHLORIDE 20 MEQ/1
20 TABLET, EXTENDED RELEASE ORAL ONCE
Status: COMPLETED | OUTPATIENT
Start: 2021-06-17 | End: 2021-06-17

## 2021-06-17 RX ADMIN — IPRATROPIUM BROMIDE AND ALBUTEROL SULFATE 1 AMPULE: .5; 2.5 SOLUTION RESPIRATORY (INHALATION) at 12:20

## 2021-06-17 RX ADMIN — IPRATROPIUM BROMIDE AND ALBUTEROL SULFATE 1 AMPULE: .5; 2.5 SOLUTION RESPIRATORY (INHALATION) at 22:08

## 2021-06-17 RX ADMIN — IPRATROPIUM BROMIDE AND ALBUTEROL SULFATE 1 AMPULE: .5; 2.5 SOLUTION RESPIRATORY (INHALATION) at 15:50

## 2021-06-17 RX ADMIN — PIPERACILLIN AND TAZOBACTAM 3375 MG: 3; .375 INJECTION, POWDER, LYOPHILIZED, FOR SOLUTION INTRAVENOUS at 11:47

## 2021-06-17 RX ADMIN — PIPERACILLIN AND TAZOBACTAM 3375 MG: 3; .375 INJECTION, POWDER, LYOPHILIZED, FOR SOLUTION INTRAVENOUS at 04:02

## 2021-06-17 RX ADMIN — SODIUM CHLORIDE, SODIUM LACTATE, POTASSIUM CHLORIDE, CALCIUM CHLORIDE AND DEXTROSE MONOHYDRATE: 5; 600; 310; 30; 20 INJECTION, SOLUTION INTRAVENOUS at 08:29

## 2021-06-17 RX ADMIN — Medication 10 ML: at 08:30

## 2021-06-17 RX ADMIN — PIPERACILLIN AND TAZOBACTAM 3375 MG: 3; .375 INJECTION, POWDER, LYOPHILIZED, FOR SOLUTION INTRAVENOUS at 18:15

## 2021-06-17 RX ADMIN — TAMSULOSIN HYDROCHLORIDE 0.4 MG: 0.4 CAPSULE ORAL at 08:29

## 2021-06-17 RX ADMIN — PANTOPRAZOLE SODIUM 40 MG: 40 INJECTION, POWDER, FOR SOLUTION INTRAVENOUS at 08:29

## 2021-06-17 RX ADMIN — POTASSIUM CHLORIDE 20 MEQ: 1500 TABLET, EXTENDED RELEASE ORAL at 11:46

## 2021-06-17 ASSESSMENT — PAIN SCALES - GENERAL
PAINLEVEL_OUTOF10: 0
PAINLEVEL_OUTOF10: 0

## 2021-06-17 NOTE — CONSULTS
NUTRITION ASSESSMENT  Admission Date: 6/13/2021     Type and Reason for Visit: Reassess    NUTRITION RECOMMENDATIONS:   1. PO Diet: Continue regular diet, monitor tolerance and encourage small frequent meals throughout the day  2. ONS: Continue Ensure Clear, pt may request additional supplements as desired    NUTRITION ASSESSMENT:  Nutritional summary & status: Patient improving nutritionally as he tolerated diet advancement to clears this am and regular diet now ordered. Ensure clear ordered as well and pt endorses hunger. Denies N/V. Patient meets ASPEN criteria for severe PCM. Will continue to monitor diet tolerance and nutritional status. Patient admitted d/t: abdominal pain, nausea, vomiting - SBO    PMH significant for: LIOR, esophageal cancer s/p resection, prostate cancer, HTN    MALNUTRITION ASSESSMENT  Context of Malnutrition: Chronic Illness   Malnutrition Status: Severe malnutrition  Findings of the 6 clinical characteristics of malnutrition (Minimum of 2 out of 6 clinical characteristics is required to make the diagnosis of moderate or severe Protein Calorie Malnutrition based on AND/ASPEN Guidelines):  Energy Intake: Less than/equal to 75% of estimated energy requirements    Energy Intake Time: Greater than or equal to 5 days    Weight Loss %: 10% loss or greater    Weight loss Time: Greater than or equal to 3 months   Body Fat Loss: Moderate Loss    Body Fat Location: Triceps   Body Muscle Loss: Severe Loss   Body Muscle Loss Location: Clavicles  and Temples    Fluid Accumulation: No significant    Fluid Accumulation Location: No significant     Strength: Not Performed;  Not Measured     NUTRITION DIAGNOSIS   Problem: Problem #1: Severe malnutrition   Etiology: Decreased ability to consume sufficient energy   Signs & Symptoms: BMI, Fat Loss , Muscle loss  and Weight loss     NUTRITION INTERVENTION  Food and/or Nutrient Delivery: Continue NPO   Nutrition Education/Counseling: No recommendation at this time   Coordination of Nutrition Care: Continue to monitor while inpatient     NUTRITION MONITORING & EVALUATION:  Evaluation:Progressing towards goal   Goals:Goals: Pt will tolerate diet advancement ot meet >50% of nutrition needs from po intakes  Monitoring: Diet advancement , Diet Tolerance , Meal Intake , Nutrition Progression  or Supplement Intake      OBJECTIVE DATA: Significant to nutrition assessment  · Nutrition-Focused Physical Findings: cachetic appearance   · Labs: Reviewed; , phos 2.2  · Meds: Reviewed  · Wounds None      ANTHROPOMETRICS  Current Height: 5' 3.5\" (161.3 cm)  Current Weight: 98 lb 1.7 oz (44.5 kg)    Admission weight: 106 lb (48.1 kg)  Ideal Bodyweight 127 lbs   Usual Bodyweight ~106-107 per EMR   Weight Changes  -12.7 lb (11%) in last 4 months      BMI BMI (Calculated): 17.1    Wt Readings from Last 50 Encounters:   06/14/21 98 lb 5.2 oz (44.6 kg)   05/26/21 106 lb (48.1 kg)   02/26/21 126 lb (57.2 kg)   02/18/21 111 lb (50.3 kg)   12/18/20 110 lb (49.9 kg)   11/18/20 107 lb (48.5 kg)   09/15/20 107 lb (48.5 kg)   09/03/20 104 lb 11.5 oz (47.5 kg)   08/26/20 105 lb (47.6 kg)   08/19/20 106 lb (48.1 kg)   06/03/20 106 lb (48.1 kg)   05/27/20 106 lb (48.1 kg)     COMPARATIVE STANDARDS  Estimated Total Kcals/Day : 30-35 Current Bodyweight (45 kg) 2879-8283 kcal/day    Estimated Total Protein (g/day) : 1.2-1.5 Current Bodyweight (45 kg) 54-68 g/day  Estimated Daily Total Fluid (ml/day): 0467-3022 mL per day     Food / Nutrition-Related History  Pre-Admission / Home Diet:  Pre-Admission/Home Diet: General, Low Fiber (skinless fruit)   Home Supplements / Herbals:    none noted  Food Restrictions / Cultural Requests:    none noted    Current Nutrition Therapies   Adult Oral Nutrition Supplement; Clear Liquid Oral Supplement  ADULT DIET;  Regular     PO Intake: 26-50% x1 meal  PO Supplement: None   PO Supplement Intake: None   IVF: D5 in LR @ 125 ml/hr     NUTRITION RISK LEVEL: Risk Level:  [address], LD  [place]:  [phone]  Office:  [phone]

## 2021-06-17 NOTE — PROGRESS NOTES
POC  Patient had large bowel movements and denies nausea vomiting at this time. Minimal output from the NGT in the past few hours. NGT was removed without issue at bedside. Patient will have popsicle tonight and was instructed to go very slow with intake.     Brittnee Qureshi DO  PGY1, General Surgery  06/16/21  9:08 PM  [phone]

## 2021-06-17 NOTE — CARE COORDINATION
CM following, received call from INs they have intent to deny due to not medically stable, Peer to Peer is offered at [phone] opt#4 by 12 noon on 6/18 with Dr Angie Palacio. Message sent to Dr Sen Magallanes about P2P if he chooses. If not we will re start SNF precert later once pt more stable.   Electronically signed by Chase Arriaza RN on 6/17/2021 at 3:56 PM  [phone]

## 2021-06-17 NOTE — PROGRESS NOTES
Surgery   Daily Progress Note    CC: SBO    SUBJECTIVE:  Had a large BM last night. NG was removed. No c/o N/V. Feels hungry and thirsty. Tolerated sips. Continues to require high flow oxygen. Currently at 8 l overnight   ROS:   A 14 point review of systems was conducted, significant findings as noted above. All other systems negative. OBJECTIVE:  PHYSICAL EXAM:  Vitals:    06/17/21 0049 06/17/21 0400 06/17/21 0421 06/17/21 0619   BP:   135/75    Pulse:   84    Resp:   16    Temp:   97.3 °F (36.3 °C)    TempSrc:   Oral    SpO2: 96% 98% 98%    Weight:    98 lb 1.7 oz (44.5 kg)   Height:         General appearance: alert, no acute distress, grooming appropriate  Chest/Lungs: Breathing 8L supplemental oxygen  Cardiovascular: Mildly tachycardic, regular rhythm  Abdomen: soft, non-tender, non-distended, no guarding/rigidity, previous incisional scar noted, no bulging, hypoactive bowel sounds   Skin: warm and dry, no rashes, small skin tear on R elbow noted  Extremities: no edema, no cyanosis  Neuro: A&Ox3, no focal deficits, sensation intact    ASSESSMENT & PLAN:   This is a 82 y.o. male with Hx of remote esophagectomy and cholecystectomy with intermittent small bowel obstruction who presents with abdominal pain, diarrhea, nausea, and vomiting.    - On Zosyn for PNA per Internal Medicine team      - Continue supplemental O2 as needed -- wean as tolerated     - Pulmonology consulted  - ECHO with 60-65% LVEF        - Once INR < 2.5 will restart heparin as weight based  - GI function-now on clear liquid diet-monitor toerlance.   Advance as tolerates  - Mir managed per Medical Team     - Continue flomax  - Case management on board for dispo planning     - PT 16/24; OT 14/24     -  Possible SNF placement

## 2021-06-17 NOTE — PROGRESS NOTES
Patient alert and oriented times 4 with stable VS.  Patient Up to chair for breakfast.  Tolerating diet so far. Bowel tones remain hypoactive.

## 2021-06-17 NOTE — PROGRESS NOTES
Progress Note    Admit Date: 6/13/2021  Diet: ADULT DIET; Clear Liquid  Adult Oral Nutrition Supplement; Clear Liquid Oral Supplement    CC: SOB    Interval history:   + 2 BM. Diet advanced to clears. O2 requirement slightly increased to 8. INR at 3.9    Overall feeling tired and extremely hungry. Making good urine. Denies any fever, chills, cough, abdominal pain, chest pain, shortness of breath, nausea, vomiting. Medications:     Scheduled Meds:   potassium chloride  20 mEq Oral Once    tamsulosin  0.4 mg Oral Daily    piperacillin-tazobactam  3,375 mg Intravenous Q8H    pantoprazole  40 mg Intravenous Daily    sodium chloride flush  10 mL Intravenous 2 times per day    ipratropium-albuterol  1 ampule Inhalation 4x daily     Continuous Infusions:   dextrose 5% in lactated ringers 125 mL/hr at 06/17/21 0829    sodium chloride      heparin (PORCINE) Infusion Stopped (06/14/21 1453)     PRN Meds:perflutren lipid microspheres, sodium chloride flush, sodium chloride, ondansetron, prochlorperazine, heparin (porcine), heparin (porcine)    Objective:   Vitals:   T-max:  Patient Vitals for the past 8 hrs:   BP Temp Temp src Pulse Resp SpO2 Weight   06/17/21 0827 137/80 97.7 °F (36.5 °C) Oral 88 16 95 % --   06/17/21 0619 -- -- -- -- -- -- 98 lb 1.7 oz (44.5 kg)   06/17/21 0421 135/75 97.3 °F (36.3 °C) Oral 84 16 98 % --   06/17/21 0400 -- -- -- -- -- 98 % --       Intake/Output Summary (Last 24 hours) at 6/17/2021 0916  Last data filed at 6/17/2021 6663  Gross per 24 hour   Intake --   Output 1100 ml   Net -1100 ml     Physical Exam  Constitutional:       General: He is not in acute distress. Appearance: He is ill-appearing. He is not toxic-appearing. Comments: Frail appearing. Cachetic    HENT:      Mouth/Throat:      Mouth: Mucous membranes are dry. Eyes:      Pupils: Pupils are equal, round, and reactive to light. Cardiovascular:      Rate and Rhythm: Normal rate and regular rhythm.       Pulses: Normal pulses. Heart sounds: Normal heart sounds. No murmur heard. Pulmonary:      Effort: Pulmonary effort is normal.      Comments: Poor air entry. Diminished lung sounds. No wheezing appreciated  Abdominal:      General: Bowel sounds are normal.      Palpations: Abdomen is soft. Tenderness: There is no abdominal tenderness. Comments: Scaphoid   Musculoskeletal:         General: No swelling. Neurological:      Mental Status: He is alert and oriented to person, place, and time. Mental status is at baseline. LABS:    CBC:   Recent Labs     06/15/21  0510 06/16/21  0456 06/17/21  0450   WBC 6.3 6.1 5.7   HGB 12.3* 11.1* 10.4*   HCT 37.7* 34.3* 31.9*   * 114* 103*   MCV 84.4 84.3 85.3     Renal:    Recent Labs     06/15/21  0510 06/16/21  0456 06/17/21  0449 06/17/21  0450    141 140  --    K 3.9 3.5 3.7  --     104 105  --    CO2 27 30 28  --    BUN 42* 35* 25*  --    CREATININE 1.3 1.2 1.1  --    GLUCOSE 101* 173* 158*  --    CALCIUM 8.1* 8.0* 7.5*  --    MG 2.10 2.20  --  2.00   PHOS 3.0 1.5* 2.1*  --    ANIONGAP 12 7 7  --      Hepatic:   Recent Labs     06/15/21  0510 06/16/21  0456 06/17/21  0449   LABALBU 2.9* 2.7* 2.2*     Troponin:   No results for input(s): TROPONINI in the last 72 hours. BNP: No results for input(s): BNP in the last 72 hours. Lipids: No results for input(s): CHOL, HDL in the last 72 hours. Invalid input(s): LDLCALCU, TRIGLYCERIDE  ABGs:  No results for input(s): PHART, EHV9DGA, PO2ART, TGN6PSD, BEART, THGBART, U8FQUQYS, DXX6YRE in the last 72 hours. INR:   Recent Labs     06/15/21  0510 06/16/21  0456 06/17/21  0449   INR 2.68* 3.41* 3.90*     Lactate: No results for input(s): LACTATE in the last 72 hours.   Cultures:  -----------------------------------------------------------------  RAD:   XR ABDOMEN (KUB) (SINGLE AP VIEW)   Final Result      Enteric contrast in colon without evidence of small bowel obstruction on this single view CT CHEST WO CONTRAST   Final Result   1. Extensive consolidation of the left lower lobe pleural fluid extending posteriorly and in the major fissure to the apex. 2. Left upper lobe bronchiectasis with anterior perihilar airspace disease   3. Interval development of right lower lobe pneumonia when compared to prior study 6/13/2021 and groundglass airspace disease in the posterior segment of the right upper lobe. 4. Nasogastric tube in the lower thoracic esophagus above the level of the stomach, hiatal hernia is present      XR CHEST PORTABLE   Final Result   1. Apparent increased volume loss the left upper lobe with diffuse groundglass density. This may be accentuated due to the patient's positioning and rotation to the left. 2. Localized density along the left costophrenic sulcus which is elevated due to the position of the hemidiaphragm   3. Radiographic accentuation of vascular markings in the right lung, groundglass disease is not excluded. Consideration is given to noncontrast CT of the chest for further evaluation      XR ABDOMEN (2 VIEWS)   Final Result   Previously administered oral contrast is within the colon. XR CHEST PORTABLE   Final Result   New patchy bilateral interstitial and airspace opacities representing edema or infection. Stable left diaphragmatic elevation. XR ABDOMEN (2 VIEWS)   Final Result      Diffuse distention of small bowel, with oral contrast throughout loops extending into the pelvis. CT ABDOMEN PELVIS WO CONTRAST Additional Contrast? None   Final Result     Limited study. Findings suggestive of bowel obstruction. XR CHEST PORTABLE   Final Result   No evidence of acute cardiopulmonary disease. Elevated left hemidiaphragm. Assessment/Plan:     Acute Hypoxic Respiratory Failure 2/2 aspiration PNA  - Not present on admission, developed during inpatient stay  - CT chest wo con shows RLL PNA.  LLL pleural fluid with consolidation, GEOVANY bronchiectasis   - Encourage incentive spirometry use. Continue Duonebs. Supplemental oxygen with goal SpO2 >92%; wean as tolerated   - Zosyn ( 06/16-)  - Diet advanced to clears. Monitor for aspiration  - Pulm consulted     Small bowel obstruction, resolving  - Likely due to adhesions  - Passing gas, + BM  - General surg following  - Diet advanced to clears. Monitor for aspiration     Acute Kidney Injury, resolved  - Cr at baseline  - IVF: D5 & LR at 125 cc/hr. Will decrease IVF once adequate PO intake     NSTEMI Type II  - Asymptomatic with initial EKG changes. Repeat EKG shown resolution of ST changes. Troponins wnl  - Echocardiogram wnl. EF 60-65%, no wall motion abnls. Pulm art pressure 43. Mech aortic valve well seated  - Cardiology following; appreciate recs     History of esophageal cancer  - s/p resection with gastric pull through     History of prostate cancer  - Stable, continue to monitor      Aortic valve repair, on warfarin  - Continue to monitor PT/INR, INR elevated at 3.9. On heparin gtt  - If INR > 4, will give vit K.  INR should decrease with PO intake    Debility  - PT/OT consulted     Code Status:Full Code  FEN: Clears  PPX:  Therapeutic heparin gtt  DISPO: GMF     James Colbert MD, PGY-1  06/17/21  9:16 AM    This patient has been staffed and discussed with Ekaterina Alfonso MD.

## 2021-06-17 NOTE — CARE COORDINATION
CM following, pt NG out cont with FC and O2 increased to 8L needs to wean. Plan for SNF at [place] to [facility], [address] accepted will start precert for Dc over weekend if Precert back.   Electronically signed by Marine Ledesma RN on 6/17/2021 at 2:58 PM  [phone]

## 2021-06-17 NOTE — PROGRESS NOTES
06/15/21  0510 06/16/21  0456 06/17/21  0449   PROTIME 31.4* 40.1* 45.9*   INR 2.68* 3.41* 3.90*     APTT:   No results for input(s): APTT in the last 72 hours. BNP:  No results for input(s): BNP in the last 72 hours. 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    History of disease 09/15/2020    Iron deficiency anemia 09/03/2020    Coronary 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 ([address] 08/26/2020    S/P AVR (aortic valve replacement) 10/15/2018    History of esophageal cancer 09/26/2016    History of prostate cancer 09/26/2016    Chronic kidney disease, stage III (moderate) ([address] 11/30/2015    Personal history of esophageal cancer 11/30/2015    Aspiration pneumonia ([address] 04/27/2015    Aspiration into lower respiratory tract     Hypoxia     Gastroparesis 03/06/2015    Osteoporosis, senile 05/20/2014    Hernia, femoral, bilateral 05/12/2014    Patient underweight 08/08/2013    Pneumonia, aspiration ([address] 06/01/2012    Nausea & vomiting 05/28/2012    Asthma 07/20/2010    Aortic valve disorder 07/20/2010    Psychosexual dysfunction with inhibited sexual excitement 07/20/2010    Foreign body accidentally left during a procedure 04/26/2010    Essential hypertension 09/18/2007       Plan:  1. No sob/cp. Wean O2 as tolerated. HR normal.  Cr  normal.  Negative fluid balance. Poss PNA by CT. Atbx.         Core Measures:  · Discharge instructions:   · LVEF documented:   · ACEI for LV dysfunction:   · Smoking Cessation:    Larry Lua MD, MD 6/17/2021 10:46 AM

## 2021-06-17 NOTE — PROGRESS NOTES
CC: Pneumonia    Subjectively feeling better. Bowel obstruction much improved, had a bowel movement last night. He is tolerating liquids today. Congested cough unchanged. He has been sitting up in a chair, feels he can get a better breath in a chair then out of bed. Afebrile  WBC  Empiric antibiotic therapy with piperacillin-tazobactam  /75. NSR. S1, S2 normal.  O2 saturation 95-98% on O2 at 8 L/min  Fine-medium crackles at the right lung base. Breath sounds decreased over the lower half of the left thorax. Abdomen soft, no tenderness. No peripheral edema. A&P: Pneumonia, likely secondary to aspiration associated with vomiting with small bowel obstruction. Hypoxemia compensated with moderate flow O2. Flow rate can probably be weaned. He has had issues with clearing secretions from the left lung, because of poor diaphragm function on the left. We will continue to monitor pulmonary toilet and look for signs of mucoid impaction. Continue empiric antibiotic coverage for anaerobes and enteric gram-negative rods.

## 2021-06-17 NOTE — PROGRESS NOTES
Occupational Therapy  Facility/Department: [facility]  Daily Treatment Note  NAME: Owen Shah MD  : 1935  MRN: [id number]    Date of Service: 2021    Discharge Recommendations:    Owen Shah MD scored a 14/24 on the AM-PAC ADL Inpatient form. Current research shows that an AM-PAC score of 17 or less is typically not associated with a discharge to the patient's home setting. Based on the patient's AM-PAC score and their current ADL deficits, it is recommended that the patient have 3-5 sessions per week of Occupational Therapy at d/c to increase the patient's independence. Please see assessment section for further patient specific details. If patient discharges prior to next session this note will serve as a discharge summary. Please see below for the latest assessment towards goals. OT Equipment Recommendations  Equipment Needed: No  Other: defer to next level of care    Assessment   Performance deficits / Impairments: Decreased functional mobility ; Decreased ADL status; Decreased strength;Decreased endurance;Decreased balance;Decreased high-level IADLs;Decreased posture  Assessment: Pt is currently functioning below occupational baseline and demo the deficits listed above. Pt is still requiring CGA for mobility and transfers and SBA for all seated level ADLs. Decreased standing tolerance noted this date as evidenced by SOB, decreased 02, and need for seated rest breaks. Pt would benefit from continued skilled OT services to address these deficits and increase independence, safety, and ease with all occupational pursuits  Treatment Diagnosis: decreased ability to perform ADL 2/2 SBO  Prognosis: Good  OT Education: OT Role;Plan of Care;Energy Conservation; ADL Adaptive Strategies  Patient Education: use of PLB- pt verbalized understanding  REQUIRES OT FOLLOW UP: Yes  Activity Tolerance  Activity Tolerance: Patient Tolerated treatment well  Activity Tolerance: Pt on 6L high flow NC during session, sp02% at 87% after completing bed mobility and ambulation but recovered to above 90% with 1-2 minute seated rest break. Safety Devices  Safety Devices in place: Yes  Type of devices: Call light within reach; Chair alarm in place; Left in chair;Nurse notified;Gait belt         Patient Diagnosis(es): The encounter diagnosis was SBO (small bowel obstruction) ([facility] [address] has a past medical history of Anemia, Aortic valve disorders, Aspiration pneumonia (HCC), Erectile dysfunction, Esophageal cancer (HCC), Hernia, femoral, bilateral, Hyperlipidemia, Osteoporosis, senile, Pancreatitis, Patient underweight, Prostate cancer ([facility] [address] and Unspecified essential hypertension. has a past surgical history that includes Coronary artery bypass graft (2006); Cardiac valve replacement (2006); eye surgery (1990& 1992); Appendectomy; bone graft; gastrectomy; Cholecystectomy; Colonoscopy (11/2009); Prostate surgery; Tonsillectomy; Esophagus surgery;  Prostate/Transrectal/Vol Collins Brachyth; Colonoscopy (10/05/15); Upper gastrointestinal endoscopy (N/A, 2/16/2021); Colonoscopy (N/A, 2/17/2021); and Colonoscopy (2/18/2021).     Restrictions  Restrictions/Precautions  Restrictions/Precautions: General Precautions, Up as Tolerated  Position Activity Restriction  Other position/activity restrictions: Maintain HOB >45 degrees    Subjective   General  Chart Reviewed: Yes  Patient assessed for rehabilitation services?: Yes  Additional Pertinent Hx: Esophageal and prostate CA  Family / Caregiver Present: No  Referring Practitioner: Sariah Simon MD  Diagnosis: Abdominal pain, n/v, diarrhea 2/2 SBO  Subjective  Subjective: Pt supine in bed upon arrival, pleasant and agreeable to OT treatment session  Vital Signs  Patient Currently in Pain: Denies     Orientation  Orientation  Overall Orientation Status: Within Functional Limits    Objective    ADL  Feeding: Setup  Grooming: Stand by assistance (combed hair while seated EOB)  UE Bathing: Setup;Stand by assistance (completed while seated EOB)  LE Bathing: Setup;Stand by assistance (completed while seated EOB)  UE Dressing: Setup;Minimal assistance (gown change)  LE Dressing:  (pt declined need)  Toileting:  (pt declined need)  Additional Comments: Sat EOB to complete ADLs this day with seated rest breaks in between each task d/t SOB. Balance  Sitting Balance: Stand by assistance  Standing Balance: Contact guard assistance  Standing Balance  Time: 2-3 minutes  Activity: functional mobility/transfers  Comment: no LOB, use of RW  Functional Mobility  Functional - Mobility Device: Rolling Walker  Activity: Other (10' in hospital room)  Assist Level: Contact guard assistance  Functional Mobility Comments: decreased step length and speed noted, decreased endurance. Sp02% at 87% after ambulation requiring 2 minute rest break to return above 90%.   Bed mobility  Supine to Sit: Stand by assistance  Sit to Supine: Unable to assess (pt in recliner chair at EOS)  Scooting: Stand by assistance (to EOB)  Transfers  Sit to stand: Contact guard assistance  Stand to sit: Contact guard assistance  Transfer Comments: verbal cueing for safe hand placement  Cognition  Overall Cognitive Status: [facility]        Plan   Plan  Times per week: 2-5x/wk  Current Treatment Recommendations: Strengthening, Balance Training, Functional Mobility Training, Endurance Training, Patient/Caregiver Education & Training, Safety Education & Training, Self-Care / ADL    G-Code     OutComes Score                                                  AM-PAC Score        AM-[facility] Inpatient Daily Activity Raw Score: 14 (06/17/21 1456)  AM-PAC Inpatient ADL T-Scale Score : 33.39 (06/17/21 1456)  ADL Inpatient CMS 0-100% Score: 59.67 (06/17/21 1456)  ADL Inpatient CMS G-Code Modifier : CK (06/17/21 1456)    Goals  Short term goals  Time Frame for Short term goals: 1 week  Short term goal 1: 1 grooming task standing with SBA- not met, progressing  Short term goal 2: Tolerate 5 mins functional standing activity with CGA and SPO2 > 90%- not met, progressing  Short term goal 3: Toilet t/f and hygiene from ambulatory level with SBA-- not met, progressing  Short term goal 4: LB dressing with CGA-- not met, progressing  Patient Goals   Patient goals : \"I want to be off oxygen and be able to workout again. \"       Therapy Time   Individual Concurrent Group Co-treatment   Time In 1200         Time Out 1239         Minutes 39         Timed Code Treatment Minutes: [address] [facility], [address] [place] [address] [id number]

## 2021-06-18 ENCOUNTER — TELEPHONE (OUTPATIENT)
Dept: INTERNAL MEDICINE CLINIC | Age: 86
End: 2021-06-18

## 2021-06-18 LAB
ALBUMIN SERPL-MCNC: 2.4 G/DL (ref 3.4–5)
ANION GAP SERPL CALCULATED.3IONS-SCNC: 8 MMOL/L (ref 3–16)
ANTI-XA UNFRAC HEPARIN: <0.04 IU/ML (ref 0.3–0.7)
ANTI-XA UNFRAC HEPARIN: <0.04 IU/ML (ref 0.3–0.7)
BASOPHILS ABSOLUTE: 0 K/UL (ref 0–0.2)
BASOPHILS RELATIVE PERCENT: 0.3 %
BUN BLDV-MCNC: 22 MG/DL (ref 7–20)
CALCIUM SERPL-MCNC: 7.4 MG/DL (ref 8.3–10.6)
CHLORIDE BLD-SCNC: 105 MMOL/L (ref 99–110)
CO2: 27 MMOL/L (ref 21–32)
CREAT SERPL-MCNC: 1.2 MG/DL (ref 0.8–1.3)
EOSINOPHILS ABSOLUTE: 0 K/UL (ref 0–0.6)
EOSINOPHILS RELATIVE PERCENT: 0.5 %
GFR AFRICAN AMERICAN: >60
GFR NON-AFRICAN AMERICAN: 58
GLUCOSE BLD-MCNC: 138 MG/DL (ref 70–99)
HCT VFR BLD CALC: 31.7 % (ref 40.5–52.5)
HEMOGLOBIN: 10.3 G/DL (ref 13.5–17.5)
INR BLD: 3.53 (ref 0.86–1.14)
LYMPHOCYTES ABSOLUTE: 0.7 K/UL (ref 1–5.1)
LYMPHOCYTES RELATIVE PERCENT: 8.8 %
MAGNESIUM: 1.7 MG/DL (ref 1.8–2.4)
MCH RBC QN AUTO: 27.6 PG (ref 26–34)
MCHC RBC AUTO-ENTMCNC: 32.4 G/DL (ref 31–36)
MCV RBC AUTO: 85.2 FL (ref 80–100)
MONOCYTES ABSOLUTE: 0.7 K/UL (ref 0–1.3)
MONOCYTES RELATIVE PERCENT: 9.1 %
NEUTROPHILS ABSOLUTE: 6 K/UL (ref 1.7–7.7)
NEUTROPHILS RELATIVE PERCENT: 81.3 %
PDW BLD-RTO: 17.5 % (ref 12.4–15.4)
PHOSPHORUS: 1.7 MG/DL (ref 2.5–4.9)
PLATELET # BLD: 120 K/UL (ref 135–450)
PMV BLD AUTO: 9.6 FL (ref 5–10.5)
POTASSIUM SERPL-SCNC: 3.8 MMOL/L (ref 3.5–5.1)
PROTHROMBIN TIME: 41.5 SEC (ref 10–13.2)
RBC # BLD: 3.72 M/UL (ref 4.2–5.9)
SODIUM BLD-SCNC: 140 MMOL/L (ref 136–145)
WBC # BLD: 7.4 K/UL (ref 4–11)

## 2021-06-18 PROCEDURE — C9113 INJ PANTOPRAZOLE SODIUM, VIA: HCPCS | Performed by: NURSE PRACTITIONER

## 2021-06-18 PROCEDURE — 1200000000 HC SEMI PRIVATE

## 2021-06-18 PROCEDURE — 6370000000 HC RX 637 (ALT 250 FOR IP): Performed by: STUDENT IN AN ORGANIZED HEALTH CARE EDUCATION/TRAINING PROGRAM

## 2021-06-18 PROCEDURE — 94640 AIRWAY INHALATION TREATMENT: CPT

## 2021-06-18 PROCEDURE — 2580000003 HC RX 258: Performed by: SURGERY

## 2021-06-18 PROCEDURE — 6360000002 HC RX W HCPCS: Performed by: NURSE PRACTITIONER

## 2021-06-18 PROCEDURE — 83735 ASSAY OF MAGNESIUM: CPT

## 2021-06-18 PROCEDURE — 2580000003 HC RX 258: Performed by: INTERNAL MEDICINE

## 2021-06-18 PROCEDURE — 99233 SBSQ HOSP IP/OBS HIGH 50: CPT | Performed by: INTERNAL MEDICINE

## 2021-06-18 PROCEDURE — 6360000002 HC RX W HCPCS: Performed by: STUDENT IN AN ORGANIZED HEALTH CARE EDUCATION/TRAINING PROGRAM

## 2021-06-18 PROCEDURE — 94669 MECHANICAL CHEST WALL OSCILL: CPT

## 2021-06-18 PROCEDURE — 51798 US URINE CAPACITY MEASURE: CPT

## 2021-06-18 PROCEDURE — 85025 COMPLETE CBC W/AUTO DIFF WBC: CPT

## 2021-06-18 PROCEDURE — 99232 SBSQ HOSP IP/OBS MODERATE 35: CPT | Performed by: HOSPITALIST

## 2021-06-18 PROCEDURE — 36415 COLL VENOUS BLD VENIPUNCTURE: CPT

## 2021-06-18 PROCEDURE — 85610 PROTHROMBIN TIME: CPT

## 2021-06-18 PROCEDURE — 94761 N-INVAS EAR/PLS OXIMETRY MLT: CPT

## 2021-06-18 PROCEDURE — 2700000000 HC OXYGEN THERAPY PER DAY

## 2021-06-18 PROCEDURE — 80069 RENAL FUNCTION PANEL: CPT

## 2021-06-18 PROCEDURE — 99232 SBSQ HOSP IP/OBS MODERATE 35: CPT | Performed by: INTERNAL MEDICINE

## 2021-06-18 PROCEDURE — 2580000003 HC RX 258: Performed by: STUDENT IN AN ORGANIZED HEALTH CARE EDUCATION/TRAINING PROGRAM

## 2021-06-18 PROCEDURE — 85520 HEPARIN ASSAY: CPT

## 2021-06-18 RX ORDER — DOCUSATE SODIUM 100 MG/1
100 CAPSULE, LIQUID FILLED ORAL 2 TIMES DAILY
Status: DISCONTINUED | OUTPATIENT
Start: 2021-06-18 | End: 2021-06-22 | Stop reason: HOSPADM

## 2021-06-18 RX ORDER — POTASSIUM CHLORIDE 20 MEQ/1
20 TABLET, EXTENDED RELEASE ORAL ONCE
Status: COMPLETED | OUTPATIENT
Start: 2021-06-18 | End: 2021-06-18

## 2021-06-18 RX ORDER — MAGNESIUM SULFATE IN WATER 40 MG/ML
2000 INJECTION, SOLUTION INTRAVENOUS ONCE
Status: COMPLETED | OUTPATIENT
Start: 2021-06-18 | End: 2021-06-18

## 2021-06-18 RX ORDER — POLYETHYLENE GLYCOL 3350 17 G/17G
17 POWDER, FOR SOLUTION ORAL 2 TIMES DAILY
Status: DISCONTINUED | OUTPATIENT
Start: 2021-06-18 | End: 2021-06-22 | Stop reason: HOSPADM

## 2021-06-18 RX ORDER — BISACODYL 10 MG
10 SUPPOSITORY, RECTAL RECTAL ONCE
Status: DISCONTINUED | OUTPATIENT
Start: 2021-06-18 | End: 2021-06-22 | Stop reason: HOSPADM

## 2021-06-18 RX ADMIN — PIPERACILLIN AND TAZOBACTAM 3375 MG: 3; .375 INJECTION, POWDER, LYOPHILIZED, FOR SOLUTION INTRAVENOUS at 18:46

## 2021-06-18 RX ADMIN — IPRATROPIUM BROMIDE AND ALBUTEROL SULFATE 1 AMPULE: .5; 2.5 SOLUTION RESPIRATORY (INHALATION) at 21:23

## 2021-06-18 RX ADMIN — IPRATROPIUM BROMIDE AND ALBUTEROL SULFATE 1 AMPULE: .5; 2.5 SOLUTION RESPIRATORY (INHALATION) at 16:28

## 2021-06-18 RX ADMIN — PIPERACILLIN AND TAZOBACTAM 3375 MG: 3; .375 INJECTION, POWDER, LYOPHILIZED, FOR SOLUTION INTRAVENOUS at 02:48

## 2021-06-18 RX ADMIN — DOCUSATE SODIUM 100 MG: 100 CAPSULE, LIQUID FILLED ORAL at 09:46

## 2021-06-18 RX ADMIN — IPRATROPIUM BROMIDE AND ALBUTEROL SULFATE 1 AMPULE: .5; 2.5 SOLUTION RESPIRATORY (INHALATION) at 09:05

## 2021-06-18 RX ADMIN — DOCUSATE SODIUM 100 MG: 100 CAPSULE, LIQUID FILLED ORAL at 20:21

## 2021-06-18 RX ADMIN — POLYETHYLENE GLYCOL 3350 17 G: 17 POWDER, FOR SOLUTION ORAL at 20:22

## 2021-06-18 RX ADMIN — Medication 10 ML: at 09:45

## 2021-06-18 RX ADMIN — TAMSULOSIN HYDROCHLORIDE 0.4 MG: 0.4 CAPSULE ORAL at 09:46

## 2021-06-18 RX ADMIN — Medication 10 ML: at 20:22

## 2021-06-18 RX ADMIN — POTASSIUM CHLORIDE 20 MEQ: 1500 TABLET, EXTENDED RELEASE ORAL at 11:19

## 2021-06-18 RX ADMIN — PANTOPRAZOLE SODIUM 40 MG: 40 INJECTION, POWDER, FOR SOLUTION INTRAVENOUS at 09:45

## 2021-06-18 RX ADMIN — IPRATROPIUM BROMIDE AND ALBUTEROL SULFATE 1 AMPULE: .5; 2.5 SOLUTION RESPIRATORY (INHALATION) at 12:58

## 2021-06-18 RX ADMIN — MAGNESIUM SULFATE HEPTAHYDRATE 2000 MG: 40 INJECTION, SOLUTION INTRAVENOUS at 11:18

## 2021-06-18 RX ADMIN — POLYETHYLENE GLYCOL 3350 17 G: 17 POWDER, FOR SOLUTION ORAL at 09:46

## 2021-06-18 RX ADMIN — SODIUM CHLORIDE, SODIUM LACTATE, POTASSIUM CHLORIDE, CALCIUM CHLORIDE AND DEXTROSE MONOHYDRATE: 5; 600; 310; 30; 20 INJECTION, SOLUTION INTRAVENOUS at 09:53

## 2021-06-18 RX ADMIN — PIPERACILLIN AND TAZOBACTAM 3375 MG: 3; .375 INJECTION, POWDER, LYOPHILIZED, FOR SOLUTION INTRAVENOUS at 09:46

## 2021-06-18 ASSESSMENT — PAIN SCALES - GENERAL
PAINLEVEL_OUTOF10: 0

## 2021-06-18 NOTE — PROGRESS NOTES
Pt concerned about hydration. Voided 1x this shift. Encouraged to drink small sips or 1 cup per hour. Pt states he cannot drink too much at one time.  Night RN aware to monitor

## 2021-06-18 NOTE — PROGRESS NOTES
Surgery   Daily Progress Note    CC: SBO    SUBJECTIVE:  No issuese overnight, dneie abd pain, tolerating diet, no nasuea no pain. States he is passing gas, unsure if had a BM. Unable to void after giraldo removal, is willing this am.      ROS:   A 14 point review of systems was conducted, significant findings as noted above. All other systems negative. OBJECTIVE:  PHYSICAL EXAM:  Vitals:    06/17/21 1551 06/17/21 2035 06/17/21 2301 06/18/21 0236   BP:  114/72 126/72 124/75   Pulse:  105 102 96   Resp: 18 19 20 19   Temp:  99 °F (37.2 °C) 98 °F (36.7 °C) 98.4 °F (36.9 °C)   TempSrc:  Oral Oral Oral   SpO2: 96% 97% 93% 94%   Weight:       Height:         General appearance: alert, no acute distress, grooming appropriate  Chest/Lungs: Breathing 4L supplemental oxygen  Cardiovascular: Mildly tachycardic, regular rhythm  Abdomen: soft, non-tender, non-distended, no guarding/rigidity, previous incisional scar noted, no bulging, hypoactive bowel sounds   Skin: warm and dry, no rashes, small skin tear on R elbow noted  Extremities: no edema, no cyanosis  Neuro: A&Ox3, no focal deficits, sensation intact    ASSESSMENT & PLAN:   This is a 82 y.o. male with Hx of remote esophagectomy and cholecystectomy with intermittent small bowel obstruction who presents with abdominal pain, diarrhea, nausea, and vomiting.    - SBO resolved, cont gen diet. Colace/mirlax bid, supp this AM.   - On Zosyn for PNA per Internal Medicine team      - Continue supplemental O2 as needed -- wean as tolerated     - Pulmonology consulted- PNA, continue ABX.  - ECHO with 60-65% LVEF      - Once INR < 2.5 will restart heparin as weight based  - Giraldo removed yesterday, pt unable to void.  managed per Medical Team     -  Continue flomax, is amenable to straight cath this AM.  - Case management on board for dispo planning     - PT 16/24; OT 14/24     -  Possible SNF placement     Anabella Daily, CNP  6/18/2021  6:13 AM  perfectserve

## 2021-06-18 NOTE — CARE COORDINATION
Spoke with Yancy at [facility]. Pt was initially denied readmission to the facility to to high acuity. Pt's condition has improved. Asked for a resubmission. Yancy agreed. Please have PT or OT see the pt over the weekend so there are updated notes for Monday to expedite the process. The family was updated.     Graham Quintana, RN  Case Management

## 2021-06-18 NOTE — TELEPHONE ENCOUNTER
Call returned to patient. Goes straight to voicemail. Unable to leave message voicemail box is full.

## 2021-06-18 NOTE — PROGRESS NOTES
[facility] Daily Progress Note      Admit Date:  6/13/2021    Subjective:  Mr. Freda Chandler was seen and examined. F/U tachy/hypoxia/SBO. Also with AVR, CABG. No complaints. Denies sob/cp.   abd feeling better. Objective:   BP (!) 151/78   Pulse 98   Temp 97.5 °F (36.4 °C) (Oral)   Resp 18   Ht 5' 3.5\" (1.613 m)   Wt 98 lb 1.7 oz (44.5 kg)   SpO2 95%   BMI 17.11 kg/m²       Intake/Output Summary (Last 24 hours) at 6/18/2021 0924  Last data filed at 6/18/2021 0710  Gross per 24 hour   Intake --   Output 1000 ml   Net -1000 ml       TELEMETRY: Sinus     Physical Exam:  General:  Awake, alert, NAD  Skin:  Warm and dry  Neck:  JVP difficult  Chest:  Decreased BS, respiration normal  Cardiovascular:  RRR S1S2  Abdomen:  Soft , few BS present now.    Extremities:  no edema    Medications:    docusate sodium  100 mg Oral BID    polyethylene glycol  17 g Oral BID    bisacodyl  10 mg Rectal Once    tamsulosin  0.4 mg Oral Daily    piperacillin-tazobactam  3,375 mg Intravenous Q8H    pantoprazole  40 mg Intravenous Daily    sodium chloride flush  10 mL Intravenous 2 times per day    ipratropium-albuterol  1 ampule Inhalation 4x daily      dextrose 5% in lactated ringers 75 mL/hr at 06/17/21 1651    sodium chloride      heparin (PORCINE) Infusion Stopped (06/14/21 1453)     perflutren lipid microspheres, sodium chloride flush, sodium chloride, ondansetron, prochlorperazine, heparin (porcine), heparin (porcine)    Lab Data:  CBC:   Recent Labs     06/16/21  0456 06/17/21  0450 06/18/21  0603   WBC 6.1 5.7 7.4   HGB 11.1* 10.4* 10.3*   HCT 34.3* 31.9* 31.7*   MCV 84.3 85.3 85.2   * 103* 120*     BMP:   Recent Labs     06/16/21  0456 06/17/21  0449 06/18/21  0603    140 140   K 3.5 3.7 3.8    105 105   CO2 30 28 27   PHOS 1.5* 2.1* 1.7*   BUN 35* 25* 22*   CREATININE 1.2 1.1 1.2     LIVER PROFILE:   No results for input(s): AST, ALT, LIPASE, BILIDIR, BILITOT, ALKPHOS in the last 72 hours.    Invalid input(s): AMYLASE,  ALB  PT/INR:   Recent Labs     06/16/21  0456 06/17/21  0449 06/18/21  0603   PROTIME 40.1* 45.9* 41.5*   INR 3.41* 3.90* 3.53*     APTT:   No results for input(s): APTT in the last 72 hours. BNP:  No results for input(s): BNP in the last 72 hours. 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    History of disease 09/15/2020    Iron deficiency anemia 09/03/2020    Coronary 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 ([address] 08/26/2020    S/P AVR (aortic valve replacement) 10/15/2018    History of esophageal cancer 09/26/2016    History of prostate cancer 09/26/2016    Chronic kidney disease, stage III (moderate) ([address] 11/30/2015    Personal history of esophageal cancer 11/30/2015    Aspiration pneumonia ([address] 04/27/2015    Aspiration into lower respiratory tract     Hypoxia     Gastroparesis 03/06/2015    Osteoporosis, senile 05/20/2014    Hernia, femoral, bilateral 05/12/2014    Patient underweight 08/08/2013    Pneumonia, aspiration ([address] 06/01/2012    Nausea & vomiting 05/28/2012    Asthma 07/20/2010    Aortic valve disorder 07/20/2010    Psychosexual dysfunction with inhibited sexual excitement 07/20/2010    Foreign body accidentally left during a procedure 04/26/2010    Essential hypertension 09/18/2007       Plan:  1. No sob/cp. Wean O2 as tolerated. O2 requirements down somewhat. HR normal.  Cr  normal.  Negative fluid balance. ATBX for asp PNA.          Core Measures:  · Discharge instructions:   · LVEF documented:   · ACEI for LV dysfunction:   · Smoking Cessation:    Ladarius White MD, MD 6/18/2021 9:24 AM

## 2021-06-18 NOTE — PROGRESS NOTES
CC: Pneumonia    Continues to improve on a daily basis. Appetite is better. He is eating solid food and having good bowel activity. Cough is mildly productive. No complaint of shortness of breath. Afebrile  WBC 7.4  Antibiotic therapy with piperacillin-tazobactam for aspiration pneumonia. /80. Heart rate 96  S1, S2 normal.  O2 saturation 94% on O2 at 3 L/min  Breath sounds absent in the left lower lung field, mildly reduced in upper lung field. Breath sound quality normal on the right, with few right basilar crackles. Abdomen soft, no tenderness. No peripheral edema. Making adequate urine, did not require catheter. Creatinine stable 1.2. Electrolytes normal.  Glucose 138. A&P: Small bowel obstruction is resolving, able to process a normal diet. Aspiration pneumonia, right lower lobe and likely some involvement in the left upper lobe, secondary to vomiting at time of presentation. Coverage for enteric gram-negative and oral denita appropriate. Antibiotic therapy can be deescalated at discharge. He is gaining some strength and mobility.

## 2021-06-18 NOTE — PROGRESS NOTES
Pt on weekend list for PT OT per therapists  SBAx1 w/ walker when ambulating in room (sat up in chair today)  A+Ox4, VSS w/ elevated BP. Weaned to 3.5L nasal cannula  Lungs clear at top and diminished/crackly in bases  Pt intermittently napping. Tolerating meals. Denies nausea. Condom cath on-little UO. Will attempt to get pt up to toilet in the hour. SCDs on, call light in reach-uses appropriately.  Will monitor

## 2021-06-18 NOTE — PLAN OF CARE
Problem: Falls - Risk of:  Goal: Will remain free from falls  Description: Will remain free from falls  Outcome: Ongoing   Pt alert and oriented, fall precaution in place, bed alarm on. Pt with unsteady gait, used FWW and x1 assist.     Problem: Pain:  Goal: Pain level will decrease  Description: Pain level will decrease  Outcome: Ongoing   Pt denies pain during this shift. Will continue to monitor.

## 2021-06-18 NOTE — PROGRESS NOTES
ABDOMEN (KUB) (SINGLE AP VIEW)   Final Result      Enteric contrast in colon without evidence of small bowel obstruction on this single view      CT CHEST WO CONTRAST   Final Result   1. Extensive consolidation of the left lower lobe pleural fluid extending posteriorly and in the major fissure to the apex. 2. Left upper lobe bronchiectasis with anterior perihilar airspace disease   3. Interval development of right lower lobe pneumonia when compared to prior study 6/13/2021 and groundglass airspace disease in the posterior segment of the right upper lobe. 4. Nasogastric tube in the lower thoracic esophagus above the level of the stomach, hiatal hernia is present      XR CHEST PORTABLE   Final Result   1. Apparent increased volume loss the left upper lobe with diffuse groundglass density. This may be accentuated due to the patient's positioning and rotation to the left. 2. Localized density along the left costophrenic sulcus which is elevated due to the position of the hemidiaphragm   3. Radiographic accentuation of vascular markings in the right lung, groundglass disease is not excluded. Consideration is given to noncontrast CT of the chest for further evaluation      XR ABDOMEN (2 VIEWS)   Final Result   Previously administered oral contrast is within the colon. XR CHEST PORTABLE   Final Result   New patchy bilateral interstitial and airspace opacities representing edema or infection. Stable left diaphragmatic elevation. XR ABDOMEN (2 VIEWS)   Final Result      Diffuse distention of small bowel, with oral contrast throughout loops extending into the pelvis. CT ABDOMEN PELVIS WO CONTRAST Additional Contrast? None   Final Result     Limited study. Findings suggestive of bowel obstruction. XR CHEST PORTABLE   Final Result   No evidence of acute cardiopulmonary disease. Elevated left hemidiaphragm.             Assessment/Plan:     Debility  - PT/OT consulted  - CM consulted for placement    Acute Hypoxic Respiratory Failure 2/2 aspiration PNA, improving  - Not present on admission, developed during inpatient stay  - CT chest wo con shows RLL PNA. LLL pleural fluid with consolidation, GEOVANY bronchiectasis   - Encourage incentive spirometry use. Continue Duonebs. Supplemental oxygen with goal SpO2 >92%; wean as tolerated   - Zosyn ( 06/16-)  - Regular diet. Monitor for aspiration  - Pulm consulted. Continue abx     Small bowel obstruction, resolved  - Likely due to adhesions  - Passing gas, + BM   - General surg following  - Regular diet     Acute Kidney Injury, resolved  - Cr at baseline  - Stop IVF as pt is having adequate PO intake     NSTEMI Type II  - Asymptomatic with initial EKG changes. Repeat EKG shown resolution of ST changes. Troponins wnl  - Echocardiogram wnl. EF 60-65%, no wall motion abnls. Pulm art pressure 43. Mech aortic valve well seated  - Cardiology following; appreciate recs     History of esophageal cancer  - s/p resection with gastric pull through     History of prostate cancer  - Stable, continue to monitor      Aortic valve repair, on warfarin  - Continue to monitor PT/INR, INR elevated at 3.9. On heparin gtt  - If INR > 4, will give vit K. INR decreased to 3.5. Resume heparin gtt once INR < 2.5    Code Status:Full Code  FEN: Regular  PPX:  Therapeutic heparin gtt  DISPO: GMF     Geraldine Pickens MD, PGY-1  06/18/21  10:46 AM    This patient has been staffed and discussed with Dr Mima Allen    Addendum to Resident H& P/Progress note:  I have personally seen,examined and evaluated the patient.  I have reviewed the current history, physical findings, labs and assessment and plan and agree with note as documented by resident MD ( Royal Young)      Tim Villalta MD, Jorge Duvall

## 2021-06-18 NOTE — PROGRESS NOTES
Pt adamantly refused to be straight cath right now. Explained to pt about the amount of urine from bladder scan and that we need to empty it. He firmly declined and said that he would rather be straight cathed after breakfast. MD made aware.

## 2021-06-18 NOTE — PLAN OF CARE
Problem: Falls - Risk of:  Goal: Will remain free from falls  Description: Will remain free from falls  6/18/2021 1555 by Nidia Parker RN  Outcome: Ongoing  Note: Sba w/ walker. Calls appropriately for assistance. Weak, but steady. Non skid socks on. Bed alarm on.  2/4 rails up  6/18/2021 0455 by Kayleigh Geller RN  Outcome: Ongoing     Problem: Pain:  Goal: Pain level will decrease  Description: Pain level will decrease  6/18/2021 1555 by Nidia Parker RN  Outcome: Ongoing  Note: Denies pain  6/18/2021 0455 by Kayleigh Geller RN  Outcome: Ongoing     Problem: Nausea/Vomiting:  Goal: Absence of nausea/vomiting  Description: Absence of nausea/vomiting  Outcome: Ongoing  Note: No evidence NV

## 2021-06-18 NOTE — PROGRESS NOTES
Patient alert and oriented times 4 with stable VS.  O2 weaned from 8L to 4L today. Heparin drip held per Dr. Khushbu Courtney for high INR. Patient up to chair for all 3 meals today. Will continue to monitor.

## 2021-06-19 LAB
ALBUMIN SERPL-MCNC: 2.3 G/DL (ref 3.4–5)
ANION GAP SERPL CALCULATED.3IONS-SCNC: 9 MMOL/L (ref 3–16)
ANTI-XA UNFRAC HEPARIN: 0.15 IU/ML (ref 0.3–0.7)
ANTI-XA UNFRAC HEPARIN: 0.21 IU/ML (ref 0.3–0.7)
BASOPHILS ABSOLUTE: 0 K/UL (ref 0–0.2)
BASOPHILS RELATIVE PERCENT: 0.4 %
BUN BLDV-MCNC: 23 MG/DL (ref 7–20)
CALCIUM SERPL-MCNC: 7.6 MG/DL (ref 8.3–10.6)
CHLORIDE BLD-SCNC: 105 MMOL/L (ref 99–110)
CO2: 25 MMOL/L (ref 21–32)
CREAT SERPL-MCNC: 1.2 MG/DL (ref 0.8–1.3)
EOSINOPHILS ABSOLUTE: 0.1 K/UL (ref 0–0.6)
EOSINOPHILS RELATIVE PERCENT: 0.9 %
GFR AFRICAN AMERICAN: >60
GFR NON-AFRICAN AMERICAN: 58
GLUCOSE BLD-MCNC: 128 MG/DL (ref 70–99)
HCT VFR BLD CALC: 32.6 % (ref 40.5–52.5)
HEMOGLOBIN: 10.6 G/DL (ref 13.5–17.5)
INR BLD: 2.08 (ref 0.86–1.14)
LYMPHOCYTES ABSOLUTE: 0.7 K/UL (ref 1–5.1)
LYMPHOCYTES RELATIVE PERCENT: 7.5 %
MAGNESIUM: 2 MG/DL (ref 1.8–2.4)
MCH RBC QN AUTO: 27.5 PG (ref 26–34)
MCHC RBC AUTO-ENTMCNC: 32.6 G/DL (ref 31–36)
MCV RBC AUTO: 84.3 FL (ref 80–100)
MONOCYTES ABSOLUTE: 1.1 K/UL (ref 0–1.3)
MONOCYTES RELATIVE PERCENT: 11.9 %
NEUTROPHILS ABSOLUTE: 7.1 K/UL (ref 1.7–7.7)
NEUTROPHILS RELATIVE PERCENT: 79.3 %
PDW BLD-RTO: 17.7 % (ref 12.4–15.4)
PHOSPHORUS: 2.4 MG/DL (ref 2.5–4.9)
PLATELET # BLD: 143 K/UL (ref 135–450)
PMV BLD AUTO: 9.5 FL (ref 5–10.5)
POTASSIUM SERPL-SCNC: 3.9 MMOL/L (ref 3.5–5.1)
PROTHROMBIN TIME: 24.3 SEC (ref 10–13.2)
RBC # BLD: 3.87 M/UL (ref 4.2–5.9)
SODIUM BLD-SCNC: 139 MMOL/L (ref 136–145)
WBC # BLD: 8.9 K/UL (ref 4–11)

## 2021-06-19 PROCEDURE — 36415 COLL VENOUS BLD VENIPUNCTURE: CPT

## 2021-06-19 PROCEDURE — 6360000002 HC RX W HCPCS: Performed by: NURSE PRACTITIONER

## 2021-06-19 PROCEDURE — 94640 AIRWAY INHALATION TREATMENT: CPT

## 2021-06-19 PROCEDURE — 6360000002 HC RX W HCPCS: Performed by: STUDENT IN AN ORGANIZED HEALTH CARE EDUCATION/TRAINING PROGRAM

## 2021-06-19 PROCEDURE — 94761 N-INVAS EAR/PLS OXIMETRY MLT: CPT

## 2021-06-19 PROCEDURE — 6370000000 HC RX 637 (ALT 250 FOR IP): Performed by: STUDENT IN AN ORGANIZED HEALTH CARE EDUCATION/TRAINING PROGRAM

## 2021-06-19 PROCEDURE — 2580000003 HC RX 258: Performed by: SURGERY

## 2021-06-19 PROCEDURE — 85610 PROTHROMBIN TIME: CPT

## 2021-06-19 PROCEDURE — 2700000000 HC OXYGEN THERAPY PER DAY

## 2021-06-19 PROCEDURE — 99232 SBSQ HOSP IP/OBS MODERATE 35: CPT | Performed by: HOSPITALIST

## 2021-06-19 PROCEDURE — 85520 HEPARIN ASSAY: CPT

## 2021-06-19 PROCEDURE — 80069 RENAL FUNCTION PANEL: CPT

## 2021-06-19 PROCEDURE — 2580000003 HC RX 258: Performed by: STUDENT IN AN ORGANIZED HEALTH CARE EDUCATION/TRAINING PROGRAM

## 2021-06-19 PROCEDURE — 83735 ASSAY OF MAGNESIUM: CPT

## 2021-06-19 PROCEDURE — 85025 COMPLETE CBC W/AUTO DIFF WBC: CPT

## 2021-06-19 PROCEDURE — 94669 MECHANICAL CHEST WALL OSCILL: CPT

## 2021-06-19 PROCEDURE — C9113 INJ PANTOPRAZOLE SODIUM, VIA: HCPCS | Performed by: NURSE PRACTITIONER

## 2021-06-19 PROCEDURE — 1200000000 HC SEMI PRIVATE

## 2021-06-19 RX ORDER — IPRATROPIUM BROMIDE AND ALBUTEROL SULFATE 2.5; .5 MG/3ML; MG/3ML
1 SOLUTION RESPIRATORY (INHALATION)
Status: DISCONTINUED | OUTPATIENT
Start: 2021-06-19 | End: 2021-06-22 | Stop reason: HOSPADM

## 2021-06-19 RX ADMIN — PANTOPRAZOLE SODIUM 40 MG: 40 INJECTION, POWDER, FOR SOLUTION INTRAVENOUS at 08:41

## 2021-06-19 RX ADMIN — PIPERACILLIN AND TAZOBACTAM 3375 MG: 3; .375 INJECTION, POWDER, LYOPHILIZED, FOR SOLUTION INTRAVENOUS at 16:48

## 2021-06-19 RX ADMIN — Medication 10 ML: at 20:04

## 2021-06-19 RX ADMIN — Medication 10 ML: at 07:54

## 2021-06-19 RX ADMIN — DOCUSATE SODIUM 100 MG: 100 CAPSULE, LIQUID FILLED ORAL at 08:41

## 2021-06-19 RX ADMIN — HEPARIN SODIUM 3850 UNITS: 1000 INJECTION INTRAVENOUS; SUBCUTANEOUS at 08:46

## 2021-06-19 RX ADMIN — IPRATROPIUM BROMIDE AND ALBUTEROL SULFATE 1 AMPULE: .5; 2.5 SOLUTION RESPIRATORY (INHALATION) at 08:15

## 2021-06-19 RX ADMIN — POLYETHYLENE GLYCOL 3350 17 G: 17 POWDER, FOR SOLUTION ORAL at 08:41

## 2021-06-19 RX ADMIN — POLYETHYLENE GLYCOL 3350 17 G: 17 POWDER, FOR SOLUTION ORAL at 20:04

## 2021-06-19 RX ADMIN — HEPARIN SODIUM 18 UNITS/KG/HR: 10000 INJECTION, SOLUTION INTRAVENOUS at 08:46

## 2021-06-19 RX ADMIN — HEPARIN SODIUM 1920 UNITS: 1000 INJECTION INTRAVENOUS; SUBCUTANEOUS at 23:07

## 2021-06-19 RX ADMIN — PIPERACILLIN AND TAZOBACTAM 3375 MG: 3; .375 INJECTION, POWDER, LYOPHILIZED, FOR SOLUTION INTRAVENOUS at 09:12

## 2021-06-19 RX ADMIN — PIPERACILLIN AND TAZOBACTAM 3375 MG: 3; .375 INJECTION, POWDER, LYOPHILIZED, FOR SOLUTION INTRAVENOUS at 03:20

## 2021-06-19 RX ADMIN — TAMSULOSIN HYDROCHLORIDE 0.4 MG: 0.4 CAPSULE ORAL at 08:41

## 2021-06-19 RX ADMIN — DOCUSATE SODIUM 100 MG: 100 CAPSULE, LIQUID FILLED ORAL at 20:04

## 2021-06-19 RX ADMIN — HEPARIN SODIUM 1920 UNITS: 1000 INJECTION INTRAVENOUS; SUBCUTANEOUS at 16:07

## 2021-06-19 ASSESSMENT — PAIN SCALES - GENERAL
PAINLEVEL_OUTOF10: 0

## 2021-06-19 NOTE — PROGRESS NOTES
Progress Note    Admit Date: 6/13/2021  Diet: ADULT DIET; Regular  Adult Oral Nutrition Supplement; Standard High Calorie/High Protein Oral Supplement    CC: SOB    Interval history:   NAEON. Patient down to 3.5L O2. He is breathing more comfortably. He is tolerating small sips of fluid and some diet. Denies any n/v, abd pain, cp, sob. Is ready to get up and moving to get stronger. Otherwise denies any other complaints. Medications:     Scheduled Meds:   docusate sodium  100 mg Oral BID    polyethylene glycol  17 g Oral BID    bisacodyl  10 mg Rectal Once    tamsulosin  0.4 mg Oral Daily    piperacillin-tazobactam  3,375 mg Intravenous Q8H    pantoprazole  40 mg Intravenous Daily    sodium chloride flush  10 mL Intravenous 2 times per day    ipratropium-albuterol  1 ampule Inhalation 4x daily     Continuous Infusions:   sodium chloride      heparin (PORCINE) Infusion Stopped (06/14/21 1453)     PRN Meds:perflutren lipid microspheres, sodium chloride flush, sodium chloride, ondansetron, prochlorperazine, heparin (porcine), heparin (porcine)    Objective:   Vitals:   T-max:  Patient Vitals for the past 8 hrs:   BP Temp Temp src Pulse Resp SpO2   06/19/21 0756 (!) 144/87 97.9 °F (36.6 °C) Oral 99 15 90 %   06/19/21 0330 (!) 168/90 98.1 °F (36.7 °C) Oral 101 14 93 %       Intake/Output Summary (Last 24 hours) at 6/19/2021 4499  Last data filed at 6/19/2021 0414  Gross per 24 hour   Intake 1006 ml   Output 675 ml   Net 331 ml     Physical Exam  Constitutional:       General: He is not in acute distress. Appearance: He is ill-appearing. He is not toxic-appearing. Comments: Frail appearing. Cachetic    HENT:      Mouth/Throat:      Mouth: Mucous membranes are dry. Eyes:      Pupils: Pupils are equal, round, and reactive to light. Cardiovascular:      Rate and Rhythm: Normal rate and regular rhythm. Pulses: Normal pulses. Heart sounds: Normal heart sounds. No murmur heard.      Pulmonary: Effort: Pulmonary effort is normal.      Comments: Poor air entry. Diminished lung sounds. No wheezing appreciated  Abdominal:      General: Bowel sounds are normal.      Palpations: Abdomen is soft. Tenderness: There is no abdominal tenderness. Comments: Scaphoid   Musculoskeletal:         General: No swelling. Neurological:      Mental Status: He is alert and oriented to person, place, and time. Mental status is at baseline. LABS:    CBC:   Recent Labs     06/17/21 0450 06/18/21 0603 06/19/21  0531   WBC 5.7 7.4 8.9   HGB 10.4* 10.3* 10.6*   HCT 31.9* 31.7* 32.6*   * 120* 143   MCV 85.3 85.2 84.3     Renal:    Recent Labs     06/17/21 0449 06/17/21 0450 06/18/21 0603 06/19/21  0531     --  140 139   K 3.7  --  3.8 3.9     --  105 105   CO2 28  --  27 25   BUN 25*  --  22* 23*   CREATININE 1.1  --  1.2 1.2   GLUCOSE 158*  --  138* 128*   CALCIUM 7.5*  --  7.4* 7.6*   MG  --  2.00 1.70* 2.00   PHOS 2.1*  --  1.7* 2.4*   ANIONGAP 7  --  8 9     Hepatic:   Recent Labs     06/17/21 0449 06/18/21 0603 06/19/21  0531   LABALBU 2.2* 2.4* 2.3*     Troponin:   No results for input(s): TROPONINI in the last 72 hours. BNP: No results for input(s): BNP in the last 72 hours. Lipids: No results for input(s): CHOL, HDL in the last 72 hours. Invalid input(s): LDLCALCU, TRIGLYCERIDE  ABGs:  No results for input(s): PHART, PPT6XUB, PO2ART, XRK8UVO, BEART, THGBART, C9WAKWLT, EYJ0TUD in the last 72 hours. INR:   Recent Labs     06/17/21 0449 06/18/21 0603 06/19/21  0531   INR 3.90* 3.53* 2.08*     Lactate: No results for input(s): LACTATE in the last 72 hours. Cultures:  -----------------------------------------------------------------  RAD:   XR ABDOMEN (KUB) (SINGLE AP VIEW)   Final Result      Enteric contrast in colon without evidence of small bowel obstruction on this single view      CT CHEST WO CONTRAST   Final Result   1.  Extensive consolidation of the left lower lobe pleural fluid extending posteriorly and in the major fissure to the apex. 2. Left upper lobe bronchiectasis with anterior perihilar airspace disease   3. Interval development of right lower lobe pneumonia when compared to prior study 6/13/2021 and groundglass airspace disease in the posterior segment of the right upper lobe. 4. Nasogastric tube in the lower thoracic esophagus above the level of the stomach, hiatal hernia is present      XR CHEST PORTABLE   Final Result   1. Apparent increased volume loss the left upper lobe with diffuse groundglass density. This may be accentuated due to the patient's positioning and rotation to the left. 2. Localized density along the left costophrenic sulcus which is elevated due to the position of the hemidiaphragm   3. Radiographic accentuation of vascular markings in the right lung, groundglass disease is not excluded. Consideration is given to noncontrast CT of the chest for further evaluation      XR ABDOMEN (2 VIEWS)   Final Result   Previously administered oral contrast is within the colon. XR CHEST PORTABLE   Final Result   New patchy bilateral interstitial and airspace opacities representing edema or infection. Stable left diaphragmatic elevation. XR ABDOMEN (2 VIEWS)   Final Result      Diffuse distention of small bowel, with oral contrast throughout loops extending into the pelvis. CT ABDOMEN PELVIS WO CONTRAST Additional Contrast? None   Final Result     Limited study. Findings suggestive of bowel obstruction. XR CHEST PORTABLE   Final Result   No evidence of acute cardiopulmonary disease. Elevated left hemidiaphragm. Assessment/Plan:     Debility  - PT/OT consulted  - CM consulted for placement    Acute Hypoxic Respiratory Failure 2/2 aspiration PNA, improving  - Not present on admission, developed during inpatient stay  - CT chest wo con shows RLL PNA.  LLL pleural fluid with consolidation, GEOVANY bronchiectasis   -

## 2021-06-19 NOTE — RT PROTOCOL NOTE
RT Inhaler-Nebulizer Bronchodilator Protocol Note    There is a bronchodilator order in the chart from a provider indicating to follow the RT Bronchodilator Protocol and there is an Initiate RT Bronchodilator Protocol order as well (see protocol at bottom of note). The findings from the last RT Protocol Assessment were as follows:  Smoking: Non smoker  Surgical Status: No surgery  Xray: One lobe infiltrates/atelectasis/pleural effusion/edema  Respiratory Pattern: RR 12-20  Mental Status: Alert and Oriented  Breath Sounds: Diminished and/or crackles  Cough: Weak, non-productive  Activity Level: Mostly sedentary, minimal walking  Oxygen Requirement: 29% or 3LNC - 5LNC/40%  Indication for Bronchodilator Therapy: Decreased or absent breath sounds  Bronchodilator Assessment Score: 6    Aerosolized bronchodilator medication orders have not been revised according to the RT Bronchodilator Protocol, will continue Duoneb due to the LETICIA/LABA combination PRN. RT Inhaler-Nebulizer Bronchodilator Protocol:    Respiratory Therapist to perform RT Therapy Protocol Assessment then follow the protocol. No Indications - adjust the frequency to every 6 hours PRN wheezing or bronchospasm, if no treatments needed after 48 hours then discontinue using Per Protocol order mode. If indication present, adjust the RT bronchodilator orders based on the Bronchodilator Assessment Score as follows:    0-6 - enter or revise RT bronchodilator order to Albuterol Inhaler order with frequency of every 2 hours PRN for wheezing or increased work of breathing using Per Protocol order mode. If Albuterol Inhaler not tolerated or not effective, then discontinue the Albuterol Inhaler order and enter Albuterol Nebulizer order with same frequency and PRN reasons. Repeat RT Therapy Protocol Assessment as needed.     7-10 - discontinue any other Inpatient aerosolized bronchodilator medication orders and enter or revise two Albuterol Inhaler
RT Nebulizer Bronchodilator Protocol Note    There is a bronchodilator order in the chart from a provider indicating to follow the RT Bronchodilator Protocol and there is an Initiate RT Bronchodilator Protocol order as well (see protocol at bottom of note). The findings from the last RT Protocol Assessment were as follows:  Smoking: Non smoker  Surgical Status: No surgery  Xray: One lobe infiltrates/atelectasis/pleural effusion/edema  Respiratory Pattern: RR 12-20  Mental Status: Alert and Oriented  Breath Sounds: Diminished and/or crackles  Cough: Strong, spontaneous, non-productive  Activity Level: Walking with assistance  Oxygen Requirement: 29% or 3LNC - 5LNC/40%  Indication for Bronchodilator Therapy: Decreased or absent breath sounds  Bronchodilator Assessment Score: 3    Aerosolized bronchodilator medication orders have been revised according to the RT Bronchodilator Protocol. RT Bronchodilator Protocol:    Respiratory Therapist to perform RT Therapy Protocol Assessment then follow the protocol. No Indications - adjust the frequency to every 6 hours PRN wheezing or bronchospasm, if no treatments needed after 48 hours then discontinue using Per Protocol order mode. If indication present, adjust the RT bronchodilator orders based on the Bronchodilator Assessment Score as follows:    0-6 - enter or revise RT Bronchodilator order to Albuterol Nebulizer order with frequency of every 2 hours PRN for wheezing or increased work of breathing using Per Protocol order mode. Repeat RT Therapy Protocol Assessment as needed. 7-10 - discontinue any other Inpatient aerosolized bronchodilator medication orders and enter or revise two Albuterol Nebulizer orders, one with BID frequency and one with frequency of every 2 hours PRN wheezing or increased work of breathing using Per Protocol order mode. Repeat RT Therapy Protocol Assessment with second treatment then BID and as needed.       11-13 - discontinue
Inpatient aerosolized bronchodilator medication orders and enter DuoNeb Nebulizer order with QID frequency and an Albuterol Nebulizer order with frequency of every 2 hours PRN wheezing or increased work of breathing using Per Protocol order mode. Repeat RT Therapy Protocol Assessment with second treatment then QID and as needed. Greater than 13 - discontinue any other Inpatient aerosolized bronchodilator medication orders and enter a DuoNeb Nebulizer order with every 4 hours frequency and an Albuterol Nebulizer order with frequency of every 2 hours PRN wheezing or increased work of breathing using Per Protocol order mode. Repeat RT Therapy Protocol Assessment with second treatment then every 4 hours and as needed. RT to enter RT Home Evaluation for COPD & MDI Assessment order using Per Protocol order mode.     Electronically signed by Pavel Chavez RCP on 6/15/2021 at 6:01 PM

## 2021-06-19 NOTE — PROGRESS NOTES
VSS, denies pain, n/v. Pt verbalized concern over hydration. He wanted IVF to be infused. Pt educated and encouraged to take small sips of drink at a time, talked to pt that he has been showing so much improvement and tolerating fluids is one of them. Pt then began to take more sips of his ensure and water, voiding using the urinal, standing at bedside to void using FWW and x1 assist. Pt on 3L NC, IV abx infusing. Call light was used for help, fall precaution in place. Requested to remove SCD this morning after standing at bedside and will put it back during breakfast. Pt alert and oriented, uneventful night with pt.

## 2021-06-19 NOTE — PROGRESS NOTES
Up to chair freqtly. No pain. x1 w/ walker. Drinking Ensure. A+ox4, VSS. IV Hep infusing.  Remains on 3.5L

## 2021-06-20 LAB
ALBUMIN SERPL-MCNC: 2.5 G/DL (ref 3.4–5)
ANION GAP SERPL CALCULATED.3IONS-SCNC: 10 MMOL/L (ref 3–16)
ANION GAP SERPL CALCULATED.3IONS-SCNC: 8 MMOL/L (ref 3–16)
ANTI-XA UNFRAC HEPARIN: 0.04 IU/ML (ref 0.3–0.7)
ANTI-XA UNFRAC HEPARIN: 0.31 IU/ML (ref 0.3–0.7)
ANTI-XA UNFRAC HEPARIN: 0.33 IU/ML (ref 0.3–0.7)
APTT: 47.9 SEC (ref 24.2–36.2)
BASOPHILS ABSOLUTE: 0 K/UL (ref 0–0.2)
BASOPHILS RELATIVE PERCENT: 0.2 %
BUN BLDV-MCNC: 27 MG/DL (ref 7–20)
BUN BLDV-MCNC: 27 MG/DL (ref 7–20)
CALCIUM SERPL-MCNC: 7.7 MG/DL (ref 8.3–10.6)
CALCIUM SERPL-MCNC: 8 MG/DL (ref 8.3–10.6)
CHLORIDE BLD-SCNC: 101 MMOL/L (ref 99–110)
CHLORIDE BLD-SCNC: 101 MMOL/L (ref 99–110)
CO2: 25 MMOL/L (ref 21–32)
CO2: 27 MMOL/L (ref 21–32)
CREAT SERPL-MCNC: 1.1 MG/DL (ref 0.8–1.3)
CREAT SERPL-MCNC: 1.1 MG/DL (ref 0.8–1.3)
EOSINOPHILS ABSOLUTE: 0.2 K/UL (ref 0–0.6)
EOSINOPHILS RELATIVE PERCENT: 2.1 %
GFR AFRICAN AMERICAN: >60
GFR AFRICAN AMERICAN: >60
GFR NON-AFRICAN AMERICAN: >60
GFR NON-AFRICAN AMERICAN: >60
GLUCOSE BLD-MCNC: 168 MG/DL (ref 70–99)
GLUCOSE BLD-MCNC: 169 MG/DL (ref 70–99)
HCT VFR BLD CALC: 33.4 % (ref 40.5–52.5)
HEMOGLOBIN: 10.9 G/DL (ref 13.5–17.5)
INR BLD: 1.44 (ref 0.86–1.14)
LYMPHOCYTES ABSOLUTE: 1 K/UL (ref 1–5.1)
LYMPHOCYTES RELATIVE PERCENT: 11.6 %
MAGNESIUM: 2 MG/DL (ref 1.8–2.4)
MCH RBC QN AUTO: 27.4 PG (ref 26–34)
MCHC RBC AUTO-ENTMCNC: 32.7 G/DL (ref 31–36)
MCV RBC AUTO: 83.9 FL (ref 80–100)
MONOCYTES ABSOLUTE: 0.8 K/UL (ref 0–1.3)
MONOCYTES RELATIVE PERCENT: 9.4 %
NEUTROPHILS ABSOLUTE: 6.7 K/UL (ref 1.7–7.7)
NEUTROPHILS RELATIVE PERCENT: 76.7 %
PDW BLD-RTO: 17.5 % (ref 12.4–15.4)
PHOSPHORUS: 2.6 MG/DL (ref 2.5–4.9)
PLATELET # BLD: 175 K/UL (ref 135–450)
PMV BLD AUTO: 9.9 FL (ref 5–10.5)
POTASSIUM REFLEX MAGNESIUM: 4.2 MMOL/L (ref 3.5–5.1)
POTASSIUM SERPL-SCNC: 4.4 MMOL/L (ref 3.5–5.1)
PROTHROMBIN TIME: 16.8 SEC (ref 10–13.2)
RBC # BLD: 3.98 M/UL (ref 4.2–5.9)
SODIUM BLD-SCNC: 136 MMOL/L (ref 136–145)
SODIUM BLD-SCNC: 136 MMOL/L (ref 136–145)
WBC # BLD: 8.8 K/UL (ref 4–11)

## 2021-06-20 PROCEDURE — 93005 ELECTROCARDIOGRAM TRACING: CPT | Performed by: STUDENT IN AN ORGANIZED HEALTH CARE EDUCATION/TRAINING PROGRAM

## 2021-06-20 PROCEDURE — 97530 THERAPEUTIC ACTIVITIES: CPT

## 2021-06-20 PROCEDURE — 6360000002 HC RX W HCPCS: Performed by: STUDENT IN AN ORGANIZED HEALTH CARE EDUCATION/TRAINING PROGRAM

## 2021-06-20 PROCEDURE — C9113 INJ PANTOPRAZOLE SODIUM, VIA: HCPCS | Performed by: NURSE PRACTITIONER

## 2021-06-20 PROCEDURE — 2580000003 HC RX 258: Performed by: STUDENT IN AN ORGANIZED HEALTH CARE EDUCATION/TRAINING PROGRAM

## 2021-06-20 PROCEDURE — 97535 SELF CARE MNGMENT TRAINING: CPT

## 2021-06-20 PROCEDURE — 6360000002 HC RX W HCPCS: Performed by: NURSE PRACTITIONER

## 2021-06-20 PROCEDURE — 85610 PROTHROMBIN TIME: CPT

## 2021-06-20 PROCEDURE — 99232 SBSQ HOSP IP/OBS MODERATE 35: CPT | Performed by: HOSPITALIST

## 2021-06-20 PROCEDURE — 83735 ASSAY OF MAGNESIUM: CPT

## 2021-06-20 PROCEDURE — 2700000000 HC OXYGEN THERAPY PER DAY

## 2021-06-20 PROCEDURE — 6370000000 HC RX 637 (ALT 250 FOR IP): Performed by: STUDENT IN AN ORGANIZED HEALTH CARE EDUCATION/TRAINING PROGRAM

## 2021-06-20 PROCEDURE — 97116 GAIT TRAINING THERAPY: CPT

## 2021-06-20 PROCEDURE — 85025 COMPLETE CBC W/AUTO DIFF WBC: CPT

## 2021-06-20 PROCEDURE — 85730 THROMBOPLASTIN TIME PARTIAL: CPT

## 2021-06-20 PROCEDURE — 36415 COLL VENOUS BLD VENIPUNCTURE: CPT

## 2021-06-20 PROCEDURE — 80069 RENAL FUNCTION PANEL: CPT

## 2021-06-20 PROCEDURE — 94640 AIRWAY INHALATION TREATMENT: CPT

## 2021-06-20 PROCEDURE — 2580000003 HC RX 258: Performed by: SURGERY

## 2021-06-20 PROCEDURE — 85520 HEPARIN ASSAY: CPT

## 2021-06-20 PROCEDURE — 94669 MECHANICAL CHEST WALL OSCILL: CPT

## 2021-06-20 PROCEDURE — 1200000000 HC SEMI PRIVATE

## 2021-06-20 RX ORDER — HEPARIN SODIUM 1000 [USP'U]/ML
80 INJECTION, SOLUTION INTRAVENOUS; SUBCUTANEOUS PRN
Status: DISCONTINUED | OUTPATIENT
Start: 2021-06-20 | End: 2021-06-22 | Stop reason: HOSPADM

## 2021-06-20 RX ORDER — HEPARIN SODIUM 1000 [USP'U]/ML
40 INJECTION, SOLUTION INTRAVENOUS; SUBCUTANEOUS PRN
Status: DISCONTINUED | OUTPATIENT
Start: 2021-06-20 | End: 2021-06-22 | Stop reason: HOSPADM

## 2021-06-20 RX ORDER — WARFARIN SODIUM 5 MG/1
2.5 TABLET ORAL DAILY
Status: DISCONTINUED | OUTPATIENT
Start: 2021-06-21 | End: 2021-06-21

## 2021-06-20 RX ORDER — WARFARIN SODIUM 5 MG/1
5 TABLET ORAL
Status: COMPLETED | OUTPATIENT
Start: 2021-06-20 | End: 2021-06-20

## 2021-06-20 RX ORDER — HEPARIN SODIUM 10000 [USP'U]/100ML
5-30 INJECTION, SOLUTION INTRAVENOUS CONTINUOUS
Status: DISCONTINUED | OUTPATIENT
Start: 2021-06-20 | End: 2021-06-20

## 2021-06-20 RX ORDER — HEPARIN SODIUM 10000 [USP'U]/100ML
26 INJECTION, SOLUTION INTRAVENOUS CONTINUOUS
Status: DISCONTINUED | OUTPATIENT
Start: 2021-06-20 | End: 2021-06-22 | Stop reason: HOSPADM

## 2021-06-20 RX ADMIN — Medication 10 ML: at 08:42

## 2021-06-20 RX ADMIN — DOCUSATE SODIUM 100 MG: 100 CAPSULE, LIQUID FILLED ORAL at 08:42

## 2021-06-20 RX ADMIN — HEPARIN SODIUM 3850 UNITS: 1000 INJECTION INTRAVENOUS; SUBCUTANEOUS at 06:44

## 2021-06-20 RX ADMIN — HEPARIN SODIUM 26 UNITS/KG/HR: 10000 INJECTION, SOLUTION INTRAVENOUS at 08:49

## 2021-06-20 RX ADMIN — PANTOPRAZOLE SODIUM 40 MG: 40 INJECTION, POWDER, FOR SOLUTION INTRAVENOUS at 08:42

## 2021-06-20 RX ADMIN — PIPERACILLIN AND TAZOBACTAM 3375 MG: 3; .375 INJECTION, POWDER, LYOPHILIZED, FOR SOLUTION INTRAVENOUS at 03:18

## 2021-06-20 RX ADMIN — WARFARIN SODIUM 5 MG: 5 TABLET ORAL at 17:39

## 2021-06-20 RX ADMIN — HEPARIN SODIUM 26 UNITS/KG/HR: 10000 INJECTION, SOLUTION INTRAVENOUS at 10:34

## 2021-06-20 RX ADMIN — TAMSULOSIN HYDROCHLORIDE 0.4 MG: 0.4 CAPSULE ORAL at 08:42

## 2021-06-20 RX ADMIN — PIPERACILLIN AND TAZOBACTAM 3375 MG: 3; .375 INJECTION, POWDER, LYOPHILIZED, FOR SOLUTION INTRAVENOUS at 17:38

## 2021-06-20 RX ADMIN — POLYETHYLENE GLYCOL 3350 17 G: 17 POWDER, FOR SOLUTION ORAL at 08:42

## 2021-06-20 RX ADMIN — PIPERACILLIN AND TAZOBACTAM 3375 MG: 3; .375 INJECTION, POWDER, LYOPHILIZED, FOR SOLUTION INTRAVENOUS at 09:46

## 2021-06-20 ASSESSMENT — PAIN SCALES - GENERAL
PAINLEVEL_OUTOF10: 0

## 2021-06-20 NOTE — PROGRESS NOTES
Pt alert and oriented, denied pain, n/v during this shift. Pt urinating, used bedside commode and had x1 BM. Hep gtt infusing, abx infused. Pt weaned oxygen to 1.5 this morning. Pt was encouraged to increase fluid intake and was able to finish 2 ensures. Fall precaution in place, call light was used for needs. Uneventful night with pt.

## 2021-06-20 NOTE — PLAN OF CARE
Problem: Falls - Risk of:  Goal: Will remain free from falls  Description: Will remain free from falls  Outcome: Ongoing  Note: Sba x1 w/ walker. Improving with steadiness. Appropriate safety awareness     Problem: Nutrition  Goal: Optimal nutrition therapy  Outcome: Ongoing  Note: Eating and drinking-tolerating well. Drinking Ensures as well.  Appropriate urine and Bms     Problem: Pain:  Goal: Pain level will decrease  Description: Pain level will decrease  Outcome: Ongoing  Note: Denies pain

## 2021-06-20 NOTE — PROGRESS NOTES
Occupational Therapy  Facility/Department: [facility]  Daily Treatment Note  NAME: Buster Mahan MD  : 1935  MRN: [id number]    Date of Service: 2021    Discharge Recommendations:  Buster Mahan MD scored a 19/24 on the AM-PAC ADL Inpatient form. Current research shows that an AM-PAC score of 18 or greater is typically associated with a discharge to the patient's home setting. Based on the patient's AM-PAC score, and their current ADL deficits, it is recommended that the patient have 2-3 sessions per week of Occupational Therapy at d/c to increase the patient's independence. At this time, this patient demonstrates the endurance and safety to discharge home with 24 hr assist and HHOT and a follow up treatment frequency of 2-3x/wk. Please see assessment section for further patient specific details. HOME HEALTH CARE: LEVEL 1 STANDARD    - Initial home health evaluation to occur within 24-48 hours, in patient home   - Therapy to evaluate with goal of regaining prior level of functioning   - Therapy to evaluate if patient has [id number] [facility] needs for personal care    If patient discharges prior to next session this note will serve as a discharge summary. Please see below for the latest assessment towards goals. OT Equipment Recommendations  Equipment Needed: Yes  Mobility Devices: ADL Assistive Devices  ADL Assistive Devices: Shower Chair with back    Assessment   Performance deficits / Impairments: Decreased functional mobility ; Decreased ADL status; Decreased strength;Decreased endurance;Decreased balance;Decreased high-level IADLs;Decreased posture  Assessment: Pt CGA this date for all fucntional transfers/mobility. Pt on 1.5L O2 destat with all activity 82%-84% andup to 5 min to recover. Pt to d/c home, spoke with wife, Lewis Catherine, and she is able to provide 24 hr assist and will purchase shower chair for pt safety. Recommend also HHOT Level 1. Continue with POC.   Treatment Diagnosis: decreased ability to perform ADL 2/2 SBO  Prognosis: Good  OT Education: OT Role;Plan of Care (PLB)  Patient Education: reinforce PLB  REQUIRES OT FOLLOW UP: Yes  Activity Tolerance  Activity Tolerance: Patient Tolerated treatment well  Activity Tolerance: limited by O2 destat 1.5L O2 destat to 82%-84%  Safety Devices  Safety Devices in place: Yes  Type of devices: Call light within reach; Chair alarm in place; Left in chair;Nurse notified;Gait belt; All fall risk precautions in place         Patient Diagnosis(es): The encounter diagnosis was SBO (small bowel obstruction) ([facility] [address] has a past medical history of Anemia, Aortic valve disorders, Aspiration pneumonia (HCC), Erectile dysfunction, Esophageal cancer (HCC), Hernia, femoral, bilateral, Hyperlipidemia, Osteoporosis, senile, Pancreatitis, Patient underweight, [facility] and Unspecified essential hypertension. has a past surgical history that includes Coronary artery bypass graft (2006); Cardiac valve replacement (2006); eye surgery (1990& 1992); Appendectomy; bone graft; gastrectomy; Cholecystectomy; Colonoscopy (11/2009); Prostate surgery; Tonsillectomy; Esophagus surgery;  Prostate/Transrectal/Vol Collins Brachyth; Colonoscopy (10/05/15); Upper gastrointestinal endoscopy (N/A, 2/16/2021); Colonoscopy (N/A, 2/17/2021); and Colonoscopy (2/18/2021).     Restrictions  Restrictions/Precautions  Restrictions/Precautions: General Precautions, Up as Tolerated  Position Activity Restriction  Other position/activity restrictions: Maintain HOB >45 degrees  Subjective   General  Chart Reviewed: Yes  Patient assessed for rehabilitation services?: Yes  Additional Pertinent Hx: Esophageal and prostate CA  Response to previous treatment: Patient with no complaints from previous session  Family / Caregiver Present: No (Spoke with pt's wife Marck Guadalupe on phone concerning d/c home)  Referring Practitioner: Henry Brown MD  Diagnosis: Abdominal pain, n/v, diarrhea 2/2 SBO  Subjective  Subjective: Pt supine in bed upon entry with HOB raised, pt pleasant, motivated and agreeable to therapy session. Pt on 1.5L O2. Pt with activity dropping to 81%-84% and needing up to 5 min to recover (RN and respiratory therapist notified). General Comment  Comments: Pt supine to sit SBA, pt sit EOB Mod I. Pt sit to stand CGA, pt ambulated with rw at CGA ~6 ft to bathroom sink. Pt in stance at sink ~5 min for oral care at [facility] for balance. Pt requesting seated rest break. Pt stand to sit CGA. Pt taking ~5 min to reocver to 90%. Pt sit to stand CGA and in stance ~< 2 min at CGA to wash face. Pt stand to sit CGA, pt O2 84% and requiring < 5 min to recover to 90%. Pt educated on pursed lip breathing during all seated rest breaks. Pt ambulated to chair ~12 ft with rw at [facility], pt stand to sit CGA. Pt doffed/donned  socks at setup. Pt's wife, Angela Vincent, called on telephone and this writer spoke with wife per pt's desire to d/c home. Pt's wife able to provide 24 hr assist and eager for him to come home. Pt's wife verbalized she will purchase shower chair for pt's safety. Pt sit to stand/stand to sit CGA. Call light in reach and chair alarm on.   Vital Signs  Patient Currently in Pain: Denies   Orientation  Orientation  Overall Orientation Status: Within Functional Limits  Objective    ADL  Grooming: Contact guard assistance (in stance at sink x2 trials (oral care) (wash face))  LE Dressing: Setup (to don/doff  socks)  Toileting:  (pt declined)        Balance  Sitting Balance: Modified independent   Standing Balance: Contact guard assistance  Standing Balance  Time: ~< 9 min total  Activity: to sink to recliner, in stance to groom x 2 trials  Functional Mobility  Functional - Mobility Device: Rolling Walker  Activity: Other  Assist Level: Contact guard assistance  Bed mobility  Supine to Sit: Stand by assistance  Scooting: Stand by assistance  Transfers  Sit to stand: Contact guard assistance  Stand to sit: Contact guard assistance                       Cognition  Overall Cognitive Status: [facility]                                         Plan   Plan  Times per week: 2-5x/wk  Current Treatment Recommendations: Strengthening, Balance Training, Functional Mobility Training, Endurance Training, Patient/Caregiver Education & Training, Safety Education & Training, Self-Care / ADL  G-Code     OutComes Score                                                  AM-PAC Score        AM-PAC Inpatient Daily Activity Raw Score: 19 (06/20/21 1318)  AM-PAC Inpatient ADL T-Scale Score : 40.22 (06/20/21 1318)  ADL Inpatient CMS 0-100% Score: 42.8 (06/20/21 1318)  ADL Inpatient CMS G-Code Modifier : CK (06/20/21 1318)    Goals  Short term goals  Time Frame for Short term goals: 1 week  Short term goal 1: 1 grooming task standing with SBA- not met CGA 6/20  Short term goal 2: Tolerate 5 mins functional standing activity with CGA and SPO2 > 90%- not met 6/20  Short term goal 3: Toilet t/f and hygiene from ambulatory level with SBA-- not met, pt declinned 6/20  Short term goal 4: LB dressing with CGA-- not met, progressing (setup  socks only)  Patient Goals   Patient goals : \"I want to be off oxygen and be able to workout again. \"       Therapy Time   Individual Concurrent Group Co-treatment   Time In 5599         Time Out 0801         Minutes 39         Timed Code Treatment Minutes: [address], [facility]

## 2021-06-20 NOTE — PROGRESS NOTES
Cardiovascular:      Rate and Rhythm: Normal rate and regular rhythm. Pulses: Normal pulses. Heart sounds: Normal heart sounds. No murmur heard. Pulmonary:      Effort: Pulmonary effort is normal.      Comments: Poor air entry. Diminished lung sounds. No wheezing appreciated  Abdominal:      General: Bowel sounds are normal.      Palpations: Abdomen is soft. Tenderness: There is no abdominal tenderness. Comments: Scaphoid   Musculoskeletal:         General: No swelling. Neurological:      Mental Status: He is alert and oriented to person, place, and time. Mental status is at baseline. LABS:    CBC:   Recent Labs     06/18/21  0603 06/19/21  0531   WBC 7.4 8.9   HGB 10.3* 10.6*   HCT 31.7* 32.6*   * 143   MCV 85.2 84.3     Renal:    Recent Labs     06/18/21 0603 06/19/21  0531    139   K 3.8 3.9    105   CO2 27 25   BUN 22* 23*   CREATININE 1.2 1.2   GLUCOSE 138* 128*   CALCIUM 7.4* 7.6*   MG 1.70* 2.00   PHOS 1.7* 2.4*   ANIONGAP 8 9     Hepatic:   Recent Labs     06/18/21  0603 06/19/21  0531   LABALBU 2.4* 2.3*     Troponin:   No results for input(s): TROPONINI in the last 72 hours. BNP: No results for input(s): BNP in the last 72 hours. Lipids: No results for input(s): CHOL, HDL in the last 72 hours. Invalid input(s): LDLCALCU, TRIGLYCERIDE  ABGs:  No results for input(s): PHART, QYD4HBR, PO2ART, BPJ9OGV, BEART, THGBART, Y7GFZPHM, WSP5ZAK in the last 72 hours. INR:   Recent Labs     06/18/21  0603 06/19/21  0531 06/20/21  0521   INR 3.53* 2.08* 1.44*     Lactate: No results for input(s): LACTATE in the last 72 hours. Cultures:  -----------------------------------------------------------------  RAD:   XR ABDOMEN (KUB) (SINGLE AP VIEW)   Final Result      Enteric contrast in colon without evidence of small bowel obstruction on this single view      CT CHEST WO CONTRAST   Final Result   1.  Extensive consolidation of the left lower lobe pleural fluid extending posteriorly and in the major fissure to the apex. 2. Left upper lobe bronchiectasis with anterior perihilar airspace disease   3. Interval development of right lower lobe pneumonia when compared to prior study 6/13/2021 and groundglass airspace disease in the posterior segment of the right upper lobe. 4. Nasogastric tube in the lower thoracic esophagus above the level of the stomach, hiatal hernia is present      XR CHEST PORTABLE   Final Result   1. Apparent increased volume loss the left upper lobe with diffuse groundglass density. This may be accentuated due to the patient's positioning and rotation to the left. 2. Localized density along the left costophrenic sulcus which is elevated due to the position of the hemidiaphragm   3. Radiographic accentuation of vascular markings in the right lung, groundglass disease is not excluded. Consideration is given to noncontrast CT of the chest for further evaluation      XR ABDOMEN (2 VIEWS)   Final Result   Previously administered oral contrast is within the colon. XR CHEST PORTABLE   Final Result   New patchy bilateral interstitial and airspace opacities representing edema or infection. Stable left diaphragmatic elevation. XR ABDOMEN (2 VIEWS)   Final Result      Diffuse distention of small bowel, with oral contrast throughout loops extending into the pelvis. CT ABDOMEN PELVIS WO CONTRAST Additional Contrast? None   Final Result     Limited study. Findings suggestive of bowel obstruction. XR CHEST PORTABLE   Final Result   No evidence of acute cardiopulmonary disease. Elevated left hemidiaphragm. Assessment/Plan:     Debility  - PT/OT consulted  - Pt would like to go home. RT to evaluate for home O2 tomorrow  - CM consulted: home care needs    Acute Hypoxic Respiratory Failure 2/2 aspiration PNA, improving  - Not present on admission, developed during inpatient stay  - CT chest wo con shows RLL PNA.  LLL pleural

## 2021-06-20 NOTE — CONSULTS
Clinical Pharmacy Consult Note    Admit date: 6/13/2021    Subjective/Objective:  83 yo male with PMHx that includes prostate cancer status post brachytherapy, aortic valve replacement on warfarin, CAD s/p CABG, esophageal cancer status post chemoradiation and esophagectomy with gastric pull through in 2010, presents with 2 days of abdominal pain in the periumbilical and upper abdomen and was found to have a small bowel obstruction which has been treated with NGT drainage. Stay complicated by HAP, currently treated with Zosyn. Pharmacy is consulted to dose warfarin per Dr. Andrea Mixon anticoagulation regimen:  Patient reportedly monitors INR at home and doses 2.5-5 mg depending on level and diet. · Latest cardiology notes indicate a dose of 5 mg daily. · INR upon admission was 2.14 and continued to rise to a max of 3.9 despite no inpatient doses being given. · Suspect dose closer to 2.5 mg daily is appropriate given age, weight, and supratherapeutic INR this admission. Date INR Warfarin Dose   6/20 1.44 5 mg ordered                      Lab Results   Component Value Date    WBC 8.9 06/19/2021    HGB 10.6 (L) 06/19/2021    HCT 32.6 (L) 06/19/2021    MCV 84.3 06/19/2021     06/19/2021     Lab Results   Component Value Date    PROTIME 16.8 (H) 06/20/2021    INR 1.44 (H) 06/20/2021     Height:  5' 3.5\" (161.3 cm)  Weight:  105 lb 6.1 oz (47.8 kg)        Assessment/Plan:  1. Anticoagulation: AVR  · INR Goal: 2-3  · Patient is being bridged with a heparin drip  · INR today is subtherapeutic at 1.44. Will give bolus dose of 5 mg today then start 2.5 mg daily. · Medication profile reviewed for potential drug interactions. Patient is currently receiving Zosyn for treatment of HAP, which has the potential to increase IINR   · Daily INR will be monitored and dose adjustments made as needed. Please call with any questions.     Lamon Alpers, PharmD  PGY1 Pharmacy Resident  6/20/2021 8:14 AM  [id number]

## 2021-06-20 NOTE — PROGRESS NOTES
On 2.5L (desats with ambulation). Up to chair multiple times. Bigeminal PVCs-residents aware. Had large BM. Voiding adequately. Hep gtt infusing- therapeautic AntiXa this afternoon. 1st dose Warfarin given. Tolerating small bites of meals and drinking Ensures. A+Ox4, VSS (elevated bps). SBA x1 walker-getting stronger than yesterday. Wife at bedside.  Call light in reach-uses appropriately

## 2021-06-21 ENCOUNTER — TELEPHONE (OUTPATIENT)
Dept: CARDIOLOGY CLINIC | Age: 86
End: 2021-06-21

## 2021-06-21 ENCOUNTER — APPOINTMENT (OUTPATIENT)
Dept: GENERAL RADIOLOGY | Age: 86
DRG: 388 | End: 2021-06-21
Payer: MEDICARE

## 2021-06-21 ENCOUNTER — TELEPHONE (OUTPATIENT)
Dept: INTERNAL MEDICINE CLINIC | Age: 86
End: 2021-06-21

## 2021-06-21 LAB
ANION GAP SERPL CALCULATED.3IONS-SCNC: 7 MMOL/L (ref 3–16)
ANTI-XA UNFRAC HEPARIN: 0.32 IU/ML (ref 0.3–0.7)
ANTI-XA UNFRAC HEPARIN: 0.34 IU/ML (ref 0.3–0.7)
BASOPHILS ABSOLUTE: 0 K/UL (ref 0–0.2)
BASOPHILS RELATIVE PERCENT: 0.3 %
BUN BLDV-MCNC: 27 MG/DL (ref 7–20)
CALCIUM SERPL-MCNC: 7.9 MG/DL (ref 8.3–10.6)
CHLORIDE BLD-SCNC: 101 MMOL/L (ref 99–110)
CO2: 29 MMOL/L (ref 21–32)
CREAT SERPL-MCNC: 1.2 MG/DL (ref 0.8–1.3)
EKG ATRIAL RATE: 99 BPM
EKG DIAGNOSIS: NORMAL
EKG P AXIS: 72 DEGREES
EKG P-R INTERVAL: 166 MS
EKG Q-T INTERVAL: 332 MS
EKG QRS DURATION: 74 MS
EKG QTC CALCULATION (BAZETT): 426 MS
EKG R AXIS: 22 DEGREES
EKG T AXIS: 65 DEGREES
EKG VENTRICULAR RATE: 99 BPM
EOSINOPHILS ABSOLUTE: 0.3 K/UL (ref 0–0.6)
EOSINOPHILS RELATIVE PERCENT: 3.6 %
GFR AFRICAN AMERICAN: >60
GFR NON-AFRICAN AMERICAN: 58
GLUCOSE BLD-MCNC: 113 MG/DL (ref 70–99)
HCT VFR BLD CALC: 32.6 % (ref 40.5–52.5)
HEMOGLOBIN: 10.6 G/DL (ref 13.5–17.5)
INR BLD: 1.32 (ref 0.86–1.14)
LYMPHOCYTES ABSOLUTE: 0.9 K/UL (ref 1–5.1)
LYMPHOCYTES RELATIVE PERCENT: 12.9 %
MCH RBC QN AUTO: 27.1 PG (ref 26–34)
MCHC RBC AUTO-ENTMCNC: 32.5 G/DL (ref 31–36)
MCV RBC AUTO: 83.5 FL (ref 80–100)
MONOCYTES ABSOLUTE: 0.7 K/UL (ref 0–1.3)
MONOCYTES RELATIVE PERCENT: 10.1 %
NEUTROPHILS ABSOLUTE: 5.3 K/UL (ref 1.7–7.7)
NEUTROPHILS RELATIVE PERCENT: 73.1 %
PDW BLD-RTO: 17.3 % (ref 12.4–15.4)
PLATELET # BLD: 183 K/UL (ref 135–450)
PMV BLD AUTO: 9.6 FL (ref 5–10.5)
POTASSIUM REFLEX MAGNESIUM: 4.2 MMOL/L (ref 3.5–5.1)
PROTHROMBIN TIME: 15.3 SEC (ref 10–13.2)
RBC # BLD: 3.91 M/UL (ref 4.2–5.9)
SODIUM BLD-SCNC: 137 MMOL/L (ref 136–145)
WBC # BLD: 7.3 K/UL (ref 4–11)

## 2021-06-21 PROCEDURE — 99233 SBSQ HOSP IP/OBS HIGH 50: CPT | Performed by: INTERNAL MEDICINE

## 2021-06-21 PROCEDURE — 94640 AIRWAY INHALATION TREATMENT: CPT

## 2021-06-21 PROCEDURE — 6360000002 HC RX W HCPCS: Performed by: STUDENT IN AN ORGANIZED HEALTH CARE EDUCATION/TRAINING PROGRAM

## 2021-06-21 PROCEDURE — 2700000000 HC OXYGEN THERAPY PER DAY

## 2021-06-21 PROCEDURE — 94669 MECHANICAL CHEST WALL OSCILL: CPT

## 2021-06-21 PROCEDURE — 94761 N-INVAS EAR/PLS OXIMETRY MLT: CPT

## 2021-06-21 PROCEDURE — 85025 COMPLETE CBC W/AUTO DIFF WBC: CPT

## 2021-06-21 PROCEDURE — 6370000000 HC RX 637 (ALT 250 FOR IP): Performed by: STUDENT IN AN ORGANIZED HEALTH CARE EDUCATION/TRAINING PROGRAM

## 2021-06-21 PROCEDURE — 99232 SBSQ HOSP IP/OBS MODERATE 35: CPT | Performed by: INTERNAL MEDICINE

## 2021-06-21 PROCEDURE — 2580000003 HC RX 258: Performed by: STUDENT IN AN ORGANIZED HEALTH CARE EDUCATION/TRAINING PROGRAM

## 2021-06-21 PROCEDURE — 80048 BASIC METABOLIC PNL TOTAL CA: CPT

## 2021-06-21 PROCEDURE — 1200000000 HC SEMI PRIVATE

## 2021-06-21 PROCEDURE — 93010 ELECTROCARDIOGRAM REPORT: CPT | Performed by: INTERNAL MEDICINE

## 2021-06-21 PROCEDURE — 87641 MR-STAPH DNA AMP PROBE: CPT

## 2021-06-21 PROCEDURE — 36415 COLL VENOUS BLD VENIPUNCTURE: CPT

## 2021-06-21 PROCEDURE — 2580000003 HC RX 258: Performed by: SURGERY

## 2021-06-21 PROCEDURE — 85520 HEPARIN ASSAY: CPT

## 2021-06-21 PROCEDURE — 71045 X-RAY EXAM CHEST 1 VIEW: CPT

## 2021-06-21 PROCEDURE — 85610 PROTHROMBIN TIME: CPT

## 2021-06-21 RX ORDER — PANTOPRAZOLE SODIUM 40 MG/1
40 TABLET, DELAYED RELEASE ORAL
Status: DISCONTINUED | OUTPATIENT
Start: 2021-06-22 | End: 2021-06-22 | Stop reason: HOSPADM

## 2021-06-21 RX ORDER — WARFARIN SODIUM 5 MG/1
2.5 TABLET ORAL DAILY
Status: DISCONTINUED | OUTPATIENT
Start: 2021-06-22 | End: 2021-06-22 | Stop reason: HOSPADM

## 2021-06-21 RX ORDER — WARFARIN SODIUM 5 MG/1
5 TABLET ORAL
Status: COMPLETED | OUTPATIENT
Start: 2021-06-21 | End: 2021-06-21

## 2021-06-21 RX ORDER — LINEZOLID 2 MG/ML
600 INJECTION, SOLUTION INTRAVENOUS EVERY 12 HOURS
Status: DISCONTINUED | OUTPATIENT
Start: 2021-06-21 | End: 2021-06-22 | Stop reason: HOSPADM

## 2021-06-21 RX ADMIN — PIPERACILLIN AND TAZOBACTAM 3375 MG: 3; .375 INJECTION, POWDER, LYOPHILIZED, FOR SOLUTION INTRAVENOUS at 18:26

## 2021-06-21 RX ADMIN — LINEZOLID 600 MG: 600 INJECTION, SOLUTION INTRAVENOUS at 16:20

## 2021-06-21 RX ADMIN — POLYETHYLENE GLYCOL 3350 17 G: 17 POWDER, FOR SOLUTION ORAL at 10:36

## 2021-06-21 RX ADMIN — DOCUSATE SODIUM 100 MG: 100 CAPSULE, LIQUID FILLED ORAL at 10:36

## 2021-06-21 RX ADMIN — PIPERACILLIN AND TAZOBACTAM 3375 MG: 3; .375 INJECTION, POWDER, LYOPHILIZED, FOR SOLUTION INTRAVENOUS at 10:36

## 2021-06-21 RX ADMIN — WARFARIN SODIUM 5 MG: 5 TABLET ORAL at 18:26

## 2021-06-21 RX ADMIN — TAMSULOSIN HYDROCHLORIDE 0.4 MG: 0.4 CAPSULE ORAL at 10:36

## 2021-06-21 RX ADMIN — Medication 10 ML: at 02:28

## 2021-06-21 RX ADMIN — Medication 10 ML: at 22:53

## 2021-06-21 RX ADMIN — Medication 10 ML: at 10:37

## 2021-06-21 RX ADMIN — PIPERACILLIN AND TAZOBACTAM 3375 MG: 3; .375 INJECTION, POWDER, LYOPHILIZED, FOR SOLUTION INTRAVENOUS at 02:41

## 2021-06-21 RX ADMIN — HEPARIN SODIUM 26 UNITS/KG/HR: 10000 INJECTION, SOLUTION INTRAVENOUS at 06:47

## 2021-06-21 ASSESSMENT — PAIN DESCRIPTION - LOCATION: LOCATION: ABDOMEN

## 2021-06-21 ASSESSMENT — PAIN SCALES - GENERAL
PAINLEVEL_OUTOF10: 0

## 2021-06-21 ASSESSMENT — PAIN DESCRIPTION - PAIN TYPE: TYPE: CHRONIC PAIN

## 2021-06-21 NOTE — PROGRESS NOTES
Pt alert and oriented. VSS. With an exception to O2 sat. Upon assessing patient. O2 sat was at 82% on  2.5L, increased to 3L and patient only got to 88%, increased to 4L and patient us currently at 92%. Pt ambulating to restroom with walker and SBA. Denies pain.  Will continue to monitor

## 2021-06-21 NOTE — CARE COORDINATION
Cm following, pt with increased O2 needs to 5L O2, repeat CXR today. On Hep gtt, INR 1.3 bridging coumadin. Spoke with Naomi Certain will restart precert today for poss SNF   Placement, vs HHC at [place].   Electronically signed by Vivek Chester RN on 6/21/2021 at 12:00 PM  [phone]

## 2021-06-21 NOTE — PROGRESS NOTES
Progress Note    Admit Date: 6/13/2021  Diet: ADULT DIET; Regular  Adult Oral Nutrition Supplement; Standard High Calorie/High Protein Oral Supplement    CC: SOB    Interval history:   NAEON. O2 req increased to 5 L today AM. Pending repeat CXR. Tolerating PO intake, +BM, voiding as usual. Working with PT/OT to improve strength. Denies any n/v, abd pain, cp, sob. Is ready to get up and moving to get stronger. Otherwise denies any other complaints. Medications:     Scheduled Meds:   [START ON 6/22/2021] pantoprazole  40 mg Oral QAM AC    warfarin  5 mg Oral Once    Followed by   Juan José Serrano ON 6/22/2021] warfarin  2.5 mg Oral Daily    docusate sodium  100 mg Oral BID    polyethylene glycol  17 g Oral BID    bisacodyl  10 mg Rectal Once    tamsulosin  0.4 mg Oral Daily    piperacillin-tazobactam  3,375 mg Intravenous Q8H    sodium chloride flush  10 mL Intravenous 2 times per day     Continuous Infusions:   heparin (PORCINE) Infusion 26 Units/kg/hr (06/21/21 0647)    sodium chloride       PRN Meds:heparin (porcine), heparin (porcine), ipratropium-albuterol, perflutren lipid microspheres, sodium chloride flush, sodium chloride, ondansetron, prochlorperazine    Objective:   Vitals:   T-max:  Patient Vitals for the past 8 hrs:   BP Temp Temp src Pulse Resp SpO2   06/21/21 1126 132/77 98.2 °F (36.8 °C) Oral 91 -- 97 %   06/21/21 0823 123/80 97.8 °F (36.6 °C) Axillary 88 19 92 %   06/21/21 0806 -- -- -- -- 20 (!) 88 %       Intake/Output Summary (Last 24 hours) at 6/21/2021 1142  Last data filed at 6/21/2021 0809  Gross per 24 hour   Intake 237 ml   Output 300 ml   Net -63 ml     Physical Exam  Constitutional:       General: He is not in acute distress. Appearance: He is not ill-appearing or toxic-appearing. Comments: Frail appearing. Cachetic    HENT:      Mouth/Throat:      Mouth: Mucous membranes are dry. Eyes:      Pupils: Pupils are equal, round, and reactive to light.    Cardiovascular: Rate and Rhythm: Normal rate and regular rhythm. Pulses: Normal pulses. Heart sounds: Normal heart sounds. No murmur heard. Pulmonary:      Effort: Pulmonary effort is normal.      Comments: Poor air entry. Diminished lung sounds. No wheezing appreciated  Abdominal:      General: Bowel sounds are normal.      Palpations: Abdomen is soft. Tenderness: There is no abdominal tenderness. Comments: Scaphoid   Musculoskeletal:         General: No swelling. Neurological:      Mental Status: He is alert and oriented to person, place, and time. Mental status is at baseline. LABS:    CBC:   Recent Labs     06/19/21 0531 06/20/21 0521 06/21/21 0446   WBC 8.9 8.8 7.3   HGB 10.6* 10.9* 10.6*   HCT 32.6* 33.4* 32.6*    175 183   MCV 84.3 83.9 83.5     Renal:    Recent Labs     06/19/21 0531 06/20/21 0520 06/20/21 0521 06/21/21 0446    136 136 137   K 3.9 4.4 4.2 4.2    101 101 101   CO2 25 27 25 29   BUN 23* 27* 27* 27*   CREATININE 1.2 1.1 1.1 1.2   GLUCOSE 128* 168* 169* 113*   CALCIUM 7.6* 7.7* 8.0* 7.9*   MG 2.00 2.00  --   --    PHOS 2.4* 2.6  --   --    ANIONGAP 9 8 10 7     Hepatic:   Recent Labs     06/19/21 0531 06/20/21 0520   LABALBU 2.3* 2.5*     Troponin:   No results for input(s): TROPONINI in the last 72 hours. BNP: No results for input(s): BNP in the last 72 hours. Lipids: No results for input(s): CHOL, HDL in the last 72 hours. Invalid input(s): LDLCALCU, TRIGLYCERIDE  ABGs:  No results for input(s): PHART, YGU0LQS, PO2ART, UNH9AHW, BEART, THGBART, C7NDBCVH, XTI5GRN in the last 72 hours. INR:   Recent Labs     06/19/21 0531 06/20/21 0521 06/21/21 0446   INR 2.08* 1.44* 1.32*     Lactate: No results for input(s): LACTATE in the last 72 hours. Cultures:  -----------------------------------------------------------------  RAD:   XR CHEST PORTABLE   Final Result      Interval worsening of multifocal right-sided airspace consolidation. Relatively improved aeration of the left lung compared with prior study. XR ABDOMEN (KUB) (SINGLE AP VIEW)   Final Result      Enteric contrast in colon without evidence of small bowel obstruction on this single view      CT CHEST WO CONTRAST   Final Result   1. Extensive consolidation of the left lower lobe pleural fluid extending posteriorly and in the major fissure to the apex. 2. Left upper lobe bronchiectasis with anterior perihilar airspace disease   3. Interval development of right lower lobe pneumonia when compared to prior study 6/13/2021 and groundglass airspace disease in the posterior segment of the right upper lobe. 4. Nasogastric tube in the lower thoracic esophagus above the level of the stomach, hiatal hernia is present      XR CHEST PORTABLE   Final Result   1. Apparent increased volume loss the left upper lobe with diffuse groundglass density. This may be accentuated due to the patient's positioning and rotation to the left. 2. Localized density along the left costophrenic sulcus which is elevated due to the position of the hemidiaphragm   3. Radiographic accentuation of vascular markings in the right lung, groundglass disease is not excluded. Consideration is given to noncontrast CT of the chest for further evaluation      XR ABDOMEN (2 VIEWS)   Final Result   Previously administered oral contrast is within the colon. XR CHEST PORTABLE   Final Result   New patchy bilateral interstitial and airspace opacities representing edema or infection. Stable left diaphragmatic elevation. XR ABDOMEN (2 VIEWS)   Final Result      Diffuse distention of small bowel, with oral contrast throughout loops extending into the pelvis. CT ABDOMEN PELVIS WO CONTRAST Additional Contrast? None   Final Result     Limited study. Findings suggestive of bowel obstruction. XR CHEST PORTABLE   Final Result   No evidence of acute cardiopulmonary disease. Elevated left hemidiaphragm. Assessment/Plan:     Debility  - PT/OT consulted: 18/19  - Pt would like to go home. RT to evaluate for home O2  - CM consulted: home care needs    Acute Hypoxic Respiratory Failure 2/2 aspiration PNA  - Developed during inpatient stay  - CT chest wo con shows RLL PNA. LLL pleural fluid with consolidation, GEOAVNY bronchiectasis   - Encourage incentive spirometry use. Continue Duonebs. Supplemental oxygen with goal SpO2 >92%; wean as tolerated   - Zosyn ( 06/15-)  - Regular diet. Monitor for aspiration  - Pulm consulted. Continue abx  - Interval increase in O2 req, CXR shows interval worsening of multifocal right-sided airspace consolidation     NSTEMI Type II  - Asymptomatic with initial EKG changes. Repeat EKG shown resolution of ST changes. Troponins wnl  - Echocardiogram wnl. EF 60-65%, no wall motion abnls. Pulm art pressure 43. Mech aortic valve well seated  - Cardiology following; appreciate recs     History of esophageal cancer  - s/p resection with gastric pull through     History of prostate cancer  - Stable, continue to monitor      Aortic valve repair, on warfarin  - Continue to monitor PT/INR daily. INR subtherpuetic  - On heparin gtt, Warfarin resumed, INR at 1.3    Code Status:Full Code  FEN: ADULT DIET;  Regular  Adult Oral Nutrition Supplement; Standard High Calorie/High Protein Oral Supplement  PPX:  Heparin gtt  DISPO: CIARA Hewitt MD, PGY-1  06/21/21  11:42 AM    This patient has been staffed and discussed with Dr Ronel Anderson

## 2021-06-21 NOTE — TELEPHONE ENCOUNTER
Patient in [address] and has had abnormal EKG and patients wife is asking that Justice Kins take a look at them.

## 2021-06-21 NOTE — CONSULTS
CC/HPI:  82 y.o. man whom I was asked to see for elevated troponin. He has had SBO and has had emesis with aspiration pneumonia and hypoxia. Previously seen by Dr. Elieser Morocho this admission. HPI/Update:  No chest pain. He is weak, tired, and has sob. He has had increasing requirements of O2 as well. He is being seen by pulmonary team, and apparently bronchoscopy is planned for tomorrow. PE:  Blood pressure (!) 148/82, pulse 94, temperature 98.1 °F (36.7 °C), temperature source Oral, resp. rate 19, height 5' 3.5\" (1.613 m), weight 111 lb 1.8 oz (50.4 kg), SpO2 95 %. General (appearance): Well devel. Ill-appearing  Eyes: Anicteric  Neck: No JVD  Ears/Nose/Mouth/Thorat: No cyanosis  CV: RRR   Respiratory:  Markedly diminished on the right mid/lower lung field, normal respiratory effort. On NC O2  GI: Abd s/nt/nd  Skin: Warm, dry. No rashes  Neuro/Psych: Alert and oriented x 3. Appropriate behavior  Ext:  No c/c.  No sig pitting edema  Pulses:  2+ radial    Lab Results   Component Value Date    WBC 7.3 06/21/2021    HGB 10.6 (L) 06/21/2021    HCT 32.6 (L) 06/21/2021    MCV 83.5 06/21/2021     06/21/2021     Lab Results   Component Value Date     06/21/2021    K 4.2 06/21/2021     06/21/2021    CO2 29 06/21/2021    BUN 27 (H) 06/21/2021    CREATININE 1.2 06/21/2021    GLUCOSE 113 (H) 06/21/2021    CALCIUM 7.9 (L) 06/21/2021    PROT 8.2 06/13/2021    LABALBU 2.5 (L) 06/20/2021    BILITOT 0.7 06/13/2021    ALKPHOS 64 06/13/2021    AST 23 06/13/2021    ALT 15 06/13/2021    LABGLOM 58 (A) 06/21/2021    GFRAA >60 06/21/2021    AGRATIO 1.4 05/21/2021    GLOB 3.0 05/21/2021     Lab Results   Component Value Date    INR 1.32 (H) 06/21/2021    INR 1.44 (H) 06/20/2021    INR 2.08 (H) 06/19/2021    PROTIME 15.3 (H) 06/21/2021    PROTIME 16.8 (H) 06/20/2021    PROTIME 24.3 (H) 06/19/2021     Lab Results   Component Value Date    TROPONINI 0.04 (H) 06/14/2021 6/21/2021 CXR: Worsening of multifocal R sided airspace consolidation. Improved aeration of left lung. 6/2021 TTE: EF 60-65%. Mild TR. No RWMA. RVSP 43.    6/20/2021 ECG: NSR wti hPAC and pvc. LAE.    6/15/2021 CT Chest:  1. Extensive consolidation of the left lower lobe pleural fluid extending posteriorly and in the major fissure to the apex. 2. Left upper lobe bronchiectasis with anterior perihilar airspace disease   3. Interval development of right lower lobe pneumonia when compared to prior study 6/13/2021 and groundglass airspace disease in the posterior segment of the right upper lobe.    4. Nasogastric tube in the lower thoracic esophagus above the level of the stomach, hiatal hernia is present       Current Facility-Administered Medications:     [START ON 6/22/2021] pantoprazole (PROTONIX) tablet 40 mg, 40 mg, Oral, QAM AC, Gibran Titus MD    warfarin (COUMADIN) tablet 5 mg, 5 mg, Oral, Once **FOLLOWED BY** [START ON 6/22/2021] warfarin (COUMADIN) tablet 2.5 mg, 2.5 mg, Oral, Daily, Oly Humphrey MD    linezolid (ZYVOX) IVPB 600 mg, 600 mg, Intravenous, Q12H, Oly Humphrey MD, Last Rate: 300 mL/hr at 06/21/21 1620, 600 mg at 06/21/21 1620    heparin 25,000 units in dextrose 5% 250 mL (premix) infusion, 26 Units/kg/hr, Intravenous, Continuous, Oly Humphrey MD, Last Rate: 12.5 mL/hr at 06/21/21 0647, 26 Units/kg/hr at 06/21/21 0647    heparin (porcine) injection 3,850 Units, 80 Units/kg, Intravenous, PRN, Oly Humphrey MD    heparin (porcine) injection 1,920 Units, 40 Units/kg, Intravenous, PRN, Oly Humphrey MD    ipratropium-albuterol (DUONEB) nebulizer solution 1 ampule, 1 ampule, Inhalation, Q2H PRN, Avelino Sánchez MD    docusate sodium (COLACE) capsule 100 mg, 100 mg, Oral, BID, Bambi Yañez MD, 100 mg at 06/21/21 1036    polyethylene glycol (GLYCOLAX) packet 17 g, 17 g, Oral, BID, Bambi Yañez MD, 17 g at 06/21/21 1036    bisacodyl (DULCOLAX) suppository 10 mg, 10 mg, Rectal, Once, MD July Valenzuelaal tamsulosin (FLOMAX) capsule 0.4 mg, 0.4 mg, Oral, Daily, Naren Beckwith, , 0.4 mg at 06/21/21 1036    piperacillin-tazobactam (ZOSYN) 3,375 mg in dextrose 5 % 100 mL IVPB extended infusion (mini-bag), 3,375 mg, Intravenous, Q8H, Jett Garcia MD, Stopped at 06/21/21 1442    perflutren lipid microspheres (DEFINITY) injection 1.65 mg, 1.5 mL, Intravenous, ONCE PRN, Noni Thompson MD    sodium chloride flush 0.9 % injection 10 mL, 10 mL, Intravenous, 2 times per day, Alvaro Thrasher MD, 10 mL at 06/21/21 1037    sodium chloride flush 0.9 % injection 10 mL, 10 mL, Intravenous, PRN, Alvaro Thrasher MD    0.9 % sodium chloride infusion, 25 mL, Intravenous, PRN, Alvaro Thrasher MD    ondansetron (ZOFRAN) injection 4 mg, 4 mg, Intravenous, Q6H PRN, Alvaro Thrasher MD    prochlorperazine (COMPAZINE) injection 10 mg, 10 mg, Intravenous, Q6H PRN, Nissa Mahan MD, 10 mg at 06/13/21 1300    A/P:  82 y. o. with SBO, aspiration pneumonia. Issues:  -S/P AVR  -CAD  -S/p CABg  -Hyperlipidemia  -CKD  -SBO    Recs:  -Cont antibiotics  -Heparin gtt ok for now, but if needs to be off for procedures would stop. -Bronch soon  -EF normal, no angina. Would not pursue cath now. -Perhaps low-dose bb tomorrow.  No CCB given bowel obstruction

## 2021-06-21 NOTE — TELEPHONE ENCOUNTER
Doing better but still very weak. Going to try to get him home tomorrow. Call returned but spouse states that she already got her answers.

## 2021-06-21 NOTE — PROGRESS NOTES
NUTRITION ASSESSMENT  Admission Date: 6/13/2021     Type and Reason for Visit: Reassess    NUTRITION RECOMMENDATIONS:   1. PO Diet: Continue regular diet, monitor tolerance and encourage small frequent meals throughout the day  2. ONS: Continue Ensure Enlive, pt may request additional supplements as desired    NUTRITION ASSESSMENT:  Nutritional summary & status: Patient continues to tolerate po diet with fair intake of meals and ONS ordered. Patient to be NPO at midnight tonight for bronch procedure tomorrow. Will continue to encourage ONS acceptance and monitor nutritional status. Patient admitted d/t: abdominal pain, nausea, vomiting - SBO  PMH significant for: LIOR, esophageal cancer s/p resection, prostate cancer, HTN    MALNUTRITION ASSESSMENT  Context of Malnutrition: Chronic Illness   Malnutrition Status: Severe malnutrition  Findings of the 6 clinical characteristics of malnutrition (Minimum of 2 out of 6 clinical characteristics is required to make the diagnosis of moderate or severe Protein Calorie Malnutrition based on AND/ASPEN Guidelines):  Energy Intake: Less than/equal to 75% of estimated energy requirements    Energy Intake Time: Greater than or equal to 5 days    Weight Loss %: 10% loss or greater    Weight loss Time: Greater than or equal to 3 months   Body Fat Loss: Moderate Loss    Body Fat Location: Triceps   Body Muscle Loss: Severe Loss   Body Muscle Loss Location: Clavicles  and Temples    Fluid Accumulation: No significant    Fluid Accumulation Location: No significant     Strength: Not Performed;  Not Measured     NUTRITION DIAGNOSIS   Problem: Problem #1: Severe malnutrition   Etiology: Decreased ability to consume sufficient energy   Signs & Symptoms: BMI, Fat Loss , Muscle loss  and Weight loss     NUTRITION INTERVENTION  Food and/or Nutrient Delivery: Continue Current Diet, Continue Oral Nutrition Supplement   Nutrition Education/Counseling: No recommendation at this time Coordination of Nutrition Care: Continue to monitor while inpatient     NUTRITION MONITORING & EVALUATION:  Evaluation:Progressing towards goal   Goals:Goals: Pt will tolerate diet advancement ot meet >50% of nutrition needs from po intakes  Monitoring: Diet advancement , Diet Tolerance , Meal Intake , Nutrition Progression  or Supplement Intake      OBJECTIVE DATA: Significant to nutrition assessment  · Nutrition-Focused Physical Findings: cachetic appearance   · Labs: Reviewed;   · Meds: Reviewed  · Wounds None      ANTHROPOMETRICS  Current Height: 5' 3.5\" (161.3 cm)  Current Weight: 111 lb 1.8 oz (50.4 kg)    Admission weight: 106 lb (48.1 kg)  Ideal Bodyweight 127 lbs   Usual Bodyweight ~106-107 per EMR   Weight Changes  -12.7 lb (11%) in last 4 months      BMI BMI (Calculated): 19.4    Wt Readings from Last 50 Encounters:   06/14/21 98 lb 5.2 oz (44.6 kg)   05/26/21 106 lb (48.1 kg)   02/26/21 126 lb (57.2 kg)   02/18/21 111 lb (50.3 kg)   12/18/20 110 lb (49.9 kg)   11/18/20 107 lb (48.5 kg)   09/15/20 107 lb (48.5 kg)   09/03/20 104 lb 11.5 oz (47.5 kg)   08/26/20 105 lb (47.6 kg)   08/19/20 106 lb (48.1 kg)   06/03/20 106 lb (48.1 kg)   05/27/20 106 lb (48.1 kg)     COMPARATIVE STANDARDS  Estimated Total Kcals/Day : 30-35 Admission Bodyweight  (45 kg) 9637-2924 kcal/day    Estimated Total Protein (g/day) : 1.2-1.5 Admission Bodyweight  (45 kg) 54-68 g/day  Estimated Daily Total Fluid (ml/day): 7508-2700 mL per day     Food / Nutrition-Related History  Pre-Admission / Home Diet:  Pre-Admission/Home Diet: General, Low Fiber (skinless fruit)   Home Supplements / Herbals:    none noted  Food Restrictions / Cultural Requests:    none noted    Current Nutrition Therapies   ADULT DIET;  Regular  Adult Oral Nutrition Supplement; Standard High Calorie/High Protein Oral Supplement  Diet NPO Exceptions are: Ice Chips, Sips of Water with Meds     PO Intake: 26-50% and 51-75%   PO Supplement: Standard High Calorie    PO Supplement Intake: 1-25%, 26-50% and None   IVF: N/A ml/hr     NUTRITION RISK LEVEL: Risk Level:  Moderate     Briseyda López RD, LD  [place]:  [phone]  Office:  [phone]

## 2021-06-21 NOTE — TELEPHONE ENCOUNTER
----- Message from Stephani Beavers sent at 6/21/2021  9:53 AM EDT -----  Subject: Message to Provider    QUESTIONS  Information for Provider? PLEASE HAVE ELLI CALL STEFFEN SOTO ABOUT     ---------------------------------------------------------------------------  --------------  CALL BACK INFO  What is the best way for the office to contact you? OK to leave message on   voicemail  Preferred Call Back Phone Number? [phone]  ---------------------------------------------------------------------------  --------------  SCRIPT ANSWERS  Relationship to Patient? Third Party  Representative Name?  Steffen

## 2021-06-21 NOTE — PROGRESS NOTES
Pulmonary & Critical Care Medicine    Admit Date: 2021  PCP: Patrice Mills MD    CC:  Aspiration pneumonia  Events of Last 24 hours:   O2 requirement is increasing. There is no fever or chills. Work of breathing is within normal      Vitals:  Tmax:  VITALS:  /77   Pulse 91   Temp 98.2 °F (36.8 °C) (Oral)   Resp 19   Ht 5' 3.5\" (1.613 m)   Wt 111 lb 1.8 oz (50.4 kg)   SpO2 96%   BMI 19.37 kg/m²   24HR INTAKE/OUTPUT:      Intake/Output Summary (Last 24 hours) at 2021 1457  Last data filed at 2021 0809  Gross per 24 hour   Intake --   Output 300 ml   Net -300 ml     CURRENT PULSE OXIMETRY:  SpO2: 96 %  24HR PULSE OXIMETRY RANGE:  SpO2  Av %  Min: 88 %  Max: 97 %    EXAM:  General: No distress. Alert. Eyes: PERRL. No sclera icterus. No conjunctival injection. ENT: No discharge. Moist oral mucosa   Neck: Trachea midline. Neck is supple   Resp: No accessory muscle use. Bilateral rales . CV: Regular rate. Mechanical aortic valve sound  GI: Non-tender. Non-distended. Normal bowel sounds. Neuro: Awake. Speech is clear. Psych: No anxiety or agitation. Medications:    IV:   heparin (PORCINE) Infusion 26 Units/kg/hr (21 0647)    sodium chloride           Scheduled Meds:   [START ON 2021] pantoprazole  40 mg Oral QAM AC    warfarin  5 mg Oral Once    Followed by   Jerald Jackson ON 2021] warfarin  2.5 mg Oral Daily    linezolid  600 mg Intravenous Q12H    docusate sodium  100 mg Oral BID    polyethylene glycol  17 g Oral BID    bisacodyl  10 mg Rectal Once    tamsulosin  0.4 mg Oral Daily    piperacillin-tazobactam  3,375 mg Intravenous Q8H    sodium chloride flush  10 mL Intravenous 2 times per day         Diet: ADULT DIET;  Regular  Adult Oral Nutrition Supplement; Standard High Calorie/High Protein Oral Supplement  Diet NPO Exceptions are: Ice Chips, Sips of Water with Meds     Results:  CBC:   Recent Labs     21  0531 21  1404 06/21/21  0446   WBC 8.9 8.8 7.3   HGB 10.6* 10.9* 10.6*   HCT 32.6* 33.4* 32.6*   MCV 84.3 83.9 83.5    175 183     BMP:   Recent Labs     06/19/21  0531 06/20/21  0520 06/20/21  0521 06/21/21  0446    136 136 137   K 3.9 4.4 4.2 4.2    101 101 101   CO2 25 27 25 29   PHOS 2.4* 2.6  --   --    BUN 23* 27* 27* 27*   CREATININE 1.2 1.1 1.1 1.2     LIVER PROFILE: No results for input(s): AST, ALT, LIPASE, BILIDIR, BILITOT, ALKPHOS in the last 72 hours. Invalid input(s): AMYLASE,  ALB  PT/INR:   Recent Labs     06/19/21  0531 06/20/21  0521 06/21/21 0446   PROTIME 24.3* 16.8* 15.3*   INR 2.08* 1.44* 1.32*     APTT:   Recent Labs     06/20/21 0521   APTT 47.9*     UA:No results for input(s): NITRITE, COLORU, PHUR, LABCAST, WBCUA, RBCUA, MUCUS, TRICHOMONAS, YEAST, BACTERIA, CLARITYU, SPECGRAV, LEUKOCYTESUR, UROBILINOGEN, BILIRUBINUR, BLOODU, GLUCOSEU, AMORPHOUS in the last 72 hours. Invalid input(s): Lyda Boxer    Assessment/Plan:  82 y.o. male with     Aspiration pneumonia:    There is progressive increased in O2 requirement over the past 24 hours for which I got an X ray this morning. It showed improvement of the left side airspace disease but there is new right side changes. He is not toxic. Breathing is not labored.       Plan   Get MRSA screen   Start Xyvox  Continue Zosyn  NPO after midnight for planned Shashank Lomas MD

## 2021-06-21 NOTE — PROGRESS NOTES
with enoxaparin 60 mg BID      Please call with any questions.     Keanu DemarcoD  PGY1 Pharmacy Resident  6/21/2021 9:51 AM  [id number]

## 2021-06-22 VITALS
OXYGEN SATURATION: 97 % | TEMPERATURE: 96.9 F | RESPIRATION RATE: 20 BRPM | WEIGHT: 111.11 LBS | SYSTOLIC BLOOD PRESSURE: 120 MMHG | BODY MASS INDEX: 18.97 KG/M2 | HEART RATE: 73 BPM | DIASTOLIC BLOOD PRESSURE: 72 MMHG | HEIGHT: 64 IN

## 2021-06-22 PROBLEM — J18.9 PNEUMONIA: Status: ACTIVE | Noted: 2021-06-22

## 2021-06-22 LAB
ANION GAP SERPL CALCULATED.3IONS-SCNC: 7 MMOL/L (ref 3–16)
ANTI-XA UNFRAC HEPARIN: 0.36 IU/ML (ref 0.3–0.7)
BASOPHILS ABSOLUTE: 0 K/UL (ref 0–0.2)
BASOPHILS RELATIVE PERCENT: 0.5 %
BUN BLDV-MCNC: 27 MG/DL (ref 7–20)
CALCIUM SERPL-MCNC: 8.4 MG/DL (ref 8.3–10.6)
CHLORIDE BLD-SCNC: 97 MMOL/L (ref 99–110)
CO2: 30 MMOL/L (ref 21–32)
CREAT SERPL-MCNC: 1.2 MG/DL (ref 0.8–1.3)
EOSINOPHILS ABSOLUTE: 0.4 K/UL (ref 0–0.6)
EOSINOPHILS RELATIVE PERCENT: 5.4 %
GFR AFRICAN AMERICAN: >60
GFR NON-AFRICAN AMERICAN: 58
GLUCOSE BLD-MCNC: 158 MG/DL (ref 70–99)
HCT VFR BLD CALC: 31.5 % (ref 40.5–52.5)
HEMOGLOBIN: 10.2 G/DL (ref 13.5–17.5)
INR BLD: 1.66 (ref 0.86–1.14)
LYMPHOCYTES ABSOLUTE: 1.2 K/UL (ref 1–5.1)
LYMPHOCYTES RELATIVE PERCENT: 17.6 %
MCH RBC QN AUTO: 27.5 PG (ref 26–34)
MCHC RBC AUTO-ENTMCNC: 32.4 G/DL (ref 31–36)
MCV RBC AUTO: 84.9 FL (ref 80–100)
MONOCYTES ABSOLUTE: 0.6 K/UL (ref 0–1.3)
MONOCYTES RELATIVE PERCENT: 8.5 %
NEUTROPHILS ABSOLUTE: 4.6 K/UL (ref 1.7–7.7)
NEUTROPHILS RELATIVE PERCENT: 68 %
PDW BLD-RTO: 16.9 % (ref 12.4–15.4)
PLATELET # BLD: 213 K/UL (ref 135–450)
PMV BLD AUTO: 9.8 FL (ref 5–10.5)
POTASSIUM REFLEX MAGNESIUM: 4.3 MMOL/L (ref 3.5–5.1)
PROTHROMBIN TIME: 19.3 SEC (ref 10–13.2)
RBC # BLD: 3.71 M/UL (ref 4.2–5.9)
SODIUM BLD-SCNC: 134 MMOL/L (ref 136–145)
WBC # BLD: 6.7 K/UL (ref 4–11)

## 2021-06-22 PROCEDURE — 97535 SELF CARE MNGMENT TRAINING: CPT

## 2021-06-22 PROCEDURE — 6370000000 HC RX 637 (ALT 250 FOR IP): Performed by: STUDENT IN AN ORGANIZED HEALTH CARE EDUCATION/TRAINING PROGRAM

## 2021-06-22 PROCEDURE — 99232 SBSQ HOSP IP/OBS MODERATE 35: CPT | Performed by: NURSE PRACTITIONER

## 2021-06-22 PROCEDURE — 6360000002 HC RX W HCPCS: Performed by: STUDENT IN AN ORGANIZED HEALTH CARE EDUCATION/TRAINING PROGRAM

## 2021-06-22 PROCEDURE — 94640 AIRWAY INHALATION TREATMENT: CPT

## 2021-06-22 PROCEDURE — 80048 BASIC METABOLIC PNL TOTAL CA: CPT

## 2021-06-22 PROCEDURE — 99238 HOSP IP/OBS DSCHRG MGMT 30/<: CPT | Performed by: INTERNAL MEDICINE

## 2021-06-22 PROCEDURE — 2580000003 HC RX 258: Performed by: SURGERY

## 2021-06-22 PROCEDURE — 99232 SBSQ HOSP IP/OBS MODERATE 35: CPT | Performed by: INTERNAL MEDICINE

## 2021-06-22 PROCEDURE — 36415 COLL VENOUS BLD VENIPUNCTURE: CPT

## 2021-06-22 PROCEDURE — 2580000003 HC RX 258: Performed by: STUDENT IN AN ORGANIZED HEALTH CARE EDUCATION/TRAINING PROGRAM

## 2021-06-22 PROCEDURE — 85025 COMPLETE CBC W/AUTO DIFF WBC: CPT

## 2021-06-22 PROCEDURE — 97530 THERAPEUTIC ACTIVITIES: CPT

## 2021-06-22 PROCEDURE — 94761 N-INVAS EAR/PLS OXIMETRY MLT: CPT

## 2021-06-22 PROCEDURE — 94680 O2 UPTK RST&XERS DIR SIMPLE: CPT

## 2021-06-22 PROCEDURE — 6370000000 HC RX 637 (ALT 250 FOR IP): Performed by: NURSE PRACTITIONER

## 2021-06-22 PROCEDURE — 85520 HEPARIN ASSAY: CPT

## 2021-06-22 PROCEDURE — 2700000000 HC OXYGEN THERAPY PER DAY

## 2021-06-22 PROCEDURE — 94669 MECHANICAL CHEST WALL OSCILL: CPT

## 2021-06-22 PROCEDURE — 85610 PROTHROMBIN TIME: CPT

## 2021-06-22 PROCEDURE — 6370000000 HC RX 637 (ALT 250 FOR IP): Performed by: INTERNAL MEDICINE

## 2021-06-22 RX ORDER — AMOXICILLIN AND CLAVULANATE POTASSIUM 875; 125 MG/1; MG/1
1 TABLET, FILM COATED ORAL 2 TIMES DAILY
Qty: 12 TABLET | Refills: 0 | Status: SHIPPED | OUTPATIENT
Start: 2021-06-22 | End: 2021-06-28

## 2021-06-22 RX ORDER — TAMSULOSIN HYDROCHLORIDE 0.4 MG/1
0.4 CAPSULE ORAL DAILY
Qty: 30 CAPSULE | Refills: 3 | Status: SHIPPED | OUTPATIENT
Start: 2021-06-23 | End: 2021-06-29

## 2021-06-22 RX ORDER — MECOBALAMIN 5000 MCG
5 TABLET,DISINTEGRATING ORAL NIGHTLY PRN
Status: DISCONTINUED | OUTPATIENT
Start: 2021-06-22 | End: 2021-06-22 | Stop reason: HOSPADM

## 2021-06-22 RX ADMIN — PANTOPRAZOLE SODIUM 40 MG: 40 TABLET, DELAYED RELEASE ORAL at 06:43

## 2021-06-22 RX ADMIN — Medication 10 ML: at 08:37

## 2021-06-22 RX ADMIN — METOPROLOL TARTRATE 25 MG: 25 TABLET, FILM COATED ORAL at 10:12

## 2021-06-22 RX ADMIN — HEPARIN SODIUM 26 UNITS/KG/HR: 10000 INJECTION, SOLUTION INTRAVENOUS at 04:22

## 2021-06-22 RX ADMIN — PIPERACILLIN AND TAZOBACTAM 3375 MG: 3; .375 INJECTION, POWDER, LYOPHILIZED, FOR SOLUTION INTRAVENOUS at 10:12

## 2021-06-22 RX ADMIN — LINEZOLID 600 MG: 600 INJECTION, SOLUTION INTRAVENOUS at 03:21

## 2021-06-22 RX ADMIN — TAMSULOSIN HYDROCHLORIDE 0.4 MG: 0.4 CAPSULE ORAL at 08:36

## 2021-06-22 RX ADMIN — PIPERACILLIN AND TAZOBACTAM 3375 MG: 3; .375 INJECTION, POWDER, LYOPHILIZED, FOR SOLUTION INTRAVENOUS at 03:37

## 2021-06-22 RX ADMIN — LINEZOLID 600 MG: 600 INJECTION, SOLUTION INTRAVENOUS at 15:05

## 2021-06-22 ASSESSMENT — PAIN SCALES - GENERAL
PAINLEVEL_OUTOF10: 0
PAINLEVEL_OUTOF10: 0

## 2021-06-22 NOTE — PROGRESS NOTES
Patient is alert and oriented, VSS. O2 weaned from 5 to 3.5L NC. Educated on importance of hourly I.S. Dyspneic upon exertion - has only been able to move from bed to [facility]. Patient had a large BM and is urinating freely. Tolerating diet.

## 2021-06-22 NOTE — PROGRESS NOTES
weak.  · Daily INR will be monitored and dose adjustments made as needed. Discharge recommendations: warfarin 2.5 mg daily; bridge with enoxaparin 60 mg BID until INR >2 (if CrCL decreases to <30 mL/min: Bridge with enoxaparin 60 mg daily)      Please call with any questions.     Melquiades Macias, PharmD  PGY1 Pharmacy Resident  6/22/2021 8:34 AM  [id number]

## 2021-06-22 NOTE — PROGRESS NOTES
Pulmonary & Critical Care Medicine    Admit Date: 2021  PCP: Arlet Acosta MD    CC:  Aspiration pneumonia  Events of Last 24 hours:   O2 requirement is increasing. There is no fever or chills. Work of breathing is within normal      Vitals:  Tmax:  VITALS:  /74   Pulse 85   Temp 97.8 °F (36.6 °C) (Oral)   Resp 20   Ht 5' 3.5\" (1.613 m)   Wt 111 lb 1.8 oz (50.4 kg)   SpO2 94%   BMI 19.37 kg/m²   24HR INTAKE/OUTPUT:    No intake or output data in the 24 hours ending 21 1202  CURRENT PULSE OXIMETRY:  SpO2: 94 %  24HR PULSE OXIMETRY RANGE:  SpO2  Av.1 %  Min: 92 %  Max: 96 %    EXAM:  General: No distress. Alert. Eyes: PERRL. No sclera icterus. No conjunctival injection. ENT: No discharge. Moist oral mucosa   Neck: Trachea midline. Neck is supple   Resp: No accessory muscle use. Bilateral rales . CV: Regular rate. Mechanical aortic valve sound  GI: Non-tender. Non-distended. Normal bowel sounds. Neuro: Awake. Speech is clear. Psych: No anxiety or agitation. Medications:    IV:   heparin (PORCINE) Infusion 26 Units/kg/hr (21)    sodium chloride           Scheduled Meds:   metoprolol tartrate  25 mg Oral BID    pantoprazole  40 mg Oral QAM AC    warfarin  2.5 mg Oral Daily    linezolid  600 mg Intravenous Q12H    docusate sodium  100 mg Oral BID    polyethylene glycol  17 g Oral BID    bisacodyl  10 mg Rectal Once    tamsulosin  0.4 mg Oral Daily    piperacillin-tazobactam  3,375 mg Intravenous Q8H    sodium chloride flush  10 mL Intravenous 2 times per day         Diet: Adult Oral Nutrition Supplement; Standard High Calorie/High Protein Oral Supplement  ADULT DIET;  Regular     Results:  CBC:   Recent Labs     21  0521 21  0446 21  0533   WBC 8.8 7.3 6.7   HGB 10.9* 10.6* 10.2*   HCT 33.4* 32.6* 31.5*   MCV 83.9 83.5 84.9    183 213     BMP:   Recent Labs     21  0520 21  0521 21  0446 21  0533   NA

## 2021-06-22 NOTE — CARE COORDINATION
Case Management Assessment            Discharge Note                    Date / Time of Note: 6/22/2021 3:58 PM                  Discharge Note Completed by: Sheila Long RN    Patient Name: Owen Shah MD   YOB: 1935  Diagnosis: [facility] ([facility] [Q69.165]  Pneumonia [J18.9]   Date / Time: 6/13/2021  1:50 AM    Current PCP: Neto Jennings MD  Clinic patient: No    Hospitalization in the last 30 days: No    Advance Directives:  Code Status: Full Code  [place] DNR form completed and on chart: Not Indicated    Financial:  Payor: [employer] / Plan: [facility] PPO / Product Type: Medicare /      Pharmacy:    [facility] [address], [place]  [place]  [address] [id number]  Phone: [phone] Fax: 848 [facility], [address] [facility] [phone] [facility] [phone]  [address] [facility] [address]  Phone: [phone] Fax: [phone]      Assistance purchasing medications?: Potential Assistance Purchasing Medications: No  Assistance provided by Case Management: None at this time    Does patient want to participate in local refill/ meds to beds program?: No    Meds To Beds General Rules:  1. Can ONLY be done Monday- Friday between 8:30am-5pm  2. Prescription(s) must be in pharmacy by 3pm to be filled same day  3. Copy of patient's insurance/ prescription drug card and patient face sheet must be sent along with the prescription(s)  4. Cost of Rx cannot be added to hospital bill. If financial assistance is needed, please contact unit  or ;  or  CANNOT provide pharmacy voucher for patients co-pays  5.  Patients can then  the prescription on their way out of the hospital at discharge, or pharmacy can deliver to the bedside if staff is available. (payment due at time of pick-up or delivery - cash, check, or card accepted)     Able to afford home medications/ co-pay costs: Yes    ADLS:  Current PT AM-PAC Score: 18 /24  Current OT AM-PAC Score: 20 /24      DISCHARGE Disposition: Home with [address]: [address] [place] [address] [employer] [facility]     LOC at discharge: Not Applicable  DIALLO Completed: Not Indicated    Notification completed in HENS/PAS?:  Not Applicable    IMM Completed:   Yes, Case management has presented and reviewed IMM letter #2 to the patient and/or family/ POA. Patient and/or family/POA verbalized understanding of their medicare rights and appeal process if needed. Patient and/or family/POA has signed, initialed and placed today's date (6/22/21) and time ([phone]) on IMM letter #2 on the the appropriate lines. Patient and/or family/POA, copy of letter offered and they are aware that this original copy of IMM letter #2 is available prior to discharge from the paper chart on the unit. Electronic documentation has been entered into epic for IMM letter #2 and original paper copy has been added to the paper chart at the nurses station. Transportation:  Transportation PLAN for discharge: family   [facility]  [address] ordered at discharge: Yes  [address]: [facility]  Phone: [phone]  Fax: [phone]  Orders faxed: Sumi Mariano aware of DC today will follow at home    Durable Medical Equipment:  DME Provider: none  Equipment obtained during hospitalization:     Home Oxygen and Respiratory Equipment:  Oxygen needed at discharge?: yes  [address]: [facility]  Phone: [phone]   Portable tank available for discharge?: Yes    Dialysis:  Dialysis patient: No    Dialysis Center:  Not Applicable      Additional CM Notes: Pt from home will DC home with wife Myra Abdullahi [employer]issa [employer] care and private pay for home O2 with [employer].  Wife will drive home, [employer] will deliver O2 to bedside    The Plan for Transition of Care is related to the following treatment goals of SBO (small bowel obstruction) ([facility] [K56.609]  Pneumonia [J18.9]    The Patient and/or patient representative Makenzie Deleon and his family were provided with a choice of provider and agrees with the discharge plan Yes    Freedom of choice list was provided with basic dialogue that supports the patient's individualized plan of care/goals and shares the quality data associated with the providers.  Yes    Care Transitions patient: Yes    Levan Claude, RN  [facility]  Case Management Department  Ph: [phone]  Fax: [phone]

## 2021-06-22 NOTE — DISCHARGE INSTR - COC
Continuity of Care Form    Patient Name: Markell Yuan MD   :  1935  MRN:  [id number]    Admit date:  2021  Discharge date:  ***    Code Status Order: Full Code   Advance Directives:   Advance Care Flowsheet Documentation       Date/Time Healthcare Directive Type of Healthcare Directive Copy in [address] Agent's Name Healthcare Agent's Phone Number    21 2014  Yes, patient has an advance directive for healthcare treatment  Health care treatment directive  Yes, copy in chart  Spouse  Tamiko Landaverde  [phone]            Admitting Physician:  No admitting provider for patient encounter.   PCP: Brinda Hooks MD    Discharging Nurse: [facility] Unit/Room#: 8605/0428-13  Discharging Unit Phone Number: ***    Emergency Contact:   Extended Emergency Contact Information  Primary Emergency Contact: Yvrose Ricketts  Address: [address] [place] [address] Phone: [phone]  Work Phone: [phone]  Mobile Phone: [phone]  Relation: Spouse  Secondary Emergency Contact: [address] Phone: [phone]  Relation: Child    Past Surgical History:  Past Surgical History:   Procedure Laterality Date    APPENDECTOMY      as a child    BONE GRAFT      for saddle nose    CARDIAC VALVE REPLACEMENT      aorta    CHOLECYSTECTOMY      COLONOSCOPY  2009    COLONOSCOPY  10/05/15    COLONOSCOPY N/A 2021    COLONOSCOPY DIAGNOSTIC/STOMA performed by Sandra Lambert MD at [address] [place] [address]  2021    COLONOSCOPY POLYPECTOMY SNARE/COLD BIOPSY performed by Sandra Lambert MD at [place] [address]  2006    ESOPHAGUS SURGERY      EYE SURGERY  &     cataract bilat removal     GASTRECTOMY      PROSTATE SURGERY      seeds    TONSILLECTOMY      UPPER GASTROINTESTINAL ENDOSCOPY N/A 2021    EGD BIOPSY performed by [facility] MD [facility]) D68.2    Foreign body accidentally left during a procedure T81.509A    History of disease Z87.898    Abdominal pain R10.9    Postural dizziness with near syncope R42, R55    SBO (small bowel obstruction) ([address] K56.609    UGI bleed K92.2    GI bleed K92.2    Tachycardia R00.0    Physical debility R53.81    Pneumonia J18.9       Isolation/Infection:   Isolation            No Isolation          Patient Infection Status       Infection Onset Added Last Indicated Last Indicated By Review Planned Expiration Resolved Resolved By    None active    Resolved    COVID-19 Rule Out 11/19/20 11/19/20 11/19/20 COVID-19 (Ordered)   11/19/20 Rule-Out Test Resulted    COVID-19 Rule Out 08/19/20 08/19/20 08/19/20 COVID-19 (Ordered)   08/20/20 Peng Lovell RN            Nurse Assessment:  Last Vital Signs: /74   Pulse 85   Temp 97.8 °F (36.6 °C) (Oral)   Resp 20   Ht 5' 3.5\" (1.613 m)   Wt 111 lb 1.8 oz (50.4 kg)   SpO2 94%   BMI 19.37 kg/m²     Last documented pain score (0-10 scale): Pain Level: 0  Last Weight:   Wt Readings from Last 1 Encounters:   06/20/21 111 lb 1.8 oz (50.4 kg)     Mental Status:  oriented and alert    IV Access:  - None    Nursing Mobility/ADLs:  Walking   Assisted  Transfer  Assisted  Bathing  Assisted  Dressing  Assisted  Toileting  Assisted  Feeding  Independent  Med Admin  Independent  Med Delivery   whole    Wound Care Documentation and Therapy:        Elimination:  Continence:   · Bowel: {YES / JL:70479}  · Bladder: {YES / PF:51126}  Urinary Catheter: None   Colostomy/Ileostomy/Ileal Conduit: {YES / BN:71321}       Date of Last BM: ***  No intake or output data in the 24 hours ending 06/22/21 1205  I/O last 3 completed shifts:  In: -   Out: 300 [Urine:300]    Safety Concerns:      At Risk for Falls    Impairments/Disabilities:      None    Nutrition Therapy:  Current Nutrition Therapy:   - Oral Diet:  General  - Oral Nutrition Supplement:  Standard  four times a day    Routes of Feeding: Oral  Liquids: {Slp liquid thickness:92945}  Daily Fluid Restriction: no  Last Modified Barium Swallow with Video (Video Swallowing Test): not done    Treatments at the Time of Hospital Discharge:   Respiratory Treatments: ***  Oxygen Therapy:  new home oxygen at 3L  Ventilator:    - No ventilator support    Rehab Therapies: {THERAPEUTIC INTERVENTION:1587681793}  Weight Bearing Status/Restrictions: No weight bearing restirctions  Other Medical Equipment (for information only, NOT a DME order):  {EQUIPMENT:636059708}  Other Treatments: ***    Patient's personal belongings (please select all that are sent with patient):  jyoti Henderson    RN SIGNATURE:  Electronically signed by David Ceja RN on 6/22/21 at 2:58 PM EDT    CASE MANAGEMENT/SOCIAL WORK SECTION    Inpatient Status Date: ***    Readmission Risk Assessment Score:  Readmission Risk              Risk of Unplanned Readmission:  36           Discharging to Facility/ Agency   · Name:   · Address:  · Phone:  · Fax:    Dialysis Facility (if applicable)   · Name:  · Address:  · Dialysis Schedule:  · Phone:  · Fax:    / signature: {Esignature:[id number]:::0}    PHYSICIAN SECTION    Prognosis: Good    Condition at Discharge: Stable    Rehab Potential (if transferring to Rehab): Good    Recommended Labs or Other Treatments After Discharge:     PT/OT eval and treat    Physician Certification: I certify the above information and transfer of Kym Haywood MD  is necessary for the continuing treatment of the diagnosis listed and that he requires Home Care for greater 30 days.      Update Admission H&P: No change in H&P    PHYSICIAN SIGNATURE:  Electronically signed by Hank Harris MD on 6/22/21 at 12:05 PM EDT

## 2021-06-22 NOTE — PROGRESS NOTES
Progress Note    Admit Date: 6/13/2021  Diet: Diet NPO Exceptions are: Ice Chips, Sips of Water with Meds    CC: SOB    Interval history:   NAEON. O2 req decreased to 2 L today AM.    Tolerating PO intake, +BM, voiding as usual. Working with PT/OT to improve strength. Denies any n/v, abd pain, cp, sob. Is ready to get up and moving to get stronger. Otherwise denies any other complaints. Medications:     Scheduled Meds:   metoprolol tartrate  25 mg Oral BID    pantoprazole  40 mg Oral QAM AC    warfarin  2.5 mg Oral Daily    linezolid  600 mg Intravenous Q12H    docusate sodium  100 mg Oral BID    polyethylene glycol  17 g Oral BID    bisacodyl  10 mg Rectal Once    tamsulosin  0.4 mg Oral Daily    piperacillin-tazobactam  3,375 mg Intravenous Q8H    sodium chloride flush  10 mL Intravenous 2 times per day     Continuous Infusions:   heparin (PORCINE) Infusion 26 Units/kg/hr (06/22/21 0422)    sodium chloride       PRN Meds:melatonin, heparin (porcine), heparin (porcine), ipratropium-albuterol, perflutren lipid microspheres, sodium chloride flush, sodium chloride, ondansetron, prochlorperazine    Objective:   Vitals:   T-max:  Patient Vitals for the past 8 hrs:   BP Temp Temp src Pulse Resp SpO2   06/22/21 0900 -- -- -- -- 20 94 %   06/22/21 0730 132/74 97.8 °F (36.6 °C) Oral 85 18 92 %   06/22/21 0246 (!) 147/82 98 °F (36.7 °C) Oral 90 18 92 %     No intake or output data in the 24 hours ending 06/22/21 1003  Physical Exam  Constitutional:       General: He is not in acute distress. Appearance: He is not ill-appearing or toxic-appearing. Comments: Frail appearing. Cachetic    HENT:      Mouth/Throat:      Mouth: Mucous membranes are dry. Eyes:      Pupils: Pupils are equal, round, and reactive to light. Cardiovascular:      Rate and Rhythm: Normal rate and regular rhythm. Pulses: Normal pulses. Heart sounds: Normal heart sounds. No murmur heard.      Pulmonary:      Effort: Pulmonary effort is normal.      Comments: Poor air entry. Diminished lung sounds. No wheezing appreciated  Abdominal:      General: Bowel sounds are normal.      Palpations: Abdomen is soft. Tenderness: There is no abdominal tenderness. Comments: Scaphoid   Musculoskeletal:         General: No swelling. Neurological:      Mental Status: He is alert and oriented to person, place, and time. Mental status is at baseline. LABS:    CBC:   Recent Labs     06/20/21 0521 06/21/21 0446 06/22/21  0533   WBC 8.8 7.3 6.7   HGB 10.9* 10.6* 10.2*   HCT 33.4* 32.6* 31.5*    183 213   MCV 83.9 83.5 84.9     Renal:    Recent Labs     06/20/21 0520 06/20/21 0521 06/21/21 0446 06/22/21  0533    136 137 134*   K 4.4 4.2 4.2 4.3    101 101 97*   CO2 27 25 29 30   BUN 27* 27* 27* 27*   CREATININE 1.1 1.1 1.2 1.2   GLUCOSE 168* 169* 113* 158*   CALCIUM 7.7* 8.0* 7.9* 8.4   MG 2.00  --   --   --    PHOS 2.6  --   --   --    ANIONGAP 8 10 7 7     Hepatic:   Recent Labs     06/20/21 0520   LABALBU 2.5*     Troponin:   No results for input(s): TROPONINI in the last 72 hours. BNP: No results for input(s): BNP in the last 72 hours. Lipids: No results for input(s): CHOL, HDL in the last 72 hours. Invalid input(s): LDLCALCU, TRIGLYCERIDE  ABGs:  No results for input(s): PHART, PGY5KVO, PO2ART, BKI2JOB, BEART, THGBART, J2TBQNJH, FFA4FAA in the last 72 hours. INR:   Recent Labs     06/20/21 0521 06/21/21 0446 06/22/21  0533   INR 1.44* 1.32* 1.66*     Lactate: No results for input(s): LACTATE in the last 72 hours. Cultures:  -----------------------------------------------------------------  RAD:   XR CHEST PORTABLE   Final Result      Interval worsening of multifocal right-sided airspace consolidation. Relatively improved aeration of the left lung compared with prior study.       XR ABDOMEN (KUB) (SINGLE AP VIEW)   Final Result      Enteric contrast in colon without evidence of small bowel obstruction on this single view      CT CHEST WO CONTRAST   Final Result   1. Extensive consolidation of the left lower lobe pleural fluid extending posteriorly and in the major fissure to the apex. 2. Left upper lobe bronchiectasis with anterior perihilar airspace disease   3. Interval development of right lower lobe pneumonia when compared to prior study 6/13/2021 and groundglass airspace disease in the posterior segment of the right upper lobe. 4. Nasogastric tube in the lower thoracic esophagus above the level of the stomach, hiatal hernia is present      XR CHEST PORTABLE   Final Result   1. Apparent increased volume loss the left upper lobe with diffuse groundglass density. This may be accentuated due to the patient's positioning and rotation to the left. 2. Localized density along the left costophrenic sulcus which is elevated due to the position of the hemidiaphragm   3. Radiographic accentuation of vascular markings in the right lung, groundglass disease is not excluded. Consideration is given to noncontrast CT of the chest for further evaluation      XR ABDOMEN (2 VIEWS)   Final Result   Previously administered oral contrast is within the colon. XR CHEST PORTABLE   Final Result   New patchy bilateral interstitial and airspace opacities representing edema or infection. Stable left diaphragmatic elevation. XR ABDOMEN (2 VIEWS)   Final Result      Diffuse distention of small bowel, with oral contrast throughout loops extending into the pelvis. CT ABDOMEN PELVIS WO CONTRAST Additional Contrast? None   Final Result     Limited study. Findings suggestive of bowel obstruction. XR CHEST PORTABLE   Final Result   No evidence of acute cardiopulmonary disease. Elevated left hemidiaphragm. Assessment/Plan:     Debility  - PT/OT consulted: 18/19  - Pt would like to go home.  RT to evaluate for home O2  - CM consulted: home care ordered    Acute Hypoxic Respiratory Failure 2/2 aspiration PNA  - Developed during inpatient stay  - CT chest wo con shows RLL PNA. LLL pleural fluid with consolidation, GEOVANY bronchiectasis   - Encourage incentive spirometry use. Continue Duonebs. Supplemental oxygen with goal SpO2 >92%; wean as tolerated   - Zosyn ( 06/15-)  - Regular diet. Monitor for aspiration  - Pulm consulted: Pending MRSA nasal probe, on Zyvox ans zosyn. Possible bronch today. However pt's respiratory status has improved dramatically compared to yesterday     NSTEMI Type II  - Asymptomatic with initial EKG changes. Repeat EKG shown resolution of ST changes. Troponins wnl  - Echocardiogram wnl. EF 60-65%, no wall motion abnls. Pulm art pressure 43. [facility] aortic valve well seated  - Cardiology following; appreciate recs     History of esophageal cancer  - s/p resection with gastric pull through     History of prostate cancer  - Stable, continue to monitor      Aortic valve repair, on warfarin  - Continue to monitor PT/INR daily.  INR subtherpuetic  - On heparin gtt, Warfarin resumed, INR at 1.66    Code Status:Full Code  FEN: Diet NPO Exceptions are: Ice Chips, Sips of Water with Meds  PPX:  Heparin gtt  DISPO: CIARA Colbert MD, PGY-1  06/22/21  10:03 AM    This patient has been staffed and discussed with Dr Marya Navarro

## 2021-06-22 NOTE — PLAN OF CARE
Problem: Falls - Risk of:  Goal: Will remain free from falls  Description: Will remain free from falls  Outcome: Completed  Goal: Absence of physical injury  Description: Absence of physical injury  Outcome: Completed     Problem: Nutrition  Goal: Optimal nutrition therapy  Outcome: Completed     Problem: Pain:  Goal: Pain level will decrease  Description: Pain level will decrease  Outcome: Completed  Goal: Control of acute pain  Description: Control of acute pain  Outcome: Completed  Goal: Control of chronic pain  Description: Control of chronic pain  Outcome: Completed     Problem: Nausea/Vomiting:  Goal: Absence of nausea/vomiting  Description: Absence of nausea/vomiting  Outcome: Completed  Goal: Able to drink  Description: Able to drink  Outcome: Completed  Goal: Able to eat  Description: Able to eat  Outcome: Completed  Goal: Ability to achieve adequate nutritional intake will improve  Description: Ability to achieve adequate nutritional intake will improve  Outcome: Completed     Problem: Skin Integrity:  Goal: Will show no infection signs and symptoms  Description: Will show no infection signs and symptoms  Outcome: Completed  Goal: Absence of new skin breakdown  Description: Absence of new skin breakdown  Outcome: Completed

## 2021-06-22 NOTE — PROGRESS NOTES
CC/HPI:  82 y.o. man whom I was asked to see for elevated troponin. He has had SBO and has had emesis with aspiration pneumonia and hypoxia. Previously seen by Dr. Caitlin Stephens this admission. HPI/Update:  Feels better today. No angina. SOB improved. No n/v. O2 down to 3L. Bronch cancelled d/t improvement. PE:  Blood pressure 131/81, pulse 75, temperature 97.1 °F (36.2 °C), temperature source Oral, resp. rate 20, height 5' 3.5\" (1.613 m), weight 111 lb 1.8 oz (50.4 kg), SpO2 95 %. General (appearance): Well devel. Ill-appearing  Eyes: Anicteric  Neck: No JVD  Ears/Nose/Mouth/Thorat: No cyanosis  CV: RRR  Respiratory:  R-sided rales, but overall breath sounds better today than yesterday. GI: Abd s/nt/nd  Skin: Warm, dry. No rashes  Neuro/Psych: Alert and oriented x 3. Appropriate behavior  Ext:  No c/c. No sig pitting edema  Pulses:  2+ radial    Lab Results   Component Value Date    WBC 6.7 06/22/2021    HGB 10.2 (L) 06/22/2021    HCT 31.5 (L) 06/22/2021    MCV 84.9 06/22/2021     06/22/2021     Lab Results   Component Value Date     (L) 06/22/2021    K 4.3 06/22/2021    CL 97 (L) 06/22/2021    CO2 30 06/22/2021    BUN 27 (H) 06/22/2021    CREATININE 1.2 06/22/2021    GLUCOSE 158 (H) 06/22/2021    CALCIUM 8.4 06/22/2021    PROT 8.2 06/13/2021    LABALBU 2.5 (L) 06/20/2021    BILITOT 0.7 06/13/2021    ALKPHOS 64 06/13/2021    AST 23 06/13/2021    ALT 15 06/13/2021    LABGLOM 58 (A) 06/22/2021    GFRAA >60 06/22/2021    AGRATIO 1.4 05/21/2021    GLOB 3.0 05/21/2021     Lab Results   Component Value Date    INR 1.66 (H) 06/22/2021    INR 1.32 (H) 06/21/2021    INR 1.44 (H) 06/20/2021    PROTIME 19.3 (H) 06/22/2021    PROTIME 15.3 (H) 06/21/2021    PROTIME 16.8 (H) 06/20/2021     Lab Results   Component Value Date    TROPONINI 0.04 (H) 06/14/2021 6/21/2021 CXR: Worsening of multifocal R sided airspace consolidation. Improved aeration of left lung. 6/2021 TTE: EF 60-65%. Mild TR. No RWMA.  RVSP 43.    6/20/2021 ECG: NSR wti hPAC and pvc. LAE.    6/15/2021 CT Chest:  1. Extensive consolidation of the left lower lobe pleural fluid extending posteriorly and in the major fissure to the apex. 2. Left upper lobe bronchiectasis with anterior perihilar airspace disease   3. Interval development of right lower lobe pneumonia when compared to prior study 6/13/2021 and groundglass airspace disease in the posterior segment of the right upper lobe.    4. Nasogastric tube in the lower thoracic esophagus above the level of the stomach, hiatal hernia is present       Current Facility-Administered Medications:     melatonin disintegrating tablet 5 mg, 5 mg, Oral, Nightly PRN, Jadiel Mobley MD    metoprolol tartrate (LOPRESSOR) tablet 25 mg, 25 mg, Oral, BID, JS Workman - CNP, 25 mg at 06/22/21 1012    pantoprazole (PROTONIX) tablet 40 mg, 40 mg, Oral, [facility], Rosy Montez MD, 40 mg at 06/22/21 0940    [COMPLETED] warfarin (COUMADIN) tablet 5 mg, 5 mg, Oral, Once, 5 mg at 06/21/21 1826 **FOLLOWED BY** warfarin (COUMADIN) tablet 2.5 mg, 2.5 mg, Oral, Daily, Jadiel Mobley MD    linezolid (ZYVOX) IVPB 600 mg, 600 mg, Intravenous, Q12H, Jadiel Mobley MD, Stopped at 06/22/21 0541    heparin 25,000 units in dextrose 5% 250 mL (premix) infusion, 26 Units/kg/hr, Intravenous, Continuous, Jadiel Mobley MD, Last Rate: 12.5 mL/hr at 06/22/21 0422, 26 Units/kg/hr at 06/22/21 0422    heparin (porcine) injection 3,850 Units, 80 Units/kg, Intravenous, PRN, Jadiel Mobley MD    heparin (porcine) injection 1,920 Units, 40 Units/kg, Intravenous, PRN, Jadiel Mobley MD    ipratropium-albuterol (DUONEB) nebulizer solution 1 ampule, 1 ampule, Inhalation, Q2H PRN, Eva Glasgow MD    docusate sodium (COLACE) capsule 100 mg, 100 mg, Oral, BID, Mechelle Howe MD, 100 mg at 06/21/21 1036    polyethylene glycol (GLYCOLAX) packet 17 g, 17 g, Oral, BID, Mechelle Howe MD, 17 g at 06/21/21 1036   bisacodyl (DULCOLAX) suppository 10 mg, 10 mg, Rectal, Once, Jelani Walker MD    tamsulosin (FLOMAX) capsule 0.4 mg, 0.4 mg, Oral, Daily, Naren Beckwith DO, 0.4 mg at 06/22/21 0836    piperacillin-tazobactam (ZOSYN) 3,375 mg in dextrose 5 % 100 mL IVPB extended infusion (mini-bag), 3,375 mg, Intravenous, Q8H, Cali Stevenson MD, Last Rate: 25 mL/hr at 06/22/21 1012, 3,375 mg at 06/22/21 1012    perflutren lipid microspheres (DEFINITY) injection 1.65 mg, 1.5 mL, Intravenous, ONCE PRN, Thao Crawford MD    sodium chloride flush 0.9 % injection 10 mL, 10 mL, Intravenous, 2 times per day, Migel Barillas MD, 10 mL at 06/22/21 0837    sodium chloride flush 0.9 % injection 10 mL, 10 mL, Intravenous, PRN, Migel Barillas MD    0.9 % sodium chloride infusion, 25 mL, Intravenous, PRN, Migel Barillas MD    ondansetron (ZOFRAN) injection 4 mg, 4 mg, Intravenous, Q6H PRN, Migel Barillas MD    prochlorperazine (COMPAZINE) injection 10 mg, 10 mg, Intravenous, Q6H PRN, Joaquim Dean MD, 10 mg at 06/13/21 1300    A/P:  82 y. o. with SBO, aspiration pneumonia. Issues:  -S/P AVR  -CAD  -S/p CABg  -Hyperlipidemia  -CKD  -SBO    Recs:  -Cont antibiotics  -AC as tolerates  -Do not rec cath  -Agree with low-dose BB    At least 25 minutes was spent with the patient/patient and family, over half of which time was spent on counseling and coordinating care for the above plan.

## 2021-06-22 NOTE — PROGRESS NOTES
assistance  Toilet Transfers Comments: pushed from toilet (has handicap height toilet at home)    Bed mobility  Supine to Sit: Modified independent  Sit to Supine: Modified independent     Transfers  Sit to stand: Stand by assistance (bed)  Stand to sit: Stand by assistance (bed)           Cognition  Overall Cognitive Status: [facility]                 Plan   Plan  Times per week: 2-5  Current Treatment Recommendations: Strengthening, Balance Training, Functional Mobility Training, Endurance Training, Patient/Caregiver Education & Training, Safety Education & Training, Self-Care / ADL    AM-PAC Score        AM-PAC Inpatient Daily Activity Raw Score: 20 (06/22/21 1534)  AM-PAC Inpatient ADL T-Scale Score : 42.03 (06/22/21 1534)  ADL Inpatient CMS 0-100% Score: 38.32 (06/22/21 1534)  ADL Inpatient CMS G-Code Modifier : [employer] (06/22/21 1534)    Goals  Short term goals  Time Frame for Short term goals: d/c  Short term goal 1: 1 grooming task standing with SBA- not met  Short term goal 2: Tolerate 5 mins functional standing activity with CGA and SPO2 > 90%- goal met 6/22. ongoing for consistency  Short term goal 3: Toilet t/f and hygiene from ambulatory level with SBA-- goal met 6/22  Short term goal 4: LB dressing with CGA-- not met  Patient Goals   Patient goals : \"I want to be off oxygen and be able to workout again. \"       Therapy Time   Individual Concurrent Group Co-treatment   Time In 1502         Time Out 1526         Minutes 24         Timed Code Treatment Minutes: Enrique 176, OT

## 2021-06-22 NOTE — PROGRESS NOTES
Pt alert and oriented. VSS. With an exception to O2 sat. Pt weaned from 3.5L NC to 3L d patient currently at 92%. Pt up to bedside commode with walker and SBA. Denies pain. Call light within each. Bed alarm on.  Will continue to monitor

## 2021-06-22 NOTE — DISCHARGE SUMMARY
INTERNAL MEDICINE DEPARTMENT AT [address] [place] [address]  DISCHARGE SUMMARY    Patient ID: Rosa Morris MD                                             Discharge Date: 6/22/2021   Patient's PCP: Rosy Montez MD                                          Discharge Physician: Jadiel Mobley MD MD  Admit Date: 6/13/2021   Admitting Physician: No admitting provider for patient encounter. DISCHARGE DIAGNOSES:  Hospital Problems         Last Modified POA    * (Principal) Aspiration pneumonia ([address] 6/17/2021 Yes    Pneumonia, aspiration ([address] 6/17/2021 Yes    Acute respiratory failure with hypoxia ([address] 6/18/2021 Yes    Aspiration into lower respiratory tract 6/17/2021 Yes    Hypoxia 6/14/2021 Yes    History of esophageal cancer 6/13/2021 Yes    History of prostate cancer 6/13/2021 Yes    S/P AVR 6/21/2021 Yes    Coronary 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 ([address] 6/14/2021 Yes    Essential hypertension 6/13/2021 Yes    Overview Signed 9/15/2020  1:08 PM by Lino Mckeon RN     Overview:   ICD-10 Transition         SBO (small bowel obstruction) ([address] 6/17/2021 Yes    Tachycardia 6/14/2021 Yes    Physical debility 6/18/2021 Yes    Pneumonia 6/22/2021 Yes        Hospital Course:      Rosa Morris MD is a 82 y.o. male h/o prostate cancer status post brachytherapy, aortic valve replacement on warfarin, CAD s/p CABG, esophageal cancer status post chemoradiation and esophagectomy with gastric pull through in 2010 [facility], presents with 2 days of abdominal pain in the periumbilical and upper abdomen. Pt was admitted under general surgery for SBO, was treated conservatively. Pt's bowel function improved with NPO, NG tube to decompression. By hospital day 3, pt started having increased O2 requirement, CT chest showed right lower lobe consolidation. Pt was started on Zosyn for suspected aspiration PNA. Pulmonolgy was consulted; recommended continuing the abx.  Over the next few days, pt's O2 requirement decreased and was weaned down to 2L NC. Pt will be discharged on home O2 and 7 days of Augmentin (total 14 days of abx). MRSA nares were negative. During the stay, pt was transitioned to heparin gtt, INR remained elevated initially when pt was NPO. Warfarin was resumed while pt remained on the heparin gtt closer to the discharge date. On the day of discharge, INR was 1.6, will be discharged on lovenox for 5 days. Pt checks his INR at home and manages warfarin by himself. Seen at bedside on day of discharge, no acute complaints. Tolerating PO diet, ambulating in the room, voiding as usual, + BMs. Feels safe to be d/c home. Has home care set up. Denies F/C, N/V, CP, SOB, palpitations, , GI sx. Physical Exam:  /74   Pulse 85   Temp 97.8 °F (36.6 °C) (Oral)   Resp 20   Ht 5' 3.5\" (1.613 m)   Wt 111 lb 1.8 oz (50.4 kg)   SpO2 94%   BMI 19.37 kg/m²   Physical Exam  Constitutional:       General: He is not in acute distress. Appearance: Normal appearance. He is obese. He is not ill-appearing, toxic-appearing or diaphoretic. Eyes:      Pupils: Pupils are equal, round, and reactive to light. Cardiovascular:      Rate and Rhythm: Normal rate and regular rhythm. Pulses: Normal pulses. Heart sounds: Normal heart sounds. No murmur heard. Pulmonary:      Effort: Pulmonary effort is normal. No respiratory distress. Breath sounds: Normal breath sounds. Abdominal:      General: Bowel sounds are normal. There is no distension. Tenderness: There is no abdominal tenderness. There is no guarding. Musculoskeletal:         General: No swelling. Right lower leg: No edema. Left lower leg: No edema. Skin:     General: Skin is warm. Neurological:      Mental Status: He is alert and oriented to person, place, and time. Mental status is at baseline.    Psychiatric:         Mood and Affect: Mood normal.         Behavior: Behavior normal. Thought Content: Thought content normal.         Judgment: Judgment normal.       Consults:  IP CONSULT TO GENERAL SURGERY  IP CONSULT TO CARDIOLOGY  IP CONSULT TO CARDIOLOGY  IP CONSULT TO PULMONOLOGY  IP CONSULT TO PHARMACY  IP CONSULT TO HOME CARE NEEDS    Disposition: home  Discharged Condition: Stable  Follow Up: Primary Care Physician in one week    DISCHARGE MEDICATION:     Medication List      START taking these medications    amoxicillin-clavulanate 875-125 MG per tablet  Commonly known as: AUGMENTIN  Take 1 tablet by mouth 2 times daily for 6 days     enoxaparin 60 MG/0.6ML injection  Commonly known as: Lovenox  Inject 0.5 mLs into the skin 2 times daily for 5 days     metoprolol tartrate 25 MG tablet  Commonly known as: LOPRESSOR  Take 1 tablet by mouth 2 times daily     tamsulosin 0.4 MG capsule  Commonly known as: FLOMAX  Take 1 capsule by mouth daily  Start taking on: June 23, 2021        CHANGE how you take these medications    Simethicone 80 MG Tabs  Take 80 tablets by mouth 3 times daily. What changed:   · how much to take  · when to take this     warfarin 5 MG tablet  Commonly known as: COUMADIN  Take as directed. If you are unsure how to take this medication, talk to your nurse or doctor. Original instructions: TAKE ONE TABLET BY MOUTH ONCE NIGHTLY  What changed: See the new instructions.         CONTINUE taking these medications    Alpha Lipoic Acid-Biotin 300-333 MG-MCG Caps     atorvastatin 10 MG tablet  Commonly known as: LIPITOR  TAKE ONE TABLET BY MOUTH DAILY     CoQ10 400 MG Caps     docusate sodium 100 MG capsule  Commonly known as: COLACE     ferrous sulfate 325 (65 Fe) MG tablet  Commonly known as: Daniele-Markie  Take 1 tablet by mouth every 48 hours     omeprazole 40 MG delayed release capsule  Commonly known as: PRILOSEC  TAKE ONE CAPSULE BY MOUTH DAILY     polyethylene glycol 17 GM/SCOOP powder  Commonly known as: GLYCOLAX     Viagra 50 MG tablet  Generic drug: sildenafil     vitamin

## 2021-06-22 NOTE — PROGRESS NOTES
CC: Elevated troponin  HPI:   82 y.o. SBO who had emesis with aspiration PNA and hypoxia. Cardiology asked to see for elevated troponin. S: Denies cp or sob. Remains on 2 liters oxygen    Tele: Sinus, PVC, PAC    O:  Physical Exam:  /74   Pulse 85   Temp 97.8 °F (36.6 °C) (Oral)   Resp 20   Ht 5' 3.5\" (1.613 m)   Wt 111 lb 1.8 oz (50.4 kg)   SpO2 94%   BMI 19.37 kg/m²    General (appearance):  No acute distress  Eyes: anicteric   Neck: soft, No JVD  Ears/Nose/Mouth/Thorat: No cyanosis  CV: RRR   Respiratory:  Diminished breath ousnds. normal effort  GI: soft, non-tender, non-distended  Skin: Warm, dry. No rashes  Neuro/Psych: Alert and oriented x 3. Appropriate behavior  Ext:  No c/c. No edema  Pulses:  2+ radial     I.O's=     Weight  Admission: Weight: 106 lb (48.1 kg)   Today: Weight: 111 lb 1.8 oz (50.4 kg)    CBC:   Recent Labs     21  0521  0446 21  0533   WBC 8.8 7.3 6.7   HGB 10.9* 10.6* 10.2*   HCT 33.4* 32.6* 31.5*   MCV 83.9 83.5 84.9    183 213     BMP:   Recent Labs     21  0520 21  0521 21  0446 21  0533    136 137 134*   K 4.4 4.2 4.2 4.3    101 101 97*   CO2 27 25 29 30   PHOS 2.6  --   --   --    BUN 27* 27* 27* 27*   CREATININE 1.1 1.1 1.2 1.2     Mag:   Lab Results   Component Value Date    MG 2.00 2021     PT/INR:   Recent Labs     21  0521 21  0446 21  0533   PROTIME 16.8* 15.3* 19.3*   INR 1.44* 1.32* 1.66*     Pro-BNP:   Lab Results   Component Value Date    PROBNP 2,662 06/15/2021    PROBNP 1,944 2021         Imagin2021 CXR: Worsening of multifocal R sided airspace consolidation. Improved aeration of left lung. 2021 ECG: NSR wti hPAC and pvc. LAE.    6/15/2021 CT chest  1. Extensive consolidation of the left lower lobe pleural fluid extending posteriorly and in the major fissure to the apex.    2. Left upper lobe bronchiectasis with anterior perihilar airspace disease   3. Interval development of right lower lobe pneumonia when compared to prior study 6/13/2021 and groundglass airspace disease in the posterior segment of the right upper lobe. 4. Nasogastric tube in the lower thoracic esophagus above the level of the stomach, hiatal hernia is present     6/15/2021 Limited echo   Normal left ventricle size, wall thickness, and systolic function with an   estimated ejection fraction of 60-65%. No regional wall motion abnormalities are seen. Mild tricuspid regurgitation. Estimated pulmonary artery systolic pressure is at [id number] assuming a right atrial pressure of 3mmHg. 6/10/2020 Echo:   Normal left ventricular size. Normal left ventricular systolic function with   an estimated ejection fraction of 55-60%. Grade I diastolic dysfunction with   normal LV filling pressures. Mitral annular calcification is present. The left atrium is dilated. Mechanical aortic valve is present. Significant artifact from that. Maximum   velocity is 1.77 m/s and mean gradient is 7 mm Hg. Mild aortic   regurgitation. Mild tricuspid regurgitation. Mild pulmonic regurgitation present. Assessment:  82 y. o. with SBO and aspiration PNA  Issues:  -s/p AVR (mechanical)  -CAD/CABG  -HTN  -HLD  -CKD  -SBO  -Aspiration PNA  -Hypoxia     Plan:  -Keep K>4, Mg>2.  -Bronch today  -Heparin gtt with bridge to warfarin. Ok to stop if needed for procedure  -EF normal, no chest pain.  No plan for cath at this time  -Metoprolol 25 mg po bid   -

## 2021-06-22 NOTE — PROGRESS NOTES
[facility]    Respiratory Therapy     Home Oxygen Evaluation        Name: Sharyle Combe, MD  Medical Record Number: [id number]  Age: 82 y.o. Gender:  male   : 1935  Today's date: 2021  Room: [address] [place] [address]-      Assessment        /74   Pulse 85   Temp 97.8 °F (36.6 °C) (Oral)   Resp 20   Ht 5' 3.5\" (1.613 m)   Wt 111 lb 1.8 oz (50.4 kg)   SpO2 (S) 90% Comment: after home oxygen eval  BMI 19.37 kg/m²     Patient Active Problem List   Diagnosis    Esophageal cancer ([address]    Asthma    Aortic valve disorder    Psychosexual dysfunction with inhibited sexual excitement    Anemia associated with chronic renal failure (HCC)    Nausea & vomiting    Pneumonia, aspiration ([address]    Patient underweight    Hernia, femoral, bilateral    Osteoporosis, senile    Aspiration pneumonia (HCC)    Acute respiratory failure with hypoxia (HCC)    Aspiration into lower respiratory tract    Hypoxia    Chronic kidney disease, stage III (moderate) (HCC)    Personal history of esophageal cancer    History of esophageal cancer    History of prostate cancer    S/P AVR    Coronary 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 (HCC)    Gastroparesis    Iron deficiency anemia    Anemia    Essential hypertension    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[address]    Foreign body accidentally left during a procedure    History of disease    Abdominal pain    Postural dizziness with near syncope    SBO (small bowel obstruction) (HCC)    UGI bleed    GI bleed    Tachycardia    Physical debility    Pneumonia       Social History:  Social History     Tobacco Use    Smoking status: Never Smoker    Smokeless tobacco: Never Used   Substance Use Topics    Alcohol use:  Yes     Alcohol/week: 0.0 standard drinks     Comment: occassionally    Drug use: No       Patient Room Air saturation at rest 90  %  Patient Room Air saturation upon ambulation 85 %    Oxygen saturations of 88% or less on RA qualifies patient for Home Oxygen    Patient resting on 0  lmp  with an oxygen saturation of  90 %     Patient ambulated on 0 lpm with an oxygen saturation of 85%    Patient ambulated on 1 lpm with an oxygen saturation of 86%    Patient ambulated on 2 lpm with an oxygen saturation of 87%     Patient ambulated on 3 lpm with an oxygen saturation of 89%    Qualifying patient for home oxygen with ambulation and continuous flow  @ 3 lpm.      In your clinical assessment does the Patient Require Portable Oxygen Tanks?     Yes               Patient/caregiver was educated on Home Oxygen process:  Yes      Level of patient/caregiver understanding able to:   [x] Verbalize understanding   [] Demonstrate understanding       [] Teach back        [] Needs reinforcement        []  No available caregiver               []  Other:     Response to education:  Excellent     Time Spent with Home O2 Set Up:  15  minutes     ABHINAV [place] on 6/22/2021 at 12:43 PM                 .

## 2021-06-23 ENCOUNTER — ANTI-COAG VISIT (OUTPATIENT)
Dept: INTERNAL MEDICINE CLINIC | Age: 86
End: 2021-06-23

## 2021-06-23 ENCOUNTER — CARE COORDINATION (OUTPATIENT)
Dept: CASE MANAGEMENT | Age: 86
End: 2021-06-23

## 2021-06-23 ENCOUNTER — TELEPHONE (OUTPATIENT)
Dept: INTERNAL MEDICINE CLINIC | Age: 86
End: 2021-06-23

## 2021-06-23 DIAGNOSIS — I35.9 AORTIC VALVE DISORDER: Primary | ICD-10-CM

## 2021-06-23 LAB
INR BLD: 2.3
MRSA SCREEN RT-PCR: NORMAL

## 2021-06-23 NOTE — CARE COORDINATION
[facility] Transitions Initial Follow Up Call    Call within 2 business days of discharge: yes     Patient: Erin Umana MD Patient : 1935   MRN: [id number]    Reason for Admission: pneumonia   Discharge Date: 21  RARS: Readmission Risk Score: 36      Last Discharge [facility]       Complaint Diagnosis Description Type Department Provider    21 Abdominal Pain; Nausea; Emesis SBO (small bowel obstruction) [facility]) ED to Hosp-Admission (Discharged) (ADMITTED) Christin Godfrey MD; Ceasar Lay... Spoke with: Erin Umana MD    Facility: [facility]      Non-face-to-face services provided:  Obtained and reviewed discharge summary and/or continuity of care documents  Education of patient/family/caregiver/guardian to support self-management-   Assessment and support for treatment adherence and medication management-      Care Transitions 24 Hour Call    Do you have any ongoing symptoms?: Yes  Patient-reported symptoms: Shortness of Breath  Interventions for patient-reported symptoms: Other  Do you have a copy of your discharge instructions?: Yes  Do you have all of your prescriptions and are they filled?: Yes  Have you been contacted by a [address]?: No  Have you scheduled your follow up appointment?: Yes  Were you discharged with any Home Care or Post Acute Services: Yes  Post Acute Services: Home Health (Comment: [place])  Do you feel like you have everything you need to keep you well at home?: Yes  Care Transitions Interventions       Challenges to be reviewed by the provider   Additional needs identified to be addressed with provider:   NO    Discussed COVID-19 related testing which was not completed     Test results were not completed     Patient informed of results, if available? NA               Method of communication with provider :   phone      Was this a readmission?   no    Care Transition Nurse (CTN) contacted the patient by telephone to perform post hospital discharge assessment. Verified name with patient as identifier. Provided introduction to self, and explanation of the CTN role. CTN reviewed discharge instructions, medical action plan and red flags with patient who verbalized understanding. Patient given an opportunity to ask questions and does not have any further questions or concerns at this time. Were discharge instructions available to patient? Yes      Reviewed appropriate site of care based on symptoms and resources available to patient including:      PCP   Specialist   After hour contact number   Urgent [address]    When to call 911       The patient agrees to contact the PCP office for questions related to their healthcare. Medication reconciliation was performed with Patient, who verbalizes understanding of administration of home medications. Advised obtaining a 90-day supply of all daily and as-needed medications. Covid Risk Education    Patient has following risk factors of:   Acute respiratory failure  Chronic kidney dx   Immunocompromised   Pneumonia    Education provided regarding infection prevention, and signs and symptoms of COVID-19 and when to seek medical attention with patient who verbalized understanding. Discussed exposure protocols and quarantine From CDC: Are you at higher risk for severe illness?   and given an opportunity for questions and concerns. The patient agrees to contact the COVID-19 hotline [phone] or PCP office for questions related to COVID-19. For more information on steps you can take to protect yourself, see CDC's How to Protect Yourself     Patient's preferred e-mail:  ON FILE     Discussed follow-up appointments. If no appointment was previously scheduled, appointment scheduling offered:  PCP 6/29     Is follow up appointment scheduled within 7 days of discharge?   YES     Non-[facility] follow up appointment(s):  NO    Plan for f/u call in 3-5 days based on severity of symptoms and risk factors. CTN provided contact information for future needs. SUMMARY  CTN spoke with Eusebio Das with [facility] who confirms [facility]. order was received. CTC spoke to the Pt who states he is improving. He reports SOB with exertion and that oxygen saturation drops. Pt states that oxygen returns to high 90's once he sits and rest.  Pt denies issues with appetite and urination and LBM was today. Pt declined to review all meds with CTC. Pt has appt with PCP 6/29. Pt will take all meds as prescribed and schedule / keep doctors appt. CTC provided education on s/s that require medical attention and when to seek medical attention. CTC advised Pt of use urgent care or physicians 24 hr access line if assistance is needed after hours or on the weekend. Pt denies any needs or concerns and is agreeable with additional calls.     Follow Up  Future Appointments   Date Time Provider Miladis Mchugh   6/29/2021 11:30 AM Adeel Crenshaw MD [facility] [address] [place] [address]   8/26/2021  3:00 PM MD TIGIST Astorga [address] [place] [address], RN

## 2021-06-23 NOTE — TELEPHONE ENCOUNTER
Ginny Perdomo  [facility] [address] [place] [address] Desk  Subject: Appointment Request     Reason for Call: Routine Hospital Follow Up     QUESTIONS   Type of Appointment? Established Patient   Reason for appointment request? Available appointments did not meet   patient need   Additional Information for Provider? Patient was released from [address] [place] [address] on   6/22 for pneumonia and bowel obstruction. Requested to be seen with in 7   days. ---------------------------------------------------------------------------   --------------   Liz GRANGER   What is the best way for the office to contact you? OK to leave message on   voicemail   Preferred Call Back Phone Number?  [phone]

## 2021-06-23 NOTE — TELEPHONE ENCOUNTER
[facility] Transitions Initial Follow Up Call    Outreach made within 2 business days of discharge: Yes    Patient: Owen Shah MD Patient : 1935   MRN: <[id number]>  Reason for Admission: There are no discharge diagnoses documented for the most recent discharge. Discharge Date: 21       Spoke with: PATIENT     Discharge department/facility: [facility]    TCM Interactive Patient Contact:  Was patient able to fill all prescriptions: Yes  Was patient instructed to bring all medications to the follow-up visit: Yes  Is patient taking all medications as directed in the discharge summary? Yes  Does patient understand their discharge instructions: Yes  Does patient have questions or concerns that need addressed prior to 7-14 day follow up office visit: no    Scheduled appointment with PCP within 7-14 days    Follow Up  Future Appointments   Date Time Provider Miladis Mchugh   2021  3:00 PM Neto Jennings MD [address]    AT [address] [facility] NOT NEED  E [facility]

## 2021-06-28 ENCOUNTER — CARE COORDINATION (OUTPATIENT)
Dept: CASE MANAGEMENT | Age: 86
End: 2021-06-28

## 2021-06-28 NOTE — CARE COORDINATION
[facility] 45 Transitions Follow Up Call    2021    Patient: Ivis Proctor MD  Patient : 1935   MRN: [id number]    Reason for Admission:  SBO - pneumonia   Discharge Date: 21  RARS: Readmission Risk Score: 39         Spoke with: Ivis Proctor MD      Care Transitions Subsequent and Final Call    Subsequent and Final Calls  Do you have any ongoing symptoms?: No  Have your medications changed?: Yes  Patient Reports: no longer on oxygen   Do you have any questions related to your medications?: No  Do you currently have any active services?: Yes  Are you currently active with any services?: Home Health  Do you have any needs or concerns that I can assist you with?: No  Identified Barriers: None  Care Transitions Interventions  Other Interventions:         SUMMARY  CTN spoke to the Pt who states he is doing fine. Pt denies issues with appetite, urination, and LBM was today. Pt reports he has seen PT today and has appt with OT later this afternoon. Pt reports that he has PCP and pulmonologist appts tomorrow. 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 Pt also states he does not take hypertensive meds and reports BP was 110/75 today and he check his BP several times a day. Pt reports he received prescription for Flomax but it is not needed so he is not taking. From CDC: Are you at higher risk for severe illness?  Wash your hands often.  Avoid close contact (6 feet, which is about two arm lengths) with people who are sick.  Put distance between yourself and other people if COVID-19 is spreading in your community.  Clean and disinfect frequently touched surfaces.  Avoid all cruise travel and non-essential air travel.  Call your healthcare professional if you have concerns about COVID-19 and your underlying condition or if you are sick.     Pt will take all meds as

## 2021-06-29 ENCOUNTER — OFFICE VISIT (OUTPATIENT)
Dept: PULMONOLOGY | Age: 86
End: 2021-06-29
Payer: MEDICARE

## 2021-06-29 ENCOUNTER — OFFICE VISIT (OUTPATIENT)
Dept: INTERNAL MEDICINE CLINIC | Age: 86
End: 2021-06-29
Payer: MEDICARE

## 2021-06-29 ENCOUNTER — TELEPHONE (OUTPATIENT)
Dept: SURGERY | Age: 86
End: 2021-06-29

## 2021-06-29 VITALS
DIASTOLIC BLOOD PRESSURE: 72 MMHG | BODY MASS INDEX: 18.15 KG/M2 | SYSTOLIC BLOOD PRESSURE: 120 MMHG | WEIGHT: 106.3 LBS | OXYGEN SATURATION: 98 % | HEART RATE: 88 BPM | HEIGHT: 64 IN | TEMPERATURE: 97.1 F

## 2021-06-29 VITALS
DIASTOLIC BLOOD PRESSURE: 84 MMHG | SYSTOLIC BLOOD PRESSURE: 142 MMHG | WEIGHT: 106 LBS | BODY MASS INDEX: 18.48 KG/M2 | HEART RATE: 97 BPM | OXYGEN SATURATION: 98 %

## 2021-06-29 DIAGNOSIS — R09.02 HYPOXIA: ICD-10-CM

## 2021-06-29 DIAGNOSIS — K56.609 SBO (SMALL BOWEL OBSTRUCTION) (HCC): Primary | ICD-10-CM

## 2021-06-29 DIAGNOSIS — J69.0 ASPIRATION PNEUMONIA, UNSPECIFIED ASPIRATION PNEUMONIA TYPE, UNSPECIFIED LATERALITY, UNSPECIFIED PART OF LUNG (HCC): Primary | ICD-10-CM

## 2021-06-29 DIAGNOSIS — J69.0 ASPIRATION PNEUMONIA, UNSPECIFIED ASPIRATION PNEUMONIA TYPE, UNSPECIFIED LATERALITY, UNSPECIFIED PART OF LUNG (HCC): ICD-10-CM

## 2021-06-29 PROCEDURE — 99213 OFFICE O/P EST LOW 20 MIN: CPT | Performed by: INTERNAL MEDICINE

## 2021-06-29 NOTE — TELEPHONE ENCOUNTER
Trupti Burnette called in to let us know she did occupational therapy evaluation only, no further ot needed.

## 2021-06-29 NOTE — PROGRESS NOTES
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 who presents for : Follow-up aspiration pneumonia bowel obstruction    HPI: Patient presented with follow-up of the above he still is not very hungry he has 1 more day of antibiotics his oxygen is much better now he is off O2 his blood pressures been low and he has been off blood pressure medicines    Review of Systems:   Constitutional:  Denies fever or chills   Eyes:  Denies change in visual acuity   HENT:  Denies nasal congestion or sore throat   Respiratory:  Denies cough or shortness of breath   Cardiovascular:  Denies chest pain or edema   GI:  Denies abdominal pain, nausea, vomiting, bloody stools or diarrhea   :  Denies dysuria   Musculoskeletal:  Denies back pain or joint pain   Integument:  Denies rash   Neurologic:  Denies headache, focal weakness or sensory changes   Endocrine:  Denies polyuria or polydipsia   Lymphatic:  Denies swollen glands   Psychiatric:  Denies depression or anxiety     Past Medical History:        Diagnosis Date    Anemia     Aortic valve disorders     stenosis    Aspiration pneumonia ([address] 4/27/2015    Erectile dysfunction     Esophageal cancer ([facility] 10/18/2012    Hernia, femoral, bilateral 5/12/2014    Hyperlipidemia     Osteoporosis, senile 5/20/2014    Pancreatitis 8/1/2013    Patient underweight 8/8/2013    Prostate cancer ([facility]     Unspecified essential hypertension        Past Surgical History:        Procedure Laterality Date    APPENDECTOMY      as a child    BONE GRAFT      for saddle nose    CARDIAC VALVE REPLACEMENT  2006    aorta    CHOLECYSTECTOMY      COLONOSCOPY  11/2009    COLONOSCOPY  10/05/15    COLONOSCOPY N/A 2/17/2021    COLONOSCOPY DIAGNOSTIC/STOMA performed by Nestor Menendez MD at [address] [place] [address]  2/18/2021    COLONOSCOPY POLYPECTOMY SNARE/COLD BIOPSY performed by Nestor Menendez MD at [address]  EYE SURGERY  1990& 1992    cataract bilat removal     GASTRECTOMY      PROSTATE SURGERY      seeds    TONSILLECTOMY      UPPER GASTROINTESTINAL ENDOSCOPY N/A 2/16/2021    EGD [facility]SY [facility] [address] [facility] PROSTATE/TRANSRECTAL VOL STUDY BRACHYTHERAPY         Family History:  Family History   Problem Relation Age of Onset    Other Mother         bleeding peptic ulcer    Heart Disease Mother     Other Father         cardiovascular disease    Stroke Father     Elevated Lipids Father     Hypertension Father        Social History:  Social History     Socioeconomic History    Marital status:      Spouse name: None    Number of children: None    Years of education: None    Highest education level: None   Occupational History    None   Tobacco Use    Smoking status: Never Smoker    Smokeless tobacco: Never Used   Substance and Sexual Activity    Alcohol use: Yes     Alcohol/week: 0.0 standard drinks     Comment: occassionally    Drug use: No    Sexual activity: None   Other Topics Concern    None   Social History Narrative    None     Social Determinants of Health     Financial Resource Strain:     Difficulty of Paying Living Expenses:    Food Insecurity:     Worried About Running Out of Food in the Last Year:     Ran Out of Food in the Last Year:    Transportation Needs:     Lack of Transportation (Medical):      Lack of Transportation (Non-Medical):    Physical Activity:     Days of Exercise per Week:     Minutes of Exercise per Session:    Stress:     Feeling of Stress :    Social Connections:     Frequency of Communication with Friends and Family:     Frequency of Social Gatherings with Friends and Family:     Attends Muslim Services:     Active Member of Clubs or Organizations:     Attends Club or Organization Meetings:     Marital Status:    Intimate Partner Violence:     Fear of Current or Ex-Partner:     Emotionally Abused:     Physically Abused:     Sexually Abused: Allergies:  No known allergies    Current Medications:    Prior to Admission medications    Medication Sig Start Date End Date Taking? Authorizing Provider   metoprolol tartrate (LOPRESSOR) 25 MG tablet Take 1 tablet by mouth 2 times daily  Patient not taking: Reported on 6/29/2021 6/22/21   Devika Lynch MD   omeprazole (PRILOSEC) 40 MG delayed release capsule TAKE ONE CAPSULE BY MOUTH DAILY 5/10/21   Brinda Hooks MD   ferrous sulfate (ANTHONY-THEO) 325 (65 Fe) MG tablet Take 1 tablet by mouth every 48 hours 2/18/21   Romina Robin MD   atorvastatin (LIPITOR) 10 MG tablet TAKE ONE TABLET BY MOUTH DAILY 12/30/20   Brinda Hooks MD   Alpha Lipoic Acid-Biotin 300-333 MG-MCG CAPS 300 mg 2 times daily     Historical Provider, MD   Coenzyme Q10 (COQ10) 400 MG CAPS Take 1 capsule by mouth    Historical Provider, MD   Simethicone 80 MG TABS Take 80 tablets by mouth 3 times daily. Patient taking differently: Take 160 mg by mouth 2 times daily  3/23/15   Manda Tinsley APRN - CNS   sildenafil (VIAGRA) 50 MG tablet Take 50 mg by mouth as needed for Erectile Dysfunction. Historical Provider, MD   vitamin B-12 (CYANOCOBALAMIN) 1000 MCG tablet Take 1,000 mcg by mouth daily.     Historical Provider, MD   docusate sodium (COLACE) 100 MG capsule Take 100 mg by mouth nightly One  Capsule daily 7/20/10   Historical Provider, MD   polyethylene glycol (GLYCOLAX) powder Take 17 g by mouth 2 times daily     Historical Provider, MD       Physical Exam:  Vital Signs: BP (!) 142/84 (Site: Right Upper Arm, Position: Sitting, Cuff Size: Medium Adult)   Pulse 97   Wt 106 lb (48.1 kg)   SpO2 98%   BMI 18.48 kg/m²   General: Patient appears  non-toxic  HENT: Atraumatic, normocephalic, oral mucosa moist  Lungs:  Clear bilaterally  Heart: Regular rate and rhythm  Abdomen: Non-distended, soft, non-tender  Extremities: No edema  Neuro: Nonfocal    Medical Decision Making and Plan:  Pertinent Labs & Imaging studies reviewed.  (See chart for details)      Aspiration pneumonia now clinically improved finish up antibiotics follow-up 1 month at that time we will repeat chest x-ray  Small bowel obstruction clinically resolved continue present regimen regime of bowel prep and nutritional supplements follow-up in 1 month

## 2021-06-29 NOTE — PROGRESS NOTES
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 Aspiration pneumonia, unspecified aspiration pneumonia type, unspecified laterality, unspecified part of lung ([address]  2. Hypoxia  one  week after hospitalization for small bowel obstruction with accompanying aspiration pneumonia, Dr. 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 He has no current issues of infection or pulmonary toilet. Hypoxemia has resolved. Follow-up chest x-ray in 1 month will be reviewed    No follow-ups on file. Subjective   SUBJECTIVE/OBJECTIVE:  HPI Dr. 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 He had vomiting prior to admission and this was the source of aspiration. At discharge, he had an additional 1 week of antibiotic therapy, completing that today. He also had mild hypoxemia and required low flow O2 on discharge. After 2 days, with self-monitoring, he found O2 saturation was adequate without supplemental O2. He denies any shortness of breath with his current level of activity, and has no cough or chest discomfort. He has been seen by home physical therapy and plans to resume his previous exercise habits to regain conditioning. Objective   Physical Exam  Constitutional:       Appearance: He is well-developed and underweight. HENT:      Head: Normocephalic and atraumatic. Mouth/Throat:      Pharynx: No oropharyngeal exudate. Eyes:      General: No scleral icterus. Right eye: No discharge. Conjunctiva/sclera: Conjunctivae normal.   Neck:      Thyroid: No thyromegaly. Trachea: No tracheal deviation. Cardiovascular:      Rate and Rhythm: Normal rate and regular rhythm. Pulses:           Radial pulses are 2+ on the right side and 2+ on the left side. Heart sounds: Normal heart sounds. No murmur heard. Pulmonary:      Breath sounds: No wheezing or rhonchi. Comments: Breath sounds normal quality bilaterally. Few crackles at right lung base. Musculoskeletal:      Right lower leg: No edema. Left lower leg: No edema. Lymphadenopathy:      Cervical: No cervical adenopathy. Skin:     General: Skin is warm and dry. Neurological:      Mental Status: He is alert and oriented to person, place, and time. Psychiatric:         Mood and Affect: Mood normal.         Behavior: Behavior normal.         Thought Content: Thought content normal.         Judgment: Judgment normal.            On this date 6/29/2021 I have spent 20 minutes reviewing previous notes, test results and face to face with the patient discussing the diagnosis and importance of compliance with the treatment plan as well as documenting on the day of the visit. An electronic signature was used to authenticate this note.     --Nury Hay MD

## 2021-07-02 ENCOUNTER — CARE COORDINATION (OUTPATIENT)
Dept: CASE MANAGEMENT | Age: 86
End: 2021-07-02

## 2021-07-09 LAB — INR BLD: 1.9

## 2021-07-12 ENCOUNTER — ANTI-COAG VISIT (OUTPATIENT)
Dept: INTERNAL MEDICINE CLINIC | Age: 86
End: 2021-07-12

## 2021-07-12 DIAGNOSIS — I35.9 AORTIC VALVE DISORDER: Primary | ICD-10-CM

## 2021-07-26 ENCOUNTER — TELEPHONE (OUTPATIENT)
Dept: INTERNAL MEDICINE CLINIC | Age: 86
End: 2021-07-26

## 2021-07-26 DIAGNOSIS — D64.9 ANEMIA, UNSPECIFIED TYPE: Primary | ICD-10-CM

## 2021-07-26 NOTE — TELEPHONE ENCOUNTER
MD Flor Kerr MD 13 hours ago (7:41 PM)     I have a Dr. Sultana Jolly visit on 7/29.   He will want me to get a chest x-ray and blood drawn before seeing him.     Both my wife Teddy Heal 10[birth year]41) and I need appointments for Prolia injections

## 2021-07-28 DIAGNOSIS — D64.9 ANEMIA, UNSPECIFIED TYPE: ICD-10-CM

## 2021-07-28 LAB
BASOPHILS ABSOLUTE: 0 K/UL (ref 0–0.2)
BASOPHILS RELATIVE PERCENT: 0.7 %
EOSINOPHILS ABSOLUTE: 0.1 K/UL (ref 0–0.6)
EOSINOPHILS RELATIVE PERCENT: 1 %
HCT VFR BLD CALC: 36.9 % (ref 40.5–52.5)
HEMOGLOBIN: 12.2 G/DL (ref 13.5–17.5)
IMMATURE RETIC FRACT: 0.26 (ref 0.21–0.37)
LYMPHOCYTES ABSOLUTE: 1.5 K/UL (ref 1–5.1)
LYMPHOCYTES RELATIVE PERCENT: 24.5 %
MCH RBC QN AUTO: 29.1 PG (ref 26–34)
MCHC RBC AUTO-ENTMCNC: 33.1 G/DL (ref 31–36)
MCV RBC AUTO: 87.8 FL (ref 80–100)
MONOCYTES ABSOLUTE: 0.6 K/UL (ref 0–1.3)
MONOCYTES RELATIVE PERCENT: 9.5 %
NEUTROPHILS ABSOLUTE: 4 K/UL (ref 1.7–7.7)
NEUTROPHILS RELATIVE PERCENT: 64.3 %
PDW BLD-RTO: 17.1 % (ref 12.4–15.4)
PLATELET # BLD: 112 K/UL (ref 135–450)
PMV BLD AUTO: 10.6 FL (ref 5–10.5)
RBC # BLD: 4.2 M/UL (ref 4.2–5.9)
RETICULOCYTE ABSOLUTE COUNT: 0.03 M/UL
RETICULOCYTE COUNT PCT: 0.69 % (ref 0.5–2.18)
WBC # BLD: 6.3 K/UL (ref 4–11)

## 2021-07-29 ENCOUNTER — OFFICE VISIT (OUTPATIENT)
Dept: INTERNAL MEDICINE CLINIC | Age: 86
End: 2021-07-29
Payer: MEDICARE

## 2021-07-29 VITALS
SYSTOLIC BLOOD PRESSURE: 144 MMHG | HEIGHT: 64 IN | BODY MASS INDEX: 17.42 KG/M2 | DIASTOLIC BLOOD PRESSURE: 88 MMHG | WEIGHT: 102 LBS

## 2021-07-29 DIAGNOSIS — J69.0 ASPIRATION PNEUMONIA DUE TO REGURGITATED FOOD, UNSPECIFIED LATERALITY, UNSPECIFIED PART OF LUNG (HCC): ICD-10-CM

## 2021-07-29 DIAGNOSIS — Z87.19 HX SBO: Primary | ICD-10-CM

## 2021-07-29 LAB
A/G RATIO: 1.1 (ref 1.1–2.2)
ALBUMIN SERPL-MCNC: 3.7 G/DL (ref 3.4–5)
ALP BLD-CCNC: 73 U/L (ref 40–129)
ALT SERPL-CCNC: 11 U/L (ref 10–40)
ANION GAP SERPL CALCULATED.3IONS-SCNC: 13 MMOL/L (ref 3–16)
AST SERPL-CCNC: 19 U/L (ref 15–37)
BILIRUB SERPL-MCNC: 0.4 MG/DL (ref 0–1)
BUN BLDV-MCNC: 36 MG/DL (ref 7–20)
CALCIUM SERPL-MCNC: 9.3 MG/DL (ref 8.3–10.6)
CHLORIDE BLD-SCNC: 99 MMOL/L (ref 99–110)
CO2: 28 MMOL/L (ref 21–32)
CREAT SERPL-MCNC: 1.4 MG/DL (ref 0.8–1.3)
GFR AFRICAN AMERICAN: 58
GFR NON-AFRICAN AMERICAN: 48
GLOBULIN: 3.5 G/DL
GLUCOSE BLD-MCNC: 65 MG/DL (ref 70–99)
IRON SATURATION: 28 % (ref 20–50)
IRON: 73 UG/DL (ref 59–158)
POTASSIUM SERPL-SCNC: 4.3 MMOL/L (ref 3.5–5.1)
SODIUM BLD-SCNC: 140 MMOL/L (ref 136–145)
TOTAL IRON BINDING CAPACITY: 258 UG/DL (ref 260–445)
TOTAL PROTEIN: 7.2 G/DL (ref 6.4–8.2)

## 2021-07-29 PROCEDURE — 99213 OFFICE O/P EST LOW 20 MIN: CPT | Performed by: INTERNAL MEDICINE

## 2021-07-29 PROCEDURE — 1111F DSCHRG MED/CURRENT MED MERGE: CPT | Performed by: INTERNAL MEDICINE

## 2021-07-29 SDOH — ECONOMIC STABILITY: FOOD INSECURITY: WITHIN THE PAST 12 MONTHS, YOU WORRIED THAT YOUR FOOD WOULD RUN OUT BEFORE YOU GOT MONEY TO BUY MORE.: NEVER TRUE

## 2021-07-29 SDOH — ECONOMIC STABILITY: FOOD INSECURITY: WITHIN THE PAST 12 MONTHS, THE FOOD YOU BOUGHT JUST DIDN'T LAST AND YOU DIDN'T HAVE MONEY TO GET MORE.: NEVER TRUE

## 2021-07-29 ASSESSMENT — SOCIAL DETERMINANTS OF HEALTH (SDOH): HOW HARD IS IT FOR YOU TO PAY FOR THE VERY BASICS LIKE FOOD, HOUSING, MEDICAL CARE, AND HEATING?: NOT HARD AT ALL

## 2021-07-29 NOTE — PROGRESS NOTES
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 who presents for :  Bowel obstruction and pneumonia    HPI: Patient presented with follow-up bowel obstruction and pneumonia he is feeling much better now and he has no symptoms he is losing weight however    Review of Systems:   Constitutional:  Denies fever or chills   Eyes:  Denies change in visual acuity   HENT:  Denies nasal congestion or sore throat   Respiratory:  Denies cough or shortness of breath   Cardiovascular:  Denies chest pain or edema   GI:  Denies abdominal pain, nausea, vomiting, bloody stools or diarrhea   :  Denies dysuria   Musculoskeletal:  Denies back pain or joint pain   Integument:  Denies rash   Neurologic:  Denies headache, focal weakness or sensory changes   Endocrine:  Denies polyuria or polydipsia   Lymphatic:  Denies swollen glands   Psychiatric:  Denies depression or anxiety     Past Medical History:        Diagnosis Date    Anemia     Aortic valve disorders     stenosis    Aspiration [facility] ([facility] 4/27/2015    Erectile dysfunction     Esophageal cancer ([facility] 10/18/2012    Hernia, femoral, bilateral 5/12/2014    Hyperlipidemia     Osteoporosis, senile 5/20/2014    Pancreatitis 8/1/2013    Patient underweight 8/8/2013    Prostate cancer ([facility]     Unspecified essential hypertension        Past Surgical History:        Procedure Laterality Date    APPENDECTOMY      as a child    BONE GRAFT      for saddle nose    CARDIAC VALVE REPLACEMENT  2006    aorta    CHOLECYSTECTOMY      COLONOSCOPY  11/2009    COLONOSCOPY  10/05/15    COLONOSCOPY N/A 2/17/2021    COLONOSCOPY DIAGNOSTIC/STOMA performed by Luz Carvalho MD at [address] [place] [address]  2/18/2021    COLONOSCOPY POLYPECTOMY SNARE/COLD BIOPSY performed by Luz Carvalho MD at [place] [address]  2006    ESOPHAGUS SURGERY      EYE SURGERY  1990& 1992    cataract bilat removal     GASTRECTOMY     [facility] seeds    TONSILLECTOMY      UPPER GASTROINTESTINAL ENDOSCOPY N/A 2/16/2021    EGD BIOPSY performed by Tanya Bang MD at [address] [facility] PROSTATE/TRANSRECTAL VOL STUDY BRACHYTHERAPY         Family History:  Family History   Problem Relation Age of Onset    Other Mother         bleeding peptic ulcer    Heart Disease Mother     Other Father         cardiovascular disease    Stroke Father     Elevated Lipids Father     Hypertension Father        Social History:  Social History     Socioeconomic History    Marital status:      Spouse name: None    Number of children: None    Years of education: None    Highest education level: None   Occupational History    None   Tobacco Use    Smoking status: Never Smoker    Smokeless tobacco: Never Used   Substance and Sexual Activity    Alcohol use: Yes     Alcohol/week: 0.0 standard drinks     Comment: occassionally    Drug use: No    Sexual activity: None   Other Topics Concern    None   Social History Narrative    None     Social Determinants of Health     Financial Resource Strain: Low Risk     Difficulty of Paying Living Expenses: Not hard at all   Food Insecurity: No Food Insecurity    Worried About Running Out of Food in the Last Year: Never true    Sridhar of Food in the Last Year: Never true   Transportation Needs:     Lack of Transportation (Medical):      Lack of Transportation (Non-Medical):    Physical Activity:     Days of Exercise per Week:     Minutes of Exercise per Session:    Stress:     Feeling of Stress :    Social Connections:     Frequency of Communication with Friends and Family:     Frequency of Social Gatherings with Friends and Family:     Attends Religion Services:     Active Member of Clubs or Organizations:     Attends Club or Organization Meetings:     Marital Status:    Intimate Partner Violence:     Fear of Current or Ex-Partner:     Emotionally Abused:     Physically Abused:     Sexually Abused: Allergies:  No known allergies    Current Medications:    Prior to Admission medications    Medication Sig Start Date End Date Taking? Authorizing Provider   metoprolol tartrate (LOPRESSOR) 25 MG tablet Take 1 tablet by mouth 2 times daily 6/22/21  Yes Pauly Cadet MD   omeprazole (PRILOSEC) 40 MG delayed release capsule TAKE ONE CAPSULE BY MOUTH DAILY 5/10/21  Yes Levi Patel MD   ferrous sulfate (ANTHONY-THEO) 325 (65 Fe) MG tablet Take 1 tablet by mouth every 48 hours 2/18/21  Yes Halima Gomez MD   atorvastatin (LIPITOR) 10 MG tablet TAKE ONE TABLET BY MOUTH DAILY 12/30/20  Yes Levi Patel MD   Alpha Lipoic Acid-Biotin 300-333 MG-MCG CAPS 300 mg 2 times daily    Yes Historical Provider, MD   Coenzyme Q10 (COQ10) 400 MG CAPS Take 1 capsule by mouth   Yes Historical Provider, MD   Simethicone 80 MG TABS Take 80 tablets by mouth 3 times daily. Patient taking differently: Take 160 mg by mouth 2 times daily  3/23/15  Yes Manda Cote APRN - CNS   sildenafil (VIAGRA) 50 MG tablet Take 50 mg by mouth as needed for Erectile Dysfunction. Yes Historical Provider, MD   vitamin B-12 (CYANOCOBALAMIN) 1000 MCG tablet Take 1,000 mcg by mouth daily. Yes Historical Provider, MD   docusate sodium (COLACE) 100 MG capsule Take 100 mg by mouth nightly One  Capsule daily 7/20/10  Yes Historical Provider, MD   polyethylene glycol (GLYCOLAX) powder Take 17 g by mouth 2 times daily    Yes Historical Provider, MD       Physical Exam:  Vital Signs: BP (!) 144/88   Ht 5' 3.5\" (1.613 m)   Wt 102 lb (46.3 kg)   BMI 17.79 kg/m²   General: Patient appears  non-toxic  HENT: Atraumatic, normocephalic, oral mucosa moist  Lungs:  Clear bilaterally  Heart: Regular rate and rhythm  Abdomen: Non-distended, soft, non-tender  Extremities: No edema  Neuro: Nonfocal    Medical Decision Making and Plan:  Pertinent Labs & Imaging studies reviewed.  (See chart for details)  Studies were reviewed    Is post bowel obstruction complicated by pneumonia clinically stable now reassuring and follow-up in 3 months at that time consider repeat chest x-ray

## 2021-08-12 ENCOUNTER — ANTI-COAG VISIT (OUTPATIENT)
Dept: INTERNAL MEDICINE CLINIC | Age: 86
End: 2021-08-12

## 2021-08-12 DIAGNOSIS — I35.9 AORTIC VALVE DISORDER: Primary | ICD-10-CM

## 2021-08-12 LAB — INR BLD: 1.9

## 2021-09-01 ENCOUNTER — PATIENT MESSAGE (OUTPATIENT)
Dept: INTERNAL MEDICINE CLINIC | Age: 86
End: 2021-09-01

## 2021-09-01 NOTE — TELEPHONE ENCOUNTER
From: Nyla Frias MD  To: Santino Thomas MD  Sent: 9/1/2021 3:10 PM EDT  Subject: Non-Urgent Medical Question    My wife and I are elderly and had our two [address] [place] [address] six months ago. Should we try for a booster now or wait two more months?     Cameron Ruth and Yonathan Reyes

## 2021-09-03 LAB — INR BLD: 2.1

## 2021-09-07 ENCOUNTER — ANTI-COAG VISIT (OUTPATIENT)
Dept: INTERNAL MEDICINE CLINIC | Age: 86
End: 2021-09-07

## 2021-09-07 DIAGNOSIS — I35.9 AORTIC VALVE DISORDER: Primary | ICD-10-CM

## 2021-09-16 ENCOUNTER — HOSPITAL ENCOUNTER (OUTPATIENT)
Dept: ONCOLOGY | Age: 86
Setting detail: INFUSION SERIES
Discharge: HOME | End: 2021-09-16
Payer: MEDICARE

## 2021-09-16 VITALS
SYSTOLIC BLOOD PRESSURE: 150 MMHG | DIASTOLIC BLOOD PRESSURE: 82 MMHG | RESPIRATION RATE: 18 BRPM | TEMPERATURE: 97.7 F | OXYGEN SATURATION: 96 % | HEART RATE: 80 BPM

## 2021-09-16 DIAGNOSIS — M81.0 OSTEOPOROSIS, SENILE: Primary | ICD-10-CM

## 2021-09-16 DIAGNOSIS — R63.6 PATIENT UNDERWEIGHT: ICD-10-CM

## 2021-09-16 LAB
ANION GAP SERPL CALCULATED.3IONS-SCNC: 11 MMOL/L (ref 3–16)
BUN BLDV-MCNC: 40 MG/DL (ref 7–20)
CALCIUM SERPL-MCNC: 9.4 MG/DL (ref 8.3–10.6)
CHLORIDE BLD-SCNC: 101 MMOL/L (ref 99–110)
CO2: 28 MMOL/L (ref 21–32)
CREAT SERPL-MCNC: 1.2 MG/DL (ref 0.8–1.3)
GFR AFRICAN AMERICAN: >60
GFR NON-AFRICAN AMERICAN: 58
GLUCOSE BLD-MCNC: 93 MG/DL (ref 70–99)
POTASSIUM SERPL-SCNC: 4.8 MMOL/L (ref 3.5–5.1)
SODIUM BLD-SCNC: 140 MMOL/L (ref 136–145)

## 2021-09-16 PROCEDURE — 80048 BASIC METABOLIC PNL TOTAL CA: CPT

## 2021-09-16 PROCEDURE — 96372 THER/PROPH/DIAG INJ SC/IM: CPT

## 2021-09-16 PROCEDURE — 6360000002 HC RX W HCPCS: Performed by: INTERNAL MEDICINE

## 2021-09-16 RX ORDER — METHYLPREDNISOLONE SODIUM SUCCINATE 125 MG/2ML
125 INJECTION, POWDER, LYOPHILIZED, FOR SOLUTION INTRAMUSCULAR; INTRAVENOUS ONCE
Status: CANCELLED | OUTPATIENT
Start: 2022-03-17 | End: 2022-03-17

## 2021-09-16 RX ORDER — SODIUM CHLORIDE 9 MG/ML
INJECTION, SOLUTION INTRAVENOUS CONTINUOUS
Status: CANCELLED | OUTPATIENT
Start: 2022-03-17

## 2021-09-16 RX ORDER — DIPHENHYDRAMINE HYDROCHLORIDE 50 MG/ML
50 INJECTION INTRAMUSCULAR; INTRAVENOUS ONCE
Status: CANCELLED | OUTPATIENT
Start: 2022-03-17 | End: 2022-03-17

## 2021-09-16 RX ADMIN — DENOSUMAB 60 MG: 60 INJECTION SUBCUTANEOUS at 13:25

## 2021-09-16 NOTE — PROGRESS NOTES
[facility] Prolia [facility] per orders from Dr. Juan Luis Granados  Pt tolerated infusion well and without incident. Pt verbalizes understanding of discharge instructions. Discharged ambulatory to home.   Olya Mcallister RN

## 2021-10-11 ENCOUNTER — ANTI-COAG VISIT (OUTPATIENT)
Dept: INTERNAL MEDICINE CLINIC | Age: 86
End: 2021-10-11

## 2021-10-11 DIAGNOSIS — I35.9 AORTIC VALVE DISORDER: Primary | ICD-10-CM

## 2021-10-11 LAB — INR BLD: 2.4

## 2021-10-25 ENCOUNTER — TELEPHONE (OUTPATIENT)
Dept: INTERNAL MEDICINE CLINIC | Age: 86
End: 2021-10-25

## 2021-10-25 DIAGNOSIS — I10 ESSENTIAL HYPERTENSION: ICD-10-CM

## 2021-10-25 DIAGNOSIS — Z85.46 HISTORY OF PROSTATE CANCER: ICD-10-CM

## 2021-10-25 DIAGNOSIS — I25.10 CORONARY ARTERY DISEASE INVOLVING NATIVE CORONARY ARTERY OF NATIVE HEART WITHOUT ANGINA PECTORIS: Primary | ICD-10-CM

## 2021-10-27 DIAGNOSIS — Z85.46 HISTORY OF PROSTATE CANCER: ICD-10-CM

## 2021-10-27 DIAGNOSIS — I10 ESSENTIAL HYPERTENSION: ICD-10-CM

## 2021-10-27 DIAGNOSIS — I25.10 CORONARY ARTERY DISEASE INVOLVING NATIVE CORONARY ARTERY OF NATIVE HEART WITHOUT ANGINA PECTORIS: ICD-10-CM

## 2021-10-28 DIAGNOSIS — D64.9 ANEMIA, UNSPECIFIED TYPE: Primary | ICD-10-CM

## 2021-10-28 DIAGNOSIS — D64.9 ANEMIA, UNSPECIFIED TYPE: ICD-10-CM

## 2021-10-28 LAB
A/G RATIO: 1.5 (ref 1.1–2.2)
ALBUMIN SERPL-MCNC: 4.2 G/DL (ref 3.4–5)
ALP BLD-CCNC: 65 U/L (ref 40–129)
ALT SERPL-CCNC: 15 U/L (ref 10–40)
ANION GAP SERPL CALCULATED.3IONS-SCNC: 15 MMOL/L (ref 3–16)
AST SERPL-CCNC: 24 U/L (ref 15–37)
BILIRUB SERPL-MCNC: 0.3 MG/DL (ref 0–1)
BUN BLDV-MCNC: 40 MG/DL (ref 7–20)
CALCIUM SERPL-MCNC: 9.1 MG/DL (ref 8.3–10.6)
CHLORIDE BLD-SCNC: 101 MMOL/L (ref 99–110)
CHOLESTEROL, TOTAL: 145 MG/DL (ref 0–199)
CO2: 27 MMOL/L (ref 21–32)
CREAT SERPL-MCNC: 1.4 MG/DL (ref 0.8–1.3)
GFR AFRICAN AMERICAN: 58
GFR NON-AFRICAN AMERICAN: 48
GLOBULIN: 2.8 G/DL
GLUCOSE BLD-MCNC: 66 MG/DL (ref 70–99)
HDLC SERPL-MCNC: 72 MG/DL (ref 40–60)
IRON SATURATION: 15 % (ref 20–50)
IRON: 44 UG/DL (ref 59–158)
LDL CHOLESTEROL CALCULATED: 59 MG/DL
POTASSIUM SERPL-SCNC: 4.5 MMOL/L (ref 3.5–5.1)
PROSTATE SPECIFIC ANTIGEN: <0.01 NG/ML (ref 0–4)
SODIUM BLD-SCNC: 143 MMOL/L (ref 136–145)
TOTAL IRON BINDING CAPACITY: 291 UG/DL (ref 260–445)
TOTAL PROTEIN: 7 G/DL (ref 6.4–8.2)
TRIGL SERPL-MCNC: 72 MG/DL (ref 0–150)
TSH SERPL DL<=0.05 MIU/L-ACNC: 4.18 UIU/ML (ref 0.27–4.2)
VLDLC SERPL CALC-MCNC: 14 MG/DL

## 2021-10-29 DIAGNOSIS — D64.9 ANEMIA, UNSPECIFIED TYPE: ICD-10-CM

## 2021-10-29 LAB
BASOPHILS ABSOLUTE: 0 K/UL (ref 0–0.2)
BASOPHILS RELATIVE PERCENT: 0.5 %
EOSINOPHILS ABSOLUTE: 0 K/UL (ref 0–0.6)
EOSINOPHILS RELATIVE PERCENT: 0.7 %
HCT VFR BLD CALC: 32.4 % (ref 40.5–52.5)
HEMOGLOBIN: 10.6 G/DL (ref 13.5–17.5)
LYMPHOCYTES ABSOLUTE: 1.7 K/UL (ref 1–5.1)
LYMPHOCYTES RELATIVE PERCENT: 25.5 %
MCH RBC QN AUTO: 29.8 PG (ref 26–34)
MCHC RBC AUTO-ENTMCNC: 32.6 G/DL (ref 31–36)
MCV RBC AUTO: 91.3 FL (ref 80–100)
MONOCYTES ABSOLUTE: 0.6 K/UL (ref 0–1.3)
MONOCYTES RELATIVE PERCENT: 9.7 %
NEUTROPHILS ABSOLUTE: 4.2 K/UL (ref 1.7–7.7)
NEUTROPHILS RELATIVE PERCENT: 63.6 %
PDW BLD-RTO: 14.7 % (ref 12.4–15.4)
PLATELET # BLD: 114 K/UL (ref 135–450)
PMV BLD AUTO: 10.2 FL (ref 5–10.5)
RBC # BLD: 3.54 M/UL (ref 4.2–5.9)
WBC # BLD: 6.5 K/UL (ref 4–11)

## 2021-11-01 ENCOUNTER — OFFICE VISIT (OUTPATIENT)
Dept: INTERNAL MEDICINE CLINIC | Age: 86
End: 2021-11-01
Payer: MEDICARE

## 2021-11-01 VITALS
WEIGHT: 105.6 LBS | SYSTOLIC BLOOD PRESSURE: 120 MMHG | DIASTOLIC BLOOD PRESSURE: 75 MMHG | BODY MASS INDEX: 18.03 KG/M2 | HEIGHT: 64 IN

## 2021-11-01 DIAGNOSIS — D50.9 IRON DEFICIENCY ANEMIA, UNSPECIFIED IRON DEFICIENCY ANEMIA TYPE: ICD-10-CM

## 2021-11-01 DIAGNOSIS — Z23 NEED FOR INFLUENZA VACCINATION: ICD-10-CM

## 2021-11-01 DIAGNOSIS — R53.83 FATIGUE, UNSPECIFIED TYPE: ICD-10-CM

## 2021-11-01 DIAGNOSIS — D69.6 THROMBOCYTOPENIA, UNSPECIFIED (HCC): ICD-10-CM

## 2021-11-01 DIAGNOSIS — D50.9 IRON DEFICIENCY ANEMIA, UNSPECIFIED IRON DEFICIENCY ANEMIA TYPE: Primary | ICD-10-CM

## 2021-11-01 LAB
A/G RATIO: 1.5 (ref 1.1–2.2)
ALBUMIN SERPL-MCNC: 4.1 G/DL (ref 3.4–5)
ALP BLD-CCNC: 54 U/L (ref 40–129)
ALT SERPL-CCNC: 13 U/L (ref 10–40)
ANION GAP SERPL CALCULATED.3IONS-SCNC: 15 MMOL/L (ref 3–16)
AST SERPL-CCNC: 20 U/L (ref 15–37)
BASOPHILS ABSOLUTE: 0 K/UL (ref 0–0.2)
BASOPHILS RELATIVE PERCENT: 0.6 %
BILIRUB SERPL-MCNC: 0.4 MG/DL (ref 0–1)
BUN BLDV-MCNC: 36 MG/DL (ref 7–20)
CALCIUM SERPL-MCNC: 9.5 MG/DL (ref 8.3–10.6)
CHLORIDE BLD-SCNC: 98 MMOL/L (ref 99–110)
CO2: 25 MMOL/L (ref 21–32)
CREAT SERPL-MCNC: 1.5 MG/DL (ref 0.8–1.3)
EOSINOPHILS ABSOLUTE: 0 K/UL (ref 0–0.6)
EOSINOPHILS RELATIVE PERCENT: 0.7 %
GFR AFRICAN AMERICAN: 54
GFR NON-AFRICAN AMERICAN: 45
GLUCOSE BLD-MCNC: 103 MG/DL (ref 70–99)
HCT VFR BLD CALC: 33.9 % (ref 40.5–52.5)
HEMOGLOBIN: 10.8 G/DL (ref 13.5–17.5)
IMMATURE RETIC FRACT: 0.35 (ref 0.21–0.37)
IRON SATURATION: 17 % (ref 20–50)
IRON: 49 UG/DL (ref 59–158)
LYMPHOCYTES ABSOLUTE: 1.8 K/UL (ref 1–5.1)
LYMPHOCYTES RELATIVE PERCENT: 27 %
MCH RBC QN AUTO: 29.5 PG (ref 26–34)
MCHC RBC AUTO-ENTMCNC: 32 G/DL (ref 31–36)
MCV RBC AUTO: 92.1 FL (ref 80–100)
MONOCYTES ABSOLUTE: 0.7 K/UL (ref 0–1.3)
MONOCYTES RELATIVE PERCENT: 11.2 %
NEUTROPHILS ABSOLUTE: 4 K/UL (ref 1.7–7.7)
NEUTROPHILS RELATIVE PERCENT: 60.5 %
PDW BLD-RTO: 15.2 % (ref 12.4–15.4)
PLATELET # BLD: 122 K/UL (ref 135–450)
PMV BLD AUTO: 10.5 FL (ref 5–10.5)
POTASSIUM SERPL-SCNC: 4.6 MMOL/L (ref 3.5–5.1)
RBC # BLD: 3.68 M/UL (ref 4.2–5.9)
RETICULOCYTE ABSOLUTE COUNT: 0.04 M/UL
RETICULOCYTE COUNT PCT: 1.22 % (ref 0.5–2.18)
SODIUM BLD-SCNC: 138 MMOL/L (ref 136–145)
TOTAL IRON BINDING CAPACITY: 288 UG/DL (ref 260–445)
TOTAL PROTEIN: 6.9 G/DL (ref 6.4–8.2)
WBC # BLD: 6.6 K/UL (ref 4–11)

## 2021-11-01 PROCEDURE — 99213 OFFICE O/P EST LOW 20 MIN: CPT | Performed by: INTERNAL MEDICINE

## 2021-11-01 NOTE — PROGRESS NOTES
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 who presents for : Follow-up anemia and fatigue    HPI: Patient presented with [place] and anemia he denies any known GI bleeding but does have a problem with taking an active adequate food since his head resection of his esophagus for esophageal cancer    Review of Systems:   Constitutional:  Denies fever or chills   Eyes:  Denies change in visual acuity   HENT:  Denies nasal congestion or sore throat   Respiratory:  Denies cough or shortness of breath   Cardiovascular:  Denies chest pain or edema   GI:  Denies abdominal pain, nausea, vomiting, bloody stools or diarrhea   :  Denies dysuria   Musculoskeletal:  Denies back pain or joint pain   Integument:  Denies rash   Neurologic:  Denies headache, focal weakness or sensory changes   Endocrine:  Denies polyuria or polydipsia   Lymphatic:  Denies swollen glands   Psychiatric:  Denies depression or anxiety     Past Medical History:        Diagnosis Date    Anemia     Aortic valve disorders     stenosis    Aspiration pneumonia ([address] 4/27/2015    Erectile dysfunction     Es[facility] ([facility] 10/18/2012    Hernia, femoral, bilateral 5/12/2014    Hyperlipidemia     Osteoporosis, senile 5/20/2014    Pancreatitis 8/1/2013    Patient underweight 8/8/2013    Prostate cancer ([facility]     Unspecified essential hypertension        Past Surgical History:        Procedure Laterality Date    APPENDECTOMY      as a child    BONE GRAFT      for saddle nose    CARDIAC VALVE REPLACEMENT  2006    aorta    CHOLECYSTECTOMY      COLONOSCOPY  11/2009    COLONOSCOPY  10/05/15    COLONOSCOPY N/A 2/17/2021    COLONOSCOPY DIAGNOSTIC/STOMA performed by Misty Crews MD at [address] [place] [address]  2/18/2021    COLONOSCOPY POLYPECTOMY SNARE/COLD BIOPSY performed by Misty Crews MD at [place] [address]  2006    ESOPHAGUS SURGERY      EYE SURGERY  1990& 1992    cataract bilat removal     GASTRECTOMY      PROSTATE SURGERY      seeds    TONSILLECTOMY      UPPER GASTROINTESTINAL ENDOSCOPY N/A 2/16/2021    EGD BI[facility]SY [facility] [address] [facility] PROST[facility]/TRANSRECTAL VOL STUDY BRACHYTHERAPY         Family History:  Family History   Problem Relation Age of Onset    Other Mother         bleeding peptic ulcer    Heart Disease Mother     Other Father         cardiovascular disease    Stroke Father     Elevated Lipids Father     Hypertension Father        Social History:  Social History     Socioeconomic History    Marital status:      Spouse name: None    Number of children: None    Years of education: None    Highest education level: None   Occupational History    None   Tobacco Use    Smoking status: Never Smoker    Smokeless tobacco: Never Used   Substance and Sexual Activity    Alcohol use: Yes     Alcohol/week: 0.0 standard drinks     Comment: occassionally    Drug use: No    Sexual activity: None   Other Topics Concern    None   Social History Narrative    None     Social Determinants of Health     Financial Resource Strain: Low Risk     Difficulty of Paying Living Expenses: Not hard at all   Food Insecurity: No Food Insecurity    Worried About Running Out of Food in the Last Year: Never true    Sridhar of Food in the Last Year: Never true   Transportation Needs:     Lack of Transportation (Medical):      Lack of Transportation (Non-Medical):    Physical Activity:     Days of Exercise per Week:     Minutes of Exercise per Session:    Stress:     Feeling of Stress :    Social Connections:     Frequency of Communication with Friends and Family:     Frequency of Social Gatherings with Friends and Family:     Attends [employer] Services:     Active Member of Clubs or Organizations:     Attends Club or Organization Meetings:     Marital Status:    Intimate Partner Violence:     Fear of Current or Ex-Partner:     Emotionally Abused:     Physically Abused:     Sexually Abused: Allergies:  No known allergies    Current Medications:    Prior to Admission medications    Medication Sig Start Date End Date Taking? Authorizing Provider   metoprolol tartrate (LOPRESSOR) 25 MG tablet Take 1 tablet by mouth 2 times daily 6/22/21  Yes Jeny Hensley MD   omeprazole (PRILOSEC) 40 MG delayed release capsule TAKE ONE CAPSULE BY MOUTH DAILY 5/10/21  Yes Carol Argueta MD   ferrous sulfate (ANTHONY-THEO) 325 (65 Fe) MG tablet Take 1 tablet by mouth every 48 hours 2/18/21  Yes David Stroud MD   atorvastatin (LIPITOR) 10 MG tablet TAKE ONE TABLET BY MOUTH DAILY 12/30/20  Yes Carol Argueta MD   Alpha Lipoic Acid-Biotin 300-333 MG-MCG CAPS 300 mg 2 times daily    Yes Historical Provider, MD   Coenzyme Q10 (COQ10) 400 MG CAPS Take 1 capsule by mouth   Yes Historical Provider, MD   Simethicone 80 MG TABS Take 80 tablets by mouth 3 times daily. Patient taking differently: Take 160 mg by mouth 2 times daily  3/23/15  Yes Manda Lopez Grade, APRN - CNS   sildenafil (VIAGRA) 50 MG tablet Take 50 mg by mouth as needed for Erectile Dysfunction. Yes Historical Provider, MD   vitamin B-12 (CYANOCOBALAMIN) 1000 MCG tablet Take 1,000 mcg by mouth daily. Yes Historical Provider, MD   docusate sodium (COLACE) 100 MG capsule Take 100 mg by mouth nightly One  Capsule daily 7/20/10  Yes Historical Provider, MD   polyethylene glycol (GLYCOLAX) powder Take 17 g by mouth 2 times daily    Yes Historical Provider, MD       Physical Exam:  Vital Signs: /75   Ht 5' 3.5\" (1.613 m)   Wt 105 lb 9.6 oz (47.9 kg)   BMI 18.41 kg/m²   General: Patient appears  non-toxic  HENT: Atraumatic, normocephalic, oral mucosa moist  Lungs:  Clear bilaterally  Heart: Regular rate and rhythm  Abdomen: Non-distended, soft, non-tender  Extremities: No edema  Neuro: Nonfocal    Medical Decision Making and Plan:  Pertinent Labs & Imaging studies reviewed.  (See chart for details)  Blood studies including iron studies are pending    1. Iron deficiency anemia, unspecified iron deficiency anemia type  Progressive iron deficiency anemia we will check above labs if he is significantly iron deficient will place on IV iron  - CBC Auto Differential; Future  - Comprehensive Metabolic Panel; Future  - RETICULOCYTES; Future  - IRON AND TIBC; Future    2. Fatigue, unspecified type  As above rule out metabolic etiology  - CBC Auto Differential; Future  - Comprehensive Metabolic Panel; Future  - RETICULOCYTES; Future  - IRON AND TIBC; Future  - TSH with Reflex; Future    3. Thrombocytopenia, unspecified  Above labs    4.  Need for influenza vaccination  Get flu shot and to get Covid booster in 1 week

## 2021-11-02 LAB
T4 FREE: 1 NG/DL (ref 0.9–1.8)
TSH REFLEX: 4.56 UIU/ML (ref 0.27–4.2)

## 2021-11-03 ENCOUNTER — OFFICE VISIT (OUTPATIENT)
Dept: CARDIOLOGY CLINIC | Age: 86
End: 2021-11-03
Payer: MEDICARE

## 2021-11-03 VITALS
OXYGEN SATURATION: 98 % | SYSTOLIC BLOOD PRESSURE: 134 MMHG | DIASTOLIC BLOOD PRESSURE: 74 MMHG | HEIGHT: 63 IN | BODY MASS INDEX: 18.75 KG/M2 | WEIGHT: 105.8 LBS | HEART RATE: 71 BPM

## 2021-11-03 DIAGNOSIS — Z95.2 S/P AVR: ICD-10-CM

## 2021-11-03 DIAGNOSIS — Z95.1 S/P CABG (CORONARY ARTERY BYPASS GRAFT): ICD-10-CM

## 2021-11-03 DIAGNOSIS — I25.10 CORONARY ARTERY DISEASE INVOLVING NATIVE CORONARY ARTERY OF NATIVE HEART WITHOUT ANGINA PECTORIS: Primary | ICD-10-CM

## 2021-11-03 DIAGNOSIS — E78.00 HYPERCHOLESTEROLEMIA: ICD-10-CM

## 2021-11-03 PROCEDURE — 99214 OFFICE O/P EST MOD 30 MIN: CPT | Performed by: INTERNAL MEDICINE

## 2021-11-03 NOTE — PROGRESS NOTES
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 Does inrs at home. No cp. No sob. No dizziness. No syncope. Has n/v, related to GI problems and h/o cancer. No LE edema. No orthopnea or PND. Past Medical History:   Diagnosis Date    Anemia     Aortic valve disorders     stenosis    Aspiration pneumonia ([address] 4/27/2015    Erectile dysfunction     Esophageal cancer ([facility] 10/18/2012    Hernia, femoral, bilateral 5/12/2014    Hyperlipidemia     Osteoporosis, senile 5/20/2014    Pancreatitis 8/1/2013    Patient underweight 8/8/2013    Prostate cancer ([facility]     Unspecified essential hypertension        Past Surgical History:   Procedure Laterality Date    APPENDECTOMY      as a child    BONE GRAFT      for saddle nose    CARDIAC VALVE REPLACEMENT  2006    aorta    CHOLECYSTECTOMY      COLONOSCOPY  11/2009    COLONOSCOPY  10/05/15    COLONOSCOPY N/A 2/17/2021    COLONOSCOPY DIAGNOSTIC/STOMA performed by Homa Covarrubias MD at [facility]  2/18/2021    COLONOSCOPY POLYPECTOMY SNARE/COLD BIOPSY performed by Homa Covarrubias MD at [place] [id number]  2006    ESOPHAGUS SURGERY      EYE SURGERY  1990& 1992    cataract bilat removal     GASTRECTOMY      PROSTATE SURGERY      seeds    TONSILLECTOMY      UPPER GASTROINTESTINAL ENDOSCOPY N/A 2/16/2021    EGD BIOPSY performed by Homa Covarrubias MD at [address] PROSTATE/TRANSRECTAL VOL STUDY BRACHYTHERAPY         Social History     Tobacco Use    Smoking status: Never Smoker    Smokeless tobacco: Never Used   Substance Use Topics    Alcohol use:  Yes     Alcohol/week: 0.0 standard drinks     Comment: occassionally    Drug use: No       Allergies   Allergen Reactions    No Known Allergies        Family History   Problem Relation Age of Onset    Other Mother         bleeding peptic ulcer    Heart Disease Mother     Other Father         cardiovascular disease    Stroke Father     Elevated Lipids Father     Hypertension Father        Review of Systems   General: No fevers, chills, fatigue, or night sweats. No abnormal changes in weight. HEENT: No blurry or decreased vision. No changes in hearing, nasal discharge or sore throat. Cardiovascular: See HPI. No cramping in legs or buttocks when walking. Respiratory: Recovered from URI. Gastrointestinal: No abdominal pain, hematochezia, melana, or history of GI ulcers. Genito-Urinary: No dysuria or hematuria. No urgency or polyuria. Musculoskeletal: No complaints of joint pain, joint swelling or muscular weakness/soreness. Neurological: No dizziness or headaches. No numbness/tingling, speech problems or weakness. No history of a stroke or TIA. 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 Psychological: No anxiety or depression  Hematological and Lymphatic: No abnormal bleeding or bruising, blood clots, jaundice. Endocrine: No malaise/lethargy, palpitations, polydipsia/polyuria, temperature intolerance or unexpected weight changes. Skin: No rashes or non-healing ulcers. Physical Exam:  Blood pressure 134/74, pulse 71, height 5' 3\" (1.6 m), weight 105 lb 12.8 oz (48 kg), SpO2 98 %. General (appearance): Well devel. No distress  Eyes: Anicteric, eomi  Neck: No JVD  Ears/Nose/Mouth/Thorat: No cyanosis  CV: RRR. Minimal RADHA. Respiratory:  Diminished on R compared to L, normal respiratory effort  GI: abd s/nt/nd  Skin: Warm, dry. No rashes. Pale  Neuro/Psych: Alert and oriented x 3. Appropriate behavior  Ext:  No c/c.  No pitting edema  Pulses:  2+ radial B      Lab Results   Component Value Date    WBC 6.6 11/01/2021    HGB 10.8 (L) 11/01/2021    HCT 33.9 (L) 11/01/2021    MCV 92.1 11/01/2021     (L) 11/01/2021     Lab Results Component Value Date     11/01/2021    K 4.6 11/01/2021    CL 98 (L) 11/01/2021    CO2 25 11/01/2021    BUN 36 (H) 11/01/2021    CREATININE 1.5 (H) 11/01/2021    GLUCOSE 103 (H) 11/01/2021    CALCIUM 9.5 11/01/2021    PROT 6.9 11/01/2021    LABALBU 4.1 11/01/2021    BILITOT 0.4 11/01/2021    ALKPHOS 54 11/01/2021    AST 20 11/01/2021    ALT 13 11/01/2021    LABGLOM 45 (A) 11/01/2021    GFRAA 54 (A) 11/01/2021    AGRATIO 1.5 11/01/2021    GLOB 2.8 10/27/2021     Lab Results   Component Value Date    INR 2.40 10/11/2021    INR 2.10 09/03/2021    INR 1.90 08/12/2021    PROTIME 19.3 (H) 06/22/2021    PROTIME 15.3 (H) 06/21/2021    PROTIME 16.8 (H) 06/20/2021       Lab Results   Component Value Date    CHOL 145 10/27/2021    CHOL 143 08/27/2020    CHOL 162 09/18/2019     Lab Results   Component Value Date    TRIG 72 10/27/2021    TRIG 95 08/27/2020    TRIG 80 09/18/2019     Lab Results   Component Value Date    HDL 72 (H) 10/27/2021    HDL 62 (H) 08/27/2020    HDL 87 (H) 09/18/2019     Lab Results   Component Value Date    LDLCALC 59 10/27/2021    LDLCALC 62 08/27/2020    LDLCALC 59 09/18/2019     Lab Results   Component Value Date    LABVLDL 14 10/27/2021    LABVLDL 19 08/27/2020    LABVLDL 16 09/18/2019     Lab Results   Component Value Date    CHOLHDLRATIO 1.8 (L) 05/14/2014    CHOLHDLRATIO 2.1 01/10/2012     6/2021 TTE: EF 60-65%. Mild TR. RVSP 43. Mechanical ao valve with PV of 2.43 m/s.    6/2020 TTE: EF 55-60. MAC. LAE. Mechanical Ao valve. MG of 7. Mild AR. Mild TR. Mild PI.    11/2018 TTE: EF 55-60%. Mechanical ao valve. MG 7. Mild AR. RVSP 22    5/2012 Echo: EF 50-55%. Mechanical aortic valve. MAC.  L pleural effusion      Current Outpatient Medications:     metoprolol tartrate (LOPRESSOR) 25 MG tablet, Take 1 tablet by mouth 2 times daily, Disp: 60 tablet, Rfl: 3    omeprazole (PRILOSEC) 40 MG delayed release capsule, TAKE ONE CAPSULE BY MOUTH DAILY, Disp: 90 capsule, Rfl: 2    ferrous sulfate (ANTHONY-THEO) 325 (65 Fe) MG tablet, Take 1 tablet by mouth every 48 hours, Disp: 30 tablet, Rfl: 1    atorvastatin (LIPITOR) 10 MG tablet, TAKE ONE TABLET BY MOUTH DAILY, Disp: 90 tablet, Rfl: 0    Alpha Lipoic Acid-Biotin 300-333 MG-MCG CAPS, 300 mg 2 times daily , Disp: , Rfl:     Coenzyme Q10 (COQ10) 400 MG CAPS, Take 1 capsule by mouth, Disp: , Rfl:     Simethicone 80 MG TABS, Take 80 tablets by mouth 3 times daily. (Patient taking differently: Take 160 mg by mouth 2 times daily ), Disp: 90 each, Rfl: 0    sildenafil (VIAGRA) 50 MG tablet, Take 50 mg by mouth as needed for Erectile Dysfunction. , Disp: , Rfl:     vitamin B-12 (CYANOCOBALAMIN) 1000 MCG tablet, Take 1,000 mcg by mouth daily. , Disp: , Rfl:     docusate sodium (COLACE) 100 MG capsule, Take 100 mg by mouth nightly One  Capsule daily, Disp: , Rfl:     polyethylene glycol (GLYCOLAX) powder, Take 17 g by mouth 2 times daily , Disp: , Rfl:     Assessment:  82 y.o. male here for f/u on avr and cabg. 1. Coronary artery disease involving native coronary artery of native heart without angina pectoris    2. S/P AVR    3. S/P CABG (coronary artery bypass graft)    4. Hypercholesterolemia      Plan:  -No med changes. Had GI bleeding issue in past, doing well on just warfarin (vavle and gi standpoint). Cont warfarin  -Cont statin  -F/U in June of next year with an echo  -Still has diminished sounds on R. No symptoms. Will mention to Dr. Mark Gonzalez. Keila Cote MD, [facility], [facility]

## 2021-11-23 ENCOUNTER — PATIENT MESSAGE (OUTPATIENT)
Dept: INTERNAL MEDICINE CLINIC | Age: 86
End: 2021-11-23

## 2021-11-23 DIAGNOSIS — R53.83 FATIGUE, UNSPECIFIED TYPE: ICD-10-CM

## 2021-11-23 DIAGNOSIS — N18.31 STAGE 3A CHRONIC KIDNEY DISEASE (HCC): Primary | ICD-10-CM

## 2021-11-23 DIAGNOSIS — D50.9 IRON DEFICIENCY ANEMIA, UNSPECIFIED IRON DEFICIENCY ANEMIA TYPE: ICD-10-CM

## 2021-11-24 NOTE — TELEPHONE ENCOUNTER
From: Get Kimble MD  To: Dr. Cynthia Culp: 11/23/2021 9:44 PM EST  Subject: Getting a CBC    Dr. Sugar Melgar wants me to get a CBC (and possibly other tests such as Iron studies). I plan to go to the lab on Monday.

## 2021-11-29 DIAGNOSIS — N18.31 STAGE 3A CHRONIC KIDNEY DISEASE (HCC): ICD-10-CM

## 2021-11-29 DIAGNOSIS — D50.9 IRON DEFICIENCY ANEMIA, UNSPECIFIED IRON DEFICIENCY ANEMIA TYPE: ICD-10-CM

## 2021-11-29 DIAGNOSIS — R53.83 FATIGUE, UNSPECIFIED TYPE: ICD-10-CM

## 2021-11-30 LAB
A/G RATIO: 1.1 (ref 1.1–2.2)
ALBUMIN SERPL-MCNC: 3.8 G/DL (ref 3.4–5)
ALP BLD-CCNC: 61 U/L (ref 40–129)
ALT SERPL-CCNC: 11 U/L (ref 10–40)
ANION GAP SERPL CALCULATED.3IONS-SCNC: 10 MMOL/L (ref 3–16)
AST SERPL-CCNC: 26 U/L (ref 15–37)
BASOPHILS ABSOLUTE: 0 K/UL (ref 0–0.2)
BASOPHILS RELATIVE PERCENT: 0.5 %
BILIRUB SERPL-MCNC: 0.3 MG/DL (ref 0–1)
BUN BLDV-MCNC: 43 MG/DL (ref 7–20)
CALCIUM SERPL-MCNC: 9.1 MG/DL (ref 8.3–10.6)
CHLORIDE BLD-SCNC: 102 MMOL/L (ref 99–110)
CO2: 29 MMOL/L (ref 21–32)
CREAT SERPL-MCNC: 1.3 MG/DL (ref 0.8–1.3)
EOSINOPHILS ABSOLUTE: 0.1 K/UL (ref 0–0.6)
EOSINOPHILS RELATIVE PERCENT: 1.2 %
GFR AFRICAN AMERICAN: >60
GFR NON-AFRICAN AMERICAN: 53
GLUCOSE BLD-MCNC: 89 MG/DL (ref 70–99)
HCT VFR BLD CALC: 29.4 % (ref 40.5–52.5)
HEMOGLOBIN: 9.2 G/DL (ref 13.5–17.5)
LYMPHOCYTES ABSOLUTE: 1.2 K/UL (ref 1–5.1)
LYMPHOCYTES RELATIVE PERCENT: 24.9 %
MCH RBC QN AUTO: 28.8 PG (ref 26–34)
MCHC RBC AUTO-ENTMCNC: 31.4 G/DL (ref 31–36)
MCV RBC AUTO: 91.8 FL (ref 80–100)
MONOCYTES ABSOLUTE: 0.5 K/UL (ref 0–1.3)
MONOCYTES RELATIVE PERCENT: 10.9 %
NEUTROPHILS ABSOLUTE: 3 K/UL (ref 1.7–7.7)
NEUTROPHILS RELATIVE PERCENT: 62.5 %
PDW BLD-RTO: 14.6 % (ref 12.4–15.4)
PLATELET # BLD: 129 K/UL (ref 135–450)
PMV BLD AUTO: 9.7 FL (ref 5–10.5)
POTASSIUM SERPL-SCNC: 4.6 MMOL/L (ref 3.5–5.1)
RBC # BLD: 3.21 M/UL (ref 4.2–5.9)
SODIUM BLD-SCNC: 141 MMOL/L (ref 136–145)
TOTAL PROTEIN: 7.3 G/DL (ref 6.4–8.2)
TSH SERPL DL<=0.05 MIU/L-ACNC: 5.01 UIU/ML (ref 0.27–4.2)
WBC # BLD: 4.9 K/UL (ref 4–11)

## 2021-12-01 ENCOUNTER — TELEPHONE (OUTPATIENT)
Dept: INTERNAL MEDICINE CLINIC | Age: 86
End: 2021-12-01

## 2021-12-01 NOTE — TELEPHONE ENCOUNTER
Do I qualify for iron infusion?  Hb is low (9.2)? Message [phone]  From   Vinny Delgadillo MD \"Terrance\" To   Etta Al MD Sent   11/30/2021  3:33 PM   See above.      Thanks

## 2021-12-02 ENCOUNTER — TELEPHONE (OUTPATIENT)
Dept: INTERNAL MEDICINE CLINIC | Age: 86
End: 2021-12-02

## 2021-12-02 NOTE — TELEPHONE ENCOUNTER
Iron infusion    Marisa Joyce  [facility], [address] [place] 12 hours ago (8:37 PM)           I'm pretty tired and sob often. I'm able to do strenuous exercise most mornings, but then have little energy most of the day. [facility], [facility], [facility] Vandana Grayd  Marisa Joyce MD \"Terrance\" 17 hours ago (3:51 PM)           Good afternoon ,      wanted to know if you are currently symptomatic?  If so he said he would order the iron infusion.     Thanks,      Li

## 2021-12-03 RX ORDER — ACETAMINOPHEN 325 MG/1
650 TABLET ORAL
Status: CANCELLED | OUTPATIENT
Start: 2021-12-06

## 2021-12-03 RX ORDER — SODIUM CHLORIDE 9 MG/ML
INJECTION, SOLUTION INTRAVENOUS CONTINUOUS
Status: CANCELLED | OUTPATIENT
Start: 2021-12-06

## 2021-12-03 RX ORDER — SODIUM CHLORIDE 9 MG/ML
25 INJECTION, SOLUTION INTRAVENOUS PRN
Status: CANCELLED | OUTPATIENT
Start: 2021-12-06

## 2021-12-03 RX ORDER — ONDANSETRON 2 MG/ML
8 INJECTION INTRAMUSCULAR; INTRAVENOUS
Status: CANCELLED | OUTPATIENT
Start: 2021-12-06

## 2021-12-03 RX ORDER — ALBUTEROL SULFATE 90 UG/1
4 AEROSOL, METERED RESPIRATORY (INHALATION) PRN
Status: CANCELLED | OUTPATIENT
Start: 2021-12-06

## 2021-12-03 RX ORDER — MEPERIDINE HYDROCHLORIDE 50 MG/ML
12.5 INJECTION INTRAMUSCULAR; INTRAVENOUS; SUBCUTANEOUS PRN
Status: CANCELLED | OUTPATIENT
Start: 2021-12-06

## 2021-12-03 RX ORDER — DIPHENHYDRAMINE HYDROCHLORIDE 50 MG/ML
50 INJECTION INTRAMUSCULAR; INTRAVENOUS
Status: CANCELLED | OUTPATIENT
Start: 2021-12-06

## 2021-12-03 RX ORDER — SODIUM CHLORIDE 0.9 % (FLUSH) 0.9 %
5-40 SYRINGE (ML) INJECTION PRN
Status: CANCELLED | OUTPATIENT
Start: 2021-12-06

## 2021-12-03 RX ORDER — EPINEPHRINE 1 MG/ML
0.3 INJECTION, SOLUTION, CONCENTRATE INTRAVENOUS PRN
Status: CANCELLED | OUTPATIENT
Start: 2021-12-06

## 2021-12-03 RX ORDER — HEPARIN SODIUM (PORCINE) LOCK FLUSH IV SOLN 100 UNIT/ML 100 UNIT/ML
500 SOLUTION INTRAVENOUS PRN
Status: CANCELLED | OUTPATIENT
Start: 2021-12-06

## 2021-12-06 ENCOUNTER — HOSPITAL ENCOUNTER (OUTPATIENT)
Dept: ONCOLOGY | Age: 86
Setting detail: INFUSION SERIES
Discharge: HOME | End: 2021-12-06
Payer: MEDICARE

## 2021-12-06 VITALS
OXYGEN SATURATION: 97 % | DIASTOLIC BLOOD PRESSURE: 78 MMHG | TEMPERATURE: 98.1 F | RESPIRATION RATE: 18 BRPM | SYSTOLIC BLOOD PRESSURE: 133 MMHG | HEART RATE: 67 BPM

## 2021-12-06 DIAGNOSIS — N18.9 ANEMIA ASSOCIATED WITH CHRONIC RENAL FAILURE: Primary | ICD-10-CM

## 2021-12-06 DIAGNOSIS — D63.1 ANEMIA ASSOCIATED WITH CHRONIC RENAL FAILURE: Primary | ICD-10-CM

## 2021-12-06 DIAGNOSIS — N18.31 STAGE 3A CHRONIC KIDNEY DISEASE (HCC): ICD-10-CM

## 2021-12-06 DIAGNOSIS — D50.9 IRON DEFICIENCY ANEMIA, UNSPECIFIED IRON DEFICIENCY ANEMIA TYPE: ICD-10-CM

## 2021-12-06 PROCEDURE — 96365 THER/PROPH/DIAG IV INF INIT: CPT

## 2021-12-06 PROCEDURE — 6360000002 HC RX W HCPCS: Performed by: INTERNAL MEDICINE

## 2021-12-06 PROCEDURE — 2580000003 HC RX 258: Performed by: INTERNAL MEDICINE

## 2021-12-06 RX ORDER — SODIUM CHLORIDE 9 MG/ML
INJECTION, SOLUTION INTRAVENOUS CONTINUOUS
Status: CANCELLED | OUTPATIENT
Start: 2021-12-07

## 2021-12-06 RX ORDER — SODIUM CHLORIDE 9 MG/ML
INJECTION, SOLUTION INTRAVENOUS CONTINUOUS
Status: CANCELLED | OUTPATIENT
Start: 2022-03-15

## 2021-12-06 RX ORDER — HEPARIN SODIUM (PORCINE) LOCK FLUSH IV SOLN 100 UNIT/ML 100 UNIT/ML
500 SOLUTION INTRAVENOUS PRN
OUTPATIENT
Start: 2021-12-07

## 2021-12-06 RX ORDER — ALBUTEROL SULFATE 90 UG/1
4 AEROSOL, METERED RESPIRATORY (INHALATION) PRN
OUTPATIENT
Start: 2021-12-07

## 2021-12-06 RX ORDER — ONDANSETRON 2 MG/ML
8 INJECTION INTRAMUSCULAR; INTRAVENOUS
OUTPATIENT
Start: 2021-12-07

## 2021-12-06 RX ORDER — MEPERIDINE HYDROCHLORIDE 25 MG/ML
12.5 INJECTION INTRAMUSCULAR; INTRAVENOUS; SUBCUTANEOUS PRN
OUTPATIENT
Start: 2021-12-07

## 2021-12-06 RX ORDER — SODIUM CHLORIDE 9 MG/ML
25 INJECTION, SOLUTION INTRAVENOUS PRN
OUTPATIENT
Start: 2021-12-07

## 2021-12-06 RX ORDER — DIPHENHYDRAMINE HYDROCHLORIDE 50 MG/ML
50 INJECTION INTRAMUSCULAR; INTRAVENOUS ONCE
Status: CANCELLED | OUTPATIENT
Start: 2022-03-15 | End: 2022-03-15

## 2021-12-06 RX ORDER — ACETAMINOPHEN 325 MG/1
650 TABLET ORAL
OUTPATIENT
Start: 2021-12-07

## 2021-12-06 RX ORDER — SODIUM CHLORIDE 0.9 % (FLUSH) 0.9 %
5-40 SYRINGE (ML) INJECTION PRN
OUTPATIENT
Start: 2021-12-07

## 2021-12-06 RX ORDER — DIPHENHYDRAMINE HYDROCHLORIDE 50 MG/ML
50 INJECTION INTRAMUSCULAR; INTRAVENOUS
OUTPATIENT
Start: 2021-12-07

## 2021-12-06 RX ORDER — METHYLPREDNISOLONE SODIUM SUCCINATE 125 MG/2ML
125 INJECTION, POWDER, LYOPHILIZED, FOR SOLUTION INTRAMUSCULAR; INTRAVENOUS ONCE
Status: CANCELLED | OUTPATIENT
Start: 2022-03-15 | End: 2022-03-15

## 2021-12-06 RX ORDER — SODIUM CHLORIDE 9 MG/ML
INJECTION, SOLUTION INTRAVENOUS CONTINUOUS
Status: DISCONTINUED | OUTPATIENT
Start: 2021-12-06 | End: 2021-12-07 | Stop reason: HOSPADM

## 2021-12-06 RX ORDER — SODIUM CHLORIDE 9 MG/ML
INJECTION, SOLUTION INTRAVENOUS CONTINUOUS
OUTPATIENT
Start: 2021-12-07

## 2021-12-06 RX ADMIN — IRON SUCROSE 200 MG: 20 INJECTION, SOLUTION INTRAVENOUS at 13:54

## 2021-12-06 RX ADMIN — SODIUM CHLORIDE: 9 INJECTION, SOLUTION INTRAVENOUS at 13:23

## 2021-12-06 NOTE — PROGRESS NOTES
Pt seen and assessed at [facility] for Venofer infusion per orders from Dr. Giuliano Kessler. Infused per [place] [id number] policy. Monitoring completed for infusion reactions - see flowsheet. Pt tolerated infusion well and without incident. Pt verbalizes understanding of discharge instructions. Discharged ambulatory with walker to home with wife.

## 2022-01-03 RX ORDER — ATORVASTATIN CALCIUM 10 MG/1
TABLET, FILM COATED ORAL
Qty: 90 TABLET | Refills: 0 | Status: SHIPPED | OUTPATIENT
Start: 2022-01-03 | End: 2022-07-06

## 2022-01-24 ENCOUNTER — TELEPHONE (OUTPATIENT)
Dept: INTERNAL MEDICINE CLINIC | Age: 87
End: 2022-01-24

## 2022-01-24 NOTE — TELEPHONE ENCOUNTER
Change of Cardiologist?    MD Estrella Mattson MD 2 days ago           Dr. Naya Escamilla, my Cardiology physicians, is leaving, going to [facility]. Should I stay with him elsewhere or change to other [facility], Dr. Javon Murillo or Dr. Carlos Manuel Ferreira?   Thanks, Perri Dowling

## 2022-01-26 ENCOUNTER — TELEPHONE (OUTPATIENT)
Dept: INTERNAL MEDICINE CLINIC | Age: 87
End: 2022-01-26

## 2022-01-26 ENCOUNTER — PATIENT MESSAGE (OUTPATIENT)
Dept: INTERNAL MEDICINE CLINIC | Age: 87
End: 2022-01-26

## 2022-01-26 DIAGNOSIS — D50.9 IRON DEFICIENCY ANEMIA, UNSPECIFIED IRON DEFICIENCY ANEMIA TYPE: ICD-10-CM

## 2022-01-26 DIAGNOSIS — N18.31 STAGE 3A CHRONIC KIDNEY DISEASE (HCC): Primary | ICD-10-CM

## 2022-01-26 DIAGNOSIS — I25.10 CORONARY ARTERY DISEASE INVOLVING NATIVE CORONARY ARTERY OF NATIVE HEART WITHOUT ANGINA PECTORIS: ICD-10-CM

## 2022-01-26 DIAGNOSIS — E55.9 VITAMIN D DEFICIENCY: ICD-10-CM

## 2022-01-26 DIAGNOSIS — Z85.46 HISTORY OF PROSTATE CANCER: Primary | ICD-10-CM

## 2022-01-26 DIAGNOSIS — I10 ESSENTIAL HYPERTENSION: ICD-10-CM

## 2022-01-26 NOTE — TELEPHONE ENCOUNTER
From: Sushil Bocanegra MD  To: Dr. Ramirez Don: 1/26/2022 8:47 AM EST  Subject: Blood tests before 2/2/2022 visit     Please order vitamin D and PSA (hx of prostate Ca) in addition to the other usual tests.

## 2022-01-26 NOTE — TELEPHONE ENCOUNTER
Blood tests before 2/2/2022 visit     Jacqualyn Boas, MD Eluterio Levine, MD 28 minutes ago (8:47 AM)           Please  order vitamin D and PSA (hx of prostate Ca) in addition to the other usual tests.

## 2022-01-28 NOTE — TELEPHONE ENCOUNTER
Please order labs      Comprehensive Metabolic Panel    CBC Auto Differential      Not sure what other labs he will need     He is trying to go to lab tomorrow     Please call and advise

## 2022-01-31 DIAGNOSIS — I25.10 CORONARY ARTERY DISEASE INVOLVING NATIVE CORONARY ARTERY OF NATIVE HEART WITHOUT ANGINA PECTORIS: ICD-10-CM

## 2022-01-31 DIAGNOSIS — D50.9 IRON DEFICIENCY ANEMIA, UNSPECIFIED IRON DEFICIENCY ANEMIA TYPE: ICD-10-CM

## 2022-01-31 DIAGNOSIS — I10 ESSENTIAL HYPERTENSION: ICD-10-CM

## 2022-01-31 DIAGNOSIS — N18.31 STAGE 3A CHRONIC KIDNEY DISEASE (HCC): ICD-10-CM

## 2022-01-31 LAB
A/G RATIO: 1.3 (ref 1.1–2.2)
ALBUMIN SERPL-MCNC: 4.3 G/DL (ref 3.4–5)
ALP BLD-CCNC: 64 U/L (ref 40–129)
ALT SERPL-CCNC: 19 U/L (ref 10–40)
ANION GAP SERPL CALCULATED.3IONS-SCNC: 12 MMOL/L (ref 3–16)
AST SERPL-CCNC: 30 U/L (ref 15–37)
BASOPHILS ABSOLUTE: 0 K/UL (ref 0–0.2)
BASOPHILS RELATIVE PERCENT: 0.6 %
BILIRUB SERPL-MCNC: 0.4 MG/DL (ref 0–1)
BUN BLDV-MCNC: 36 MG/DL (ref 7–20)
CALCIUM SERPL-MCNC: 8.9 MG/DL (ref 8.3–10.6)
CHLORIDE BLD-SCNC: 102 MMOL/L (ref 99–110)
CO2: 29 MMOL/L (ref 21–32)
CREAT SERPL-MCNC: 1.4 MG/DL (ref 0.8–1.3)
EOSINOPHILS ABSOLUTE: 0.1 K/UL (ref 0–0.6)
EOSINOPHILS RELATIVE PERCENT: 1.7 %
GFR AFRICAN AMERICAN: 58
GFR NON-AFRICAN AMERICAN: 48
GLUCOSE BLD-MCNC: 122 MG/DL (ref 70–99)
HCT VFR BLD CALC: 36.7 % (ref 40.5–52.5)
HEMOGLOBIN: 11.4 G/DL (ref 13.5–17.5)
IRON % SATURATION: 18 % (ref 20–50)
IRON: 63 UG/DL (ref 59–158)
LYMPHOCYTES ABSOLUTE: 1.8 K/UL (ref 1–5.1)
LYMPHOCYTES RELATIVE PERCENT: 31.8 %
MCH RBC QN AUTO: 26.8 PG (ref 26–34)
MCHC RBC AUTO-ENTMCNC: 31 G/DL (ref 31–36)
MCV RBC AUTO: 86.4 FL (ref 80–100)
MONOCYTES ABSOLUTE: 0.5 K/UL (ref 0–1.3)
MONOCYTES RELATIVE PERCENT: 8.9 %
NEUTROPHILS ABSOLUTE: 3.3 K/UL (ref 1.7–7.7)
NEUTROPHILS RELATIVE PERCENT: 57 %
PDW BLD-RTO: 15.5 % (ref 12.4–15.4)
PLATELET # BLD: 112 K/UL (ref 135–450)
PMV BLD AUTO: 9.8 FL (ref 5–10.5)
POTASSIUM SERPL-SCNC: 4.5 MMOL/L (ref 3.5–5.1)
RBC # BLD: 4.25 M/UL (ref 4.2–5.9)
SODIUM BLD-SCNC: 143 MMOL/L (ref 136–145)
TOTAL IRON BINDING CAPACITY: 344 UG/DL (ref 260–445)
TOTAL PROTEIN: 7.5 G/DL (ref 6.4–8.2)
WBC # BLD: 5.8 K/UL (ref 4–11)

## 2022-02-02 ENCOUNTER — OFFICE VISIT (OUTPATIENT)
Dept: INTERNAL MEDICINE CLINIC | Age: 87
End: 2022-02-02
Payer: MEDICARE

## 2022-02-02 VITALS
BODY MASS INDEX: 19.14 KG/M2 | SYSTOLIC BLOOD PRESSURE: 128 MMHG | WEIGHT: 108 LBS | HEIGHT: 63 IN | DIASTOLIC BLOOD PRESSURE: 70 MMHG

## 2022-02-02 DIAGNOSIS — E55.9 VITAMIN D DEFICIENCY: ICD-10-CM

## 2022-02-02 DIAGNOSIS — E03.9 HYPOTHYROIDISM, UNSPECIFIED TYPE: Primary | ICD-10-CM

## 2022-02-02 DIAGNOSIS — D50.9 IRON DEFICIENCY ANEMIA, UNSPECIFIED IRON DEFICIENCY ANEMIA TYPE: ICD-10-CM

## 2022-02-02 DIAGNOSIS — E03.9 HYPOTHYROIDISM, UNSPECIFIED TYPE: ICD-10-CM

## 2022-02-02 LAB
T4 FREE: 1 NG/DL (ref 0.9–1.8)
TSH REFLEX: 4.26 UIU/ML (ref 0.27–4.2)
VITAMIN D 25-HYDROXY: 59.5 NG/ML

## 2022-02-02 PROCEDURE — 99214 OFFICE O/P EST MOD 30 MIN: CPT | Performed by: INTERNAL MEDICINE

## 2022-02-02 ASSESSMENT — ENCOUNTER SYMPTOMS
ALLERGIC/IMMUNOLOGIC NEGATIVE: 1
RESPIRATORY NEGATIVE: 1
GASTROINTESTINAL NEGATIVE: 1

## 2022-02-02 NOTE — PROGRESS NOTES
Outpatient Clinic Established Patient Note    Patient: Angi Aguilera MD  : 1935 (82 y.o.)  Date: 2022    CC: Follow up appointment    HPI:      Patient is an 82year old M with PMHx of CAD s/p CABG x2 in , aortic stenosis 2/2 bicuspid aortic valve s/p mechanical AVR in , HTN, HLD, CKD3, esophageal ca s/p esophagectomy, prostate ca who presented to the clinic for a follow up appointment. Patient claims he has been doing well since he was last seen. He claims that he did get IV iron for his anemia. Overall, he claims he feels great and is able to complete all his ADLs and IADLs. Home Meds:  Prior to Visit Medications    Medication Sig Taking? Authorizing Provider   atorvastatin (LIPITOR) 10 MG tablet TAKE ONE TABLET BY MOUTH DAILY Yes Giuliano Kessler MD   metoprolol tartrate (LOPRESSOR) 25 MG tablet Take 1 tablet by mouth 2 times daily Yes Matilde Wilcox MD   omeprazole (PRILOSEC) 40 MG delayed release capsule TAKE ONE CAPSULE BY MOUTH DAILY Yes Giuliano Kessler MD   ferrous sulfate (ANTHONY-THEO) 325 (65 Fe) MG tablet Take 1 tablet by mouth every 48 hours Yes Anand Turcios MD   Alpha Lipoic Acid-Biotin 300-333 MG-MCG CAPS 300 mg 2 times daily  Yes Historical Provider, MD   Coenzyme Q10 (COQ10) 400 MG CAPS Take 1 capsule by mouth Yes Historical Provider, MD   Simethicone 80 MG TABS Take 80 tablets by mouth 3 times daily. Patient taking differently: Take 160 mg by mouth 2 times daily  Yes JS Go - CNS   sildenafil (VIAGRA) 50 MG tablet Take 50 mg by mouth as needed for Erectile Dysfunction. Yes Historical Provider, MD   vitamin B-12 (CYANOCOBALAMIN) 1000 MCG tablet Take 1,000 mcg by mouth daily.  Yes Historical Provider, MD   docusate sodium (COLACE) 100 MG capsule Take 100 mg by mouth nightly One  Capsule daily Yes Historical Provider, MD   polyethylene glycol (GLYCOLAX) powder Take 17 g by mouth 2 times daily  Yes Historical Provider, MD       Allergies:    No known allergies    Health Maintenance Due   Topic Date Due    Annual Wellness Visit (AWV)  08/27/2021    Flu vaccine (1) 09/01/2021       Immunization History   Administered Date(s) Administered    CHRISTIID-19, Ann-Marie Montalvo, Primary or Immunocompromised, PF, 100mcg/0.5mL 01/30/2021, 03/02/2021, 11/06/2021    Influenza 10/01/2009    Influenza Virus Vaccine 10/01/2006, 10/28/2010, 11/08/2011    Influenza, High Dose (Fluzone 65 yrs and older) 10/18/2012, 10/14/2013, 11/06/2014, 10/14/2015, 09/16/2016, 10/25/2017, 09/11/2018    Influenza, Quadv, adjuvanted, 65 yrs +, IM, PF (Fluad) 09/10/2020    Influenza, Triv, inactivated, subunit, adjuvanted, IM (Fluad 65 yrs and older) 09/16/2019    Pneumococcal Conjugate 13-valent ([id number]) 07/07/2015    Pneumococcal Polysaccharide ([id number]) 01/01/2006, 01/01/2007, 10/20/2013    Tdap (Boostrix, Adacel) 05/12/2014    Zoster Live (Zostavax) 09/20/2013    Zoster Recombinant (Shingrix) 07/06/2018, 09/11/2018       Review of Systems   Constitutional: Negative. HENT: Negative. Respiratory: Negative. Cardiovascular: Negative. Gastrointestinal: Negative. Endocrine: Negative. Allergic/Immunologic: Negative. Neurological: Negative. Psychiatric/Behavioral: Negative. A 10-organ Review Of Systems was obtained and otherwise unremarkable except as per HPI. Data: Old records have been reviewed electronically. PHYSICAL EXAM:  /70   Ht 5' 3\" (1.6 m)   Wt 108 lb (49 kg)   BMI 19.13 kg/m²   Physical Exam  HENT:      Head: Normocephalic and atraumatic. Mouth/Throat:      Mouth: Mucous membranes are moist.   Eyes:      Extraocular Movements: Extraocular movements intact. Pupils: Pupils are equal, round, and reactive to light. Cardiovascular:      Rate and Rhythm: Normal rate and regular rhythm. Pulses: Normal pulses. Heart sounds: Normal heart sounds. No murmur heard. No friction rub. No gallop.     Pulmonary:      Effort: Pulmonary effort is normal. No respiratory distress. Breath sounds: Normal breath sounds. No wheezing or rales. Musculoskeletal:      Right lower leg: No edema. Left lower leg: No edema. Neurological:      General: No focal deficit present. Mental Status: He is alert and oriented to person, place, and time. Psychiatric:         Behavior: Behavior normal.         Assessment & Plan:      1. Hypothyroidism, unspecified type  In the past, pt has had borderline elevated TSH. Currently not on any thyroid replacement. - Will check TSH    2. Vitamin D deficiency  Patient endorses he takes Vitamin D tablets at home  - Will check Vit D levels     3. Iron deficiency anemia, unspecified iron deficiency anemia type  Patient is s/p IV iron and currently takes Fe tablets every other day. His Hgb has increased from 9.2 to 11.   - Iron panel did not show Fe deciency anemia  - Continue Iron tablets       4. Bicuspid AV s/p Mechanical VR  Stable. Patient follows Dr. Lore Mercado.  Patient is currently on Warfarin and claims his goal INR is between 2 and 2.5.   - He just got it checked and it was 2.5  - Patient denies any roland evidence of bleeding  - Continue Coumadin    _______________  Fay Monson MD, 2/2/2022 2:18 PM

## 2022-02-04 LAB — INR BLD: 2

## 2022-02-07 ENCOUNTER — ANTI-COAG VISIT (OUTPATIENT)
Dept: INTERNAL MEDICINE CLINIC | Age: 87
End: 2022-02-07

## 2022-02-07 DIAGNOSIS — I35.9 AORTIC VALVE DISORDER: Primary | ICD-10-CM

## 2022-02-11 LAB — INR BLD: 2.1

## 2022-02-14 ENCOUNTER — ANTI-COAG VISIT (OUTPATIENT)
Dept: INTERNAL MEDICINE CLINIC | Age: 87
End: 2022-02-14

## 2022-02-14 DIAGNOSIS — I35.9 AORTIC VALVE DISORDER: Primary | ICD-10-CM

## 2022-02-16 RX ORDER — OMEPRAZOLE 40 MG/1
CAPSULE, DELAYED RELEASE ORAL
Qty: 90 CAPSULE | Refills: 2 | Status: ON HOLD
Start: 2022-02-16 | End: 2022-04-25 | Stop reason: HOSPADM

## 2022-03-10 ENCOUNTER — NURSE TRIAGE (OUTPATIENT)
Dept: OTHER | Facility: CLINIC | Age: 87
End: 2022-03-10

## 2022-03-10 ENCOUNTER — PATIENT MESSAGE (OUTPATIENT)
Dept: INTERNAL MEDICINE CLINIC | Age: 87
End: 2022-03-10

## 2022-03-10 ENCOUNTER — TELEPHONE (OUTPATIENT)
Dept: INTERNAL MEDICINE CLINIC | Age: 87
End: 2022-03-10

## 2022-03-10 RX ORDER — METOCLOPRAMIDE 10 MG/1
TABLET ORAL
Qty: 60 TABLET | Refills: 3 | Status: SHIPPED
Start: 2022-03-10 | End: 2022-03-28 | Stop reason: CLARIF

## 2022-03-10 NOTE — TELEPHONE ENCOUNTER
Patient just spoke to  [facility]. He was going to send a prescription for:     Hyacinth Mahajan [address] [phone]    The pharmacy doesn't have it and he needs to take it tonight.     Please call and advise

## 2022-03-14 ENCOUNTER — TELEPHONE (OUTPATIENT)
Dept: INTERNAL MEDICINE CLINIC | Age: 87
End: 2022-03-14

## 2022-03-14 NOTE — TELEPHONE ENCOUNTER
Dr. Fariba Vogel said that I need a prescription. MD Juan Farmer MD 4 days ago           I spoke to Dr. Fariba Vogel late Thursday afternoon, and he told me he would call my pharmacy for a prescription of  Reglan (or similar sounding drug),  but the pharmacy never got the call. Your office is now closed.   WILL IT BE CALLED?

## 2022-03-14 NOTE — TELEPHONE ENCOUNTER
Prolia appointments    MD Mathew Smith MD 4 days ago           Both my wife Desiree Chapin and I think we are ready for another Prolia injection. Please order it and send the number for us to call for appointments at [facility] outpatient.      Thanks, Yaniv Barclay

## 2022-03-19 LAB — INR BLD: 1.8

## 2022-03-21 ENCOUNTER — ANTI-COAG VISIT (OUTPATIENT)
Dept: INTERNAL MEDICINE CLINIC | Age: 87
End: 2022-03-21

## 2022-03-21 DIAGNOSIS — I35.9 AORTIC VALVE DISORDER: Primary | ICD-10-CM

## 2022-03-23 ENCOUNTER — TELEPHONE (OUTPATIENT)
Dept: INTERNAL MEDICINE CLINIC | Age: 87
End: 2022-03-23

## 2022-03-24 RX ORDER — ACETAMINOPHEN 325 MG/1
650 TABLET ORAL
OUTPATIENT
Start: 2022-03-24

## 2022-03-24 RX ORDER — ONDANSETRON 2 MG/ML
8 INJECTION INTRAMUSCULAR; INTRAVENOUS
OUTPATIENT
Start: 2022-03-24

## 2022-03-24 RX ORDER — SODIUM CHLORIDE 9 MG/ML
INJECTION, SOLUTION INTRAVENOUS CONTINUOUS
OUTPATIENT
Start: 2022-03-24

## 2022-03-24 RX ORDER — DIPHENHYDRAMINE HYDROCHLORIDE 50 MG/ML
50 INJECTION INTRAMUSCULAR; INTRAVENOUS
OUTPATIENT
Start: 2022-03-24

## 2022-03-25 ENCOUNTER — TELEPHONE (OUTPATIENT)
Dept: INTERNAL MEDICINE CLINIC | Age: 87
End: 2022-03-25

## 2022-03-25 DIAGNOSIS — D50.9 IRON DEFICIENCY ANEMIA, UNSPECIFIED IRON DEFICIENCY ANEMIA TYPE: Primary | ICD-10-CM

## 2022-03-25 DIAGNOSIS — R53.83 FATIGUE, UNSPECIFIED TYPE: ICD-10-CM

## 2022-03-25 NOTE — TELEPHONE ENCOUNTER
I need a CBC    MD Marie Mullen Do, MD 23 hours ago (9:44 AM)           I'm getting weak, tired, and easily get Short of breath. My legs are getting restless. Please ask Dr. Sotelo Call to order a CBC and whatever else he wants.     Thanks, Presley Garcia think that I need upper GI endoscopy. MD Marie Mullen Do, MD 14 hours ago (6:11 PM)           It is possible that dilation of my pyloric sphincter would help my current problem.

## 2022-03-26 ENCOUNTER — TELEPHONE (OUTPATIENT)
Dept: INTERNAL MEDICINE CLINIC | Age: 87
End: 2022-03-26

## 2022-03-26 NOTE — TELEPHONE ENCOUNTER
Prolia? Elizabeth Causey MD \"Terrance\"  Jose J Chapin MD Yesterday (11:11 AM)           Insurance for me and my wife to receive Prolia is delayed. Are we timed out for Prolia?     Thanks, Annie Gtz (and Ashutosh Dorantes).

## 2022-03-28 ENCOUNTER — TELEPHONE (OUTPATIENT)
Dept: INTERNAL MEDICINE CLINIC | Age: 87
End: 2022-03-28

## 2022-03-28 ENCOUNTER — OFFICE VISIT (OUTPATIENT)
Dept: INTERNAL MEDICINE CLINIC | Age: 87
End: 2022-03-28
Payer: MEDICARE

## 2022-03-28 ENCOUNTER — HOSPITAL ENCOUNTER (INPATIENT)
Age: 87
LOS: 2 days | Discharge: HOME | DRG: 388 | End: 2022-03-30
Attending: EMERGENCY MEDICINE | Admitting: INTERNAL MEDICINE
Payer: MEDICARE

## 2022-03-28 ENCOUNTER — APPOINTMENT (OUTPATIENT)
Dept: CT IMAGING | Age: 87
DRG: 388 | End: 2022-03-28
Payer: MEDICARE

## 2022-03-28 VITALS
HEIGHT: 63 IN | BODY MASS INDEX: 18.61 KG/M2 | DIASTOLIC BLOOD PRESSURE: 84 MMHG | SYSTOLIC BLOOD PRESSURE: 124 MMHG | WEIGHT: 105 LBS

## 2022-03-28 DIAGNOSIS — R10.9 INTERMITTENT ABDOMINAL PAIN: ICD-10-CM

## 2022-03-28 DIAGNOSIS — R11.2 INTRACTABLE VOMITING WITH NAUSEA, UNSPECIFIED VOMITING TYPE: Primary | ICD-10-CM

## 2022-03-28 DIAGNOSIS — R11.2 INTRACTABLE NAUSEA AND VOMITING: Primary | ICD-10-CM

## 2022-03-28 DIAGNOSIS — K56.609 SMALL BOWEL OBSTRUCTION (HCC): ICD-10-CM

## 2022-03-28 LAB
ALBUMIN SERPL-MCNC: 4.1 G/DL (ref 3.4–5)
ALP BLD-CCNC: 54 U/L (ref 40–129)
ALT SERPL-CCNC: 17 U/L (ref 10–40)
ANION GAP SERPL CALCULATED.3IONS-SCNC: 12 MMOL/L (ref 3–16)
AST SERPL-CCNC: 57 U/L (ref 15–37)
BASE EXCESS VENOUS: 6 MMOL/L (ref -2–3)
BASOPHILS ABSOLUTE: 0 K/UL (ref 0–0.2)
BASOPHILS RELATIVE PERCENT: 0.4 %
BILIRUB SERPL-MCNC: 0.5 MG/DL (ref 0–1)
BILIRUBIN DIRECT: <0.2 MG/DL (ref 0–0.3)
BILIRUBIN, INDIRECT: ABNORMAL MG/DL (ref 0–1)
BUN BLDV-MCNC: 33 MG/DL (ref 7–20)
CALCIUM SERPL-MCNC: 9.5 MG/DL (ref 8.3–10.6)
CARBOXYHEMOGLOBIN: 1.1 % (ref 0–1.5)
CHLORIDE BLD-SCNC: 95 MMOL/L (ref 99–110)
CO2: 27 MMOL/L (ref 21–32)
CREAT SERPL-MCNC: 1.2 MG/DL (ref 0.8–1.3)
EOSINOPHILS ABSOLUTE: 0.1 K/UL (ref 0–0.6)
EOSINOPHILS RELATIVE PERCENT: 0.9 %
GFR AFRICAN AMERICAN: >60
GFR NON-AFRICAN AMERICAN: 58
GLUCOSE BLD-MCNC: 100 MG/DL (ref 70–99)
HCO3 VENOUS: 33.9 MMOL/L (ref 24–28)
HCT VFR BLD CALC: 40.2 % (ref 40.5–52.5)
HEMOGLOBIN, VEN, REDUCED: 57.5 %
HEMOGLOBIN: 12.8 G/DL (ref 13.5–17.5)
INR BLD: 1.71 (ref 0.88–1.12)
LACTIC ACID: 1.1 MMOL/L (ref 0.4–2)
LIPASE: 56 U/L (ref 13–60)
LYMPHOCYTES ABSOLUTE: 1.3 K/UL (ref 1–5.1)
LYMPHOCYTES RELATIVE PERCENT: 17.2 %
MCH RBC QN AUTO: 26.8 PG (ref 26–34)
MCHC RBC AUTO-ENTMCNC: 31.9 G/DL (ref 31–36)
MCV RBC AUTO: 84.2 FL (ref 80–100)
METHEMOGLOBIN VENOUS: 1.2 % (ref 0–1.5)
MONOCYTES ABSOLUTE: 0.6 K/UL (ref 0–1.3)
MONOCYTES RELATIVE PERCENT: 8 %
NEUTROPHILS ABSOLUTE: 5.4 K/UL (ref 1.7–7.7)
NEUTROPHILS RELATIVE PERCENT: 73.5 %
O2 SAT, VEN: 41 %
PCO2, VEN: 63.2 MMHG (ref 41–51)
PDW BLD-RTO: 18.1 % (ref 12.4–15.4)
PH VENOUS: 7.34 (ref 7.35–7.45)
PLATELET # BLD: 152 K/UL (ref 135–450)
PMV BLD AUTO: 10.4 FL (ref 5–10.5)
PO2, VEN: <30 MMHG (ref 25–40)
POTASSIUM REFLEX MAGNESIUM: 5.9 MMOL/L (ref 3.5–5.1)
POTASSIUM SERPL-SCNC: 4.2 MMOL/L (ref 3.5–5.1)
PRO-BNP: 990 PG/ML (ref 0–449)
PROTHROMBIN TIME: 19.7 SEC (ref 9.9–12.7)
RBC # BLD: 4.78 M/UL (ref 4.2–5.9)
SARS-COV-2, NAAT: NOT DETECTED
SODIUM BLD-SCNC: 134 MMOL/L (ref 136–145)
TCO2 CALC VENOUS: 36 MMOL/L
TOTAL PROTEIN: 7.6 G/DL (ref 6.4–8.2)
TSH SERPL DL<=0.05 MIU/L-ACNC: 7.71 UIU/ML (ref 0.27–4.2)
WBC # BLD: 7.3 K/UL (ref 4–11)

## 2022-03-28 PROCEDURE — 99213 OFFICE O/P EST LOW 20 MIN: CPT | Performed by: INTERNAL MEDICINE

## 2022-03-28 PROCEDURE — 83605 ASSAY OF LACTIC ACID: CPT

## 2022-03-28 PROCEDURE — 2580000003 HC RX 258: Performed by: EMERGENCY MEDICINE

## 2022-03-28 PROCEDURE — 83550 IRON BINDING TEST: CPT

## 2022-03-28 PROCEDURE — 80076 HEPATIC FUNCTION PANEL: CPT

## 2022-03-28 PROCEDURE — 84443 ASSAY THYROID STIM HORMONE: CPT

## 2022-03-28 PROCEDURE — 80048 BASIC METABOLIC PNL TOTAL CA: CPT

## 2022-03-28 PROCEDURE — 74177 CT ABD & PELVIS W/CONTRAST: CPT

## 2022-03-28 PROCEDURE — 83540 ASSAY OF IRON: CPT

## 2022-03-28 PROCEDURE — 83880 ASSAY OF NATRIURETIC PEPTIDE: CPT

## 2022-03-28 PROCEDURE — 85610 PROTHROMBIN TIME: CPT

## 2022-03-28 PROCEDURE — 84132 ASSAY OF SERUM POTASSIUM: CPT

## 2022-03-28 PROCEDURE — 99285 EMERGENCY DEPT VISIT HI MDM: CPT

## 2022-03-28 PROCEDURE — 2060000000 HC ICU INTERMEDIATE R&B

## 2022-03-28 PROCEDURE — 87635 SARS-COV-2 COVID-19 AMP PRB: CPT

## 2022-03-28 PROCEDURE — 2580000003 HC RX 258

## 2022-03-28 PROCEDURE — 85025 COMPLETE CBC W/AUTO DIFF WBC: CPT

## 2022-03-28 PROCEDURE — 82803 BLOOD GASES ANY COMBINATION: CPT

## 2022-03-28 PROCEDURE — 96374 THER/PROPH/DIAG INJ IV PUSH: CPT

## 2022-03-28 PROCEDURE — 83690 ASSAY OF LIPASE: CPT

## 2022-03-28 PROCEDURE — 99223 1ST HOSP IP/OBS HIGH 75: CPT | Performed by: SURGERY

## 2022-03-28 PROCEDURE — 6360000002 HC RX W HCPCS: Performed by: EMERGENCY MEDICINE

## 2022-03-28 PROCEDURE — 96375 TX/PRO/DX INJ NEW DRUG ADDON: CPT

## 2022-03-28 PROCEDURE — 6360000004 HC RX CONTRAST MEDICATION: Performed by: EMERGENCY MEDICINE

## 2022-03-28 PROCEDURE — 71250 CT THORAX DX C-: CPT

## 2022-03-28 RX ORDER — ONDANSETRON 2 MG/ML
4 INJECTION INTRAMUSCULAR; INTRAVENOUS EVERY 6 HOURS PRN
Status: DISCONTINUED | OUTPATIENT
Start: 2022-03-28 | End: 2022-03-30 | Stop reason: HOSPADM

## 2022-03-28 RX ORDER — SODIUM CHLORIDE 9 MG/ML
INJECTION, SOLUTION INTRAVENOUS PRN
Status: DISCONTINUED | OUTPATIENT
Start: 2022-03-28 | End: 2022-03-30 | Stop reason: HOSPADM

## 2022-03-28 RX ORDER — PROCHLORPERAZINE EDISYLATE 5 MG/ML
10 INJECTION INTRAMUSCULAR; INTRAVENOUS ONCE
Status: COMPLETED | OUTPATIENT
Start: 2022-03-28 | End: 2022-03-28

## 2022-03-28 RX ORDER — SODIUM CHLORIDE, SODIUM LACTATE, POTASSIUM CHLORIDE, AND CALCIUM CHLORIDE .6; .31; .03; .02 G/100ML; G/100ML; G/100ML; G/100ML
1000 INJECTION, SOLUTION INTRAVENOUS ONCE
Status: COMPLETED | OUTPATIENT
Start: 2022-03-28 | End: 2022-03-28

## 2022-03-28 RX ORDER — SIMETHICONE 80 MG
80 TABLET,CHEWABLE ORAL 3 TIMES DAILY
COMMUNITY

## 2022-03-28 RX ORDER — ACETAMINOPHEN 650 MG/1
650 SUPPOSITORY RECTAL EVERY 6 HOURS PRN
Status: DISCONTINUED | OUTPATIENT
Start: 2022-03-28 | End: 2022-03-30 | Stop reason: HOSPADM

## 2022-03-28 RX ORDER — CALCIUM CARBONATE 200(500)MG
3 TABLET,CHEWABLE ORAL 2 TIMES DAILY
COMMUNITY

## 2022-03-28 RX ORDER — ONDANSETRON 4 MG/1
4 TABLET, ORALLY DISINTEGRATING ORAL EVERY 8 HOURS PRN
Status: DISCONTINUED | OUTPATIENT
Start: 2022-03-28 | End: 2022-03-30 | Stop reason: HOSPADM

## 2022-03-28 RX ORDER — SODIUM CHLORIDE 9 MG/ML
INJECTION, SOLUTION INTRAVENOUS CONTINUOUS
Status: DISCONTINUED | OUTPATIENT
Start: 2022-03-28 | End: 2022-03-29

## 2022-03-28 RX ORDER — SODIUM CHLORIDE 0.9 % (FLUSH) 0.9 %
5-40 SYRINGE (ML) INJECTION EVERY 12 HOURS SCHEDULED
Status: DISCONTINUED | OUTPATIENT
Start: 2022-03-28 | End: 2022-03-30 | Stop reason: HOSPADM

## 2022-03-28 RX ORDER — MAGNESIUM OXIDE 400 MG/1
400 TABLET ORAL DAILY
COMMUNITY

## 2022-03-28 RX ORDER — SODIUM CHLORIDE 0.9 % (FLUSH) 0.9 %
5-40 SYRINGE (ML) INJECTION PRN
Status: DISCONTINUED | OUTPATIENT
Start: 2022-03-28 | End: 2022-03-30 | Stop reason: HOSPADM

## 2022-03-28 RX ORDER — ONDANSETRON 2 MG/ML
4 INJECTION INTRAMUSCULAR; INTRAVENOUS ONCE
Status: COMPLETED | OUTPATIENT
Start: 2022-03-28 | End: 2022-03-28

## 2022-03-28 RX ORDER — WARFARIN SODIUM 2.5 MG/1
2.5 TABLET ORAL DAILY
COMMUNITY

## 2022-03-28 RX ORDER — CHLORAL HYDRATE 500 MG
1000 CAPSULE ORAL DAILY
COMMUNITY

## 2022-03-28 RX ORDER — PROCHLORPERAZINE EDISYLATE 5 MG/ML
10 INJECTION INTRAMUSCULAR; INTRAVENOUS EVERY 6 HOURS PRN
Status: DISCONTINUED | OUTPATIENT
Start: 2022-03-28 | End: 2022-03-30 | Stop reason: HOSPADM

## 2022-03-28 RX ORDER — ACETAMINOPHEN 325 MG/1
650 TABLET ORAL EVERY 6 HOURS PRN
Status: DISCONTINUED | OUTPATIENT
Start: 2022-03-28 | End: 2022-03-30 | Stop reason: HOSPADM

## 2022-03-28 RX ORDER — POLYETHYLENE GLYCOL 3350 17 G/17G
17 POWDER, FOR SOLUTION ORAL DAILY PRN
Status: DISCONTINUED | OUTPATIENT
Start: 2022-03-28 | End: 2022-03-30 | Stop reason: HOSPADM

## 2022-03-28 RX ADMIN — ONDANSETRON 4 MG: 2 INJECTION INTRAMUSCULAR; INTRAVENOUS at 11:33

## 2022-03-28 RX ADMIN — IOHEXOL 50 ML: 240 INJECTION, SOLUTION INTRATHECAL; INTRAVASCULAR; INTRAVENOUS; ORAL at 15:22

## 2022-03-28 RX ADMIN — SODIUM CHLORIDE: 9 INJECTION, SOLUTION INTRAVENOUS at 20:11

## 2022-03-28 RX ADMIN — PROCHLORPERAZINE EDISYLATE 10 MG: 5 INJECTION INTRAMUSCULAR; INTRAVENOUS at 13:35

## 2022-03-28 RX ADMIN — SODIUM CHLORIDE, POTASSIUM CHLORIDE, SODIUM LACTATE AND CALCIUM CHLORIDE 1000 ML: 600; 310; 30; 20 INJECTION, SOLUTION INTRAVENOUS at 11:32

## 2022-03-28 RX ADMIN — IOPAMIDOL 80 ML: 755 INJECTION, SOLUTION INTRAVENOUS at 15:22

## 2022-03-28 ASSESSMENT — PATIENT HEALTH QUESTIONNAIRE - PHQ9
SUM OF ALL RESPONSES TO PHQ QUESTIONS 1-9: 0
1. LITTLE INTEREST OR PLEASURE IN DOING THINGS: 0
SUM OF ALL RESPONSES TO PHQ9 QUESTIONS 1 & 2: 0
2. FEELING DOWN, DEPRESSED OR HOPELESS: 0
SUM OF ALL RESPONSES TO PHQ QUESTIONS 1-9: 0

## 2022-03-28 ASSESSMENT — PAIN SCALES - GENERAL
PAINLEVEL_OUTOF10: 0
PAINLEVEL_OUTOF10: 0

## 2022-03-28 NOTE — ED PROVIDER NOTES
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 The patient states that he does have some intermittent difficulties with nausea and vomiting, but he began vomiting last night after dinner and has not been able to keep down any food or fluids since that time. He feels that he has been experiencing intermittent generalized abdominal pain for the last week, with intermittent nausea and vomiting, which has become constant since last night, as noted above. He is not currently experiencing any abdominal pain at the time of my interview. He is vomiting bilious appearing stomach contents. He states that his last bowel movement was yesterday, and he has been passing gas this morning. Due to his history of small bowel obstruction last year, he was referred to the emergency department for laboratory studies, cross-sectional imaging, and admission. Review of Systems     Review of Systems   Patient does complain of some chronic peripheral neuropathy, but otherwise review of systems is negative.     Past Medical, Surgical, Family, and Social History     He has a past medical history of Anemia, Aortic valve disorders, Aspiration pneumonia ([address] Erectile dysfunction, Esophageal cancer ([address] Hernia, femoral, bilateral, Hyperlipidemia, Osteoporosis, senile, Pancreatitis, Patient underweight, Prostate cancer ([facility] [address] and Unspecified essential hypertension. He has a past surgical history that includes Coronary artery bypass graft (2006); Cardiac valve replacement (2006); eye surgery (1990& 1992); Appendectomy; bone graft; gastrectomy; Cholecystectomy; Colonoscopy (11/2009); Prostate surgery; Tonsillectomy; Esophagus surgery; US Prostate/Transrectal/Vol Collins Brachyth; Colonoscopy (10/05/15); Upper gastrointestinal endoscopy (N/A, 2/16/2021); Colonoscopy (N/A, 2/17/2021); and Colonoscopy (2/18/2021). His family history includes Elevated Lipids in his father; Heart Disease in his mother; Hypertension in his father; Other in his father and mother; Stroke in his father. He reports that he has never smoked. He has never used smokeless tobacco. He reports current alcohol use. He reports that he does not use drugs. Medications     Previous Medications    ALPHA LIPOIC ACID-BIOTIN 300-333 MG-MCG CAPS    300 mg 2 times daily     ATORVASTATIN (LIPITOR) 10 MG TABLET    TAKE ONE TABLET BY MOUTH DAILY    COENZYME Q10 (COQ10) 400 MG CAPS    Take 1 capsule by mouth    MAGNESIUM OXIDE (MAG-OX) 400 MG TABLET    Take 400 mg by mouth daily    OMEGA-3 FATTY ACIDS (FISH OIL) 1000 MG CAPS    Take 3,000 mg by mouth 3 times daily    OMEPRAZOLE (PRILOSEC) 40 MG DELAYED RELEASE CAPSULE    TAKE ONE CAPSULE BY MOUTH DAILY    SIMETHICONE (MYLICON) 80 MG CHEWABLE TABLET    Take 160 mg by mouth daily    VITAMIN B-12 (CYANOCOBALAMIN) 1000 MCG TABLET    Take 1,000 mcg by mouth daily. WARFARIN (COUMADIN) 2.5 MG TABLET    Take 2.5 mg by mouth daily Pt tests INR at home and self adjusts every Friday (GOAL 2-2.5)       Allergies     He is allergic to no known allergies. Physical Exam     INITIAL VITALS: BP: (!) 163/94, Temp: 97.8 °F (36.6 °C), Pulse: 76, Resp: 18, SpO2: 97 %     General: Overall well appearing elderly gentleman. Uncomfortable appearing, but very pleasantly conversational, and in NAD.     HEENT: Pupils are equal, round, and reactive to light. Extraocular muscles are intact. Conjunctivae are clear and moist. No redness or drainage from the eyes. Neck: Supple, with full range of motion. Chest: Not tender to palpation. Cardiovascular: Normal S1-S2 without murmur rub or gallop. 2+ radial pulses bilaterally. Respiratory: Unlabored breathing with equal chest rise and fall. Lungs are clear to auscultation bilaterally. No adventitious lung sounds heard. Abdomen: Soft and nontender, without guarding or rebound tenderness. No masses or hepatosplenomegaly. Skin: Warm and dry, without rashes or ecchymoses, lacerations or abrasions. Neuro: Alert and oriented x3. No focal neurologic deficits are noted. Extremities: Warm and well-perfused, without clubbing, cyanosis, or edema. The patient moves all extremities equally. Psych: The patient's mood and affect are generally within normal limits for their presentation.       Diagnostic Results       RADIOLOGY:  CT ABDOMEN PELVIS W IV CONTRAST Additional Contrast? Oral    (Results Pending)   CT CHEST WO CONTRAST    (Results Pending)       LABS:   Results for orders placed or performed during the hospital encounter of 03/28/22   CBC with Auto Differential   Result Value Ref Range    WBC 7.3 4.0 - 11.0 K/uL    RBC 4.78 4.20 - 5.90 M/uL    Hemoglobin 12.8 (L) 13.5 - 17.5 g/dL    Hematocrit 40.2 (L) 40.5 - 52.5 %    MCV 84.2 80.0 - 100.0 fL    MCH 26.8 26.0 - 34.0 pg    MCHC 31.9 31.0 - 36.0 g/dL    RDW 18.1 (H) 12.4 - 15.4 %    Platelets 376 256 - 231 K/uL    MPV 10.4 5.0 - 10.5 fL    Neutrophils % 73.5 %    Lymphocytes % 17.2 %    Monocytes % 8.0 %    Eosinophils % 0.9 %    Basophils % 0.4 %    Neutrophils Absolute 5.4 1.7 - 7.7 K/uL    Lymphocytes Absolute 1.3 1.0 - 5.1 K/uL    Monocytes Absolute 0.6 0.0 - 1.3 K/uL    Eosinophils Absolute 0.1 0.0 - 0.6 K/uL    Basophils Absolute 0.0 0.0 - 0.2 K/uL   Protime-INR   Result Value Ref Range    Protime 19.7 (H) 9.9 - 12.7 sec    INR 1.71 (H) 0.88 - 1.12   Brain Natriuretic Peptide   Result Value Ref Range    Pro- (H) 0 - 449 pg/mL   Blood gas, venous (Lab)   Result Value Ref Range    pH, Star 7.338 (L) 7.350 - 7.450    pCO2, Star 63.2 (H) 41.0 - 51.0 mmHg    pO2, Star <30.0 25.0 - 40.0 mmHg    HCO3, Venous 33.9 (H) 24.0 - 28.0 mmol/L    Base Excess, Star 6.0 (H) -2.0 - 3.0 mmol/L    O2 Sat, Star 41 Not established %    Carboxyhemoglobin 1.1 0.0 - 1.5 %    MetHgb, Star 1.2 0.0 - 1.5 %    TC02 (Calc), Star 36 mmol/L    Hemoglobin, Star, Reduced 57.50 %   Lactic Acid   Result Value Ref Range    Lactic Acid 1.1 0.4 - 2.0 mmol/L   Lipase   Result Value Ref Range    Lipase 56.0 13.0 - 60.0 U/L   Basic Metabolic Panel w/ Reflex to MG   Result Value Ref Range    Sodium 134 (L) 136 - 145 mmol/L    Potassium reflex Magnesium 5.9 (H) 3.5 - 5.1 mmol/L    Chloride 95 (L) 99 - 110 mmol/L    CO2 27 21 - 32 mmol/L    Anion Gap 12 3 - 16    Glucose 100 (H) 70 - 99 mg/dL    BUN 33 (H) 7 - 20 mg/dL    CREATININE 1.2 0.8 - 1.3 mg/dL    GFR Non-African American 58 (A) >60    GFR African American >60 >60    Calcium 9.5 8.3 - 10.6 mg/dL   Hepatic Function Panel   Result Value Ref Range    Total Protein 7.6 6.4 - 8.2 g/dL    Albumin 4.1 3.4 - 5.0 g/dL    Alkaline Phosphatase 54 40 - 129 U/L    ALT 17 10 - 40 U/L    AST 57 (H) 15 - 37 U/L    Total Bilirubin 0.5 0.0 - 1.0 mg/dL    Bilirubin, Direct <0.2 0.0 - 0.3 mg/dL    Bilirubin, Indirect see below 0.0 - 1.0 mg/dL   Potassium   Result Value Ref Range    Potassium 4.2 3.5 - 5.1 mmol/L       RECENT VITALS:  BP: (!) 152/91, Temp: 97.8 °F (36.6 °C), Pulse: 79, Resp: 16, SpO2: 93 %     Procedures       ED Course     Nursing Notes, Past Medical Hx, Past Surgical Hx, Social Hx, Allergies, and Family Hx were reviewed.     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 He was referred to the emergency department by his primary care provider for laboratory evaluation, symptom control, cross-sectional imaging, and admission. Patient was given fluids and initially Zofran, without resolution of his vomiting. He thereafter had resolution of vomiting with a dose of Compazine. His initial potassium was hemolyzed, but repeat is normal.  Laboratory studies are otherwise unremarkable. He is now drinking contrast for CT of the chest, abdomen, and pelvis, to evaluate his enteric tract for signs of small bowel obstruction or other complication. Chest imaging was added as the patient's gastroesophageal junction is in his chest near his clavicle, due to his history of esophagectomy and pull-through. Patient's case has been discussed with Dr. Juan Luis Granados, his primary care provider, who does wish him to be admitted after this imaging is complete, and plans for likely consultation with gastroenterology. He request that the AOD be contacted for admission after completion of the cross-sectional imaging. At this time, the patient's care is being given over the oncoming physician, who will follow up on the results of the cross-sectional imaging, pursue surgical consultation if indicated, and determine the patient's final disposition accordingly. Clinical Impression     1. Intractable nausea and vomiting    2. Intermittent abdominal pain        Disposition     PATIENT REFERRED TO:  No follow-up provider specified.     DISCHARGE MEDICATIONS:  New Prescriptions    No medications on file       DISPOSITION Admit    (Please note that portions of this note were completed with voice recognition software.   Efforts were made to edit the dictations but occasionally words are mis-transcribed.)     Joie Reese MD  03/28/22 [id number]

## 2022-03-28 NOTE — ED PROVIDER NOTES
[address] [facility]          ATTENDING PHYSICIAN NOTE       Date of evaluation: 3/28/2022    ADDENDUM:      Care of this patient was assumed from Dr Jose Roberto Littlejohn. The patient was seen for Emesis, Nausea, and Abdominal Pain (Intermittent)  . The patient's initial evaluation and plan have been discussed with the prior provider who initially evaluated the patient. Nursing Notes, Past Medical Hx, Past Surgical Hx, Social Hx, Allergies, and Family Hx were all reviewed. Diagnostic Results     RADIOLOGY:  CT ABDOMEN PELVIS W IV CONTRAST Additional Contrast? Oral   Final Result      1. Postsurgical changes from prior esophagectomy. 2. Status post aortic valve replacement. 3. Coronary artery calcification. 4. Elevated left hemidiaphragm. 5. Partial left lower lobe collapse with left lower lobe airspace disease. Pneumonia cannot entirely be excluded. ABDOMEN: Patient is status postresection of the lower two thirds of the esophagus and stomach with  bowel upper esophageal surgical anastomosis. The interposed bowel within the thorax is distended with fluid. There is dilatation of proximal small bowel    loop within the left upper quadrant with focal transition point to decompressed small bowel within the left upper quadrant (series 608 image 51). Some of the oral contrast does pass beyond this transition point. This bowel loop has a twisted appearance     suggesting a partial volvulus. Adjacent mesenteric veins at site of suspected volvulus are decompressed. The remainder of the small bowel loops are nondilated. There is stool identified within nondilated colon. No bowel wall thickening is identified. Patient status post a cholecystectomy. There is some mild intrahepatic biliary dilatation with the left lobe liver. This is likely related to cholestasis. Portal vein is patent.  Pancreas, spleen, and adrenal glands are normal.      There is some stable scarring identified at the upper pole of the left kidney. There is mild bilateral hydronephrosis. This is new from the prior exam.      No abdominal lymphadenopathy is identified. Trace amount of free fluid is identified within the left subdiaphragmatic space. PELVIS:  There is extensive atherosclerotic calcification of the infrarenal abdominal aorta and common iliac arteries. 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 No pelvic lymphadenopathy is identified. No pelvic hernias are identified. Radiation therapy seeds are identified within the prostate gland. There is dextroscoliosis of the thoracolumbar spine with associated advanced multilevel degenerative disc disease and facet joint spondylosis. IMPRESSION:   1. Postsurgical changes from prior esophagectomy and gastrectomy with esophageal bowel anastomosis. There is dilatation of the thoracic portion of bowel as well as proximal small bowel with focal transition point with the left upper quadrant. At this    transition point, the bowel has a twisted appearance. Some of the ingested oral contrast is able to pass the transition point. The findings are consistent with a partial small bowel obstruction. The twisting of the dilated loop of small bowel is    suggestive of a partial volvulus. 2. Status post cholecystectomy. 3. Stable elevated left hemidiaphragm. 4. 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 5. Mild bilateral hydronephrosis. This is new from the prior exam.   6. Radiation therapy seeds are identified within the prostate gland. 7. Mild intrahepatic biliary dilatation is identified with the left lobe liver. This likely reflects cholestasis from prior cholecystectomy. CT CHEST WO CONTRAST   Final Result      1. Postsurgical changes from prior esophagectomy. 2. Status post aortic valve replacement. 3. Coronary artery calcification. 4. Elevated left hemidiaphragm.    5. Partial left lower lobe collapse with left lower lobe airspace disease. Pneumonia cannot entirely be excluded. ABDOMEN: Patient is status postresection of the lower two thirds of the esophagus and stomach with  bowel upper esophageal surgical anastomosis. The interposed bowel within the thorax is distended with fluid. There is dilatation of proximal small bowel    loop within the left upper quadrant with focal transition point to decompressed small bowel within the left upper quadrant (series 608 image 51). Some of the oral contrast does pass beyond this transition point. This bowel loop has a twisted appearance     suggesting a partial volvulus. Adjacent mesenteric veins at site of suspected volvulus are decompressed. The remainder of the small bowel loops are nondilated. There is stool identified within nondilated colon. No bowel wall thickening is identified. Patient status post a cholecystectomy. There is some mild intrahepatic biliary dilatation with the left lobe liver. This is likely related to cholestasis. Portal vein is patent. Pancreas, spleen, and adrenal glands are normal.      There is some stable scarring identified at the upper pole of the left kidney. There is mild bilateral hydronephrosis. This is new from the prior exam.      No abdominal lymphadenopathy is identified. Trace amount of free fluid is identified within the left subdiaphragmatic space. PELVIS:  There is extensive atherosclerotic calcification of the infrarenal abdominal aorta and common iliac arteries. 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 No pelvic lymphadenopathy is identified. No pelvic hernias are identified. Radiation therapy seeds are identified within the prostate gland. There is dextroscoliosis of the thoracolumbar spine with associated advanced multilevel degenerative disc disease and facet joint spondylosis. IMPRESSION:   1.  Postsurgical changes from prior esophagectomy and gastrectomy with esophageal bowel anastomosis. There is dilatation of the thoracic portion of bowel as well as proximal small bowel with focal transition point with the left upper quadrant. At this    transition point, the bowel has a twisted appearance. Some of the ingested oral contrast is able to pass the transition point. The findings are consistent with a partial small bowel obstruction. The twisting of the dilated loop of small bowel is    suggestive of a partial volvulus. 2. Status post cholecystectomy. 3. Stable elevated left hemidiaphragm. 4. 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 5. Mild bilateral hydronephrosis. This is new from the prior exam.   6. Radiation therapy seeds are identified within the prostate gland. 7. Mild intrahepatic biliary dilatation is identified with the left lobe liver. This likely reflects cholestasis from prior cholecystectomy.                        LABS:   Results for orders placed or performed during the hospital encounter of 03/28/22   COVID-19, Rapid    Specimen: Nasopharyngeal Swab   Result Value Ref Range    SARS-CoV-2, NAAT Not Detected Not Detected   CBC with Auto Differential   Result Value Ref Range    WBC 7.3 4.0 - 11.0 K/uL    RBC 4.78 4.20 - 5.90 M/uL    Hemoglobin 12.8 (L) 13.5 - 17.5 g/dL    Hematocrit 40.2 (L) 40.5 - 52.5 %    MCV 84.2 80.0 - 100.0 fL    MCH 26.8 26.0 - 34.0 pg    MCHC 31.9 31.0 - 36.0 g/dL    RDW 18.1 (H) 12.4 - 15.4 %    Platelets 893 561 - 250 K/uL    MPV 10.4 5.0 - 10.5 fL    Neutrophils % 73.5 %    Lymphocytes % 17.2 %    Monocytes % 8.0 %    Eosinophils % 0.9 %    Basophils % 0.4 %    Neutrophils Absolute 5.4 1.7 - 7.7 K/uL    Lymphocytes Absolute 1.3 1.0 - 5.1 K/uL    Monocytes Absolute 0.6 0.0 - 1.3 K/uL    Eosinophils Absolute 0.1 0.0 - 0.6 K/uL    Basophils Absolute 0.0 0.0 - 0.2 K/uL   Protime-INR   Result Value Ref Range    Protime 19.7 (H) 9.9 - 12.7 sec    INR 1.71 (H) 0.88 - 1.12   Brain Natriuretic Peptide Result Value Ref Range    Pro- (H) 0 - 449 pg/mL   Blood gas, venous (Lab)   Result Value Ref Range    pH, Star 7.338 (L) 7.350 - 7.450    pCO2, Star 63.2 (H) 41.0 - 51.0 mmHg    pO2, Star <30.0 25.0 - 40.0 mmHg    HCO3, Venous 33.9 (H) 24.0 - 28.0 mmol/L    Base Excess, Star 6.0 (H) -2.0 - 3.0 mmol/L    O2 Sat, Star 41 Not established %    Carboxyhemoglobin 1.1 0.0 - 1.5 %    MetHgb, Star 1.2 0.0 - 1.5 %    TC02 (Calc), Star 36 mmol/L    Hemoglobin, Star, Reduced 57.50 %   Lactic Acid   Result Value Ref Range    Lactic Acid 1.1 0.4 - 2.0 mmol/L   Lipase   Result Value Ref Range    Lipase 56.0 13.0 - 60.0 U/L   Basic Metabolic Panel w/ Reflex to MG   Result Value Ref Range    Sodium 134 (L) 136 - 145 mmol/L    Potassium reflex Magnesium 5.9 (H) 3.5 - 5.1 mmol/L    Chloride 95 (L) 99 - 110 mmol/L    CO2 27 21 - 32 mmol/L    Anion Gap 12 3 - 16    Glucose 100 (H) 70 - 99 mg/dL    BUN 33 (H) 7 - 20 mg/dL    CREATININE 1.2 0.8 - 1.3 mg/dL    GFR Non-African American 58 (A) >60    GFR African American >60 >60    Calcium 9.5 8.3 - 10.6 mg/dL   Hepatic Function Panel   Result Value Ref Range    Total Protein 7.6 6.4 - 8.2 g/dL    Albumin 4.1 3.4 - 5.0 g/dL    Alkaline Phosphatase 54 40 - 129 U/L    ALT 17 10 - 40 U/L    AST 57 (H) 15 - 37 U/L    Total Bilirubin 0.5 0.0 - 1.0 mg/dL    Bilirubin, Direct <0.2 0.0 - 0.3 mg/dL    Bilirubin, Indirect see below 0.0 - 1.0 mg/dL   Potassium   Result Value Ref Range    Potassium 4.2 3.5 - 5.1 mmol/L       RECENT VITALS:  BP: (!) 139/98, Temp: 97.8 °F (36.6 °C), Pulse: 90, Resp: 16, SpO2: 91 %       ED Course     The patient was given the following medications:  Orders Placed This Encounter   Medications    ondansetron (ZOFRAN) injection 4 mg    lactated ringers bolus    prochlorperazine (COMPAZINE) injection 10 mg    iohexol (OMNIPAQUE 240) injection 50 mL    iopamidol (ISOVUE-370) 76 % injection 80 mL       CONSULTS:  IP CONSULT TO Christoph 15 / ASSESSMENT / 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 CT scan did reveal a small bowel obstruction with a transition point. Surgery was consulted and placed an NG tube. Patient will be admitted to his primary care physician with surgery following along. Clinical Impression     1. Intractable nausea and vomiting    2. Intermittent abdominal pain    3. Small bowel obstruction (HCC)        Disposition     PATIENT REFERRED TO:  No follow-up provider specified.     DISCHARGE MEDICATIONS:  New Prescriptions    No medications on file       DISPOSITION Decision To Admit 03/28/2022 02:51:57 PM          Tono Monk MD  03/28/22 [id number]

## 2022-03-28 NOTE — PROGRESS NOTES
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 who presents for : Recurrent nausea and vomiting    HPI: Patient presented with current nausea and vomiting he is unable to keep even liquids down he has had a history of a gastric pull-up for esophageal cancer and thinks he might have pyloric stenosis    Review of Systems:   Constitutional:  Denies fever or chills   Eyes:  Denies change in visual acuity   HENT:  Denies nasal congestion or sore throat   Respiratory:  Denies cough or shortness of breath   Cardiovascular:  Denies chest pain or edema   GI:  Denies abdominal pain, nausea, vomiting, bloody stools or diarrhea   :  Denies dysuria   Musculoskeletal:  Denies back pain or joint pain   Integument:  Denies rash   Neurologic:  Denies headache, focal weakness or sensory changes   Endocrine:  Denies polyuria or polydipsia   Lymphatic:  Denies swollen glands   Psychiatric:  Denies depression or anxiety     Past Medical History:        Diagnosis Date    Anemia     Aortic valve disorders     stenosis    Aspiration pneumonia ([address] 4/27/2015    Erectile dysfunction     Esophageal cancer ([facility] 10/18/2012    Hernia, femoral, bilateral 5/12/2014    Hyperlipidemia     Osteoporosis, senile 5/20/2014    Pancreatitis 8/1/2013    Patient underweight 8/8/2013    Prostate cancer ([facility]     Unspecified essential hypertension        Past Surgical History:        Procedure Laterality Date    APPENDECTOMY      as a child    BONE GRAFT      for saddle nose    CARDIAC VALVE REPLACEMENT  2006    aorta    CHOLECYSTECTOMY      COLONOSCOPY  11/2009    COLONOSCOPY  10/05/15    COLONOSCOPY N/A 2/17/2021    COLONOSCOPY DIAGNOSTIC/STOMA performed by Shay Dodge MD at [facility]  2/18/2021    COLONOSCOPY POLYPECTOMY SNARE/COLD BIOPSY performed by Shay Dodge MD at [address] [facility]  2006    [facility]  1990& 1992    cataract bilat removal     GASTRECTOMY      PROSTATE SURGERY      seeds    TONSILLECTOMY      UPPER GASTROINTESTINAL ENDOSCOPY N/A 2/16/2021    EGD BIOPSY performed by Dio Gonzalez MD at [address] PROSTATE/TRANSRECTAL VOL STUDY BRACHYTHERAPY         Family History:  Family History   Problem Relation Age of Onset    Other Mother         bleeding peptic ulcer    Heart Disease Mother     Other Father         cardiovascular disease    Stroke Father     Elevated Lipids Father     Hypertension Father        Social History:  Social History     Socioeconomic History    Marital status:      Spouse name: None    Number of children: None    Years of education: None    Highest education level: None   Occupational History    None   Tobacco Use    Smoking status: Never Smoker    Smokeless tobacco: Never Used   Substance and Sexual Activity    Alcohol use: Yes     Alcohol/week: 0.0 standard drinks     Comment: occassionally    Drug use: No    Sexual activity: None   Other Topics Concern    None   Social History Narrative    None     Social Determinants of Health     Financial Resource Strain: Low Risk     Difficulty of Paying Living Expenses: Not hard at all   Food Insecurity: No Food Insecurity    Worried About Running Out of Food in the Last Year: Never true    [place] of Food in the Last Year: Never true   Transportation Needs:     Lack of Transportation (Medical): Not on file    Lack of Transportation (Non-Medical):  Not on file   Physical Activity:     Days of Exercise per Week: Not on file    Minutes of Exercise per Session: Not on file   Stress:     Feeling of Stress : Not on file   Social Connections:     Frequency of Communication with Friends and Family: Not on file    Frequency of Social Gatherings with Friends and Family: Not on file    Attends Synagogue Services: Not on file    Active Member of Clubs or Organizations: Not on file    Attends Club or Organization Meetings: Not on file    Marital Status: Not on file   Intimate Partner Violence:     Fear of Current or Ex-Partner: Not on file    Emotionally Abused: Not on file    Physically Abused: Not on file    Sexually Abused: Not on file   Housing Stability:     Unable to Pay for Housing in the Last Year: Not on file    Number of [place] in the Last Year: Not on file    Unstable Housing in the Last Year: Not on file         Allergies:  No known allergies    Current Medications:    Prior to Admission medications    Medication Sig Start Date End Date Taking? Authorizing Provider   metoclopramide (REGLAN) 10 MG tablet Take one tablet by mouth with breakfast and dinner. 3/10/22  Yes Sofia Cedeno MD   omeprazole (PRILOSEC) 40 MG delayed release capsule TAKE ONE CAPSULE BY MOUTH DAILY 2/16/22  Yes Sofia Cedeno MD   atorvastatin (LIPITOR) 10 MG tablet TAKE ONE TABLET BY MOUTH DAILY 1/3/22  Yes Sofia Cedeno MD   ferrous sulfate (ANTHONY-THEO) 325 (65 Fe) MG tablet Take 1 tablet by mouth every 48 hours 2/18/21  Yes Remington Lyle MD   Alpha Lipoic Acid-Biotin 300-333 MG-MCG CAPS 300 mg 2 times daily    Yes Historical Provider, MD   Coenzyme Q10 (COQ10) 400 MG CAPS Take 1 capsule by mouth   Yes Historical Provider, MD   Simethicone 80 MG TABS Take 80 tablets by mouth 3 times daily. Patient taking differently: Take 160 mg by mouth 2 times daily  3/23/15  Yes JS Marinelli - CNS   sildenafil (VIAGRA) 50 MG tablet Take 50 mg by mouth as needed for Erectile Dysfunction. Yes Historical Provider, MD   vitamin B-12 (CYANOCOBALAMIN) 1000 MCG tablet Take 1,000 mcg by mouth daily.    Yes Historical Provider, MD   docusate sodium (COLACE) 100 MG capsule Take 100 mg by mouth nightly One  Capsule daily 7/20/10  Yes Historical Provider, MD   polyethylene glycol (GLYCOLAX) powder Take 17 g by mouth 2 times daily    Yes Historical Provider, MD       Physical Exam:  Vital Signs: /84   Ht 5' 3\" (1.6 m)   Wt 105 lb (47.6 kg)   BMI 18.60 kg/m²   General: Patient appears  non-toxic prior mucous membranes  HENT: Atraumatic, normocephalic, oral mucosa moist  Lungs:  Clear bilaterally  Heart: Regular rate and rhythm  Abdomen: Non-distended, soft, non-tender  Extremities: No edema  Neuro: Nonfocal    Medical Decision Making and Plan:  Pertinent Labs & Imaging studies reviewed.  (See chart for details)      Recurrent nausea and vomiting and inability to take p.o. fluids will send to the ER for IV hydration blood work-up and probable CT of the abdomen to rule out obstruction will probably need to be admitted and get an upper endoscopy following this

## 2022-03-28 NOTE — CONSULTS
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 Over the past week, he has had on-and-off nausea and vomiting. He has been able to tolerate minimal diet over these past few days. In addition, he notes upper abdominal pain that has improved after episodes of vomiting. These symptoms worsened last night into this morning, prompting him to go to his PCP's office, after which he was referred here. He has continued to pass flatus and have regular BMs, last having one last night that was otherwise unremarkable. He has had obstructive symptoms multiple times in the past, last being admitted in June 2021. Of note, he continues to take Warfarin but has been unable to tolerate the medication the past two days because of nausea/vomiting.     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    Pancreatitis 8/1/2013    Patient underweight 8/8/2013    Prostate cancer ([address]     Unspecified essential hypertension        Past Surgical History:        Procedure Laterality Date    APPENDECTOMY      as a child    BONE GRAFT      for saddle nose    CARDIAC VALVE REPLACEMENT  2006    aorta    CHOLECYSTECTOMY      COLONOSCOPY  11/2009    COLONOSCOPY  10/05/15    COLONOSCOPY N/A 2/17/2021    COLONOSCOPY DIAGNOSTIC/STOMA performed by Jennifer Asencio MD at [facility]  2/18/2021    COLONOSCOPY POLYPECTOMY SNARE/COLD BIOPSY performed by Jennifer Asencio MD at [facility] GRAFT  2006    ESOPHAGUS SURGERY      EYE SURGERY  1990& 1992    cataract bilat removal     GASTRECTOMY      PROSTATE SURGERY      seeds    TONSILLECTOMY      UPPER GASTROINTESTINAL ENDOSCOPY N/A 2/16/2021    EGD BIOPSY performed by Joshua Read MD at [address] [facility] PROSTATE/TRANSRECTAL VOL STUDY BRACHYTHERAPY         Allergies:  No known allergies    Medications:   Home Meds  No current facility-administered medications on file prior to encounter. Current Outpatient Medications on File Prior to Encounter   Medication Sig Dispense Refill    warfarin (COUMADIN) 2.5 MG tablet Take 2.5 mg by mouth daily Pt tests INR at home and self adjusts every Friday (GOAL 2-2.5)      simethicone (MYLICON) 80 MG chewable tablet Take 160 mg by mouth daily      magnesium oxide (MAG-OX) 400 MG tablet Take 400 mg by mouth daily      Omega-3 Fatty Acids (FISH OIL) 1000 MG CAPS Take 3,000 mg by mouth 3 times daily      omeprazole (PRILOSEC) 40 MG delayed release capsule TAKE ONE CAPSULE BY MOUTH DAILY 90 capsule 2    atorvastatin (LIPITOR) 10 MG tablet TAKE ONE TABLET BY MOUTH DAILY 90 tablet 0    Alpha Lipoic Acid-Biotin 300-333 MG-MCG CAPS 300 mg 2 times daily       Coenzyme Q10 (COQ10) 400 MG CAPS Take 1 capsule by mouth      vitamin B-12 (CYANOCOBALAMIN) 1000 MCG tablet Take 1,000 mcg by mouth daily. Current Meds  No current facility-administered medications for this encounter. Family History:   Family History   Problem Relation Age of Onset    Other Mother         bleeding peptic ulcer    Heart Disease Mother     Other Father         cardiovascular disease    Stroke Father     Elevated Lipids Father     Hypertension Father        Social History:   TOBACCO:   reports that he has never smoked. He has never used smokeless tobacco.  ETOH:   reports current alcohol use. DRUGS:   reports no history of drug use. Review of Systems:     Constitutional: Negative. HENT: Negative.   Eyes: Negative. Respiratory: Negative. Cardiovascular: Negative. Gastrointestinal: Negative except for nausea, vomiting, pain. Genitourinary: Negative. Musculoskeletal: Negative. Skin: Negative. Endocrine: Negative. Allergic/Immunologic: Negative. Neurological: Negative. Hematological: Negative. Psychiatric/Behavioral: Negative. Physical exam:    Vitals:    03/28/22 1200 03/28/22 1257 03/28/22 1400 03/28/22 1600   BP: (!) 168/97 (!) 156/80 (!) 152/91 (!) 139/98   Pulse: 78 81 79 90   Resp:  16 16    Temp:       TempSrc:       SpO2: 99% 97% 93% 91%       General appearance: alert, no acute distress, grooming appropriate  HEENT: Normocephalic, atraumatic; EOMI; moist mucous membranes  Neck: trachea midline, no JVD, no lymphadenopathy  Chest/Lungs: Normal inspiratory effort, symmetric chest rise, no accessory muscle use  Cardiovascular: Regular rate and rhythm; perfusing extremities  Abdomen: soft, non-tender, non-distended, no guarding/rigidity; multiple well-healed surgical scars  Skin: warm and dry, no rashes  Extremities: no edema, no cyanosis  Neuro: A&Ox3, no gross sensory or motor neuro deficits    Labs:    CBC:   Recent Labs     03/28/22  1125   WBC 7.3   HGB 12.8*   HCT 40.2*   MCV 84.2        BMP:   Recent Labs     03/28/22  1125 03/28/22  1313   *  --    K 5.9* 4.2   CL 95*  --    CO2 27  --    BUN 33*  --    CREATININE 1.2  --      Liver Profile:   Lab Results   Component Value Date    AST 57 03/28/2022    ALT 17 03/28/2022    BILIDIR <0.2 03/28/2022    BILITOT 0.5 03/28/2022    ALKPHOS 54 03/28/2022     Lab Results   Component Value Date    CHOL 145 10/27/2021    HDL 72 10/27/2021    TRIG 72 10/27/2021     PT/INR:   Recent Labs     03/28/22  1125   PROTIME 19.7*   INR 1.71*         Imaging:   CT ABDOMEN PELVIS W IV CONTRAST Additional Contrast? Oral   CT CHEST WO CONTRAST      1. Postsurgical changes from prior esophagectomy. 2. Status post aortic valve replacement.    3. Coronary artery calcification. 4. Elevated left hemidiaphragm. 5. Partial left lower lobe collapse with left lower lobe airspace disease. Pneumonia cannot entirely be excluded. 1. Postsurgical changes from prior esophagectomy and gastrectomy with esophageal bowel anastomosis. There is dilatation of the thoracic portion of bowel as well as proximal small bowel with focal transition point with the left upper quadrant. At this    transition point, the bowel has a twisted appearance. Some of the ingested oral contrast is able to pass the transition point. The findings are consistent with a partial small bowel obstruction. The twisting of the dilated loop of small bowel is    suggestive of a partial volvulus. 2. Status post cholecystectomy. 3. Stable elevated left hemidiaphragm. 4. 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 5. Mild bilateral hydronephrosis. This is new from the prior exam.   6. Radiation therapy seeds are identified within the prostate gland. 7. Mild intrahepatic biliary dilatation is identified with the left lobe liver. This likely reflects cholestasis from prior cholecystectomy. 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 Patient in no acute distress, non-toxic appearing. Vital signs stable, lab results largely unremarkable. Given the above findings, non-operative management with NG tube placement decided as the initial line of therapy.     - Admit to IM; general surgery will continue to follow closely during the admission  - NG tube placed within the stomach, to low wall suction; 200cc gastric contents returned immediately  - Continue IV fluids, NPO  - Prn pain/nausea control; avoid narcotic use when possible  - Monitor vital signs, I/Os  - Patient seen and discussed with surgical team, surgical staff  Brenda Zavala MD PGY-2  03/28/22  5:17 PM  [phone]     I saw and independently examined the patient today. I agree with the history of present illness, past medical/surgical histories, family history, social history, medication list and allergies as listed. The review of systems is as noted above. My physical exam confirms the findings listed above. Review of labs, pathology reports, radiology reports and medical records confirm the findings noted above. I edited the note where appropriate.       Pt had multiple episodes of bowel obstruction in the past  CT scan reviewed - dilated and collapsed small bowel loops  Pt denies abdominal pain and exam is shows no tenderness and no peritoneal signs  Discussed about operative vs non-operative management  Along with patient, decided to go with non-operative management for now  NGT, NPO, IVF  Serial clinical exams  Discussed with wife    Frank Burrows MD  Surgery Attending

## 2022-03-28 NOTE — H&P
Internal Medicine  PGY-1  History & Physical      CC: Abdominal pain. History Obtained From:  Patient, EMR. HISTORY OF PRESENT ILLNESS:    Floridalma JacquesRober Rodgers is an 81-year-old male with PMHx SBO (2021), esophagectomy with gastric pull-through as well as chemo/rad tx for esophageal cancer (2010), CAD s/p CABG x2 2006, aortic stenosis 2/2 bicuspid aortic valve s/p mechanical AVR in 2006 (now on Warfarin), prostate ca s/p brachytherapy, HTN, HLD who presented to the [place] [address] ED for abdominal pain associated with nausea and vomiting. Of note he was seen in his PCP's office earlier in the day for the same complaint. Patient states that he has not been able to keep food or liquids down since Saturday. He endorses nausea as well as vomiting, also poor po intake. He endorsed he has been passing BM's, last one was night prior to admission. He also has been passing gas before coming into the ED however none since he has been here. Last dose of Warfarin was 2 days ago as he was vomiting it out. On ROS, he denies fever or chills, chest pain, palpitations, SOB, recent travel, recent viral illness. In the ED, VS significant for 's, other VSS. Chemistry with evidence of hyponatremia to 134. K 5.9, repeated and was 4.2. CBC with Hb 12.8/Hct 40.2. CTAP with concern for partial SBO, partial volvulus as well as new mild b/l hydronephrosis. He was given anti-emetics and IVF in the ED and admitted to the floor for further management.      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    Pancreatitis 8/1/2013    Patient underweight 8/8/2013    Prostate cancer ([address]     Unspecified essential hypertension        Past Surgical History:        Procedure Laterality Date    APPENDECTOMY      as a child    BONE GRAFT      for saddle nose    CARDIAC VALVE REPLACEMENT  2006    aorta    CHOLECYSTECTOMY      COLONOSCOPY  11/2009    COLONOSCOPY  10/05/15    COLONOSCOPY N/A 2/17/2021    COLONOSCOPY DIAGNOSTIC/STOMA performed by Adri Esteves MD at [address]  2/18/2021    COLONOSCOPY POLYPECTOMY SNARE/COLD BIOPSY performed by Adri Esteves MD at [facility]  2006    ESOPHAGUS SURGERY      EYE SURGERY  1990& 1992    cataract bilat removal     GASTRECTOMY      PROSTATE SURGERY      seeds    TONSILLECTOMY      UPPER GASTROINTESTINAL ENDOSCOPY N/A 2/16/2021    EGD BIOPSY performed by Adri Esteves MD at [address] PROSTATE/TRANSRECTAL VOL STUDY BRACHYTHERAPY         Medications Priorto Admission:    Medications Prior to Admission: warfarin (COUMADIN) 2.5 MG tablet, Take 2.5 mg by mouth daily Pt tests INR at home and self adjusts every Friday (GOAL 2-2.5)  simethicone (MYLICON) 80 MG chewable tablet, Take 160 mg by mouth daily  magnesium oxide (MAG-OX) 400 MG tablet, Take 400 mg by mouth daily  Omega-3 Fatty Acids (FISH OIL) 1000 MG CAPS, Take 3,000 mg by mouth 3 times daily  calcium carbonate (TUMS) 500 MG chewable tablet, Take 3 tablets by mouth 2 times daily  omeprazole (PRILOSEC) 40 MG delayed release capsule, TAKE ONE CAPSULE BY MOUTH DAILY  atorvastatin (LIPITOR) 10 MG tablet, TAKE ONE TABLET BY MOUTH DAILY  Alpha Lipoic Acid-Biotin 300-333 MG-MCG CAPS, 300 mg 2 times daily   Coenzyme Q10 (COQ10) 400 MG CAPS, Take 1 capsule by mouth  vitamin B-12 (CYANOCOBALAMIN) 1000 MCG tablet, Take 1,000 mcg by mouth daily. Allergies:  No known allergies    Social History:   · TOBACCO:   reports that he has never smoked. He has never used smokeless tobacco.  · ETOH:   reports current alcohol use. · DRUGS: None. · Patient currently lives at home.    ·   Family History:       Problem Relation Age of Onset    Other Mother         bleeding peptic ulcer    Heart Disease Mother     Other Father         cardiovascular disease    Stroke Father     Elevated Lipids Father     Hypertension Father        Review of Systems    ROS: A 10 point review of systems was conducted, significant findings as noted in HPI. Physical Exam  Constitutional:       General: He is not in acute distress. Appearance: Normal appearance. HENT:      Head: Normocephalic and atraumatic. Nose: Nose normal.      Mouth/Throat:      Mouth: Mucous membranes are dry. Eyes:      Extraocular Movements: Extraocular movements intact. Conjunctiva/sclera: Conjunctivae normal.   Cardiovascular:      Rate and Rhythm: Normal rate and regular rhythm. Pulses: Normal pulses. Heart sounds: Normal heart sounds. Pulmonary:      Effort: Pulmonary effort is normal.      Breath sounds: Normal breath sounds. Abdominal:      Tenderness: There is abdominal tenderness. Comments: Mild tenderness to palpation throughout. Musculoskeletal:         General: Normal range of motion. Cervical back: Normal range of motion and neck supple. Skin:     General: Skin is warm and dry. Capillary Refill: Capillary refill takes less than 2 seconds. Neurological:      General: No focal deficit present. Mental Status: He is alert and oriented to person, place, and time. Vitals:    03/28/22 1809   BP: (!) 157/56   Pulse: 91   Resp: 18   Temp: 98 °F (36.7 °C)   SpO2: 98%       DATA:    Labs:  CBC:   Recent Labs     03/28/22  1125   WBC 7.3   HGB 12.8*   HCT 40.2*          BMP:   Recent Labs     03/28/22  1125 03/28/22  1313   *  --    K 5.9* 4.2   CL 95*  --    CO2 27  --    BUN 33*  --    CREATININE 1.2  --    GLUCOSE 100*  --      LFT's:   Recent Labs     03/28/22  1125   AST 57*   ALT 17   BILITOT 0.5   ALKPHOS 54     Troponin: No results for input(s): TROPONINI in the last 72 hours.   BNP:No results for input(s): BNP in the last 72 hours. ABGs: No results for input(s): PHART, TVG4RER, PO2ART in the last 72 hours. INR:   Recent Labs     03/28/22  1125   INR 1.71*       U/A:No results for input(s): NITRITE, COLORU, PHUR, LABCAST, WBCUA, RBCUA, MUCUS, TRICHOMONAS, YEAST, BACTERIA, CLARITYU, SPECGRAV, LEUKOCYTESUR, UROBILINOGEN, BILIRUBINUR, BLOODU, GLUCOSEU, AMORPHOUS in the last 72 hours. Invalid input(s): KETONESU    CT ABDOMEN PELVIS W IV CONTRAST Additional Contrast? Oral   Final Result      1. Postsurgical changes from prior esophagectomy. 2. Status post aortic valve replacement. 3. Coronary artery calcification. 4. Elevated left hemidiaphragm. 5. Partial left lower lobe collapse with left lower lobe airspace disease. Pneumonia cannot entirely be excluded. ABDOMEN: Patient is status postresection of the lower two thirds of the esophagus and stomach with  bowel upper esophageal surgical anastomosis. The interposed bowel within the thorax is distended with fluid. There is dilatation of proximal small bowel    loop within the left upper quadrant with focal transition point to decompressed small bowel within the left upper quadrant (series 608 image 51). Some of the oral contrast does pass beyond this transition point. This bowel loop has a twisted appearance     suggesting a partial volvulus. Adjacent mesenteric veins at site of suspected volvulus are decompressed. The remainder of the small bowel loops are nondilated. There is stool identified within nondilated colon. No bowel wall thickening is identified. Patient status post a cholecystectomy. There is some mild intrahepatic biliary dilatation with the left lobe liver. This is likely related to cholestasis. Portal vein is patent. Pancreas, spleen, and adrenal glands are normal.      There is some stable scarring identified at the upper pole of the left kidney. There is mild bilateral hydronephrosis.  This is new from the prior exam.      No abdominal lymphadenopathy is identified. Trace amount of free fluid is identified within the left subdiaphragmatic space. PELVIS:  There is extensive atherosclerotic calcification of the infrarenal abdominal aorta and common iliac arteries. 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 No pelvic lymphadenopathy is identified. No pelvic hernias are identified. Radiation therapy seeds are identified within the prostate gland. There is dextroscoliosis of the thoracolumbar spine with associated advanced multilevel degenerative disc disease and facet joint spondylosis. IMPRESSION:   1. Postsurgical changes from prior esophagectomy and gastrectomy with esophageal bowel anastomosis. There is dilatation of the thoracic portion of bowel as well as proximal small bowel with focal transition point with the left upper quadrant. At this    transition point, the bowel has a twisted appearance. Some of the ingested oral contrast is able to pass the transition point. The findings are consistent with a partial small bowel obstruction. The twisting of the dilated loop of small bowel is    suggestive of a partial volvulus. 2. Status post cholecystectomy. 3. Stable elevated left hemidiaphragm. 4. 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 5. Mild bilateral hydronephrosis. This is new from the prior exam.   6. Radiation therapy seeds are identified within the prostate gland. 7. Mild intrahepatic biliary dilatation is identified with the left lobe liver. This likely reflects cholestasis from prior cholecystectomy. CT CHEST WO CONTRAST   Final Result      1. Postsurgical changes from prior esophagectomy. 2. Status post aortic valve replacement. 3. Coronary artery calcification. 4. Elevated left hemidiaphragm. 5. Partial left lower lobe collapse with left lower lobe airspace disease. Pneumonia cannot entirely be excluded.        ABDOMEN: Patient is status postresection of the lower two thirds of the esophagus and stomach with  bowel upper esophageal surgical anastomosis. The interposed bowel within the thorax is distended with fluid. There is dilatation of proximal small bowel    loop within the left upper quadrant with focal transition point to decompressed small bowel within the left upper quadrant (series 608 image 51). Some of the oral contrast does pass beyond this transition point. This bowel loop has a twisted appearance     suggesting a partial volvulus. Adjacent mesenteric veins at site of suspected volvulus are decompressed. The remainder of the small bowel loops are nondilated. There is stool identified within nondilated colon. No bowel wall thickening is identified. Patient status post a cholecystectomy. There is some mild intrahepatic biliary dilatation with the left lobe liver. This is likely related to cholestasis. Portal vein is patent. Pancreas, spleen, and adrenal glands are normal.      There is some stable scarring identified at the upper pole of the left kidney. There is mild bilateral hydronephrosis. This is new from the prior exam.      No abdominal lymphadenopathy is identified. Trace amount of free fluid is identified within the left subdiaphragmatic space. PELVIS:  There is extensive atherosclerotic calcification of the infrarenal abdominal aorta and common iliac arteries. 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 No pelvic lymphadenopathy is identified. No pelvic hernias are identified. Radiation therapy seeds are identified within the prostate gland. There is dextroscoliosis of the thoracolumbar spine with associated advanced multilevel degenerative disc disease and facet joint spondylosis. IMPRESSION:   1. Postsurgical changes from prior esophagectomy and gastrectomy with esophageal bowel anastomosis.  There is dilatation of the thoracic portion of bowel as well as proximal small bowel with focal transition point with the left upper quadrant. At this    transition point, the bowel has a twisted appearance. Some of the ingested oral contrast is able to pass the transition point. The findings are consistent with a partial small bowel obstruction. The twisting of the dilated loop of small bowel is    suggestive of a partial volvulus. 2. Status post cholecystectomy. 3. Stable elevated left hemidiaphragm. 4. 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 5. Mild bilateral hydronephrosis. This is new from the prior exam.   6. Radiation therapy seeds are identified within the prostate gland. 7. Mild intrahepatic biliary dilatation is identified with the left lobe liver. This likely reflects cholestasis from prior cholecystectomy. ASSESSMENT AND PLAN:    [facility]. Phillip Michelle is an 72-year-old male with PMHx SBO (2021), esophagectomy with gastric pull-through as well as chemo/rad tx for esophageal cancer (2010), CAD s/p CABG x2 2006, aortic stenosis 2/2 bicuspid aortic valve s/p mechanical AVR in 2006 (now on Warfarin), prostate ca s/p brachytherapy, HTN, HLD who presented to the [facility] ED for abdominal pain associated with nausea and vomiting. Partial SBO, Partial Volvulus   CTAP with dilatation of thoracic bowel and proximal small bowel with transition point in LUQ concerning for partial SBO, partial volvulus. S/p 1L LR.   - Gen Surg c/s - appreciate reccs  - NPO  - IVF   - consider NGT placement   - Compazine PRN for nausea      Chronic Issues:  CAD s/p CABG x2 (2006)   Aortic Stenosis 2/2 Bicuspid Aortic Valve s/p mechanical AVR (2006) - holding Warfarin pending surg evaluation   Prostate Ca - s/p brachytherapy  HLD - holding Lipitor  GERD - holding Omeprazole     Will discuss with attending physician Dr. Nilsa Morocho.      Code Status: Full Code  FEN: NPO   PPX: SCD's  DISPO: NELSY Herbert MD  3/28/2022,  6:31 PM

## 2022-03-28 NOTE — PROGRESS NOTES
Clinical Pharmacy Progress Note  Medication History     Admit Date: 3/28/2022    Spoke to patients about his medications, he is on numerous supplements but only 3 RX agents. Lipitor 10mg QDAY, Omeprazole 40mg QDAY and Warfarin. He self doses his warfarin off INR readings he takes every Friday. His normal dose is 2.5mg QDAY.     Katja Schrader, PharmD  PGY-1 Pharmacy Resident  [id number]  3/28/2022 12:42 PM

## 2022-03-28 NOTE — PROGRESS NOTES
4 Eyes Admission Assessment     I agree as the admission nurse that 2 RN's have performed a thorough Head to Toe Skin Assessment on the patient. ALL assessment sites listed below have been assessed on admission. Areas assessed by both nurses: Quentin Ifeoma E  [x]   Head, Face, and Ears   [x]   Shoulders, Back, and Chest  [x]   Arms, Elbows, and Hands   [x]   Coccyx, Sacrum, and Ischium  [x]   Legs, Feet, and Heels        Does the Patient have Skin Breakdown?   No         Hunter Prevention initiated:  Yes   Wound Care Orders initiated:  NA      [facility] nurse consulted for Pressure Injury (Stage 3,4, Unstageable, DTI, NWPT, and Complex wounds) or Hunter score 18 or lower:  NA      Nurse 1 eSignature: Electronically signed by Samara Vo RN on 3/28/22 at 7:08 PM EDT    **SHARE this note so that the co-signing nurse is able to place an eSignature**    Nurse 2 eSignature: Electronically signed by Jean Carlos Cleary RN on 3/28/22 at 7:39 PM EDT

## 2022-03-29 ENCOUNTER — APPOINTMENT (OUTPATIENT)
Dept: GENERAL RADIOLOGY | Age: 87
DRG: 388 | End: 2022-03-29
Payer: MEDICARE

## 2022-03-29 LAB
ANION GAP SERPL CALCULATED.3IONS-SCNC: 10 MMOL/L (ref 3–16)
ANTI-XA UNFRAC HEPARIN: 0.43 IU/ML (ref 0.3–0.7)
ANTI-XA UNFRAC HEPARIN: 0.68 IU/ML (ref 0.3–0.7)
APTT: 46.9 SEC (ref 26.2–38.6)
BASOPHILS ABSOLUTE: 0 K/UL (ref 0–0.2)
BASOPHILS RELATIVE PERCENT: 0.7 %
BUN BLDV-MCNC: 31 MG/DL (ref 7–20)
CALCIUM SERPL-MCNC: 8.5 MG/DL (ref 8.3–10.6)
CHLORIDE BLD-SCNC: 101 MMOL/L (ref 99–110)
CO2: 27 MMOL/L (ref 21–32)
CREAT SERPL-MCNC: 1.3 MG/DL (ref 0.8–1.3)
EOSINOPHILS ABSOLUTE: 0.1 K/UL (ref 0–0.6)
EOSINOPHILS RELATIVE PERCENT: 1.4 %
GFR AFRICAN AMERICAN: >60
GFR NON-AFRICAN AMERICAN: 52
GLUCOSE BLD-MCNC: 105 MG/DL (ref 70–99)
GLUCOSE BLD-MCNC: 69 MG/DL (ref 70–99)
HCT VFR BLD CALC: 33 % (ref 40.5–52.5)
HEMOGLOBIN: 10.6 G/DL (ref 13.5–17.5)
IRON SATURATION: 16 % (ref 20–50)
IRON: 77 UG/DL (ref 59–158)
LYMPHOCYTES ABSOLUTE: 1.3 K/UL (ref 1–5.1)
LYMPHOCYTES RELATIVE PERCENT: 23.2 %
MAGNESIUM: 1.9 MG/DL (ref 1.8–2.4)
MCH RBC QN AUTO: 27 PG (ref 26–34)
MCHC RBC AUTO-ENTMCNC: 32 G/DL (ref 31–36)
MCV RBC AUTO: 84.4 FL (ref 80–100)
MONOCYTES ABSOLUTE: 0.6 K/UL (ref 0–1.3)
MONOCYTES RELATIVE PERCENT: 11.1 %
NEUTROPHILS ABSOLUTE: 3.6 K/UL (ref 1.7–7.7)
NEUTROPHILS RELATIVE PERCENT: 63.6 %
PDW BLD-RTO: 17.4 % (ref 12.4–15.4)
PERFORMED ON: ABNORMAL
PLATELET # BLD: 108 K/UL (ref 135–450)
PMV BLD AUTO: 10.1 FL (ref 5–10.5)
POTASSIUM REFLEX MAGNESIUM: 4.2 MMOL/L (ref 3.5–5.1)
RBC # BLD: 3.91 M/UL (ref 4.2–5.9)
SODIUM BLD-SCNC: 138 MMOL/L (ref 136–145)
TOTAL IRON BINDING CAPACITY: 479 UG/DL (ref 260–445)
WBC # BLD: 5.6 K/UL (ref 4–11)

## 2022-03-29 PROCEDURE — 6360000002 HC RX W HCPCS

## 2022-03-29 PROCEDURE — 99233 SBSQ HOSP IP/OBS HIGH 50: CPT | Performed by: SURGERY

## 2022-03-29 PROCEDURE — 74019 RADEX ABDOMEN 2 VIEWS: CPT

## 2022-03-29 PROCEDURE — 85025 COMPLETE CBC W/AUTO DIFF WBC: CPT

## 2022-03-29 PROCEDURE — 99222 1ST HOSP IP/OBS MODERATE 55: CPT | Performed by: INTERNAL MEDICINE

## 2022-03-29 PROCEDURE — 85730 THROMBOPLASTIN TIME PARTIAL: CPT

## 2022-03-29 PROCEDURE — 2580000003 HC RX 258

## 2022-03-29 PROCEDURE — 2060000000 HC ICU INTERMEDIATE R&B

## 2022-03-29 PROCEDURE — 2500000003 HC RX 250 WO HCPCS: Performed by: STUDENT IN AN ORGANIZED HEALTH CARE EDUCATION/TRAINING PROGRAM

## 2022-03-29 PROCEDURE — 85520 HEPARIN ASSAY: CPT

## 2022-03-29 PROCEDURE — 80048 BASIC METABOLIC PNL TOTAL CA: CPT

## 2022-03-29 PROCEDURE — 6360000002 HC RX W HCPCS: Performed by: STUDENT IN AN ORGANIZED HEALTH CARE EDUCATION/TRAINING PROGRAM

## 2022-03-29 PROCEDURE — 83735 ASSAY OF MAGNESIUM: CPT

## 2022-03-29 PROCEDURE — 36415 COLL VENOUS BLD VENIPUNCTURE: CPT

## 2022-03-29 RX ORDER — MAGNESIUM SULFATE 1 G/100ML
1000 INJECTION INTRAVENOUS ONCE
Status: COMPLETED | OUTPATIENT
Start: 2022-03-29 | End: 2022-03-29

## 2022-03-29 RX ORDER — DEXTROSE, SODIUM CHLORIDE, AND POTASSIUM CHLORIDE 5; .45; .15 G/100ML; G/100ML; G/100ML
INJECTION INTRAVENOUS CONTINUOUS
Status: DISCONTINUED | OUTPATIENT
Start: 2022-03-29 | End: 2022-03-30

## 2022-03-29 RX ORDER — DEXTROSE MONOHYDRATE 25 G/50ML
12.5 INJECTION, SOLUTION INTRAVENOUS PRN
Status: DISCONTINUED | OUTPATIENT
Start: 2022-03-29 | End: 2022-03-29

## 2022-03-29 RX ORDER — NICOTINE POLACRILEX 4 MG
15 LOZENGE BUCCAL PRN
Status: DISCONTINUED | OUTPATIENT
Start: 2022-03-29 | End: 2022-03-29

## 2022-03-29 RX ORDER — DEXTROSE MONOHYDRATE 50 MG/ML
100 INJECTION, SOLUTION INTRAVENOUS PRN
Status: DISCONTINUED | OUTPATIENT
Start: 2022-03-29 | End: 2022-03-30 | Stop reason: HOSPADM

## 2022-03-29 RX ORDER — HEPARIN SODIUM 1000 [USP'U]/ML
40 INJECTION, SOLUTION INTRAVENOUS; SUBCUTANEOUS PRN
Status: DISCONTINUED | OUTPATIENT
Start: 2022-03-29 | End: 2022-03-30 | Stop reason: HOSPADM

## 2022-03-29 RX ORDER — HEPARIN SODIUM 10000 [USP'U]/100ML
18 INJECTION, SOLUTION INTRAVENOUS CONTINUOUS
Status: DISCONTINUED | OUTPATIENT
Start: 2022-03-29 | End: 2022-03-30

## 2022-03-29 RX ORDER — HEPARIN SODIUM 1000 [USP'U]/ML
80 INJECTION, SOLUTION INTRAVENOUS; SUBCUTANEOUS ONCE
Status: COMPLETED | OUTPATIENT
Start: 2022-03-29 | End: 2022-03-29

## 2022-03-29 RX ORDER — HEPARIN SODIUM 1000 [USP'U]/ML
80 INJECTION, SOLUTION INTRAVENOUS; SUBCUTANEOUS PRN
Status: DISCONTINUED | OUTPATIENT
Start: 2022-03-29 | End: 2022-03-30 | Stop reason: HOSPADM

## 2022-03-29 RX ADMIN — SODIUM CHLORIDE: 9 INJECTION, SOLUTION INTRAVENOUS at 05:54

## 2022-03-29 RX ADMIN — HEPARIN SODIUM 3810 UNITS: 1000 INJECTION INTRAVENOUS; SUBCUTANEOUS at 10:03

## 2022-03-29 RX ADMIN — Medication 18 UNITS/KG/HR: at 10:09

## 2022-03-29 RX ADMIN — POTASSIUM CHLORIDE, DEXTROSE MONOHYDRATE AND SODIUM CHLORIDE: 150; 5; 450 INJECTION, SOLUTION INTRAVENOUS at 20:21

## 2022-03-29 RX ADMIN — MAGNESIUM SULFATE HEPTAHYDRATE 1000 MG: 1 INJECTION, SOLUTION INTRAVENOUS at 08:53

## 2022-03-29 RX ADMIN — SODIUM CHLORIDE, PRESERVATIVE FREE 10 ML: 5 INJECTION INTRAVENOUS at 08:53

## 2022-03-29 RX ADMIN — SODIUM CHLORIDE, PRESERVATIVE FREE 10 ML: 5 INJECTION INTRAVENOUS at 20:27

## 2022-03-29 RX ADMIN — POTASSIUM CHLORIDE, DEXTROSE MONOHYDRATE AND SODIUM CHLORIDE: 150; 5; 450 INJECTION, SOLUTION INTRAVENOUS at 10:03

## 2022-03-29 ASSESSMENT — PAIN SCALES - GENERAL
PAINLEVEL_OUTOF10: 0

## 2022-03-29 NOTE — PROGRESS NOTES
Point of Care Note:  General Surgery  Markell Yuan MD    6:30 PM  3/29/2022      Patient with minimal residuals, passing flatus and having multiple bowel movements. NG tube removed and patient tolerated well.      Sera Velazquez DO MS  PGY1, General Surgery  03/29/22  6:30 PM  [phone]

## 2022-03-29 NOTE — PLAN OF CARE
Problem: Nausea/Vomiting:  Goal: Absence of nausea/vomiting  Description: Absence of nausea/vomiting  Outcome: Ongoing  Note: Pt NPO this PM. No c/o nausea or vomiting. Will continue to monitor. Problem: Pain:  Goal: Pain level will decrease  Description: Pain level will decrease  Outcome: Ongoing  Note:   Pt not complaining of any pain this PM. Pt instructed to call for new/increasing pain levels. Pt verbalized understanding. Will continue to monitor. Problem: Falls - Risk of:  Goal: Will remain free from falls  Description: Will remain free from falls  Outcome: Ongoing  Note: Pt is a High fall risk. See Gloria Longest Fall Score and ABCDS Injury Risk assessments.   + Screening for Orthostasis and/or + High Fall Risk per CAMPOS/ABCDS: Explained fall risk precautions to pt and family and rationale behind their use to keep the patient safe. Pt bed is in low position, side rails up, call light and belongings are in reach. Fall wristband applied and present on pts wrist.  Bed alarm on. Pt encouraged to call for assistance. Will continue with hourly rounds for PO intake, pain needs, toileting and repositioning as needed. Problem:  Activity:  Goal: Ability to tolerate increased activity will improve  Description: Ability to tolerate increased activity will improve  Outcome: Ongoing

## 2022-03-29 NOTE — PROGRESS NOTES
prochlorperazine    Objective:   Vitals:   T-max:  Patient Vitals for the past 8 hrs:   BP Temp Temp src Pulse Resp SpO2   03/29/22 0352 121/68 97.8 °F (36.6 °C) Oral 75 16 94 %   03/28/22 2347 116/73 97.8 °F (36.6 °C) Oral 80 16 95 %       Intake/Output Summary (Last 24 hours) at 3/29/2022 6636  Last data filed at 3/29/2022 0600  Gross per 24 hour   Intake 1967.72 ml   Output 900 ml   Net 1067.72 ml       Review of Systems - as above    Physical Exam  Constitutional:       General: He is not in acute distress. HENT:      Head: Normocephalic and atraumatic. Cardiovascular:      Rate and Rhythm: Normal rate and regular rhythm. Heart sounds: No murmur heard. Pulmonary:      Effort: No respiratory distress. Breath sounds: No wheezing or rales. Abdominal:      General: Abdomen is flat. There is no distension. Palpations: Abdomen is soft. Tenderness: There is no abdominal tenderness. There is no guarding. Musculoskeletal:      Right lower leg: No edema. Left lower leg: No edema. Skin:     Capillary Refill: Capillary refill takes less than 2 seconds. Neurological:      General: No focal deficit present. Mental Status: He is alert and oriented to person, place, and time. LABS:    CBC:   Recent Labs     03/28/22  1125 03/29/22  0406   WBC 7.3 5.6   HGB 12.8* 10.6*   HCT 40.2* 33.0*    108*   MCV 84.2 84.4     Renal:    Recent Labs     03/28/22  1125 03/28/22  1313 03/29/22  0406   *  --  138   K 5.9* 4.2 4.2   CL 95*  --  101   CO2 27  --  27   BUN 33*  --  31*   CREATININE 1.2  --  1.3   GLUCOSE 100*  --  69*   CALCIUM 9.5  --  8.5   MG  --   --  1.90   ANIONGAP 12  --  10     Hepatic:   Recent Labs     03/28/22  1125   AST 57*   ALT 17   BILITOT 0.5   BILIDIR <0.2   PROT 7.6   LABALBU 4.1   ALKPHOS 54     Troponin: No results for input(s): TROPONINI in the last 72 hours. BNP: No results for input(s): BNP in the last 72 hours.   Lipids: No results for input(s): CHOL, HDL in the last 72 hours. Invalid input(s): LDLCALCU, TRIGLYCERIDE  ABGs:  No results for input(s): PHART, ZRK4VDH, PO2ART, PBG8KIG, BEART, THGBART, O4VIPFRX, FGV3EZQ in the last 72 hours. INR:   Recent Labs     03/28/22  1125   INR 1.71*     Lactate: No results for input(s): LACTATE in the last 72 hours. Cultures:  -----------------------------------------------------------------  RAD:   CT ABDOMEN PELVIS W IV CONTRAST Additional Contrast? Oral   Final Result      1. Postsurgical changes from prior esophagectomy. 2. Status post aortic valve replacement. 3. Coronary artery calcification. 4. Elevated left hemidiaphragm. 5. Partial left lower lobe collapse with left lower lobe airspace disease. Pneumonia cannot entirely be excluded. ABDOMEN: Patient is status postresection of the lower two thirds of the esophagus and stomach with  bowel upper esophageal surgical anastomosis. The interposed bowel within the thorax is distended with fluid. There is dilatation of proximal small bowel    loop within the left upper quadrant with focal transition point to decompressed small bowel within the left upper quadrant (series 608 image 51). Some of the oral contrast does pass beyond this transition point. This bowel loop has a twisted appearance     suggesting a partial volvulus. Adjacent mesenteric veins at site of suspected volvulus are decompressed. The remainder of the small bowel loops are nondilated. There is stool identified within nondilated colon. No bowel wall thickening is identified. Patient status post a cholecystectomy. There is some mild intrahepatic biliary dilatation with the left lobe liver. This is likely related to cholestasis. Portal vein is patent. Pancreas, spleen, and adrenal glands are normal.      There is some stable scarring identified at the upper pole of the left kidney. There is mild bilateral hydronephrosis.  This is new from the prior exam.      No abdominal lymphadenopathy is identified. Trace amount of free fluid is identified within the left subdiaphragmatic space. PELVIS:  There is extensive atherosclerotic calcification of the infrarenal abdominal aorta and common iliac arteries. 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 No pelvic lymphadenopathy is identified. No pelvic hernias are identified. Radiation therapy seeds are identified within the prostate gland. There is dextroscoliosis of the thoracolumbar spine with associated advanced multilevel degenerative disc disease and facet joint spondylosis. IMPRESSION:   1. Postsurgical changes from prior esophagectomy and gastrectomy with esophageal bowel anastomosis. There is dilatation of the thoracic portion of bowel as well as proximal small bowel with focal transition point with the left upper quadrant. At this    transition point, the bowel has a twisted appearance. Some of the ingested oral contrast is able to pass the transition point. The findings are consistent with a partial small bowel obstruction. The twisting of the dilated loop of small bowel is    suggestive of a partial volvulus. 2. Status post cholecystectomy. 3. Stable elevated left hemidiaphragm. 4. 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 5. Mild bilateral hydronephrosis. This is new from the prior exam.   6. Radiation therapy seeds are identified within the prostate gland. 7. Mild intrahepatic biliary dilatation is identified with the left lobe liver. This likely reflects cholestasis from prior cholecystectomy. CT CHEST WO CONTRAST   Final Result      1. Postsurgical changes from prior esophagectomy. 2. Status post aortic valve replacement. 3. Coronary artery calcification. 4. Elevated left hemidiaphragm. 5. Partial left lower lobe collapse with left lower lobe airspace disease. Pneumonia cannot entirely be excluded.        ABDOMEN: Patient is status postresection of the lower two thirds of the esophagus and stomach with  bowel upper esophageal surgical anastomosis. The interposed bowel within the thorax is distended with fluid. There is dilatation of proximal small bowel    loop within the left upper quadrant with focal transition point to decompressed small bowel within the left upper quadrant (series 608 image 51). Some of the oral contrast does pass beyond this transition point. This bowel loop has a twisted appearance     suggesting a partial volvulus. Adjacent mesenteric veins at site of suspected volvulus are decompressed. The remainder of the small bowel loops are nondilated. There is stool identified within nondilated colon. No bowel wall thickening is identified. Patient status post a cholecystectomy. There is some mild intrahepatic biliary dilatation with the left lobe liver. This is likely related to cholestasis. Portal vein is patent. Pancreas, spleen, and adrenal glands are normal.      There is some stable scarring identified at the upper pole of the left kidney. There is mild bilateral hydronephrosis. This is new from the prior exam.      No abdominal lymphadenopathy is identified. Trace amount of free fluid is identified within the left subdiaphragmatic space. PELVIS:  There is extensive atherosclerotic calcification of the infrarenal abdominal aorta and common iliac arteries. 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 No pelvic lymphadenopathy is identified. No pelvic hernias are identified. Radiation therapy seeds are identified within the prostate gland. There is dextroscoliosis of the thoracolumbar spine with associated advanced multilevel degenerative disc disease and facet joint spondylosis. IMPRESSION:   1. Postsurgical changes from prior esophagectomy and gastrectomy with esophageal bowel anastomosis.  There is dilatation of the thoracic portion of bowel as well as proximal small bowel with focal transition point with the left upper quadrant. At this    transition point, the bowel has a twisted appearance. Some of the ingested oral contrast is able to pass the transition point. The findings are consistent with a partial small bowel obstruction. The twisting of the dilated loop of small bowel is    suggestive of a partial volvulus. 2. Status post cholecystectomy. 3. Stable elevated left hemidiaphragm. 4. 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 5. Mild bilateral hydronephrosis. This is new from the prior exam.   6. Radiation therapy seeds are identified within the prostate gland. 7. Mild intrahepatic biliary dilatation is identified with the left lobe liver. This likely reflects cholestasis from prior cholecystectomy. Assessment/Plan:   Edin Smith is an 44-year-old male with PMHx SBO (2021), esophagectomy with gastric pull-through as well as chemo/rad tx for esophageal cancer (2010), CAD s/p CABG x2 2006, aortic stenosis 2/2 bicuspid aortic valve s/p mechanical AVR in 2006 (on Warfarin), prostate ca s/p brachytherapy, HTN, HLD who presented to the [facility] ED for abdominal pain associated with nausea and vomiting.     Partial SBO, Partial Volvulus   CTAP with dilatation of thoracic bowel and proximal small bowel with transition point in LUQ concerning for partial SBO, partial volvulus.    S/p 1L LR.   - Gen Surgery following  - Plan for gastrogaffin in NGT today with AXR 4 hours after to evaluate progression  - Continue NPO  - Continue IVF   - Compazine PRN for nausea      Chronic Issues:  CAD s/p CABG x2 (2006)   Aortic Stenosis 2/2 Bicuspid Aortic Valve s/p mechanical AVR (2006) - holding Warfarin for now  Prostate Ca - s/p brachytherapy  HLD - holding Lipitor  GERD - holding Omeprazole        Code Status: Full Code  FEN: Diet NPO  PPX: SCDs  DISPO: IP Judieth Closs, MD, PGY-1  03/29/22  6:27 AM    This patient has been staffed and discussed with Zohreh Pimentel MD.

## 2022-03-29 NOTE — PROGRESS NOTES
Surgery Daily Progress Note  Edward Donato MD  CC:  SBO      Subjective :No acute events overnight. Slept well. Denies any abdominal pain. No flatus, no BM. Remains afebrile and HDS    No other complaints. Review of systems is otherwise negative    Past medical history, Past surgical history, Social history, Family history and allergies reviewed and updated. Objective    Infusions:   sodium chloride      sodium chloride 100 mL/hr at 03/29/22 0600        I/O:I/O last 3 completed shifts: In: 1967.7 [I.V.:967.7; IV Piggyback:1000]  Out: 900 [Urine:700; Emesis/NG output:200]           Wt Readings from Last 1 Encounters:   03/28/22 105 lb (47.6 kg)                 LABS:    Recent Labs     03/28/22  1125 03/29/22  0406   WBC 7.3 5.6   HGB 12.8* 10.6*   HCT 40.2* 33.0*   MCV 84.2 84.4    108*        Recent Labs     03/28/22  1125 03/28/22  1313 03/29/22  0406   *  --  138   K 5.9* 4.2 4.2   CL 95*  --  101   CO2 27  --  27   BUN 33*  --  31*   CREATININE 1.2  --  1.3        Recent Labs     03/28/22  1125   AST 57*   ALT 17   BILIDIR <0.2   BILITOT 0.5   ALKPHOS 54        Recent Labs     03/28/22  1125   LIPASE 56.0        Recent Labs     03/28/22  1125   PROT 7.6   INR 1.71*      No results for input(s): CKTOTAL, CKMB, CKMBINDEX, TROPONINI in the last 72 hours.             Exam:/68   Pulse 75   Temp 97.8 °F (36.6 °C) (Oral)   Resp 16   SpO2 94%   General appearance: alert, appears stated age and cooperative  General appearance: alert, no acute distress, grooming appropriate  HEENT: Normocephalic, atraumatic; EOMI; moist mucous membranes  Neck: trachea midline, no JVD, no lymphadenopathy  Chest/Lungs: Normal inspiratory effort, symmetric chest rise, no accessory muscle use  Cardiovascular: Regular rate and rhythm; perfusing extremities  Abdomen: soft, non-tender, non-distended, no guarding/rigidity; multiple well-healed surgical scars  Skin: warm and dry, no rashes  Extremities: no edema, no

## 2022-03-29 NOTE — PROGRESS NOTES
Nutrition Note       NUTRITION RECOMMENDATIONS:   1. PO Diet: Continue NPO per MD.   2. ONS: add BID w/diet advancement. NUTRITION ASSESSMENT:  Nutritional summary & status: MST 2 for risk of malnutrition; no evidence of weight loss per EMR review and pt currently NPO pending surgery consult for SBO. RD will monitor for diet advancement and monitor nutritional adequacy through admit     Admission/PMH: Admitted w/concern for partial SBO, partial volvulus as well as new mild b/l hydronephrosis w/abdominal pain and n/v. PMHx SBO (2021), esophagectomy with gastric pull-through as well as chemo/rad tx for esophageal cancer (2010), CAD s/p CABG x2 2006, aortic stenosis 2/2 bicuspid aortic valve s/p mechanical AVR in 2006 (now on Warfarin), prostate ca s/p brachytherapy, HTN, HLD     MALNUTRITION ASSESSMENT  Context of Malnutrition: Acute Illness   Malnutrition Status: Insufficient data    NUTRITION DIAGNOSIS   Predicted inadequate energy intake related to altered GI function as evidenced by NPO or clear liquid status due to medical condition,BMI    [address] and/or Nutrient Delivery:  Continue NPO  Nutrition Education/Counseling:  No recommendation at this time      The patient will still be monitored per nutrition standards of care. Consult dietitian if nutrition interventions essential to patient care is needed.      Itzel Ramirez, [address] [place] [address],   Kurt:  [phone]  Office:  [phone]

## 2022-03-29 NOTE — PROGRESS NOTES
4 Eyes Admission Assessment     I agree as the admission nurse that 2 RN's have performed a thorough Head to Toe Skin Assessment on the patient. ALL assessment sites listed below have been assessed on admission. Areas assessed by both nurses: Wilfredo Whiteian  [x]   Head, Face, and Ears   [x]   Shoulders, Back, and Chest  [x]   Arms, Elbows, and Hands   [x]   Coccyx, Sacrum, and Ischium  [x]   Legs, Feet, and Heels        Does the Patient have Skin Breakdown?   Yes a wound was noted on the Admission Assessment and an LDA was Initiated documentation include the Rossy-wound, Wound Assessment, Measurements, Dressing Treatment, Drainage, and Color\",         Hunter Prevention initiated:  Yes   Wound Care Orders initiated:  Yes      [id number]  nurse consulted for Pressure Injury (Stage 3,4, Unstageable, DTI, NWPT, and Complex wounds) or Hunter score 18 or lower:  Yes      Nurse 1 eSignature: Electronically signed by Merlinda Spencer, RN on 3/29/22 at 7:03 PM EDT    **SHARE this note so that the co-signing nurse is able to place an eSignature**    Nurse 2 eSignature: Electronically signed by Sobeida Felton RN on 3/29/22 at 11:37 PM EDT

## 2022-03-30 VITALS
RESPIRATION RATE: 19 BRPM | DIASTOLIC BLOOD PRESSURE: 85 MMHG | HEART RATE: 67 BPM | SYSTOLIC BLOOD PRESSURE: 149 MMHG | OXYGEN SATURATION: 99 % | TEMPERATURE: 96.2 F

## 2022-03-30 LAB
ALBUMIN SERPL-MCNC: 3 G/DL (ref 3.4–5)
ANION GAP SERPL CALCULATED.3IONS-SCNC: 6 MMOL/L (ref 3–16)
ANISOCYTOSIS: ABNORMAL
ANTI-XA UNFRAC HEPARIN: 0.5 IU/ML (ref 0.3–0.7)
BASOPHILS ABSOLUTE: 0 K/UL (ref 0–0.2)
BASOPHILS RELATIVE PERCENT: 0.9 %
BUN BLDV-MCNC: 30 MG/DL (ref 7–20)
CALCIUM SERPL-MCNC: 8.2 MG/DL (ref 8.3–10.6)
CHLORIDE BLD-SCNC: 103 MMOL/L (ref 99–110)
CO2: 27 MMOL/L (ref 21–32)
CREAT SERPL-MCNC: 1.1 MG/DL (ref 0.8–1.3)
EOSINOPHILS ABSOLUTE: 0.1 K/UL (ref 0–0.6)
EOSINOPHILS RELATIVE PERCENT: 1.8 %
GFR AFRICAN AMERICAN: >60
GFR NON-AFRICAN AMERICAN: >60
GLUCOSE BLD-MCNC: 139 MG/DL (ref 70–99)
HCT VFR BLD CALC: 32.5 % (ref 40.5–52.5)
HEMOGLOBIN: 10.2 G/DL (ref 13.5–17.5)
INR BLD: 1.47 (ref 0.88–1.12)
LYMPHOCYTES ABSOLUTE: 1.2 K/UL (ref 1–5.1)
LYMPHOCYTES RELATIVE PERCENT: 23.8 %
MAGNESIUM: 2 MG/DL (ref 1.8–2.4)
MCH RBC QN AUTO: 26.7 PG (ref 26–34)
MCHC RBC AUTO-ENTMCNC: 31.5 G/DL (ref 31–36)
MCV RBC AUTO: 84.6 FL (ref 80–100)
MONOCYTES ABSOLUTE: 0.5 K/UL (ref 0–1.3)
MONOCYTES RELATIVE PERCENT: 10.7 %
NEUTROPHILS ABSOLUTE: 3.1 K/UL (ref 1.7–7.7)
NEUTROPHILS RELATIVE PERCENT: 62.8 %
OVALOCYTES: ABNORMAL
PDW BLD-RTO: 18.1 % (ref 12.4–15.4)
PHOSPHORUS: 3 MG/DL (ref 2.5–4.9)
PLATELET # BLD: 91 K/UL (ref 135–450)
PLATELET SLIDE REVIEW: ABNORMAL
PMV BLD AUTO: 9.8 FL (ref 5–10.5)
POTASSIUM SERPL-SCNC: 4.2 MMOL/L (ref 3.5–5.1)
PROTHROMBIN TIME: 16.9 SEC (ref 9.9–12.7)
RBC # BLD: 3.83 M/UL (ref 4.2–5.9)
SCHISTOCYTES: ABNORMAL
SODIUM BLD-SCNC: 136 MMOL/L (ref 136–145)
WBC # BLD: 4.9 K/UL (ref 4–11)

## 2022-03-30 PROCEDURE — 6360000002 HC RX W HCPCS: Performed by: STUDENT IN AN ORGANIZED HEALTH CARE EDUCATION/TRAINING PROGRAM

## 2022-03-30 PROCEDURE — 99232 SBSQ HOSP IP/OBS MODERATE 35: CPT | Performed by: SURGERY

## 2022-03-30 PROCEDURE — 99238 HOSP IP/OBS DSCHRG MGMT 30/<: CPT | Performed by: INTERNAL MEDICINE

## 2022-03-30 PROCEDURE — 2580000003 HC RX 258

## 2022-03-30 PROCEDURE — 2500000003 HC RX 250 WO HCPCS: Performed by: STUDENT IN AN ORGANIZED HEALTH CARE EDUCATION/TRAINING PROGRAM

## 2022-03-30 PROCEDURE — 85610 PROTHROMBIN TIME: CPT

## 2022-03-30 PROCEDURE — 83735 ASSAY OF MAGNESIUM: CPT

## 2022-03-30 PROCEDURE — 36415 COLL VENOUS BLD VENIPUNCTURE: CPT

## 2022-03-30 PROCEDURE — 85025 COMPLETE CBC W/AUTO DIFF WBC: CPT

## 2022-03-30 PROCEDURE — 80069 RENAL FUNCTION PANEL: CPT

## 2022-03-30 PROCEDURE — 85520 HEPARIN ASSAY: CPT

## 2022-03-30 RX ADMIN — ENOXAPARIN SODIUM 40 MG: 40 INJECTION SUBCUTANEOUS at 12:49

## 2022-03-30 RX ADMIN — POTASSIUM CHLORIDE, DEXTROSE MONOHYDRATE AND SODIUM CHLORIDE: 150; 5; 450 INJECTION, SOLUTION INTRAVENOUS at 05:16

## 2022-03-30 RX ADMIN — SODIUM CHLORIDE, PRESERVATIVE FREE 10 ML: 5 INJECTION INTRAVENOUS at 09:39

## 2022-03-30 ASSESSMENT — PAIN SCALES - GENERAL
PAINLEVEL_OUTOF10: 0

## 2022-03-30 NOTE — PLAN OF CARE
Problem: Nausea/Vomiting:  Goal: Absence of nausea/vomiting  Description: Absence of nausea/vomiting  Outcome: Ongoing  No reports of nausea or vomiting this shift       Problem: Pain:  Description: Pain management should include both nonpharmacologic and pharmacologic interventions. Goal: Control of acute pain  Description: Control of acute pain  Outcome: Ongoing     No complaints of pain      Problem: Falls - Risk of:  Goal: Will remain free from falls  Description: Will remain free from falls  Outcome: Ongoing      Pt is a High fall risk. See Valere Brittany Fall Score and ABCDS Injury Risk assessments.   + Screening for Orthostasis and/or + High Fall Risk per CAMPOS/ABCDS: Explained fall risk precautions to pt and family and rationale behind their use to keep the patient safe. Pt bed is in low position, side rails up, call light and belongings are in reach. Fall wristband applied and present on pts wrist.  Bed alarm on. Pt encouraged to call for assistance. Will continue with hourly rounds for PO intake, pain needs, toileting and repositioning as needed. - Screening for Orthostasis AND not a [place] Risk per CAMPOS/ABCDS: Pt bed is in low position, side rails up, call light and belongings are in reach. Fall risk light is on outside pts room. Pt encouraged to call for assistance as needed. Will continue with hourly rounds for PO intake, pain needs, toileting and repositioning as needed. Problem: Activity:  Goal: Ability to tolerate increased activity will improve  Description: Ability to tolerate increased activity will improve  Outcome: Ongoing   Pt was able to walk using standard walker in room today.   Pt sat up in chair, tolerated well

## 2022-03-30 NOTE — PROGRESS NOTES
Surgery Daily Progress Note  Yvrose Vo MD  CC:  SBO      Subjective :  Patient rested well overnight. He remains afebrile and HDS. NG out yesterday, tolerating clears, denies nausea, vomiting or abdominal pain. Passing gas and having bowel movements. Past medical history, Past surgical history, Social history, Family history and allergies reviewed and updated. Objective    Infusions:   dextrose      dextrose 5% and 0.45% NaCl with KCl 20 mEq 100 mL/hr at 03/30/22 0516    heparin (PORCINE) Infusion 18 Units/kg/hr (03/29/22 2200)    sodium chloride          I/O:I/O last 3 completed shifts: In: 1967.7 [I.V.:967.7; IV Piggyback:1000]  Out: 900 [Urine:700; Emesis/NG output:200]           Wt Readings from Last 1 Encounters:   03/28/22 105 lb (47.6 kg)                 LABS:    Recent Labs     03/28/22  1125 03/29/22  0406   WBC 7.3 5.6   HGB 12.8* 10.6*   HCT 40.2* 33.0*   MCV 84.2 84.4    108*        Recent Labs     03/29/22  0406 03/30/22  0329    136   K 4.2 4.2    103   CO2 27 27   PHOS  --  3.0   BUN 31* 30*   CREATININE 1.3 1.1        Recent Labs     03/28/22  1125   AST 57*   ALT 17   BILIDIR <0.2   BILITOT 0.5   ALKPHOS 54        Recent Labs     03/28/22  1125   LIPASE 56.0        Recent Labs     03/28/22  1125 03/29/22  0953   PROT 7.6  --    INR 1.71*  --    APTT  --  46.9*      No results for input(s): CKTOTAL, CKMB, CKMBINDEX, TROPONINI in the last 72 hours.             Exam:/68   Pulse 60   Temp 97.3 °F (36.3 °C) (Oral)   Resp 21   SpO2 99%   General appearance: alert, appears stated age and cooperative  General appearance: alert, no acute distress, grooming appropriate  HEENT: Normocephalic, atraumatic; EOMI; moist mucous membranes  Neck: trachea midline, no JVD, no lymphadenopathy  Chest/Lungs: Normal inspiratory effort, symmetric chest rise, no accessory muscle use  Cardiovascular: Regular rate and rhythm; perfusing extremities  Abdomen: soft, non-tender, non-distended, no guarding/rigidity; multiple well-healed surgical scars  Skin: warm and dry, no rashes  Extremities: no edema, no cyanosis  Neuro: A&Ox3, no gross sensory or motor neuro deficits      ASSESSMENT/PLAN: Pt. is a 82 y.o. male with history of prostate cancer and esophageal cancer s/p esophagectomy who presents to [facility] ED with signs/symptoms consistent with small bowel obstruction. Patient in no acute distress, non-toxic appearing. Vital signs stable, lab results largely unremarkable. Given the above findings, non-operative management with NG tube placement decided as the initial line of therapy    - Will advance to soft diet this morning, as having bowel function and tolerating clears  - Patient ok for discharge if tolerating soft diet. Salas De Jesus DO  PGY1, General Surgery  03/30/22  6:55 AM  [phone]    I have personally performed the medical history, physical exam and medical decision making and agree with all pertinent clinical information unless otherwise noted.      Feeling much better  Having BMs  Diet modification discussed    Lali Umanzor MD  Surgery Attending

## 2022-03-30 NOTE — CONSULTS
Consulted for old pressure injury L Hip and blanchable redness on sacrum, pt being discharged, did not evaluate.

## 2022-03-30 NOTE — PROGRESS NOTES
Reviewed discharge instructions with patient and  spouse. Reviewed discharge medications including dosing, schedule, indication, and adverse reactions. Reviewed which medications were already taken today and next dosage due for each medication. Patient verbalized understanding of all instructions and questions were answered to his. satisfaction. Signed discharge instructions were given to the patient and a copy placed in the paper-lite chart. Patient discharged to home per wheelchair with spouse.     Willam Najera

## 2022-03-30 NOTE — CARE COORDINATION
Case Management Assessment            Discharge Note                    Date / Time of Note: 3/30/2022 11:52 AM                  Discharge Note Completed by: GARRY Montelongo    Patient Name: Miracle Simmons MD   YOB: 1935  Diagnosis: Small bowel obstruction ([facility] [address] [E26.158]  Intermittent abdominal pain [R10.9]  Intractable nausea and vomiting [R11.2]   Date / Time: 3/28/2022 10:52 AM    Current PCP: Shaina Noyola MD  Clinic patient: No    Hospitalization in the last 30 days: No    Advance Directives:  Code Status: Full Code  [place] DNR form completed and on chart: No    Financial:  Payor: Maria D Tracy / Plan: Saulo Gongora PPO / Product Type: Medicare /      Pharmacy:    [facility] [address]  [place]  [address] [id number]  Phone: [phone] Fax: [address] [place] [address], [facility] [phone] [facility] [phone]  [address] [place] [address]  Phone: [phone] Fax: [phone]      Assistance purchasing medications?:    Assistance provided by Case Management: None at this time    Does patient want to participate in local refill/ meds to beds program?:      Meds To Beds General Rules:  1. Can ONLY be done Monday- Friday between 8:30am-5pm  2. Prescription(s) must be in pharmacy by 3pm to be filled same day  3. Copy of patient's insurance/ prescription drug card and patient face sheet must be sent along with the prescription(s)  4. Cost of Rx cannot be added to hospital bill. If financial assistance is needed, please contact unit  or ;  or  CANNOT provide pharmacy voucher for patients co-pays  5.  Patients can then  the prescription on their way out of the hospital at discharge, or pharmacy can deliver to the bedside if staff is available. (payment due at time of pick-up or delivery - cash, check, or card accepted)     Able to afford home medications/ co-pay costs: Yes    ADLS:  Current PT AM-PAC Score:   /24  Current OT AM-PAC Score:   /24      DISCHARGE Disposition: Home- No Services Needed    LOC at discharge: Not Applicable  DIALLO Completed: No    Notification completed in HENS/PAS?:  Not Applicable    IMM Completed:   Yes, Case management has presented and reviewed IMM letter #2 to the patient and/or family/ POA. Patient and/or family/POA verbalized understanding of their medicare rights and appeal process if needed. Patient and/or family/POA has signed, initialed and placed today's date (3/30/2022) and time (1150) on IMM letter #2 on the the appropriate lines. Patient and/or family/POA, copy of letter offered and they are aware that this original copy of IMM letter #2 is available prior to discharge from the paper chart on the unit. Electronic documentation has been entered into epic for IMM letter #2 and original paper copy has been added to the paper chart at the nurses station. Transportation:  Transportation PLAN for discharge: family   Mode of Transport: Private Car  Reason for medical transport: Not Applicable  Name of [address] [place] [address], [address]: Not Applicable  Time of Transport: afternoon    Transport form completed: No    Home Care:  [address] ordered at discharge: No  [address]: Not Applicable  Orders faxed: No    Durable Medical Equipment:  DME Provider: none  Equipment obtained during hospitalization: none    Home Oxygen and Respiratory Equipment:  Oxygen needed at discharge?: No  [address]: Not Applicable  Portable tank available for discharge?: No    Dialysis:  Dialysis patient: No    Dialysis Center:  Not Applicable    Hospice Services:  Location: Not Applicable  Agency: Not Applicable    Consents signed: No    Referrals made at [facility] for outpatient continued care:  Not Applicable    Additional CM Notes: Patient is from home with his wife. He is independent with a walker.  He reports no needs at discharge. The Plan for Transition of Care is related to the following treatment goals of Small bowel obstruction (HCC) [K56.609]  Intermittent abdominal pain [R10.9]  Intractable nausea and vomiting [R11.2]    The Patient and/or patient representative Terell Mtz and his family were provided with a choice of provider and agrees with the discharge plan Yes    Freedom of choice list was provided with basic dialogue that supports the patient's individualized plan of care/goals and shares the quality data associated with the providers.  Yes    Care Transitions patient: Yes    Tino Thomas [facility]  Case Management Department  Ph: [phone]  Fax: [phone]

## 2022-03-30 NOTE — CONSULTS
Nutrition Note    RD consult for \"diet education\" and dietary supplements. Pt diet just advanced this AM to low fiber diet. RECOMMENDATIONS:   1. Continue low fiber diet  2. Include ONS BID as pt currently underweight w/low BMI.      Electronically signed by Manuel Rivera RD, LD on 3/30/22 at 12:14 PM EDT    Contact: [phone]

## 2022-03-30 NOTE — PROGRESS NOTES
4 Eyes Admission Assessment     I agree as the admission nurse that 2 RN's have performed a thorough Head to Toe Skin Assessment on the patient. ALL assessment sites listed below have been assessed on admission. Areas assessed by both nurses: Garnetta [place]  [x]? Head, Face, and Ears   [x]? Shoulders, Back, and Chest  [x]? Arms, Elbows, and Hands   [x]? Coccyx, Sacrum, and Ischium  [x]? Legs, Feet, and Heels                        Does the Patient have Skin Breakdown?   Yes a wound was noted on the Admission Assessment and an LDA was Initiated documentation include the Rossy-wound, Wound Assessment, Measurements, Dressing Treatment, Drainage, and Color\",         Hunter Prevention initiated:  Yes   Wound Care Orders initiated:  Yes      [address]  nurse consulted for Pressure Injury (Stage 3,4, Unstageable, DTI, NWPT, and Complex wounds) or Hunter score 18 or lower:  Yes       Nurse 1 eSignature: Electronically signed by Kranthi Davila RN on 3/30/22 at 8:11 AM EDT    **SHARE this note so that the co-signing nurse is able to place an eSignature**    Nurse 2 eSignature: {Esignature:[id number]}

## 2022-03-30 NOTE — FLOWSHEET NOTE
03/30/22 1530   Encounter Summary   Services provided to: Patient and family together   Referral/Consult From: Rounding   Continue Visiting   (es 3/30)   Complexity of Encounter Moderate   Length of Encounter 15 minutes   Routine   Type Initial   Assessment Calm; Approachable   Intervention Active listening;Explored feelings, thoughts, concerns;Prayer;Discussed belief system/Scientology practices/gabby   Outcome Engaged in conversation; Shared reminiscences; Receptive

## 2022-03-30 NOTE — DISCHARGE SUMMARY
INTERNAL MEDICINE DEPARTMENT AT [address] [place] [address]  DISCHARGE SUMMARY    Patient ID: Miracle Simmons MD                                             Discharge Date: 3/30/2022   Patient's PCP: Shaina Noyola MD                                          Discharge Physician: Roxy Partida MD   Admit Date: 3/28/2022   Admitting Physician: Shaina Noyola MD    PROBLEMS DURING HOSPITALIZATION   Small bowel obstruction ([facility] [address]   Intractable nausea and vomiting    DISCHARGE DIAGNOSES:  Present on Admission:   Small bowel obstruction ([address]   Intractable nausea and vomiting    Hospital Course:  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 He was admitted for partial SBO. Surgery team was consulted. NG tube was placed and he was NPO. We started him on IVF. Patient had a gastograffin study and KUB showed contrast progression through colon. Patient was advanced to a CLD and tolerated a soft diet on day of discharge. Given patient's hx of mechanical aortic valve, he was placed on a heparin ggt inpatient and we held his warfarin in case he needed to be taken to the OR. His INR was subtherapeutic on day of discharge so he was discharged with instructions to use lovenox for the new few days. On the day of discharge, patient denied any abdominal pain, N/V, cough, CP or urinary symptoms. He was tolerating diet, having BMs and passing flatus. Patient status improved and patient was stable on the day of discharge. Physical Exam:  BP (!) 149/85   Pulse 67   Temp 96.2 °F (35.7 °C) (Oral)   Resp 19   SpO2 99%     ROS - As above     Physical Exam  Constitutional:       General: He is not in acute distress. Thin and frail appearing.    HENT:      Head: Normocephalic and atraumatic. Cardiovascular:      Rate and Rhythm: Normal rate and regular rhythm. Heart sounds: No murmur heard. Pulmonary:      Effort: No respiratory distress. Breath sounds: No wheezing or rales. Abdominal:      General: Abdomen is flat. There is no distension. Palpations: Abdomen is soft. Tenderness: There is no abdominal tenderness. There is no guarding. Musculoskeletal:      Right lower leg: No edema. Left lower leg: No edema. Skin:     Capillary Refill: Capillary refill takes less than 2 seconds. Neurological:      General: No focal deficit present. Mental Status: He is alert and oriented to person, place, and time.         Consults: general surgery and dietary  Significant Diagnostic Studies:  KUB, CXR and CT abdomen/pelvis  Treatments: IV hydration and antibiotics  Disposition: home  Discharged Condition: Stable  Follow Up: Primary Care Physician in one week    DISCHARGE MEDICATION:     Medication List      START taking these medications    enoxaparin 40 MG/0.4ML injection  Commonly known as: LOVENOX  Inject 0.4 mLs into the skin 2 times daily for 2 days  Notes to patient: Enoxaparin  (Lovenox)  USE-- Prevent and treat blood clots  SIDE EFFECTS--  Increased risk of bleeding/ bruising; irritation at injection site  Blood thinner-- refer to drug information handout        CONTINUE taking these medications    Alpha Lipoic Acid-Biotin 300-333 MG-MCG Caps     atorvastatin 10 MG tablet  Commonly known as: LIPITOR  TAKE ONE TABLET BY MOUTH DAILY  Notes to patient: Atorvastatin (Lipitor)  USE--  Lower cholesterol, prevent heart attack/ stroke  SIDE EFFECTS-- headache, muscle pain/ weakness     calcium carbonate 500 MG chewable tablet  Commonly known as: TUMS  Notes to patient: Used to help treat acid reflux  Side effects: constipation     CoQ10 400 MG Caps     fish oil 1000 MG Caps  Notes to patient: It is used to lower triglycerides.      magnesium oxide 400 MG tablet  Commonly known as: MAG-OX  Notes to patient: It is used to treat or prevent low magnesium levels. It is used to treat heartburn and upset stomach. omeprazole 40 MG delayed release capsule  Commonly known as: PRILOSEC  TAKE ONE CAPSULE BY MOUTH DAILY  Notes to patient: It is used to treat or prevent GI (gastrointestinal) ulcers caused by infection. It is used to treat gastroesophageal reflux disease (GERD; acid reflux). It is used to treat heartburn. It is used to treat syndromes caused by lots of stomach acid. It is used to treat or prevent ulcers of the swallowing tube (esophagus). simethicone 80 MG chewable tablet  Commonly known as: MYLICON  Notes to patient: It is used to ease too much gas in the GI (gastrointestinal) tract. vitamin B-12 1000 MCG tablet  Commonly known as: CYANOCOBALAMIN     warfarin 2.5 MG tablet  Commonly known as: COUMADIN  Take as directed. If you are unsure how to take this medication, talk to your nurse or doctor.   Notes to patient: Warfarin  (Coumadin)  USE--; treat or prevent blood clots  SIDE EFFECTS--  Increased risk of bleeding or bruising  Blood thinner- refer to drug information handout           Where to Get Your Medications      You can get these medications from any pharmacy    Bring a paper prescription for each of these medications  · enoxaparin 40 MG/0.4ML injection       Activity: activity as tolerated  Diet: regular  Wound Care: none needed    Time Spent on discharge is more than 45 minutes    Signed:  Leta Middleton MD, PGY-1  3/30/2022

## 2022-03-30 NOTE — PROGRESS NOTES
Progress Note    Admit Date: 3/28/2022  Day: 2  Diet: ADULT DIET; Regular; Low Fiber    CC: N/V    Interval history: NAONE. Patient was seen and examined this am. Denies any abdominal pain, nausea and vomiting. Had BM yesterday and passing gas. Tolerated CLD. HPI:   Nu Raquel. Alex Martinez is an 82-year-old male with PMHx SBO (2021), esophagectomy with gastric pull-through as well as chemo/rad tx for esophageal cancer (2010), CAD s/p CABG x2 2006, aortic stenosis 2/2 bicuspid aortic valve s/p mechanical AVR in 2006 (now on Warfarin), prostate ca s/p brachytherapy, HTN, HLD who presented to the [facility] ED for abdominal pain associated with nausea and vomiting. Of note he was seen in his PCP's office earlier in the day for the same complaint. Patient states that he has not been able to keep food or liquids down since Saturday. He endorses nausea as well as vomiting, also poor po intake. He endorsed he has been passing BM's, last one was night prior to admission. He also has been passing gas before coming into the ED however none since he has been here. Last dose of Warfarin was 2 days ago as he was vomiting it out. On ROS, he denies fever or chills, chest pain, palpitations, SOB, recent travel, recent viral illness.      In the ED, VS significant for 's, other VSS. Chemistry with evidence of hyponatremia to 134. K 5.9, repeated and was 4.2. CBC with Hb 12.8/Hct 40.2. CTAP with concern for partial SBO, partial volvulus as well as new mild b/l hydronephrosis. He was given anti-emetics and IVF in the ED and admitted to the floor for further management.      Medications:     Scheduled Meds:   sodium chloride flush  5-40 mL IntraVENous 2 times per day     Continuous Infusions:   dextrose      dextrose 5% and 0.45% NaCl with KCl 20 mEq 100 mL/hr at 03/30/22 0516    heparin (PORCINE) Infusion 18 Units/kg/hr (03/29/22 2200)    sodium chloride       PRN Meds:glucagon (rDNA), dextrose, glucose, dextrose bolus (hypoglycemia) **OR** dextrose bolus (hypoglycemia), heparin (porcine), heparin (porcine), sodium chloride flush, sodium chloride, ondansetron **OR** ondansetron, polyethylene glycol, acetaminophen **OR** acetaminophen, prochlorperazine    Objective:   Vitals:   T-max:  Patient Vitals for the past 8 hrs:   BP Temp Temp src Pulse Resp SpO2   03/30/22 0814 (!) 144/75 95.8 °F (35.4 °C) Oral 71 22 99 %   03/30/22 0505 134/68 97.3 °F (36.3 °C) Oral 60 21 99 %     No intake or output data in the 24 hours ending 03/30/22 0820    Review of Systems - as above    Physical Exam  Constitutional:       General: He is not in acute distress. HENT:      Head: Normocephalic and atraumatic. Cardiovascular:      Rate and Rhythm: Normal rate and regular rhythm. Heart sounds: No murmur heard. Pulmonary:      Effort: No respiratory distress. Breath sounds: No wheezing or rales. Abdominal:      General: Abdomen is flat. There is no distension. Palpations: Abdomen is soft. Tenderness: There is no abdominal tenderness. There is no guarding. Musculoskeletal:      Right lower leg: No edema. Left lower leg: No edema. Skin:     Capillary Refill: Capillary refill takes less than 2 seconds. Neurological:      General: No focal deficit present. Mental Status: He is alert and oriented to person, place, and time.          LABS:    CBC:   Recent Labs     03/28/22  1125 03/29/22  0406 03/30/22  0329   WBC 7.3 5.6 4.9   HGB 12.8* 10.6* 10.2*   HCT 40.2* 33.0* 32.5*    108* 91*   MCV 84.2 84.4 84.6     Renal:    Recent Labs     03/28/22  1125 03/28/22  1313 03/29/22  0406 03/30/22  0329   *  --  138 136   K 5.9* 4.2 4.2 4.2   CL 95*  --  101 103   CO2 27  --  27 27   BUN 33*  --  31* 30*   CREATININE 1.2  --  1.3 1.1   GLUCOSE 100*  --  69* 139*   CALCIUM 9.5  --  8.5 8.2*   MG  --   --  1.90 2.00   PHOS  --   --   --  3.0   ANIONGAP 12  --  10 6     Hepatic:   Recent Labs     03/28/22  1125 03/30/22  0329 AST 57*  --    ALT 17  --    BILITOT 0.5  --    BILIDIR <0.2  --    PROT 7.6  --    LABALBU 4.1 3.0*   ALKPHOS 54  --      Troponin: No results for input(s): TROPONINI in the last 72 hours. BNP: No results for input(s): BNP in the last 72 hours. Lipids: No results for input(s): CHOL, HDL in the last 72 hours. Invalid input(s): LDLCALCU, TRIGLYCERIDE  ABGs:  No results for input(s): PHART, CNM0REI, PO2ART, BOP7RYU, BEART, THGBART, K4ANDCBL, LUW4IIF in the last 72 hours. INR:   Recent Labs     03/28/22  1125   INR 1.71*     Lactate: No results for input(s): LACTATE in the last 72 hours. Cultures:  -----------------------------------------------------------------  RAD:   XR ABDOMEN (2 VIEWS)   Final Result   1. Contrast has progressed throughout colon. CT ABDOMEN PELVIS W IV CONTRAST Additional Contrast? Oral   Final Result      1. Postsurgical changes from prior esophagectomy. 2. Status post aortic valve replacement. 3. Coronary artery calcification. 4. Elevated left hemidiaphragm. 5. Partial left lower lobe collapse with left lower lobe airspace disease. Pneumonia cannot entirely be excluded. ABDOMEN: Patient is status postresection of the lower two thirds of the esophagus and stomach with  bowel upper esophageal surgical anastomosis. The interposed bowel within the thorax is distended with fluid. There is dilatation of proximal small bowel    loop within the left upper quadrant with focal transition point to decompressed small bowel within the left upper quadrant (series 608 image 51). Some of the oral contrast does pass beyond this transition point. This bowel loop has a twisted appearance     suggesting a partial volvulus. Adjacent mesenteric veins at site of suspected volvulus are decompressed. The remainder of the small bowel loops are nondilated. There is stool identified within nondilated colon. No bowel wall thickening is identified. Patient status post a cholecystectomy. There is some mild intrahepatic biliary dilatation with the left lobe liver. This is likely related to cholestasis. Portal vein is patent. Pancreas, spleen, and adrenal glands are normal.      There is some stable scarring identified at the upper pole of the left kidney. There is mild bilateral hydronephrosis. This is new from the prior exam.      No abdominal lymphadenopathy is identified. Trace amount of free fluid is identified within the left subdiaphragmatic space. PELVIS:  There is extensive atherosclerotic calcification of the infrarenal abdominal aorta and common iliac arteries. 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 No pelvic lymphadenopathy is identified. No pelvic hernias are identified. Radiation therapy seeds are identified within the prostate gland. There is dextroscoliosis of the thoracolumbar spine with associated advanced multilevel degenerative disc disease and facet joint spondylosis. IMPRESSION:   1. Postsurgical changes from prior esophagectomy and gastrectomy with esophageal bowel anastomosis. There is dilatation of the thoracic portion of bowel as well as proximal small bowel with focal transition point with the left upper quadrant. At this    transition point, the bowel has a twisted appearance. Some of the ingested oral contrast is able to pass the transition point. The findings are consistent with a partial small bowel obstruction. The twisting of the dilated loop of small bowel is    suggestive of a partial volvulus. 2. Status post cholecystectomy. 3. Stable elevated left hemidiaphragm. 4. 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 5. Mild bilateral hydronephrosis. This is new from the prior exam.   6. Radiation therapy seeds are identified within the prostate gland. 7. Mild intrahepatic biliary dilatation is identified with the left lobe liver. This likely reflects cholestasis from prior cholecystectomy. CT CHEST WO CONTRAST   Final Result      1. Postsurgical changes from prior esophagectomy. 2. Status post aortic valve replacement. 3. Coronary artery calcification. 4. Elevated left hemidiaphragm. 5. Partial left lower lobe collapse with left lower lobe airspace disease. Pneumonia cannot entirely be excluded. ABDOMEN: Patient is status postresection of the lower two thirds of the esophagus and stomach with  bowel upper esophageal surgical anastomosis. The interposed bowel within the thorax is distended with fluid. There is dilatation of proximal small bowel    loop within the left upper quadrant with focal transition point to decompressed small bowel within the left upper quadrant (series 608 image 51). Some of the oral contrast does pass beyond this transition point. This bowel loop has a twisted appearance     suggesting a partial volvulus. Adjacent mesenteric veins at site of suspected volvulus are decompressed. The remainder of the small bowel loops are nondilated. There is stool identified within nondilated colon. No bowel wall thickening is identified. Patient status post a cholecystectomy. There is some mild intrahepatic biliary dilatation with the left lobe liver. This is likely related to cholestasis. Portal vein is patent. Pancreas, spleen, and adrenal glands are normal.      There is some stable scarring identified at the upper pole of the left kidney. There is mild bilateral hydronephrosis. This is new from the prior exam.      No abdominal lymphadenopathy is identified. Trace amount of free fluid is identified within the left subdiaphragmatic space. PELVIS:  There is extensive atherosclerotic calcification of the infrarenal abdominal aorta and common iliac arteries. 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 No pelvic lymphadenopathy is identified. No pelvic hernias are identified.  Radiation therapy seeds are identified within the prostate gland.      There is dextroscoliosis of the thoracolumbar spine with associated advanced multilevel degenerative disc disease and facet joint spondylosis. IMPRESSION:   1. Postsurgical changes from prior esophagectomy and gastrectomy with esophageal bowel anastomosis. There is dilatation of the thoracic portion of bowel as well as proximal small bowel with focal transition point with the left upper quadrant. At this    transition point, the bowel has a twisted appearance. Some of the ingested oral contrast is able to pass the transition point. The findings are consistent with a partial small bowel obstruction. The twisting of the dilated loop of small bowel is    suggestive of a partial volvulus. 2. Status post cholecystectomy. 3. Stable elevated left hemidiaphragm. 4. 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 5. Mild bilateral hydronephrosis. This is new from the prior exam.   6. Radiation therapy seeds are identified within the prostate gland. 7. Mild intrahepatic biliary dilatation is identified with the left lobe liver. This likely reflects cholestasis from prior cholecystectomy. Assessment/Plan:   Kike Rivera is an 51-year-old male with PMHx SBO (2021), esophagectomy with gastric pull-through as well as chemo/rad tx for esophageal cancer (2010), CAD s/p CABG x2 2006, aortic stenosis 2/2 bicuspid aortic valve s/p mechanical AVR in 2006 (on Warfarin), prostate ca s/p brachytherapy, HTN, HLD who presented to the [facility] ED for abdominal pain associated with nausea and vomiting.     Partial SBO, Partial Volvulus   CTAP with dilatation of thoracic bowel and proximal small bowel with transition point in LUQ concerning for partial SBO, partial volvulus. S/p 1L LR. XR after gastrograffin showed contrast progression through the colon.   - Gen Surgery following  - Advance to soft diet, ok for discharge per surgery team if tolerating soft diet  - Compazine PRN for nausea CAD s/p CABG x2 and aortic Stenosis 2/2 Bicuspid Aortic Valve s/p mechanical AVR (2006)   Patient on warfarin at home. Held during this admission  - Continue heparin ggt  - Consult with pharmacy about dosage at which we need to restart warfarin  - F/U PT/INR    Chronic Issues:    Prostate Ca - s/p brachytherapy  HLD - holding Lipitor  GERD - holding Omeprazole        Code Status: Full Code  FEN: ADULT DIET;  Regular; Low Fiber  PPX: SCDs  DISPO: Possible discharge today    Julio César Franco MD, PGY-1  03/30/22  8:20 AM    This patient has been staffed and discussed with Denver Mannan, MD.

## 2022-03-31 ENCOUNTER — CARE COORDINATION (OUTPATIENT)
Dept: CASE MANAGEMENT | Age: 87
End: 2022-03-31

## 2022-03-31 PROCEDURE — 90694 VACC AIIV4 NO PRSRV 0.5ML IM: CPT | Performed by: INTERNAL MEDICINE

## 2022-03-31 PROCEDURE — G0008 ADMIN INFLUENZA VIRUS VAC: HCPCS | Performed by: INTERNAL MEDICINE

## 2022-03-31 NOTE — CARE COORDINATION
[facility] Transitions Initial Follow Up Call    Call within 2 business days of discharge: yes     Patient: Gayla Fabian MD Patient : 1935   MRN: [id number]    Reason for Admission: SBO  Discharge Date: 3/30/22  RARS: Readmission Risk Score: 16.4 ( )      Last Discharge [facility]       Complaint Diagnosis Description Type Department Provider    3/28/22 Emesis; Nausea; Abdominal Pain Intractable nausea and vomiting . .. ED to Hosp-Admission (Discharged) (ADMITTED) [address] [facility] Lee Lemus MD; Johnn Soulier, MD           Spoke with: Gayla Fabian MD    Facility: [facility]      Non-face-to-face services provided:  Obtained and reviewed discharge summary and/or continuity of care documents  Education of patient/family/caregiver/guardian to support self-management-   Assessment and support for treatment adherence and medication management-        Care Transitions 24 Hour Call    Do you have any ongoing symptoms?: No  Do you have a copy of your discharge instructions?: Yes  Do you have all of your prescriptions and are they filled?: Yes  Have you been contacted by a [address]?: No  Have you scheduled your follow up appointment?: No  Were you discharged with any Home Care or Post Acute Services: No  Post Acute Services: Home Health (Comment: [facility])  Do you feel like you have everything you need to keep you well at home?: Yes  Care Transitions Interventions       Was this an external facility discharge? NA  Challenges to be reviewed by the provider   Additional needs identified to be addressed with provider:   NO    Discussed COVID-19 related testing which was available at this time yes     Test results were available yes     Patient informed of results, if available? Yes                Method of communication with provider :   phone    SUMMARY  CTC spoke to the Pt who denies any issues and reports LBM was today. Pt denies issues with BM and states he is doing ok.   Pt declined to review all meds with [facility] and states he will call and schedule HFU appts. Pt declined additional calls. Pt will take all meds as prescribed and schedule / keep doctor's appt. [facility] provided education on s/s that require medical attention and when to seek medical attention. [facility] advised Pt of use urgent care or physician's 24 hr access line if assistance is needed after hours or on the weekend. Pt denies any needs or concerns.     Follow Up  Future Appointments   Date Time Provider Miladis Mchugh   4/8/2022 12:00 PM B1 INJ [facility] OP INF [facility] OPINF [facility]   5/2/2022  2:00 PM Denver Mannan, MD [address], RN

## 2022-04-01 ENCOUNTER — TELEPHONE (OUTPATIENT)
Dept: INTERNAL MEDICINE CLINIC | Age: 87
End: 2022-04-01

## 2022-04-01 NOTE — TELEPHONE ENCOUNTER
----- Message from Felix Ahumada sent at 4/1/2022 10:17 AM EDT -----  Subject: Appointment Request    Reason for Call: Routine Hospital Follow Up    QUESTIONS  Type of Appointment? Established Patient  Reason for appointment request? Available appointments did not meet   patient need  Additional Information for Provider? Patient need hospital follow up in   net two weeks . Patient is requesting to be seen on any day beside Tuesday   in late mornings. ---------------------------------------------------------------------------  --------------  Karin GRANGER  What is the best way for the office to contact you? OK to leave message on   voicemail  Preferred Call Back Phone Number? [phone]  ---------------------------------------------------------------------------  --------------  SCRIPT ANSWERS  Relationship to Patient? Self  (Patient requests to see provider urgently. )? No  (Has the patient been discharged from the hospital within 2 business days   AND does not have a Telephone Encounter  Follow Up From [address] [place] [address]   documented in [address]?)? No  Do you have any questions for your primary care provider that need to be   answered prior to your appointment? (Use RN Triage if question pertains to   anything on the red flag list)? No  (Patient needs follow up visit after hospital discharge) Book first   available appointment within 7 days OF DISCHARGE, if no appt, proceed to   book the next available time slot within 14 days OF DISCHARGE AND Send   Message to Provider. [facility] Follow Up appointment cannot be booked   beyond 14 Days and should result in a Message to Provider. ? Yes   Have you been diagnosed with, awaiting test results for, or told that you   are suspected of having COVID-19 (Coronavirus)? (If patient has tested   negative or was tested as a requirement for work, school, or travel and   not based on symptoms, answer no)?  No  Within the past 10 days have you developed any of the following symptoms (answer no if symptoms have been present longer than 10 days or began   more than 10 days ago)? Fever or Chills, Cough, Shortness of breath or   difficulty breathing, Loss of taste or smell, Sore throat, Nasal   congestion, Sneezing or runny nose, Fatigue or generalized body aches   (answer no if pain is specific to a body part e.g. back pain), Diarrhea,   Headache? No  Have you had close contact with someone with COVID-19 in the last 7 days? No  (Service Expert  click yes below to proceed with [employer] As Usual   Scheduling)?  Yes

## 2022-04-04 NOTE — TELEPHONE ENCOUNTER
Spoke with patient has an appointment 05/02/22   States he is feeling much better and declined an earlier appointment   Will call if something changes prior to may appointment

## 2022-04-08 ENCOUNTER — HOSPITAL ENCOUNTER (OUTPATIENT)
Dept: ONCOLOGY | Age: 87
Setting detail: INFUSION SERIES
Discharge: HOME | End: 2022-04-08
Payer: MEDICARE

## 2022-04-08 VITALS
HEART RATE: 80 BPM | OXYGEN SATURATION: 99 % | TEMPERATURE: 97.5 F | RESPIRATION RATE: 18 BRPM | DIASTOLIC BLOOD PRESSURE: 84 MMHG | SYSTOLIC BLOOD PRESSURE: 141 MMHG

## 2022-04-08 DIAGNOSIS — D63.1 ANEMIA ASSOCIATED WITH CHRONIC RENAL FAILURE: Primary | ICD-10-CM

## 2022-04-08 DIAGNOSIS — R63.6 PATIENT UNDERWEIGHT: ICD-10-CM

## 2022-04-08 DIAGNOSIS — M81.0 OSTEOPOROSIS, SENILE: ICD-10-CM

## 2022-04-08 DIAGNOSIS — N18.9 ANEMIA ASSOCIATED WITH CHRONIC RENAL FAILURE: Primary | ICD-10-CM

## 2022-04-08 PROCEDURE — 96372 THER/PROPH/DIAG INJ SC/IM: CPT

## 2022-04-08 PROCEDURE — 6360000002 HC RX W HCPCS: Performed by: INTERNAL MEDICINE

## 2022-04-08 RX ORDER — ACETAMINOPHEN 325 MG/1
650 TABLET ORAL
OUTPATIENT
Start: 2022-10-07

## 2022-04-08 RX ORDER — SODIUM CHLORIDE 9 MG/ML
INJECTION, SOLUTION INTRAVENOUS CONTINUOUS
OUTPATIENT
Start: 2022-10-07

## 2022-04-08 RX ORDER — ONDANSETRON 2 MG/ML
8 INJECTION INTRAMUSCULAR; INTRAVENOUS
OUTPATIENT
Start: 2022-10-07

## 2022-04-08 RX ORDER — DIPHENHYDRAMINE HYDROCHLORIDE 50 MG/ML
50 INJECTION INTRAMUSCULAR; INTRAVENOUS
OUTPATIENT
Start: 2022-10-07

## 2022-04-08 RX ADMIN — DENOSUMAB 60 MG: 60 INJECTION SUBCUTANEOUS at 12:09

## 2022-04-08 NOTE — PROGRESS NOTES
Pt seen and assessed [facility] today for Prolia injection per orders from Dr. Shirin Marvin. Pt tolerated infusion well and without incident. Pt verbalizes understanding of discharge instructions. Discharged ambulatory to home with wife.   Oleg Montiel RN

## 2022-04-18 ENCOUNTER — TELEPHONE (OUTPATIENT)
Dept: INTERNAL MEDICINE CLINIC | Age: 87
End: 2022-04-18

## 2022-04-18 DIAGNOSIS — R09.02 HYPOXIA: ICD-10-CM

## 2022-04-18 DIAGNOSIS — N18.31 STAGE 3A CHRONIC KIDNEY DISEASE (HCC): ICD-10-CM

## 2022-04-18 DIAGNOSIS — D50.9 IRON DEFICIENCY ANEMIA, UNSPECIFIED IRON DEFICIENCY ANEMIA TYPE: Primary | ICD-10-CM

## 2022-04-18 DIAGNOSIS — R53.83 FATIGUE, UNSPECIFIED TYPE: ICD-10-CM

## 2022-04-18 NOTE — TELEPHONE ENCOUNTER
I need blood drawn. MD Vasyl Lopez MD 15 minutes ago (8:23 AM)           Order blood draw for upcoming appointment and because I am weak, tired, and SOB with a hx of GI blood loss.   Draw usual including CBC and iron studies.     Thanks, Yanni Waldrop

## 2022-04-19 DIAGNOSIS — N18.31 STAGE 3A CHRONIC KIDNEY DISEASE (HCC): ICD-10-CM

## 2022-04-19 DIAGNOSIS — D50.9 IRON DEFICIENCY ANEMIA, UNSPECIFIED IRON DEFICIENCY ANEMIA TYPE: ICD-10-CM

## 2022-04-19 DIAGNOSIS — R53.83 FATIGUE, UNSPECIFIED TYPE: ICD-10-CM

## 2022-04-19 DIAGNOSIS — R09.02 HYPOXIA: ICD-10-CM

## 2022-04-19 LAB
A/G RATIO: 1.6 (ref 1.1–2.2)
ALBUMIN SERPL-MCNC: 4.2 G/DL (ref 3.4–5)
ALP BLD-CCNC: 58 U/L (ref 40–129)
ALT SERPL-CCNC: 22 U/L (ref 10–40)
ANION GAP SERPL CALCULATED.3IONS-SCNC: 15 MMOL/L (ref 3–16)
AST SERPL-CCNC: 25 U/L (ref 15–37)
BASOPHILS ABSOLUTE: 0 K/UL (ref 0–0.2)
BASOPHILS RELATIVE PERCENT: 0.5 %
BILIRUB SERPL-MCNC: 0.3 MG/DL (ref 0–1)
BUN BLDV-MCNC: 55 MG/DL (ref 7–20)
CALCIUM SERPL-MCNC: 9.1 MG/DL (ref 8.3–10.6)
CHLORIDE BLD-SCNC: 105 MMOL/L (ref 99–110)
CO2: 25 MMOL/L (ref 21–32)
CREAT SERPL-MCNC: 1.7 MG/DL (ref 0.8–1.3)
EOSINOPHILS ABSOLUTE: 0 K/UL (ref 0–0.6)
EOSINOPHILS RELATIVE PERCENT: 0.6 %
GFR AFRICAN AMERICAN: 47
GFR NON-AFRICAN AMERICAN: 39
GLUCOSE BLD-MCNC: 103 MG/DL (ref 70–99)
HCT VFR BLD CALC: 26.8 % (ref 40.5–52.5)
HEMOGLOBIN: 8.4 G/DL (ref 13.5–17.5)
IRON SATURATION: 20 % (ref 20–50)
IRON: 62 UG/DL (ref 59–158)
LYMPHOCYTES ABSOLUTE: 1.5 K/UL (ref 1–5.1)
LYMPHOCYTES RELATIVE PERCENT: 21 %
MCH RBC QN AUTO: 28.1 PG (ref 26–34)
MCHC RBC AUTO-ENTMCNC: 31.2 G/DL (ref 31–36)
MCV RBC AUTO: 90.1 FL (ref 80–100)
MONOCYTES ABSOLUTE: 0.6 K/UL (ref 0–1.3)
MONOCYTES RELATIVE PERCENT: 8.5 %
NEUTROPHILS ABSOLUTE: 4.9 K/UL (ref 1.7–7.7)
NEUTROPHILS RELATIVE PERCENT: 69.4 %
PDW BLD-RTO: 19.9 % (ref 12.4–15.4)
PLATELET # BLD: 159 K/UL (ref 135–450)
PMV BLD AUTO: 9.6 FL (ref 5–10.5)
POTASSIUM SERPL-SCNC: 4.3 MMOL/L (ref 3.5–5.1)
RBC # BLD: 2.98 M/UL (ref 4.2–5.9)
SODIUM BLD-SCNC: 145 MMOL/L (ref 136–145)
TOTAL IRON BINDING CAPACITY: 309 UG/DL (ref 260–445)
TOTAL PROTEIN: 6.9 G/DL (ref 6.4–8.2)
WBC # BLD: 7.1 K/UL (ref 4–11)

## 2022-04-20 ENCOUNTER — PATIENT MESSAGE (OUTPATIENT)
Dept: INTERNAL MEDICINE CLINIC | Age: 87
End: 2022-04-20

## 2022-04-20 ENCOUNTER — TELEPHONE (OUTPATIENT)
Dept: INTERNAL MEDICINE CLINIC | Age: 87
End: 2022-04-20

## 2022-04-20 DIAGNOSIS — R53.83 FATIGUE, UNSPECIFIED TYPE: ICD-10-CM

## 2022-04-20 DIAGNOSIS — D50.9 IRON DEFICIENCY ANEMIA, UNSPECIFIED IRON DEFICIENCY ANEMIA TYPE: ICD-10-CM

## 2022-04-20 DIAGNOSIS — D64.9 ANEMIA, UNSPECIFIED TYPE: Primary | ICD-10-CM

## 2022-04-20 DIAGNOSIS — D64.9 ANEMIA, UNSPECIFIED TYPE: ICD-10-CM

## 2022-04-20 LAB
A/G RATIO: 1.6 (ref 1.1–2.2)
ALBUMIN SERPL-MCNC: 3.9 G/DL (ref 3.4–5)
ALP BLD-CCNC: 55 U/L (ref 40–129)
ALT SERPL-CCNC: 18 U/L (ref 10–40)
ANION GAP SERPL CALCULATED.3IONS-SCNC: 12 MMOL/L (ref 3–16)
AST SERPL-CCNC: 19 U/L (ref 15–37)
BASOPHILS ABSOLUTE: 0 K/UL (ref 0–0.2)
BASOPHILS RELATIVE PERCENT: 0.4 %
BILIRUB SERPL-MCNC: 0.3 MG/DL (ref 0–1)
BUN BLDV-MCNC: 62 MG/DL (ref 7–20)
CALCIUM SERPL-MCNC: 9 MG/DL (ref 8.3–10.6)
CHLORIDE BLD-SCNC: 102 MMOL/L (ref 99–110)
CO2: 27 MMOL/L (ref 21–32)
CREAT SERPL-MCNC: 1.6 MG/DL (ref 0.8–1.3)
EOSINOPHILS ABSOLUTE: 0.1 K/UL (ref 0–0.6)
EOSINOPHILS RELATIVE PERCENT: 1.4 %
GFR AFRICAN AMERICAN: 50
GFR NON-AFRICAN AMERICAN: 41
GLUCOSE BLD-MCNC: 78 MG/DL (ref 70–99)
HCT VFR BLD CALC: 23.6 % (ref 40.5–52.5)
HEMOGLOBIN: 7.5 G/DL (ref 13.5–17.5)
IRON SATURATION: 11 % (ref 20–50)
IRON: 30 UG/DL (ref 59–158)
LYMPHOCYTES ABSOLUTE: 1.8 K/UL (ref 1–5.1)
LYMPHOCYTES RELATIVE PERCENT: 33.1 %
MCH RBC QN AUTO: 28.4 PG (ref 26–34)
MCHC RBC AUTO-ENTMCNC: 31.8 G/DL (ref 31–36)
MCV RBC AUTO: 89.2 FL (ref 80–100)
MONOCYTES ABSOLUTE: 0.7 K/UL (ref 0–1.3)
MONOCYTES RELATIVE PERCENT: 12.4 %
NEUTROPHILS ABSOLUTE: 2.9 K/UL (ref 1.7–7.7)
NEUTROPHILS RELATIVE PERCENT: 52.7 %
PDW BLD-RTO: 19.7 % (ref 12.4–15.4)
PLATELET # BLD: 146 K/UL (ref 135–450)
PMV BLD AUTO: 9.4 FL (ref 5–10.5)
POTASSIUM SERPL-SCNC: 4.6 MMOL/L (ref 3.5–5.1)
RBC # BLD: 2.65 M/UL (ref 4.2–5.9)
SODIUM BLD-SCNC: 141 MMOL/L (ref 136–145)
TOTAL IRON BINDING CAPACITY: 284 UG/DL (ref 260–445)
TOTAL PROTEIN: 6.4 G/DL (ref 6.4–8.2)
TSH SERPL DL<=0.05 MIU/L-ACNC: 8.09 UIU/ML (ref 0.27–4.2)
WBC # BLD: 5.5 K/UL (ref 4–11)

## 2022-04-20 RX ORDER — WARFARIN SODIUM 5 MG/1
2.5-5 TABLET ORAL DAILY
Qty: 30 TABLET | Refills: 3 | Status: ON HOLD
Start: 2022-04-20 | End: 2022-10-21 | Stop reason: HOSPADM

## 2022-04-20 NOTE — TELEPHONE ENCOUNTER
Iron infusion? MD Janeth Ardon Mason Smoke, MD 38 minutes ago (8:31 AM)           Hb 8.4. Do I qualify for infusion?

## 2022-04-21 ENCOUNTER — HOSPITAL ENCOUNTER (INPATIENT)
Age: 87
LOS: 4 days | Discharge: HOME | DRG: 813 | End: 2022-04-25
Attending: EMERGENCY MEDICINE | Admitting: INTERNAL MEDICINE
Payer: MEDICARE

## 2022-04-21 ENCOUNTER — TELEPHONE (OUTPATIENT)
Dept: INTERNAL MEDICINE CLINIC | Age: 87
End: 2022-04-21

## 2022-04-21 DIAGNOSIS — K92.1 GASTROINTESTINAL HEMORRHAGE WITH MELENA: ICD-10-CM

## 2022-04-21 DIAGNOSIS — D64.9 ANEMIA, UNSPECIFIED TYPE: Primary | ICD-10-CM

## 2022-04-21 DIAGNOSIS — E03.9 HYPOTHYROIDISM, UNSPECIFIED TYPE: Primary | ICD-10-CM

## 2022-04-21 LAB
ABO/RH: NORMAL
ANION GAP SERPL CALCULATED.3IONS-SCNC: 10 MMOL/L (ref 3–16)
ANTIBODY SCREEN: NORMAL
APTT: 52.6 SEC (ref 26.2–38.6)
BASE EXCESS VENOUS: -5.5 MMOL/L (ref -2–3)
BASE EXCESS VENOUS: 4.6 MMOL/L (ref -2–3)
BASOPHILS ABSOLUTE: 0 K/UL (ref 0–0.2)
BASOPHILS RELATIVE PERCENT: 0.6 %
BUN BLDV-MCNC: 60 MG/DL (ref 7–20)
CALCIUM SERPL-MCNC: 8.8 MG/DL (ref 8.3–10.6)
CARBOXYHEMOGLOBIN: 1.5 % (ref 0–1.5)
CARBOXYHEMOGLOBIN: 2 % (ref 0–1.5)
CHLORIDE BLD-SCNC: 106 MMOL/L (ref 99–110)
CO2: 26 MMOL/L (ref 21–32)
CREAT SERPL-MCNC: 1.7 MG/DL (ref 0.8–1.3)
EOSINOPHILS ABSOLUTE: 0 K/UL (ref 0–0.6)
EOSINOPHILS RELATIVE PERCENT: 0.8 %
GFR AFRICAN AMERICAN: 47
GFR NON-AFRICAN AMERICAN: 39
GLUCOSE BLD-MCNC: 174 MG/DL (ref 70–99)
HCO3 VENOUS: 24.8 MMOL/L (ref 24–28)
HCO3 VENOUS: 30.1 MMOL/L (ref 24–28)
HCT VFR BLD CALC: 20.3 % (ref 40.5–52.5)
HCT VFR BLD CALC: 24.6 % (ref 40.5–52.5)
HEMOGLOBIN, VEN, REDUCED: 24.3 %
HEMOGLOBIN, VEN, REDUCED: 3.1 %
HEMOGLOBIN: 6.4 G/DL (ref 13.5–17.5)
HEMOGLOBIN: 7.6 G/DL (ref 13.5–17.5)
INR BLD: 2.51 (ref 0.88–1.12)
LYMPHOCYTES ABSOLUTE: 1.1 K/UL (ref 1–5.1)
LYMPHOCYTES RELATIVE PERCENT: 20.6 %
MCH RBC QN AUTO: 27.6 PG (ref 26–34)
MCHC RBC AUTO-ENTMCNC: 30.9 G/DL (ref 31–36)
MCV RBC AUTO: 89.5 FL (ref 80–100)
METHEMOGLOBIN VENOUS: 1.2 % (ref 0–1.5)
METHEMOGLOBIN VENOUS: 1.6 % (ref 0–1.5)
MONOCYTES ABSOLUTE: 0.5 K/UL (ref 0–1.3)
MONOCYTES RELATIVE PERCENT: 9.2 %
NEUTROPHILS ABSOLUTE: 3.6 K/UL (ref 1.7–7.7)
NEUTROPHILS RELATIVE PERCENT: 68.8 %
O2 SAT, VEN: 75 %
O2 SAT, VEN: 97 %
PCO2, VEN: 50.3 MMHG (ref 41–51)
PCO2, VEN: 84.8 MMHG (ref 41–51)
PDW BLD-RTO: 19.5 % (ref 12.4–15.4)
PH VENOUS: 7.07 (ref 7.35–7.45)
PH VENOUS: 7.39 (ref 7.35–7.45)
PLATELET # BLD: 152 K/UL (ref 135–450)
PMV BLD AUTO: 9.3 FL (ref 5–10.5)
PO2, VEN: 101 MMHG (ref 25–40)
PO2, VEN: 61.2 MMHG (ref 25–40)
POC OCCULT BLOOD STOOL: POSITIVE
POTASSIUM SERPL-SCNC: 4.4 MMOL/L (ref 3.5–5.1)
PROTHROMBIN TIME: 29.5 SEC (ref 9.9–12.7)
RBC # BLD: 2.74 M/UL (ref 4.2–5.9)
SODIUM BLD-SCNC: 142 MMOL/L (ref 136–145)
T4 FREE: 1 NG/DL (ref 0.9–1.8)
TCO2 CALC VENOUS: 27 MMOL/L
TCO2 CALC VENOUS: 32 MMOL/L
WBC # BLD: 5.2 K/UL (ref 4–11)

## 2022-04-21 PROCEDURE — 85014 HEMATOCRIT: CPT

## 2022-04-21 PROCEDURE — P9017 PLASMA 1 DONOR FRZ W/IN 8 HR: HCPCS

## 2022-04-21 PROCEDURE — 85610 PROTHROMBIN TIME: CPT

## 2022-04-21 PROCEDURE — 6360000002 HC RX W HCPCS: Performed by: EMERGENCY MEDICINE

## 2022-04-21 PROCEDURE — 86900 BLOOD TYPING SEROLOGIC ABO: CPT

## 2022-04-21 PROCEDURE — 93005 ELECTROCARDIOGRAM TRACING: CPT | Performed by: STUDENT IN AN ORGANIZED HEALTH CARE EDUCATION/TRAINING PROGRAM

## 2022-04-21 PROCEDURE — 80048 BASIC METABOLIC PNL TOTAL CA: CPT

## 2022-04-21 PROCEDURE — 82803 BLOOD GASES ANY COMBINATION: CPT

## 2022-04-21 PROCEDURE — 82272 OCCULT BLD FECES 1-3 TESTS: CPT

## 2022-04-21 PROCEDURE — C9113 INJ PANTOPRAZOLE SODIUM, VIA: HCPCS | Performed by: EMERGENCY MEDICINE

## 2022-04-21 PROCEDURE — 2580000003 HC RX 258: Performed by: STUDENT IN AN ORGANIZED HEALTH CARE EDUCATION/TRAINING PROGRAM

## 2022-04-21 PROCEDURE — 85730 THROMBOPLASTIN TIME PARTIAL: CPT

## 2022-04-21 PROCEDURE — 96361 HYDRATE IV INFUSION ADD-ON: CPT

## 2022-04-21 PROCEDURE — 1200000000 HC SEMI PRIVATE

## 2022-04-21 PROCEDURE — 36415 COLL VENOUS BLD VENIPUNCTURE: CPT

## 2022-04-21 PROCEDURE — 2580000003 HC RX 258: Performed by: EMERGENCY MEDICINE

## 2022-04-21 PROCEDURE — 85018 HEMOGLOBIN: CPT

## 2022-04-21 PROCEDURE — 99285 EMERGENCY DEPT VISIT HI MDM: CPT

## 2022-04-21 PROCEDURE — 96374 THER/PROPH/DIAG INJ IV PUSH: CPT

## 2022-04-21 PROCEDURE — 6370000000 HC RX 637 (ALT 250 FOR IP): Performed by: STUDENT IN AN ORGANIZED HEALTH CARE EDUCATION/TRAINING PROGRAM

## 2022-04-21 PROCEDURE — C9113 INJ PANTOPRAZOLE SODIUM, VIA: HCPCS | Performed by: STUDENT IN AN ORGANIZED HEALTH CARE EDUCATION/TRAINING PROGRAM

## 2022-04-21 PROCEDURE — 86901 BLOOD TYPING SEROLOGIC RH(D): CPT

## 2022-04-21 PROCEDURE — 85025 COMPLETE CBC W/AUTO DIFF WBC: CPT

## 2022-04-21 PROCEDURE — 86923 COMPATIBILITY TEST ELECTRIC: CPT

## 2022-04-21 PROCEDURE — P9016 RBC LEUKOCYTES REDUCED: HCPCS

## 2022-04-21 PROCEDURE — 36430 TRANSFUSION BLD/BLD COMPNT: CPT

## 2022-04-21 PROCEDURE — 6360000002 HC RX W HCPCS: Performed by: STUDENT IN AN ORGANIZED HEALTH CARE EDUCATION/TRAINING PROGRAM

## 2022-04-21 PROCEDURE — 86850 RBC ANTIBODY SCREEN: CPT

## 2022-04-21 RX ORDER — SODIUM CHLORIDE 9 MG/ML
1000 INJECTION, SOLUTION INTRAVENOUS CONTINUOUS
Status: DISCONTINUED | OUTPATIENT
Start: 2022-04-21 | End: 2022-04-22

## 2022-04-21 RX ORDER — SODIUM CHLORIDE 0.9 % (FLUSH) 0.9 %
5-40 SYRINGE (ML) INJECTION EVERY 12 HOURS SCHEDULED
Status: DISCONTINUED | OUTPATIENT
Start: 2022-04-21 | End: 2022-04-25 | Stop reason: HOSPADM

## 2022-04-21 RX ORDER — VITAMIN B COMPLEX
1000 TABLET ORAL DAILY
Status: DISCONTINUED | OUTPATIENT
Start: 2022-04-21 | End: 2022-04-25 | Stop reason: HOSPADM

## 2022-04-21 RX ORDER — SODIUM CHLORIDE 9 MG/ML
INJECTION, SOLUTION INTRAVENOUS PRN
Status: DISCONTINUED | OUTPATIENT
Start: 2022-04-21 | End: 2022-04-25 | Stop reason: HOSPADM

## 2022-04-21 RX ORDER — ACETAMINOPHEN 650 MG/1
650 SUPPOSITORY RECTAL EVERY 6 HOURS PRN
Status: DISCONTINUED | OUTPATIENT
Start: 2022-04-21 | End: 2022-04-25 | Stop reason: HOSPADM

## 2022-04-21 RX ORDER — POLYETHYLENE GLYCOL 3350 17 G/17G
17 POWDER, FOR SOLUTION ORAL DAILY PRN
Status: DISCONTINUED | OUTPATIENT
Start: 2022-04-21 | End: 2022-04-23

## 2022-04-21 RX ORDER — PANTOPRAZOLE SODIUM 40 MG/10ML
40 INJECTION, POWDER, LYOPHILIZED, FOR SOLUTION INTRAVENOUS ONCE
Status: COMPLETED | OUTPATIENT
Start: 2022-04-21 | End: 2022-04-21

## 2022-04-21 RX ORDER — SODIUM CHLORIDE, SODIUM LACTATE, POTASSIUM CHLORIDE, CALCIUM CHLORIDE 600; 310; 30; 20 MG/100ML; MG/100ML; MG/100ML; MG/100ML
INJECTION, SOLUTION INTRAVENOUS CONTINUOUS
Status: DISCONTINUED | OUTPATIENT
Start: 2022-04-21 | End: 2022-04-23

## 2022-04-21 RX ORDER — DEXTROSE MONOHYDRATE 50 MG/ML
100 INJECTION, SOLUTION INTRAVENOUS PRN
Status: DISCONTINUED | OUTPATIENT
Start: 2022-04-21 | End: 2022-04-25 | Stop reason: HOSPADM

## 2022-04-21 RX ORDER — SODIUM CHLORIDE 9 MG/ML
INJECTION, SOLUTION INTRAVENOUS PRN
Status: DISCONTINUED | OUTPATIENT
Start: 2022-04-21 | End: 2022-04-24

## 2022-04-21 RX ORDER — MULTIVIT WITH MINERALS/LUTEIN
1000 TABLET ORAL WEEKLY
COMMUNITY

## 2022-04-21 RX ORDER — ATORVASTATIN CALCIUM 10 MG/1
10 TABLET, FILM COATED ORAL DAILY
Status: DISCONTINUED | OUTPATIENT
Start: 2022-04-21 | End: 2022-04-25 | Stop reason: HOSPADM

## 2022-04-21 RX ORDER — ACETAMINOPHEN 325 MG/1
650 TABLET ORAL EVERY 6 HOURS PRN
Status: DISCONTINUED | OUTPATIENT
Start: 2022-04-21 | End: 2022-04-25 | Stop reason: HOSPADM

## 2022-04-21 RX ORDER — SODIUM CHLORIDE 0.9 % (FLUSH) 0.9 %
5-40 SYRINGE (ML) INJECTION PRN
Status: DISCONTINUED | OUTPATIENT
Start: 2022-04-21 | End: 2022-04-25 | Stop reason: HOSPADM

## 2022-04-21 RX ORDER — PANTOPRAZOLE SODIUM 40 MG/10ML
40 INJECTION, POWDER, LYOPHILIZED, FOR SOLUTION INTRAVENOUS 2 TIMES DAILY
Status: DISCONTINUED | OUTPATIENT
Start: 2022-04-21 | End: 2022-04-21

## 2022-04-21 RX ORDER — FERROUS SULFATE 325(65) MG
325 TABLET ORAL EVERY OTHER DAY
COMMUNITY

## 2022-04-21 RX ORDER — POLYETHYLENE GLYCOL 3350 17 G/17G
17 POWDER, FOR SOLUTION ORAL DAILY
COMMUNITY

## 2022-04-21 RX ORDER — ONDANSETRON 2 MG/ML
4 INJECTION INTRAMUSCULAR; INTRAVENOUS EVERY 6 HOURS PRN
Status: DISCONTINUED | OUTPATIENT
Start: 2022-04-21 | End: 2022-04-25 | Stop reason: HOSPADM

## 2022-04-21 RX ORDER — ONDANSETRON 4 MG/1
4 TABLET, ORALLY DISINTEGRATING ORAL EVERY 8 HOURS PRN
Status: DISCONTINUED | OUTPATIENT
Start: 2022-04-21 | End: 2022-04-25 | Stop reason: HOSPADM

## 2022-04-21 RX ORDER — 0.9 % SODIUM CHLORIDE 0.9 %
500 INTRAVENOUS SOLUTION INTRAVENOUS ONCE
Status: DISCONTINUED | OUTPATIENT
Start: 2022-04-21 | End: 2022-04-24

## 2022-04-21 RX ADMIN — PANTOPRAZOLE SODIUM 40 MG: 40 INJECTION, POWDER, FOR SOLUTION INTRAVENOUS at 11:10

## 2022-04-21 RX ADMIN — SODIUM CHLORIDE, POTASSIUM CHLORIDE, SODIUM LACTATE AND CALCIUM CHLORIDE: 600; 310; 30; 20 INJECTION, SOLUTION INTRAVENOUS at 19:06

## 2022-04-21 RX ADMIN — PANTOPRAZOLE SODIUM 8 MG/HR: 40 INJECTION, POWDER, FOR SOLUTION INTRAVENOUS at 19:48

## 2022-04-21 RX ADMIN — SODIUM CHLORIDE 1000 ML: 9 INJECTION, SOLUTION INTRAVENOUS at 11:10

## 2022-04-21 RX ADMIN — SODIUM CHLORIDE, PRESERVATIVE FREE 10 ML: 5 INJECTION INTRAVENOUS at 19:48

## 2022-04-21 RX ADMIN — ATORVASTATIN CALCIUM 10 MG: 10 TABLET, FILM COATED ORAL at 19:48

## 2022-04-21 ASSESSMENT — PAIN - FUNCTIONAL ASSESSMENT: PAIN_FUNCTIONAL_ASSESSMENT: NONE - DENIES PAIN

## 2022-04-21 NOTE — ED NOTES
Critical venous pH called from lab 7.04.   Dr Red Russ notified and lab to be re-drawn      Lita Dumont, RN  04/21/22 5218

## 2022-04-21 NOTE — H&P
Internal Medicine  PGY 1  History & Physical      CC weakness, dark stools    History Obtained From:  patient, electronic medical record    HISTORY OF PRESENT ILLNESS:    86M with PMHx CABGx2, mech aortic valve replacement on warfarin, esophageal cancer s/p surgery/chemotherapy, prostate cancer s/p brachytherapy presenting with ~1 week of weakness and dark stools. He noticed that his stools began appearing dark brown in color. No bright red blood noted. He has not had history of GI bleed prior to this. He endorses weakness and shortness of breath. Denies fever, chills, dizziness/lightheadedness upon moving from sitting to standing position, chest pain, nausea, vomiting, abdominal pain, constipation, diarrhea. His hemoglobin levels have been decreasing (baseline ~11, noted to be 10.2 on 3/30, 8.4 on 4/19 and 7.5/7.6 on admission). Denies NSAID usage. In ED, VSS, INR found to be 2.5, Cr elevated to 1.7. Given 500 cc bolus and protonix.     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    Pancreatitis 8/1/2013    Patient underweight 8/8/2013    [facility]     Unspecified essential hypertension    ·     Past Surgical History:        Procedure Laterality Date    APPENDECTOMY      as a child    BONE GRAFT      for saddle nose    CARDIAC VALVE REPLACEMENT  2006    aorta    CHOLECYSTECTOMY      COLONOSCOPY  11/2009    COLONOSCOPY  10/05/15    COLONOSCOPY N/A 2/17/2021    COLONOSCOPY DIAGNOSTIC/STOMA performed by Avelino Baez MD at [address]  2/18/2021    COLONOSCOPY POLYPECTOMY SNARE/COLD BIOPSY performed by Avelino Baez MD at [address]  2006   [address] [facility] 1990& 1992    cataract bilat removal     GASTRECTOMY      PROSTATE SURGERY      seeds    TONSILLECTOMY      UPPER GASTROINTESTINAL ENDOSCOPY N/A 2/16/2021    EGD BIOPSY performed by Eulalio Mcintyre MD at [address] PROSTATE/TRANSRECTAL VOL STUDY BRACHYTHERAPY     ·     Medications Priorto Admission:    · Not in a hospital admission. Allergies:  No known allergies    Social History:   · TOBACCO:   reports that he has never smoked. He has never used smokeless tobacco.  · ETOH:   reports current alcohol use. · DRUGS : none  · Patient currently lives with family   ·   Family History:       Problem Relation Age of Onset    Other Mother         bleeding peptic ulcer    Heart Disease Mother     Other Father         cardiovascular disease    Stroke Father     Elevated Lipids Father     Hypertension Father    ·     Review of Systems    ROS: A 10 point review of systems was conducted, significant findings as noted in HPI. Physical Exam  Constitutional:       General: He is not in acute distress. Comments: Thin/frail appearing   HENT:      Head: Normocephalic and atraumatic. Eyes:      Conjunctiva/sclera: Conjunctivae normal.   Cardiovascular:      Rate and Rhythm: Normal rate and regular rhythm. Pulses: Normal pulses. Pulmonary:      Effort: Pulmonary effort is normal. No respiratory distress. Breath sounds: Normal breath sounds. No wheezing or rales. Abdominal:      General: Abdomen is flat. Bowel sounds are normal. There is no distension. Palpations: Abdomen is soft. Tenderness: There is no abdominal tenderness. Musculoskeletal:      Right lower leg: No edema. Left lower leg: No edema. Neurological:      Mental Status: He is alert.        Physical exam:       Vitals:    04/21/22 1330   BP: 125/69   Pulse: 68   Resp: 18   Temp:    SpO2: 100%       DATA:    Labs:  CBC:   Recent Labs     04/19/22  1312 04/20/22  1410 04/21/22  1123   WBC 7.1 5.5 5.2   HGB 8. 4* 7.5* 7.6*   HCT 26.8* 23.6* 24.6*    146 152       BMP:   Recent Labs     04/19/22  1312 04/20/22  1410 04/21/22  1123    141 142   K 4.3 4.6 4.4    102 106   CO2 25 27 26   BUN 55* 62* 60*   CREATININE 1.7* 1.6* 1.7*   GLUCOSE 103* 78 174*     LFT's:   Recent Labs     04/19/22  1312 04/20/22  1410   AST 25 19   ALT 22 18   BILITOT 0.3 0.3   ALKPHOS 58 55     Troponin: No results for input(s): TROPONINI in the last 72 hours. BNP:No results for input(s): BNP in the last 72 hours. ABGs: No results for input(s): PHART, OGZ5AOA, PO2ART in the last 72 hours. INR:   Recent Labs     04/21/22  1123   INR 2.51*       U/A:No results for input(s): NITRITE, COLORU, PHUR, LABCAST, WBCUA, RBCUA, MUCUS, TRICHOMONAS, YEAST, BACTERIA, CLARITYU, SPECGRAV, LEUKOCYTESUR, UROBILINOGEN, BILIRUBINUR, BLOODU, GLUCOSEU, AMORPHOUS in the last 72 hours. Invalid input(s): Jw Anderson orders to display           ASSESSMENT AND PLAN:    Acute blood loss anemia 2/2 GI bleed  Suspect upper given dark appearance of stool and elevated BUN. Occult positive stool.  -protonix 40 mg IV BID  -H/H q8hrs, transfuse if below 7  -NPO  -hold warfarin  -GI consult    LIOR  Cr elevated to 1.7.  Likely pre-renal etiology.   -daily RFP  -continuous IVF @ 100 cc/hr  -avoid nephrotoxins    CAD s/p CABG  -continue statin  -telemetry monitoring     Aortic stenosis s/p mechanical AVR  Goal INR 2-2.5  -hold warfarin in setting of GI bleed         Will discuss with attending physician Dr. Sultana Jolly MD    Code Status:Full code  FEN: NPO  PPX: SCD  DISPO: Ketan Kim MD PGY-1  4/21/2022,  3:06 PM

## 2022-04-21 NOTE — TELEPHONE ENCOUNTER
Pt is calling again to receive an antibiotic for the anti-viral due us not doing infusions anymore . Will also like to discuss recent labs as soon as possible.  Needs appt           Please call and advise

## 2022-04-21 NOTE — TELEPHONE ENCOUNTER
Spoke with patient and physician about results. Patient has dark stool. Will go to er for evaluation.

## 2022-04-21 NOTE — CONSULTS
Gastroenterology & Hepatology Consult Note      Patient: Nyla Frias MD  : 1935  Acct#:      Date:  2022    Subjective:       History of Present Illness  Patient is a 82 y.o. male admitted with GI bleed [K92.2]  Gastrointestinal hemorrhage with melena [K92.1]  Anemia, unspecified type [D64.9] who is seen in consult for melena    30-year-old male with past medical history of CABG x2, mechanical aortic valve on warfarin, esophageal cancer status post esophagectomy/chemotherapy, prostate cancer status post brachytherapy presenting with 2 weeks of weakness and dark stools. No bright red blood per rectum noted. He has never had similar symptoms before. In the ED his hemoglobin was found to be 7.5, baseline of approximately 11. INR was elevated to 2.5. Creatinine was elevated to 1.7. Vital signs are within normal limits. Admitted to the hospital for acute blood loss anemia likely from upper GI bleed. GI was consulted    Patient denies ASA, NSAID use.     Past Medical History:   Diagnosis Date    Anemia     Aortic valve disorders     stenosis    Aspiration pneumonia ([address] 2015    Erectile dysfunction     Esophageal cancer ([address] 10/18/2012    Hernia, femoral, bilateral 2014    Hyperlipidemia     Osteoporosis, senile 2014    Pancreatitis 2013    Patient underweight 2013    Prostate cancer ([address]     Unspecified essential hypertension       Past Surgical History:   Procedure Laterality Date    APPENDECTOMY      as a child    BONE GRAFT      for saddle nose    CARDIAC VALVE REPLACEMENT      aorta    CHOLECYSTECTOMY      COLONOSCOPY  2009    COLONOSCOPY  10/05/15    COLONOSCOPY N/A 2021    COLONOSCOPY DIAGNOSTIC/STOMA performed by Kennedy Mcgowan MD at [address] [place] [address]  2021    COLONOSCOPY POLYPECTOMY SNARE/COLD BIOPSY performed by Kennedy Mcgowan MD at [place] [address]  2006    ESOPHAGUS SURGERY      EYE SURGERY  1990& 1992    cataract bilat removal     GASTRECTOMY      PROSTATE SURGERY      seeds    TONSILLECTOMY      UPPER GASTROINTESTINAL ENDOSCOPY N/A 2/16/2021    EGD BIOPSY performed by Marko Mason MD at [address] PROSTATE/TRANSRECTAL VOL STUDY BRACHYTHERAPY        Past Endoscopic History  EGD on 02/2021showed 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 Cscope on 02/18 showed Normal TI  Poor prep  2 small polyps removed  No obstructive lesions or large lesions identified. The exam was not adequate to detect angioectasias etc.      Admission Meds  No current facility-administered medications on file prior to encounter.      Current Outpatient Medications on File Prior to Encounter   Medication Sig Dispense Refill    polyethylene glycol (MIRALAX) 17 g PACK packet Take 17 g by mouth daily      ferrous sulfate (IRON 325) 325 (65 Fe) MG tablet Take 325 mg by mouth every other day      vitamin E 1000 units capsule Take 1,000 Units by mouth daily      vitamin D (CHOLECALCIFEROL) 25 MCG (1000 UT) TABS tablet Take 1,000 Units by mouth daily      warfarin (COUMADIN) 5 MG tablet Take 0.5-1 tablets by mouth daily Performs test at home prior to dose 30 tablet 3    warfarin (COUMADIN) 2.5 MG tablet Take 2.5 mg by mouth daily Pt tests INR at home and self adjusts every Friday (GOAL 2-2.5)      simethicone (MYLICON) 80 MG chewable tablet Take 160 mg by mouth three times daily       magnesium oxide (MAG-OX) 400 MG tablet Take 400 mg by mouth daily      Omega-3 Fatty Acids (FISH OIL) 1000 MG CAPS Take 3,000 mg by mouth 3 times daily      calcium carbonate (TUMS) 500 MG chewable tablet Take 3 tablets by mouth 2 times daily      omeprazole (PRILOSEC) 40 MG delayed release capsule TAKE ONE CAPSULE BY MOUTH DAILY 90 capsule 2  atorvastatin (LIPITOR) 10 MG tablet TAKE ONE TABLET BY MOUTH DAILY 90 tablet 0    Alpha Lipoic Acid-Biotin 300-333 MG-MCG CAPS 300 mg 2 times daily       Coenzyme Q10 (COQ10) 400 MG CAPS Take 1 capsule by mouth      vitamin B-12 (CYANOCOBALAMIN) 1000 MCG tablet Take 1,000 mcg by mouth daily. Scheduled Meds:   sodium chloride  500 mL IntraVENous Once    atorvastatin  10 mg Oral Daily    Vitamin D  1,000 Units Oral Daily    sodium chloride flush  5-40 mL IntraVENous 2 times per day     Continuous Infusions:   sodium chloride Stopped (04/21/22 1318)    sodium chloride      lactated ringers      dextrose      sodium chloride      pantoprozole (PROTONIX) infusion       PRN Meds:sodium chloride flush, sodium chloride, ondansetron **OR** ondansetron, polyethylene glycol, acetaminophen **OR** acetaminophen, glucose, dextrose bolus (hypoglycemia), glucagon (rDNA), dextrose, sodium chloride    Allergies  Allergies   Allergen Reactions    No Known Allergies       Social   Social History     Tobacco Use    Smoking status: Never Smoker    Smokeless tobacco: Never Used   Substance Use Topics    Alcohol use: Yes     Alcohol/week: 0.0 standard drinks     Comment: occassionally        Family History   Problem Relation Age of Onset    Other Mother         bleeding peptic ulcer    Heart Disease Mother     Other Father         cardiovascular disease    Stroke Father     Elevated Lipids Father     Hypertension Father       Review of Systems  A comprehensive review of systems was negative. Physical Exam  Blood pressure 137/72, pulse 67, temperature 97.7 °F (36.5 °C), temperature source Oral, resp. rate 20, height 5' 3\" (1.6 m), weight 100 lb (45.4 kg), SpO2 98 %.     General appearance: alert, cooperative, no distress, appears stated age  Eyes: Anicteric  Head: Normocephalic, without obvious abnormality  Lungs: clear to auscultation bilaterally, Normal Effort  Heart: regular rate and rhythm, normal S1 and S2, no murmurs or rubs  Abdomen: soft, non-tender. Bowel sounds normal. No masses,  no organomegaly. Extremities: atraumatic, no cyanosis or edema  Skin: warm and dry, no jaundice  Neuro: Grossly intact, A&OX3    Data Review:    Recent Labs     04/19/22  1312 04/20/22  1410 04/21/22  1123   WBC 7.1 5.5 5.2   HGB 8.4* 7.5* 7.6*   HCT 26.8* 23.6* 24.6*   MCV 90.1 89.2 89.5    146 152     Recent Labs     04/19/22  1312 04/20/22  1410 04/21/22  1123    141 142   K 4.3 4.6 4.4    102 106   CO2 25 27 26   BUN 55* 62* 60*   CREATININE 1.7* 1.6* 1.7*     Recent Labs     04/19/22  1312 04/20/22  1410   AST 25 19   ALT 22 18   BILITOT 0.3 0.3   ALKPHOS 58 55     No results for input(s): LIPASE, AMYLASE in the last 72 hours. Recent Labs     04/21/22  1123   PROTIME 29.5*   INR 2.51*     No results for input(s): PTT in the last 72 hours. No results for input(s): OCCULTBLD in the last 72 hours. Imaging Studies:    CT CHEST WO CONTRAST    Result Date: 3/28/2022  CT CHEST, ABDOMEN AND PELVIS: INDICATION:  vomiting, esophageal cancer s/p esophagectomy TECHNIQUE: Spiral CT images of the chest were obtained without contrast. Subsequently, spiral CT images of the abdomen and pelvis were obtained following the intravenous and oral administration of contrast. Patient received a total of 80 mL of Isovue-370  contrast. Up-to-date CT equipment and radiation dose reduction techniques were employed. COMPARISON: CT scan of the chest Gabriella 15, 2021. CHEST: Patient is status post resection of the lower two thirds of the esophagus and stomach with  bowel upper esophageal surgical anastomosis. No axillary, mediastinal or hilar lymphadenopathy is identified. Patient status post aortic valve replacement. There is coronary artery calcification. There is elevation of the left hemidiaphragm.  There is some partial collapse of the left lower lobe with some associated groundglass airspace disease at the inferior aspect left lower lobe. No pleural effusion is identified. 1. Postsurgical changes from prior esophagectomy. 2. Status post aortic valve replacement. 3. Coronary artery calcification. 4. Elevated left hemidiaphragm. 5. Partial left lower lobe collapse with left lower lobe airspace disease. Pneumonia cannot entirely be excluded. ABDOMEN: Patient is status postresection of the lower two thirds of the esophagus and stomach with  bowel upper esophageal surgical anastomosis. The interposed bowel within the thorax is distended with fluid. There is dilatation of proximal small bowel loop within the left upper quadrant with focal transition point to decompressed small bowel within the left upper quadrant (series 608 image 51). Some of the oral contrast does pass beyond this transition point. This bowel loop has a twisted appearance  suggesting a partial volvulus. Adjacent mesenteric veins at site of suspected volvulus are decompressed. The remainder of the small bowel loops are nondilated. There is stool identified within nondilated colon. No bowel wall thickening is identified. Patient status post a cholecystectomy. There is some mild intrahepatic biliary dilatation with the left lobe liver. This is likely related to cholestasis. Portal vein is patent. Pancreas, spleen, and adrenal glands are normal. There is some stable scarring identified at the upper pole of the left kidney. There is mild bilateral hydronephrosis. This is new from the prior exam. No abdominal lymphadenopathy is identified. Trace amount of free fluid is identified within the left subdiaphragmatic space. PELVIS:  There is extensive atherosclerotic calcification of the infrarenal abdominal aorta and common iliac arteries. 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 No pelvic lymphadenopathy is identified. No pelvic hernias are identified. Radiation therapy seeds are identified within the prostate gland.  There is dextroscoliosis of the thoracolumbar spine with associated advanced multilevel degenerative disc disease and facet joint spondylosis. IMPRESSION: 1. Postsurgical changes from prior esophagectomy and gastrectomy with esophageal bowel anastomosis. There is dilatation of the thoracic portion of bowel as well as proximal small bowel with focal transition point with the left upper quadrant. At this transition point, the bowel has a twisted appearance. Some of the ingested oral contrast is able to pass the transition point. The findings are consistent with a partial small bowel obstruction. The twisting of the dilated loop of small bowel is suggestive of a partial volvulus. 2. Status post cholecystectomy. 3. Stable elevated left hemidiaphragm. 4. 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 5. Mild bilateral hydronephrosis. This is new from the prior exam. 6. Radiation therapy seeds are identified within the prostate gland. 7. Mild intrahepatic biliary dilatation is identified with the left lobe liver. This likely reflects cholestasis from prior cholecystectomy. CT ABDOMEN PELVIS W IV CONTRAST Additional Contrast? Oral    Result Date: 3/28/2022  CT CHEST, ABDOMEN AND PELVIS: INDICATION:  vomiting, esophageal cancer s/p esophagectomy TECHNIQUE: Spiral CT images of the chest were obtained without contrast. Subsequently, spiral CT images of the abdomen and pelvis were obtained following the intravenous and oral administration of contrast. Patient received a total of 80 mL of Isovue-370  contrast. Up-to-date CT equipment and radiation dose reduction techniques were employed. COMPARISON: CT scan of the chest Gabriella 15, 2021. CHEST: Patient is status post resection of the lower two thirds of the esophagus and stomach with  bowel upper esophageal surgical anastomosis. No axillary, mediastinal or hilar lymphadenopathy is identified. Patient status post aortic valve replacement. There is coronary artery calcification.  There is elevation of the left hemidiaphragm. There is some partial collapse of the left lower lobe with some associated groundglass airspace disease at the inferior aspect left lower lobe. No pleural effusion is identified. 1. Postsurgical changes from prior esophagectomy. 2. Status post aortic valve replacement. 3. Coronary artery calcification. 4. Elevated left hemidiaphragm. 5. Partial left lower lobe collapse with left lower lobe airspace disease. Pneumonia cannot entirely be excluded. ABDOMEN: Patient is status postresection of the lower two thirds of the esophagus and stomach with  bowel upper esophageal surgical anastomosis. The interposed bowel within the thorax is distended with fluid. There is dilatation of proximal small bowel loop within the left upper quadrant with focal transition point to decompressed small bowel within the left upper quadrant (series 608 image 51). Some of the oral contrast does pass beyond this transition point. This bowel loop has a twisted appearance  suggesting a partial volvulus. Adjacent mesenteric veins at site of suspected volvulus are decompressed. The remainder of the small bowel loops are nondilated. There is stool identified within nondilated colon. No bowel wall thickening is identified. Patient status post a cholecystectomy. There is some mild intrahepatic biliary dilatation with the left lobe liver. This is likely related to cholestasis. Portal vein is patent. Pancreas, spleen, and adrenal glands are normal. There is some stable scarring identified at the upper pole of the left kidney. There is mild bilateral hydronephrosis. This is new from the prior exam. No abdominal lymphadenopathy is identified. Trace amount of free fluid is identified within the left subdiaphragmatic space. PELVIS:  There is extensive atherosclerotic calcification of the infrarenal abdominal aorta and common iliac arteries. 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  No pelvic lymphadenopathy is identified. No pelvic hernias are identified. Radiation therapy seeds are identified within the prostate gland. There is dextroscoliosis of the thoracolumbar spine with associated advanced multilevel degenerative disc disease and facet joint spondylosis. IMPRESSION: 1. Postsurgical changes from prior esophagectomy and gastrectomy with esophageal bowel anastomosis. There is dilatation of the thoracic portion of bowel as well as proximal small bowel with focal transition point with the left upper quadrant. At this transition point, the bowel has a twisted appearance. Some of the ingested oral contrast is able to pass the transition point. The findings are consistent with a partial small bowel obstruction. The twisting of the dilated loop of small bowel is suggestive of a partial volvulus. 2. Status post cholecystectomy. 3. Stable elevated left hemidiaphragm. 4. 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 5. Mild bilateral hydronephrosis. This is new from the prior exam. 6. Radiation therapy seeds are identified within the prostate gland. 7. Mild intrahepatic biliary dilatation is identified with the left lobe liver. This likely reflects cholestasis from prior cholecystectomy. XR ABDOMEN (2 VIEWS)    Result Date: 3/29/2022  KUB 2 views INDICATIONS: Abnormal CAT scan, dilated proximal small bowel with history of esophagectomy and gastrectomy with thoracic interposition Markedly dilated proximal jejunum with inverted loop suggesting obstructive transition zone Oral contrast has progressed into the bowel and resides in the colon Dilated loops of bowel are redemonstrated     1. Contrast has progressed throughout colon.     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    History of disease 09/15/2020    Iron deficiency anemia 09/03/2020    Coronary 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 ([address] 08/26/2020    S/P AVR 10/15/2018    History of esophageal cancer 09/26/2016    History of prostate cancer 09/26/2016    Chronic kidney disease, stage III (moderate) ([address] 11/30/2015    Personal history of esophageal cancer 11/30/2015    Aspiration pneumonia ([address] 04/27/2015    Acute respiratory failure with hypoxia ([address] 04/27/2015    Aspiration into lower respiratory tract     Hypoxia     Gastroparesis 03/06/2015    Osteoporosis, senile 05/20/2014    Hernia, femoral, bilateral 05/12/2014    Patient underweight 08/08/2013    Pneumonia, aspiration ([address] 06/01/2012    Intractable nausea and vomiting 05/28/2012    Coronary artery disease involving native coronary artery of native heart without angina pectoris 07/20/2010    Asthma 07/20/2010    Aortic valve disorder 07/20/2010    Psychosexual dysfunction with inhibited sexual excitement 07/20/2010    Foreign body accidentally left during a procedure 04/26/2010    Essential hypertension 09/18/2007              Assessment:     Principal Problem:    GI bleed  Resolved Problems:    * No resolved hospital problems. *    Upper GI bleed    Recommendations:     Reverse warfarin. Please administer 4u FFP starting 4 am on 04/22/22. Recheck INR after transfusion. INR needs to be < 1.5 prior to EGD  Will do EGD tomorrow  Protonix gtt  Frequent Hg check  Keep Hg > 7    Thank you for allowing me to participate in the care of your patient. Please feel free to contact me with any questions or concerns.      LALO Viera, MD [facility]  [phone].  Also available via [employer]

## 2022-04-22 ENCOUNTER — ANESTHESIA (OUTPATIENT)
Dept: ENDOSCOPY | Age: 87
DRG: 813 | End: 2022-04-22
Payer: MEDICARE

## 2022-04-22 ENCOUNTER — ANESTHESIA EVENT (OUTPATIENT)
Dept: ENDOSCOPY | Age: 87
DRG: 813 | End: 2022-04-22
Payer: MEDICARE

## 2022-04-22 VITALS — SYSTOLIC BLOOD PRESSURE: 111 MMHG | DIASTOLIC BLOOD PRESSURE: 55 MMHG | OXYGEN SATURATION: 100 %

## 2022-04-22 LAB
ALBUMIN SERPL-MCNC: 3.1 G/DL (ref 3.4–5)
ANION GAP SERPL CALCULATED.3IONS-SCNC: 6 MMOL/L (ref 3–16)
BASOPHILS ABSOLUTE: 0 K/UL (ref 0–0.2)
BASOPHILS RELATIVE PERCENT: 0.9 %
BLOOD BANK DISPENSE STATUS: NORMAL
BLOOD BANK PRODUCT CODE: NORMAL
BPU ID: NORMAL
BUN BLDV-MCNC: 47 MG/DL (ref 7–20)
CALCIUM SERPL-MCNC: 8.4 MG/DL (ref 8.3–10.6)
CHLORIDE BLD-SCNC: 108 MMOL/L (ref 99–110)
CO2: 29 MMOL/L (ref 21–32)
CREAT SERPL-MCNC: 1.3 MG/DL (ref 0.8–1.3)
DESCRIPTION BLOOD BANK: NORMAL
EKG ATRIAL RATE: 60 BPM
EKG DIAGNOSIS: NORMAL
EKG P AXIS: 79 DEGREES
EKG P-R INTERVAL: 180 MS
EKG Q-T INTERVAL: 428 MS
EKG QRS DURATION: 88 MS
EKG QTC CALCULATION (BAZETT): 428 MS
EKG R AXIS: 65 DEGREES
EKG T AXIS: 79 DEGREES
EKG VENTRICULAR RATE: 60 BPM
EOSINOPHILS ABSOLUTE: 0.1 K/UL (ref 0–0.6)
EOSINOPHILS RELATIVE PERCENT: 3.3 %
GFR AFRICAN AMERICAN: >60
GFR NON-AFRICAN AMERICAN: 52
GLUCOSE BLD-MCNC: 132 MG/DL (ref 70–99)
HCT VFR BLD CALC: 20.2 % (ref 40.5–52.5)
HCT VFR BLD CALC: 21.3 % (ref 40.5–52.5)
HCT VFR BLD CALC: 23 % (ref 40.5–52.5)
HCT VFR BLD CALC: 24.5 % (ref 40.5–52.5)
HCT VFR BLD CALC: 25.9 % (ref 40.5–52.5)
HEMOGLOBIN: 6.5 G/DL (ref 13.5–17.5)
HEMOGLOBIN: 6.9 G/DL (ref 13.5–17.5)
HEMOGLOBIN: 7.3 G/DL (ref 13.5–17.5)
HEMOGLOBIN: 7.9 G/DL (ref 13.5–17.5)
HEMOGLOBIN: 8.2 G/DL (ref 13.5–17.5)
INR BLD: 1.51 (ref 0.88–1.12)
INR BLD: 1.95 (ref 0.88–1.12)
LYMPHOCYTES ABSOLUTE: 1 K/UL (ref 1–5.1)
LYMPHOCYTES RELATIVE PERCENT: 30.6 %
MAGNESIUM: 2.2 MG/DL (ref 1.8–2.4)
MCH RBC QN AUTO: 28.1 PG (ref 26–34)
MCHC RBC AUTO-ENTMCNC: 32.2 G/DL (ref 31–36)
MCV RBC AUTO: 87.2 FL (ref 80–100)
MONOCYTES ABSOLUTE: 0.3 K/UL (ref 0–1.3)
MONOCYTES RELATIVE PERCENT: 9.4 %
NEUTROPHILS ABSOLUTE: 1.8 K/UL (ref 1.7–7.7)
NEUTROPHILS RELATIVE PERCENT: 55.8 %
PDW BLD-RTO: 19.9 % (ref 12.4–15.4)
PHOSPHORUS: 3.4 MG/DL (ref 2.5–4.9)
PLATELET # BLD: 109 K/UL (ref 135–450)
PMV BLD AUTO: 9.4 FL (ref 5–10.5)
POTASSIUM SERPL-SCNC: 4.5 MMOL/L (ref 3.5–5.1)
PROTHROMBIN TIME: 17.3 SEC (ref 9.9–12.7)
PROTHROMBIN TIME: 22.7 SEC (ref 9.9–12.7)
RBC # BLD: 2.45 M/UL (ref 4.2–5.9)
SODIUM BLD-SCNC: 143 MMOL/L (ref 136–145)
WBC # BLD: 3.3 K/UL (ref 4–11)

## 2022-04-22 PROCEDURE — 6360000002 HC RX W HCPCS: Performed by: INTERNAL MEDICINE

## 2022-04-22 PROCEDURE — 85018 HEMOGLOBIN: CPT

## 2022-04-22 PROCEDURE — 93010 ELECTROCARDIOGRAM REPORT: CPT | Performed by: INTERNAL MEDICINE

## 2022-04-22 PROCEDURE — 6360000002 HC RX W HCPCS: Performed by: NURSE ANESTHETIST, CERTIFIED REGISTERED

## 2022-04-22 PROCEDURE — 85025 COMPLETE CBC W/AUTO DIFF WBC: CPT

## 2022-04-22 PROCEDURE — 0W3P8ZZ CONTROL BLEEDING IN GASTROINTESTINAL TRACT, VIA NATURAL OR ARTIFICIAL OPENING ENDOSCOPIC: ICD-10-PCS | Performed by: INTERNAL MEDICINE

## 2022-04-22 PROCEDURE — 2580000003 HC RX 258: Performed by: STUDENT IN AN ORGANIZED HEALTH CARE EDUCATION/TRAINING PROGRAM

## 2022-04-22 PROCEDURE — 6370000000 HC RX 637 (ALT 250 FOR IP): Performed by: STUDENT IN AN ORGANIZED HEALTH CARE EDUCATION/TRAINING PROGRAM

## 2022-04-22 PROCEDURE — C9113 INJ PANTOPRAZOLE SODIUM, VIA: HCPCS | Performed by: INTERNAL MEDICINE

## 2022-04-22 PROCEDURE — 1200000000 HC SEMI PRIVATE

## 2022-04-22 PROCEDURE — 36430 TRANSFUSION BLD/BLD COMPNT: CPT

## 2022-04-22 PROCEDURE — 99222 1ST HOSP IP/OBS MODERATE 55: CPT | Performed by: INTERNAL MEDICINE

## 2022-04-22 PROCEDURE — 83735 ASSAY OF MAGNESIUM: CPT

## 2022-04-22 PROCEDURE — 80069 RENAL FUNCTION PANEL: CPT

## 2022-04-22 PROCEDURE — 3609013000 HC EGD TRANSORAL CONTROL BLEEDING ANY METHOD: Performed by: INTERNAL MEDICINE

## 2022-04-22 PROCEDURE — 3700000001 HC ADD 15 MINUTES (ANESTHESIA): Performed by: INTERNAL MEDICINE

## 2022-04-22 PROCEDURE — 3700000000 HC ANESTHESIA ATTENDED CARE: Performed by: INTERNAL MEDICINE

## 2022-04-22 PROCEDURE — 85014 HEMATOCRIT: CPT

## 2022-04-22 PROCEDURE — 7100000010 HC PHASE II RECOVERY - FIRST 15 MIN: Performed by: INTERNAL MEDICINE

## 2022-04-22 PROCEDURE — 2720000010 HC SURG SUPPLY STERILE: Performed by: INTERNAL MEDICINE

## 2022-04-22 PROCEDURE — 2709999900 HC NON-CHARGEABLE SUPPLY: Performed by: INTERNAL MEDICINE

## 2022-04-22 PROCEDURE — 36415 COLL VENOUS BLD VENIPUNCTURE: CPT

## 2022-04-22 PROCEDURE — 2500000003 HC RX 250 WO HCPCS: Performed by: NURSE ANESTHETIST, CERTIFIED REGISTERED

## 2022-04-22 PROCEDURE — 2580000003 HC RX 258: Performed by: INTERNAL MEDICINE

## 2022-04-22 PROCEDURE — 85610 PROTHROMBIN TIME: CPT

## 2022-04-22 RX ORDER — SODIUM CHLORIDE 9 MG/ML
INJECTION, SOLUTION INTRAVENOUS ONCE
Status: COMPLETED | OUTPATIENT
Start: 2022-04-22 | End: 2022-04-22

## 2022-04-22 RX ORDER — SODIUM CHLORIDE 9 MG/ML
INJECTION, SOLUTION INTRAVENOUS PRN
Status: COMPLETED | OUTPATIENT
Start: 2022-04-22 | End: 2022-04-22

## 2022-04-22 RX ORDER — LIDOCAINE HYDROCHLORIDE 20 MG/ML
INJECTION, SOLUTION INFILTRATION; PERINEURAL PRN
Status: DISCONTINUED | OUTPATIENT
Start: 2022-04-22 | End: 2022-04-22 | Stop reason: SDUPTHER

## 2022-04-22 RX ORDER — PROPOFOL 10 MG/ML
INJECTION, EMULSION INTRAVENOUS PRN
Status: DISCONTINUED | OUTPATIENT
Start: 2022-04-22 | End: 2022-04-22 | Stop reason: SDUPTHER

## 2022-04-22 RX ORDER — PANTOPRAZOLE SODIUM 40 MG/10ML
40 INJECTION, POWDER, LYOPHILIZED, FOR SOLUTION INTRAVENOUS 2 TIMES DAILY
Status: DISCONTINUED | OUTPATIENT
Start: 2022-04-22 | End: 2022-04-25 | Stop reason: HOSPADM

## 2022-04-22 RX ORDER — EPINEPHRINE 1 MG/ML
INJECTION, SOLUTION, CONCENTRATE INTRAVENOUS PRN
Status: DISCONTINUED | OUTPATIENT
Start: 2022-04-22 | End: 2022-04-22 | Stop reason: ALTCHOICE

## 2022-04-22 RX ADMIN — PROPOFOL 20 MG: 10 INJECTION, EMULSION INTRAVENOUS at 15:56

## 2022-04-22 RX ADMIN — PANTOPRAZOLE SODIUM 40 MG: 40 INJECTION, POWDER, FOR SOLUTION INTRAVENOUS at 21:27

## 2022-04-22 RX ADMIN — SODIUM CHLORIDE: 9 INJECTION, SOLUTION INTRAVENOUS at 15:31

## 2022-04-22 RX ADMIN — SODIUM CHLORIDE, PRESERVATIVE FREE 10 ML: 5 INJECTION INTRAVENOUS at 21:28

## 2022-04-22 RX ADMIN — SODIUM CHLORIDE, PRESERVATIVE FREE 10 ML: 5 INJECTION INTRAVENOUS at 08:19

## 2022-04-22 RX ADMIN — PROPOFOL 20 MG: 10 INJECTION, EMULSION INTRAVENOUS at 15:47

## 2022-04-22 RX ADMIN — ATORVASTATIN CALCIUM 10 MG: 10 TABLET, FILM COATED ORAL at 21:27

## 2022-04-22 RX ADMIN — Medication 1000 UNITS: at 08:19

## 2022-04-22 RX ADMIN — PROPOFOL 20 MG: 10 INJECTION, EMULSION INTRAVENOUS at 15:53

## 2022-04-22 RX ADMIN — PROPOFOL 40 MG: 10 INJECTION, EMULSION INTRAVENOUS at 15:38

## 2022-04-22 RX ADMIN — SODIUM CHLORIDE: 9 INJECTION, SOLUTION INTRAVENOUS at 15:15

## 2022-04-22 RX ADMIN — PROPOFOL 20 MG: 10 INJECTION, EMULSION INTRAVENOUS at 15:43

## 2022-04-22 RX ADMIN — LIDOCAINE HYDROCHLORIDE 100 MG: 20 INJECTION, SOLUTION INFILTRATION; PERINEURAL at 15:38

## 2022-04-22 ASSESSMENT — PULMONARY FUNCTION TESTS
PIF_VALUE: 1

## 2022-04-22 ASSESSMENT — PAIN SCALES - GENERAL
PAINLEVEL_OUTOF10: 0

## 2022-04-22 ASSESSMENT — PAIN - FUNCTIONAL ASSESSMENT: PAIN_FUNCTIONAL_ASSESSMENT: 0-10

## 2022-04-22 NOTE — ANESTHESIA POSTPROCEDURE EVALUATION
Department of Anesthesiology  Postprocedure Note    Patient: Jakob Kent MD  MRN: [id number]  YOB: 1935  Date of evaluation: 4/22/2022  Time:  4:10 PM     Procedure Summary     Date: 04/22/22 Room / Location: [address] [place] [address] [facility]    Anesthesia Start: 9704 Anesthesia Stop: 0280    Procedure: EGD CONTROL HEMORRHAGE (N/A ) Diagnosis: (UGI bleed)    Surgeons: Akbar Palmer MD Responsible Provider: Jorge Alberto Woods MD    Anesthesia Type: MAC ASA Status: 4          Anesthesia Type: MAC    Nimisha Phase I: Nimisha Score: 10    Nimisha Phase II:      Last vitals: Reviewed and per EMR flowsheets.        Anesthesia Post Evaluation    Patient location during evaluation: PACU  Level of consciousness: awake  Complications: no  Multimodal analgesia pain management approach

## 2022-04-22 NOTE — PROGRESS NOTES
Progress Note    Admit Date: 4/21/2022  Day: 1  Diet: Diet NPO Exceptions are: Sips of Water with Meds    CC: weakness, dark stools    Interval history:   Patient seen and examined at bedside. No acute events overnight. Patient is afebrile and hemodynamically stable. Patient received 1 unit PRBCs and 2 FFP w/ third unit currently running. Will run 1 unit PRBCs next. Patient has not had bowel movement in 2 days. Patient scheduled for EGD this AM. Patient denies chest pain, SOB, abdominal pain, fevers, chills, nausea, and vomiting.         Medications:     Scheduled Meds:   sodium chloride  500 mL IntraVENous Once    atorvastatin  10 mg Oral Daily    Vitamin D  1,000 Units Oral Daily    sodium chloride flush  5-40 mL IntraVENous 2 times per day     Continuous Infusions:   sodium chloride Stopped (04/21/22 1318)    sodium chloride      lactated ringers 100 mL/hr at 04/21/22 1906    dextrose      sodium chloride      pantoprozole (PROTONIX) infusion 8 mg/hr (04/21/22 1948)    sodium chloride       PRN Meds:sodium chloride flush, sodium chloride, ondansetron **OR** ondansetron, polyethylene glycol, acetaminophen **OR** acetaminophen, glucose, dextrose bolus (hypoglycemia), glucagon (rDNA), dextrose, sodium chloride, sodium chloride    Objective:   Vitals:   T-max:  Patient Vitals for the past 8 hrs:   BP Temp Temp src Pulse Resp SpO2   04/22/22 0525 112/72 97.3 °F (36.3 °C) Oral 60 16 99 %   04/22/22 0446 120/66 97.9 °F (36.6 °C) Axillary 56 16 100 %   04/22/22 0143 114/71 97.9 °F (36.6 °C) Axillary 61 16 --   04/22/22 0025 120/70 98 °F (36.7 °C) Axillary 59 16 --   04/21/22 2316 117/61 98.1 °F (36.7 °C) Axillary 61 16 --   04/21/22 2303 117/68 98.1 °F (36.7 °C) Axillary 67 16 98 %   04/21/22 2259 -- -- -- -- -- 99 %   04/21/22 2258 126/67 98 °F (36.7 °C) Axillary 55 16 --       Intake/Output Summary (Last 24 hours) at 4/22/2022 0617  Last data filed at 4/21/2022 2236  Gross per 24 hour   Intake --   Output 450 ml   Net -450 ml     ROS: Pertinent ROS in Interval HX    Physical Exam  Constitutional: Alert, normal appearance, thin frail learning. HENT: head normocephalic/atraumatic, no congestion or rhinorrhea, PERRLA, EOM intact  Cardio: normal rate, regular rhythm. No murmurs heard on auscultation  Pulmonary: clear to auscultation bilaterally, no wheezes, rales, or stridor  GI: abdomen soft, flat, and non-distended. No tenderness or guarding. Musculoskeletal: no deformity, tenderness, or injury. No lower extremity edema. Strength 5/5 in bilateral upper and lower extremities  Skin: no lesion, rash, or erythema  Neuro: no focal neurological deficits, alert and oriented X3  Psych: mood normal, behavior normal    LABS:    CBC:   Recent Labs     04/19/22  1312 04/19/22  1312 04/20/22  1410 04/21/22  1123 04/21/22  2100   WBC 7.1  --  5.5 5.2  --    HGB 8.4*   < > 7.5* 7.6* 6.4*   HCT 26.8*   < > 23.6* 24.6* 20.3*     --  146 152  --    MCV 90.1  --  89.2 89.5  --     < > = values in this interval not displayed. Renal:    Recent Labs     04/19/22  1312 04/20/22  1410 04/21/22  1123    141 142   K 4.3 4.6 4.4    102 106   CO2 25 27 26   BUN 55* 62* 60*   CREATININE 1.7* 1.6* 1.7*   GLUCOSE 103* 78 174*   CALCIUM 9.1 9.0 8.8   ANIONGAP 15 12 10     Hepatic:   Recent Labs     04/19/22  1312 04/20/22  1410   AST 25 19   ALT 22 18   BILITOT 0.3 0.3   PROT 6.9 6.4   LABALBU 4.2 3.9   ALKPHOS 58 55     Troponin: No results for input(s): TROPONINI in the last 72 hours. BNP: No results for input(s): BNP in the last 72 hours. Lipids: No results for input(s): CHOL, HDL in the last 72 hours. Invalid input(s): LDLCALCU, TRIGLYCERIDE  ABGs:  No results for input(s): PHART, SRI7OLA, PO2ART, QNX9PWP, BEART, THGBART, Z9JIPHYO, LWV7VLP in the last 72 hours.     INR:   Recent Labs     04/21/22  1123   INR 2.51*     Lactate: No results for input(s): LACTATE in the last 72 hours.  Cultures:  -----------------------------------------------------------------  RAD:   No orders to display       Assessment/Plan:   86M with PMHx CABGx2, mech aortic valve replacement on warfarin, esophageal cancer s/p surgery/chemotherapy, prostate cancer s/p brachytherapy presenting with ~1 week of weakness and dark stools. Acute blood loss anemia 2/2 GI bleed  Patient presents w/ progressive weakness and dark appearance of stool. Presented w/ hgb of 7.5 w/ baseline of 11. 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 Colonoscopy 2/18/2021 showed 2 small polys that were removed and not adequate to detect angioectasias.   -s/p one unit PRBCs and 2 units FFP  -1 unit PRBCs ordered this am  -2 units FFP to be transfused  -Cont protonix drip  -Cont IVF LR at 100 ml/hr  -Goal INR < 1.5 prior to EGD  -No dark bowel movements overnight  -Hgb 6.5 this AM    CAD s/p CABG  -Cont atorvastatin 10 mg  -Hold warfarin   -Telemetry monitoring    Hx Gastroesophageal carcinoma  Diagnosed in 2009. Underwent neoadjuvant combined modality chemo radiotherapy followed by distal esophagectomy and proximal gast[facility] at [facility]. Patient has difficulty absorbing iron. No evidence of recurrence.   -No evidence of recurrence    Hx Prostate Cancer  -Treated w/ brachytherapy    Aortic Stenosis s/p Mechanical AVR  Patient on warfarin w/ goal IRN 2-2.5  -Held warfarin in setting of acute blood loss anemia  -Cardiology consulted to held determine timing of resuming anticoagulation    LIOR (resolved)  Patient presented w/ Cr 1.7 w/ baseline creatinine 1.2-1.3.  Patient received 500 ml NS bolus in ED and 1 unit PRBCs.  -Cr 1.3 this am    Code Status: Full Code  FEN: Diet NPO Exceptions are: Sips of Water with Meds  PPX: SCDs  DISPO: CIARA Ramirez MD PGY-1  04/22/22  6:17 AM    This patient has been staffed and discussed with Kayley Liu MD.

## 2022-04-22 NOTE — PROGRESS NOTES
Comprehensive Nutrition Assessment    RECOMMENDATIONS:  1. PO Diet: Continue NPO per MD  2. ONS: Once diet advances, start Ensure Enlive TID (consider Ensure Compact per pt preference)   3. Please obtain measured wt    NUTRITION ASSESSMENT:   Nutritional summary & status: +IP for BMI <19. Pt out of room during attempted encounter d/t EGD. Pt w/ hx of cancer w/ ctx. Per EMR, pt noted to be underwt over last ~10 years. Pt known to this diet office from previous adm and dx w/ severe PCM. No wt loss noted over 11 months. RD to monitor for diet adv to provide ONS recommendations to promote intake.  Admission/PMH:  Admission: GI bleed; PMH:  CABGx2, mech aortic valve replacement on warfarin, esophageal cancer s/p surgery/chemotherapy, prostate cancer s/p brachytherapy    MALNUTRITION ASSESSMENT  Context of Malnutrition: Acute Illness   Malnutrition Status: Insufficient data - previously dx w/ severe PCM 6/2021 by this RD office  Findings of the 6 clinical characteristics of malnutrition (Minimum of 2 out of 6 clinical characteristics is required to make the diagnosis of moderate or severe Protein Calorie Malnutrition based on AND/ASPEN Guidelines):  Energy Intake: Less than/equal to 50% of estimated energy requirements    Energy Intake Time: 1 day NPO   Weight Loss %: No significant loss   Weight loss Time: No significant        NUTRITION DIAGNOSIS   Inadequate oral intake related to altered GI function as evidenced by NPO or clear liquid status due to medical condition    [address] and/or Nutrient Delivery:  Continue NPO  Nutrition Education/Counseling:  No recommendation at this time   Goals:        Initiate PO diet or nutrition support within 5-7 days per ASPEN guidelines    Nutrition Monitoring and Evaluation:   Food/Nutrient Intake Outcomes:  Diet Advancement/Tolerance  Physical Signs/Symptoms Outcomes:  Biochemical Data,GI Status,Weight,Nutrition Focused Physical Findings     OBJECTIVE DATA: Significant to nutrition assessment  · Nutrition-Focused Physical Findings: lbm pta 4/20; no edema noted; previous dx severe malnutr. · Labs: Reviewed; ;   · Meds: Reviewed; protonix, vitamin D  · Wounds: None       CURRENT NUTRITION THERAPIES  Diet NPO Exceptions are: Sips of Water with Meds     Diet NPO Exceptions are: Sips of Water with Meds  PO Intake: NPO   PO Supplement Intake:NPO  Additional Sources of Calories/IVF:LR 100ml/h; continuous protonix in NS @ 10ml/h     ANTHROPOMETRICS  Current Height: 5' 3\" (160 cm)  Current Weight: 100 lb (45.4 kg)    Admission weight: 100 lb (45.4 kg)  Ideal Body Weight (IBW): 124 lbs  (56 kg)    Usual Bodyweight  (100-105# per EMR over 1 year)   Weight Changes insignificant wt loss noted however CBW possibly stated- RD ordering new wt       BMI: 17.8    Wt Readings from Last 50 Encounters:   04/21/22 100 lb (45.4 kg)   03/28/22 105 lb (47.6 kg)   02/02/22 108 lb (49 kg)   11/03/21 105 lb 12.8 oz (48 kg)   11/01/21 105 lb 9.6 oz (47.9 kg)   07/29/21 102 lb (46.3 kg)   06/29/21 106 lb 4.8 oz (48.2 kg)   06/29/21 106 lb (48.1 kg)   06/20/21 111 lb 1.8 oz (50.4 kg)   05/26/21 106 lb (48.1 kg)       COMPARATIVE STANDARDS  Energy (kcal):  9667-4532 (30-35)     Protein (g):  68-81 (1.5-1.8)       Fluid (ml/day):  >1500    The patient will still be monitored per nutrition standards of care. Consult dietitian if nutrition interventions essential to patient care is needed.      Paulino Cordero [address]ai[address] [place] [address]  Kurt:  [phone]  Office:  [phone]

## 2022-04-22 NOTE — PROCEDURES
EGD PROCEDURE NOTE                                                    Patient: Andrew Corrigan MD MRN: [id number]     YOB: 1935  Age: 82 y.o. Sex: male    Unit: [facility] ENDOSCOPY Room/Bed: Endo Pool/NONE Location: [address] [place] [address]       Admitting Physician: Piyush Garcia    Primary Care Physician: Levi Patel MD      Referring  Physician:      Facility:   [address] [place] [address] [Inpatient]  : Corie Saleh MD      PROCEDURE:  Esophagogastroduodenoscopy with control of bleeding with epi and clips  Procedure(s):  EGD CONTROL HEMORRHAGE    Preoperative Diagnosis:   UGI bleed    Post Operative Diagnosis:  Same as documented in Diagnosis    INSTRUMENT:  Olympus  Gastroscope    HISTORY & PHYSICAL:  Patient's history, physical, medications, allergies, labs reviewed prior to procedure. Indication:   Same as the preop diagnosis. AllergiesNo known allergies   Allergies noted: YES  Vital signs reviewed: YES    ASA: No  found with the name: Media Axon:  MAC with deep sedation    see nurse documentation for details    Patient in acceptable condition for procedure:YES  Written informed consent obtained from  patient. . Risks (including but not limited to perforation, bloating, infection, perforation  and bleeding adverse drug reaction missed lesions and aspiration during the procedure requiring medical or surgical management) benefits and alternatives explained and questions answered. The  patient. verbalized understanding. A timeoout procedure was performed. Based on the pre-procedure assessment, including review of the patient's medical history, medications, allergies, and review of systems, patient had been deemed to be an appropriate candidate for sedation as planned above. Patient was therefore sedated with the medications listed above.  Patient was monitored continuously with electrocardiogram tracing, pulse oximetry, blood pressure monitoring, and direct visualization. Under continue IV sedation with close monitoring, endoscope passed in to the 3rd portion of the duodenum by direct visualization. POST OPERATIVE FINDINGS:   ESOPHAGUS: EG junction noted at the around 20 cm with near esophagus with gastric pull-through anatomy noted. There is large amount of clot noted at around 25 cm level which is thoroughly irrigated. The bleeding source appears to be from Dieulafoy lesion. Epinephrine diluted with 1 in 10,000 concentration injected all 4 quadrants in the area. 2 clips placed at the Dieulafoy lesion site to control bleeding. Area was thoroughly irrigated again and no active bleeding identified at this site and anywhere else    STOMACH: Blood clots noted in the stomach. Entire stomach thoroughly irrigated and examined for bleeding source. No evidence of peptic ulcer disease or lesions noted in the distal stomach. DUODENUM: Traces of fresh blood and clots noted. Thoroughly irrigated and no active bleeding source identified in duodenum. The patient was then decompressed. Scope was then withdrawn. Patient tolerated procedure well. 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 2) partial esophagectomy and gastrectomy anatomy with the gastric pull-through with the esophagogastric anastomosis. 3) diffuse gastritis. RECOMMENDATIONS:   1) Protonix 40 mg twice daily for 8 weeks at the time of discharge. 2) okay to resume heparin drip at 4 hours. If no signs of active bleeding next 24 hours okay to resume home anticoagulation regimen. 3) Avoid pain medications like Motrin/Advil/ibuprofen/Aleve/naproxen or similar NSAIDs for 2 weeks  4) we will start clear liquid diet next 24 hours.   If no signs of active bleeding after 24 hours the diet can be advanced. Call GI doctor on-call for any signs of active GI bleeding. Gastric or Duodenal ulcer present: no  Biopsy done to rule out Helicobacter pylori infection:  no  SENTHIL PATH SPECIMEN SENT:  no  PHOTOGRAPH TAKEN:  yes In MD Reports    COMPLICATIONS DURING PROCEDURE:   None      EBL: None  . DISPOSITION: The patient was sent to the recovery room in stable condition. CC: Primary Care/Referring Physician(s): Mary Barillas MD  Patient meets criteria for discharge/transfer:  yes  Results and plan discussed with the patient and family in detail. Signed By:   Rocky Chapman MD  [id number]  4:05 PM 4/22/2022       \"Please note that some or all of this record was generated using voice recognition software. If there are any questions about the content of this document, please contact the author as some errors of transcription may have occurred. \"

## 2022-04-22 NOTE — FLOWSHEET NOTE
04/22/22 1014   Encounter Summary   Encounter Overview/Reason  Initial Encounter   Service Provided For: Patient and family together   [facility]   Support System Spouse; Family members   Complexity of Encounter Moderate   Begin Time 1000   End Time  1015   Total Time Calculated 15 min   Encounter    Type Initial Screen/Assessment   Spiritual/Emotional needs   Type Spiritual Support   Assessment/Intervention/Outcome   Assessment Calm;Coping; Hopeful   Intervention Active listening;Discussed illness injury and its impact; Explored/Affirmed feelings, thoughts, concerns;Nurtured Hope;Prayer (assurance of)/[place]   Outcome Coping;Engaged in conversation;Receptive

## 2022-04-22 NOTE — H&P
Endoscopy Procedure H&P    Patient: Bernard Whalen MD MRN: [id number]     YOB: 1935  Age: 82 y.o. Sex: male    Unit: [facility] ENDOSCOPY Room/Bed: Endo Pool/NONE Location: [address] [place] [address]     Referring  Physician: Naun Pendleton MD    PROCEDURE:    Procedure(s):  EGD ESOPHAGOGASTRODUODENOSCOPY    PRE OPERATIVE DIAGNOSIS:   UGI bleed  H/o esophageal cancer  On long term anti coagulation for mechanical heart valve  H/o AVM's      INDICATIONS:   Same as pre op    Pt seen and examined. H& P reviewed. Please see PN/ Consult note for  details    Vital Signs: /67   Pulse 59   Temp 97.9 °F (36.6 °C) (Temporal)   Resp 18   Ht 5' 3\" (1.6 m)   Wt 100 lb (45.4 kg)   SpO2 97%   BMI 17.71 kg/m²     Vital signs reviewed:Yes      LABS: Recent labs reviewed      ASA Grade:  ASA 4 - Patient with severe systemic disease that is a constant threat to life   Per Anesthesia  Mallampati Score: II   Per Anesthesia            see nurse documentation for details    Patient in acceptable condition for procedure: Yes       ASSESSMENT AND PLAN:    1. Patient is a 82 y.o. male with above specified procedure planned   with deep sedation  2. Procedure options, risks and benefits and alternatives available for the procedure with possible intervention  reviewed with patient. Patient expresses understanding and informed consent obtained prior to the procedure. Sujatha Barillas MD  3:21 PM 4/22/2022      \"Please note that some or all of this record was generated using voice recognition software. If there are any questions about the content of this document, please contact the author as some errors of transcription may have occurred. \"

## 2022-04-22 NOTE — PROGRESS NOTES
Physician Progress Note      PATIENT:               Claudeen Console  [facility] #:                  [id number]  :                       1935  ADMIT DATE:       2022 10:52 AM  DISCH DATE:  RESPONDING  PROVIDER #:        Mildred Chirinos MD          QUERY TEXT:    Patient admitted with anemia and melena and is on chronic anticoagulation. If possible, please document in the progress notes and discharge summary if   you are evaluating and/or treating any of the following: The medical record reflects the following:  Risk Factors: 82year old male s/p [facility] aortic valve replacement on warfarin  Clinical Indicators: On admission: PT 29.5, INR 2.51. Per H&P- Goal INR 2-2.5   -hold warfarin in setting of GI bleed. Per IM progress note- Patient presents   w/ progressive weakness and dark appearance of stool. Presented w/ hgb of 7.5   w/ baseline of 11. 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 Query created by:  Ana Laura Fairchild on 2022 12:00 PM      Electronically signed by:  Mildred Chirinos MD 2022 2:02 PM

## 2022-04-22 NOTE — PLAN OF CARE
Problem: Nutrition Deficit:  Goal: Optimize nutritional status  4/22/2022 1020 by Neno Grace, MS, RD, LD  Outcome: Not Progressing    Nutrition Problem #1: Inadequate oral intake  Intervention: Food and/or Nutrient Delivery: Continue NPO  Nutritional   Initiate PO diet or nutrition support within 5-7 days

## 2022-04-22 NOTE — ANESTHESIA PRE PROCEDURE
Department of Anesthesiology  Preprocedure Note       Name:  Rashid Gtz MD   Age:  82 y.o.  :  1935                                          MRN:  [id number]         Date:  2022      Surgeon: Susan Garza):  Anali Osman MD    Procedure: Procedure(s):  EGD ESOPHAGOGASTRODUODENOSCOPY    Medications prior to admission:   Prior to Admission medications    Medication Sig Start Date End Date Taking?  Authorizing Provider   vitamin D (CHOLECALCIFEROL) 25 MCG (1000 UT) TABS tablet Take 1,000 Units by mouth daily    Historical Provider, MD   polyethylene glycol (MIRALAX) 17 g PACK packet Take 17 g by mouth daily    Historical Provider, MD   ferrous sulfate (IRON 325) 325 (65 Fe) MG tablet Take 325 mg by mouth every other day    Historical Provider, MD   vitamin E 1000 units capsule Take 1,000 Units by mouth daily    Historical Provider, MD   warfarin (COUMADIN) 5 MG tablet Take 0.5-1 tablets by mouth daily Performs test at home prior to dose 22   Aissatou Lovell MD   warfarin (COUMADIN) 2.5 MG tablet Take 2.5 mg by mouth daily Pt tests INR at home and self adjusts every Friday (GOAL 2-2.5)    Historical Provider, MD   simethicone (MYLICON) 80 MG chewable tablet Take 160 mg by mouth three times daily     Historical Provider, MD   magnesium oxide (MAG-OX) 400 MG tablet Take 400 mg by mouth daily    Historical Provider, MD   Omega-3 Fatty Acids (FISH OIL) 1000 MG CAPS Take 3,000 mg by mouth 3 times daily    Historical Provider, MD   calcium carbonate (TUMS) 500 MG chewable tablet Take 3 tablets by mouth 2 times daily    Historical Provider, MD   omeprazole (PRILOSEC) 40 MG delayed release capsule TAKE ONE CAPSULE BY MOUTH DAILY 22   Aissatou Lovell MD   atorvastatin (LIPITOR) 10 MG tablet TAKE ONE TABLET BY MOUTH DAILY 1/3/22   Aissatou Lovell MD   Alpha Lipoic Acid-Biotin 300-333 MG-MCG CAPS 300 mg 2 times daily     Historical Provider, MD   Coenzyme Q10 (COQ10) 400 MG CAPS Take 1 capsule by mouth Historical Provider, MD   vitamin B-12 (CYANOCOBALAMIN) 1000 MCG tablet Take 1,000 mcg by mouth daily. Historical Provider, MD       Current medications:    No current facility-administered medications for this visit. No current outpatient medications on file.      Facility-Administered Medications Ordered in Other Visits   Medication Dose Route Frequency Provider Last Rate Last Admin    0.9 % sodium chloride infusion   IntraVENous PRN Sanjana Calero MD        perflutren lipid microspheres (DEFINITY) injection 1.65 mg  1.5 mL IntraVENous ONCE PRN Iveth Richmond MD        0.9 % sodium chloride bolus  500 mL IntraVENous Once Regis Bui MD   Stopped at 04/21/22 1415    atorvastatin (LIPITOR) tablet 10 mg  10 mg Oral Daily Jose Young MD   10 mg at 04/21/22 1948    vitamin D (CHOLECALCIFEROL) tablet 1,000 Units  1,000 Units Oral Daily Yuni Robles MD   1,000 Units at 04/22/22 0819    sodium chloride flush 0.9 % injection 5-40 mL  5-40 mL IntraVENous 2 times per day Yuni Robles MD   10 mL at 04/22/22 0819    sodium chloride flush 0.9 % injection 5-40 mL  5-40 mL IntraVENous PRN Jose Young MD        0.9 % sodium chloride infusion   IntraVENous PRN Yuni Robles MD        ondansetron (ZOFRAN-ODT) disintegrating tablet 4 mg  4 mg Oral Q8H PRN Jose Young MD        Or    ondansetron (ZOFRAN) injection 4 mg  4 mg IntraVENous Q6H PRN Jose Garcia MD        polyethylene glycol (GLYCOLAX) packet 17 g  17 g Oral Daily PRN Yuni Robles MD        acetaminophen (TYLENOL) tablet 650 mg  650 mg Oral Q6H PRN Jose Young MD        Or    acetaminophen (TYLENOL) suppository 650 mg  650 mg Rectal Q6H PRN Yuni Robles MD        lactated ringers infusion   IntraVENous Continuous Yuni Robles  mL/hr at 04/21/22 1906 New Bag at 04/21/22 1906    glucose chewable tablet 16 g  4 tablet Oral PRN Yuni Robles MD        dextrose bolus (hypoglycemia) 10% 125 mL  125 mL IntraVENous PRN Jose Cedeno MD        glucagon (rDNA) injection 1 mg  1 mg IntraMUSCular PRN Jose Garcia MD        dextrose 5 % solution  100 mL/hr IntraVENous PRN Jose Garcia MD        0.9 % sodium chloride infusion   IntraVENous PRN Parul Herring MD        pantoprazole (PROTONIX) 80 mg in sodium chloride 0.9 % 100 mL infusion  8 mg/hr IntraVENous Brigette Flores MD 10 mL/hr at 04/21/22 1948 8 mg/hr at 04/21/22 1948    0.9 % sodium chloride infusion   IntraVENous PRN Anjali Horta MD           Allergies:     Allergies   Allergen Reactions    No Known Allergies        Problem List:    Patient Active Problem List   Diagnosis Code    Esophageal cancer ([address] C15.9    Coronary artery disease involving native coronary artery of native heart without angina pectoris I25.10    Asthma J45.909    Aortic valve disorder I35.9    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    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, D63.1    Intractable nausea and vomiting R11.2    Pneumonia, aspiration ([address] J69.0    Patient underweight R63.6    Hernia, femoral, bilateral K41.20    Osteoporosis, senile M81.0    Aspiration pneumonia ([address] J69.0    Acute respiratory failure with hypoxia ([address] J96.01    Aspiration into lower respiratory tract T17.800A    Hypoxia R09.02    Chronic kidney disease, stage III (moderate) (HCC) N18.30    Personal history of esophageal cancer Z85.01    History of esophageal cancer Z85.01    History of prostate cancer Z85.46    S/P AVR Z95.2    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    Gastroparesis K31.84    Iron deficiency anemia D50.9    Anemia D64.9    Essential hypertension I10    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    Foreign body accidentally left during a procedure T81.509A    History of disease Z87.898    Abdominal pain R10.9    Postural dizziness with near syncope R42, R55    SBO (small bowel obstruction) ([facility] K56.609    UGI bleed K92.2    GI bleed K92.2    Tachycardia R00.0    Physical debility R53.81    Pneumonia J18.9    Thrombocytopenia, unspecified D69.6    Small bowel obstruction ([address] K56.609       Past Medical History:        Diagnosis Date    Anemia     Aortic valve disorders     stenosis    Aspiration pneumonia (HCC) 4/27/2015    Erectile dysfunction     Esophageal cancer ([address] 10/18/2012    Hernia, femoral, bilateral 5/12/2014    Hyperlipidemia     Osteoporosis, senile 5/20/2014    Pancreatitis 8/1/2013    Patient underweight 8/8/2013    Prostate cancer ([facility]     Unspecified essential hypertension        Past Surgical History:        Procedure Laterality Date    APPENDECTOMY      as a child    BONE GRAFT      for saddle nose    CARDIAC VALVE REPLACEMENT  2006    aorta    CHOLECYSTECTOMY      COLONOSCOPY  11/2009    COLONOSCOPY  10/05/15    COLONOSCOPY N/A 2/17/2021    COLONOSCOPY DIAGNOSTIC/STOMA performed by Citlali Gillis MD at [facility]  2/18/2021    COLONOSCOPY POLYPECTOMY SNARE/COLD BIOPSY performed by Citlali Gillis MD at [facility]  2006    ESOPHAGUS SURGERY      EYE SURGERY  1990& 1992    cataract bilat removal     GASTRECTOMY      PROSTATE SURGERY      seeds    TONSILLECTOMY      UPPER GASTROINTESTINAL ENDOSCOPY N/A 2/16/2021    EGD BIOPSY performed by Citlali Gillis MD at [address] PROSTATE/TRANSRECTAL VOL STUDY BRACHYTHERAPY         Social History:    Social History     Tobacco Use    Smoking status: Never Smoker    Smokeless tobacco: Never Used   Substance Use Topics    Alcohol use: Yes     Alcohol/week: 0.0 standard drinks     Comment: occassionally                                Counseling given: Not Answered      Vital Signs (Current):    There were no vitals filed for this visit.                                           BP Readings from Last 3 Encounters:   04/22/22 137/67   04/08/22 (!) 141/84   03/30/22 (!) 149/85       NPO Status:                                                                                 BMI:   Wt Readings from Last 3 Encounters:   04/21/22 100 lb (45.4 kg)   03/28/22 105 lb (47.6 kg)   02/02/22 108 lb (49 kg)     There is no height or weight on file to calculate BMI.    CBC:   Lab Results   Component Value Date    WBC 3.3 04/22/2022    RBC 2.45 04/22/2022    RBC 4.41 06/26/2017    HGB 8.2 04/22/2022    HCT 25.9 04/22/2022    MCV 87.2 04/22/2022    RDW 19.9 04/22/2022     04/22/2022       CMP:   Lab Results   Component Value Date     04/22/2022    K 4.5 04/22/2022    K 4.2 03/29/2022     04/22/2022    CO2 29 04/22/2022    BUN 47 04/22/2022    CREATININE 1.3 04/22/2022    GFRAA >60 04/22/2022    GFRAA >60 05/10/2013    AGRATIO 1.6 04/20/2022    LABGLOM 52 04/22/2022    LABGLOM >60>60 01/10/2012    GLUCOSE 132 04/22/2022    GLUCOSE 123 09/26/2016    PROT 6.4 04/20/2022    PROT 6.8 09/26/2016    CALCIUM 8.4 04/22/2022    BILITOT 0.3 04/20/2022    ALKPHOS 55 04/20/2022    AST 19 04/20/2022    ALT 18 04/20/2022       POC Tests: No results for input(s): POCGLU, POCNA, POCK, POCCL, POCBUN, POCHEMO, POCHCT in the last 72 hours.     Coags:   Lab Results   Component Value Date    PROTIME 17.3 04/22/2022    INR 1.51 04/22/2022    APTT 52.6 04/21/2022       HCG (If Applicable): No results found for: PREGTESTUR, PREGSERUM, HCG, HCGQUANT     ABGs:   Lab Results   Component Value Date    PHART 7.35 04/27/2015    PO2ART 104 04/27/2015    RZF7EYH 40 04/27/2015    JGK1LZO 21 04/27/2015    BEART -3.5 04/27/2015    T6ECMTXT 98 04/27/2015        Type & Screen (If Applicable):  Lab Results   Component Value Date    LABABO O 05/29/2012    [address] Positive 05/29/2012       Drug/Infectious Status (If Applicable):  No results found for: HIV, HEPCAB    COVID-19 Screening (If Applicable):   Lab Results   Component Value Date    COVID19 Not Detected 03/28/2022    COVID19 NOT DETECTED 08/19/2020         Anesthesia Evaluation  Patient summary reviewed and Nursing notes reviewed  Airway: Mallampati: II  TM distance: >3 FB   Neck ROM: full  Mouth opening: > = 3 FB Dental: normal exam         Pulmonary:normal exam    (+) pneumonia: resolved,  asthma: seasonal asthma,           Patient did not smoke on day of surgery. Cardiovascular:    (+) hypertension: mild, valvular problems/murmurs: no interval change, angina: no interval change, CAD: no interval change, murmur,       ECG reviewed  Rhythm: regular  Rate: normal  Echocardiogram reviewed         Beta Blocker:  Dose within 24 Hrs         Neuro/Psych:   Negative Neuro/Psych ROS              GI/Hepatic/Renal: Neg GI/Hepatic/Renal ROS            Endo/Other: Negative Endo/Other ROS                    Abdominal:       Abdomen: soft. Vascular: negative vascular ROS. Other Findings: Summary   Normal left ventricular size. Normal left ventricular systolic function with   an estimated ejection fraction of 55-60%. Grade I diastolic dysfunction with   normal LV filling pressures. Mitral annular calcification is present. The left atrium is dilated. Mechanical aortic valve is present. Significant artifact from that. Maximum   velocity is 1.77 m/s and mean gradient is 7 mm Hg. Mild aortic   regurgitation. Mild tricuspid regurgitation. Mild pulmonic regurgitation present. Anesthesia Plan      MAC     ASA 4       Induction: intravenous. MIPS: Postoperative opioids intended and Prophylactic antiemetics administered. Anesthetic plan and risks discussed with patient. Use of blood products discussed with patient whom consented to blood products. Plan discussed with attending and CRNA.     Attending anesthesiologist reviewed and agrees with Shirley Johnston MD 4/22/2022

## 2022-04-23 LAB
ALBUMIN SERPL-MCNC: 3 G/DL (ref 3.4–5)
ANION GAP SERPL CALCULATED.3IONS-SCNC: 8 MMOL/L (ref 3–16)
ANTI-XA UNFRAC HEPARIN: 0.39 IU/ML (ref 0.3–0.7)
APTT: 40.9 SEC (ref 26.2–38.6)
BASOPHILS ABSOLUTE: 0 K/UL (ref 0–0.2)
BASOPHILS RELATIVE PERCENT: 0.5 %
BUN BLDV-MCNC: 35 MG/DL (ref 7–20)
CALCIUM SERPL-MCNC: 7.9 MG/DL (ref 8.3–10.6)
CHLORIDE BLD-SCNC: 103 MMOL/L (ref 99–110)
CO2: 25 MMOL/L (ref 21–32)
CREAT SERPL-MCNC: 1.1 MG/DL (ref 0.8–1.3)
EOSINOPHILS ABSOLUTE: 0.1 K/UL (ref 0–0.6)
EOSINOPHILS RELATIVE PERCENT: 1.5 %
GFR AFRICAN AMERICAN: >60
GFR NON-AFRICAN AMERICAN: >60
GLUCOSE BLD-MCNC: 209 MG/DL (ref 70–99)
HCT VFR BLD CALC: 26.1 % (ref 40.5–52.5)
HEMOGLOBIN: 8.3 G/DL (ref 13.5–17.5)
INR BLD: 1.68 (ref 0.88–1.12)
LYMPHOCYTES ABSOLUTE: 0.7 K/UL (ref 1–5.1)
LYMPHOCYTES RELATIVE PERCENT: 15.4 %
MAGNESIUM: 1.9 MG/DL (ref 1.8–2.4)
MCH RBC QN AUTO: 28.5 PG (ref 26–34)
MCHC RBC AUTO-ENTMCNC: 31.9 G/DL (ref 31–36)
MCV RBC AUTO: 89.4 FL (ref 80–100)
MONOCYTES ABSOLUTE: 0.4 K/UL (ref 0–1.3)
MONOCYTES RELATIVE PERCENT: 7.7 %
NEUTROPHILS ABSOLUTE: 3.6 K/UL (ref 1.7–7.7)
NEUTROPHILS RELATIVE PERCENT: 74.9 %
PDW BLD-RTO: 18.6 % (ref 12.4–15.4)
PHOSPHORUS: 3.1 MG/DL (ref 2.5–4.9)
PLATELET # BLD: 107 K/UL (ref 135–450)
PMV BLD AUTO: 9.4 FL (ref 5–10.5)
POTASSIUM SERPL-SCNC: 4.1 MMOL/L (ref 3.5–5.1)
PROTHROMBIN TIME: 19.4 SEC (ref 9.9–12.7)
RBC # BLD: 2.91 M/UL (ref 4.2–5.9)
SODIUM BLD-SCNC: 136 MMOL/L (ref 136–145)
WBC # BLD: 4.8 K/UL (ref 4–11)

## 2022-04-23 PROCEDURE — 6360000002 HC RX W HCPCS: Performed by: INTERNAL MEDICINE

## 2022-04-23 PROCEDURE — 85610 PROTHROMBIN TIME: CPT

## 2022-04-23 PROCEDURE — 6370000000 HC RX 637 (ALT 250 FOR IP): Performed by: STUDENT IN AN ORGANIZED HEALTH CARE EDUCATION/TRAINING PROGRAM

## 2022-04-23 PROCEDURE — 36415 COLL VENOUS BLD VENIPUNCTURE: CPT

## 2022-04-23 PROCEDURE — 83735 ASSAY OF MAGNESIUM: CPT

## 2022-04-23 PROCEDURE — 93308 TTE F-UP OR LMTD: CPT

## 2022-04-23 PROCEDURE — 93320 DOPPLER ECHO COMPLETE: CPT

## 2022-04-23 PROCEDURE — 99233 SBSQ HOSP IP/OBS HIGH 50: CPT | Performed by: NURSE PRACTITIONER

## 2022-04-23 PROCEDURE — 1200000000 HC SEMI PRIVATE

## 2022-04-23 PROCEDURE — 85730 THROMBOPLASTIN TIME PARTIAL: CPT

## 2022-04-23 PROCEDURE — 80069 RENAL FUNCTION PANEL: CPT

## 2022-04-23 PROCEDURE — 99232 SBSQ HOSP IP/OBS MODERATE 35: CPT | Performed by: INTERNAL MEDICINE

## 2022-04-23 PROCEDURE — 85025 COMPLETE CBC W/AUTO DIFF WBC: CPT

## 2022-04-23 PROCEDURE — 6360000002 HC RX W HCPCS: Performed by: STUDENT IN AN ORGANIZED HEALTH CARE EDUCATION/TRAINING PROGRAM

## 2022-04-23 PROCEDURE — 2580000003 HC RX 258: Performed by: STUDENT IN AN ORGANIZED HEALTH CARE EDUCATION/TRAINING PROGRAM

## 2022-04-23 PROCEDURE — C9113 INJ PANTOPRAZOLE SODIUM, VIA: HCPCS | Performed by: INTERNAL MEDICINE

## 2022-04-23 PROCEDURE — 85520 HEPARIN ASSAY: CPT

## 2022-04-23 PROCEDURE — 93325 DOPPLER ECHO COLOR FLOW MAPG: CPT

## 2022-04-23 RX ORDER — HEPARIN SODIUM 1000 [USP'U]/ML
40 INJECTION, SOLUTION INTRAVENOUS; SUBCUTANEOUS PRN
Status: DISCONTINUED | OUTPATIENT
Start: 2022-04-23 | End: 2022-04-24

## 2022-04-23 RX ORDER — HEPARIN SODIUM 10000 [USP'U]/100ML
5-30 INJECTION, SOLUTION INTRAVENOUS CONTINUOUS
Status: DISCONTINUED | OUTPATIENT
Start: 2022-04-23 | End: 2022-04-24

## 2022-04-23 RX ORDER — ASPIRIN 81 MG/1
81 TABLET, CHEWABLE ORAL DAILY
Status: DISCONTINUED | OUTPATIENT
Start: 2022-04-24 | End: 2022-04-23

## 2022-04-23 RX ORDER — HEPARIN SODIUM 1000 [USP'U]/ML
80 INJECTION, SOLUTION INTRAVENOUS; SUBCUTANEOUS ONCE
Status: COMPLETED | OUTPATIENT
Start: 2022-04-23 | End: 2022-04-23

## 2022-04-23 RX ORDER — DOCUSATE SODIUM 100 MG/1
100 CAPSULE, LIQUID FILLED ORAL DAILY
Status: DISCONTINUED | OUTPATIENT
Start: 2022-04-23 | End: 2022-04-25 | Stop reason: HOSPADM

## 2022-04-23 RX ORDER — HEPARIN SODIUM 1000 [USP'U]/ML
80 INJECTION, SOLUTION INTRAVENOUS; SUBCUTANEOUS PRN
Status: DISCONTINUED | OUTPATIENT
Start: 2022-04-23 | End: 2022-04-24

## 2022-04-23 RX ORDER — POLYETHYLENE GLYCOL 3350 17 G/17G
17 POWDER, FOR SOLUTION ORAL DAILY
Status: DISCONTINUED | OUTPATIENT
Start: 2022-04-23 | End: 2022-04-25 | Stop reason: HOSPADM

## 2022-04-23 RX ORDER — POLYETHYLENE GLYCOL 3350 17 G/17G
17 POWDER, FOR SOLUTION ORAL DAILY
Status: DISCONTINUED | OUTPATIENT
Start: 2022-04-23 | End: 2022-04-23

## 2022-04-23 RX ADMIN — POLYETHYLENE GLYCOL 3350 17 G: 17 POWDER, FOR SOLUTION ORAL at 18:40

## 2022-04-23 RX ADMIN — SODIUM CHLORIDE, PRESERVATIVE FREE 10 ML: 5 INJECTION INTRAVENOUS at 20:20

## 2022-04-23 RX ADMIN — Medication 1000 UNITS: at 08:29

## 2022-04-23 RX ADMIN — DOCUSATE SODIUM 100 MG: 100 CAPSULE, LIQUID FILLED ORAL at 12:00

## 2022-04-23 RX ADMIN — HEPARIN SODIUM 18 UNITS/KG/HR: 5000 INJECTION INTRAVENOUS; SUBCUTANEOUS at 15:15

## 2022-04-23 RX ADMIN — HEPARIN SODIUM 3630 UNITS: 1000 INJECTION INTRAVENOUS; SUBCUTANEOUS at 11:45

## 2022-04-23 RX ADMIN — PANTOPRAZOLE SODIUM 40 MG: 40 INJECTION, POWDER, FOR SOLUTION INTRAVENOUS at 08:29

## 2022-04-23 RX ADMIN — ATORVASTATIN CALCIUM 10 MG: 10 TABLET, FILM COATED ORAL at 20:20

## 2022-04-23 RX ADMIN — PANTOPRAZOLE SODIUM 40 MG: 40 INJECTION, POWDER, FOR SOLUTION INTRAVENOUS at 20:20

## 2022-04-23 RX ADMIN — SODIUM CHLORIDE, PRESERVATIVE FREE 10 ML: 5 INJECTION INTRAVENOUS at 08:29

## 2022-04-23 RX ADMIN — IRON SUCROSE 200 MG: 20 INJECTION, SOLUTION INTRAVENOUS at 12:00

## 2022-04-23 ASSESSMENT — PAIN SCALES - GENERAL
PAINLEVEL_OUTOF10: 0
PAINLEVEL_OUTOF10: 0

## 2022-04-23 NOTE — PROGRESS NOTES
CC:  HPI:     S: Denies cp, sob, or GI/ bleeding     Tele: Sinus kong     O:  Physical Exam:  /73   Pulse 76   Temp 97.3 °F (36.3 °C) (Oral)   Resp 17   Ht 5' 3\" (1.6 m)   Wt 100 lb (45.4 kg)   SpO2 96%   BMI 17.71 kg/m²    General (appearance):  No acute distress  Eyes: anicteric   Neck: soft, No JVD  Ears/Nose/Mouth/Thorat: No cyanosis  CV: Reg, kong   Respiratory:  Clear, normal effort  GI: soft, non-tender, non-distended  Skin: Warm, dry. No rashes  Neuro/Psych: Alert and oriented x 3. Appropriate behavior  Ext:  No c/c. No edema  Pulses:  2+ radial     I.O's=     Weight  Admission: Weight: 100 lb (45.4 kg)   Today: Weight: 100 lb (45.4 kg)    CBC: Recent Labs     22  1410 22  1410 22  1123 22  2100 22  0530 22  0731 22  1158 22  1354 22  2311   WBC 5.5  --  5.2  --  3.3*  --   --   --   --    HGB 7.5*   < > 7.6*   < > 6.9*   < > 7.3* 8.2* 7.9*   HCT 23.6*   < > 24.6*   < > 21.3*   < > 23.0* 25.9* 24.5*   MCV 89.2  --  89.5  --  87.2  --   --   --   --      --  152  --  109*  --   --   --   --     < > = values in this interval not displayed. BMP:   Recent Labs     22  1410 22  1123 22  0530    142 143   K 4.6 4.4 4.5    106 108   CO2 27 26 29   PHOS  --   --  3.4   BUN 62* 60* 47*   CREATININE 1.6* 1.7* 1.3     Mag:   Lab Results   Component Value Date    MG 2.20 2022     LIVER PROFILE:   Recent Labs     22  1410   AST 19   ALT 18   BILITOT 0.3   ALKPHOS 55     PT/INR:   Recent Labs     22  1123 22  0530 22  1355   PROTIME 29.5* 22.7* 17.3*   INR 2.51* 1.95* 1.51*     Pro-BNP:   Lab Results   Component Value Date    PROBNP 990 2022    PROBNP 2,662 06/15/2021    PROBNP 1,944 2021     CARDIAC ENZYMES: No results for input(s): CKTOTAL, CKMB, TROPONINI in the last 72 hours.       Imagin/15/2021 Limited echo     Normal left ventricle size, wall thickness, and systolic function with an   estimated ejection fraction of 60-65%. No regional wall motion abnormalities are seen. Mild tricuspid regurgitation. Estimated pulmonary artery systolic pressure is at 17IAGA assuming a right   atrial pressure of 3mmHg. Assessment:  Mechanical aortic valve  CAD/CABG  GI bleed  HTN  HLD    Plan:  -Keep K>4, Mg>2.  -Lipitor  -Heparin gtt: resume warfarin if ok with GI   -Limited Echo to assess valve and velocity   States he hasn't been taking asa for years.  Prefers not to restart asa and restart warfarin only

## 2022-04-23 NOTE — PLAN OF CARE
IV fluids infusing per orders. Hgb stable at 7.9. 1 BM overnight, dark brown in color. Voiding per bedside commode as charted. Patient at risk for falls. Patient resting quietly in bed. Side rails up x3. Bed locked in lowest position. Bed alarm on. Bedside table and call light within reach. Patient instructed to call for assistance. Patient verbalized understanding. Will continue to monitor. Problem: Safety - Adult  Goal: Free from fall injury  Outcome: Progressing     Problem: ABCDS Injury Assessment  Goal: Absence of physical injury  Outcome: Progressing     Problem: Skin/Tissue Integrity  Goal: Absence of new skin breakdown  Outcome: Progressing  Pt has blanchable redness to buttocks. Turning in bed with pillow support. Problem: Pain  Goal: Verbalizes/displays adequate comfort level or baseline comfort level  Outcome: Progressing  Pt denies c/o pain.

## 2022-04-23 NOTE — PROGRESS NOTES
Progress Note    Admit Date: 4/21/2022  Day: 2  Diet: ADULT DIET; Dysphagia - Soft and Bite Sized    CC: weakness, dark stools    Interval history:   Patient seen and examined at bedside. No acute events overnight. 1 report of dark brown bowel movement overnight. Feels okay. Has been tolerating clear liquids without any nausea or vomiting and is inquiring about soft foods now. Denies any chest pain, shortness of breath, abdominal pain, dizziness, lightheadedness, weakness. Medications:     Scheduled Meds:   iron sucrose  200 mg IntraVENous Once    heparin (porcine)  80 Units/kg IntraVENous Once    polyethylene glycol  17 g Oral Daily    docusate sodium  100 mg Oral Daily    pantoprazole  40 mg IntraVENous BID    sodium chloride  500 mL IntraVENous Once    atorvastatin  10 mg Oral Daily    Vitamin D  1,000 Units Oral Daily    sodium chloride flush  5-40 mL IntraVENous 2 times per day     Continuous Infusions:   heparin (PORCINE) Infusion      sodium chloride      dextrose      sodium chloride      sodium chloride       PRN Meds:heparin (porcine), heparin (porcine), perflutren lipid microspheres, sodium chloride flush, sodium chloride, ondansetron **OR** ondansetron, acetaminophen **OR** acetaminophen, glucose, dextrose bolus (hypoglycemia), glucagon (rDNA), dextrose, sodium chloride, sodium chloride    Objective:   Vitals:   T-max:  Patient Vitals for the past 8 hrs:   BP Temp Temp src Pulse Resp SpO2   04/23/22 0827 124/73 97.3 °F (36.3 °C) Oral 76 17 96 %   04/23/22 0350 127/68 97.7 °F (36.5 °C) Oral 56 18 96 %       Intake/Output Summary (Last 24 hours) at 4/23/2022 1058  Last data filed at 4/23/2022 0827  Gross per 24 hour   Intake 880 ml   Output 1100 ml   Net -220 ml     ROS: Pertinent ROS in Interval HX    Physical Exam  Constitutional: Alert, normal appearance,   HENT: head normocephalic/atraumatic, no congestion or rhinorrhea, PERRLA, EOM intact  Cardio: normal rate, regular rhythm. No murmurs heard on auscultation  Pulmonary: clear to auscultation bilaterally, no wheezes, rales, or stridor  GI: abdomen soft, flat, and non-distended. No tenderness or guarding. Musculoskeletal: no deformity, tenderness, or injury. No lower extremity edema. Strength 5/5 in bilateral upper and lower extremities  Skin: no lesion, rash, or erythema  Neuro: no focal neurological deficits, alert and oriented X3  Psych: mood normal, behavior normal    LABS:    CBC:   Recent Labs     04/21/22  1123 04/21/22  2100 04/22/22  0530 04/22/22  0731 04/22/22  1354 04/22/22  2311 04/23/22  0952   WBC 5.2  --  3.3*  --   --   --  4.8   HGB 7.6*   < > 6.9*   < > 8.2* 7.9* 8.3*   HCT 24.6*   < > 21.3*   < > 25.9* 24.5* 26.1*     --  109*  --   --   --  107*   MCV 89.5  --  87.2  --   --   --  89.4    < > = values in this interval not displayed. Renal:    Recent Labs     04/21/22  1123 04/22/22  0530 04/23/22  0952    143 136   K 4.4 4.5 4.1    108 103   CO2 26 29 25   BUN 60* 47* 35*   CREATININE 1.7* 1.3 1.1   GLUCOSE 174* 132* 209*   CALCIUM 8.8 8.4 7.9*   MG  --  2.20 1.90   PHOS  --  3.4 3.1   ANIONGAP 10 6 8     Hepatic:   Recent Labs     04/20/22  1410 04/22/22  0530 04/23/22  0952   AST 19  --   --    ALT 18  --   --    BILITOT 0.3  --   --    PROT 6.4  --   --    LABALBU 3.9 3.1* 3.0*   ALKPHOS 55  --   --        INR:   Recent Labs     04/22/22  0530 04/22/22  1355 04/23/22  0952   INR 1.95* 1.51* 1.68*     Lactate: No results for input(s): LACTATE in the last 72 hours. Cultures:  -----------------------------------------------------------------  RAD:   No orders to display       Assessment/Plan:   86M with PMH[facility] CABGx2[facility], esophageal cancer s/p surgery/chemotherapy, prostate cancer s/p brachytherapy presenting with ~1 week of weakness and dark stools. Acute blood loss anemia 2/2 upper GI bleed  -Has required multiple units of PRBC and FFP.   He was on warfarin prior to coming here for mechanical aortic valve.  -Has an EGD at this admission which revealed Dieulafoy lesion with active bleeding in the new esophagus status post epinephrine injection and endoclips.   -As per GI recs, will start on heparin drip again prior to initiation of warfarin. He has tolerated liquid diet so we will advance her to soft diet now.   -Continue Protonix twice daily for 8 more weeks.   -We will stop his IV fluids today as he has been eating and drinking okay. Aortic Stenosis s/p Mechanical AVR  Patient on warfarin w/ goal IRN 2-2.5  -Held warfarin in setting of acute blood loss anemia and got FFP. -Cardiology consulted to held determine timing of resuming anticoagulation.   -After discussion with GI and EGD intervention, he was restarted on heparin drip after no further bleeding after procedure. We will start warfarin tomorrow if he has no further bleeding.   -As per patient he was instructed by his cardiologist to take aspirin only once weekly, but he stopped taking it altogether. CAD s/p CABG  -Cont atorvastatin 10 mg    Hx Prostate Cancer  -Treated w/ brachytherapy    Hx Gastroesophageal carcinoma  Diagnosed in 2009. Underwent neoadjuvant combined modality chemo radiotherapy followed by distal esophagectomy and proximal gastrectomy at [facility]. Patient has difficulty absorbing iron. No evidence of recurrence.   -No evidence of recurrence    LIOR (resolved)  Creatinine improved status post IV fluids. Code Status: Full Code  FEN: ADULT DIET;  Dysphagia - Soft and Bite Sized  PPX: SCDs  DISPO: Chidi Gardner MD PGY-3  04/23/22  10:58 AM    This patient has been staffed and discussed with Dianelys White MD.

## 2022-04-24 LAB
ALBUMIN SERPL-MCNC: 3.1 G/DL (ref 3.4–5)
ANION GAP SERPL CALCULATED.3IONS-SCNC: 10 MMOL/L (ref 3–16)
ANTI-XA UNFRAC HEPARIN: 0.42 IU/ML (ref 0.3–0.7)
ANTI-XA UNFRAC HEPARIN: 0.53 IU/ML (ref 0.3–0.7)
BASOPHILS ABSOLUTE: 0 K/UL (ref 0–0.2)
BASOPHILS RELATIVE PERCENT: 0.7 %
BUN BLDV-MCNC: 30 MG/DL (ref 7–20)
CALCIUM SERPL-MCNC: 8.3 MG/DL (ref 8.3–10.6)
CHLORIDE BLD-SCNC: 104 MMOL/L (ref 99–110)
CO2: 26 MMOL/L (ref 21–32)
CREAT SERPL-MCNC: 1.2 MG/DL (ref 0.8–1.3)
EOSINOPHILS ABSOLUTE: 0.1 K/UL (ref 0–0.6)
EOSINOPHILS RELATIVE PERCENT: 3.1 %
GFR AFRICAN AMERICAN: >60
GFR NON-AFRICAN AMERICAN: 58
GLUCOSE BLD-MCNC: 87 MG/DL (ref 70–99)
HCT VFR BLD CALC: 28.1 % (ref 40.5–52.5)
HEMOGLOBIN: 9.1 G/DL (ref 13.5–17.5)
INR BLD: 1.61 (ref 0.88–1.12)
LYMPHOCYTES ABSOLUTE: 1 K/UL (ref 1–5.1)
LYMPHOCYTES RELATIVE PERCENT: 21.8 %
MAGNESIUM: 2 MG/DL (ref 1.8–2.4)
MCH RBC QN AUTO: 28.9 PG (ref 26–34)
MCHC RBC AUTO-ENTMCNC: 32.5 G/DL (ref 31–36)
MCV RBC AUTO: 88.7 FL (ref 80–100)
MONOCYTES ABSOLUTE: 0.4 K/UL (ref 0–1.3)
MONOCYTES RELATIVE PERCENT: 9.6 %
NEUTROPHILS ABSOLUTE: 2.9 K/UL (ref 1.7–7.7)
NEUTROPHILS RELATIVE PERCENT: 64.8 %
PDW BLD-RTO: 19 % (ref 12.4–15.4)
PHOSPHORUS: 3.4 MG/DL (ref 2.5–4.9)
PLATELET # BLD: 105 K/UL (ref 135–450)
PMV BLD AUTO: 9.5 FL (ref 5–10.5)
POTASSIUM SERPL-SCNC: 4.2 MMOL/L (ref 3.5–5.1)
PROTHROMBIN TIME: 18.6 SEC (ref 9.9–12.7)
RBC # BLD: 3.16 M/UL (ref 4.2–5.9)
SODIUM BLD-SCNC: 140 MMOL/L (ref 136–145)
WBC # BLD: 4.5 K/UL (ref 4–11)

## 2022-04-24 PROCEDURE — 2580000003 HC RX 258: Performed by: STUDENT IN AN ORGANIZED HEALTH CARE EDUCATION/TRAINING PROGRAM

## 2022-04-24 PROCEDURE — 6370000000 HC RX 637 (ALT 250 FOR IP): Performed by: STUDENT IN AN ORGANIZED HEALTH CARE EDUCATION/TRAINING PROGRAM

## 2022-04-24 PROCEDURE — 99232 SBSQ HOSP IP/OBS MODERATE 35: CPT | Performed by: HOSPITALIST

## 2022-04-24 PROCEDURE — C9113 INJ PANTOPRAZOLE SODIUM, VIA: HCPCS | Performed by: INTERNAL MEDICINE

## 2022-04-24 PROCEDURE — 6360000002 HC RX W HCPCS: Performed by: INTERNAL MEDICINE

## 2022-04-24 PROCEDURE — 6360000002 HC RX W HCPCS: Performed by: STUDENT IN AN ORGANIZED HEALTH CARE EDUCATION/TRAINING PROGRAM

## 2022-04-24 PROCEDURE — 83735 ASSAY OF MAGNESIUM: CPT

## 2022-04-24 PROCEDURE — 99232 SBSQ HOSP IP/OBS MODERATE 35: CPT | Performed by: NURSE PRACTITIONER

## 2022-04-24 PROCEDURE — 80069 RENAL FUNCTION PANEL: CPT

## 2022-04-24 PROCEDURE — 85520 HEPARIN ASSAY: CPT

## 2022-04-24 PROCEDURE — 85610 PROTHROMBIN TIME: CPT

## 2022-04-24 PROCEDURE — 1200000000 HC SEMI PRIVATE

## 2022-04-24 PROCEDURE — 85025 COMPLETE CBC W/AUTO DIFF WBC: CPT

## 2022-04-24 PROCEDURE — 36415 COLL VENOUS BLD VENIPUNCTURE: CPT

## 2022-04-24 RX ORDER — WARFARIN SODIUM 5 MG/1
5 TABLET ORAL
Status: COMPLETED | OUTPATIENT
Start: 2022-04-24 | End: 2022-04-24

## 2022-04-24 RX ORDER — ENOXAPARIN SODIUM 100 MG/ML
1 INJECTION SUBCUTANEOUS 2 TIMES DAILY
Status: DISCONTINUED | OUTPATIENT
Start: 2022-04-24 | End: 2022-04-25 | Stop reason: HOSPADM

## 2022-04-24 RX ADMIN — WARFARIN SODIUM 5 MG: 5 TABLET ORAL at 17:09

## 2022-04-24 RX ADMIN — SODIUM CHLORIDE, PRESERVATIVE FREE 10 ML: 5 INJECTION INTRAVENOUS at 09:47

## 2022-04-24 RX ADMIN — SODIUM CHLORIDE, PRESERVATIVE FREE 10 ML: 5 INJECTION INTRAVENOUS at 19:43

## 2022-04-24 RX ADMIN — PANTOPRAZOLE SODIUM 40 MG: 40 INJECTION, POWDER, FOR SOLUTION INTRAVENOUS at 09:33

## 2022-04-24 RX ADMIN — DOCUSATE SODIUM 100 MG: 100 CAPSULE, LIQUID FILLED ORAL at 09:33

## 2022-04-24 RX ADMIN — PANTOPRAZOLE SODIUM 40 MG: 40 INJECTION, POWDER, FOR SOLUTION INTRAVENOUS at 19:43

## 2022-04-24 RX ADMIN — ATORVASTATIN CALCIUM 10 MG: 10 TABLET, FILM COATED ORAL at 19:43

## 2022-04-24 RX ADMIN — SODIUM CHLORIDE, PRESERVATIVE FREE 10 ML: 5 INJECTION INTRAVENOUS at 09:34

## 2022-04-24 RX ADMIN — POLYETHYLENE GLYCOL 3350 17 G: 17 POWDER, FOR SOLUTION ORAL at 09:33

## 2022-04-24 RX ADMIN — Medication 1000 UNITS: at 09:33

## 2022-04-24 RX ADMIN — ENOXAPARIN SODIUM 50 MG: 60 INJECTION SUBCUTANEOUS at 19:49

## 2022-04-24 ASSESSMENT — PAIN SCALES - GENERAL
PAINLEVEL_OUTOF10: 0
PAINLEVEL_OUTOF10: 0

## 2022-04-24 NOTE — PLAN OF CARE
Problem: Safety - Adult  Goal: Free from fall injury  Outcome: Progressing     Problem: Skin/Tissue Integrity  Goal: Absence of new skin breakdown  Description: 1. Monitor for areas of redness and/or skin breakdown  2. Assess vascular access sites hourly  3. Every 4-6 hours minimum:  Change oxygen saturation probe site  4. Every 4-6 hours:  If on nasal continuous positive airway pressure, respiratory therapy assess nares and determine need for appliance change or resting period.   4/23/2022 2300 by Carlos Bearden RN  Outcome: Progressing

## 2022-04-24 NOTE — CONSULTS
Clinical Pharmacy Progress Note    Warfarin - Management by Pharmacy    Consult Date(s): 4/24  Consulting Provider(s): Shante Tay    Assessment / Plan    Mechanical Atrial Valve - Warfarin   Goal INR: 2 - 3   Concurrent Anticoagulants / Antiplatelets: None   Interactions: None   Current Regimen: 2.5mg daily at home   Plan / Rationale:   o Patient manages his own warfarin at home he takes 2.5mg daily and boluses with 5mg as needed.  o INR 1.61 toady will bolus with 5mg  o If pt is D/C home recommend 2.5mg QDAY and check INR in 1 week   Will monitor pt's clinical status and INR daily, and make dose adjustments as needed. Thank you for consulting Pharmacy! 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 Dx with upper GI bleed. Pharmacy has been consulted to dose warfarin. Height:   Ht Readings from Last 1 Encounters:   04/22/22 5' 3\" (1.6 m)     Weight:   Wt Readings from Last 1 Encounters:   04/21/22 100 lb (45.4 kg)       Prior / Home Warfarin Regimen:   2.5mg QDAY    Date INR Warfarin   4/24 1.61 5mg                    Labs / Ancillary Data:    Recent Labs     04/22/22  0530 04/22/22  0731 04/22/22  1354 04/22/22  1355 04/22/22  2311 04/23/22  0952 04/24/22  0305 04/24/22  0330   INR 1.95*   < >  --  1.51*  --  1.68*  --  1.61*   HGB 6.9*   < >   < >  --  7.9* 8.3* 9.1*  --    *  --   --   --   --  107* 105*  --    LABALBU 3.1*  --   --   --   --  3.0* 3.1*  --    CREATININE 1.3  --   --   --   --  1.1 1.2  --     < > = values in this interval not displayed.

## 2022-04-24 NOTE — DISCHARGE SUMMARY
INTERNAL MEDICINE DEPARTMENT AT [address] [place] [address]  DISCHARGE SUMMARY    Patient ID: Ade MD Darling                                             Discharge Date: 4/24/2022   Patient's PCP: Yumiko Anna MD                                          Discharge Physician: Fara Hassan MD MD  Admit Date: 4/21/2022   Admitting Physician: Yumiko Anna MD    PROBLEMS DURING HOSPITALIZATION:  Present on Admission:   GI bleed   S/P AVR   History of esophageal cancer   History of prostate cancer    DISCHARGE DIAGNOSES:    HPI:    82M with PMHx CABGx2, mech aortic valve replacement on warfarin, esophageal cancer s/p surgery/chemotherapy, prostate cancer s/p brachytherapy presenting with ~1 week of weakness and dark stools. He noticed that his stools began appearing dark brown in color. No bright red blood noted. He has not had history of GI bleed prior to this. He endorses weakness and shortness of breath. Denies fever, chills, dizziness/lightheadedness upon moving from sitting to standing position, chest pain, nausea, vomiting, abdominal pain, constipation, diarrhea. His hemoglobin levels have been decreasing (baseline ~11, noted to be 10.2 on 3/30, 8.4 on 4/19 and 7.5/7.6 on admission). Denies NSAID usage. In ED, VSS, INR found to be 2.5, Cr elevated to 1.7. Given 500 cc bolus and protonix. The following issues were addressed during hospitalization:    Acute blood loss anemia 2/2 GI bleed  Aortic stenosis s/p mechanical AVR  Patient presented w/ one week of weakness and dark stools. Patient presented w/ hgb of 7.5 w/ baseline of 11. Patient's warfarin for mechanical aortic valve was held and was started on PPI drip and given multiple units of PRBCs and FFP. EGD reviealed Dieulafoy lesion w/ active bleeding in john esophagus status post epinephrine injection and endoclips. Patient had no further dark bowel movements and tolerated advancing diet well. Patient will be discharged on 8 wks protonix therapy.  Patient was started on heparin drip 4 hours after procedure. Patient was started on BID lovenox 1 mg/kg and warfarin on 4/24. Patient reports that he has lovenox supply at home. Patient instructed to take lovenox 1mg/kg BID until therapeutic. Patient will take warfarin 2.5 mg daily w/ 5 mg boluses as needed until INR therapeutic. Patient was discharged w/ protonix 40 mg BID for 8 weeks. Patient hemodynamically stable and afebrile w/ stable hemoglobin for 72 hours before discharge. Patient will follow up w/ PCP, Dr. Sultana Jolly, in two weeks. Physical Exam:  BP (!) 171/85   Pulse 66   Temp 98.1 °F (36.7 °C) (Oral)   Resp 16   Ht 5' 3\" (1.6 m)   Wt 100 lb (45.4 kg)   SpO2 93%   BMI 17.71 kg/m²     Physical Exam  Constitutional: Alert, normal appearance,   HENT: head normocephalic/atraumatic, no congestion or rhinorrhea, PERRLA, EOM intact  Cardio: normal rate, regular rhythm. No murmurs heard on auscultation  Pulmonary: clear to auscultation bilaterally, no wheezes, rales, or stridor  GI: abdomen soft, flat, and non-distended. No tenderness or guarding. Musculoskeletal: no deformity, tenderness, or injury. No lower extremity edema.  Strength 5/5 in bilateral upper and lower extremities  Skin: no lesion, rash, or erythema  Neuro: no focal neurological deficits, alert and oriented X3  Psych: mood normal, behavior normal    Consults: Gastroenterology, Cardiology  Significant Diagnostic Studies:  Echo, EGD  Treatments: Hold anticoagulation, EGD w/ epi and clip placement  Disposition: home  Discharged Condition: Stable  Follow Up: Primary Care Physician in two weeks    DISCHARGE MEDICATION:     Medication List      ASK your doctor about these medications    Alpha Lipoic Acid-Biotin 300-333 MG-MCG Caps     atorvastatin 10 MG tablet  Commonly known as: LIPITOR  TAKE ONE TABLET BY MOUTH DAILY     calcium carbonate 500 MG chewable tablet  Commonly known as: TUMS     CoQ10 400 MG Caps     ferrous sulfate 325 (65 Fe) MG tablet  Commonly known as: IRON 325     fish oil 1000 MG Caps     magnesium oxide 400 MG tablet  Commonly known as: MAG-OX     omeprazole 40 MG delayed release capsule  Commonly known as: PRILOSEC  TAKE ONE CAPSULE BY MOUTH DAILY     polyethylene glycol 17 g Pack packet  Commonly known as: MIRALAX     simethicone 80 MG chewable tablet  Commonly known as: MYLICON     vitamin M-34 1000 MCG tablet  Commonly known as: CYANOCOBALAMIN     vitamin D 25 MCG (1000 UT) Tabs tablet  Commonly known as: CHOLECALCIFEROL     vitamin E 1000 units capsule     * warfarin 2.5 MG tablet  Commonly known as: COUMADIN  Take as directed. If you are unsure how to take this medication, talk to your nurse or doctor. * warfarin 5 MG tablet  Commonly known as: COUMADIN  Take as directed. If you are unsure how to take this medication, talk to your nurse or doctor. Original instructions: Take 0.5-1 tablets by mouth daily Performs test at home prior to dose         * This list has 2 medication(s) that are the same as other medications prescribed for you. Read the directions carefully, and ask your doctor or other care provider to review them with you. Activity: activity as tolerated  Diet: regular diet  Wound Care: none needed    Time Spent on discharge is more than 45 minutes    Signed:  Fara Hassan MD,  PGY-1  4/24/2022     Addendum to Resident H& P/Progress note:  I have personally seen,examined and evaluated the patient.  I have reviewed the current history, physical findings, labs and assessment and plan and agree with note as documented by resident MD ( Stella Cobb)  + CKD stage 3a    Marybel Case MD, Kurt Ulloa

## 2022-04-24 NOTE — PROGRESS NOTES
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 1. Anemia, unspecified type    2. Gastrointestinal hemorrhage with melena        SUBJECTIVE:    No GIB. No abdom pain     Physical    VITALS:  BP (!) 149/81   Pulse 70   Temp 98.1 °F (36.7 °C) (Oral)   Resp 16   Ht 5' 3\" (1.6 m)   Wt 100 lb (45.4 kg)   SpO2 96%   BMI 17.71 kg/m²   TEMPERATURE:  Current - Temp: 98.1 °F (36.7 °C); Max - Temp  Av.1 °F (36.7 °C)  Min: 97.4 °F (36.3 °C)  Max: 98.6 °F (37 °C)    Abdomen soft, ND, NT, no HSM, Bowel sounds normal     Data      Recent Labs     22  0530 22  0731 22  2311 22  0952 22  0305   WBC 3.3*  --   --  4.8 4.5   HGB 6.9*   < > 7.9* 8.3* 9.1*   HCT 21.3*   < > 24.5* 26.1* 28.1*   MCV 87.2  --   --  89.4 88.7   *  --   --  107* 105*    < > = values in this interval not displayed. Recent Labs     22  0530 22  0952 22  0305    136 140   K 4.5 4.1 4.2    103 104   CO2 29 25 26   PHOS 3.4 3.1 3.4   BUN 47* 35* 30*   CREATININE 1.3 1.1 1.2     No results for input(s): AST, ALT, ALB, BILIDIR, BILITOT, ALKPHOS in the last 72 hours. No results for input(s): LIPASE, AMYLASE in the last 72 hours. ASSESSMENT   1. UGIB: no further bleeding after EGD 22 with findings below  DIAGNOSIS:   1) Dieulafoy lesion with active bleeding at 25 cm in to john esophagus  s/p  dilated epinephrine injection and endoclips placed at the bleeding site with control of bleeding. 2) partial esophagectomy and gastrectomy anatomy with the gastric pull-through with the esophagogastric anastomosis. 3) diffuse gastritis. 2.  Anemia due to GI bleed>>>hgb improved to 9.1        PLAN    1. Continue PPI daily. 2. Monitor for rebleed  3. Continue anticoagulation for mech valve  4.  Possibly home tmrw    Yury Chapin MD  [address] [place] [address]

## 2022-04-24 NOTE — PROGRESS NOTES
CC:  HPI:     S: resting comfortably. No complaints     Tele: Sinus     O:  Physical Exam:  BP (!) 171/85   Pulse 66   Temp 98.1 °F (36.7 °C) (Oral)   Resp 16   Ht 5' 3\" (1.6 m)   Wt 100 lb (45.4 kg)   SpO2 93%   BMI 17.71 kg/m²    General (appearance):  No acute distress  Eyes: anicteric   Neck: soft, No JVD  Ears/Nose/Mouth/Thorat: No cyanosis  CV: RRR  Respiratory:  Clear, normal effort  GI: soft, non-tender, non-distended  Skin: Warm, dry. No rashes  Neuro/Psych: Alert and oriented x 3. Appropriate behavior  Ext:  No c/c. No edema  Pulses:  2+ radial     I.O's=     Weight  Admission: Weight: 100 lb (45.4 kg)   Today: Weight: 100 lb (45.4 kg)    CBC:   Recent Labs     22  0530 22  0731 22  2311 22  0952 22  0305   WBC 3.3*  --   --  4.8 4.5   HGB 6.9*   < > 7.9* 8.3* 9.1*   HCT 21.3*   < > 24.5* 26.1* 28.1*   MCV 87.2  --   --  89.4 88.7   *  --   --  107* 105*    < > = values in this interval not displayed. BMP:   Recent Labs     22  0530 22  0952 22  0305    136 140   K 4.5 4.1 4.2    103 104   CO2 29 25 26   PHOS 3.4 3.1 3.4   BUN 47* 35* 30*   CREATININE 1.3 1.1 1.2     Mag:   Lab Results   Component Value Date    MG 2.00 2022     LIVER PROFILE:   No results for input(s): AST, ALT, LIPASE, BILIDIR, BILITOT, ALKPHOS in the last 72 hours. Invalid input(s): AMYLASE,  ALB  PT/INR:   Recent Labs     22  1355 22  0952 22  0330   PROTIME 17.3* 19.4* 18.6*   INR 1.51* 1.68* 1.61*     Pro-BNP:   Lab Results   Component Value Date    PROBNP 990 2022    PROBNP 2,662 06/15/2021    PROBNP 1,944 2021     CARDIAC ENZYMES: No results for input(s): CKTOTAL, CKMB, TROPONINI in the last 72 hours. Imagin2022 Limited echo:     Overall left ventricular systolic function appears normal with an ejection   fraction of 60-65%.    A mechanical artificial aortic valve appears well seated with a maximum velocity of 2.06m/s and a mean gradient of 9mmHg. Mild aortic regurgitation vs perivalvular leak. 6/15/2021 Limited echo     Normal left ventricle size, wall thickness, and systolic function with an   estimated ejection fraction of 60-65%. No regional wall motion abnormalities are seen. Mild tricuspid regurgitation. Estimated pulmonary artery systolic pressure is at 91GUMF assuming a right   atrial pressure of 3mmHg. Assessment:  Mechanical aortic valve  CAD/CABG  GI bleed  HTN  HLD    Plan:  -Keep K>4, Mg>2.  -Lipitor  -Lovenox: resume warfarin if ok with GI     States he hasn't been taking asa for years.  Prefers not to restart asa and restart warfarin only

## 2022-04-24 NOTE — PROGRESS NOTES
Progress Note    Admit Date: 4/21/2022  Day: 3  Diet: ADULT DIET; Dysphagia - Soft and Bite Sized    CC: weakness, dark stools    Interval history:   Patient seen and examined at bedside. No acute events overnight. Patient is afebrile. Blood pressure rising though patient asymptomatic and reports this has occurred during previous hospitalizations. Patient reported a couple brown bowel movements overnight. Patient able to tolerate soft diet. Will start warfarin today. Patient denies chest pain, SOB, abdominal pain, fevers, chills, nausea, and vomiting.        Medications:     Scheduled Meds:   polyethylene glycol  17 g Oral Daily    docusate sodium  100 mg Oral Daily    pantoprazole  40 mg IntraVENous BID    sodium chloride  500 mL IntraVENous Once    atorvastatin  10 mg Oral Daily    Vitamin D  1,000 Units Oral Daily    sodium chloride flush  5-40 mL IntraVENous 2 times per day     Continuous Infusions:   heparin (PORCINE) Infusion 18 Units/kg/hr (04/23/22 1515)    sodium chloride      dextrose      sodium chloride      sodium chloride       PRN Meds:heparin (porcine), heparin (porcine), perflutren lipid microspheres, sodium chloride flush, sodium chloride, ondansetron **OR** ondansetron, acetaminophen **OR** acetaminophen, glucose, dextrose bolus (hypoglycemia), glucagon (rDNA), dextrose, sodium chloride, sodium chloride    Objective:   Vitals:   T-max:  Patient Vitals for the past 8 hrs:   BP Temp Temp src Pulse Resp SpO2   04/24/22 0533 (!) 165/89 98.1 °F (36.7 °C) Oral 69 16 93 %   04/24/22 0053 (!) 161/92 98.3 °F (36.8 °C) Oral 72 16 92 %       Intake/Output Summary (Last 24 hours) at 4/24/2022 0640  Last data filed at 4/23/2022 1700  Gross per 24 hour   Intake 1020 ml   Output --   Net 1020 ml     ROS: Pertinent ROS in Interval HX    Physical Exam  Constitutional: Alert, normal appearance,   HENT: head normocephalic/atraumatic, no congestion or rhinorrhea, PERRLA, EOM intact  Cardio: normal rate, regular rhythm. No murmurs heard on auscultation  Pulmonary: clear to auscultation bilaterally, no wheezes, rales, or stridor  GI: abdomen soft, flat, and non-distended. No tenderness or guarding. Musculoskeletal: no deformity, tenderness, or injury. No lower extremity edema. Strength 5/5 in bilateral upper and lower extremities  Skin: no lesion, rash, or erythema  Neuro: no focal neurological deficits, alert and oriented X3  Psych: mood normal, behavior normal    LABS:    CBC:   Recent Labs     04/22/22  0530 04/22/22  0731 04/22/22  2311 04/23/22  0952 04/24/22  0305   WBC 3.3*  --   --  4.8 4.5   HGB 6.9*   < > 7.9* 8.3* 9.1*   HCT 21.3*   < > 24.5* 26.1* 28.1*   *  --   --  107* 105*   MCV 87.2  --   --  89.4 88.7    < > = values in this interval not displayed. Renal:    Recent Labs     04/22/22  0530 04/23/22  0952 04/24/22  0305    136 140   K 4.5 4.1 4.2    103 104   CO2 29 25 26   BUN 47* 35* 30*   CREATININE 1.3 1.1 1.2   GLUCOSE 132* 209* 87   CALCIUM 8.4 7.9* 8.3   MG 2.20 1.90 2.00   PHOS 3.4 3.1 3.4   ANIONGAP 6 8 10     Hepatic:   Recent Labs     04/22/22  0530 04/23/22  0952 04/24/22  0305   LABALBU 3.1* 3.0* 3.1*     Troponin: No results for input(s): TROPONINI in the last 72 hours. BNP: No results for input(s): BNP in the last 72 hours. Lipids: No results for input(s): CHOL, HDL in the last 72 hours. Invalid input(s): LDLCALCU, TRIGLYCERIDE  ABGs:  No results for input(s): PHART, EGG9DPW, PO2ART, GPJ0NZM, BEART, THGBART, I8TVQOUS, MAX1JBP in the last 72 hours. INR:   Recent Labs     04/22/22  0530 04/22/22  1355 04/23/22  0952   INR 1.95* 1.51* 1.68*     Lactate: No results for input(s): LACTATE in the last 72 hours.   Cultures:  -----------------------------------------------------------------  RAD:   No orders to display       Assessment/Plan:   86M with PMHx CABGx2, [facility] aortic valve replacement on warfarin, esophageal cancer s/p surgery/chemotherapy, prostate cancer s/p brachytherapy presenting with ~1 week of weakness and dark stools. Acute blood loss anemia 2/2 upper GI bleed  -Has required multiple units of PRBC and FFP. He was on warfarin prior to coming here for mechanical aortic valve.  -Has an EGD at this admission which revealed Dieulafoy lesion with active bleeding in the new esophagus status post epinephrine injection and endoclips.   -As per GI recs, will start on heparin drip again prior to initiation of warfarin.    -Tolerated soft diet well overnight   -Continue Protonix twice daily for 8 more weeks.   -Hemoglobin uptrending     Aortic Stenosis s/p Mechanical AVR  Patient on warfarin w/ goal IRN 2-2.5  -Held warfarin in setting of acute blood loss anemia and got FFP. -Cardiology consulted to held determine timing of resuming anticoagulation.   -After discussion with GI and EGD intervention, he was restarted on heparin drip after no further bleeding after procedure.    -As per patient he was instructed by his cardiologist to take aspirin only once weekly, but he stopped taking it altogether.   -Will start warfarin today     CAD s/p CABG  -Cont atorvastatin 10 mg     Hx Prostate Cancer  -Treated w/ brachytherapy     Hx Gastroesophageal carcinoma  Diagnosed in 2009. Underwent neoadjuvant combined modality chemo radiotherapy followed by distal esophagectomy and proximal gastrectomy at [facility]. Patient has difficulty absorbing iron. No evidence of recurrence.   -No evidence of recurrence     LIOR (resolved)  Creatinine improved status post IV fluids. Code Status: Full Code  FEN: ADULT DIET; Dysphagia - Soft and Bite Sized  PPX: Heparin drip, Protonix, SCDs  DISPO: GMF, discharge pending GI/cardiology Janet Peters MD, PGY-1  04/24/22  6:40 AM    This patient has been staffed and discussed with Dr. Mo Capellan    Addendum to Resident H& P/Progress note:  I have personally seen,examined and evaluated the patient.  I have reviewed the current history, physical findings, labs and assessment and plan and agree with note as documented by resident MD ( Orly Carr)  IV heparin drip will be switched to enoxaparin 50 mg subcutaneously every 12 hours  We will continue to monitor INR.   Hopefully, will be ready for discharge home tomorrow    Nona Proctor MD, [facility]

## 2022-04-25 VITALS
RESPIRATION RATE: 18 BRPM | WEIGHT: 100 LBS | TEMPERATURE: 97.2 F | HEART RATE: 77 BPM | HEIGHT: 63 IN | SYSTOLIC BLOOD PRESSURE: 136 MMHG | DIASTOLIC BLOOD PRESSURE: 82 MMHG | OXYGEN SATURATION: 95 % | BODY MASS INDEX: 17.72 KG/M2

## 2022-04-25 LAB
ALBUMIN SERPL-MCNC: 3.2 G/DL (ref 3.4–5)
ANION GAP SERPL CALCULATED.3IONS-SCNC: 8 MMOL/L (ref 3–16)
ANTI-XA UNFRAC HEPARIN: 0.11 IU/ML (ref 0.3–0.7)
BASOPHILS ABSOLUTE: 0 K/UL (ref 0–0.2)
BASOPHILS RELATIVE PERCENT: 1.1 %
BUN BLDV-MCNC: 29 MG/DL (ref 7–20)
CALCIUM SERPL-MCNC: 8 MG/DL (ref 8.3–10.6)
CHLORIDE BLD-SCNC: 106 MMOL/L (ref 99–110)
CO2: 25 MMOL/L (ref 21–32)
CREAT SERPL-MCNC: 1.3 MG/DL (ref 0.8–1.3)
EOSINOPHILS ABSOLUTE: 0.1 K/UL (ref 0–0.6)
EOSINOPHILS RELATIVE PERCENT: 2.3 %
GFR AFRICAN AMERICAN: >60
GFR NON-AFRICAN AMERICAN: 52
GLUCOSE BLD-MCNC: 145 MG/DL (ref 70–99)
HCT VFR BLD CALC: 29.4 % (ref 40.5–52.5)
HEMOGLOBIN: 9.4 G/DL (ref 13.5–17.5)
INR BLD: 1.45 (ref 0.88–1.12)
LYMPHOCYTES ABSOLUTE: 1 K/UL (ref 1–5.1)
LYMPHOCYTES RELATIVE PERCENT: 23.5 %
MAGNESIUM: 2.1 MG/DL (ref 1.8–2.4)
MCH RBC QN AUTO: 28.8 PG (ref 26–34)
MCHC RBC AUTO-ENTMCNC: 32.1 G/DL (ref 31–36)
MCV RBC AUTO: 89.6 FL (ref 80–100)
MONOCYTES ABSOLUTE: 0.3 K/UL (ref 0–1.3)
MONOCYTES RELATIVE PERCENT: 7.7 %
NEUTROPHILS ABSOLUTE: 2.9 K/UL (ref 1.7–7.7)
NEUTROPHILS RELATIVE PERCENT: 65.4 %
PDW BLD-RTO: 19.1 % (ref 12.4–15.4)
PHOSPHORUS: 3 MG/DL (ref 2.5–4.9)
PLATELET # BLD: 115 K/UL (ref 135–450)
PMV BLD AUTO: 9.7 FL (ref 5–10.5)
POTASSIUM SERPL-SCNC: 4.2 MMOL/L (ref 3.5–5.1)
PROTHROMBIN TIME: 16.7 SEC (ref 9.9–12.7)
RBC # BLD: 3.28 M/UL (ref 4.2–5.9)
SODIUM BLD-SCNC: 139 MMOL/L (ref 136–145)
WBC # BLD: 4.4 K/UL (ref 4–11)

## 2022-04-25 PROCEDURE — 97165 OT EVAL LOW COMPLEX 30 MIN: CPT

## 2022-04-25 PROCEDURE — 36415 COLL VENOUS BLD VENIPUNCTURE: CPT

## 2022-04-25 PROCEDURE — 6360000002 HC RX W HCPCS: Performed by: STUDENT IN AN ORGANIZED HEALTH CARE EDUCATION/TRAINING PROGRAM

## 2022-04-25 PROCEDURE — 97530 THERAPEUTIC ACTIVITIES: CPT

## 2022-04-25 PROCEDURE — 99239 HOSP IP/OBS DSCHRG MGMT >30: CPT | Performed by: HOSPITALIST

## 2022-04-25 PROCEDURE — C9113 INJ PANTOPRAZOLE SODIUM, VIA: HCPCS | Performed by: INTERNAL MEDICINE

## 2022-04-25 PROCEDURE — 83735 ASSAY OF MAGNESIUM: CPT

## 2022-04-25 PROCEDURE — 97116 GAIT TRAINING THERAPY: CPT

## 2022-04-25 PROCEDURE — 6370000000 HC RX 637 (ALT 250 FOR IP): Performed by: STUDENT IN AN ORGANIZED HEALTH CARE EDUCATION/TRAINING PROGRAM

## 2022-04-25 PROCEDURE — 99233 SBSQ HOSP IP/OBS HIGH 50: CPT | Performed by: INTERNAL MEDICINE

## 2022-04-25 PROCEDURE — 85610 PROTHROMBIN TIME: CPT

## 2022-04-25 PROCEDURE — 97535 SELF CARE MNGMENT TRAINING: CPT

## 2022-04-25 PROCEDURE — 6360000002 HC RX W HCPCS: Performed by: INTERNAL MEDICINE

## 2022-04-25 PROCEDURE — 85025 COMPLETE CBC W/AUTO DIFF WBC: CPT

## 2022-04-25 PROCEDURE — 97161 PT EVAL LOW COMPLEX 20 MIN: CPT

## 2022-04-25 PROCEDURE — 2580000003 HC RX 258: Performed by: STUDENT IN AN ORGANIZED HEALTH CARE EDUCATION/TRAINING PROGRAM

## 2022-04-25 PROCEDURE — 80069 RENAL FUNCTION PANEL: CPT

## 2022-04-25 PROCEDURE — 85520 HEPARIN ASSAY: CPT

## 2022-04-25 RX ORDER — PANTOPRAZOLE SODIUM 40 MG/1
40 TABLET, DELAYED RELEASE ORAL
Qty: 120 TABLET | Refills: 0 | Status: SHIPPED | OUTPATIENT
Start: 2022-04-25 | End: 2022-05-12

## 2022-04-25 RX ORDER — PANTOPRAZOLE SODIUM 40 MG/1
40 TABLET, DELAYED RELEASE ORAL
Qty: 60 TABLET | Refills: 0 | Status: SHIPPED | OUTPATIENT
Start: 2022-04-25 | End: 2022-04-25 | Stop reason: SDUPTHER

## 2022-04-25 RX ORDER — PANTOPRAZOLE SODIUM 40 MG/1
40 TABLET, DELAYED RELEASE ORAL
Qty: 120 TABLET | Refills: 0 | Status: SHIPPED | OUTPATIENT
Start: 2022-04-25 | End: 2022-04-25 | Stop reason: SDUPTHER

## 2022-04-25 RX ADMIN — Medication 1000 UNITS: at 08:39

## 2022-04-25 RX ADMIN — SODIUM CHLORIDE, PRESERVATIVE FREE 10 ML: 5 INJECTION INTRAVENOUS at 08:40

## 2022-04-25 RX ADMIN — DOCUSATE SODIUM 100 MG: 100 CAPSULE, LIQUID FILLED ORAL at 08:39

## 2022-04-25 RX ADMIN — ENOXAPARIN SODIUM 50 MG: 60 INJECTION SUBCUTANEOUS at 08:38

## 2022-04-25 RX ADMIN — POLYETHYLENE GLYCOL 3350 17 G: 17 POWDER, FOR SOLUTION ORAL at 08:38

## 2022-04-25 RX ADMIN — PANTOPRAZOLE SODIUM 40 MG: 40 INJECTION, POWDER, FOR SOLUTION INTRAVENOUS at 08:39

## 2022-04-25 ASSESSMENT — PAIN SCALES - GENERAL: PAINLEVEL_OUTOF10: 0

## 2022-04-25 NOTE — PLAN OF CARE
Problem: Safety - Adult  Goal: Free from fall injury  Outcome: Progressing  Note: Pt at risk for falls r/t weakness  Pt placed in fall risk precautions, bed alarm In place, bed in lowest locked position, call light in reach. RN encouraged pt to stay in bed and use call light to express needs.

## 2022-04-25 NOTE — PROGRESS NOTES
Pt discharged home to private residence with wife. IV removed with no complications. D/c instructions gone over with pt and spouse. New medication and side effects explained. Pt verbalized understanding with no further questions. Left floor via wheelchair with staff and all personal belongings.

## 2022-04-25 NOTE — CARE COORDINATION
Case Management Assessment            Discharge Note                    Date / Time of Note: 4/25/2022 11:07 AM                  Discharge Note Completed by: AFTAB Andujar, LSW    Patient Name: Vu Villalobos MD   YOB: 1935  Diagnosis: GI bleed [K92.2]  Gastrointestinal hemorrhage with melena [K92.1]  Anemia, unspecified type [D64.9]   Date / Time: 4/21/2022 10:52 AM    Current PCP: Ellen Paul MD  Clinic patient: No    Hospitalization in the last 30 days: Yes    Advance Directives:  Code Status: Full Code  [place] DNR form completed and on chart: Yes    Financial:  Payor: [employer] [place] / Plan: Khadijah Silva PPO / Product Type: Medicare /      Pharmacy:    [facility] [id number] - [address] [facility]  [address] [facility] [id number]  Phone: [phone] Fax: [address] [place] [address], [address] [facility] [phone] [facility] [phone]  [address] [place] [address]  Phone: [phone] Fax: [phone]      Assistance purchasing medications?: Potential Assistance Purchasing Medications: No  Assistance provided by Case Management: None at this time    Does patient want to participate in local refill/ meds to beds program?: Yes    Meds To Beds General Rules:  1. Can ONLY be done Monday- Friday between 8:30am-5pm  2. Prescription(s) must be in pharmacy by 3pm to be filled same day  3. Copy of patient's insurance/ prescription drug card and patient face sheet must be sent along with the prescription(s)  4. Cost of Rx cannot be added to hospital bill. If financial assistance is needed, please contact unit  or ;  or  CANNOT provide pharmacy voucher for patients co-pays  5.  Patients can then  the prescription on their way out of the hospital at discharge, or pharmacy can deliver to the bedside if staff is available. (payment due at time of pick-up or delivery - cash, check, or card accepted)     Able to afford home medications/ co-pay costs: Yes    ADLS:  Current PT AM-PAC Score:   /24  Current OT AM-PAC Score:   /24      DISCHARGE Disposition: Home- No Services Needed    LOC at discharge: Not Applicable  DIALLO Completed: Not Indicated    Notification completed in HENS/PAS?:  Not Applicable    IMM Completed:   Not Indicated    Transportation:  Transportation PLAN for discharge: family   Mode of Transport: Private Car    Home Care:  Home Care ordered at discharge: No    Durable Medical Equipment:  Equipment obtained during hospitalization: NA    Home Oxygen and Respiratory Equipment:  Oxygen needed at discharge?: No    Dialysis:  Dialysis patient: No      Referrals made at [facility] for outpatient continued care:  Not Applicable    Additional CM Notes:  Pt will DC home today, wife will transport. No current DME or HHC needs. No other needs at this time. The Plan for Transition of Care is related to the following treatment goals of GI bleed [K92.2]  Gastrointestinal hemorrhage with melena [K92.1]  Anemia, unspecified type [D64.9]    The Patient and/or patient representative Sakshi Flores and his family were provided with a choice of provider and agrees with the discharge plan Yes    Freedom of choice list was provided with basic dialogue that supports the patient's individualized plan of care/goals and shares the quality data associated with the providers.  Yes    Care Transitions patient: Yes    AFTAB Salgado, [facility]  Case Management Department  Ph: [phone]  Fax: [phone]

## 2022-04-25 NOTE — PROGRESS NOTES
Comprehensive Nutrition Assessment    RECOMMENDATIONS:  1. PO Diet: Continue current- Dysphagia- soft and bite sized diet   2. ONS: Start- Boost Pudding TID for pt to try     NUTRITION ASSESSMENT:   Nutritional summary & status: Pt follow up. Noted pt to D/C home this date. Pt reports appetitie is good and that he eats three small meals per day at home. Pt is in esophageal cancer remission. Pt also stated he had bariatric surgery in January of 2010 and that is what caused his wt loss. Pt states UBW is 102 lbs. NFPE findings indicate pt meets criteria for severe malnutrition. RD encouraged pt to incorporate more calories and protien into his diet. Pt reports he drinks about 1/3 of a boost at home each day and a carnations breakfast essential every morning. RD to send Boost pudding for pt to try. RD will continue to monitor pt through admission. Admission/PMH:  Admission: GI bleed; PMH:  CABGx2, mech aortic valve replacement on warfarin, esophageal cancer s/p surgery/chemotherapy, prostate cancer s/p brachytherapy    MALNUTRITION ASSESSMENT  Context of Malnutrition: Acute Illness   Malnutrition Status: Severe malnutrition  Findings of the 6 clinical characteristics of malnutrition (Minimum of 2 out of 6 clinical characteristics is required to make the diagnosis of moderate or severe Protein Calorie Malnutrition based on AND/ASPEN Guidelines):  Weight Loss %: 10% loss or greater    Weight loss Time: Greater than or equal to 1 month    Body Fat Loss: Severe Loss   Body Fat Location: Triceps and Buccal region   Body Muscle Loss: Severe Loss   Body Muscle Loss Location: Calf, Clavicles , Shoulders, Temples  and Thigh     NUTRITION DIAGNOSIS   Severe malnutrition related to catabolic illness,acute injury/trauma,altered GI structure,swallowing difficulty as evidenced by BMI,severe loss of subcutaneous fat,severe muscle loss,GI abnormality    NUTRITION INTERVENTION  Food and/or Nutrient Delivery:  Continue Current Diet  Nutrition Education/Counseling:  No recommendation at this time   Goals:  Pt will consume 75% of meals and ONS through admission. Nutrition Monitoring and Evaluation:   Food/Nutrient Intake Outcomes:  Food and Nutrient Intake  Physical Signs/Symptoms Outcomes:  Biochemical Data,Chewing or Swallowing,Nutrition Focused Physical Findings,Weight     OBJECTIVE DATA: Significant to nutrition assessment  · Nutrition-Focused Physical Findings: lbm: 4/24  · Labs: Reviewed   · Meds: Reviewed; Vit D  · Wounds: None       CURRENT NUTRITION THERAPIES  ADULT DIET; Dysphagia - Soft and Bite Sized     PO Intake: %   PO Supplement Intake:None Ordered  Additional Sources of Calories/IVF:n/a     ANTHROPOMETRICS  Current Height: 5' 3\" (160 cm)  Current Weight: 100 lb (45.4 kg)    Admission weight: 100 lb (45.4 kg)  Ideal Body Weight (IBW): 124 lbs  (56 kg)    Usual Bodyweight 102 lb (46.3 kg) (per pt )   Weight Changes: 8 lb wt loss or 10% in 2 months. BMI: 17.8    Wt Readings from Last 50 Encounters:   04/21/22 100 lb (45.4 kg)   03/28/22 105 lb (47.6 kg)   02/02/22 108 lb (49 kg)   11/03/21 105 lb 12.8 oz (48 kg)   11/01/21 105 lb 9.6 oz (47.9 kg)   07/29/21 102 lb (46.3 kg)   06/29/21 106 lb 4.8 oz (48.2 kg)   06/29/21 106 lb (48.1 kg)   06/20/21 111 lb 1.8 oz (50.4 kg)   05/26/21 106 lb (48.1 kg)   02/26/21 126 lb (57.2 kg) ? Accuracy    02/18/21 111 lb (50.3 kg)   12/18/20 110 lb (49.9 kg)       COMPARATIVE STANDARDS  Energy (kcal):  7954-0079 (30-35)     Protein (g):  70-80 (1.5-1.8)       Fluid (ml/day):  1500 ml minimum     The patient will still be monitored per nutrition standards of care. Consult dietitian if nutrition interventions essential to patient care is needed.      Fredis Narayan  Office:  [phone]

## 2022-04-25 NOTE — PROGRESS NOTES
Progress Note    Admit Date: 4/21/2022  Day: 4  Diet: ADULT DIET; Dysphagia - Soft and Bite Sized    CC: weakness, dark stools    Interval history:   Patient seen and examined at bedside. No acute events overnight. Patient afebrile and hemodynamically stable. Patient had one brown stool yesterday. Patient denies chest pain, SOB, abdominal pain, fevers, chills, nausea, and vomiting. Medications:     Scheduled Meds:   enoxaparin  1 mg/kg SubCUTAneous BID    warfarin placeholder: dosing by pharmacy   Other RX Placeholder    polyethylene glycol  17 g Oral Daily    docusate sodium  100 mg Oral Daily    pantoprazole  40 mg IntraVENous BID    atorvastatin  10 mg Oral Daily    Vitamin D  1,000 Units Oral Daily    sodium chloride flush  5-40 mL IntraVENous 2 times per day     Continuous Infusions:   sodium chloride      dextrose      sodium chloride       PRN Meds:perflutren lipid microspheres, sodium chloride flush, sodium chloride, ondansetron **OR** ondansetron, acetaminophen **OR** acetaminophen, glucose, dextrose bolus (hypoglycemia), glucagon (rDNA), dextrose, sodium chloride    Objective:   Vitals:   T-max:  Patient Vitals for the past 8 hrs:   BP Temp Temp src Pulse Resp SpO2   04/25/22 0500 (!) 150/84 98.1 °F (36.7 °C) Oral 66 16 96 %   04/24/22 2352 (!) 153/86 98.1 °F (36.7 °C) Oral 71 16 94 %       Intake/Output Summary (Last 24 hours) at 4/25/2022 0610  Last data filed at 4/25/2022 0000  Gross per 24 hour   Intake 720 ml   Output 700 ml   Net 20 ml     ROS: Pertinent ROS in Interval HX    Physical Exam  Constitutional: Alert, normal appearance,   HENT: head normocephalic/atraumatic, no congestion or rhinorrhea, PERRLA, EOM intact  Cardio: normal rate, regular rhythm. No murmurs heard on auscultation  Pulmonary: clear to auscultation bilaterally, no wheezes, rales, or stridor  GI: abdomen soft, flat, and non-distended. No tenderness or guarding.    Musculoskeletal: no deformity, tenderness, or injury. No lower extremity edema. Strength 5/5 in bilateral upper and lower extremities  Skin: no lesion, rash, or erythema  Neuro: no focal neurological deficits, alert and oriented X3  Psych: mood normal, behavior normal    LABS:    CBC:   Recent Labs     04/22/22  2311 04/23/22  0952 04/24/22  0305   WBC  --  4.8 4.5   HGB 7.9* 8.3* 9.1*   HCT 24.5* 26.1* 28.1*   PLT  --  107* 105*   MCV  --  89.4 88.7     Renal:    Recent Labs     04/23/22  0952 04/24/22  0305    140   K 4.1 4.2    104   CO2 25 26   BUN 35* 30*   CREATININE 1.1 1.2   GLUCOSE 209* 87   CALCIUM 7.9* 8.3   MG 1.90 2.00   PHOS 3.1 3.4   ANIONGAP 8 10     Hepatic:   Recent Labs     04/23/22  0952 04/24/22  0305   LABALBU 3.0* 3.1*     Troponin: No results for input(s): TROPONINI in the last 72 hours. BNP: No results for input(s): BNP in the last 72 hours. Lipids: No results for input(s): CHOL, HDL in the last 72 hours. Invalid input(s): LDLCALCU, TRIGLYCERIDE  ABGs:  No results for input(s): PHART, MGS8AVY, PO2ART, LXG2IOA, BEART, THGBART, F2LBFQBO, KLZ4XBE in the last 72 hours. INR:   Recent Labs     04/22/22  1355 04/23/22  0952 04/24/22  0330   INR 1.51* 1.68* 1.61*     Lactate: No results for input(s): LACTATE in the last 72 hours. Cultures:  -----------------------------------------------------------------  RAD:   No orders to display       Assessment/Plan:   86M with PMHx CABGx2, mech aortic valve replacement on warfarin, esophageal cancer s/p surgery/chemotherapy, prostate cancer s/p brachytherapy presenting with ~1 week of weakness and dark stools. Acute blood loss anemia 2/2 upper GI bleed  -Has required multiple units of PRBC and FFP.   He was on warfarin prior to coming here for mechanical aortic valve.  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   -As per GI recs, will start on heparin drip again prior to initiation of warfarin.    -Tolerated soft diet well overnight   -Continue Protonix twice daily for 8 more weeks.   -Hemoglobin stable      Aortic Stenosis s/p Mechanical AVR  Patient on warfarin w/ goal IRN 2-2.5  -Held warfarin in setting of acute blood loss anemia and got FFP. -Cardiology consulted to held determine timing of resuming anticoagulation.   -After discussion with GI and EGD intervention, he was restarted on heparin drip after no further bleeding after procedure.    -As per patient he was instructed by his cardiologist to take aspirin only once weekly, but he stopped taking it altogether.   -Cont Lovenox 1 mg/kg BID to bridge to warfarin  -Cont warfarin, on D/c continue w/ 2.5 mg per day and check INR in one week, Goal INR 2-3     CAD s/p CABG  -Cont atorvastatin 10 mg     Hx Prostate Cancer  -Treated w/ brachytherapy     Hx Gastroesophageal carcinoma  Diagnosed in 2009. Underwent neoadjuvant combined modality chemo radiotherapy followed by distal esophagectomy and proximal gastrectomy at the [facility]. Patient has difficulty absorbing iron. No evidence of recurrence.   -No evidence of recurrence     LIOR (resolved)  Creatinine improved status post IV fluids.     Code Status: Full Code  FEN: ADULT DIET; Dysphagia - Soft and Bite Sized  PPX: SQH, Protonix, SCDs  DISPO: GMF, discharge today pending labs    Fausto Galloway MD, PGY-1  04/25/22  6:10 AM    This patient has been staffed and discussed with Dr. Anali Flores    Addendum to Resident H& P/Progress note:  I have personally seen,examined and evaluated the patient.  I have reviewed the current history, physical findings, labs and assessment and plan and agree with note as documented by resident MD ( Pepper Cortez)  Repeat INR was 1.45    Breezy Dutta MD, Chris Chung yes

## 2022-04-25 NOTE — PROGRESS NOTES
[facility]  Cardiology Inpatient Consult Service  Daily Progress Note        Admit Date:  4/21/2022    Referring Physician: Aissatou Lovell MD    Reason for Consultation/Chief Complaint:   Mechanical aortic valve/CAD/GI bleed    Subjective: Interval history:  CALEB  Hb ok  Heparin and warfarin    Medications:   enoxaparin  1 mg/kg SubCUTAneous BID    warfarin placeholder: dosing by pharmacy   Other RX Placeholder    polyethylene glycol  17 g Oral Daily    docusate sodium  100 mg Oral Daily    pantoprazole  40 mg IntraVENous BID    atorvastatin  10 mg Oral Daily    Vitamin D  1,000 Units Oral Daily    sodium chloride flush  5-40 mL IntraVENous 2 times per day       IV drips:   sodium chloride      dextrose      sodium chloride         PRN:  perflutren lipid microspheres, sodium chloride flush, sodium chloride, ondansetron **OR** ondansetron, acetaminophen **OR** acetaminophen, glucose, dextrose bolus (hypoglycemia), glucagon (rDNA), dextrose, sodium chloride      Objective:     Vitals:    04/24/22 2352 04/25/22 0500 04/25/22 0829 04/25/22 0837   BP: (!) 153/86 (!) 150/84 (!) 160/87 136/82   Pulse: 71 66 77    Resp: 16 16 18    Temp: 98.1 °F (36.7 °C) 98.1 °F (36.7 °C) 97.2 °F (36.2 °C)    TempSrc: Oral Oral Oral    SpO2: 94% 96% 95%    Weight:       Height:           Intake/Output Summary (Last 24 hours) at 4/25/2022 1002  Last data filed at 4/25/2022 0000  Gross per 24 hour   Intake 480 ml   Output 300 ml   Net 180 ml     I/O last 3 completed shifts:   In: 720 [P.O.:720]  Out: 700 [Urine:700]  Wt Readings from Last 3 Encounters:   04/21/22 100 lb (45.4 kg)   03/28/22 105 lb (47.6 kg)   02/02/22 108 lb (49 kg)       Admit Wt: Weight: 100 lb (45.4 kg)   Todays Wt: Weight: 100 lb (45.4 kg)    TELEMETRY: Personally interpreted Sinus     Physical Exam:     General:  Awake, alert, NAD  Skin:  Warm and dry  Neck:  -JVP  Chest:  Clear to auscultation, respiration normal  Cardiovascular:  RRR S1S2  Abdomen:  Soft -  Extremities:  - edema      Objective    Labs:   Recent Labs     04/23/22  0952 04/24/22  0305    140   K 4.1 4.2   BUN 35* 30*   CREATININE 1.1 1.2    104   CO2 25 26   GLUCOSE 209* 87   CALCIUM 7.9* 8.3   MG 1.90 2.00     Recent Labs     04/22/22  2311 04/23/22  0952 04/23/22  0952 04/24/22  0305   WBC  --  4.8  --  4.5   HGB 7.9* 8.3*  --  9.1*   HCT 24.5* 26.1*  --  28.1*   PLT  --  107*  --  105*   MCV  --  89.4   < > 88.7    < > = values in this interval not displayed. No results for input(s): CHOLTOT, TRIG, HDL, LDL in the last 72 hours. Invalid input(s): LIPIDCOMM, CHOLHDL, VLDCHOL  Recent Labs     04/22/22  1355 04/23/22  0952 04/24/22  0330   INR 1.51* 1.68* 1.61*     No results for input(s): CKTOTAL, CKMB, CKMBINDEX, TROPONINI in the last 72 hours. No results for input(s): BNP in the last 72 hours. No results for input(s): NTPROBNP in the last 72 hours. No results for input(s): TSH in the last 72 hours. Imaging:   I personally reviewed imaging studies including CXR, Stress test, TTE/REMINGTON. Assessment & Plan:     Mechanical aortic valve/CAD/GI bleed  -Doing okay so far, hemoglobin stable  -Tolerating warfarin.  -Start aspirin when considered appropriate from a GI perspective ( ideally should remain on aspirin and warfarin given mechanical aortic valve. -Continue Lipitor for CAD  -Valve working properly    Thank you for allowing to us to participate in the care of Armando Crawley MD. Please call our service with questions. All questions and concerns were addressed to the patient/family. Alternatives to my treatment were discussed. The note was completed using EMR. Every effort was made to ensure accuracy; however, inadvertent computerized transcription errors may be present.  I have personally reviewed the reports and images of labs, radiological studies, cardiac studies including ECG's and telemetry, current and old medical records. Gustabo Sánchez MD, [facility] [address]    4/25/2022 10:02 AM

## 2022-04-25 NOTE — PLAN OF CARE
Problem: Nutrition Deficit:  Goal: Optimize nutritional status  Outcome: Progressing  Note: Nutrition Problem #1: Severe malnutrition  Intervention: Food and/or Nutrient Delivery: Continue Current Diet

## 2022-04-25 NOTE — PROGRESS NOTES
Occupational Therapy  Facility/Department: [address] [place] [address]  Occupational Therapy Initial Assessment and Treatment  Discharge    Name: Chirag Doty MD  : 1935  MRN: [id number]  Date of Service: 2022    Discharge Recommendations:  Chirag Doty MD scored a 20/24 on the AM-PAC ADL Inpatient form. Current research shows that an AM-PAC score of 18 or greater is typically associated with a discharge to the patient's home setting. OT Equipment Recommendations  Equipment Needed: No       Patient Diagnosis(es): The primary encounter diagnosis was Anemia, unspecified type. A diagnosis of Gastrointestinal hemorrhage with melena was also pertinent to this visit. Past Medical History:  has a past medical history of Anemia, Aortic valve disorders, Aspiration pneumonia ([address] Erectile dysfunction, Esophageal cancer ([address] Hernia, femoral, bilateral, Hyperlipidemia, Osteoporosis, senile, Pancreatitis, Patient underweight, Prostate cancer ([address] and Unspecified essential hypertension. Past Surgical History:  has a past surgical history that includes Coronary artery bypass graft (); Cardiac valve replacement (); eye surgery (& ); Appendectomy; bone graft; gastrectomy; Cholecystectomy; Colonoscopy (2009); Prostate surgery; Tonsillectomy; Esophagus surgery;  Prostate/Transrectal/Vol Collins Brachyth; Colonoscopy (10/05/15); Upper gastrointestinal endoscopy (N/A, 2021); Colonoscopy (N/A, 2021); Colonoscopy (2021); and Upper gastrointestinal endoscopy (N/A, 2022). Assessment   Assessment: Seen for evaluation only - to be discharged home afterwards. Pt functioning at [facility] level for functional transfers/mobility, and ADLs in standing. Plans to return home w/ initial 24 hr S. No DME needs - has needed equipment in place. No further OT needs indicated. Will sign off.   No Skilled OT: No OT goals identified  REQUIRES OT FOLLOW-UP: No  Activity Tolerance  Activity Tolerance: Patient Tolerated treatment well        Plan   Discharge acute OT - evaluation only. Pt discharged. Restrictions  Position Activity Restriction  Other position/activity restrictions: up with assist    Subjective   General  Chart Reviewed: Yes  Additional Pertinent Hx: 82 y.o. M presents to ED w/ Anemia and Dark Stools. Hospital Course: EGD 4/22 which revealed Dieulafoy lesion with active bleeding in the new esophagus status post epinephrine injection and endoclips. PMH:CABGx2, mech aortic valve replacement on warfarin, esophageal cancer s/p surgery/chemotherapy, prostate cancer s/p brachytherapy  Family / Caregiver Present: Yes (wife)  Referring Practitioner: Dr. Angella Stubbs  Diagnosis: GI Bleed    Subjective  Subjective: In bed on arrival. Addie Flood to be discharged home. Reports he has done minimal activity since admission. Normally, pt does UB/LB exercises. After evaluation, reports he has no concerns for return home.     Pain: denies    Social/Functional History  Social/Functional History  Lives With: Spouse  Type of Home: House  Home Layout: One level (exercise room in basement)  Home Access: Stairs to enter without rails  Entrance Stairs - Number of Steps: 1 small step - pt holds on to doorknob  Bathroom Shower/Tub: Walk-in shower  Bathroom Toilet: Handicap height  Bathroom Equipment: Grab bars in [place] [facility]  Home Equipment: Walker, rolling (standard walker (keeps in basement for exercises))  Has the patient had two or more falls in the past year or any fall with injury in the past year?: No  ADL Assistance: Independent  Homemaking Assistance:  (wife performs mostly; pt able to do some cooking)  Ambulation Assistance: Independent (furniture walking; use of walker prn)  Transfer Assistance: Independent  Active : Yes  Occupation: Retired  Type of Occupation: retired pathologist       Objective   Vision Exceptions: Wears glasses for reading  Hearing: Within functional limits Safety Devices  Type of Devices: Left in chair;Call light within reach; Chair alarm in place;Nurse notified (wife at bedside; setup w/ lunch tray)    Balance  Sitting Balance: Supervision  Standing Balance: Contact guard assistance    Functional Mobility  Functional - Mobility Device: Rolling Walker  Activity: To/from bathroom; Other (mobility in hallway)  Assist Level: Contact guard assistance  Functional Mobility Comments: Note: small, shuffled steps    Toilet Transfers  Toilet - Technique: Ambulating (using wh walker)  Equipment Used: Standard toilet  Toilet Transfer:  (CGA (first trial); SBA (second and third transfers))    AROM: Within functional limits  Strength: Within functional limits     ADL  UE Dressing: Setup; Independent  LE Dressing: Contact guard assistance (pants, socks, shoes)           Bed mobility  Supine to Sit: Stand by assistance (HOB flat)  Scooting: Stand by assistance     Transfers  Sit to stand: Contact guard assistance  Stand to sit: Contact guard assistance        Cognition  Cognition Comment: appears to have a good awareness of his abilities and limitations                     Education Given To: Patient  Education Provided: Role of Therapy;Plan of Care;Transfer Training;ADL Adaptive Strategies  Education Method: Verbal;Demonstration  Barriers to Learning: None  Education Outcome: Verbalized understanding;Demonstrated understanding                     Pt seen by OT for eval and treat.  Treatment included: bed mobility, functional transfer/mobility, ADL, pt education                                                        AM-PAC Score        AM-[facility] Inpatient Daily Activity Raw Score: 20 (04/25/22 1332)  AM-PAC Inpatient ADL T-Scale Score : 42.03 (04/25/22 1332)  ADL Inpatient CMS 0-100% Score: 38.32 (04/25/22 1332)  ADL Inpatient CMS G-Code Modifier : CJ (04/25/22 1332)              Therapy Time   Individual Concurrent Group Co-treatment   Time In 1150         Time Out 1228         Minutes

## 2022-04-25 NOTE — FLOWSHEET NOTE
04/25/22 1215   Encounter Summary   Service Provided For: Patient and family together   Referral/Consult From: [facility]   Support System Spouse   Last Encounter  04/25/22  (es)   Begin Time 1205   End Time  1210   Total Time Calculated 5 min   Assessment/Intervention/Outcome   Assessment Coping; Hopeful   Intervention Active listening;Discussed illness injury and its impact; Explored/Affirmed feelings, thoughts, concerns;Prayer (assurance of)/[place]   Outcome Connection/Belonging;Engaged in conversation

## 2022-04-26 ENCOUNTER — CARE COORDINATION (OUTPATIENT)
Dept: CASE MANAGEMENT | Age: 87
End: 2022-04-26

## 2022-04-26 DIAGNOSIS — D62 ACUTE BLOOD LOSS ANEMIA: Primary | ICD-10-CM

## 2022-04-26 DIAGNOSIS — Z95.2 HISTORY OF MECHANICAL AORTIC VALVE REPLACEMENT: ICD-10-CM

## 2022-04-26 DIAGNOSIS — Z85.46 HISTORY OF PROSTATE CANCER: ICD-10-CM

## 2022-04-26 DIAGNOSIS — Z85.01 HISTORY OF ESOPHAGEAL CANCER: ICD-10-CM

## 2022-04-26 DIAGNOSIS — K92.2 GASTROINTESTINAL HEMORRHAGE, UNSPECIFIED GASTROINTESTINAL HEMORRHAGE TYPE: ICD-10-CM

## 2022-04-26 PROCEDURE — 1111F DSCHRG MED/CURRENT MED MERGE: CPT | Performed by: INTERNAL MEDICINE

## 2022-04-26 NOTE — CARE COORDINATION
[facility] Trans[facility] Initial Follow Up Call    Call within 2 business days of discharge: Yes    Patient: Juana Dong MD Patient : 1935   MRN: [id number]  Reason for Admission: Anemia  Discharge Date: 22 RARS: Readmission Risk Score: 21.3 ( )      Last Discharge [facility]       Complaint Diagnosis Description Type Department Provider    22 Abnormal Lab Anemia, unspecified type . .. ED to Hosp-Admission (Discharged) (ADMITTED) [address] MD Cecilio; Justin Causey MD           Spoke with: Nikhil Simeonrui  Patient reports he is getting better. Appetite and fluid intake is good. No problems with elimination of bladder or bowel. Patient denies abdominal pain, cp, sob, weakness, fatigue, fever, chills, dizziness, lightheadedness or nvd. He no hhc ordered. Medication reconciliation and 1111f completed. Patient has follow up scheduled. No questions, needs or concerns voiced. Will continue to follow. Transitions of Care Initial Call    Was this an external facility discharge? No Discharge Facility: [facility]    Challenges to be reviewed by the provider   Additional needs identified to be addressed with provider: No  none             Method of communication with provider : none      Advance Care Planning:   Does patient have an Advance Directive: not on file    Was this a readmission? Yes  Patient stated reason for admission: anemia , weak and sob  Patients top risk factors for readmission: ineffective coping  medical condition-anemia, asthma    Care Transition Nurse (CTN) contacted the patient by telephone to perform post hospital discharge assessment. Verified name and  with patient as identifiers. Provided introduction to self, and explanation of the CTN role. CTN reviewed discharge instructions, medical action plan and red flags with patient who verbalized understanding.  Patient given an opportunity to ask questions and does not have any further questions or concerns at this time. Were discharge instructions available to patient? Yes. Reviewed appropriate site of care based on symptoms and resources available to patient including: PCP  Specialist  Home health. The patient agrees to contact the PCP office for questions related to their healthcare. Medication reconciliation was performed with patient, who verbalizes understanding of administration of home medications. Advised obtaining a 90-day supply of all daily and as-needed medications. Covid Risk Education     Educated patient about risk for severe COVID-19 due to risk factors according to CDC guidelines. LPN CC reviewed discharge instructions, medical action plan and red flag symptoms with the patient who verbalized understanding. Discussed COVID vaccination status: Yes. Education provided on COVID-19 vaccination as appropriate. Discussed exposure protocols and quarantine with CDC Guidelines. Patient was given an opportunity to verbalize any questions and concerns and agrees to contact LPN CC or health care provider for questions related to their healthcare. Reviewed and educated patient on any new and changed medications related to discharge diagnosis. Was patient discharged with a pulse oximeter? No Discussed and confirmed pulse oximeter discharge instructions and when to notify provider or seek emergency care. LPN CC provided contact information. Plan for follow-up call in 5-7 days based on severity of symptoms and risk factors.   Plan for next call: symptom management-weakness, sob          Non-face-to-face services provided:  Obtained and reviewed discharge summary and/or continuity of care documents    Care Transitions 24 Hour Call    Do you have all of your prescriptions and are they filled?: Yes  Post Acute Services: Home Health (Comment: [place])  Care Transitions Interventions         Follow Up  Future Appointments   Date Time Provider Miladis Mchugh   5/2/2022  2:00 PM Ronel Mora MD [facility] [address], LPN

## 2022-05-02 ENCOUNTER — OFFICE VISIT (OUTPATIENT)
Dept: INTERNAL MEDICINE CLINIC | Age: 87
End: 2022-05-02
Payer: MEDICARE

## 2022-05-02 VITALS — WEIGHT: 106 LBS | DIASTOLIC BLOOD PRESSURE: 80 MMHG | SYSTOLIC BLOOD PRESSURE: 130 MMHG | BODY MASS INDEX: 18.78 KG/M2

## 2022-05-02 DIAGNOSIS — C15.9 MALIGNANT NEOPLASM OF ESOPHAGUS, UNSPECIFIED LOCATION (HCC): ICD-10-CM

## 2022-05-02 DIAGNOSIS — I25.708 ATHEROSCLEROSIS OF CORONARY ARTERY BYPASS GRAFT(S), UNSPECIFIED, WITH OTHER FORMS OF ANGINA PECTORIS (HCC): ICD-10-CM

## 2022-05-02 DIAGNOSIS — E46 MALNUTRITION, UNSPECIFIED TYPE (HCC): ICD-10-CM

## 2022-05-02 DIAGNOSIS — K92.0 GASTROINTESTINAL HEMORRHAGE WITH HEMATEMESIS: ICD-10-CM

## 2022-05-02 DIAGNOSIS — D69.6 THROMBOCYTOPENIA, UNSPECIFIED (HCC): ICD-10-CM

## 2022-05-02 DIAGNOSIS — K92.0 GASTROINTESTINAL HEMORRHAGE WITH HEMATEMESIS: Primary | ICD-10-CM

## 2022-05-02 LAB
A/G RATIO: 1.2 (ref 1.1–2.2)
ALBUMIN SERPL-MCNC: 3.9 G/DL (ref 3.4–5)
ALP BLD-CCNC: 64 U/L (ref 40–129)
ALT SERPL-CCNC: 16 U/L (ref 10–40)
ANION GAP SERPL CALCULATED.3IONS-SCNC: 13 MMOL/L (ref 3–16)
AST SERPL-CCNC: 21 U/L (ref 15–37)
BASOPHILS ABSOLUTE: 0 K/UL (ref 0–0.2)
BASOPHILS RELATIVE PERCENT: 0.9 %
BILIRUB SERPL-MCNC: 0.4 MG/DL (ref 0–1)
BUN BLDV-MCNC: 38 MG/DL (ref 7–20)
CALCIUM SERPL-MCNC: 9.4 MG/DL (ref 8.3–10.6)
CHLORIDE BLD-SCNC: 100 MMOL/L (ref 99–110)
CO2: 28 MMOL/L (ref 21–32)
CREAT SERPL-MCNC: 1.3 MG/DL (ref 0.8–1.3)
EOSINOPHILS ABSOLUTE: 0.1 K/UL (ref 0–0.6)
EOSINOPHILS RELATIVE PERCENT: 1.8 %
GFR AFRICAN AMERICAN: >60
GFR NON-AFRICAN AMERICAN: 52
GLUCOSE BLD-MCNC: 101 MG/DL (ref 70–99)
HCT VFR BLD CALC: 32 % (ref 40.5–52.5)
HEMOGLOBIN: 10.3 G/DL (ref 13.5–17.5)
IRON SATURATION: 12 % (ref 20–50)
IRON: 37 UG/DL (ref 59–158)
LYMPHOCYTES ABSOLUTE: 1.2 K/UL (ref 1–5.1)
LYMPHOCYTES RELATIVE PERCENT: 24.6 %
MCH RBC QN AUTO: 28.8 PG (ref 26–34)
MCHC RBC AUTO-ENTMCNC: 32.3 G/DL (ref 31–36)
MCV RBC AUTO: 89.2 FL (ref 80–100)
MONOCYTES ABSOLUTE: 0.6 K/UL (ref 0–1.3)
MONOCYTES RELATIVE PERCENT: 11.5 %
NEUTROPHILS ABSOLUTE: 3 K/UL (ref 1.7–7.7)
NEUTROPHILS RELATIVE PERCENT: 61.2 %
PDW BLD-RTO: 18 % (ref 12.4–15.4)
PLATELET # BLD: 127 K/UL (ref 135–450)
PMV BLD AUTO: 9.8 FL (ref 5–10.5)
POTASSIUM SERPL-SCNC: 4.6 MMOL/L (ref 3.5–5.1)
RBC # BLD: 3.58 M/UL (ref 4.2–5.9)
SODIUM BLD-SCNC: 141 MMOL/L (ref 136–145)
TOTAL IRON BINDING CAPACITY: 321 UG/DL (ref 260–445)
TOTAL PROTEIN: 7.1 G/DL (ref 6.4–8.2)
WBC # BLD: 4.9 K/UL (ref 4–11)

## 2022-05-02 PROCEDURE — 99214 OFFICE O/P EST MOD 30 MIN: CPT | Performed by: INTERNAL MEDICINE

## 2022-05-02 RX ORDER — OMEPRAZOLE 40 MG/1
CAPSULE, DELAYED RELEASE ORAL
Qty: 90 CAPSULE | Refills: 3 | Status: SHIPPED | OUTPATIENT
Start: 2022-05-02

## 2022-05-02 NOTE — PROGRESS NOTES
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 who presents for follow-up upper GI bleed    HPI: Patient presented with follow-up in the GI bleed he is doing fine and getting his energy back there is no active bleeding currently according the patient    Review of Systems:   Constitutional:  Denies fever or chills   Eyes:  Denies change in visual acuity   HENT:  Denies nasal congestion or sore throat   Respiratory:  Denies cough or shortness of breath   Cardiovascular:  Denies chest pain or edema   GI:  Denies abdominal pain, nausea, vomiting, bloody stools or diarrhea   :  Denies dysuria   Musculoskeletal:  Denies back pain or joint pain   Integument:  Denies rash   Neurologic:  Denies headache, focal weakness or sensory changes   Endocrine:  Denies polyuria or polydipsia   Lymphatic:  Denies swollen glands   Psychiatric:  Denies depression or anxiety     Past Medical History:        Diagnosis Date    Anemia     Aortic valve disorders     stenosis    Aspiration pneumonia ([address] 4/27/2015    Erectile dysfunction     Esophageal cancer ([facility] 10/18/2012    Hernia, femoral, bilateral 5/12/2014    Hyperlipidemia     Osteoporosis, senile 5/20/2014    Pancreatitis 8/1/2013    Patient underweight 8/8/2013    Prostate cancer ([facility]     Unspecified essential hypertension        Past Surgical History:        Procedure Laterality Date    APPENDECTOMY      as a child    BONE GRAFT      for saddle nose    CARDIAC VALVE REPLACEMENT  2006    aorta    CHOLECYSTECTOMY      COLONOSCOPY  11/2009    COLONOSCOPY  10/05/15    COLONOSCOPY N/A 2/17/2021    COLONOSCOPY DIAGNOSTIC/STOMA performed by César Valladares MD at [address] [place] [address]  2/18/2021    COLONOSCOPY POLYPECTOMY SNARE/COLD BIOPSY performed by César Valladares MD at [place] [address]  2006    ESOPHAGUS SURGERY      EYE SURGERY  1990& 1992    cataract bilat removal     GASTRECTOMY      PROSTATE SURGERY seeds    TONSILLECTOMY      UPPER GASTROINTESTINAL ENDOSCOPY N/A 2/16/2021    EGD BIOPSY performed by Sammy Donahue MD at [address] [facility] N/A 4/22/2022    EGD CONTROL HEMORRHAGE performed by Corey Courteny MD at [address] PROSTATE/TRANSRECTAL VOL STUDY BRACHYTHERAPY         Family History:  Family History   Problem Relation Age of Onset    Other Mother         bleeding peptic ulcer    Heart Disease Mother     Other Father         cardiovascular disease    Stroke Father     Elevated Lipids Father     Hypertension Father        Social History:  Social History     Socioeconomic History    Marital status:      Spouse name: None    Number of children: None    Years of education: None    Highest education level: None   Occupational History    None   Tobacco Use    Smoking status: Never Smoker    Smokeless tobacco: Never Used   Vaping Use    Vaping Use: Never used   Substance and Sexual Activity    Alcohol use: Yes     Alcohol/week: 0.0 standard drinks     Comment: occassionally    Drug use: No    Sexual activity: None   Other Topics Concern    None   Social History Narrative    None     Social Determinants of Health     Financial Resource Strain: Low Risk     Difficulty of Paying Living Expenses: Not hard at all   Food Insecurity: No Food Insecurity    Worried About Running Out of Food in the Last Year: Never true    Sridhar of Food in the Last Year: Never true   Transportation Needs:     Lack of Transportation (Medical): Not on file    Lack of Transportation (Non-Medical):  Not on file   Physical Activity:     Days of Exercise per Week: Not on file    Minutes of Exercise per Session: Not on file   Stress:     Feeling of Stress : Not on file   Social Connections:     Frequency of Communication with Friends and Family: Not on file    Frequency of Social Gatherings with Friends and Family: Not on file    Attends Worship Services: Not on file    Active Member of Clubs or Organizations: Not on file    Attends Club or Organization Meetings: Not on file    Marital Status: Not on file   Intimate Partner Violence:     Fear of Current or Ex-Partner: Not on file    Emotionally Abused: Not on file    Physically Abused: Not on file    Sexually Abused: Not on file   Housing Stability:     Unable to Pay for Housing in the Last Year: Not on file    Number of Jilauramoadriane in the Last Year: Not on file    Unstable Housing in the Last Year: Not on file         Allergies:  No known allergies    Current Medications:    Prior to Admission medications    Medication Sig Start Date End Date Taking?  Authorizing Provider   omeprazole (PRILOSEC) 40 MG delayed release capsule 1 po bid for 1 week then 1 po daily 5/2/22  Yes Rodriguez Ibarra MD   pantoprazole (PROTONIX) 40 MG tablet Take 1 tablet by mouth 2 times daily (before meals) 4/25/22 6/24/22 Yes Suresh Corley MD   vitamin D (CHOLECALCIFEROL) 25 MCG (1000 UT) TABS tablet Take 1,000 Units by mouth daily   Yes Historical Provider, MD   polyethylene glycol (MIRALAX) 17 g PACK packet Take 17 g by mouth daily   Yes Historical Provider, MD   ferrous sulfate (IRON 325) 325 (65 Fe) MG tablet Take 325 mg by mouth every other day   Yes Historical Provider, MD   vitamin E 1000 units capsule Take 1,000 Units by mouth once a week    Yes Historical Provider, MD   warfarin (COUMADIN) 5 MG tablet Take 0.5-1 tablets by mouth daily Performs test at home prior to dose 4/20/22  Yes Rodriguez Ibarra MD   warfarin (COUMADIN) 2.5 MG tablet Take 2.5 mg by mouth daily Pt tests INR at home and self adjusts every Friday (GOAL 2-2.5)   Yes Historical Provider, MD   simethicone (MYLICON) 80 MG chewable tablet Take 80 mg by mouth three times daily    Yes Historical Provider, MD   magnesium oxide (MAG-OX) 400 MG tablet Take 400 mg by mouth daily    Yes Historical Provider, MD   Omega-3 Fatty Acids (FISH OIL) 1000 MG CAPS Take 1,000 mg by mouth daily    Yes Historical Provider, MD   calcium carbonate (TUMS) 500 MG chewable tablet Take 3 tablets by mouth 2 times daily   Yes Historical Provider, MD   atorvastatin (LIPITOR) 10 MG tablet TAKE ONE TABLET BY MOUTH DAILY 1/3/22  Yes Edwin Brewer MD   Alpha Lipoic Acid-Biotin 300-333 MG-MCG CAPS 600 mg 2 times daily    Yes Historical Provider, MD   Coenzyme Q10 (COQ10) 400 MG CAPS Take 1 capsule by mouth   Yes Historical Provider, MD   vitamin B-12 (CYANOCOBALAMIN) 1000 MCG tablet Take 1,000 mcg by mouth daily. Yes Historical Provider, MD       Physical Exam:  Vital Signs: /80   Wt 106 lb (48.1 kg)   BMI 18.78 kg/m²   General: Patient appears  non-toxic  HENT: Atraumatic, normocephalic, oral mucosa moist  Lungs:  Clear bilaterally  Heart: Regular rate and rhythm  Abdomen: Non-distended, soft, non-tender  Extremities: No edema  Neuro: Nonfocal    Medical Decision Making and Plan:  Pertinent Labs & Imaging studies reviewed. (See chart for details)  Studies are pending    1. Gastrointestinal hemorrhage with hematemesis  This problem is stable continue Prilosec 40 twice daily x1 week then 40 daily  - CBC with Auto Differential; Future  - Comprehensive Metabolic Panel; Future    2. Malnutrition, unspecified type ([address]  This problem is stable    3. Malignant neoplasm of esophagus, unspecified location ([address]  No evidence of recurrence    4. Thrombocytopenia, unspecified ([address]  This problem is stable check above lab    5.  Atherosclerosis of coronary artery bypass graft(s), unspecified, with other forms of angina pectoris ([address]  This problem is stable check above labs  LIOR now clinically resolved check above labs and follow-up in 1 month

## 2022-05-03 ENCOUNTER — CARE COORDINATION (OUTPATIENT)
Dept: CASE MANAGEMENT | Age: 87
End: 2022-05-03

## 2022-05-03 NOTE — CARE COORDINATION
[facility] 45 Transitions Initial Follow Up Call    5/3/22    Patient:  Gladys Dominguez MD Patient :  1935  MRN:  [id number]    Reason for Admission:    Discharge Date:  22   RARS: 21      CTC attempt to reach Pt regarding recent hospital discharge. CTC left voice recording with call back number requesting a call back. Follow up appointments:    Future Appointments   Date Time Provider [facility]   2022  2:00 PM Erma Villanueva MD [address] ADEBAYO HurleyN, RN  Care Transition Coordinator  Contact Number:  [phone]

## 2022-05-06 ENCOUNTER — CARE COORDINATION (OUTPATIENT)
Dept: CASE MANAGEMENT | Age: 87
End: 2022-05-06

## 2022-05-06 NOTE — CARE COORDINATION
[facility] Transitions Initial Follow Up Call    22    Patient:  Lydia Delarosa MD Patient :  1935  MRN:  [id number]    Reason for Admission:    Discharge Date:  22   RARS:       CTC attempt to reach Pt regarding recent hospital discharge. CTC left voice recording with call back number requesting a call back. Pt seen by PCP since d/c.  Episode closed. Follow up appointments:    Future Appointments   Date Time Provider Miladis Mchugh   2022  2:00 PM Richard Vargas MD [address]  APRIL Sher, RN  Care Transition Coordinator  Contact Number:  [phone]

## 2022-05-19 DIAGNOSIS — D50.9 IRON DEFICIENCY ANEMIA, UNSPECIFIED IRON DEFICIENCY ANEMIA TYPE: ICD-10-CM

## 2022-05-19 DIAGNOSIS — R53.83 FATIGUE, UNSPECIFIED TYPE: ICD-10-CM

## 2022-05-20 LAB
A/G RATIO: 1.3 (ref 1.1–2.2)
ALBUMIN SERPL-MCNC: 3.9 G/DL (ref 3.4–5)
ALP BLD-CCNC: 54 U/L (ref 40–129)
ALT SERPL-CCNC: 13 U/L (ref 10–40)
ANION GAP SERPL CALCULATED.3IONS-SCNC: 13 MMOL/L (ref 3–16)
AST SERPL-CCNC: 21 U/L (ref 15–37)
BILIRUB SERPL-MCNC: 0.4 MG/DL (ref 0–1)
BUN BLDV-MCNC: 36 MG/DL (ref 7–20)
CALCIUM SERPL-MCNC: 8.5 MG/DL (ref 8.3–10.6)
CHLORIDE BLD-SCNC: 101 MMOL/L (ref 99–110)
CO2: 26 MMOL/L (ref 21–32)
CREAT SERPL-MCNC: 1.3 MG/DL (ref 0.8–1.3)
GFR AFRICAN AMERICAN: >60
GFR NON-AFRICAN AMERICAN: 52
GLUCOSE BLD-MCNC: 77 MG/DL (ref 70–99)
POTASSIUM SERPL-SCNC: 4.4 MMOL/L (ref 3.5–5.1)
SODIUM BLD-SCNC: 140 MMOL/L (ref 136–145)
TOTAL PROTEIN: 7 G/DL (ref 6.4–8.2)

## 2022-06-01 ENCOUNTER — PATIENT MESSAGE (OUTPATIENT)
Dept: INTERNAL MEDICINE CLINIC | Age: 87
End: 2022-06-01

## 2022-06-01 DIAGNOSIS — I10 ESSENTIAL HYPERTENSION: Primary | ICD-10-CM

## 2022-06-01 DIAGNOSIS — N18.9 ANEMIA ASSOCIATED WITH CHRONIC RENAL FAILURE: ICD-10-CM

## 2022-06-01 DIAGNOSIS — D63.1 ANEMIA ASSOCIATED WITH CHRONIC RENAL FAILURE: ICD-10-CM

## 2022-06-02 ENCOUNTER — TELEPHONE (OUTPATIENT)
Dept: INTERNAL MEDICINE CLINIC | Age: 87
End: 2022-06-02

## 2022-06-03 DIAGNOSIS — D63.1 ANEMIA ASSOCIATED WITH CHRONIC RENAL FAILURE: ICD-10-CM

## 2022-06-03 DIAGNOSIS — I10 ESSENTIAL HYPERTENSION: ICD-10-CM

## 2022-06-03 DIAGNOSIS — N18.9 ANEMIA ASSOCIATED WITH CHRONIC RENAL FAILURE: ICD-10-CM

## 2022-06-03 LAB
A/G RATIO: 1.1 (ref 1.1–2.2)
ALBUMIN SERPL-MCNC: 3.6 G/DL (ref 3.4–5)
ALP BLD-CCNC: 56 U/L (ref 40–129)
ALT SERPL-CCNC: 17 U/L (ref 10–40)
ANION GAP SERPL CALCULATED.3IONS-SCNC: 11 MMOL/L (ref 3–16)
AST SERPL-CCNC: 22 U/L (ref 15–37)
BASOPHILS ABSOLUTE: 0 K/UL (ref 0–0.2)
BASOPHILS RELATIVE PERCENT: 1 %
BILIRUB SERPL-MCNC: 0.4 MG/DL (ref 0–1)
BUN BLDV-MCNC: 34 MG/DL (ref 7–20)
CALCIUM SERPL-MCNC: 8.6 MG/DL (ref 8.3–10.6)
CHLORIDE BLD-SCNC: 102 MMOL/L (ref 99–110)
CO2: 28 MMOL/L (ref 21–32)
CREAT SERPL-MCNC: 1.3 MG/DL (ref 0.8–1.3)
EOSINOPHILS ABSOLUTE: 0.1 K/UL (ref 0–0.6)
EOSINOPHILS RELATIVE PERCENT: 3.5 %
GFR AFRICAN AMERICAN: >60
GFR NON-AFRICAN AMERICAN: 52
GLUCOSE BLD-MCNC: 96 MG/DL (ref 70–99)
HCT VFR BLD CALC: 34.9 % (ref 40.5–52.5)
HEMOGLOBIN: 11.1 G/DL (ref 13.5–17.5)
IRON SATURATION: 13 % (ref 20–50)
IRON: 37 UG/DL (ref 59–158)
LYMPHOCYTES ABSOLUTE: 1.3 K/UL (ref 1–5.1)
LYMPHOCYTES RELATIVE PERCENT: 30.1 %
MCH RBC QN AUTO: 28 PG (ref 26–34)
MCHC RBC AUTO-ENTMCNC: 31.9 G/DL (ref 31–36)
MCV RBC AUTO: 87.8 FL (ref 80–100)
MONOCYTES ABSOLUTE: 0.4 K/UL (ref 0–1.3)
MONOCYTES RELATIVE PERCENT: 9.3 %
NEUTROPHILS ABSOLUTE: 2.4 K/UL (ref 1.7–7.7)
NEUTROPHILS RELATIVE PERCENT: 56.1 %
PDW BLD-RTO: 15.8 % (ref 12.4–15.4)
PLATELET # BLD: 118 K/UL (ref 135–450)
PMV BLD AUTO: 9.9 FL (ref 5–10.5)
POTASSIUM SERPL-SCNC: 4.2 MMOL/L (ref 3.5–5.1)
RBC # BLD: 3.98 M/UL (ref 4.2–5.9)
SODIUM BLD-SCNC: 141 MMOL/L (ref 136–145)
TOTAL IRON BINDING CAPACITY: 286 UG/DL (ref 260–445)
TOTAL PROTEIN: 6.9 G/DL (ref 6.4–8.2)
WBC # BLD: 4.2 K/UL (ref 4–11)

## 2022-06-06 ENCOUNTER — OFFICE VISIT (OUTPATIENT)
Dept: INTERNAL MEDICINE CLINIC | Age: 87
End: 2022-06-06
Payer: MEDICARE

## 2022-06-06 VITALS
OXYGEN SATURATION: 97 % | WEIGHT: 106 LBS | HEART RATE: 78 BPM | DIASTOLIC BLOOD PRESSURE: 86 MMHG | SYSTOLIC BLOOD PRESSURE: 120 MMHG | BODY MASS INDEX: 18.78 KG/M2

## 2022-06-06 DIAGNOSIS — E46 MALNUTRITION, UNSPECIFIED TYPE (HCC): ICD-10-CM

## 2022-06-06 DIAGNOSIS — I10 ESSENTIAL HYPERTENSION: Primary | ICD-10-CM

## 2022-06-06 DIAGNOSIS — I25.708 CORONARY ARTERY DISEASE OF BYPASS GRAFT OF NATIVE HEART WITH STABLE ANGINA PECTORIS (HCC): ICD-10-CM

## 2022-06-06 DIAGNOSIS — D50.9 IRON DEFICIENCY ANEMIA, UNSPECIFIED IRON DEFICIENCY ANEMIA TYPE: ICD-10-CM

## 2022-06-06 PROCEDURE — 99214 OFFICE O/P EST MOD 30 MIN: CPT | Performed by: INTERNAL MEDICINE

## 2022-06-06 PROCEDURE — 1123F ACP DISCUSS/DSCN MKR DOCD: CPT | Performed by: INTERNAL MEDICINE

## 2022-06-06 NOTE — PROGRESS NOTES
Outpatient Clinic Established Patient Note    Patient: Leti Campbell MD  : 1935 (82 y.o.)  Date: 2022    CC: 1 month follow-up    HPI:      Patient has no complaints. Exercises regularly. States appetite has been good, but he can only eat small amounts due to bariatric component of his abdominal surgeries. Has been trying to eat frequent small meals, but it is easy for him to over eat. Denies chest pain, shortness of breath, N/V, abdominal pain, constipation/diarrhea, melnea/hematochezia. Home Meds:  Prior to Visit Medications    Medication Sig Taking?  Authorizing Provider   omeprazole (PRILOSEC) 40 MG delayed release capsule 1 po bid for 1 week then 1 po daily Yes Caleb Nogueira MD   vitamin D (CHOLECALCIFEROL) 25 MCG (1000 UT) TABS tablet Take 1,000 Units by mouth daily Yes Historical Provider, MD   polyethylene glycol (MIRALAX) 17 g PACK packet Take 17 g by mouth daily Yes Historical Provider, MD   ferrous sulfate (IRON 325) 325 (65 Fe) MG tablet Take 325 mg by mouth every other day Yes Historical Provider, MD   vitamin E 1000 units capsule Take 1,000 Units by mouth once a week  Yes Historical Provider, MD   warfarin (COUMADIN) 5 MG tablet Take 0.5-1 tablets by mouth daily Performs test at home prior to dose Yes Caleb Nogueira MD   warfarin (COUMADIN) 2.5 MG tablet Take 2.5 mg by mouth daily Pt tests INR at home and self adjusts every Friday (GOAL 2-2.5) Yes Historical Provider, MD   magnesium oxide (MAG-OX) 400 MG tablet Take 400 mg by mouth daily  Yes Historical Provider, MD   Omega-3 Fatty Acids (FISH OIL) 1000 MG CAPS Take 1,000 mg by mouth daily  Yes Historical Provider, MD   calcium carbonate (TUMS) 500 MG chewable tablet Take 3 tablets by mouth 2 times daily Yes Historical Provider, MD   atorvastatin (LIPITOR) 10 MG tablet TAKE ONE TABLET BY MOUTH DAILY Yes Caleb Nogueira MD   Alpha Lipoic Acid-Biotin 300-333 MG-MCG CAPS 600 mg 2 times daily  Yes Historical Provider, MD   Coenzyme Q10 (COQ10) 400 MG CAPS Take 1 capsule by mouth Yes Historical Provider, MD   vitamin B-12 (CYANOCOBALAMIN) 1000 MCG tablet Take 1,000 mcg by mouth daily. Yes Historical Provider, MD   simethicone (MYLICON) 80 MG chewable tablet Take 80 mg by mouth three times daily   Historical Provider, MD     Allergies:    No known allergies    Health Maintenance Due   Topic Date Due    Annual Wellness Visit (AWV)  08/27/2021       Immunization History   Administered Date(s) Administered    COVID-19, Mart Garcia, Primary or Immunocompromised, PF, 100mcg/0.5mL 01/30/2021, 03/02/2021, 11/06/2021    Influenza 10/01/2009    Influenza Virus Vaccine 10/01/2006, 10/28/2010, 11/08/2011    Influenza, High Dose (Fluzone 65 yrs and older) 10/18/2012, 10/14/2013, 11/06/2014, 10/14/2015, 09/16/2016, 10/25/2017, 09/11/2018    Influenza, Quadv, adjuvanted, 65 yrs +, IM, PF (Fluad) 09/10/2020, 03/31/2022    Influenza, Triv, inactivated, subunit, adjuvanted, IM (Fluad 65 yrs and older) 09/16/2019    Pneumococcal Conjugate 13-valent (Qhwzanv18) 07/07/2015    Pneumococcal Polysaccharide (Qshopflxv57) 01/01/2006, 01/01/2007, 10/20/2013    Tdap (Boostrix, Adacel) 05/12/2014    Zoster Live (Zostavax) 09/20/2013    Zoster Recombinant (Shingrix) 07/06/2018, 09/11/2018       Review of Systems  A 10-organ Review Of Systems was obtained and otherwise unremarkable except as per HPI. Data: Old records have been reviewed electronically. PHYSICAL EXAM:  /86   Pulse 78   Wt 106 lb (48.1 kg)   SpO2 97%   BMI 18.78 kg/m²   Physical Exam  Constitutional:       General: He is not in acute distress. Appearance: He is not diaphoretic. Comments: Cachectic   HENT:      Head: Normocephalic and atraumatic. Mouth/Throat:      Mouth: Mucous membranes are moist.   Eyes:      Extraocular Movements: Extraocular movements intact. Pupils: Pupils are equal, round, and reactive to light.    Cardiovascular:      Rate and Rhythm: Normal rate and regular rhythm. Pulses: Normal pulses. Heart sounds: No murmur heard. No friction rub. No gallop. Comments: Mechanical valve sound  Pulmonary:      Effort: Pulmonary effort is normal.      Breath sounds: Normal breath sounds. No wheezing, rhonchi or rales. Abdominal:      General: Bowel sounds are normal.      Palpations: Abdomen is soft. Tenderness: There is no abdominal tenderness. There is no guarding or rebound. Hernia: No hernia is present. Comments: Concave abdomen   Musculoskeletal:      Right lower leg: No edema. Left lower leg: No edema. Comments: kyphotic   Skin:     General: Skin is warm. Capillary Refill: Capillary refill takes less than 2 seconds. Neurological:      General: No focal deficit present. Mental Status: He is alert and oriented to person, place, and time. Assessment & Plan:      1. Essential hypertension  -under good control; continue current meds    2. Iron deficiency anemia, unspecified iron deficiency anemia type  -With patient's anatomy, is likely difficult for him to adequately absorb PO iron; still fairly iron deficient on labs  -Will give patient 3 doses of IV iron    3. Malnutrition, unspecified type ([address]  -Chronic problem  -Patient trying to eat frequent small meals, supplementing with Boost    4.  Coronary 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 (HCC)  -Lipid panel looks excellent  -Continue current regimen     IV iron infusions  F/U in 3 months  _______________  Richard Vargas MD, 6/6/2022 2:36 PM

## 2022-06-07 ENCOUNTER — TELEPHONE (OUTPATIENT)
Dept: INTERNAL MEDICINE CLINIC | Age: 87
End: 2022-06-07

## 2022-06-07 NOTE — TELEPHONE ENCOUNTER
Message  Received: 3 days ago  Madhu Long MD  P [facility] Practice Support  All labs stable, repeat labs as per normal interval   Anemia improved but still iron deficient   IV iron for 3 doses         In here for becky to remember. no

## 2022-06-09 ENCOUNTER — HOSPITAL ENCOUNTER (OUTPATIENT)
Dept: ONCOLOGY | Age: 87
Setting detail: INFUSION SERIES
Discharge: HOME | End: 2022-06-09
Payer: MEDICARE

## 2022-06-09 VITALS
TEMPERATURE: 97.4 F | DIASTOLIC BLOOD PRESSURE: 88 MMHG | HEART RATE: 73 BPM | RESPIRATION RATE: 18 BRPM | OXYGEN SATURATION: 97 % | SYSTOLIC BLOOD PRESSURE: 186 MMHG

## 2022-06-09 DIAGNOSIS — N18.9 ANEMIA ASSOCIATED WITH CHRONIC RENAL FAILURE: Primary | ICD-10-CM

## 2022-06-09 DIAGNOSIS — D63.1 ANEMIA ASSOCIATED WITH CHRONIC RENAL FAILURE: Primary | ICD-10-CM

## 2022-06-09 DIAGNOSIS — N18.31 STAGE 3A CHRONIC KIDNEY DISEASE (HCC): ICD-10-CM

## 2022-06-09 DIAGNOSIS — D50.9 IRON DEFICIENCY ANEMIA, UNSPECIFIED IRON DEFICIENCY ANEMIA TYPE: ICD-10-CM

## 2022-06-09 PROCEDURE — 96365 THER/PROPH/DIAG IV INF INIT: CPT

## 2022-06-09 PROCEDURE — 2580000003 HC RX 258: Performed by: INTERNAL MEDICINE

## 2022-06-09 PROCEDURE — 6360000002 HC RX W HCPCS: Performed by: INTERNAL MEDICINE

## 2022-06-09 RX ORDER — MEPERIDINE HYDROCHLORIDE 25 MG/ML
12.5 INJECTION INTRAMUSCULAR; INTRAVENOUS; SUBCUTANEOUS PRN
OUTPATIENT
Start: 2022-06-10

## 2022-06-09 RX ORDER — ONDANSETRON 2 MG/ML
8 INJECTION INTRAMUSCULAR; INTRAVENOUS
OUTPATIENT
Start: 2022-06-10

## 2022-06-09 RX ORDER — SODIUM CHLORIDE 0.9 % (FLUSH) 0.9 %
5-40 SYRINGE (ML) INJECTION PRN
OUTPATIENT
Start: 2022-06-10

## 2022-06-09 RX ORDER — DIPHENHYDRAMINE HYDROCHLORIDE 50 MG/ML
50 INJECTION INTRAMUSCULAR; INTRAVENOUS
OUTPATIENT
Start: 2022-06-10

## 2022-06-09 RX ORDER — SODIUM CHLORIDE 9 MG/ML
25 INJECTION, SOLUTION INTRAVENOUS PRN
OUTPATIENT
Start: 2022-06-10

## 2022-06-09 RX ORDER — SODIUM CHLORIDE 9 MG/ML
INJECTION, SOLUTION INTRAVENOUS CONTINUOUS
OUTPATIENT
Start: 2022-06-10

## 2022-06-09 RX ORDER — HEPARIN SODIUM (PORCINE) LOCK FLUSH IV SOLN 100 UNIT/ML 100 UNIT/ML
500 SOLUTION INTRAVENOUS PRN
OUTPATIENT
Start: 2022-06-10

## 2022-06-09 RX ORDER — SODIUM CHLORIDE 9 MG/ML
INJECTION, SOLUTION INTRAVENOUS CONTINUOUS
Status: DISCONTINUED | OUTPATIENT
Start: 2022-06-09 | End: 2022-06-10 | Stop reason: HOSPADM

## 2022-06-09 RX ORDER — ALBUTEROL SULFATE 90 UG/1
4 AEROSOL, METERED RESPIRATORY (INHALATION) PRN
OUTPATIENT
Start: 2022-06-10

## 2022-06-09 RX ORDER — ACETAMINOPHEN 325 MG/1
650 TABLET ORAL
OUTPATIENT
Start: 2022-06-10

## 2022-06-09 RX ADMIN — SODIUM CHLORIDE: 9 INJECTION, SOLUTION INTRAVENOUS at 14:49

## 2022-06-09 RX ADMIN — IRON SUCROSE 200 MG: 20 INJECTION, SOLUTION INTRAVENOUS at 14:52

## 2022-06-09 NOTE — PROGRESS NOTES
Pt seen and assessed at [address] [facility] for Venofer infusion per orders from Dr. Jayesh Aden. Infused per [facility] policy. Monitoring completed for infusion reactions - see flowsheet. Pt tolerated infusion well and without incident. Pt verbalizes understanding of discharge instructions. Discharged ambulatory with walker to home.

## 2022-06-27 ENCOUNTER — TELEPHONE (OUTPATIENT)
Dept: INTERNAL MEDICINE CLINIC | Age: 87
End: 2022-06-27

## 2022-06-27 NOTE — TELEPHONE ENCOUNTER
I need an appointment. MD Emi Du MD Yesterday (8:26 AM)           I have fallen and have a LARGE hematoma over my right ischial tuberosity making sitting, dressing, and arising very painful and difficult.

## 2022-06-29 SDOH — HEALTH STABILITY: PHYSICAL HEALTH: ON AVERAGE, HOW MANY DAYS PER WEEK DO YOU ENGAGE IN MODERATE TO STRENUOUS EXERCISE (LIKE A BRISK WALK)?: 7 DAYS

## 2022-06-29 SDOH — HEALTH STABILITY: PHYSICAL HEALTH: ON AVERAGE, HOW MANY MINUTES DO YOU ENGAGE IN EXERCISE AT THIS LEVEL?: 30 MIN

## 2022-06-29 ASSESSMENT — SOCIAL DETERMINANTS OF HEALTH (SDOH)
WITHIN THE LAST YEAR, HAVE YOU BEEN KICKED, HIT, SLAPPED, OR OTHERWISE PHYSICALLY HURT BY YOUR PARTNER OR EX-PARTNER?: NO
WITHIN THE LAST YEAR, HAVE YOU BEEN AFRAID OF YOUR PARTNER OR EX-PARTNER?: NO
WITHIN THE LAST YEAR, HAVE YOU BEEN HUMILIATED OR EMOTIONALLY ABUSED IN OTHER WAYS BY YOUR PARTNER OR EX-PARTNER?: NO
WITHIN THE LAST YEAR, HAVE TO BEEN RAPED OR FORCED TO HAVE ANY KIND OF SEXUAL ACTIVITY BY YOUR PARTNER OR EX-PARTNER?: NO

## 2022-06-30 ENCOUNTER — OFFICE VISIT (OUTPATIENT)
Dept: ORTHOPEDIC SURGERY | Age: 87
End: 2022-06-30
Payer: MEDICARE

## 2022-06-30 VITALS — BODY MASS INDEX: 18.78 KG/M2 | WEIGHT: 106 LBS | HEIGHT: 63 IN

## 2022-06-30 DIAGNOSIS — S76.011A HIP STRAIN, RIGHT, INITIAL ENCOUNTER: ICD-10-CM

## 2022-06-30 DIAGNOSIS — M25.551 HIP PAIN, RIGHT: Primary | ICD-10-CM

## 2022-06-30 PROCEDURE — 1123F ACP DISCUSS/DSCN MKR DOCD: CPT | Performed by: ORTHOPAEDIC SURGERY

## 2022-06-30 PROCEDURE — 99203 OFFICE O/P NEW LOW 30 MIN: CPT | Performed by: ORTHOPAEDIC SURGERY

## 2022-06-30 NOTE — PROGRESS NOTES
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 Sent in consultation by Kalyan Castro MD.    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 Wife was a pediatric radiologist  Occupational activities: clerical work. Athletic/exercise activity: no sports. Injury Mechanism:  fall. Worker's Comp. & legal issues:   none. Previous Treatments: Ice, Heat, NSAIDs and pain medication    He fell 6 days ago when he fell backwards directly on his buttock. He had an acute onset of pain over the buttock and has now a large hematoma. He is on anticoagulant medications. He is sent for initial evaluation for his hip. He denies any kind of functional pain with increased ambulation. His main concern remains that he is unable to row which is his main form of exercise. He has been doing some fall prevention therapy at baseline. 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     Past History  Past Medical History:   Diagnosis Date    Anemia     Aortic valve disorders     stenosis    Aspiration pneumonia ([address] 4/27/2015    Erectile dysfunction     Esophageal cancer ([address] 10/18/2012    Hernia, femoral, bilateral 5/12/2014    Hyperlipidemia     Osteoporosis, senile 5/20/2014    Pancreatitis 8/1/2013    Patient underweight 8/8/2013    Prostate cancer ([address]     Unspecified essential hypertension      Past Surgical History:   Procedure Laterality Date    APPENDECTOMY      as a child    BONE GRAFT      for saddle nose    CARDIAC VALVE REPLACEMENT  2006    aorta    CHOLECYSTECTOMY      COLONOSCOPY  11/2009    COLONOSCOPY  10/05/15    COLONOSCOPY N/A 2/17/2021 COLONOSCOPY DIAGNOSTIC/STOMA performed by Harley Quiroz MD at [address]  2/18/2021    COLONOSCOPY POLYPECTOMY SNARE/COLD BIOPSY performed by Harley Quiroz MD at [address] GRAFT  2006    ESOPHAGUS SURGERY      EYE SURGERY  1990& 1992    cataract bilat removal     GASTRECTOMY      PROSTATE SURGERY      seeds    TONSILLECTOMY      UPPER GASTROINTESTINAL ENDOSCOPY N/A 2/16/2021    EGD BIOPSY performed by Harley Quiroz MD at [address] N/A 4/22/2022    EGD CONTROL HEMORRHAGE performed by Shilpa Rouse MD at [address] PROSTATE/TRANSRECTAL VOL STUDY BRACHYTHERAPY       Family History   Problem Relation Age of Onset    Other Mother         bleeding peptic ulcer    Heart Disease Mother     Other Father         cardiovascular disease    Stroke Father     Elevated Lipids Father     Hypertension Father      Social History     Tobacco Use    Smoking status: Never Smoker    Smokeless tobacco: Never Used   Substance Use Topics    Alcohol use:  Yes     Alcohol/week: 0.0 standard drinks     Comment: occassionally      Current Outpatient Medications on File Prior to Visit   Medication Sig Dispense Refill    omeprazole (PRILOSEC) 40 MG delayed release capsule 1 po bid for 1 week then 1 po daily 90 capsule 3    vitamin D (CHOLECALCIFEROL) 25 MCG (1000 UT) TABS tablet Take 1,000 Units by mouth daily      polyethylene glycol (MIRALAX) 17 g PACK packet Take 17 g by mouth daily      ferrous sulfate (IRON 325) 325 (65 Fe) MG tablet Take 325 mg by mouth every other day      vitamin E 1000 units capsule Take 1,000 Units by mouth once a week       warfarin (COUMADIN) 5 MG tablet Take 0.5-1 tablets by mouth daily Performs test at home prior to dose 30 tablet 3    warfarin (COUMADIN) 2.5 MG tablet Take 2.5 mg by mouth daily Pt tests INR at home and self adjusts every Friday (GOAL 2-2.5)      simethicone (MYLICON) 80 MG chewable tablet Take 80 mg by mouth three times daily       magnesium oxide (MAG-OX) 400 MG tablet Take 400 mg by mouth daily       Omega-3 Fatty Acids (FISH OIL) 1000 MG CAPS Take 1,000 mg by mouth daily       calcium carbonate (TUMS) 500 MG chewable tablet Take 3 tablets by mouth 2 times daily      atorvastatin (LIPITOR) 10 MG tablet TAKE ONE TABLET BY MOUTH DAILY 90 tablet 0    Alpha Lipoic Acid-Biotin 300-333 MG-MCG CAPS 600 mg 2 times daily       Coenzyme Q10 (COQ10) 400 MG CAPS Take 1 capsule by mouth      vitamin B-12 (CYANOCOBALAMIN) 1000 MCG tablet Take 1,000 mcg by mouth daily. No current facility-administered medications on file prior to visit. ASCVD 10-YEAR RISK SCORE  The ASCVD Risk score (Shi Quick, et al., 2013) failed to calculate for the following reasons: The 2013 ASCVD risk score is only valid for ages 36 to 78   . Review of Systems  10-point ROS is negative other than HPI. Physical Exam  Based off 1997 Exam Criteria  Ht 5' 3\" (1.6 m)   Wt 106 lb (48.1 kg)   BMI 18.78 kg/m²      Constitutional:       General: He is not in acute distress. Appearance: Normal appearance. Cardiovascular:      Rate and Rhythm: Normal rate and regular rhythm. Pulses: Normal pulses. Pulmonary:      Effort: Pulmonary effort is normal. No respiratory distress. Neurological:      Mental Status: He is alert and oriented to person, place, and time. Mental status is at baseline. Musculoskeletal:  Gait:  with walker    Skin: Clean and intact.   No open sores or wounds    Lymphatics: No palpable lymph nodes    Spine / Hip Exam:      RIGHT  LEFT    Lumbar Spine Exam  [x] All Neg    [x] All Neg     Straight leg raise  []  []Not tested   []  []Not tested    Clonus  []  []Not tested   []  []Not tested    Pain with motion  []  []Not tested   []  []Not tested    Radiculopathy  []  []Not tested   []  []Not tested    Paraspinal muscle tenderness  [] Paraspinal []Midline   [] Paraspinal  []Midline   Sensation RIGHT  LEFT    L3  [x] Normal []Decreased    [x] Normal []Decreased   L4  [x] Normal  []Decreased   [x] Normal []Decreased   L5  [x] Normal []Decreased   [x] Normal []Decreased   S1  [x] Normal  []Decreased   [x] Normal []Decreased   Pelvis       Scoliosis  [x] Nml  [] Present     Leg-length discrepency  [x] Equal  [] Right longer   [] Left longer   Range of Motion Active Passive Active Passive   Hip Flexion 100  100    Abduction 50  50    External Rotation @ 90 flex 65  65    Internal Rotation @ 90 flex 10  10           Hip Impingement / Dysplasia  [] All Neg  [] Not tested   [] All Neg  [] Not tested    Hip impingement test  []  []Not tested   []  []Not tested    C-sign  []  []Not tested   []  []Not tested    Anterior instability apprehension  []  []Not tested   []  []Not tested    Posterior instability apprehension  []  []Not tested   []  []Not tested    Uncontained Internal rotation  []  []Not tested  []  []Not tested          Abductors  [] All Neg  [] Not tested   [] All Neg  [] Not tested    Medius strength  []  []Not tested   []  []Not tested    Minimum strength  []  []Not tested   []  []Not tested    IT band tendonitis  []  []Not tested   []  []Not tested    Trochanteric tenderness  []  []Not tested  []  []Not tested   Sciatic neuropathic pain  []  []Not tested   []  []Not tested           Post-arthroplasty  [] All Neg  [] Not tested   [] All Neg  [] Not tested    Rectus tendonitis  []  []Not tested   []  []Not tested    Iliopsoas tendonitis       Start-up pain  []  []Not tested   []  []Not tested      Hematoma present over the right buttock, no evidence of weakness in the hamstring. Mild soreness present over the right sacroiliac region and the right posterior sacrum. No functional pain with ambulation. Imaging    Right Hip: [address] [facility]  Radiographs: No evidence of fracture.   No displaced injury present of the sacrum or the pubic douglas.        Procedure:  Orders Placed This Encounter   Procedures    XR HIP RIGHT (2-3 VIEWS)     Standing Status:   Future     Number of Occurrences:   1     Standing Expiration Date:   6/29/2023       Assessment and Plan  Joe Medel was seen today for hip pain. Diagnoses and all orders for this visit:    Hip pain, right  -     XR HIP RIGHT (2-3 VIEWS); Future    Hip strain, right, initial encounter        I discussed with Rashid Gtz MD that his history, symptoms, signs and imaging are most consistent with hip strain    We reviewed the natural history of these conditions and treatment options ranging from conservative measures (rest, icing, activity modification, physical therapy, pain meds, cortisone injection) to surgical options. In terms of treatment, I recommended starting with rest, icing, avoidance of painful activities, NSAIDs or pain meds as tolerated, and physical therapy. I offered starting physical therapy for balance training but he declined. For requiring a CT scan for his pelvis to delineate lateral compression pelvic ring injury. However, his lesions does not necessarily change his weightbearing status in any way. He denies any functional pain. Even if he has a small sacral stable injury, this is likely to be stable considering his lack of functional pain. He and his wife chose against the CT scan as a result. He already is on Prolia for osteoporosis. I will see him back in 6 weeks if he has not improved. Electronically signed by Nevaeh oLpez MD on 6/30/2022 at 9:35 AM  This dictation was generated by voice recognition computer software. Although all attempts are made to edit the dictation for accuracy, there may be errors in the transcription that are not intended.

## 2022-07-06 RX ORDER — ATORVASTATIN CALCIUM 10 MG/1
TABLET, FILM COATED ORAL
Qty: 90 TABLET | Refills: 0 | Status: ON HOLD | OUTPATIENT
Start: 2022-07-06 | End: 2022-10-18

## 2022-08-22 ENCOUNTER — ANTI-COAG VISIT (OUTPATIENT)
Dept: INTERNAL MEDICINE CLINIC | Age: 87
End: 2022-08-22

## 2022-08-22 DIAGNOSIS — I35.9 AORTIC VALVE DISORDER: Primary | ICD-10-CM

## 2022-08-22 LAB — INR BLD: 2

## 2022-09-05 ENCOUNTER — PATIENT MESSAGE (OUTPATIENT)
Dept: INTERNAL MEDICINE CLINIC | Age: 87
End: 2022-09-05

## 2022-09-05 DIAGNOSIS — D62 ACUTE BLOOD LOSS ANEMIA: ICD-10-CM

## 2022-09-05 DIAGNOSIS — I10 ESSENTIAL HYPERTENSION: ICD-10-CM

## 2022-09-05 DIAGNOSIS — N18.31 STAGE 3A CHRONIC KIDNEY DISEASE (HCC): ICD-10-CM

## 2022-09-05 DIAGNOSIS — N18.9 ANEMIA ASSOCIATED WITH CHRONIC RENAL FAILURE: ICD-10-CM

## 2022-09-05 DIAGNOSIS — D63.1 ANEMIA ASSOCIATED WITH CHRONIC RENAL FAILURE: ICD-10-CM

## 2022-09-05 DIAGNOSIS — Z12.5 SCREENING PSA (PROSTATE SPECIFIC ANTIGEN): Primary | ICD-10-CM

## 2022-09-06 NOTE — TELEPHONE ENCOUNTER
From: Carline Chirinos MD  To: Dr. Jatinder Lucas: 9/5/2022 8:37 AM EDT  Subject: Office Visit    Please order lab work to be done before my office visit Friday. Include PSA and iron studies. Can we receive a flu vaccine shot and the Covid-19 Boost? Is [facility] making these available, or elsewhere?

## 2022-09-07 DIAGNOSIS — D62 ACUTE BLOOD LOSS ANEMIA: ICD-10-CM

## 2022-09-07 DIAGNOSIS — Z12.5 SCREENING PSA (PROSTATE SPECIFIC ANTIGEN): ICD-10-CM

## 2022-09-07 DIAGNOSIS — N18.31 STAGE 3A CHRONIC KIDNEY DISEASE (HCC): ICD-10-CM

## 2022-09-07 DIAGNOSIS — N18.9 ANEMIA ASSOCIATED WITH CHRONIC RENAL FAILURE: ICD-10-CM

## 2022-09-07 DIAGNOSIS — D63.1 ANEMIA ASSOCIATED WITH CHRONIC RENAL FAILURE: ICD-10-CM

## 2022-09-07 LAB
A/G RATIO: 1 (ref 1.1–2.2)
ALBUMIN SERPL-MCNC: 3.8 G/DL (ref 3.4–5)
ALP BLD-CCNC: 68 U/L (ref 40–129)
ALT SERPL-CCNC: 17 U/L (ref 10–40)
ANION GAP SERPL CALCULATED.3IONS-SCNC: 10 MMOL/L (ref 3–16)
AST SERPL-CCNC: 22 U/L (ref 15–37)
BASOPHILS ABSOLUTE: 0 K/UL (ref 0–0.2)
BASOPHILS RELATIVE PERCENT: 0.8 %
BILIRUB SERPL-MCNC: 0.4 MG/DL (ref 0–1)
BUN BLDV-MCNC: 32 MG/DL (ref 7–20)
CALCIUM SERPL-MCNC: 9.1 MG/DL (ref 8.3–10.6)
CHLORIDE BLD-SCNC: 97 MMOL/L (ref 99–110)
CO2: 31 MMOL/L (ref 21–32)
CREAT SERPL-MCNC: 1.3 MG/DL (ref 0.8–1.3)
EOSINOPHILS ABSOLUTE: 0.2 K/UL (ref 0–0.6)
EOSINOPHILS RELATIVE PERCENT: 3.3 %
GFR AFRICAN AMERICAN: >60
GFR NON-AFRICAN AMERICAN: 52
GLUCOSE BLD-MCNC: 104 MG/DL (ref 70–99)
HCT VFR BLD CALC: 38.9 % (ref 40.5–52.5)
HEMOGLOBIN: 12.3 G/DL (ref 13.5–17.5)
IRON SATURATION: 23 % (ref 20–50)
IRON: 59 UG/DL (ref 59–158)
LYMPHOCYTES ABSOLUTE: 1.6 K/UL (ref 1–5.1)
LYMPHOCYTES RELATIVE PERCENT: 29.4 %
MCH RBC QN AUTO: 27.9 PG (ref 26–34)
MCHC RBC AUTO-ENTMCNC: 31.5 G/DL (ref 31–36)
MCV RBC AUTO: 88.4 FL (ref 80–100)
MONOCYTES ABSOLUTE: 0.5 K/UL (ref 0–1.3)
MONOCYTES RELATIVE PERCENT: 9 %
NEUTROPHILS ABSOLUTE: 3.2 K/UL (ref 1.7–7.7)
NEUTROPHILS RELATIVE PERCENT: 57.5 %
PDW BLD-RTO: 16.6 % (ref 12.4–15.4)
PLATELET # BLD: 108 K/UL (ref 135–450)
PMV BLD AUTO: 9.6 FL (ref 5–10.5)
POTASSIUM SERPL-SCNC: 4.2 MMOL/L (ref 3.5–5.1)
PROSTATE SPECIFIC ANTIGEN: <0.01 NG/ML (ref 0–4)
RBC # BLD: 4.4 M/UL (ref 4.2–5.9)
SODIUM BLD-SCNC: 138 MMOL/L (ref 136–145)
TOTAL IRON BINDING CAPACITY: 255 UG/DL (ref 260–445)
TOTAL PROTEIN: 7.5 G/DL (ref 6.4–8.2)
WBC # BLD: 5.5 K/UL (ref 4–11)

## 2022-09-09 ENCOUNTER — OFFICE VISIT (OUTPATIENT)
Dept: INTERNAL MEDICINE CLINIC | Age: 87
End: 2022-09-09
Payer: MEDICARE

## 2022-09-09 VITALS
HEIGHT: 63 IN | OXYGEN SATURATION: 96 % | DIASTOLIC BLOOD PRESSURE: 90 MMHG | SYSTOLIC BLOOD PRESSURE: 132 MMHG | WEIGHT: 108.6 LBS | TEMPERATURE: 97.8 F | BODY MASS INDEX: 19.24 KG/M2 | HEART RATE: 80 BPM

## 2022-09-09 DIAGNOSIS — Z23 NEED FOR INFLUENZA VACCINATION: Primary | ICD-10-CM

## 2022-09-09 DIAGNOSIS — D50.9 IRON DEFICIENCY ANEMIA, UNSPECIFIED IRON DEFICIENCY ANEMIA TYPE: ICD-10-CM

## 2022-09-09 DIAGNOSIS — N18.31 STAGE 3A CHRONIC KIDNEY DISEASE (HCC): ICD-10-CM

## 2022-09-09 DIAGNOSIS — Z95.2 HISTORY OF MECHANICAL AORTIC VALVE REPLACEMENT: ICD-10-CM

## 2022-09-09 PROBLEM — R42 POSTURAL DIZZINESS WITH NEAR SYNCOPE: Status: RESOLVED | Noted: 2020-11-19 | Resolved: 2022-09-09

## 2022-09-09 PROBLEM — J18.9 PNEUMONIA: Status: RESOLVED | Noted: 2021-06-22 | Resolved: 2022-09-09

## 2022-09-09 PROBLEM — D64.9 ANEMIA: Status: RESOLVED | Noted: 2020-09-15 | Resolved: 2022-09-09

## 2022-09-09 PROBLEM — R55 POSTURAL DIZZINESS WITH NEAR SYNCOPE: Status: RESOLVED | Noted: 2020-11-19 | Resolved: 2022-09-09

## 2022-09-09 PROBLEM — K92.2 GI BLEED: Status: RESOLVED | Noted: 2021-02-16 | Resolved: 2022-09-09

## 2022-09-09 PROBLEM — K56.609 SMALL BOWEL OBSTRUCTION (HCC): Status: RESOLVED | Noted: 2022-03-28 | Resolved: 2022-09-09

## 2022-09-09 PROCEDURE — 99213 OFFICE O/P EST LOW 20 MIN: CPT | Performed by: INTERNAL MEDICINE

## 2022-09-09 PROCEDURE — 1123F ACP DISCUSS/DSCN MKR DOCD: CPT | Performed by: INTERNAL MEDICINE

## 2022-09-09 PROCEDURE — G0008 ADMIN INFLUENZA VIRUS VAC: HCPCS | Performed by: INTERNAL MEDICINE

## 2022-09-09 PROCEDURE — 90694 VACC AIIV4 NO PRSRV 0.5ML IM: CPT | Performed by: INTERNAL MEDICINE

## 2022-09-09 SDOH — ECONOMIC STABILITY: FOOD INSECURITY: WITHIN THE PAST 12 MONTHS, YOU WORRIED THAT YOUR FOOD WOULD RUN OUT BEFORE YOU GOT MONEY TO BUY MORE.: NEVER TRUE

## 2022-09-09 SDOH — ECONOMIC STABILITY: FOOD INSECURITY: WITHIN THE PAST 12 MONTHS, THE FOOD YOU BOUGHT JUST DIDN'T LAST AND YOU DIDN'T HAVE MONEY TO GET MORE.: NEVER TRUE

## 2022-09-09 ASSESSMENT — ENCOUNTER SYMPTOMS
SORE THROAT: 0
TROUBLE SWALLOWING: 0
SHORTNESS OF BREATH: 0
BLOOD IN STOOL: 0
COUGH: 0
DIARRHEA: 0
NAUSEA: 0
ABDOMINAL PAIN: 0
VOMITING: 0
BACK PAIN: 0
CONSTIPATION: 0

## 2022-09-09 ASSESSMENT — PATIENT HEALTH QUESTIONNAIRE - PHQ9
SUM OF ALL RESPONSES TO PHQ9 QUESTIONS 1 & 2: 0
SUM OF ALL RESPONSES TO PHQ QUESTIONS 1-9: 0
SUM OF ALL RESPONSES TO PHQ QUESTIONS 1-9: 0
2. FEELING DOWN, DEPRESSED OR HOPELESS: 0
SUM OF ALL RESPONSES TO PHQ QUESTIONS 1-9: 0
1. LITTLE INTEREST OR PLEASURE IN DOING THINGS: 0
SUM OF ALL RESPONSES TO PHQ QUESTIONS 1-9: 0

## 2022-09-09 ASSESSMENT — SOCIAL DETERMINANTS OF HEALTH (SDOH): HOW HARD IS IT FOR YOU TO PAY FOR THE VERY BASICS LIKE FOOD, HOUSING, MEDICAL CARE, AND HEATING?: NOT HARD AT ALL

## 2022-09-09 NOTE — PROGRESS NOTES
Arpita Brunner MD (:  1935) is a 82 y.o. male,Established patient, here for evaluation of the following chief complaint(s):  3 Month Follow-Up and Immunizations (HD FLU)         ASSESSMENT/PLAN:  {Chronic kidney disease stable at present  Anemia improved  Hypertension controlled we will follow-up in 3 months  No follow-ups on file. Subjective   SUBJECTIVE/OBJECTIVE:  HPI Alver Councilman is a 82year old male with past medical history of esophageal cancer s/p surgical resection and chemotherapy, aortic valve disorder s/p mechanical replacement, GI bleed d/t Dieulafoy lesion, CAD s/p CABG, CKD3, prostate cancer, osteoporosis, and malnutrition who presents today for follow-up visit and medication management. The patient states that he feels well overall. 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 He reports that he exercises daily, including rowing machine and weight training, and has increased his PO intake with resultant 3 pound weight gain. His INR results typically are around 2.3, ranging between 2-2.5. He remains on his prescribed medications and denies significant adverse effects. Review of Systems   Constitutional:  Negative for chills and fever. HENT:  Positive for hearing loss. Negative for ear discharge, ear pain, nosebleeds, sore throat and trouble swallowing. Eyes:  Negative for visual disturbance. Respiratory:  Negative for cough and shortness of breath. Cardiovascular:  Negative for chest pain and palpitations. Gastrointestinal:  Negative for abdominal pain, blood in stool, constipation, diarrhea, nausea and vomiting. Genitourinary:  Negative for dysuria. Musculoskeletal:  Positive for gait problem. Negative for arthralgias and back pain. Skin:  Negative for rash and wound. Neurological:  Positive for weakness. Negative for dizziness and headaches. Hematological:  Bruises/bleeds easily. Objective   Physical Exam  HENT:      Head: Normocephalic and atraumatic. Ears:      Comments: Cerumen bilaterally     Nose: Nose normal.      Mouth/Throat:      Mouth: Mucous membranes are moist.   Eyes:      General: No scleral icterus. Extraocular Movements: Extraocular movements intact. Conjunctiva/sclera: Conjunctivae normal.      Pupils: Pupils are equal, round, and reactive to light. Cardiovascular:      Rate and Rhythm: Normal rate and regular rhythm. Heart sounds: Normal heart sounds. Comments: Pectus excavatum  Pulmonary:      Effort: Pulmonary effort is normal.      Breath sounds: Normal breath sounds. Abdominal:      Palpations: Abdomen is soft. Musculoskeletal:         General: No swelling. Normal range of motion. Cervical back: Normal range of motion. Skin:     General: Skin is warm and dry. Comments: SK to left nipple   Neurological:      General: No focal deficit present. Mental Status: He is alert and oriented to person, place, and time. Gait: Gait abnormal.   Psychiatric:         Mood and Affect: Mood normal.                An electronic signature was used to authenticate this note.     --Ashley Elmore MD

## 2022-09-19 ENCOUNTER — APPOINTMENT (OUTPATIENT)
Dept: CT IMAGING | Age: 87
End: 2022-09-19
Payer: COMMERCIAL

## 2022-09-19 ENCOUNTER — APPOINTMENT (OUTPATIENT)
Dept: GENERAL RADIOLOGY | Age: 87
End: 2022-09-19
Payer: COMMERCIAL

## 2022-09-19 ENCOUNTER — HOSPITAL ENCOUNTER (EMERGENCY)
Age: 87
Discharge: ANOTHER HOSPITAL | End: 2022-09-19
Attending: EMERGENCY MEDICINE
Payer: COMMERCIAL

## 2022-09-19 VITALS
SYSTOLIC BLOOD PRESSURE: 131 MMHG | OXYGEN SATURATION: 97 % | HEIGHT: 63 IN | DIASTOLIC BLOOD PRESSURE: 83 MMHG | HEART RATE: 94 BPM | WEIGHT: 103 LBS | TEMPERATURE: 97.8 F | RESPIRATION RATE: 15 BRPM | BODY MASS INDEX: 18.25 KG/M2

## 2022-09-19 DIAGNOSIS — S12.601A CLOSED NONDISPLACED FRACTURE OF SEVENTH CERVICAL VERTEBRA, UNSPECIFIED FRACTURE MORPHOLOGY, INITIAL ENCOUNTER (HCC): ICD-10-CM

## 2022-09-19 DIAGNOSIS — S22.029A CLOSED FRACTURE OF SECOND THORACIC VERTEBRA, UNSPECIFIED FRACTURE MORPHOLOGY, INITIAL ENCOUNTER (HCC): ICD-10-CM

## 2022-09-19 DIAGNOSIS — S22.49XA CLOSED FRACTURE OF MULTIPLE RIBS, UNSPECIFIED LATERALITY, INITIAL ENCOUNTER: ICD-10-CM

## 2022-09-19 DIAGNOSIS — J93.9 BILATERAL PNEUMOTHORACES: Primary | ICD-10-CM

## 2022-09-19 LAB
A/G RATIO: 1.2 (ref 1.1–2.2)
ABO/RH: NORMAL
ALBUMIN SERPL-MCNC: 4.2 G/DL (ref 3.4–5)
ALP BLD-CCNC: 62 U/L (ref 40–129)
ALT SERPL-CCNC: 30 U/L (ref 10–40)
ANION GAP SERPL CALCULATED.3IONS-SCNC: 11 MMOL/L (ref 3–16)
ANTIBODY SCREEN: NORMAL
AST SERPL-CCNC: 43 U/L (ref 15–37)
BASOPHILS ABSOLUTE: 0.1 K/UL (ref 0–0.2)
BASOPHILS RELATIVE PERCENT: 0.6 %
BILIRUB SERPL-MCNC: 0.5 MG/DL (ref 0–1)
BUN BLDV-MCNC: 35 MG/DL (ref 7–20)
CALCIUM SERPL-MCNC: 9.1 MG/DL (ref 8.3–10.6)
CHLORIDE BLD-SCNC: 100 MMOL/L (ref 99–110)
CO2: 28 MMOL/L (ref 21–32)
CREAT SERPL-MCNC: 1.3 MG/DL (ref 0.8–1.3)
EKG ATRIAL RATE: 102 BPM
EKG DIAGNOSIS: NORMAL
EKG P AXIS: 77 DEGREES
EKG P-R INTERVAL: 160 MS
EKG Q-T INTERVAL: 342 MS
EKG QRS DURATION: 80 MS
EKG QTC CALCULATION (BAZETT): 445 MS
EKG R AXIS: 35 DEGREES
EKG T AXIS: 57 DEGREES
EKG VENTRICULAR RATE: 102 BPM
EOSINOPHILS ABSOLUTE: 0.2 K/UL (ref 0–0.6)
EOSINOPHILS RELATIVE PERCENT: 2.3 %
GFR AFRICAN AMERICAN: >60
GFR NON-AFRICAN AMERICAN: 52
GLUCOSE BLD-MCNC: 104 MG/DL (ref 70–99)
HCT VFR BLD CALC: 39.9 % (ref 40.5–52.5)
HEMOGLOBIN: 12.7 G/DL (ref 13.5–17.5)
INR BLD: 2.16 (ref 0.87–1.14)
LYMPHOCYTES ABSOLUTE: 2.9 K/UL (ref 1–5.1)
LYMPHOCYTES RELATIVE PERCENT: 30.9 %
MCH RBC QN AUTO: 27.8 PG (ref 26–34)
MCHC RBC AUTO-ENTMCNC: 31.9 G/DL (ref 31–36)
MCV RBC AUTO: 87.4 FL (ref 80–100)
MONOCYTES ABSOLUTE: 0.7 K/UL (ref 0–1.3)
MONOCYTES RELATIVE PERCENT: 7.8 %
NEUTROPHILS ABSOLUTE: 5.5 K/UL (ref 1.7–7.7)
NEUTROPHILS RELATIVE PERCENT: 58.4 %
PDW BLD-RTO: 16 % (ref 12.4–15.4)
PLATELET # BLD: 137 K/UL (ref 135–450)
PMV BLD AUTO: 9.9 FL (ref 5–10.5)
POTASSIUM REFLEX MAGNESIUM: 4.2 MMOL/L (ref 3.5–5.1)
PROTHROMBIN TIME: 24.2 SEC (ref 11.7–14.5)
RBC # BLD: 4.56 M/UL (ref 4.2–5.9)
SODIUM BLD-SCNC: 139 MMOL/L (ref 136–145)
TOTAL PROTEIN: 7.8 G/DL (ref 6.4–8.2)
TROPONIN: 0.04 NG/ML
TROPONIN: 0.05 NG/ML
WBC # BLD: 9.5 K/UL (ref 4–11)

## 2022-09-19 PROCEDURE — 71045 X-RAY EXAM CHEST 1 VIEW: CPT

## 2022-09-19 PROCEDURE — 73590 X-RAY EXAM OF LOWER LEG: CPT

## 2022-09-19 PROCEDURE — 86901 BLOOD TYPING SEROLOGIC RH(D): CPT

## 2022-09-19 PROCEDURE — 86850 RBC ANTIBODY SCREEN: CPT

## 2022-09-19 PROCEDURE — 85610 PROTHROMBIN TIME: CPT

## 2022-09-19 PROCEDURE — 6360000002 HC RX W HCPCS: Performed by: PHYSICIAN ASSISTANT

## 2022-09-19 PROCEDURE — 99285 EMERGENCY DEPT VISIT HI MDM: CPT

## 2022-09-19 PROCEDURE — 71260 CT THORAX DX C+: CPT

## 2022-09-19 PROCEDURE — 73562 X-RAY EXAM OF KNEE 3: CPT

## 2022-09-19 PROCEDURE — 6360000004 HC RX CONTRAST MEDICATION: Performed by: PHYSICIAN ASSISTANT

## 2022-09-19 PROCEDURE — 90471 IMMUNIZATION ADMIN: CPT | Performed by: PHYSICIAN ASSISTANT

## 2022-09-19 PROCEDURE — 70450 CT HEAD/BRAIN W/O DYE: CPT

## 2022-09-19 PROCEDURE — 76376 3D RENDER W/INTRP POSTPROCES: CPT

## 2022-09-19 PROCEDURE — 72128 CT CHEST SPINE W/O DYE: CPT

## 2022-09-19 PROCEDURE — 84484 ASSAY OF TROPONIN QUANT: CPT

## 2022-09-19 PROCEDURE — 72125 CT NECK SPINE W/O DYE: CPT

## 2022-09-19 PROCEDURE — 74176 CT ABD & PELVIS W/O CONTRAST: CPT

## 2022-09-19 PROCEDURE — 85025 COMPLETE CBC W/AUTO DIFF WBC: CPT

## 2022-09-19 PROCEDURE — 73030 X-RAY EXAM OF SHOULDER: CPT

## 2022-09-19 PROCEDURE — 12001 RPR S/N/AX/GEN/TRNK 2.5CM/<: CPT

## 2022-09-19 PROCEDURE — 86900 BLOOD TYPING SEROLOGIC ABO: CPT

## 2022-09-19 PROCEDURE — 6370000000 HC RX 637 (ALT 250 FOR IP): Performed by: PHYSICIAN ASSISTANT

## 2022-09-19 PROCEDURE — 36415 COLL VENOUS BLD VENIPUNCTURE: CPT

## 2022-09-19 PROCEDURE — 80053 COMPREHEN METABOLIC PANEL: CPT

## 2022-09-19 PROCEDURE — 93005 ELECTROCARDIOGRAM TRACING: CPT | Performed by: EMERGENCY MEDICINE

## 2022-09-19 PROCEDURE — 90715 TDAP VACCINE 7 YRS/> IM: CPT | Performed by: PHYSICIAN ASSISTANT

## 2022-09-19 RX ORDER — LIDOCAINE 4 G/G
1 PATCH TOPICAL DAILY
Status: DISCONTINUED | OUTPATIENT
Start: 2022-09-19 | End: 2022-09-19 | Stop reason: HOSPADM

## 2022-09-19 RX ORDER — ACETAMINOPHEN 325 MG/1
650 TABLET ORAL ONCE
Status: COMPLETED | OUTPATIENT
Start: 2022-09-19 | End: 2022-09-19

## 2022-09-19 RX ADMIN — IOPAMIDOL 75 ML: 755 INJECTION, SOLUTION INTRAVENOUS at 17:30

## 2022-09-19 RX ADMIN — ACETAMINOPHEN 650 MG: 325 TABLET ORAL at 18:08

## 2022-09-19 RX ADMIN — TETANUS TOXOID, REDUCED DIPHTHERIA TOXOID AND ACELLULAR PERTUSSIS VACCINE, ADSORBED 0.5 ML: 5; 2.5; 8; 8; 2.5 SUSPENSION INTRAMUSCULAR at 18:07
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
## 2022-09-19 NOTE — ED NOTES
Pt placed in c-collar. Pt to ER CT for additional scans.       Dale Walker, CHADD  09/19/22 [address] RN  09/19/22 [id number]
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
## 2022-09-20 NOTE — ED NOTES
JENNIFER complete at this time. Nurse to nurse report called to [address] at [facility]. Pt is stable and agreeable, no questions or concerns at this time.       Jennifer Cabrera, [facility]  09/19/22 2033

## 2022-09-21 ENCOUNTER — TELEPHONE (OUTPATIENT)
Dept: INTERNAL MEDICINE CLINIC | Age: 87
End: 2022-09-21
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
## 2022-09-28 ENCOUNTER — TELEPHONE (OUTPATIENT)
Dept: INTERNAL MEDICINE CLINIC | Age: 87
End: 2022-09-28

## 2022-09-28 NOTE — TELEPHONE ENCOUNTER
Call returned by the physician to the patient's wife . Surgery Scheduled.    Date of Surgery/ Procedure: 5/5/17  [facility]  Time of Surgery/ Procedure: 1:30  Hospital/Surgical Facility: [facility]  Primary Physician: Cruzito  Type of Anesthesia Anticipated: General    Surgery packet given to patient. Patient was instructed to schedule pre-op exam. Pre surgery discussion scheduled 4/27/17 10:30.  Hysterectomy form signed  RIRI Chan 4/13/2017         Pre cert done

## 2022-09-28 NOTE — TELEPHONE ENCOUNTER
Patients wife would like a call back from Dr. Sugar Melgar. Patient is still in ICU surgical at [facility].    Dr. Sugar Melgar was supposed to try to get him in [facility] when  Adriane Amaro was ready. He is not ready yet. .. Please review the MyChart and return Mrs. Ricketts's call.     They would appreciate your consideration at this time!!

## 2022-10-04 NOTE — TELEPHONE ENCOUNTER
Call returned to spouse. She is aware that physician is out of the office. She said that the  from [employer] has been out to see her . She is not sure if he will be ready to go by Thursday but they have clamped off his chest tube.

## 2022-10-04 NOTE — TELEPHONE ENCOUNTER
Patients wife called, they are hoping Dr. Thomas Krishna will be ready by Thursday (10/06/22) for the bed at [facility]. They are just following up to see if the bed would be available on Thursday.     Please call her back and advise    Current patient room phone # [phone]

## 2022-10-06 ENCOUNTER — HOSPITAL ENCOUNTER (INPATIENT)
Age: 87
LOS: 16 days | Discharge: HOME HEALTH | DRG: 091 | End: 2022-10-22
Attending: PHYSICAL MEDICINE & REHABILITATION | Admitting: PHYSICAL MEDICINE & REHABILITATION
Payer: MEDICARE

## 2022-10-06 DIAGNOSIS — R53.81 PHYSICAL DEBILITY: Primary | ICD-10-CM

## 2022-10-06 DIAGNOSIS — D69.6 THROMBOCYTOPENIA, UNSPECIFIED (HCC): ICD-10-CM

## 2022-10-06 LAB
GLUCOSE BLD-MCNC: 144 MG/DL (ref 70–99)
PERFORMED ON: ABNORMAL

## 2022-10-06 PROCEDURE — 6360000002 HC RX W HCPCS: Performed by: PHYSICAL MEDICINE & REHABILITATION

## 2022-10-06 PROCEDURE — 2580000003 HC RX 258: Performed by: PHYSICAL MEDICINE & REHABILITATION

## 2022-10-06 PROCEDURE — 94640 AIRWAY INHALATION TREATMENT: CPT

## 2022-10-06 PROCEDURE — 6370000000 HC RX 637 (ALT 250 FOR IP): Performed by: PHYSICAL MEDICINE & REHABILITATION

## 2022-10-06 PROCEDURE — 94150 VITAL CAPACITY TEST: CPT

## 2022-10-06 PROCEDURE — 1280000000 HC REHAB R&B

## 2022-10-06 RX ORDER — ACETAMINOPHEN 325 MG/1
650 TABLET ORAL EVERY 4 HOURS PRN
Status: DISCONTINUED | OUTPATIENT
Start: 2022-10-06 | End: 2022-10-22 | Stop reason: HOSPADM

## 2022-10-06 RX ORDER — DEXTROSE MONOHYDRATE 100 MG/ML
INJECTION, SOLUTION INTRAVENOUS CONTINUOUS PRN
Status: DISCONTINUED | OUTPATIENT
Start: 2022-10-06 | End: 2022-10-22 | Stop reason: HOSPADM

## 2022-10-06 RX ORDER — WARFARIN SODIUM 1 MG/1
2 TABLET ORAL DAILY
Status: DISCONTINUED | OUTPATIENT
Start: 2022-10-07 | End: 2022-10-08

## 2022-10-06 RX ORDER — MECOBALAMIN 5000 MCG
5 TABLET,DISINTEGRATING ORAL NIGHTLY
Status: DISCONTINUED | OUTPATIENT
Start: 2022-10-06 | End: 2022-10-22 | Stop reason: HOSPADM

## 2022-10-06 RX ORDER — ATORVASTATIN CALCIUM 10 MG/1
10 TABLET, FILM COATED ORAL NIGHTLY
Status: DISCONTINUED | OUTPATIENT
Start: 2022-10-06 | End: 2022-10-22 | Stop reason: HOSPADM

## 2022-10-06 RX ORDER — SIMETHICONE 80 MG
80 TABLET,CHEWABLE ORAL EVERY 6 HOURS PRN
Status: DISCONTINUED | OUTPATIENT
Start: 2022-10-06 | End: 2022-10-22 | Stop reason: HOSPADM

## 2022-10-06 RX ORDER — POLYETHYLENE GLYCOL 3350 17 G/17G
17 POWDER, FOR SOLUTION ORAL DAILY PRN
Status: DISCONTINUED | OUTPATIENT
Start: 2022-10-06 | End: 2022-10-22 | Stop reason: HOSPADM

## 2022-10-06 RX ORDER — INSULIN LISPRO 100 [IU]/ML
0-4 INJECTION, SOLUTION INTRAVENOUS; SUBCUTANEOUS NIGHTLY
Status: DISCONTINUED | OUTPATIENT
Start: 2022-10-06 | End: 2022-10-13

## 2022-10-06 RX ORDER — GUAIFENESIN 600 MG/1
600 TABLET, EXTENDED RELEASE ORAL 2 TIMES DAILY
Status: DISCONTINUED | OUTPATIENT
Start: 2022-10-06 | End: 2022-10-22 | Stop reason: HOSPADM

## 2022-10-06 RX ORDER — WARFARIN SODIUM 1 MG/1
1 TABLET ORAL DAILY
Status: DISCONTINUED | OUTPATIENT
Start: 2022-10-06 | End: 2022-10-06

## 2022-10-06 RX ORDER — FERROUS SULFATE 325(65) MG
325 TABLET ORAL
Status: DISCONTINUED | OUTPATIENT
Start: 2022-10-07 | End: 2022-10-13

## 2022-10-06 RX ORDER — ACETYLCYSTEINE 200 MG/ML
600 SOLUTION ORAL; RESPIRATORY (INHALATION) 2 TIMES DAILY
Status: DISCONTINUED | OUTPATIENT
Start: 2022-10-06 | End: 2022-10-09

## 2022-10-06 RX ORDER — IPRATROPIUM BROMIDE AND ALBUTEROL SULFATE 2.5; .5 MG/3ML; MG/3ML
1 SOLUTION RESPIRATORY (INHALATION)
Status: DISCONTINUED | OUTPATIENT
Start: 2022-10-06 | End: 2022-10-11

## 2022-10-06 RX ORDER — PANTOPRAZOLE SODIUM 40 MG/1
40 TABLET, DELAYED RELEASE ORAL
Status: DISCONTINUED | OUTPATIENT
Start: 2022-10-07 | End: 2022-10-22 | Stop reason: HOSPADM

## 2022-10-06 RX ORDER — METHOCARBAMOL 500 MG/1
500 TABLET, FILM COATED ORAL 3 TIMES DAILY
Status: DISCONTINUED | OUTPATIENT
Start: 2022-10-06 | End: 2022-10-13

## 2022-10-06 RX ORDER — OXYCODONE HYDROCHLORIDE 5 MG/1
5 TABLET ORAL EVERY 4 HOURS PRN
Status: DISCONTINUED | OUTPATIENT
Start: 2022-10-06 | End: 2022-10-14

## 2022-10-06 RX ORDER — INSULIN LISPRO 100 [IU]/ML
0-4 INJECTION, SOLUTION INTRAVENOUS; SUBCUTANEOUS
Status: DISCONTINUED | OUTPATIENT
Start: 2022-10-06 | End: 2022-10-13

## 2022-10-06 RX ORDER — ALBUTEROL SULFATE 2.5 MG/3ML
2.5 SOLUTION RESPIRATORY (INHALATION) EVERY 6 HOURS PRN
Status: DISCONTINUED | OUTPATIENT
Start: 2022-10-06 | End: 2022-10-11

## 2022-10-06 RX ORDER — GABAPENTIN 100 MG/1
100 CAPSULE ORAL NIGHTLY
Status: DISCONTINUED | OUTPATIENT
Start: 2022-10-06 | End: 2022-10-13

## 2022-10-06 RX ADMIN — CEFTRIAXONE 2000 MG: 2 INJECTION, POWDER, FOR SOLUTION INTRAMUSCULAR; INTRAVENOUS at 23:12

## 2022-10-06 RX ADMIN — METHOCARBAMOL TABLETS 500 MG: 500 TABLET, COATED ORAL at 22:36

## 2022-10-06 RX ADMIN — ATORVASTATIN CALCIUM 10 MG: 10 TABLET, FILM COATED ORAL at 22:36

## 2022-10-06 RX ADMIN — GUAIFENESIN 600 MG: 600 TABLET, EXTENDED RELEASE ORAL at 22:36

## 2022-10-06 RX ADMIN — Medication 5 MG: at 22:36

## 2022-10-06 RX ADMIN — IPRATROPIUM BROMIDE AND ALBUTEROL SULFATE 1 AMPULE: 2.5; .5 SOLUTION RESPIRATORY (INHALATION) at 20:17

## 2022-10-06 RX ADMIN — GABAPENTIN 100 MG: 100 CAPSULE ORAL at 22:36
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
## 2022-10-07 PROBLEM — D64.9 ANEMIA: Status: ACTIVE | Noted: 2022-10-07

## 2022-10-07 PROBLEM — E87.5 HYPERKALEMIA: Status: ACTIVE | Noted: 2022-10-07

## 2022-10-07 PROBLEM — I95.1 ORTHOSTATIC HYPOTENSION: Status: ACTIVE | Noted: 2022-10-07

## 2022-10-07 PROBLEM — E87.8 ELECTROLYTE IMBALANCE: Status: ACTIVE | Noted: 2022-10-07

## 2022-10-07 LAB
ANION GAP SERPL CALCULATED.3IONS-SCNC: 6 MMOL/L (ref 3–16)
BASOPHILS ABSOLUTE: 0.1 K/UL (ref 0–0.2)
BASOPHILS RELATIVE PERCENT: 0.6 %
BUN BLDV-MCNC: 39 MG/DL (ref 7–20)
CALCIUM SERPL-MCNC: 8.4 MG/DL (ref 8.3–10.6)
CHLORIDE BLD-SCNC: 102 MMOL/L (ref 99–110)
CO2: 33 MMOL/L (ref 21–32)
CREAT SERPL-MCNC: 1.1 MG/DL (ref 0.8–1.3)
EOSINOPHILS ABSOLUTE: 0.1 K/UL (ref 0–0.6)
EOSINOPHILS RELATIVE PERCENT: 1.3 %
GFR AFRICAN AMERICAN: >60
GFR NON-AFRICAN AMERICAN: >60
GLUCOSE BLD-MCNC: 106 MG/DL (ref 70–99)
GLUCOSE BLD-MCNC: 116 MG/DL (ref 70–99)
GLUCOSE BLD-MCNC: 125 MG/DL (ref 70–99)
GLUCOSE BLD-MCNC: 149 MG/DL (ref 70–99)
GLUCOSE BLD-MCNC: 174 MG/DL (ref 70–99)
GLUCOSE BLD-MCNC: 188 MG/DL (ref 70–99)
HCT VFR BLD CALC: 24.1 % (ref 40.5–52.5)
HEMOGLOBIN: 7.7 G/DL (ref 13.5–17.5)
INR BLD: 1.89 (ref 0.87–1.14)
LYMPHOCYTES ABSOLUTE: 1.3 K/UL (ref 1–5.1)
LYMPHOCYTES RELATIVE PERCENT: 13.4 %
MCH RBC QN AUTO: 27.5 PG (ref 26–34)
MCHC RBC AUTO-ENTMCNC: 32.1 G/DL (ref 31–36)
MCV RBC AUTO: 85.6 FL (ref 80–100)
MONOCYTES ABSOLUTE: 0.8 K/UL (ref 0–1.3)
MONOCYTES RELATIVE PERCENT: 8.6 %
NEUTROPHILS ABSOLUTE: 7.2 K/UL (ref 1.7–7.7)
NEUTROPHILS RELATIVE PERCENT: 76.1 %
PDW BLD-RTO: 17.4 % (ref 12.4–15.4)
PERFORMED ON: ABNORMAL
PLATELET # BLD: 405 K/UL (ref 135–450)
PMV BLD AUTO: 8.5 FL (ref 5–10.5)
POTASSIUM REFLEX MAGNESIUM: 5.3 MMOL/L (ref 3.5–5.1)
PROTHROMBIN TIME: 21.7 SEC (ref 11.7–14.5)
RBC # BLD: 2.82 M/UL (ref 4.2–5.9)
SODIUM BLD-SCNC: 141 MMOL/L (ref 136–145)
WBC # BLD: 9.5 K/UL (ref 4–11)

## 2022-10-07 PROCEDURE — 99222 1ST HOSP IP/OBS MODERATE 55: CPT | Performed by: INTERNAL MEDICINE

## 2022-10-07 PROCEDURE — 85025 COMPLETE CBC W/AUTO DIFF WBC: CPT

## 2022-10-07 PROCEDURE — 94761 N-INVAS EAR/PLS OXIMETRY MLT: CPT

## 2022-10-07 PROCEDURE — 97166 OT EVAL MOD COMPLEX 45 MIN: CPT

## 2022-10-07 PROCEDURE — 97535 SELF CARE MNGMENT TRAINING: CPT

## 2022-10-07 PROCEDURE — 97163 PT EVAL HIGH COMPLEX 45 MIN: CPT

## 2022-10-07 PROCEDURE — 2580000003 HC RX 258: Performed by: PHYSICAL MEDICINE & REHABILITATION

## 2022-10-07 PROCEDURE — 2700000000 HC OXYGEN THERAPY PER DAY

## 2022-10-07 PROCEDURE — 99222 1ST HOSP IP/OBS MODERATE 55: CPT | Performed by: PHYSICAL MEDICINE & REHABILITATION

## 2022-10-07 PROCEDURE — 6370000000 HC RX 637 (ALT 250 FOR IP): Performed by: INTERNAL MEDICINE

## 2022-10-07 PROCEDURE — 6360000002 HC RX W HCPCS: Performed by: PHYSICAL MEDICINE & REHABILITATION

## 2022-10-07 PROCEDURE — 97530 THERAPEUTIC ACTIVITIES: CPT

## 2022-10-07 PROCEDURE — 1280000000 HC REHAB R&B

## 2022-10-07 PROCEDURE — 36415 COLL VENOUS BLD VENIPUNCTURE: CPT

## 2022-10-07 PROCEDURE — 85610 PROTHROMBIN TIME: CPT

## 2022-10-07 PROCEDURE — 94640 AIRWAY INHALATION TREATMENT: CPT

## 2022-10-07 PROCEDURE — 2500000003 HC RX 250 WO HCPCS: Performed by: PHYSICAL MEDICINE & REHABILITATION

## 2022-10-07 PROCEDURE — 6370000000 HC RX 637 (ALT 250 FOR IP): Performed by: PHYSICAL MEDICINE & REHABILITATION

## 2022-10-07 PROCEDURE — 97110 THERAPEUTIC EXERCISES: CPT

## 2022-10-07 PROCEDURE — 80048 BASIC METABOLIC PNL TOTAL CA: CPT

## 2022-10-07 RX ORDER — FLUDROCORTISONE ACETATE 0.1 MG/1
0.1 TABLET ORAL DAILY
Status: DISCONTINUED | OUTPATIENT
Start: 2022-10-07 | End: 2022-10-11

## 2022-10-07 RX ADMIN — GUAIFENESIN 600 MG: 600 TABLET, EXTENDED RELEASE ORAL at 21:39

## 2022-10-07 RX ADMIN — CEFTRIAXONE 2000 MG: 2 INJECTION, POWDER, FOR SOLUTION INTRAMUSCULAR; INTRAVENOUS at 23:03

## 2022-10-07 RX ADMIN — METHOCARBAMOL TABLETS 500 MG: 500 TABLET, COATED ORAL at 21:38

## 2022-10-07 RX ADMIN — PANTOPRAZOLE SODIUM 40 MG: 40 TABLET, DELAYED RELEASE ORAL at 06:38

## 2022-10-07 RX ADMIN — METHOCARBAMOL TABLETS 500 MG: 500 TABLET, COATED ORAL at 14:14

## 2022-10-07 RX ADMIN — GUAIFENESIN 600 MG: 600 TABLET, EXTENDED RELEASE ORAL at 08:20

## 2022-10-07 RX ADMIN — WARFARIN SODIUM 2 MG: 1 TABLET ORAL at 17:22

## 2022-10-07 RX ADMIN — Medication 5 MG: at 21:38

## 2022-10-07 RX ADMIN — IPRATROPIUM BROMIDE AND ALBUTEROL SULFATE 1 AMPULE: 2.5; .5 SOLUTION RESPIRATORY (INHALATION) at 15:59

## 2022-10-07 RX ADMIN — IPRATROPIUM BROMIDE AND ALBUTEROL SULFATE 1 AMPULE: 2.5; .5 SOLUTION RESPIRATORY (INHALATION) at 20:26

## 2022-10-07 RX ADMIN — FERROUS SULFATE TAB 325 MG (65 MG ELEMENTAL FE) 325 MG: 325 (65 FE) TAB at 08:20

## 2022-10-07 RX ADMIN — METHOCARBAMOL TABLETS 500 MG: 500 TABLET, COATED ORAL at 08:20

## 2022-10-07 RX ADMIN — IPRATROPIUM BROMIDE AND ALBUTEROL SULFATE 1 AMPULE: 2.5; .5 SOLUTION RESPIRATORY (INHALATION) at 08:24

## 2022-10-07 RX ADMIN — Medication: at 21:47

## 2022-10-07 RX ADMIN — ACETYLCYSTEINE 600 MG: 200 SOLUTION ORAL; RESPIRATORY (INHALATION) at 08:27

## 2022-10-07 RX ADMIN — GABAPENTIN 100 MG: 100 CAPSULE ORAL at 21:38

## 2022-10-07 RX ADMIN — FLUDROCORTISONE ACETATE 0.1 MG: 0.1 TABLET ORAL at 17:23

## 2022-10-07 RX ADMIN — ATORVASTATIN CALCIUM 10 MG: 10 TABLET, FILM COATED ORAL at 21:38

## 2022-10-07 ASSESSMENT — PAIN SCALES - GENERAL
PAINLEVEL_OUTOF10: 0

## 2022-10-07 NOTE — PLAN OF CARE
Problem: Discharge Planning  Goal: Discharge to home or other facility with appropriate resources  Outcome: Progressing  Flowsheets  Taken 10/7/2022 1747  Discharge to home or other facility with appropriate resources:   Identify barriers to discharge with patient and caregiver   Arrange for needed discharge resources and transportation as appropriate   Identify discharge learning needs (meds, wound care, etc)  Taken 10/7/2022 0800  Discharge to home or other facility with appropriate resources: Identify barriers to discharge with patient and caregiver     Problem: Safety - Adult  Goal: Free from fall injury  Outcome: Progressing  Note: Pt remains free of falls thus far this shift. All fall precautions in place and functioning properly. Problem: Nutrition Deficit:  Goal: Optimize nutritional status  Outcome: Progressing  Flowsheets (Taken 10/7/2022 1747)  Nutrient intake appropriate for improving, restoring, or maintaining nutritional needs:   Assess nutritional status and recommend course of action   Monitor oral intake, labs, and treatment plans   Recommend appropriate diets, oral nutritional supplements, and vitamin/mineral supplements  Note: Patient continues to have a good appetite and eat adequately.

## 2022-10-07 NOTE — PROGRESS NOTES
Comprehensive Nutrition Assessment    Type and Reason for Visit:  Initial    Nutrition Recommendations/Plan:   Continue CCC-5 Diet  ONS; Glucerna(chocolate) TID     Malnutrition Assessment:  Malnutrition Status: At risk for malnutrition (Comment) (10/07/22 9342)    Context:  Acute Illness     Findings of the 6 clinical characteristics of malnutrition:  Energy Intake:  Unable to assess  Weight Loss:  No significant weight loss     Body Fat Loss:  Mild body fat loss Buccal region, Orbital   Muscle Mass Loss:  Mild muscle mass loss Temples (temporalis), Clavicles (pectoralis & deltoids)  Fluid Accumulation:  No significant fluid accumulation     Strength:  Not Performed    Nutrition Assessment:    Consult for ONS. Pt new to ARU. Receiving a CCC-5 diet. Pt reports improved appetite since admit to hospital. Visited at noon meal; ate % of fish and mac and cheese. Reports weight loss past couple of years, however, none recent. Obtained weight hx per EMR of 100 lbs(4/21) and 106 lbs(6/06), indicating no significant loss. Agreeable to receive Glucerna supplement(prefers chocolate) to promote adequate nutrition. Noted report of coccygeal ulcer. ONS to assist with wound healing. Continue to monitor. Nutrition Related Findings:    Glu 188 K+ 5.3 other lytes wnl. no BM recorded. No edema. Wound Type: Pressure Injury (coccygeal ulcer per MD)       Current Nutrition Intake & Therapies:    Average Meal Intake: 26-50%  Average Supplements Intake: None Ordered  ADULT DIET; Regular; 5 carb choices (75 gm/meal)  ADULT ORAL NUTRITION SUPPLEMENT; Breakfast, Lunch, Dinner; Diabetic Oral Supplement    Anthropometric Measures:  Height: 5' 3\" (160 cm)  Ideal Body Weight (IBW): 124 lbs (56 kg)       Current Body Weight: 104 lb (47.2 kg),   IBW.  Weight Source: Bed Scale  Current BMI (kg/m2): 18.4  Usual Body Weight: 106 lb (48.1 kg)  % Weight Change (Calculated): -1.9           BMI Categories: Underweight (BMI less than 22) age over 65    Estimated Daily Nutrient Needs:  Energy Requirements Based On: Kcal/kg  Weight Used for Energy Requirements: Current  Energy (kcal/day): 1736-1135  Weight Used for Protein Requirements: Current  Protein (g/day): 71-85  Method Used for Fluid Requirements: 1 ml/kcal  Fluid (ml/day): 3853-1437    Nutrition Diagnosis:   Inadequate oral intake related to acute injury/trauma, inadequate protein-energy intake as evidenced by intake 26-50%, poor intake prior to admission  Increased nutrient needs related to increase demand for energy/nutrients as evidenced by wounds    Nutrition Interventions:   Food and/or Nutrient Delivery: Continue Current Diet, Start Oral Nutrition Supplement  Nutrition Education/Counseling: No recommendation at this time  Coordination of Nutrition Care: Continue to monitor while inpatient       Goals:     Goals: PO intake 50% or greater, within 7 days       Nutrition Monitoring and Evaluation:   Behavioral-Environmental Outcomes: None Identified  Food/Nutrient Intake Outcomes: Food and Nutrient Intake, Supplement Intake  Physical Signs/Symptoms Outcomes: Biochemical Data, Skin, Weight    Discharge Planning:     Too soon to determine     Maninder Haile, [address]: [id number]
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
## 2022-10-07 NOTE — CARE COORDINATION
Pt admitted from [address] [place] [address] yesterday. Pt is from home with his spouse. PTA, independent with all ADL's but used a rolling walker at home. No skilled Home Care involvement. SW will follow up early next week including after Team Conference on 10/11/22 and advise Pt and spouse of DC date/plan and needs.      Anny Horta [facility]  Case Management  [phone]
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
## 2022-10-07 NOTE — PROGRESS NOTES
Alert, oriented. VSS on 2L NC. Lungs clear/diminished T/O. Positive orthostatic BPs during therapy with SBP in the mid 80s and symptomatic. Stabilized when put in recliner. See flow sheet. New consult for nephrology. See orders. Denying pain T/O shift. Stand pivot x 2 transfer. Participated in therapy. Wife at bedside. Assessment complete. No other new issues.

## 2022-10-07 NOTE — PLAN OF CARE
Problem: Discharge Planning  Goal: Discharge to home or other facility with appropriate resources  Outcome: Progressing  Flowsheets (Taken 10/6/2022 2042)  Discharge to home or other facility with appropriate resources:   Identify barriers to discharge with patient and caregiver   Identify discharge learning needs (meds, wound care, etc)     Problem: Safety - Adult  Goal: Free from fall injury  Outcome: Progressing   Pt. Admitted to facility with impaired gait and weakness. Fall prevention measures in place to promote safety and reduce the risk of falls: Fall sign posted on door, bed wheels locked and in lowest position, call light and over bed table placed within reach. Hourly rounding and frequent visual checks in place. Will continue to monitor     Problem: ABCDS Injury Assessment  Goal: Absence of physical injury  Outcome: Progressing   Pt will be free from accidental injury during this stay on the ARU. Will continue to monitor pt and assess per schedule and prn. Problem: Skin/Tissue Integrity  Goal: Absence of new skin breakdown  Description: 1. Monitor for areas of redness and/or skin breakdown  2. Assess vascular access sites hourly  3. Every 4-6 hours minimum:  Change oxygen saturation probe site  4. Every 4-6 hours:  If on nasal continuous positive airway pressure, respiratory therapy assess nares and determine need for appliance change or resting period. Outcome: Progressing   Pt. Scattered abrasion, bruising and skin breakdown to coccyx, wound care consult placed, specialty bed ordered, pt. Turned and repositioned Q 2 hrs and PRN. Skin integrity will be improved or be maintained by discharge.
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
## 2022-10-07 NOTE — PROGRESS NOTES
Physical Therapy  Facility/Department: [facility] UNIT  Rehabilitation Physical Therapy Initial Assessment & Daily Treatment    NAME: Oleg Salinas MD  : 1935 (82 y.o.)  MRN: [id number]  CODE STATUS: Full Code    Date of Service: 10/7/22      Past Medical History:   Diagnosis Date    Anemia     Aortic valve disorders     stenosis    Arthritis     Aspiration pneumonia ([facility] 2015    Erectile dysfunction     Esophageal cancer ([facility] 10/18/2012    Hernia, femoral, bilateral 2014    History of blood transfusion     Hyperlipidemia     Osteoporosis, senile 2014    Pancreatitis 2013    Patient underweight 2013    Prostate cancer ([facility]     Unspecified essential hypertension      Past Surgical History:   Procedure Laterality Date    APPENDECTOMY      as a child    BONE GRAFT      for saddle nose    CARDIAC VALVE REPLACEMENT  2006    aorta    CHOLECYSTECTOMY      COLONOSCOPY  2009    COLONOSCOPY  10/05/2015    COLONOSCOPY N/A 2021    COLONOSCOPY DIAGNOSTIC/STOMA performed by Waylon Cabrera MD at [address] [address]  2021    COLONOSCOPY POLYPECTOMY SNARE/COLD BIOPSY performed by Waylon Cabrera MD at [address] [facility]  2006    DILATATION, ESOPHAGUS          ESOPHAGUS SURGERY      EYE SURGERY  &     cataract bilat removal     GASTRECTOMY      IR PERC CATH PLEURAL DRAIN W/IMAG  2022    IR PERC CATH PLEURAL DRAIN W/IMAG    PROSTATE SURGERY      seeds    TONSILLECTOMY      UPPER GASTROINTESTINAL ENDOSCOPY N/A 2021    EGD BIOPSY performed by Waylon Cabrera MD at [address] [facility] [address] 2022    EGD CONTROL HEMORRHAGE performed by John Olson MD at [address] [place] PROSTATE/TRANSRECTAL VOL STUDY BRACHYTHERAPY         Chart Reviewed: Yes  Patient assessed for rehabilitation services?: Yes  Family / Caregiver Present: Yes (wife)  Referring Practitioner: DO Susy  Referral Date : 10/07/22    Restrictions:  Position Activity Restriction  Other position/activity restrictions: up w/ assist, ambulate pt, up as tolerated,     SUBJECTIVE  Subjective: Pt supine in bed upon PT arrival, agreeable to therapy evaluation. Pt denied pain. Post Treatment Pain Screening       Co-treatment indicated 2/2 two skilled disciplines required to safely mobilize pt and integrate multiple skills to progress independence. Prior Level of Function:  Social/Functional History  Lives With: Spouse  Type of Home: Condo  Home Layout: Two level (bed/bath on main level; exercises on lower level (1 flight of stairs to access))  Home Access: Stairs to enter without rails  Entrance Stairs - Number of Steps: 2  Bathroom Shower/Tub: Walk-in shower  Bathroom Toilet: Handicap height  Bathroom Equipment: Grab bars in shower, Shower chair  Bathroom Accessibility: Not accessible  Home Equipment: Walker, [place], Juanna Kenning, 4 wheeled  Has the patient had two or more falls in the past year or any fall with injury in the past year?: Yes (1 - fell in garage when he reached for walker, resulted in \"large hematoma\")  Receives Help From: Family (son lives a block away - rarely available)  ADL Assistance: Independent  Homemaking Assistance:  (wife completes; occasionally cooked)  Homemaking Responsibilities: No  Ambulation Assistance: Independent (using RW)  Transfer Assistance: Independent  Active : Yes  Education: MD  Occupation: Retired  Leisure & Hobbies: \"use my computer\", reading  Additional Comments: Spouse can provide 24 hr supervision, not physical assist      OBJECTIVE  Vision  Vision: Impaired  Vision Exceptions: Wears glasses for reading    Hearing  Hearing: Within functional limits    Cognition  Overall Cognitive Status: WFL  Arousal/Alertness: Appropriate responses to stimuli  Following Commands:  Follows multistep commands consistently  Attention Span: Appears intact  Memory: Appears intact  Safety Judgement: Good awareness of safety precautions  Problem Solving: Good awareness of errors made  Insights: Fully aware of deficits  Initiation: Requires cues for some  Sequencing: Requires cues for some  Cognition Comment: Pt with slight decreased alertness with drop in BP, but appropriately responsive to questions. Improved with increase in BP in reclined position. ROM  AROM RLE (degrees)  RLE AROM: WFL  RLE General AROM: Seated in w/c, pt lacks ~15 degrees knee extension; PF/DF and hip flexion WFL  AROM LLE (degrees)  LLE AROM : Exceptions  LLE General AROM: Seated in w/c, pt lacks ~15 degrees knee extension; PF/DF and hip flexion [facility]    Strength  Strength RLE  Strength RLE: Exception  Comment: Strength grossly 3/5 of DF, knee extension, hip flexion AEB ability to complete available range against gravity  Strength LLE  Strength LLE: Exception  Comment: Strength grossly 3/5 of DF, knee extension, hip flexion AEB ability to complete available range against gravity  Strength Other  Other:  In stance, pt's knees buckle bilaterally indicating <3+/5 strength    Quality of Movement  Tone RLE  RLE Tone: Normotonic  Tone LLE  LLE Tone: Normotonic       Vitals (pt on 2L O2 via NC)  Supine: /65,  bpm, SpO2 94%  Seated: BP 96/54,  bpm, SpO2 94%  Seated (following seated exercises): BP 85/56,  bpm, SpO2 94%  Semi-reclined in chair with LE's elevated: /58    Functional Mobility  Bed mobility  Rolling to Left: Supervision (VC for pt to complete roll (performed partially))  Rolling to Right: Supervision (VC for pt to complete roll (performed partially))  Supine to Sit: Contact guard assistance  Bed Mobility Comments: Due to orthostatic BP response, pt not returned to supine this session in order to increase tolerance to upright (BP increased with time in upright and semi-reclined position)  Transfers  Sit to Stand: Dependent/Total;2 Person Assistance (Pt unable to attain an erect stance position 2/2 bilateral knee buckling)  Stand to Sit: Dependent/Total;2 Person Assistance (For controlled descent)  Bed to Chair: Dependent/Total;2 Person Assistance (Performed via squat pivot (see below))  Stand Pivot Transfers: 2 Person Assistance;Dependent/Total (w/c>chair; Ana + CGA to maintain upright with knee buckling and to guide hips to chair)  Squat Pivot Transfers: Dependent/Total;2 Person Assistance (bed>w/c; Ana + ModA for anterior weight shift and to guide hips to chair)  Balance  Posture: Poor (Significant forward flexed posture seated EOB. Pt able to improve but not correct completely with verbal cueing. Pt with significant scoliosis - S curve to the R)  Sitting - Static: Good (SBA)  Sitting - Dynamic: Fair (Seated in w/c, pt performed upper/lower body exercises with SVN for balance)  Standing - Static: Poor (MaxA x2 d/t bilateral knee buckling, using RW)  Standing - Dynamic:  (Unable to assess 2/2 pt unable to safely maintain stance)    Environmental Mobility  Wheelchair Activities  Wheelchair Type: Standard  Wheelchair Cushion: Pressure Relieving  Wheelchair Parts Management: Yes  All Wheelchair Parts Management: SVN for management of brakes; VC for instruction  Propulsion: Yes  Propulsion 1  Propulsion: Manual  Level: Level Tile  Method: RUE;LUE;RLE;LLE  Level of Assistance: Supervision  Description/ Details: Pt navigated w/c within room  Distance: ~20' within room with multiple turns    PT Exercises  Exercise Treatment: To improve circulation in upright position and to address strength deficits, PT instructed pt on the following LE exercises: ankle pumps X10, LAQ X10 each, seated marches X5 each      2nd Session  Pt seated in recliner upon PT arrival, alert and agreeable to therapy. Co-treatment indicated to integrate multiple skills and progress independence. Pt sat in recliner with SVN for balance and performed upper/lower body dressing (see OT note for specific details).  Pt stood to RW with Ana to stand for anterior weight shift and VC for hand placement and to scoot to EOS, then Mod-MaxA to maintain standing balance d/t strong posterior lean and bilateral knee buckling. In standing with 1 person assist, OT assisted pt to lift pants from knees threaded in sitting. Pt tolerated stance < 30 sec. Pt sat with MaxA for controlled descent and VC for hand placement. /73, HR 93 in sitting with back support following 1 standing trial.   Pt tolerated another standing trial (<30 sec) with the same assist levels and posterior lean/knee buckling noted again. With knee buckling, pt noted to re-activate quads quickly after buckling, but requires MaxA to maintain upright in the interim. Pt was educated on role and technique for pressure relief methods d/t pt with fragile skin and already existing coccygeal ulcer. Pt verbalized understanding. PT instructed pt on performing chair pushups to facilitate strengthening and pressure relief from recliner. Pt performed 5 reps X 5 second holds with CGA overall. PT provided VC for anterior WS to unweight hips rather than stand up. Pt transferred bed>chair via squat pivot with ModA for anterior WS and to pivot with VC for setup and sequencing. Pt performed sit>supine with SVN for safety and VC for re-positioning to center of bed. Pt performed cephalo/caudal and lateral scooting with VC for technique, able to achieve a few inches of movement in all directions. All bed mobility performed on convoluted bed with HOB flat and no handrails to simulate home environment. SpO2 91% with pt semi-supine in bed (pt on 2L O2 via NC). PT educated pt on pursed lip breathing technique using visual feedback of monitor for pt to watch. SpO2 increased to 94% following this activity. Pt left in bed, bed alarm on, call light and needs within reach.              ASSESSMENT       Activity Tolerance  Activity Tolerance: Treatment limited secondary to medical complications  Activity Tolerance Comments: Limited by orthostatic BP response to upright    Assessment  Assessment: Pt is functioning well below baseline, requiring up to MaxA to stand and unable to safely ambulate d/t significant knee buckling in stance. Pt was independent and active prior to admission, motivated to return to prior level of function including frequent exercise participation. Pt was limited today by poor activity tolerance, including symptomatic orthostatic BP response to upright positioning. He will benefit from continued skilled PT to maximize potential and facilitate return to PLOF. Therapy Prognosis: Good  Decision Making: High Complexity  Discharge Recommendations: Continue to assess pending progress;24 hour supervision or assist;Home with Home health PT  PT D/C Equipment  Other: Ongoing assessment  PT Equipment Recommendations  Other: Ongoing assessment    CLINICAL IMPRESSION   Pt is functioning well below baseline, requiring up to MaxA to stand and unable to safely ambulate d/t significant knee buckling in stance. Pt was independent and active prior to admission, motivated to return to prior level of function including frequent exercise participation. Pt was limited today by poor activity tolerance, including symptomatic orthostatic BP response to upright positioning. He will benefit from continued skilled PT to maximize potential and facilitate return to PLOF. GOALS  Patient Goals   Patient Goals :  \"To get back to doing what I was doing\"  Short Term Goals  Time Frame for Short Term Goals: 7 days  Short Term Goal 1: Pt will perform bed mobility independently  Short Term Goal 2: Pt will perform all transfers excluding floor transfer with Ana  Short Term Goal 3: Pt will ambulate 10' on level surfaces with LRAD and CGA  Short Term Goal 4: Patient will navigate 4 stairs with bilateral HR and CGA  Long Term Goals  Time Frame for Long Term Goals : 14 days  Long Term Goal 1: Pt will perform all transfers excluding floor transfer Mod I with LRAD  Long Term Goal 2: Pt will ambulate at least 150' on level surfaces Mod I with LRAD  Long Term Goal 3: Patient will navigate 12 stairs with 1 HR Mod I    PLAN OF CARE  Physcial Therapy Plan  General Plan:  (5x/week for 90 minutes)  Current Treatment Recommendations: Strengthening;ROM;Balance training;Functional mobility training;Transfer training; Endurance training; Wheelchair mobility training;Gait training;Stair training;Neuromuscular re-education;Pain management;Home exercise program;Safety education & training;Patient/Caregiver education & training;Equipment evaluation, education, & procurement;Positioning; Therapeutic activities  Safety Devices  Type of Devices: Left in chair;Chair alarm in place;Call light within reach;Nurse notified (Pt left in reclined position with BP improved to 101/58 (MAP 72) - RN aware)    EDUCATION  Education  Education Given To: Patient; Family  Education Provided: Role of Therapy;Plan of Care;Safety; Mobility Training;Transfer Training  Education Provided Comments: Pt and family educated on purpose of evaluation, importance of sitting up out of bed as tolerated to improve activity tolerance  Education Method: Verbal  Barriers to Learning: None  Education Outcome: Verbalized understanding             Therapy Time   Individual Concurrent Group Co-treatment   Time In 0830     0845   Time Out 0845     0940   Minutes 15     55   70 minutes    Second Session Therapy Time:    Individual Concurrent Group Co-treatment   Time In 1345   1320   Time Out 1350   1345   Minutes 5   25    30 minutes  Timed Code Treatment Minutes:  70 + 30     Total Treatment Minutes:   [address], [facility] 10/07/22 at 5:07 PM

## 2022-10-07 NOTE — PROGRESS NOTES
[facility] Admission Assessment    Ethnicity  \"Are you of , /a, or [place] origin? \"  Check all that apply:  [x] A. No, not of , /a, or [place] Origin  [] B.  Yes, [place], [place] American, Chicano/a  [] C.  Yes, [address] [place] [address]  [] D.  Yes, [place]  [] E.  Yes, another , , or [place]an origin  [] X. Patient unable to respond    Race  \"What is your race? \"  Check all that apply:  [x] A. White  [] B. Black or   [] C. American [place] [facility] or [place] Native  [] D.  [place] [facility]  [] E. [place]  [] F. Cuban  [] G. [place]  [] [place]  [] I. [place]  [] J.  Other   [] K.   [] L. Taj[place] or [place]  [] M. Kyr[place]z  [] N. Other [place]  [] X. Patient unable to respond    Language  A. \"What is your preferred language? \"   English    B. \"Do you need or want an  to communicate with a doctor or health care staff? \"  Check only one:  [x] 0. No  [] 1. Yes  [] 9. Unable to determine    Transportation  \"Has lack of transportation kept you from medical appointments, meetings, work, or from getting things needed for daily living? \"Check all that apply:  [] A.  Yes, it has kept me from medical appointments or from getting my medications  [] B.  Yes, it has kept me from non-medical meetings, appointments, work, or from getting things that I need  [x] C.  No  [] X. Patient unable to respond    Hearing  Ability to hear (with hearing aid or hearing appliances if normally used)  [x]  0. Adequate - no difficulty in normal conversation, social interaction, listening to TV  []  1. Minimal difficulty - difficulty in some environments (e.g. when person speaks softly or setting is noisy)  []  2. Moderate difficulty - speaker has to increase volume and speak distinctly   []  3. Highly impaired - absence of useful hearing    Vision  Ability to see in adequate light (with glasses or other visual appliances)  [x]  0.   Adequate - sees fine detail, such as regular print in newspapers/books  []  1. Impaired - sees large print, but not regular print in newspapers/books  []  2. Moderately impaired - limited vision; not able to see newspaper headlines but can identify objects  []  3. Highly impaired - object identification in question, but eyes appear to follow objects  []  4. Severely impaired - no vision or sees only light, colors, or shapes; eyes do not appear to follow objects    Health Literacy  \"How often do you need to have someone help you when you read instructions, pamphlets, or other written material from your doctor or pharmacy? \"  [x]  0. Never  []  1. Rarely  []  2. Sometimes  []  3. Often  []  4. Always  []  8. Patient unable to respond    BIMS - **Must be completed in the flowsheet at admission prior to proceeding with Delirium Assessment**  [x] BIMS completed in flowsheet at admission    Signs and Symptoms of Delirium  A. Acute Onset Mental Status Change - Is there evidence of an acute change in mental status from the patient's baseline? [x] 0. No  [] 1. Yes    B. Inattention - Did the patient have difficulty focusing attention, for example being easily distractible or having difficulty keeping track of what was being said? [x]  0. Behavior not present  []  1. Behavior continuously present, does not fluctuate  []  2. Behavior present, fluctuates (comes and goes, changes in severity)    C. Disorganized thinking - Was the patient's thinking disorganized or incoherent (rambling or irrelevant conversation, unclear or illogical flow of ideas, or unpredictable switching from subject to subject)? [x]  0. Behavior not present  []  1. Behavior continuously present, does not fluctuate  []  2. Behavior present, fluctuates (comes and goes, changes in severity)    D. Altered level of consciousness - Did the patient have altered level of consciousness as indicated by any of the following criteria?   Vigilant - startled easily to any sound or touch  Lethargic - repeatedly dozed off while being asked questions, but responded to voice or touch  Stuporous - very difficulty to arouse and keep aroused for the interview  Comatose - could not be aroused  [x]  0. Behavior not present  []  1. Behavior continuously present, does not fluctuate  []  2. Behavior present, fluctuates (comes and goes, changes in severity)    Mood    \"Over the last 2 weeks, have you been bothered by any of the following problems?\" 1. Symptom Presence    0 = No  1 = Yes  9 = No Response 2. Symptom Frequency    0 = Never or 1 day  1 = 2-6 days (several days)  2 = 7-11 days (half or more of the days)  3 = 12-14 days (nearly every day)  **Leave blank if 'No Reponse'**      Enter scores in boxes    Column 1 Column 2   Little interest or pleasure in doing things   1 3   Feeling down, depressed, or hopeless   1 1   **If either A or B in column 2 is coded 2 or 3, CONTINUE asking the questions below. If not, END the interview. **     Trouble falling or staying asleep, or sleeping too much   1 2   Feeling tired or having little energy   1 3   Poor appetite or overeating   1 3   Feeling bad about yourself - or that you are a failure or have let yourself or your family down   0    Trouble concentrating on things, such as reading the newspaper or watching television   1 3   Moving or speaking so slowly that other people could have noticed. Or the opposite- being so fidgety or restless that you have been moving around a lot more than usual.   0 0   Thoughts that you would be better off dead, or of hurting yourself in some way.   0    Total Severity: Add scores for all frequency responses in column 2 (possible score 0-27, or enter 99 if unable to complete (if symptom frequency (column 2) is blank for 3 or more items). 15     Social Isolation  \"How often do you feel lonely or isolated from those around you? \"  [x] 0. Never  [] 1. Rarely  [] 2. Sometimes  [] 3. Often  [] 4. Always  [] 7. Patient declines to respond  [] 8. Patient unable to respond    Pain Effect on Sleep  \"Over the past 5 days, how much of the time has pain made it hard for you to sleep at night? \"  []  0. Does not apply - I have not had any pain or hurting in the past 5 days  [x]  1. Rarely or not at all  []  2. Occasionally  []  3. Frequently  []  4. Almost constantly  []  8. Unable to answer    **If the patient answers \"0. Does not apply\" to this question, skip the next two \"Pain Effect. [place] \" questions**    Pain Interference with Therapy Activities  \"Over the past 5 days, how often have you limited your participation in rehabilitation therapy sessions due to pain? \"  []  0. Does not apply - I have not received rehabilitation therapy in the past 5 days  [x]  1. Rarely or not at all  []  2. Occasionally  []  3. Frequently  []  4. Almost constantly  []  8. Unable to answer    Pain Interference with Day-to-Day Activities: \"Over the past 5 days, how often have you limited your day-to-day activities (excluding rehabilitation therapy session)? \"  []  1. Rarely or not at all  []  2. Occasionally  []  3. Frequently  [x]  4. Almost constantly  []  8. Unable to answer    Nutritional Approaches  Check all of the following nutritional approaches that apply on admission:  []  A. Parenteral/IV feeding (including IV fluids if needed for hydration, but not as part of dialysis/chemo)  []  B. Feeding tube (e.g., nasogastric or abdominal (PEG))  []  C. Mechanically altered diet - requires change in texture of food or liquids (e.g., pureed food, thickened liquids)  []  D. Therapeutic diet (e.g., low salt, diabetic, low cholesterol)  [x]  Z. None of the above    High Risk Drug Classes:  Use and Indication    Is taking: Check if the pt is taking any medications by pharmacological classification, not how it is used, in the following classes  Indication noted:  If column 1 is checked, check if there is an indication noted for all meds in the drug class Is taking  (check all that apply) Indication noted (check all that apply)   Antipsychotic [] []   Anticoagulant [x] [x]   Antibiotic [x] [x]   Opioid [] []   Antiplatelet [] []   Hypoglycemic (including insulin) [x] [x]   None of the above []     Special Treatments, Procedures, and Programs    Check all of the following treatments, procedures, and programs that apply on admission. On admission (check all that apply)   Cancer Treatments   A1. Chemotherapy []           A2. IV []           A3. Oral []           A10. Other []   B1. Radiation []   Respiratory Therapies   C1. Oxygen Therapy []           C2. Continuous (continuously for at least 14 hours per day) [x]           C3. Intermittent []           C4. High-concentration []   D1. Suctioning (Does not include oral suctioning) []           D2. Scheduled []           D3. As needed []   E1. Tracheostomy Care []   F1. Invasive Mechanical Ventilator (ventilator or respirator) []   G1. Non-invasive Mechanical Ventilator []           G2. BiPAP []           G3. CPAP []   Other   H1. IV Medications (Do not include sub Q pumps, flushes, Dextrose 50% or lactated ringers) []           H2. Vasoactive medications []           H3. Antibiotics [x]           H4. Anticoagulation []           H10. Other []   I1. Transfusions []   J1. Dialysis []           J2. Hemodialysis []           J3. Peritoneal dialysis []   O1. IV access (including a catheter in a vein) []           O2. Peripheral []           O3. Midline []           O4. Central (PICC, tunneled, port) [x]      None of the above (select if no Cancer, Respiratory, or Other boxes are checked) []     The above items have been reviewed and updated as necessary, and are accurate for the admission assessment period.     Reviewing RN:  Mini Davis RN
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
## 2022-10-08 LAB
ALBUMIN SERPL-MCNC: 2.4 G/DL (ref 3.4–5)
ANION GAP SERPL CALCULATED.3IONS-SCNC: 5 MMOL/L (ref 3–16)
BUN BLDV-MCNC: 37 MG/DL (ref 7–20)
CALCIUM SERPL-MCNC: 8.2 MG/DL (ref 8.3–10.6)
CHLORIDE BLD-SCNC: 98 MMOL/L (ref 99–110)
CO2: 33 MMOL/L (ref 21–32)
CORTISOL TOTAL: 8.6 UG/DL
CREAT SERPL-MCNC: 1.2 MG/DL (ref 0.8–1.3)
CREATININE URINE: 78 MG/DL (ref 39–259)
GFR AFRICAN AMERICAN: >60
GFR NON-AFRICAN AMERICAN: 57
GLUCOSE BLD-MCNC: 102 MG/DL (ref 70–99)
GLUCOSE BLD-MCNC: 103 MG/DL (ref 70–99)
GLUCOSE BLD-MCNC: 117 MG/DL (ref 70–99)
GLUCOSE BLD-MCNC: 126 MG/DL (ref 70–99)
GLUCOSE BLD-MCNC: 181 MG/DL (ref 70–99)
INR BLD: 1.89 (ref 0.87–1.14)
PERFORMED ON: ABNORMAL
PHOSPHORUS: 3.6 MG/DL (ref 2.5–4.9)
POTASSIUM SERPL-SCNC: 4.7 MMOL/L (ref 3.5–5.1)
PROTEIN PROTEIN: 46 MG/DL
PROTEIN/CREAT RATIO: 0.6 MG/DL
PROTHROMBIN TIME: 21.7 SEC (ref 11.7–14.5)
SODIUM BLD-SCNC: 136 MMOL/L (ref 136–145)

## 2022-10-08 PROCEDURE — 99232 SBSQ HOSP IP/OBS MODERATE 35: CPT | Performed by: INTERNAL MEDICINE

## 2022-10-08 PROCEDURE — 94640 AIRWAY INHALATION TREATMENT: CPT

## 2022-10-08 PROCEDURE — 6370000000 HC RX 637 (ALT 250 FOR IP): Performed by: INTERNAL MEDICINE

## 2022-10-08 PROCEDURE — 2700000000 HC OXYGEN THERAPY PER DAY

## 2022-10-08 PROCEDURE — 97530 THERAPEUTIC ACTIVITIES: CPT

## 2022-10-08 PROCEDURE — 80069 RENAL FUNCTION PANEL: CPT

## 2022-10-08 PROCEDURE — 6360000002 HC RX W HCPCS: Performed by: PHYSICAL MEDICINE & REHABILITATION

## 2022-10-08 PROCEDURE — 6370000000 HC RX 637 (ALT 250 FOR IP): Performed by: PHYSICAL MEDICINE & REHABILITATION

## 2022-10-08 PROCEDURE — 1280000000 HC REHAB R&B

## 2022-10-08 PROCEDURE — 82533 TOTAL CORTISOL: CPT

## 2022-10-08 PROCEDURE — 97110 THERAPEUTIC EXERCISES: CPT

## 2022-10-08 PROCEDURE — 36592 COLLECT BLOOD FROM PICC: CPT

## 2022-10-08 PROCEDURE — 2580000003 HC RX 258: Performed by: PHYSICAL MEDICINE & REHABILITATION

## 2022-10-08 PROCEDURE — 84156 ASSAY OF PROTEIN URINE: CPT

## 2022-10-08 PROCEDURE — 85610 PROTHROMBIN TIME: CPT

## 2022-10-08 PROCEDURE — 97535 SELF CARE MNGMENT TRAINING: CPT

## 2022-10-08 PROCEDURE — 94761 N-INVAS EAR/PLS OXIMETRY MLT: CPT

## 2022-10-08 PROCEDURE — 82570 ASSAY OF URINE CREATININE: CPT

## 2022-10-08 RX ORDER — WARFARIN SODIUM 2.5 MG/1
2.5 TABLET ORAL
Status: COMPLETED | OUTPATIENT
Start: 2022-10-08 | End: 2022-10-08

## 2022-10-08 RX ADMIN — Medication 5 MG: at 21:21

## 2022-10-08 RX ADMIN — WARFARIN SODIUM 2.5 MG: 2.5 TABLET ORAL at 18:31

## 2022-10-08 RX ADMIN — FERROUS SULFATE TAB 325 MG (65 MG ELEMENTAL FE) 325 MG: 325 (65 FE) TAB at 08:00

## 2022-10-08 RX ADMIN — CEFTRIAXONE 2000 MG: 2 INJECTION, POWDER, FOR SOLUTION INTRAMUSCULAR; INTRAVENOUS at 23:12

## 2022-10-08 RX ADMIN — Medication: at 21:27

## 2022-10-08 RX ADMIN — METHOCARBAMOL TABLETS 500 MG: 500 TABLET, COATED ORAL at 15:05

## 2022-10-08 RX ADMIN — GABAPENTIN 100 MG: 100 CAPSULE ORAL at 21:19

## 2022-10-08 RX ADMIN — GUAIFENESIN 600 MG: 600 TABLET, EXTENDED RELEASE ORAL at 07:59

## 2022-10-08 RX ADMIN — Medication: at 08:02

## 2022-10-08 RX ADMIN — ACETYLCYSTEINE 600 MG: 200 SOLUTION ORAL; RESPIRATORY (INHALATION) at 23:22

## 2022-10-08 RX ADMIN — METHOCARBAMOL TABLETS 500 MG: 500 TABLET, COATED ORAL at 21:26

## 2022-10-08 RX ADMIN — GUAIFENESIN 600 MG: 600 TABLET, EXTENDED RELEASE ORAL at 21:26

## 2022-10-08 RX ADMIN — PANTOPRAZOLE SODIUM 40 MG: 40 TABLET, DELAYED RELEASE ORAL at 06:08

## 2022-10-08 RX ADMIN — BISACODYL 5 MG: 5 TABLET, COATED ORAL at 08:00

## 2022-10-08 RX ADMIN — ALTEPLASE 1 MG: 2.2 INJECTION, POWDER, LYOPHILIZED, FOR SOLUTION INTRAVENOUS at 02:36

## 2022-10-08 RX ADMIN — IPRATROPIUM BROMIDE AND ALBUTEROL SULFATE 1 AMPULE: 2.5; .5 SOLUTION RESPIRATORY (INHALATION) at 07:13

## 2022-10-08 RX ADMIN — IPRATROPIUM BROMIDE AND ALBUTEROL SULFATE 1 AMPULE: 2.5; .5 SOLUTION RESPIRATORY (INHALATION) at 11:24

## 2022-10-08 RX ADMIN — ATORVASTATIN CALCIUM 10 MG: 10 TABLET, FILM COATED ORAL at 21:19

## 2022-10-08 RX ADMIN — IPRATROPIUM BROMIDE AND ALBUTEROL SULFATE 1 AMPULE: 2.5; .5 SOLUTION RESPIRATORY (INHALATION) at 15:18

## 2022-10-08 RX ADMIN — METHOCARBAMOL TABLETS 500 MG: 500 TABLET, COATED ORAL at 07:59

## 2022-10-08 RX ADMIN — IPRATROPIUM BROMIDE AND ALBUTEROL SULFATE 1 AMPULE: 2.5; .5 SOLUTION RESPIRATORY (INHALATION) at 19:40

## 2022-10-08 RX ADMIN — FLUDROCORTISONE ACETATE 0.1 MG: 0.1 TABLET ORAL at 08:00

## 2022-10-08 ASSESSMENT — PAIN SCALES - GENERAL: PAINLEVEL_OUTOF10: 0

## 2022-10-08 NOTE — PROGRESS NOTES
Physical Therapy  Facility/Department: [facility]  Rehabilitation Physical Therapy Treatment Note    NAME: Adri Macias MD  : 1935 (82 y.o.)  MRN: [id number]  CODE STATUS: Full Code    Date of Service: 10/8/22       Restrictions:  Position Activity Restriction  Other position/activity restrictions: up w/ assist, ambulate pt, up as tolerated,     SUBJECTIVE  Subjective  Subjective: Pt seated in recliner upon PT/OT arrival, agreeable to therapy. Pt reported he had been sitting up for ~45 minutes and c/o dizziness. Vitals assessed. Pain: Pt did not c/o pain. Post Treatment Pain Screening     VITALS  Pt on 1L O2 via NC  Position Seated in recliner Seated in w/c Seated on shower chair Seated in reclined position   BP (mmHg) 90/54 (66) 92/54 (67) 77/48 (57) 104/61 (76)   HR (bpm) 92 94  96   SpO2  96% 95%         OBJECTIVE  Cognition  Overall Cognitive Status: WFL  Arousal/Alertness: Appropriate responses to stimuli  Following Commands: Follows multistep commands consistently  Attention Span: Appears intact  Memory: Appears intact  Safety Judgement: Good awareness of safety precautions  Problem Solving: Good awareness of errors made  Insights: Fully aware of deficits  Initiation: Requires cues for some  Sequencing: Requires cues for some  Cognition Comment: Pt appropriately responsive throughout session. Pt appeared lethargic, not very conversational especially during periods of decreased BP. Orientation  Overall Orientation Status: Within Functional Limits  Orientation Level: Oriented X4    Functional Mobility  Bed Mobility  Additional Factors: Head of bed flat; Without handrails (to simulate home environment)  Sit to Supine  Assistance Level: Supervision  Skilled Clinical Factors: SVN for safety with change of position  Balance  Sitting Balance: Supervision (Pt sat in recliner, w/c ind.  Pt req SBA seated on shower chair d/t c/o dizziness and decreased BP (transferred back to w/c).)  Transfers  Surface: From bed; Wheelchair (shower chair)  Additional Factors: Verbal cues; Hand placement cues  Bed To/From Chair  Technique: Stand pivot  Assistance Level: Moderate assistance (bed<>w/c)  Skilled Clinical Factors: Pt req VC to scoot to EOS, hand placement. Physical assist req for anterior WS, pivot. Stand Pivot  Assistance Level:  (w/c>shower chair ModA x1, shower chair>w/c ModA + Ana, w/c>recliner ModA + Ana)  Skilled Clinical Factors: Pt req VC to scoot to EOS, hand placement. Physical assist req for anterior WS, pivot. Environmental Mobility  Wheelchair  Surface: Level surface  Device: Standard wheelchair  Additional Factors: Verbal cues  Assistance Required to Manage Brakes: No assistance  Assistance Level for Propulsion: Supervision  Propulsion Method: Bilateral upper extremities  Propulsion Quality: Slow velocity; Short strokes  Propulsion Distance: 10' within room  Skilled Clinical Factors: Pt req VC to navigate around bathroom door on the R             PT Exercises  Exercise Treatment: To improve BP, PT instructed pt on the following exercises: X20 ankle pumps bilateral, X10 each LAQ, X10 chest passes with pillow. Pt instructed to perform ankle pumps throughout session with instances of low BP.      2nd Session  Pt semi-supine in bed upon PT/OT arrival, agreeable to therapy. Denied pain. Pt performed supine>sit with HOB elevated and without HR's with CGA for LOB to the R upon sitting upright. Pt scooted to EOB and with feet supported on floor, pt was SVN for sitting balance. Pt req ModA to transfer bed>w/c via stand-pivot for ant WS and to pivot. Seated in w/c, pt performed oral hygiene and upper body dressing req ++ time (see OT note for specifics). To improve strength and standing tolerance, PT instructed pt to perform partial stands from w/c. Pt was able to achieve partial stand with CGA, however unable to achieve full upright.  Once in partial standing, pt req Mod-MaxA for standing balance 2/2 posterior lean and intermittent bilateral knee buckling. PT instructed pt to perform anterior reaching to facilitate anterior WS, but pt still unable to correct posterior lean. On first stand>sit, pt req MaxA for eccentric control. On subsequent trials, PT provided VC for eccentric control and pt was able to complete with CGA-Ana. Pt tolerated X5 reps. BP improved following this activity, but pt reported feeling SOB and fatigued. Overall, pt demo'd improved activity tolerance during second session. Pt reported dizziness with initial transition to upright but reported it remained stable/improved throughout session. Pt left in w/c, chair alarm in place, call light and needs within reach, wife present. Vitals:  Pt on 2L O2 via NC (SpO2 assessed with 1L O2: 90-91%)  Position Semi-supine in bed Seated EOB Seated in w/c following partial stands   BP (mmHg) 120's/70's 96/62 (73) 106/54 (71)   HR (bpm)  107 96   SpO2  94%        ASSESSMENT/PROGRESS TOWARDS GOALS       Assessment  Assessment: Pt continued to be limited by symptomatic decreased BP this session, tolerating less than 3 minutes seated on shower chair d/t BP with MAP <60 requiring pt be assisted back to recliner. In reclined position with LE's elevated, BP improved to 104/61 (MAP 76). Pt is motivated to participate and was assisted to bed in order to rest prior to second session. Pt is functioning below baseline and will benefit from continued skilled PT to maximize potential.  Activity Tolerance: Treatment limited secondary to medical complications  Discharge Recommendations: Continue to assess pending progress;24 hour supervision or assist;Home with Home health PT  PT Equipment Recommendations  Other: Ongoing assessment    Goals  Patient Goals   Patient Goals :  \"To get back to doing what I was doing\"  Short Term Goals  Time Frame for Short Term Goals: 7 days  Short Term Goal 1: Pt will perform bed mobility independently  Short Term Goal 2: Pt will perform all transfers excluding floor transfer with Ana  Short Term Goal 3: Pt will ambulate 10' on level surfaces with LRAD and CGA  Short Term Goal 4: Patient will navigate 4 stairs with bilateral HR and CGA  Long Term Goals  Time Frame for Long Term Goals : 14 days  Long Term Goal 1: Pt will perform all transfers excluding floor transfer Mod I with LRAD  Long Term Goal 2: Pt will ambulate at least 150' on level surfaces Mod I with LRAD  Long Term Goal 3: Patient will navigate 12 stairs with 1 HR Mod I    PLAN OF CARE/SAFETY  Physcial Therapy Plan  General Plan:  (5x/week for 90 minutes)  Current Treatment Recommendations: Strengthening;ROM;Balance training;Functional mobility training;Transfer training; Endurance training; Wheelchair mobility training;Gait training;Stair training;Neuromuscular re-education;Pain management;Home exercise program;Safety education & training;Patient/Caregiver education & training;Equipment evaluation, education, & procurement;Positioning; Therapeutic activities  Safety Devices  Type of Devices: Left in bed;Bed alarm in place;Call light within reach    EDUCATION  Education  Education Given To: Patient  Education Provided: Role of Therapy; Safety; Mobility Training;Transfer Training  Education Provided Comments: Pt and pt's wife educated on possible contributors to orthostatic BP and role of therapy interventions including exercises/positioning to address this.   Education Method: Verbal  Barriers to Learning: None  Education Outcome: Verbalized understanding        Therapy Time   Individual Concurrent Group Co-treatment   Time In       0850   Time Out       0930   Minutes       40     Second Session Therapy Time:    Individual Concurrent Group Co-treatment   Time In    1130   Time Out    1220   Minutes    50      Timed Code Treatment Minutes:  40 + 50     Total Treatment Minutes:   [address] [facility] 10/08/22 at 3:02 PM
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
## 2022-10-08 NOTE — PROGRESS NOTES
Nephrology  Note                                                                                                                                                                                                                                                                                                                                                               Office : [phone]     Fax :[phone]              Patient's Name: Liam Oneil MD  12:32 PM  10/8/2022    Reason for Consult:  Orthostatic   Requesting Physician:  Zetta Klinefelter, MD      Chief Complaint:  Trauma      Feels better  Dizzy on standing     Past Medical History:   Diagnosis Date    Anemia     Aortic valve disorders     stenosis    Arthritis     Aspiration pneumonia ([address] 04/27/2015    Erectile dysfunction     Esophageal cancer ([address] 10/18/2012    Hernia, femoral, bilateral 05/12/2014    History of blood transfusion     Hyperlipidemia     Osteoporosis, senile 05/20/2014    Pancreatitis 08/01/2013    Patient underweight 08/08/2013    Prostate cancer ([facility]     Unspecified essential hypertension        Past Surgical History:   Procedure Laterality Date    APPENDECTOMY      as a child    BONE GRAFT      for saddle nose    CARDIAC VALVE REPLACEMENT  01/01/2006    aorta    CHOLECYSTECTOMY      COLONOSCOPY  11/01/2009    COLONOSCOPY  10/05/2015    COLONOSCOPY N/A 02/17/2021    COLONOSCOPY DIAGNOSTIC/STOMA performed by Jose Lee MD at [place] [address]  02/18/2021    COLONOSCOPY POLYPECTOMY SNARE/COLD BIOPSY performed by Jose Lee MD at [facility]  01/01/2006    DILATATION, ESOPHAGUS      2010    ESOPHAGUS SURGERY      EYE SURGERY  1990& 1992    cataract bilat removal     GASTRECTOMY      IR PERC CATH PLEURAL DRAIN W/IMAG  09/21/2022    IR PERC CATH PLEURAL DRAIN W/IMAG    PROSTATE SURGERY      seeds    TONSILLECTOMY      UPPER GASTROINTESTINAL ENDOSCOPY N/A 02/16/2021    EGD BIOPSY performed by Cem Anna MD at [facility] N/A 04/22/2022    EGD CONTROL HEMORRHAGE performed by Sachin Angulo MD at [facility]         Family History   Problem Relation Age of Onset    Other Mother         bleeding peptic ulcer    Heart Disease Mother     High Blood Pressure Father     Stroke Father     Prostate Cancer Father     Other Father         cardiovascular disease    Elevated Lipids Father     Hypertension Father     Colon Cancer Paternal Cousin         reports that he has never smoked. He has never used smokeless tobacco. He reports current alcohol use. He reports that he does not use drugs.     Allergies:  No known allergies    Current Medications:    cefTRIAXone (ROCEPHIN) 2,000 mg in dextrose 5 % 50 mL IVPB mini-bag, Q24H  zinc oxide (TRIAD HYDROPHILIC) paste, BID  fludrocortisone (FLORINEF) tablet 0.1 mg, Daily  alteplase (CATHFLO) injection 1 mg, Daily PRN  acetylcysteine (MUCOMYST) 20 % solution 600 mg, BID  albuterol (PROVENTIL) nebulizer solution 2.5 mg, Q6H PRN  ipratropium-albuterol (DUONEB) nebulizer solution 1 ampule, Q4H WA  atorvastatin (LIPITOR) tablet 10 mg, Nightly  gabapentin (NEURONTIN) capsule 100 mg, Nightly  guaiFENesin (MUCINEX) extended release tablet 600 mg, BID  melatonin disintegrating tablet 5 mg, Nightly  methocarbamol (ROBAXIN) tablet 500 mg, TID  oxyCODONE (ROXICODONE) immediate release tablet 5 mg, Q4H PRN  pantoprazole (PROTONIX) tablet 40 mg, QAM AC  acetaminophen (TYLENOL) tablet 650 mg, Q4H PRN  bisacodyl (DULCOLAX) EC tablet 5 mg, Daily  magnesium hydroxide (MILK OF MAGNESIA) 400 MG/5ML suspension 30 mL, Daily PRN  polyethylene glycol (GLYCOLAX) packet 17 g, Daily PRN  simethicone (MYLICON) chewable tablet 80 mg, Q6H PRN  ferrous sulfate (IRON 325) tablet 325 mg, Daily with breakfast  insulin lispro (1 Unit Dial) (HUMALOG/ADMELOG) pen 0-4 Units, TID WC  insulin lispro (1 Unit Dial) (HUMALOG/ADMELOG) pen 0-4 Units, Nightly  glucose chewable tablet 16 g, PRN  dextrose bolus 10% 125 mL, PRN   Or  dextrose bolus 10% 250 mL, PRN  glucagon (rDNA) injection 1 mg, PRN  dextrose 10 % infusion, Continuous PRN  warfarin (COUMADIN) tablet 2 mg, Daily      Review of Systems:   14 point ROS obtained but were negative except mentioned in HPI      Physical exam:     Vitals:  BP (!) 92/53   Pulse 90   Temp 98 °F (36.7 °C) (Oral)   Resp 17   Ht 5' 3\" (1.6 m)   Wt 104 lb 8 oz (47.4 kg)   SpO2 95%   BMI 18.51 kg/m²   Constitutional:  AA  Skin: no rash, turgor wnl  Heent:  eomi, mmm  Neck: no bruits or jvd noted  Cardiovascular:  S1, S2 without m/r/g  Respiratory: CTA B without w/r/r  Abdomen:  +bs, soft, nt, nd  Ext: + lower extremity edema  Psychiatric: mood and affect appropriate  Musculoskeletal:  Rom, muscular strength intact    Data:   Labs:  CBC:   Recent Labs     10/07/22  0616   WBC 9.5   HGB 7.7*          BMP:    Recent Labs     10/07/22  0616 10/08/22  0350    136   K 5.3* 4.7    98*   CO2 33* 33*   BUN 39* 37*   CREATININE 1.1 1.2   GLUCOSE 106* 102*       Ca/Mg/Phos:   Recent Labs     10/07/22  0616 10/08/22  0350   CALCIUM 8.4 8.2*   PHOS  --  3.6       Hepatic: No results for input(s): AST, ALT, ALB, BILITOT, ALKPHOS in the last 72 hours. Troponin: No results for input(s): TROPONINI in the last 72 hours. BNP: No results for input(s): BNP in the last 72 hours. Lipids: No results for input(s): CHOL, TRIG, HDL, LDLCALC, LABVLDL in the last 72 hours. ABGs: No results for input(s): PHART, PO2ART, WMH9FUY in the last 72 hours. INR:   Recent Labs     10/07/22  0616 10/08/22  0350   INR 1.89* 1.89*       UA:No results for input(s): Peng Cruz, GLUCOSEU, BILIRUBINUR, Anna Parish, BLOODU, PHUR, PROTEINU, UROBILINOGEN, NITRU, LEUKOCYTESUR, [place] in the last 72 hours.    Urine Microscopic: No results for input(s): LABCAST, BACTERIA, COMU, HYALCAST, WBCUA, RBCUA, EPIU in the last 72 hours. Urine Culture: No results for input(s): LABURIN in the last 72 hours. Urine Chemistry: No results for input(s): Bevely Bolds, PROTEINUR, NAUR in the last 72 hours. IMAGING:  No orders to display       Assessment/Plan   Orthostatic     2. Hyperkalemia     3. Anemia    4. Acid- base/ Electrolyte imbalance     5.  Debility     Plan   - Added florinef   - Monitor Orthostats   - Cortisol 8.6  - UPC   - encourage solute intake    - Rehab                   Thank you for allowing us to participate in care of MD Adamaris Pastor MD  Feel free to contact me   [facility] [address]  Office : [phone]  Fax :[phone]

## 2022-10-08 NOTE — PROGRESS NOTES
Shift assessment complete, VSS, afebrile, on 2 L O2 via NC, medications taken without difficulty. Pt. Remained A & O X 4, no s/sx of distress noted. Pt. Reminded to use the call light when in need of assistance. Fall prevention measures remains in place. Call light and over bed table within reach.

## 2022-10-08 NOTE — PROGRESS NOTES
Occupational Therapy  Facility/Department: [facility] ACUTE REHAB UNIT  Rehabilitation Occupational Therapy Daily Treatment Note    Date: 10/8/22  Patient Name: Michael dAams MD       Room: 3109/3109-01  MRN: [id number]  Account: [de-identified]   : 1935  (82 y.o.) Gender: male                  Past Medical History:  has a past medical history of Anemia, Aortic valve disorders, Arthritis, Aspiration pneumonia ([address] Erectile dysfunction, Esophageal cancer ([address] Hernia, femoral, bilateral, History of blood transfusion, Hyperlipidemia, Osteoporosis, senile, Pancreatitis, Patient underweight, Prostate cancer ([address] and Unspecified essential hypertension. Past Surgical History:   has a past surgical history that includes Coronary artery bypass graft (2006); Cardiac valve replacement (2006); eye surgery (& ); Appendectomy; bone graft; gastrectomy; Cholecystectomy; Colonoscopy (2009); Prostate surgery; Tonsillectomy; Esophagus surgery;  Prostate/Transrectal/Vol Collins Brachyth; Colonoscopy (10/05/2015); Upper gastrointestinal endoscopy (N/A, 2021); Colonoscopy (N/A, 2021); Colonoscopy (2021); Upper gastrointestinal endoscopy (N/A, 2022); IR GUIDED PERC PLEURAL DRAIN W CATH INSERT (2022); and Dilatation, esophagus. Restrictions  Other position/activity restrictions: up w/ assist, ambulate pt, up as tolerated,    Subjective  Subjective: Pt in recliner upon arrival finishing breakfast. pt pleasant and agreeable to OT/PT cotreatment.  Cotreatment indicated to maximize safety and participation in therapy session                VITALS  Pt on 1L O2 via NC  Position Seated in recliner Seated in w/c Seated on shower chair Seated in reclined position   BP (mmHg) 90/54 (66) 92/54 (67) 77/48 (57) 104/61 (76)   HR (bpm) 92 94   96   SpO2  96% 95%          Objective     Cognition  Overall Cognitive Status: WFL  Arousal/Alertness: Appropriate responses to stimuli  Following Commands: Follows multistep commands consistently  Attention Span: Appears intact  Memory: Appears intact  Safety Judgement: Good awareness of safety precautions  Problem Solving: Good awareness of errors made  Insights: Decreased awareness of deficits  Initiation: Requires cues for some  Sequencing: Requires cues for some  Cognition Comment: Pt appropriately responsive throughout session. Pt appeared lethargic, not very conversational especially during periods of decreased BP. Orientation  Overall Orientation Status: Within Functional Limits  Orientation Level: Oriented X4         ADL  Grooming/Oral Hygiene  Assistance Level: Set-up  Skilled Clinical Factors: pt washed face w/ wash cloth while seated in wc w/ setup assist  Upper Extremity Bathing  Skilled Clinical Factors: unable to complete d/t BP  Lower Extremity Bathing  Skilled Clinical Factors: unable to complete d/t BP  Tub/Shower Transfers  Type: Shower  Transfer From: Wheelchair  Transfer To: Tub transfer bench  Assistance Level: Requires x 2 assistance; Moderate assistance;Minimal assistance  Skilled Clinical Factors: wc>TTB via squat pivot w/ Mod Ax1 leading L. TTB>wc via squat pivot w/ Min A + Mod A leading R. Functional Mobility  Skilled Clinical Factors: therapist pushed pt in wc to/from bathroom d/t time contraints  Sit to Supine  Assistance Level: Supervision  Sit Pivot  Assistance Level: Requires x 2 assistance; Moderate assistance;Minimal assistance  Skilled Clinical Factors: recliner>WC w/ Mod Ax1; WC>TTB w/ Mod Ax1; TTB>WC w/ Min A + Mod A; WC>recliner w/ Min A + Mod A; recliner> EOB w/ Mod A   OT Exercises  Exercise Treatment: To improve BP, Pt instructed on the following exercises: X20 ankle pumps bilateral, X10 each LAQ, X10 chest passes with pillow. Pt instructed to perform ankle pumps throughout session with instances of low BP       Second Session:   Pt supine in bed upon arrival, pleasant and agreeable to OT/PT cotreatment session. Pt denied pain. Pt performed supine>sit with HOB elevated and without HR's with CGA for LOB to the R upon sitting upright. Pt scooted to EOB and with feet supported on floor, pt was SVN for sitting balance. Pt req ModA to transfer bed>w/c via stand-pivot for ant WS and to pivot. Seated in wc at sink, pt performed oral hygiene w/ increased time. Pt initially putting tube of toothpaste underwater and when pointed out, pt switching to placing toothbrush underwater. Pt also doffed button up t-shirt w/ increased time and effort. Pt donned t-shirt w/ setup assist. To improve strength and standing tolerance, pt instructed to perform partial stands from w/c. Pt was able to achieve partial stand with CGA, however unable to achieve full upright. Once in partial standing, pt req Mod-MaxA for standing balance 2/2 posterior lean and intermittent bilateral knee buckling. Pt instructed to perform anterior reaching to facilitate anterior WS, but pt still unable to correct posterior lean. On first stand>sit, pt req MaxA for eccentric control. On subsequent trials, VC provided for eccentric control and pt was able to complete with CGA-Ana. Pt tolerated X5 reps. BP improved following this activity, but pt reported feeling SOB and fatigued. Overall, pt demo'd improved activity tolerance during second session. Pt reported dizziness with initial transition to upright but reported it remained stable/improved throughout session. Pt left in w/c, chair alarm in place, call light and needs within reach, wife present   Vitals:  Pt on 2L O2 via NC (SpO2 assessed with 1L O2: 90-91%)  Position Semi-supine in bed Seated EOB Seated in w/c following partial stands   BP (mmHg) 120's/70's 96/62 (73) 106/54 (71)   HR (bpm)   107 96   SpO2   94%         Assessment  Assessment  Assessment: Pt demo fair tolerance of therapy session this date. Pt motivated to engage in therapy, however significantly limited by orthostatic hypotension.  Pt completed squat pivot transfers w/ Mod Ax1 to Min A + Mod A. Pt demo good performance of bed mobility, completing sit>supine w/ spvn. Pt cont to be limited by BUE/BLE weakness, decreased endurance, decreased functional mobility, and balance deficits impacting his independence w/ ADLs and transfers. Pt cont to benefit from skilled OT services to maximize safety and functional independence prior to dc. Cont OT per POC. Activity Tolerance: Treatment limited secondary to medical complications  Discharge Recommendations: Continue to assess pending progress;24 hour supervision or assist;Patient would benefit from continued therapy after discharge  OT Equipment Recommendations  Other: cont to assess pending progress  Safety Devices  Safety Devices in place: Yes  Type of devices: Bed alarm in place;Call light within reach; Left in bed;Nurse notified    Patient Education  Education  Education Given To: Patient; Family  Education Provided: Role of Therapy;Plan of Care;Safety;ADL Function;Transfer Training  Education Provided Comments: PLB, exercises to assist w/ raising BP  Education Method: Demonstration;Verbal  Barriers to Learning: None  Education Outcome: Verbalized understanding;Continued education needed    Plan  Occupational Therapy Plan  Times Per Week: 5x/week, 90min/day  Current Treatment Recommendations: Strengthening;Balance training;Functional mobility training; Endurance training; Safety education & training;Patient/Caregiver education & training;Equipment evaluation, education, & procurement;Self-Care / ADL; Home management training; Wheelchair mobility training    Goals  Patient Goals   Patient goals : \"I want to return to what I was doing before. \"  Short Term Goals  Time Frame for Short Term Goals: 2 weeks- all ongoing  Short Term Goal 1: Pt will complete UE/LE dressing w/ Mod I and use of AE prn  Short Term Goal 2: Pt will complete bathing w/ spvn  Short Term Goal 3: Pt will complete shower transfer w/ spvn  Short Term Goal 4: Pt will complete toileting/toilet transfer w/ Mod I  Short Term Goal 5: Pt will complete grooming routine in stance w/ Mod I      Therapy Time   Individual Concurrent Group Co-treatment   Time In       0850   Time Out       0930   Minutes       40         Second Session Therapy Time:   Individual Concurrent Group Co-treatment   Time In      1130   Time Out      1220   Minutes      50     Timed Code Treatment Minutes:  29+75    Total Treatment Minutes:  [facility], OT

## 2022-10-08 NOTE — PROGRESS NOTES
Shift assessment complete. BP soft. On 2L of O2. A/Ox4. Fall precautions in place. Denies pain or discomforts. Call light in reach. Will continue to monitor.      Vitals:    10/08/22 0757   BP: (!) 92/53   Pulse: 90   Resp: 17   Temp: 98 °F (36.7 °C)   SpO2: 95%

## 2022-10-09 LAB
GLUCOSE BLD-MCNC: 123 MG/DL (ref 70–99)
GLUCOSE BLD-MCNC: 129 MG/DL (ref 70–99)
GLUCOSE BLD-MCNC: 157 MG/DL (ref 70–99)
GLUCOSE BLD-MCNC: 161 MG/DL (ref 70–99)
INR BLD: 1.87 (ref 0.87–1.14)
PERFORMED ON: ABNORMAL
PROTHROMBIN TIME: 21.6 SEC (ref 11.7–14.5)

## 2022-10-09 PROCEDURE — 94640 AIRWAY INHALATION TREATMENT: CPT

## 2022-10-09 PROCEDURE — 94761 N-INVAS EAR/PLS OXIMETRY MLT: CPT

## 2022-10-09 PROCEDURE — 6370000000 HC RX 637 (ALT 250 FOR IP): Performed by: PHYSICAL MEDICINE & REHABILITATION

## 2022-10-09 PROCEDURE — 99232 SBSQ HOSP IP/OBS MODERATE 35: CPT | Performed by: INTERNAL MEDICINE

## 2022-10-09 PROCEDURE — 2700000000 HC OXYGEN THERAPY PER DAY

## 2022-10-09 PROCEDURE — 1280000000 HC REHAB R&B

## 2022-10-09 PROCEDURE — 36592 COLLECT BLOOD FROM PICC: CPT

## 2022-10-09 PROCEDURE — 85610 PROTHROMBIN TIME: CPT

## 2022-10-09 PROCEDURE — 6370000000 HC RX 637 (ALT 250 FOR IP): Performed by: INTERNAL MEDICINE

## 2022-10-09 RX ORDER — WARFARIN SODIUM 2.5 MG/1
2.5 TABLET ORAL
Status: COMPLETED | OUTPATIENT
Start: 2022-10-09 | End: 2022-10-09

## 2022-10-09 RX ORDER — ACETYLCYSTEINE 200 MG/ML
600 SOLUTION ORAL; RESPIRATORY (INHALATION) 2 TIMES DAILY
Status: DISCONTINUED | OUTPATIENT
Start: 2022-10-09 | End: 2022-10-09

## 2022-10-09 RX ADMIN — GABAPENTIN 100 MG: 100 CAPSULE ORAL at 20:49

## 2022-10-09 RX ADMIN — FLUDROCORTISONE ACETATE 0.1 MG: 0.1 TABLET ORAL at 08:17

## 2022-10-09 RX ADMIN — GUAIFENESIN 600 MG: 600 TABLET, EXTENDED RELEASE ORAL at 20:48

## 2022-10-09 RX ADMIN — BISACODYL 5 MG: 5 TABLET, COATED ORAL at 08:17

## 2022-10-09 RX ADMIN — METHOCARBAMOL TABLETS 500 MG: 500 TABLET, COATED ORAL at 08:17

## 2022-10-09 RX ADMIN — Medication: at 08:18

## 2022-10-09 RX ADMIN — IPRATROPIUM BROMIDE AND ALBUTEROL SULFATE 1 AMPULE: 2.5; .5 SOLUTION RESPIRATORY (INHALATION) at 15:15

## 2022-10-09 RX ADMIN — IPRATROPIUM BROMIDE AND ALBUTEROL SULFATE 1 AMPULE: 2.5; .5 SOLUTION RESPIRATORY (INHALATION) at 23:23

## 2022-10-09 RX ADMIN — Medication: at 20:49

## 2022-10-09 RX ADMIN — PANTOPRAZOLE SODIUM 40 MG: 40 TABLET, DELAYED RELEASE ORAL at 06:19

## 2022-10-09 RX ADMIN — METHOCARBAMOL TABLETS 500 MG: 500 TABLET, COATED ORAL at 15:12

## 2022-10-09 RX ADMIN — WARFARIN SODIUM 2.5 MG: 2.5 TABLET ORAL at 18:09

## 2022-10-09 RX ADMIN — FERROUS SULFATE TAB 325 MG (65 MG ELEMENTAL FE) 325 MG: 325 (65 FE) TAB at 08:17

## 2022-10-09 RX ADMIN — IPRATROPIUM BROMIDE AND ALBUTEROL SULFATE 1 AMPULE: 2.5; .5 SOLUTION RESPIRATORY (INHALATION) at 11:30

## 2022-10-09 RX ADMIN — Medication 5 MG: at 20:48

## 2022-10-09 RX ADMIN — IPRATROPIUM BROMIDE AND ALBUTEROL SULFATE 1 AMPULE: 2.5; .5 SOLUTION RESPIRATORY (INHALATION) at 07:11

## 2022-10-09 RX ADMIN — GUAIFENESIN 600 MG: 600 TABLET, EXTENDED RELEASE ORAL at 08:17

## 2022-10-09 RX ADMIN — ATORVASTATIN CALCIUM 10 MG: 10 TABLET, FILM COATED ORAL at 20:48

## 2022-10-09 RX ADMIN — METHOCARBAMOL TABLETS 500 MG: 500 TABLET, COATED ORAL at 20:49

## 2022-10-09 NOTE — PROGRESS NOTES
Shift assessment complete. VSS. A/Ox4. Fall precautions in place. Denies pain or discomforts. Up to chair. Wife at bedside. Call light in reach. Will continue to monitor.      Vitals:    10/09/22 0742   BP: 108/68   Pulse: 89   Resp: 17   Temp: 97.4 °F (36.3 °C)   SpO2: 94%

## 2022-10-09 NOTE — PROGRESS NOTES
Pt up to chair, watching television. Alert & oriented x 4. VSS. Assessment completed. No complaints at this time. Assisted pt back to bed, with gait. Pt tolerated fairly well. Nighttime medication given after returning to room. Pt tolerated well. Reminded pt to call for assistance with any needs. Call light within reach. Safety measures in place.

## 2022-10-09 NOTE — PLAN OF CARE
Problem: Safety - Adult  Goal: Free from fall injury  Outcome: Progressing  Flowsheets (Taken 10/9/2022 0357)  Free From Fall Injury: Instruct family/caregiver on patient safety     Problem: ABCDS Injury Assessment  Goal: Absence of physical injury  Outcome: Progressing  Flowsheets (Taken 10/9/2022 0357)  Absence of Physical Injury: Implement safety measures based on patient assessment     Problem: Skin/Tissue Integrity  Goal: Absence of new skin breakdown  Description: 1.   Monitor for areas of redness and/or skin breakdown  Outcome: Progressing     Problem: Nutrition Deficit:  Goal: Optimize nutritional status  Outcome: Progressing  Flowsheets (Taken 10/9/2022 0357)  Nutrient intake appropriate for improving, restoring, or maintaining nutritional needs:   Assess nutritional status and recommend course of action   Monitor oral intake, labs, and treatment plans   Recommend appropriate diets, oral nutritional supplements, and vitamin/mineral supplements
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
## 2022-10-09 NOTE — PROGRESS NOTES
Nephrology  Note                                                                                                                                                                                                                                                                                                                                                               Office : [phone]     Fax :[phone]              Patient's Name: Daniel Sumner MD  9:04 AM  10/9/2022    Reason for Consult:  Orthostatic   Requesting Physician:  Radha Jesus MD      Chief Complaint:  Trauma      Feels better  Dizzy on standing     Past Medical History:   Diagnosis Date    Anemia     Aortic valve disorders     stenosis    Arthritis     Aspiration pneumonia ([address] 04/27/2015    Erectile dysfunction     Esophageal cancer ([address] 10/18/2012    Hernia, femoral, bilateral 05/12/2014    History of blood transfusion     Hyperlipidemia     Osteoporosis, senile 05/20/2014    Pancreatitis 08/01/2013    Patient underweight 08/08/2013    Prostate cancer ([facility]     Unspecified essential hypertension        Past Surgical History:   Procedure Laterality Date    APPENDECTOMY      as a child    BONE GRAFT      for saddle nose    CARDIAC VALVE REPLACEMENT  01/01/2006    aorta    CHOLECYSTECTOMY      COLONOSCOPY  11/01/2009    COLONOSCOPY  10/05/2015    COLONOSCOPY N/A 02/17/2021    COLONOSCOPY DIAGNOSTIC/STOMA performed by Tyson Henry MD at [place] [address]  02/18/2021    COLONOSCOPY POLYPECTOMY SNARE/COLD BIOPSY performed by Tyson Henry MD at [address] [place] [address]  01/01/2006    DILATATION, ESOPHAGUS      2010    ESOPHAGUS SURGERY      EYE SURGERY  1990& 1992    cataract bilat removal     GASTRECTOMY      IR PERC CATH PLEURAL DRAIN W/IMAG  09/21/2022    IR PERC CATH PLEURAL DRAIN W/IMAG    PROSTATE SURGERY      seeds    TONSILLECTOMY      UPPER GASTROINTESTINAL ENDOSCOPY N/A 02/16/2021    EGD BIOPSY performed by Luana Hauser MD at [address] [place] [address] N/A 04/22/2022    EGD CONTROL HEMORRHAGE performed by Talisha Durham MD at [place] [address]         Family History   Problem Relation Age of Onset    Other Mother         bleeding peptic ulcer    Heart Disease Mother     High Blood Pressure Father     Stroke Father     Prostate Cancer Father     Other Father         cardiovascular disease    Elevated Lipids Father     Hypertension Father     Colon Cancer Paternal Cousin         reports that he has never smoked. He has never used smokeless tobacco. He reports current alcohol use. He reports that he does not use drugs.     Allergies:  No known allergies    Current Medications:    warfarin placeholder: dosing by pharmacy, RX Placeholder  zinc oxide (TRIAD HYDROPHILIC) paste, BID  fludrocortisone (FLORINEF) tablet 0.1 mg, Daily  alteplase (CATHFLO) injection 1 mg, Daily PRN  acetylcysteine (MUCOMYST) 20 % solution 600 mg, BID  albuterol (PROVENTIL) nebulizer solution 2.5 mg, Q6H PRN  ipratropium-albuterol (DUONEB) nebulizer solution 1 ampule, Q4H WA  atorvastatin (LIPITOR) tablet 10 mg, Nightly  gabapentin (NEURONTIN) capsule 100 mg, Nightly  guaiFENesin (MUCINEX) extended release tablet 600 mg, BID  melatonin disintegrating tablet 5 mg, Nightly  methocarbamol (ROBAXIN) tablet 500 mg, TID  oxyCODONE (ROXICODONE) immediate release tablet 5 mg, Q4H PRN  pantoprazole (PROTONIX) tablet 40 mg, QAM AC  acetaminophen (TYLENOL) tablet 650 mg, Q4H PRN  bisacodyl (DULCOLAX) EC tablet 5 mg, Daily  magnesium hydroxide (MILK OF MAGNESIA) 400 MG/5ML suspension 30 mL, Daily PRN  polyethylene glycol (GLYCOLAX) packet 17 g, Daily PRN  simethicone (MYLICON) chewable tablet 80 mg, Q6H PRN  ferrous sulfate (IRON 325) tablet 325 mg, Daily with breakfast  insulin lispro (1 Unit Dial) (HUMALOG/ADMELOG) pen 0-4 Units, TID WC  insulin lispro (1 Unit Dial) (HUMALOG/ADMELOG) pen 0-4 Units, Nightly  glucose chewable tablet 16 g, PRN  dextrose bolus 10% 125 mL, PRN   Or  dextrose bolus 10% 250 mL, PRN  glucagon (rDNA) injection 1 mg, PRN  dextrose 10 % infusion, Continuous PRN      Review of Systems:   14 point ROS obtained but were negative except mentioned in HPI      Physical exam:     Vitals:  /68   Pulse 89   Temp 97.4 °F (36.3 °C) (Oral)   Resp 17   Ht 5' 3\" (1.6 m)   Wt 104 lb 8 oz (47.4 kg)   SpO2 94%   BMI 18.51 kg/m²   Constitutional:  AA  Skin: no rash, turgor wnl  Heent:  eomi, mmm  Neck: no bruits or jvd noted  Cardiovascular:  S1, S2 without m/r/g  Respiratory: CTA B without w/r/r  Abdomen:  +bs, soft, nt, nd  Ext: + lower extremity edema  Psychiatric: mood and affect appropriate  Musculoskeletal:  Rom, muscular strength intact    Data:   Labs:  CBC:   Recent Labs     10/07/22  0616   WBC 9.5   HGB 7.7*          BMP:    Recent Labs     10/07/22  0616 10/08/22  0350    136   K 5.3* 4.7    98*   CO2 33* 33*   BUN 39* 37*   CREATININE 1.1 1.2   GLUCOSE 106* 102*       Ca/Mg/Phos:   Recent Labs     10/07/22  0616 10/08/22  0350   CALCIUM 8.4 8.2*   PHOS  --  3.6       Hepatic: No results for input(s): AST, ALT, ALB, BILITOT, ALKPHOS in the last 72 hours. Troponin: No results for input(s): TROPONINI in the last 72 hours. BNP: No results for input(s): BNP in the last 72 hours. Lipids: No results for input(s): CHOL, TRIG, HDL, LDLCALC, LABVLDL in the last 72 hours. ABGs: No results for input(s): PHART, PO2ART, QCY9QAQ in the last 72 hours. INR:   Recent Labs     10/07/22  0616 10/08/22  0350 10/09/22  0633   INR 1.89* 1.89* 1.87*       UA:No results for input(s): Sydell Southerly, GLUCOSEU, BILIRUBINUR, Thuy Abrahan, SPECGRAV, BLOODU, PHUR, PROTEINU, UROBILINOGEN, NITRU, LEUKOCYTESUR, Rhesa Sauger in the last 72 hours.    Urine Microscopic: No results for input(s): LABCAST, BACTERIA, COMU, HYALCAST, WBCUA, RBCUA, EPIU in the last 72 hours. Urine Culture: No results for input(s): LABURIN in the last 72 hours. Urine Chemistry:   Recent Labs     10/08/22  0355   LABCREA 78.0   PROTEINUR 46.00*             IMAGING:  No orders to display       Assessment/Plan   Orthostatic     2. Hyperkalemia     3. Anemia    4. Acid- base/ Electrolyte imbalance     5.  Debility     Plan   - Added florinef   - Monitor Orthostats   - Cortisol 8.6  - UPC   - encourage solute intake    - Rehab                   Thank you for allowing us to participate in care of MD Lala Napoles MD  Feel free to contact me   [facility] [address]  Office : [phone]  Fax :[phone]

## 2022-10-10 ENCOUNTER — APPOINTMENT (OUTPATIENT)
Dept: GENERAL RADIOLOGY | Age: 87
DRG: 091 | End: 2022-10-10
Attending: PHYSICAL MEDICINE & REHABILITATION
Payer: MEDICARE

## 2022-10-10 LAB
ANION GAP SERPL CALCULATED.3IONS-SCNC: 4 MMOL/L (ref 3–16)
BASOPHILS ABSOLUTE: 0.1 K/UL (ref 0–0.2)
BASOPHILS RELATIVE PERCENT: 1.2 %
BUN BLDV-MCNC: 41 MG/DL (ref 7–20)
CALCIUM SERPL-MCNC: 8.3 MG/DL (ref 8.3–10.6)
CHLORIDE BLD-SCNC: 102 MMOL/L (ref 99–110)
CO2: 34 MMOL/L (ref 21–32)
CREAT SERPL-MCNC: 1.1 MG/DL (ref 0.8–1.3)
EOSINOPHILS ABSOLUTE: 0.1 K/UL (ref 0–0.6)
EOSINOPHILS RELATIVE PERCENT: 1.6 %
GFR AFRICAN AMERICAN: >60
GFR NON-AFRICAN AMERICAN: >60
GLUCOSE BLD-MCNC: 106 MG/DL (ref 70–99)
GLUCOSE BLD-MCNC: 107 MG/DL (ref 70–99)
GLUCOSE BLD-MCNC: 125 MG/DL (ref 70–99)
GLUCOSE BLD-MCNC: 140 MG/DL (ref 70–99)
GLUCOSE BLD-MCNC: 146 MG/DL (ref 70–99)
HCT VFR BLD CALC: 25.9 % (ref 40.5–52.5)
HEMOGLOBIN: 8.2 G/DL (ref 13.5–17.5)
INR BLD: 1.79 (ref 0.87–1.14)
LYMPHOCYTES ABSOLUTE: 1.2 K/UL (ref 1–5.1)
LYMPHOCYTES RELATIVE PERCENT: 16.6 %
MCH RBC QN AUTO: 28 PG (ref 26–34)
MCHC RBC AUTO-ENTMCNC: 31.5 G/DL (ref 31–36)
MCV RBC AUTO: 89 FL (ref 80–100)
MONOCYTES ABSOLUTE: 0.6 K/UL (ref 0–1.3)
MONOCYTES RELATIVE PERCENT: 9 %
NEUTROPHILS ABSOLUTE: 5 K/UL (ref 1.7–7.7)
NEUTROPHILS RELATIVE PERCENT: 71.6 %
PDW BLD-RTO: 19.1 % (ref 12.4–15.4)
PERFORMED ON: ABNORMAL
PLATELET # BLD: 309 K/UL (ref 135–450)
PMV BLD AUTO: 8.8 FL (ref 5–10.5)
POTASSIUM REFLEX MAGNESIUM: 4.4 MMOL/L (ref 3.5–5.1)
PROTHROMBIN TIME: 20.8 SEC (ref 11.7–14.5)
RBC # BLD: 2.91 M/UL (ref 4.2–5.9)
SODIUM BLD-SCNC: 140 MMOL/L (ref 136–145)
WBC # BLD: 6.9 K/UL (ref 4–11)

## 2022-10-10 PROCEDURE — 71045 X-RAY EXAM CHEST 1 VIEW: CPT

## 2022-10-10 PROCEDURE — 97110 THERAPEUTIC EXERCISES: CPT

## 2022-10-10 PROCEDURE — 36415 COLL VENOUS BLD VENIPUNCTURE: CPT

## 2022-10-10 PROCEDURE — 97530 THERAPEUTIC ACTIVITIES: CPT

## 2022-10-10 PROCEDURE — 2700000000 HC OXYGEN THERAPY PER DAY

## 2022-10-10 PROCEDURE — 94761 N-INVAS EAR/PLS OXIMETRY MLT: CPT

## 2022-10-10 PROCEDURE — 99232 SBSQ HOSP IP/OBS MODERATE 35: CPT | Performed by: PHYSICAL MEDICINE & REHABILITATION

## 2022-10-10 PROCEDURE — 6370000000 HC RX 637 (ALT 250 FOR IP): Performed by: INTERNAL MEDICINE

## 2022-10-10 PROCEDURE — 94640 AIRWAY INHALATION TREATMENT: CPT

## 2022-10-10 PROCEDURE — 80048 BASIC METABOLIC PNL TOTAL CA: CPT

## 2022-10-10 PROCEDURE — 1280000000 HC REHAB R&B

## 2022-10-10 PROCEDURE — 6370000000 HC RX 637 (ALT 250 FOR IP): Performed by: PHYSICAL MEDICINE & REHABILITATION

## 2022-10-10 PROCEDURE — 99232 SBSQ HOSP IP/OBS MODERATE 35: CPT | Performed by: INTERNAL MEDICINE

## 2022-10-10 PROCEDURE — 97116 GAIT TRAINING THERAPY: CPT

## 2022-10-10 PROCEDURE — 85610 PROTHROMBIN TIME: CPT

## 2022-10-10 PROCEDURE — 85025 COMPLETE CBC W/AUTO DIFF WBC: CPT

## 2022-10-10 RX ORDER — WARFARIN SODIUM 1 MG/1
3 TABLET ORAL
Status: COMPLETED | OUTPATIENT
Start: 2022-10-10 | End: 2022-10-10

## 2022-10-10 RX ADMIN — BISACODYL 5 MG: 5 TABLET, COATED ORAL at 08:25

## 2022-10-10 RX ADMIN — ATORVASTATIN CALCIUM 10 MG: 10 TABLET, FILM COATED ORAL at 21:06

## 2022-10-10 RX ADMIN — Medication: at 08:28

## 2022-10-10 RX ADMIN — GUAIFENESIN 600 MG: 600 TABLET, EXTENDED RELEASE ORAL at 21:07

## 2022-10-10 RX ADMIN — Medication 5 MG: at 21:06

## 2022-10-10 RX ADMIN — IPRATROPIUM BROMIDE AND ALBUTEROL SULFATE 1 AMPULE: 2.5; .5 SOLUTION RESPIRATORY (INHALATION) at 08:14

## 2022-10-10 RX ADMIN — GABAPENTIN 100 MG: 100 CAPSULE ORAL at 21:06

## 2022-10-10 RX ADMIN — IPRATROPIUM BROMIDE AND ALBUTEROL SULFATE 1 AMPULE: 2.5; .5 SOLUTION RESPIRATORY (INHALATION) at 20:04

## 2022-10-10 RX ADMIN — METHOCARBAMOL TABLETS 500 MG: 500 TABLET, COATED ORAL at 08:25

## 2022-10-10 RX ADMIN — METHOCARBAMOL TABLETS 500 MG: 500 TABLET, COATED ORAL at 13:45

## 2022-10-10 RX ADMIN — IPRATROPIUM BROMIDE AND ALBUTEROL SULFATE 1 AMPULE: 2.5; .5 SOLUTION RESPIRATORY (INHALATION) at 17:05

## 2022-10-10 RX ADMIN — Medication: at 21:07

## 2022-10-10 RX ADMIN — IPRATROPIUM BROMIDE AND ALBUTEROL SULFATE 1 AMPULE: 2.5; .5 SOLUTION RESPIRATORY (INHALATION) at 13:14

## 2022-10-10 RX ADMIN — FLUDROCORTISONE ACETATE 0.1 MG: 0.1 TABLET ORAL at 08:26

## 2022-10-10 RX ADMIN — GUAIFENESIN 600 MG: 600 TABLET, EXTENDED RELEASE ORAL at 08:25

## 2022-10-10 RX ADMIN — PANTOPRAZOLE SODIUM 40 MG: 40 TABLET, DELAYED RELEASE ORAL at 05:33

## 2022-10-10 RX ADMIN — METHOCARBAMOL TABLETS 500 MG: 500 TABLET, COATED ORAL at 21:06

## 2022-10-10 RX ADMIN — WARFARIN SODIUM 3 MG: 1 TABLET ORAL at 17:49

## 2022-10-10 ASSESSMENT — PAIN SCALES - GENERAL
PAINLEVEL_OUTOF10: 0
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
## 2022-10-10 NOTE — RT PROTOCOL NOTE
RT Inhaler-Nebulizer Bronchodilator Protocol Note    There is a bronchodilator order in the chart from a provider indicating to follow the RT Bronchodilator Protocol and there is an Initiate RT Inhaler-Nebulizer Bronchodilator Protocol order as well (see protocol at bottom of note). CXR Findings:  No results found. The findings from the last RT Protocol Assessment were as follows:   History Pulmonary Disease: Chronic pulmonary disease  Respiratory Pattern: Mild dyspnea at rest, irregular pattern, or RR 21-25 bpm  Breath Sounds: Slightly diminished and/or crackles  Cough: Weak, non-productive  Indication for Bronchodilator Therapy: Decreased or absent breath sounds, Unable to speak in sentences  Bronchodilator Assessment Score: 11    Aerosolized bronchodilator medication orders have been revised according to the RT Inhaler-Nebulizer Bronchodilator Protocol below. Respiratory Therapist to perform RT Therapy Protocol Assessment initially then follow the protocol. Repeat RT Therapy Protocol Assessment PRN for score 0-3 or on second treatment, BID, and PRN for scores above 3. No Indications - adjust the frequency to every 6 hours PRN wheezing or bronchospasm, if no treatments needed after 48 hours then discontinue using Per Protocol order mode. If indication present, adjust the RT bronchodilator orders based on the Bronchodilator Assessment Score as indicated below. Use Inhaler orders unless patient has one or more of the following: on home nebulizer, not able to hold breath for 10 seconds, is not alert and oriented, cannot activate and use MDI correctly, or respiratory rate 25 breaths per minute or more, then use the equivalent nebulizer order(s) with same Frequency and PRN reasons based on the score. If a patient is on this medication at home then do not decrease Frequency below that used at home.     0-3 - enter or revise RT bronchodilator order(s) to equivalent RT Bronchodilator order with Frequency of every 4 hours PRN for wheezing or increased work of breathing using Per Protocol order mode. 4-6 - enter or revise RT Bronchodilator order(s) to two equivalent RT bronchodilator orders with one order with BID Frequency and one order with Frequency of every 4 hours PRN wheezing or increased work of breathing using Per Protocol order mode. 7-10 - enter or revise RT Bronchodilator order(s) to two equivalent RT bronchodilator orders with one order with TID Frequency and one order with Frequency of every 4 hours PRN wheezing or increased work of breathing using Per Protocol order mode. 11-13 - enter or revise RT Bronchodilator order(s) to one equivalent RT bronchodilator order with QID Frequency and an Albuterol order with Frequency of every 4 hours PRN wheezing or increased work of breathing using Per Protocol order mode. Greater than 13 - enter or revise RT Bronchodilator order(s) to one equivalent RT bronchodilator order with every 4 hours Frequency and an Albuterol order with Frequency of every 2 hours PRN wheezing or increased work of breathing using Per Protocol order mode. RT to enter RT Home Evaluation for COPD & MDI Assessment order using Per Protocol order mode.     Electronically signed by Koko Brown RCP on 10/10/2022 at 11:33 AM

## 2022-10-10 NOTE — PLAN OF CARE
Problem: Discharge Planning  Goal: Discharge to home or other facility with appropriate resources  Outcome: Progressing  Flowsheets (Taken 10/10/2022 0219)  Discharge to home or other facility with appropriate resources: Identify barriers to discharge with patient and caregiver     Problem: Safety - Adult  Goal: Free from fall injury  Outcome: Progressing  Flowsheets (Taken 10/10/2022 0219)  Free From Fall Injury: Instruct family/caregiver on patient safety     Problem: ABCDS Injury Assessment  Goal: Absence of physical injury  Outcome: Progressing  Flowsheets (Taken 10/10/2022 0219)  Absence of Physical Injury: Implement safety measures based on patient assessment     Problem: Skin/Tissue Integrity  Goal: Absence of new skin breakdown  Description: 1.   Monitor for areas of redness and/or skin breakdown  Outcome: Progressing     Problem: Nutrition Deficit:  Goal: Optimize nutritional status  Outcome: Progressing  Flowsheets (Taken 10/9/2022 0357)  Nutrient intake appropriate for improving, restoring, or maintaining nutritional needs:   Assess nutritional status and recommend course of action   Monitor oral intake, labs, and treatment plans   Recommend appropriate diets, oral nutritional supplements, and vitamin/mineral supplements

## 2022-10-10 NOTE — PROGRESS NOTES
Physical Therapy  Facility/Department: [facility]  Rehabilitation Physical Therapy Treatment Note    NAME: Carlos Alberto Hdz MD  : 1935 (82 y.o.)  MRN: [id number]  CODE STATUS: Full Code    Date of Service: 10/10/22       Restrictions:  Position Activity Restriction  Other position/activity restrictions: up w/ assist, ambulate pt, up as tolerated,       PT and OT collaborated to integrate multiple skills to progress patient's independence. SUBJECTIVE  Subjective  Subjective: Pt semi-supine in bed upon PT/OT arrival, agreeable to therapy. Pain: Denied pain. Post Treatment Pain Screening       VITALS  Patient on 2L O2 via NC  Position Semi-supine EOB Sitting in standing frame FDC standing in frame >penitentiary standing in frame Standing in frame (near full stand) Standing in frame (near full stand) Seated in recliner   BP (mmHg) 126/74 113/60 103/58 110/64  100/63 98/62 103/65   HR (bpm)    97 93 102  90   SpO2     93%          OBJECTIVE  Cognition  Overall Cognitive Status: WFL  Arousal/Alertness: Appropriate responses to stimuli  Following Commands: Follows multistep commands consistently  Attention Span: Appears intact  Memory: Appears intact  Safety Judgement: Good awareness of safety precautions  Problem Solving: Good awareness of errors made  Insights: Decreased awareness of deficits  Initiation: Requires cues for some  Sequencing: Requires cues for some  Cognition Comment: Pt demo'd intact problem solving AEB requesting therapy begin later in the morning to allow pt to finish breakfast (pt requires + time to eat). Orientation  Overall Orientation Status: Within Functional Limits  Orientation Level: Oriented X4    Functional Mobility  Bed Mobility  Additional Factors: Head of bed raised; With handrails; Increased time to complete  Supine to Sit  Assistance Level: Contact guard assist  Skilled Clinical Factors: CGA for grading of movement upon transition to upright  Balance  Sitting Balance: Stand by assistance (With pt seated EOB, therapist donned compression stockings and pt doffed/donned gripper socks with SBA. Pt donned socks/shoes seated in w/c (see OT note for specifics))  Transfers  Surface: From bed; Wheelchair (standing frame)  Additional Factors: Verbal cues; Hand placement cues  Bed To/From Chair  Technique:  (Squat pivot)  Assistance Level: Moderate assistance;Contact guard assist;Requires x 2 assistance  Skilled Clinical Factors: VC to scoot to EOS, hand placement. Physical assist for ant WS and to pivot hips (ModA + CGA). Sit Pivot  Assistance Level: Contact guard assist;Minimal assistance (w/c>standing frame (CGA via scoot pivot); standing frame>w/c (CGA via scoot pivot); w/c>recliner (Ana x1 via squat pivot))  Skilled Clinical Factors: Pt req VC for sequencing and hand placement. Pt able to scoot with UE and CGA for grading movement w/c<standing frame. From w/c>bed, Ana req for anterior WS and pivot. PT Exercises  Exercise Treatment: To facilitate prolonged time in upright and to increase standing tolerance, pt participated in exercise using standing table. Pt cranked lever of standing frame with RUE to elevate seat. Once in partial standing, PT instructed pt to stand fully upright with cues for anterior weight shift and decreased UE support. Pt tolerated 1:20 in standing prior to req a break 2/2 fatigue. PT then instructed pt to perform heel raises to encourage quad activation. Pt unable to clear heels, but did demo quad activiation with this task. Pt tolerated 8-10 reps. 2nd Session  Pt supine in bed upon approach, agreeable to therapy. Pt performed supine>sit with SBA for safety using bedrails. Seated EOB, pt donned shoes with SBA for balance (see OT note for specifics). Pt performed bed>w/c transfer with Ana via squat pivot, physical assist to pivot hips. VC for sequencing.  Pt was wheeled to gym with [address] [employer] (pt assisted to propel using BLE's) to conserve energy, then transferred w/c>mat with CGA via scoot pivot with w/c armrest removed. Pt managed w/c brakes with SVN, cues for instruction. Seated EOM, vitals were assessed 2/2 pt c/o dizziness. Found to be /73, HR 97 bpm (on 1.5 L O2). Patient performed sit>stand up to RW with CGA-Ana to stand (pt with posterior lean upon standing), then MinAx2 for standing balance 2/2 posterior lean and 2 episodes of knee buckling. Standing balance progressed to MinAx1. PT instructed pt to perform pre-gait activities including lateral WS (~20 sec) and forward/backward stepping (X2 each) and pt was able to with Ana. Pt req seated rest break following these activities and sat to mat with CGA and VC for hand placement. Pt performed sit>stand with CGA. Ambulated 10' + 10' + 25' + 20' with RW and Ana for unsteadiness. Pt req w/c follow and O2 tank transported by therapist. Bambi Butterfield to increase step length and for upright posture. Pt req seated rest breaks between ambulation bouts 2/2 fatigue and shortness of breath. Following final ambulation bout, pt sat to recliner with CGA and VC for hand placement. In recliner, pt was instructed on the following exercises:  Chair pushups (X10) for strengthening and as a form of pressure relief. Pt educated on using this exercise for pressure relief when staff is present in the room throughout the day. LAQ X10 each for strengthening  Seated marches X10 L (pt instructed to complete X10 L at end of session). VC for upright posture to facilitate core activation. Pt left in recliner, chair alarm in place, call light and needs within reach. ASSESSMENT/PROGRESS TOWARDS GOALS       Assessment  Assessment: Pt with improved tolerance to therapy this session as evidenced by improved vital response to upright positioning (see vitals for details). Pt continued to be symptomatic reporting some dizziness, but improved from prior sessions.  Pt is functioning below baseline and will benefit from continued skilled PT to maximize potential and facilitate safe d/c home. Activity Tolerance: Patient tolerated treatment well;Patient limited by fatigue  Discharge Recommendations: Continue to assess pending progress;24 hour supervision or assist;Home with Home health PT  PT Equipment Recommendations  Other: Ongoing assessment    Goals  Patient Goals   Patient Goals : \"To get back to doing what I was doing\"  Short Term Goals  Time Frame for Short Term Goals: 7 days  Short Term Goal 1: Pt will perform bed mobility independently  Short Term Goal 2: Pt will perform all transfers excluding floor transfer with Ana  Short Term Goal 3: Pt will ambulate 10' on level surfaces with LRAD and CGA  Short Term Goal 4: Patient will navigate 4 stairs with bilateral HR and CGA  Long Term Goals  Time Frame for Long Term Goals : 14 days  Long Term Goal 1: Pt will perform all transfers excluding floor transfer Mod I with LRAD  Long Term Goal 2: Pt will ambulate at least 150' on level surfaces Mod I with LRAD  Long Term Goal 3: Patient will navigate 12 stairs with 1 HR Mod I    PLAN OF CARE/SAFETY  Physcial Therapy Plan  General Plan:  (5x/week for 90 minutes)  Current Treatment Recommendations: Strengthening;ROM;Balance training;Functional mobility training;Transfer training; Endurance training; Wheelchair mobility training;Gait training;Stair training;Neuromuscular re-education;Pain management;Home exercise program;Safety education & training;Patient/Caregiver education & training;Equipment evaluation, education, & procurement;Positioning; Therapeutic activities  Safety Devices  Type of Devices: Left in chair;Chair alarm in place;Call light within reach    EDUCATION  Education  Education Given To: Patient  Education Provided: Role of Therapy; Mobility Training;Transfer Training  Education Provided Comments: Pt educated on purpose of therapy activities.   Education Method: Verbal  Barriers to Learning: None  Education Outcome: Verbalized understanding        Therapy Time   Individual Concurrent Group Co-treatment   Time In       0845   Time Out       0930   Minutes       39     Second Session Therapy Time:    Individual Concurrent Group Co-treatment   Time In    1400   Time Out    1445   Minutes    45      Timed Code Treatment Minutes:  39 + 45     Total Treatment Minutes:   [email or web address] 10/10/22 at 3:13 PM

## 2022-10-10 NOTE — PROGRESS NOTES
Occupational Therapy  Facility/Department: [facility] ACUTE REHAB UNIT  Rehabilitation Occupational Therapy Daily Treatment Note    Date: 10/10/22  Patient Name: Dontae Dickerson MD       Room: 3109/3109-01  MRN: [id number]  Account: [de-identified]   : 1935  (82 y.o.) Gender: male                    Past Medical History:  has a past medical history of Anemia, Aortic valve disorders, Arthritis, Aspiration pneumonia ([address] Erectile dysfunction, Esophageal cancer ([address] Hernia, femoral, bilateral, History of blood transfusion, Hyperlipidemia, Osteoporosis, senile, Pancreatitis, Patient underweight, Prostate cancer ([address] and Unspecified essential hypertension. Past Surgical History:   has a past surgical history that includes Coronary artery bypass graft (2006); Cardiac valve replacement (2006); eye surgery (& ); Appendectomy; bone graft; gastrectomy; Cholecystectomy; Colonoscopy (2009); Prostate surgery; Tonsillectomy; Esophagus surgery;  Prostate/Transrectal/Vol Collins Brachyth; Colonoscopy (10/05/2015); Upper gastrointestinal endoscopy (N/A, 2021); Colonoscopy (N/A, 2021); Colonoscopy (2021); Upper gastrointestinal endoscopy (N/A, 2022); IR GUIDED PERC PLEURAL DRAIN W CATH INSERT (2022); and Dilatation, esophagus. Restrictions  Other position/activity restrictions: up w/ assist, ambulate pt, up as tolerated,    Subjective  Subjective: Pt was semi supine in bed upon arrival. Pt was pleasant and agreeable to OT/PT co treatment. Co treat indicated to maximize functional independence. Pt on 2L O2                Objective     Cognition  Overall Cognitive Status: WFL  Orientation  Overall Orientation Status: Within Functional Limits         ADL  Putting On/Taking Off Footwear  Assistance Level: Set-up  Skilled Clinical Factors: Pt donned shoes seated in w/c in figure 4. OT assisted w/ donning compression socks.  compression socks donned in order to assist w/ BP management            Supine to Sit  Assistance Level: Contact guard assist  Skilled Clinical Factors: HOB elevated w/ min VCs for technique  Sit Pivot  Assistance Level: Requires x 2 assistance  Skilled Clinical Factors: Pt completed scoot pivot from EOB > w/c w/ Mod Ax1 + CGA1. scoot pivot from w/c > standing frame seated completed w/ CGA and from standing frame seat to w/c w/ CGA. W/c > recliner chair completed w/ min A.     OT Exercises  Dynamic Standing Balance Exercises: Since pt's BP has been an issue pt's BP was monitored throughout session very closely. See below chart for BP readings. The standing frame was utilized today in order to address gradual transition into stance for BP management. Pt used his RUE to push lever into care home standing position. Pt rested at this point for ~2 mins and then completed full stand w/ SBA and use of standing frame arm rests to push self into stance. Pt maintained stance in frame for ~3 mins while engaging in toe raises and unilateral shoulder flexion exercises. Pt completed ~10 reps each w/ rest breaks needed due to fatigue and c/o dizziness. Position Supine in bed Seated at EOB  Seated on standing frame seat snf in stance Full stance after 1.5 mins Full stance in frame after 3 mins  Seated in recliner chair    BP (mmHg) 126/74 113/60 103/58 110/64 100/63  98/62 103/65   HR (bpm)   93 97 102 93 90   SpO2               Assessment  Assessment  Assessment: Pt demonstrated improved participation in OT today and completed sit to stand transfers w/ min A and scoot pivot transfers w/ CGA. Pt tolerated ambulation up to 25ft today w/ min A but w/ w/c follow due to LE weakness and SOB. Pt is still limited by dizziness however BP was more stable today. Pt is motivated and continues to benefit from OT.  Cont OT per POC  Activity Tolerance: Patient tolerated treatment well  Discharge Recommendations: Continue to assess pending progress;24 hour supervision or assist;Home with Home health OT  OT Equipment Recommendations  Other: cont to assess pending progress  Safety Devices  Safety Devices in place: Yes  Type of devices: Call light within reach; Left in chair;Chair alarm in place;Nurse notified    Patient Education  Education  Education Given To: Patient  Education Provided: Role of Therapy;Plan of Care;Safety;ADL Function;Transfer Training;Mobility Training;Precautions  Education Provided Comments: exercises to assist w/ raising BP  Education Method: Demonstration;Verbal  Barriers to Learning: None  Education Outcome: Verbalized understanding;Continued education needed      2nd session: Pt was semi supine in bed upon arrival. Pt on 1.5 L O2. Pt was pleasant and agreeable to OT/PT. Co treat indicated to maximize functional independence. Pt completed supine > sit w/ SBA w/ HOB flat and use of bed rail. Pt donned shoes seated EOB w/ spvn. Pt completed scoot pivot from EOB > w/c w/ min A. Pt was assisted in w/c to therapy gym. Pt completed scoot pivot from w/c > EOM w/ CGA. BP seated EOM= 136/73 and HR 97. Pt c/o min dizziness. Sit to stand completed from EOM > Rw w/ min A. Upon transition into stance pt required min Ax2 due to BLE buckling however pt progressed to min A. Pt engaged in [address] L and R and forward and backward stepping w/ Min A. Pt required short rest break following task. Sit to stand completed from EOM > Rw w/ min A. Pt ambulated 10ft + 10ft + 25 ft + 20ft w/ RW w/ Min A and w/c follow/ O2 in tow. Pt demonstrated forward flexed posture due to scoliosis and SOB w/ increased distance. Extended rest breaks needed between bouts of ambulation. Pt engaged in seated chair exercises that he can do outside of therapy time. Pt competed 10 chair push ups and 10 reps AROM shoulder flexion. PT provided leg exercises. Pt was left seated in recliner chair w/ chair alarm on and call light within reach.              Plan  Occupational Therapy Plan  Times Per Week: 5x/week, 90min/day  Current Treatment Recommendations: Strengthening;Balance training;Functional mobility training; Endurance training; Safety education & training;Patient/Caregiver education & training;Equipment evaluation, education, & procurement;Self-Care / ADL; Home management training; Wheelchair mobility training    Goals  Patient Goals   Patient goals : \"I want to return to what I was doing before. \"  Short Term Goals  Time Frame for Short Term Goals: 2 weeks- all ongoing  Short Term Goal 1: Pt will complete UE/LE dressing w/ Mod I and use of AE prn  Short Term Goal 2: Pt will complete bathing w/ spvn  Short Term Goal 3: Pt will complete shower transfer w/ spvn  Short Term Goal 4: Pt will complete toileting/toilet transfer w/ Mod I  Short Term Goal 5: Pt will complete grooming routine in stance w/ Mod I    AM-PAC Score               Therapy Time   Individual Concurrent Group Co-treatment   Time In       0845   Time Out       0930   Minutes       45   Timed Code Treatment Minutes: 39 Minutes       Second Session Therapy Time:   Individual Concurrent Group Co-treatment   Time In       1400   Time Out       1445   Minutes       45     Timed Code Treatment Minutes:  45    Total Treatment Minutes:   45+45= [address] [id number], OT

## 2022-10-10 NOTE — PROGRESS NOTES
Nephrology  Note                                                                                                                                                                                                                                                                                                                                                               Office : [phone]     Fax :[phone]              Patient's Name: Lynnette Cogan, MD      Reason for Consult:  Orthostatic   Requesting Physician:  Rafael Peter MD      Chief Complaint:  Trauma      Feels better  Dizzy on standing better     Past Medical History:   Diagnosis Date    Anemia     Aortic valve disorders     stenosis    Arthritis     Aspiration pneumonia ([facility] 04/27/2015    Erectile dysfunction     Esophageal cancer ([facility] 10/18/2012    Hernia, femoral, bilateral 05/12/2014    History of blood transfusion     Hyperlipidemia     Osteoporosis, senile 05/20/2014    Pancreatitis 08/01/2013    Patient underweight 08/08/2013    Prostate cancer ([facility]     Unspecified essential hypertension        Past Surgical History:   Procedure Laterality Date    APPENDECTOMY      as a child    BONE GRAFT      for saddle nose    CARDIAC VALVE REPLACEMENT  01/01/2006    aorta    CHOLECYSTECTOMY      COLONOSCOPY  11/01/2009    COLONOSCOPY  10/05/2015    COLONOSCOPY N/A 02/17/2021    COLONOSCOPY DIAGNOSTIC/STOMA performed by Kee Paula MD at [place] [address]  02/18/2021    COLONOSCOPY POLYPECTOMY SNARE/COLD BIOPSY performed by Kee Paula MD at [address] [place] [address]  01/01/2006    DILATATION, ESOPHAGUS      2010    ESOPHAGUS SURGERY      EYE SURGERY  1990& 1992    cataract bilat removal     GASTRECTOMY      IR PERC CATH PLEURAL DRAIN W/IMAG  09/21/2022    IR PERC CATH PLEURAL DRAIN W/IMAG    PROSTATE SURGERY      seeds    TONSILLECTOMY      UPPER GASTROINTESTINAL ENDOSCOPY N/A 02/16/2021    EGD BIOPSY performed by Marli Murrell MD at [address] N/A 04/22/2022    EGD CONTROL HEMORRHAGE performed by Cee Valenzuela MD at [address] BRACHYTHERAPY         Family History   Problem Relation Age of Onset    Other Mother         bleeding peptic ulcer    Heart Disease Mother     High Blood Pressure Father     Stroke Father     Prostate Cancer Father     Other Father         cardiovascular disease    Elevated Lipids Father     Hypertension Father     Colon Cancer Paternal Cousin         reports that he has never smoked. He has never used smokeless tobacco. He reports current alcohol use. He reports that he does not use drugs.     Allergies:  No known allergies    Current Medications:    warfarin (COUMADIN) tablet 3 mg, Once  warfarin placeholder: dosing by pharmacy, RX Placeholder  zinc oxide (TRIAD HYDROPHILIC) paste, BID  fludrocortisone (FLORINEF) tablet 0.1 mg, Daily  alteplase (CATHFLO) injection 1 mg, Daily PRN  albuterol (PROVENTIL) nebulizer solution 2.5 mg, Q6H PRN  ipratropium-albuterol (DUONEB) nebulizer solution 1 ampule, Q4H WA  atorvastatin (LIPITOR) tablet 10 mg, Nightly  gabapentin (NEURONTIN) capsule 100 mg, Nightly  guaiFENesin (MUCINEX) extended release tablet 600 mg, BID  melatonin disintegrating tablet 5 mg, Nightly  methocarbamol (ROBAXIN) tablet 500 mg, TID  oxyCODONE (ROXICODONE) immediate release tablet 5 mg, Q4H PRN  pantoprazole (PROTONIX) tablet 40 mg, QAM AC  acetaminophen (TYLENOL) tablet 650 mg, Q4H PRN  bisacodyl (DULCOLAX) EC tablet 5 mg, Daily  magnesium hydroxide (MILK OF MAGNESIA) 400 MG/5ML suspension 30 mL, Daily PRN  polyethylene glycol (GLYCOLAX) packet 17 g, Daily PRN  simethicone (MYLICON) chewable tablet 80 mg, Q6H PRN  ferrous sulfate (IRON 325) tablet 325 mg, Daily with breakfast  insulin lispro (1 Unit Dial) (HUMALOG/ADMELOG) pen 0-4 Units, TID WC  insulin lispro (1 Unit Dial) (HUMALOG/ADMELOG) pen 0-4 Units, Nightly  glucose chewable tablet 16 g, PRN  dextrose bolus 10% 125 mL, PRN   Or  dextrose bolus 10% 250 mL, PRN  glucagon (rDNA) injection 1 mg, PRN  dextrose 10 % infusion, Continuous PRN      Review of Systems:   14 point ROS obtained but were negative except mentioned in HPI      Physical exam:     Vitals:  /75   Pulse 92   Temp 97 °F (36.1 °C) (Axillary)   Resp 16   Ht 5' 3\" (1.6 m)   Wt 104 lb 8 oz (47.4 kg)   SpO2 92%   BMI 18.51 kg/m²   Constitutional:  AA  Skin: no rash, turgor wnl  Heent:  eomi, mmm  Neck: no bruits or jvd noted  Cardiovascular:  S1, S2 without m/r/g  Respiratory: CTA B without w/r/r  Abdomen:  +bs, soft, nt, nd  Ext: + lower extremity edema  Psychiatric: mood and affect appropriate  Musculoskeletal:  Rom, muscular strength intact    Data:   Labs:  CBC:   Recent Labs     10/10/22  0649   WBC 6.9   HGB 8.2*          BMP:    Recent Labs     10/08/22  0350 10/10/22  0649    140   K 4.7 4.4   CL 98* 102   CO2 33* 34*   BUN 37* 41*   CREATININE 1.2 1.1   GLUCOSE 102* 107*       Ca/Mg/Phos:   Recent Labs     10/08/22  0350 10/10/22  0649   CALCIUM 8.2* 8.3   PHOS 3.6  --        Hepatic: No results for input(s): AST, ALT, ALB, BILITOT, ALKPHOS in the last 72 hours. Troponin: No results for input(s): TROPONINI in the last 72 hours. BNP: No results for input(s): BNP in the last 72 hours. Lipids: No results for input(s): CHOL, TRIG, HDL, LDLCALC, LABVLDL in the last 72 hours. ABGs: No results for input(s): PHART, PO2ART, [id number] in the last 72 hours. INR:   Recent Labs     10/08/22  0350 10/09/22  0633 10/10/22  0649   INR 1.89* 1.87* 1.79*       UA:No results for input(s): Loralyn Codey, GLUCOSEU, BILIRUBINUR, Geraldene Peed, BLOODU, PHUR, PROTEINU, UROBILINOGEN, NITRU, LEUKOCYTESUR, Frutoso Schlichter in the last 72 hours.    Urine Microscopic: No results for input(s): LABCAST, BACTERIA, COMU, HYALCAST, WBCUA, RBCUA, EPIU in the last 72 hours. Urine Culture: No results for input(s): LABURIN in the last 72 hours. Urine Chemistry:   Recent Labs     10/08/22  0355   LABCREA 78.0   PROTEINUR 46.00*               IMAGING:  XR CHEST PORTABLE   Final Result   1. Worsening left-sided airspace opacities, possibly asymmetric pulmonary edema or pneumonia. 2.  Left-sided pleural effusion. 3.  The previously described pneumothoraces are no longer visualized. The lucency projecting over the medial left lung apex is felt to represent air within the esophagus. Assessment/Plan   Orthostatic     2. Hyperkalemia     3. Anemia    4. Acid- base/ Electrolyte imbalance     5.  Debility     Plan   - cont florinef   - Monitor Orthostats   - Cortisol 8.6  - UPC - no proteinuria   - encourage solute intake    - Rehab                   Thank you for allowing us to participate in care of MD Loly Galeano MD  Feel free to contact me   [facility] [address]  Office : [phone]  Fax :[phone]

## 2022-10-10 NOTE — PROGRESS NOTES
[facility] AT [facility] notified of cxr results waiting return call for any order changes.

## 2022-10-10 NOTE — PROGRESS NOTES
Fall precautions are in place, call light and bedside table are within reach. Patient is aware to call for assistance to transfer. Patient encouraged to eat his breakfast, he states he has a poor appetite.

## 2022-10-10 NOTE — PLAN OF CARE
Problem: Nutrition Deficit:  Goal: Optimize nutritional status  10/10/2022 0948 by Augustine Byers RN  Outcome: Not Progressing  Patient with poor appetite, encourage po intake.  Offer patient small more frequent meals,

## 2022-10-10 NOTE — PROGRESS NOTES
Pt in bed, awake watching television. Alert & oriented x 4. VSS. Pt on 2L O2 via nasal cannula. Assessment completed. No complaints at this time. Nighttime medications given, pt tolerated well. Reminded pt to call for assistance with any needs. Call light within reach. Safety measures in place.

## 2022-10-10 NOTE — PATIENT CARE CONFERENCE
Inpatient Rehabilitation  Weekly Team Conference Note  The [address], [address] [place] [address]  [phone]  Patient Name: Any Castro MD        MRN: [id number]    : 1935  (82 y.o.)  Gender: male   Referring Practitioner: DO Susy     The team conference for this patient was held on 10/11/2022 at 10:30am by:  Radha Taylor.  DO Susy    Current/Goal QM SCORES  QM Current/Goal Score   Eating CARE Score: 5 / Discharge Goal: Independent   Oral Hygiene CARE Score: 4 / Discharge Goal: Independent   Shower/Bathing CARE Score: 80 / Discharge Goal: Supervision or touching assistance   UB Dressing CARE Score: 2 / Discharge Goal: Independent   LB Dressing CARE Score: 1 / Discharge Goal: Independent   Putting on/off Footwear CARE Score: 1 / Discharge Goal: Independent   Toileting Hygiene CARE Score: 2 / Discharge Goal: Independent   Bladder Continence Bladder Continence: Always continent    Bowel Continence Bowel Continence: Not rated    Toilet Transfers CARE Score: 3 / Discharge Goal: Independent   Shower/Bathe Self  CARE Score: 88 / Discharge Goal: Supervision or touching assistance   Rolling Left and Right CARE Score: 4 / Discharge Goal: Independent   Sit to Lying CARE Score: 3 / Discharge Goal: Independent   Lying to Sitting on Bedside CARE Score: 4 / Discharge Goal: Independent   Sit to Stand CARE Score: 3 / Discharge Goal: Independent   Chair/Bed to Chair Transfer CARE Score: 3 / Discharge Goal: Independent   Car Transfers CARE Score: 88 / Discharge Goal: Independent   Walk 10 Feet CARE Score: 1 / Discharge Goal: Independent   Walk 50 Feet with Two Turns CARE Score: 88 / Discharge Goal: Independent   Walk 150 Feet CARE Score: 88 / Discharge Goal: Independent   Walk 10 Feet on Uneven Surfaces CARE Score: 88 / Discharge Goal: Independent   1 Step (Curb) CARE Score: 88 / Discharge Goal: Independent   4 Steps CARE Score: 88 / Discharge Goal: Independent   12 Steps CARE Recent Labs     10/10/22  0649      K 4.4      CO2 34*   BUN 41*   CREATININE 1.1     No results for input(s): AST, ALT, ALB, BILIDIR, BILITOT, ALKPHOS in the last 72 hours. No results for input(s): MG in the last 72 hours. Recent Labs     10/10/22  0649   WBC 6.9   HGB 8.2*   HCT 25.9*        Recent Labs     10/10/22  0649      K 4.4      CO2 34*   BUN 41*   CREATININE 1.1   CALCIUM 8.3     No results for input(s): AST, ALT, BILIDIR, BILITOT, ALKPHOS in the last 72 hours. Recent Labs     10/09/22  0633 10/10/22  0649 10/11/22  0604   INR 1.87* 1.79* 1.87*     No results for input(s): Reina Perales in the last 72 hours. PHYSICAL THERAPY:  Transfers:  Sit to Stand: Dependent/Total, 2 Person Assistance  Stand to Sit: Dependent/Total, 2 Person Assistance  Bed to Chair: Dependent/Total, 2 Person Assistance (Performed via squat pivot (see below))  Squat Pivot Transfers: Dependent/Total, 2 Person Assistance (bed>w/c; Ana + ModA for anterior weight shift and to guide hips to chair)    OCCUPATIONAL THERAPY:  ADL  Feeding: Setup  Feeding Skilled Clinical Factors: Pt eating breakfast upon arrival. PT reports assisting opening salt packet and syrup  container  UE Dressing: Maximum assistance  UE Dressing Skilled Clinical Factors: Pt donned button up pj shirt. Pt threaded RUE into shirt and required assist to move shirt around back and to button up shirt. LE Dressing: Dependent/Total  LE Dressing Skilled Clinical Factors: pt threaded BLEs into pants while seated in recliner w/ increased time and min A for thoroughness. Pt required assist x2 to pull briefs and pants up in stance- assist x1 for balance and assistx1 for clothing mgmt. Additional Comments: unable to complete additional ADLs this date d/t orthostatic hypotension    Toilet Transfers:       Tub/ShowerTransfers:        NUTRITION:  Please see nutrition note for details.   Weight: 104 lb 8 oz (47.4 kg) / Body mass index is 18.51 kg/m². Diet Level/Order: ADULT DIET; Regular; 5 carb choices (75 gm/meal); Low Potassium (Less than 3000 mg/day)  ADULT ORAL NUTRITION SUPPLEMENT; Breakfast, Lunch, Dinner; Renal Oral Supplement  Supplements:   Average Consumption per meal: PO Meals Eaten (%): 26 - 50%    CASE MANAGEMENT:  Psychosocial/Behavioral Issues: none  Assessment:  Pt is from home with spouse and was independent PTA. Patient/Family Education provided by team:  Role of PT/OT, safety w/ transfers, energy conservation    Patient Goals for Rehab stay:  1. \"to return to what I was doing before\"     Rehab Team Goals for patient for the Upcoming Week:  1. increase independence w/ LE dressing and footwear  2. Increase independence w/ functional mobility    Barriers to Progress/Attainment of Goals & Efforts/Interventions to remove Barriers:  1. decreased strength and endurance - continued therapy    Discharge Plan:  Estimated Length of Stay: 14 days  Destination: home health  Services at Discharge: [address] [place] [address], Occupational Therapy, and Nursing 3x week  Equipment at Discharge: cont to assess  Community Resources:   Factors facilitating achievement of predicted outcomes: Cooperative  Barriers to the achievement of predicted outcomes: Decreased endurance, Upper extremity weakness, and Lower extremity weakness    Special Needs in the Upcoming Week:   [x] Family/Caregiver Education  [] Home visit  []Therapeutic Pass [] Consults:_______   [] Family/Caregiver Training  [] Stroke Risk Factor Education     [] Other;_______     TEAM SUMMARY: Will continue with current poc & goals until anticipated d/c date of 10/20/2022.     MD:   Stroke Risk Factors:   [] N/A for this patient [x] HTN  []  Diabetes  [x] Hyperlipidemia  []Obesity BMI >25  [] Atrial Fibrillation [] Smoker (current)  [] Smoker (quit in last 12 months)  [] Sleep Apnea [] Other:     Risk for Readmission: High 20%  Critical Items: If High Risk, consider the following recommendations: Follow up with PCP within one week of discharge. Justification for Continued Stay:   Criteria for continued IRF stay:  Based on my medical assessment of the patient and review of information from the interdisciplinary team, as part of this weekly team conference, the patient continues to meet the following criteria for IRF level of care:  [x] The patient requires 24-hour rehabilitation nursing care   [x] The patient requires an intensive rehabilitation therapy program  [x] The patient requires active and ongoing intervention of multiple therapy disciplines  [x] The patient requires continued physician supervision by a rehabilitation physician  [x] The patient requires an intensive and coordinated interdisciplinary team approach to the delivery of rehabilitative care    Medical Necessity-continued close physician medical management is required for:   [] Cardiac/Circulatory dysfunction  [] Respiratory/Pulmonary dysfunction  [] Integumentary complications  [] Peripheral Vascular dysfunction  [x] Musculoskeletal dysfunction  [x] Neurological dysfunction d/t:  [] CVA  [] SCI  [] TBI  [x] Other: _CIM_________  [] Renal dysfunction  [] Hematologic dysfunction    [] Endocrine disorders  [] GI disorders     [] Genito-Urinary dysfunction    Assessment/Plan:  [x] The patient is making good progression towards their LTG's, is actively participating in, and has a reasonable expectation to continue to benefit from the intensive rehabilitation program.  [] The estimated discharge date has been changed from initial team conference due to:   [] The estimated discharge destination has been changed from initial team conference due to:     Rehab Team Members in attendance for Team Conference:  ARU Supervisor/PPS Coordinator:  Merly Arias PT, DPT    Therapy Manager/ARU :  Enrique Alonso PT, DPT    Social Work:  Kaley Osorio    Nursing:   Saeed Liang RN    Therapy:  TRISTON Miller Jennifer Moreno, DPT  Florentin Taveras PT, DPT   Victor Manuel Kumar, SPT    Juan Antonio Segal, OTR/L  Yulia Painter, OTR/L    I approve the established interdisciplinary plan of care as documented within the medical record of Brenda Beckham MD.    MD Signature DAVIDE Wolff.O. M.P.H  PM&R  10/11/2022  12:30 PM

## 2022-10-10 NOTE — PROGRESS NOTES
Department of Physical Medicine & Rehabilitation  Progress Note    Patient Identification:  Adri Macias MD  [id number]  : 1935  Admit date: 10/6/2022    Chief Complaint: Debility    Subjective:   No acute events overnight. Patient seen this am. He does have some complaints  about the therapy schedule. Slept well. He is also concerned about his lungs. ROS: No f/c, n/v, cp     Objective:  Patient Vitals for the past 24 hrs:   BP Temp Temp src Pulse Resp SpO2   10/10/22 0841 -- -- -- 87 16 92 %   10/10/22 0800 129/75 97 °F (36.1 °C) Axillary 86 16 92 %   10/09/22 2326 -- -- -- 85 15 94 %   10/09/22 2030 130/71 98.4 °F (36.9 °C) Oral 90 20 95 %     Const: Alert. No distress, pleasant. HEENT: Normocephalic, atraumatic. Normal sclera/conjunctiva. MMM. CV: Regular rate and rhythm. Resp: No respiratory distress. Lungs CTAB. Abd: Soft, nontender, nondistended, NABS+   Ext: No edema. Neuro: Alert, oriented, appropriately interactive. Psych: Cooperative, appropriate mood and affect    Laboratory data: Available via EMR.    Last 24 hour lab  Recent Results (from the past 24 hour(s))   POCT Glucose    Collection Time: 10/09/22 12:01 PM   Result Value Ref Range    POC Glucose 157 (H) 70 - 99 mg/dl    Performed on ACCU-CHEK    POCT Glucose    Collection Time: 10/09/22  5:23 PM   Result Value Ref Range    POC Glucose 161 (H) 70 - 99 mg/dl    Performed on ACCU-CHEK    POCT Glucose    Collection Time: 10/09/22  8:53 PM   Result Value Ref Range    POC Glucose 129 (H) 70 - 99 mg/dl    Performed on ACCU-CHEK    Protime-INR    Collection Time: 10/10/22  6:49 AM   Result Value Ref Range    Protime 20.8 (H) 11.7 - 14.5 sec    INR 1.79 (H) 0.87 - 6.63   Basic Metabolic Panel w/ Reflex to MG    Collection Time: 10/10/22  6:49 AM   Result Value Ref Range    Sodium 140 136 - 145 mmol/L    Potassium reflex Magnesium 4.4 3.5 - 5.1 mmol/L    Chloride 102 99 - 110 mmol/L    CO2 34 (H) 21 - 32 mmol/L    Anion Gap 4 3 - 16    Glucose 107 (H) 70 - 99 mg/dL    BUN 41 (H) 7 - 20 mg/dL    Creatinine 1.1 0.8 - 1.3 mg/dL    GFR Non-African American >60 >60    GFR African American >60 >60    Calcium 8.3 8.3 - 10.6 mg/dL   CBC auto differential    Collection Time: 10/10/22  6:49 AM   Result Value Ref Range    WBC 6.9 4.0 - 11.0 K/uL    RBC 2.91 (L) 4.20 - 5.90 M/uL    Hemoglobin 8.2 (L) 13.5 - 17.5 g/dL    Hematocrit 25.9 (L) 40.5 - 52.5 %    MCV 89.0 80.0 - 100.0 fL    MCH 28.0 26.0 - 34.0 pg    MCHC 31.5 31.0 - 36.0 g/dL    RDW 19.1 (H) 12.4 - 15.4 %    Platelets 006 832 - 175 K/uL    MPV 8.8 5.0 - 10.5 fL    Neutrophils % 71.6 %    Lymphocytes % 16.6 %    Monocytes % 9.0 %    Eosinophils % 1.6 %    Basophils % 1.2 %    Neutrophils Absolute 5.0 1.7 - 7.7 K/uL    Lymphocytes Absolute 1.2 1.0 - 5.1 K/uL    Monocytes Absolute 0.6 0.0 - 1.3 K/uL    Eosinophils Absolute 0.1 0.0 - 0.6 K/uL    Basophils Absolute 0.1 0.0 - 0.2 K/uL   POCT Glucose    Collection Time: 10/10/22  7:56 AM   Result Value Ref Range    POC Glucose 106 (H) 70 - 99 mg/dl    Performed on ACCU-CHEK        Therapy progress:  PT  Position Activity Restriction  Other position/activity restrictions: up w/ assist, ambulate pt, up as tolerated,  Objective     Sit to Stand: Dependent/Total, 2 Person Assistance  Stand to Sit: Dependent/Total, 2 Person Assistance  Bed to Chair: Dependent/Total, 2 Person Assistance (Performed via squat pivot (see below))     OT  PT Equipment Recommendations  Other: Ongoing assessment     Assessment        SLP          Body mass index is 18.51 kg/m².     Assessment and Plan:  # Right pneumothorax  # R 1,3-6 nondisplaced rib fractures  # Right lung contusion             - 9/19 - Right chest tube placed in the ED  - 9/23 - Right chest tube removed  - Pulmonary expansion  - Multimodal pain control     # Left pneumothorax  # Left T1 and T3 posterior rib fractures  # Left lung contusion  - 9/21 - Left chest tube placed by IR  - 9/23 - chest tube to water seal  - Left CT removed 10/4     #LLL collapse, L mainstem bronchus occlusion  - 9/23 - s/p bronchoscopy by ICU with removal of large mucus plug  - 9/27 - CT chest demonstrating persistent total occlusion of the left central airway/left lung collapse with loculated L hydropneumothorax    - Aggressive respiratory care, hypertonic saline, mucomyst nebs BID  - Bronch with pulm 9/30 w/mucus plugging in the right and left tracheobronchial trees  - Bronch wash cultures with Ecoli resistant to cefazolin, on Ceftriaxone- 2 days left       Repeat CXR today      # C7 TP Fracture  - 9/20 - CTA Head/Neck did not show evidence of BCVI   - No brace needed  - Activity as tolerated     #L shoulder ecchymoses  - L shoulder x-ray with no interosseous abnormalities      Medical Comorbidities  # Aortic Valve Replacement  - 9/23 - restarted home warfarin  - AM INR checks - prior goal 2-2.5  - check dosing with pharmacy     # Hyperlipidemia  - Continue atorvastatin 10 mg daily     #RUL pulmonary nodule  - 6mm RUL nodule noted on 9/22 CT chest  - Recommend 6-12 month interval CT scan for monitoring      #Stage 3 Coccygeal Ulcer  - 2/2 to rowing machine per patient now a stage 3 wound   - Recommending [facility]     #CIM  - prolonged hospital course   - PT/OT required      # orthostatic hypotension  - Likely associated with CIM  - nephrology consult       Rehab Progress: Improving  Anticipated Dispo: home  Services/DME: WILLIAM  ELOS: WILLIAM Ambrose D.O. M.P.H  PM&R  10/10/2022  11:11 AM

## 2022-10-11 PROBLEM — R93.89 ABNORMAL CXR: Status: ACTIVE | Noted: 2022-10-11

## 2022-10-11 PROBLEM — R06.89 INEFFECTIVE AIRWAY CLEARANCE: Status: ACTIVE | Noted: 2022-10-11

## 2022-10-11 LAB
GLUCOSE BLD-MCNC: 106 MG/DL (ref 70–99)
GLUCOSE BLD-MCNC: 125 MG/DL (ref 70–99)
GLUCOSE BLD-MCNC: 159 MG/DL (ref 70–99)
GLUCOSE BLD-MCNC: 168 MG/DL (ref 70–99)
INR BLD: 1.87 (ref 0.87–1.14)
PERFORMED ON: ABNORMAL
PROTHROMBIN TIME: 21.5 SEC (ref 11.7–14.5)

## 2022-10-11 PROCEDURE — 85610 PROTHROMBIN TIME: CPT

## 2022-10-11 PROCEDURE — 94669 MECHANICAL CHEST WALL OSCILL: CPT

## 2022-10-11 PROCEDURE — 97116 GAIT TRAINING THERAPY: CPT

## 2022-10-11 PROCEDURE — 6370000000 HC RX 637 (ALT 250 FOR IP): Performed by: PHYSICAL MEDICINE & REHABILITATION

## 2022-10-11 PROCEDURE — 97542 WHEELCHAIR MNGMENT TRAINING: CPT

## 2022-10-11 PROCEDURE — 94761 N-INVAS EAR/PLS OXIMETRY MLT: CPT

## 2022-10-11 PROCEDURE — 97530 THERAPEUTIC ACTIVITIES: CPT

## 2022-10-11 PROCEDURE — 94664 DEMO&/EVAL PT USE INHALER: CPT

## 2022-10-11 PROCEDURE — 99232 SBSQ HOSP IP/OBS MODERATE 35: CPT | Performed by: INTERNAL MEDICINE

## 2022-10-11 PROCEDURE — 97110 THERAPEUTIC EXERCISES: CPT

## 2022-10-11 PROCEDURE — 97535 SELF CARE MNGMENT TRAINING: CPT

## 2022-10-11 PROCEDURE — 6370000000 HC RX 637 (ALT 250 FOR IP): Performed by: INTERNAL MEDICINE

## 2022-10-11 PROCEDURE — 6360000002 HC RX W HCPCS: Performed by: PHYSICAL MEDICINE & REHABILITATION

## 2022-10-11 PROCEDURE — 1280000000 HC REHAB R&B

## 2022-10-11 PROCEDURE — 99223 1ST HOSP IP/OBS HIGH 75: CPT | Performed by: INTERNAL MEDICINE

## 2022-10-11 PROCEDURE — 2580000003 HC RX 258: Performed by: PHYSICAL MEDICINE & REHABILITATION

## 2022-10-11 PROCEDURE — 36415 COLL VENOUS BLD VENIPUNCTURE: CPT

## 2022-10-11 PROCEDURE — 99233 SBSQ HOSP IP/OBS HIGH 50: CPT | Performed by: PHYSICAL MEDICINE & REHABILITATION

## 2022-10-11 PROCEDURE — 2700000000 HC OXYGEN THERAPY PER DAY

## 2022-10-11 PROCEDURE — 94640 AIRWAY INHALATION TREATMENT: CPT

## 2022-10-11 RX ORDER — WARFARIN SODIUM 1 MG/1
3 TABLET ORAL
Status: COMPLETED | OUTPATIENT
Start: 2022-10-11 | End: 2022-10-11

## 2022-10-11 RX ORDER — FLUDROCORTISONE ACETATE 0.1 MG/1
0.1 TABLET ORAL
Status: DISCONTINUED | OUTPATIENT
Start: 2022-10-12 | End: 2022-10-18

## 2022-10-11 RX ORDER — ALBUTEROL SULFATE 2.5 MG/3ML
2.5 SOLUTION RESPIRATORY (INHALATION) 3 TIMES DAILY
Status: DISCONTINUED | OUTPATIENT
Start: 2022-10-11 | End: 2022-10-18

## 2022-10-11 RX ORDER — IPRATROPIUM BROMIDE AND ALBUTEROL SULFATE 2.5; .5 MG/3ML; MG/3ML
1 SOLUTION RESPIRATORY (INHALATION) EVERY 4 HOURS PRN
Status: DISCONTINUED | OUTPATIENT
Start: 2022-10-11 | End: 2022-10-11

## 2022-10-11 RX ORDER — SODIUM CHLORIDE FOR INHALATION 3 %
4 VIAL, NEBULIZER (ML) INHALATION 3 TIMES DAILY
Status: DISCONTINUED | OUTPATIENT
Start: 2022-10-11 | End: 2022-10-18

## 2022-10-11 RX ORDER — AMOXICILLIN AND CLAVULANATE POTASSIUM 500; 125 MG/1; MG/1
1 TABLET, FILM COATED ORAL EVERY 8 HOURS SCHEDULED
Status: DISCONTINUED | OUTPATIENT
Start: 2022-10-11 | End: 2022-10-12

## 2022-10-11 RX ORDER — SODIUM CHLORIDE FOR INHALATION 3 %
4 VIAL, NEBULIZER (ML) INHALATION PRN
Status: DISCONTINUED | OUTPATIENT
Start: 2022-10-11 | End: 2022-10-11

## 2022-10-11 RX ADMIN — AMOXICILLIN AND CLAVULANATE POTASSIUM 1 TABLET: 500; 125 TABLET, FILM COATED ORAL at 14:06

## 2022-10-11 RX ADMIN — IPRATROPIUM BROMIDE AND ALBUTEROL SULFATE 1 AMPULE: 2.5; .5 SOLUTION RESPIRATORY (INHALATION) at 07:24

## 2022-10-11 RX ADMIN — AMOXICILLIN AND CLAVULANATE POTASSIUM 1 TABLET: 500; 125 TABLET, FILM COATED ORAL at 22:50

## 2022-10-11 RX ADMIN — WARFARIN SODIUM 3 MG: 1 TABLET ORAL at 18:01

## 2022-10-11 RX ADMIN — METHOCARBAMOL TABLETS 500 MG: 500 TABLET, COATED ORAL at 20:58

## 2022-10-11 RX ADMIN — ALBUTEROL SULFATE 2.5 MG: 2.5 SOLUTION RESPIRATORY (INHALATION) at 19:59

## 2022-10-11 RX ADMIN — GABAPENTIN 100 MG: 100 CAPSULE ORAL at 20:58

## 2022-10-11 RX ADMIN — ATORVASTATIN CALCIUM 10 MG: 10 TABLET, FILM COATED ORAL at 20:58

## 2022-10-11 RX ADMIN — BISACODYL 5 MG: 5 TABLET, COATED ORAL at 07:59

## 2022-10-11 RX ADMIN — Medication: at 20:58

## 2022-10-11 RX ADMIN — METHOCARBAMOL TABLETS 500 MG: 500 TABLET, COATED ORAL at 07:59

## 2022-10-11 RX ADMIN — Medication: at 07:59

## 2022-10-11 RX ADMIN — SODIUM CHLORIDE 30 MG/ML INHALATION SOLUTION 4 ML: 30 SOLUTION INHALANT at 19:59

## 2022-10-11 RX ADMIN — FERROUS SULFATE TAB 325 MG (65 MG ELEMENTAL FE) 325 MG: 325 (65 FE) TAB at 07:59

## 2022-10-11 RX ADMIN — GUAIFENESIN 600 MG: 600 TABLET, EXTENDED RELEASE ORAL at 07:59

## 2022-10-11 RX ADMIN — METHOCARBAMOL TABLETS 500 MG: 500 TABLET, COATED ORAL at 14:06

## 2022-10-11 RX ADMIN — GUAIFENESIN 600 MG: 600 TABLET, EXTENDED RELEASE ORAL at 20:58

## 2022-10-11 RX ADMIN — PANTOPRAZOLE SODIUM 40 MG: 40 TABLET, DELAYED RELEASE ORAL at 06:53

## 2022-10-11 RX ADMIN — FLUDROCORTISONE ACETATE 0.1 MG: 0.1 TABLET ORAL at 07:59

## 2022-10-11 RX ADMIN — Medication 5 MG: at 20:58

## 2022-10-11 ASSESSMENT — PAIN SCALES - GENERAL
PAINLEVEL_OUTOF10: 0
PAINLEVEL_OUTOF10: 0
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
## 2022-10-11 NOTE — PROGRESS NOTES
Comprehensive Nutrition Assessment    Type and Reason for Visit:  Reassess    Nutrition Recommendations/Plan:   Continue Regular , CCC-5, Low Potassium  Continue with ONS; Nepro TID     Malnutrition Assessment:  Malnutrition Status: At risk for malnutrition (Comment) (10/11/22 1601)    Context:  Acute Illness           Nutrition Assessment:    Follow up : Pt receiving a Regular CCC-5 Low potassium diet with recorded meal intakes of 26-50%. ONS changed to Nepro with added modification of low potassium diet, noted intake of %. Pt working with therapy when attempted to visit. Will continue with Nepro TID to promote adequate nutrition and monitor acceptance. Nutrition Related Findings:    K+4. 4(prev 5.3), glu 106 LBM 10/09. no edema Wound Type: Pressure Injury, Stage II (on sacrum)       Current Nutrition Intake & Therapies:    Average Meal Intake: 26-50%  Average Supplements Intake: %  ADULT DIET; Regular; 5 carb choices (75 gm/meal); Low Potassium (Less than 3000 mg/day)  ADULT ORAL NUTRITION SUPPLEMENT; Breakfast, Lunch, Dinner; Renal Oral Supplement    Anthropometric Measures:  Height: 5' 3\" (160 cm)  Ideal Body Weight (IBW): 124 lbs (56 kg)       Current Body Weight: 104 lb (47.2 kg),   IBW.    Current BMI (kg/m2): 18.4  Usual Body Weight: 106 lb (48.1 kg)  % Weight Change (Calculated): -1.9  Weight Adjustment For: No Adjustment                 BMI Categories: Underweight (BMI less than 22) age over 72    Estimated Daily Nutrient Needs:    Energy (kcal/day): 1714-9515  Protein (g/day): 71-85  Fluid (ml/day): 6120-5632    Nutrition Diagnosis:   Inadequate oral intake related to acute injury/trauma, inadequate protein-energy intake as evidenced by intake 26-50%, poor intake prior to admission    Nutrition Interventions:   Food and/or Nutrient Delivery: Continue Current Diet, Continue Oral Nutrition Supplement  Nutrition Education/Counseling: No recommendation at this time  Coordination of Nutrition Care: Continue to monitor while inpatient       Goals:  Previous Goal Met: Progressing toward Goal(s)  Goals: PO intake 50% or greater, by next RD assessment       Nutrition Monitoring and Evaluation:   Behavioral-Environmental Outcomes: None Identified  Food/Nutrient Intake Outcomes: Food and Nutrient Intake, Supplement Intake  Physical Signs/Symptoms Outcomes: Biochemical Data, Nutrition Focused Physical Findings, Skin, Weight    Discharge Planning:     Too soon to determine     Mariann Macdonald, [address]: [id number]

## 2022-10-11 NOTE — CARE COORDINATION
Team Conference held this morning and team discussed DC date for 10/20/22. Plan is for Pt to return home with spouse and skilled [address] SW met with spouse at bedside this morning as Pt was soundly asleep. Spouse stated that therapy this morning \"really wiped him out. \" We discussed DC date and spouse agreed. Emotional support provided. SW will continue follow.     Zina Mcallister, [facility]  Case Management  [phone]
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
## 2022-10-11 NOTE — PROGRESS NOTES
Patient is alert and oriented x4, denies c/o. Fall precautions are in place, call light and bedside table are within reach. Patient is aware to call for assistance to transfer.

## 2022-10-11 NOTE — PROGRESS NOTES
Shift assessment complete, VSS, afebrile, medications taken one by one. PICC dressing was partially attached. Dressing change complete and site cleansed with chlorhexidine. Small blood return noted. Pt. Denies pain thus far, remains A & O X 4, call light and over bed table within reach. Hourly rounding and frequent visual checks in place.
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
## 2022-10-11 NOTE — RT PROTOCOL NOTE
RT Nebulizer Bronchodilator Protocol Note    There is a bronchodilator order in the chart from a provider indicating to follow the RT Bronchodilator Protocol and there is an Initiate RT Bronchodilator Protocol order as well (see protocol at bottom of note). CXR Findings:  XR CHEST PORTABLE    Result Date: 10/10/2022  1. Worsening left-sided airspace opacities, possibly asymmetric pulmonary edema or pneumonia. 2.  Left-sided pleural effusion. 3.  The previously described pneumothoraces are no longer visualized. The lucency projecting over the medial left lung apex is felt to represent air within the esophagus. The findings from the last RT Protocol Assessment were as follows:  Smoking: None or smoker <15 pack years  Respiratory Pattern: Regular pattern and RR 12-20 bpm  Breath Sounds: Slightly diminished and/or crackles  Cough: Strong, spontaneous, non-productive  Indication for Bronchodilator Therapy: Decreased or absent breath sounds  Bronchodilator Assessment Score: 2    Aerosolized bronchodilator medication orders have been revised according to the RT Nebulizer Bronchodilator Protocol below. Respiratory Therapist to perform RT Therapy Protocol Assessment initially then follow the protocol. Repeat RT Therapy Protocol Assessment PRN for score 0-3 or on second treatment, BID, and PRN for scores above 3. No Indications - adjust the frequency to every 6 hours PRN wheezing or bronchospasm, if no treatments needed after 48 hours then discontinue using Per Protocol order mode. If indication present, adjust the RT bronchodilator orders based on the Bronchodilator Assessment Score as indicated below. If a patient is on this medication at home then do not decrease Frequency below that used at home. 0-3 - enter or revise RT bronchodilator order(s) to equivalent RT Bronchodilator order with Frequency of every 4 hours PRN for wheezing or increased work of breathing using Per Protocol order mode. 4-6 - enter or revise RT Bronchodilator order(s) to two equivalent RT bronchodilator orders with one order with BID Frequency and one order with Frequency of every 4 hours PRN wheezing or increased work of breathing using Per Protocol order mode. 7-10 - enter or revise RT Bronchodilator order(s) to two equivalent RT bronchodilator orders with one order with TID Frequency and one order with Frequency of every 4 hours PRN wheezing or increased work of breathing using Per Protocol order mode. 11-13 - enter or revise RT Bronchodilator order(s) to one equivalent RT bronchodilator order with QID Frequency and an Albuterol order with Frequency of every 4 hours PRN wheezing or increased work of breathing using Per Protocol order mode. Greater than 13 - enter or revise RT Bronchodilator order(s) to one equivalent RT bronchodilator order with every 4 hours Frequency and an Albuterol order with Frequency of every 2 hours PRN wheezing or increased work of breathing using Per Protocol order mode. RT to enter RT Home Evaluation for COPD & MDI Assessment order using Per Protocol order mode.     Electronically signed by Naseem Swanson RCP on 10/11/2022 at 7:31 AM

## 2022-10-11 NOTE — PROGRESS NOTES
Department of Physical Medicine & Rehabilitation  Progress Note    Patient Identification:  Wilian Mercer MD  [id number]  : 1935  Admit date: 10/6/2022    Chief Complaint: Debility    Subjective:   Improvement in sleep and function today. Pulmonology consult yesterday for HAP. No new issues overnight. Plan for DC 10/20. Team conference today. ROS: No f/c, n/v, cp     Objective:  Patient Vitals for the past 24 hrs:   BP Temp Temp src Pulse Resp SpO2   10/11/22 0745 136/74 97.1 °F (36.2 °C) Oral 74 18 99 %   10/11/22 0727 -- -- -- 79 18 93 %   10/10/22 2105 135/77 97.9 °F (36.6 °C) Oral 91 18 94 %   10/10/22 2008 -- -- -- 87 16 94 %   10/10/22 1706 -- -- -- 90 16 93 %   10/10/22 1314 -- -- -- 92 16 92 %       Const: Alert. No distress, pleasant. HEENT: Normocephalic, atraumatic. Normal sclera/conjunctiva. MMM. CV: Regular rate and rhythm. Resp: No respiratory distress. Lungs CTAB. Abd: Soft, nontender, nondistended, NABS+   Ext: No edema. Neuro: Alert, oriented, appropriately interactive. Psych: Cooperative, appropriate mood and affect    Laboratory data: Available via EMR.    Last 24 hour lab  Recent Results (from the past 24 hour(s))   POCT Glucose    Collection Time: 10/10/22  4:44 PM   Result Value Ref Range    POC Glucose 140 (H) 70 - 99 mg/dl    Performed on ACCU-CHEK    POCT Glucose    Collection Time: 10/10/22  9:11 PM   Result Value Ref Range    POC Glucose 125 (H) 70 - 99 mg/dl    Performed on ACCU-CHEK    Protime-INR    Collection Time: 10/11/22  6:04 AM   Result Value Ref Range    Protime 21.5 (H) 11.7 - 14.5 sec    INR 1.87 (H) 0.87 - 1.14   POCT Glucose    Collection Time: 10/11/22  7:21 AM   Result Value Ref Range    POC Glucose 106 (H) 70 - 99 mg/dl    Performed on ACCU-CHEK    POCT Glucose    Collection Time: 10/11/22 11:52 AM   Result Value Ref Range    POC Glucose 125 (H) 70 - 99 mg/dl    Performed on ACCU-CHEK        Therapy progress:  PT  Position Activity Restriction  Other position/activity restrictions: up w/ assist, ambulate pt, up as tolerated,  Objective     Sit to Stand: Dependent/Total, 2 Person Assistance  Stand to Sit: Dependent/Total, 2 Person Assistance  Bed to Chair: Dependent/Total, 2 Person Assistance (Performed via squat pivot (see below))     OT  PT Equipment Recommendations  Other: Ongoing assessment     Assessment        SLP          Body mass index is 18.51 kg/m².     Assessment and Plan:  # Right pneumothorax  # R 1,3-6 nondisplaced rib fractures  # Right lung contusion             - 9/19 - Right chest tube placed in the ED  - 9/23 - Right chest tube removed  - Pulmonary expansion  - Multimodal pain control     # Left pneumothorax  # Left T1 and T3 posterior rib fractures  # Left lung contusion  - 9/21 - Left chest tube placed by IR  - 9/23 - chest tube to water seal  - Left CT removed 10/4     #LLL collapse, L mainstem bronchus occlusion  - 9/23 - s/p bronchoscopy by ICU with removal of large mucus plug  - 9/27 - CT chest demonstrating persistent total occlusion of the left central airway/left lung collapse with loculated L hydropneumothorax    - Aggressive respiratory care, hypertonic saline, mucomyst nebs BID  - Bronch with pulm 9/30 w/mucus plugging in the right and left tracheobronchial trees  - Bronch wash cultures with Ecoli resistant to cefazolin, on Ceftriaxone- 2 days left       Repeat CXR today      # C7 TP Fracture  - 9/20 - CTA Head/Neck did not show evidence of BCVI   - No brace needed  - Activity as tolerated     #L shoulder ecchymoses  - L shoulder x-ray with no interosseous abnormalities      Medical Comorbidities  # Aortic Valve Replacement  - 9/23 - restarted home warfarin  - AM INR checks - prior goal 2-2.5  - check dosing with pharmacy     # Hyperlipidemia  - Continue atorvastatin 10 mg daily     #RUL pulmonary nodule  - 6mm RUL nodule noted on 9/22 CT chest  - Recommend 6-12 month interval CT scan for monitoring      #Stage 3 Coccygeal Ulcer  - 2/2 to rowing machine per patient now a stage 3 wound   - Recommending [facility]     #CIM  - prolonged hospital course   - PT/OT required      # orthostatic hypotension  - Likely associated with CIM  - nephrology consult      Team conference was held today on the patient and discussed directly with the patient utilizing their entire treatment team. Please see separate team note for details. Total treatment time for today's care >35 min. >50% of time spent counseling with patient and coordinating care.           Rehab Progress: Improving  Anticipated Dispo: home  Services/DME: [facility]  ELOS: 10/20      RAJI ArcherP.H  PM&R  10/11/2022  12:30 PM

## 2022-10-11 NOTE — PROGRESS NOTES
Physical Therapy  Facility/Department: [facility] ACUTE REHAB UNIT  Rehabilitation Physical Therapy Treatment Note    NAME: Suleiman Laughlin MD  : 1935 (82 y.o.)  MRN: [id number]  CODE STATUS: Full Code    Date of Service: 10/11/22       Restrictions:  Position Activity Restriction  Other position/activity restrictions: up w/ assist, ambulate pt, up as tolerated,     SUBJECTIVE  Subjective  Subjective: Pt semi-supine in bed upon PT/OT arrival, agreeable to therapy. Pain: Denied pain. Post Treatment Pain Screening     PT and OT worked collaboratively to integrate multiple skills to progress pt's independence. OBJECTIVE  Cognition  Overall Cognitive Status: WFL  Arousal/Alertness: Appropriate responses to stimuli  Following Commands: Follows multistep commands consistently  Attention Span: Appears intact  Memory: Appears intact  Safety Judgement: Good awareness of safety precautions  Problem Solving: Assistance required to identify errors made;Assistance required to correct errors made  Insights: Decreased awareness of deficits  Initiation: Does not require cues  Sequencing: Requires cues for some  Orientation  Overall Orientation Status: Within Normal Limits  Orientation Level: Oriented X4    Functional Mobility  Roll Left  Assistance Level: Stand by assist  Skilled Clinical Factors: VC for sequencing  Sit to Supine  Assistance Level: Supervision  Skilled Clinical Factors: Performed X1 with HOB elevated  Supine to Sit  Assistance Level: Supervision;Stand by assist  Skilled Clinical Factors: Performed X1 initially with HOB elevated/HR. To simulate home environment, PT instructed pt to perform with [facility] flat/no HRs. VC for step by step sequencing of log roll technique and SBA for safety.   Balance  Sitting Balance: Supervision (Pt donned socks/R shoe seated EOB without assist (OT donned L shoe for sake of time))  Standing Balance: Contact guard assistance  Standing Balance  Activity: Pt stood with RW and CGA for safety/stability. Occasional episodes of bilateral knee buckling that pt self-corrects. Comments: Pt reported knee buckling was present at baseline. Transfers  Surface: From bed; Wheelchair  Additional Factors: Verbal cues; Hand placement cues  Device: Walker (RW)  Sit to Stand  Assistance Level: Contact guard assist  Skilled Clinical Factors: VC to scoot to EOS and for hand placement. CGA for safety d/t episodes of knee buckling. Stand to Sit  Assistance Level: Contact guard assist;Minimal assistance  Skilled Clinical Factors: VC for hand placement. CGA-Ana for controlled descent (inc assist req with fatigue). Bed To/From Chair  Technique: Stand pivot (no AD)  Assistance Level: Moderate assistance;Contact guard assist;Requires x 2 assistance  Skilled Clinical Factors: VC to scoot to EOS, hand placement. Physical assist for stability with pivot and to correct x1 episode of knee buckling during dynamic movement. Environmental Mobility  Ambulation  Surface: Level surface  Device: Rolling walker  Distance: 61' + 25' + 35'  Activity: Within Unit  Activity Comments: Seated rest breaks between bouts. 2nd therapist provided w/c follow and O2 transport. Additional Factors: Verbal cues; Increased time to complete  Assistance Level: Contact guard assist;Requires x 2 assistance (w/c follow + O2 transport)  Gait Deviations: Slow mingo;Decreased step length bilateral;Narrow base of support (forward flexed posture)  Skilled Clinical Factors: VC for upright posture, increased step length, incresed CHRIS  Stairs  Stair Height: 6''  Device: Bilateral handrails  Number of Stairs: 6 (3+3)  Additional Factors: Verbal cues; Hand placement cues; Non-reciprocal going up;Non-reciprocal going down; Increased time to complete  Assistance Level: Requires x 2 assistance (Min-ModA + CGA)  Skilled Clinical Factors: Ana+CGA for stability/propulsion ascending; Min-ModA+CGA for controlled descent. VC for hand placement.          2nd Session  Pt seated EOB with wife present and bed alarm activated upon PT arrival. PT educated pt/wife about safety concerns with pt sitting EOB without staff present and instructed to call nursing to assist pt to chair if wanted to sit upright. Pt and wife verbalized understanding. Pt agreeable to therapy. Pt also reported he has not been getting OOB to use the bathroom and PT educated pt on importance of getting up to the bathroom with assist of staff to increase independence, increase activity tolerance, and for more normal bowel/bladder function. Pt verbalized understanding. With HOB flat and without bedrails to simulate home environment, pt performed sit>supine with SVN, then supine>sit with VC and min VC for log roll technique taught in morning session. Pt donned shoes seated EOB without assist and SBA for balance. Pt performed bed>w/c transfer via stand-pivot with CGA for stability. PT educated pt on purpose of w/c training as a form of strength/endurance training and pt instructed to propel w/c from room>gym. No assistance req for managing brakes. Pt propelled 95' in 1:21 with SVN d/t pt requiring VC to take appropriate rest breaks. Prolonged rest break taken. Pt reported some dizziness following exertion that improved with rest. Pt propelled 184' in 5:00 with SVN d/t pt req VC to take appropriate rest breaks and technique of simultaneous UE propulsion. Pt again reported dizziness with exertion that improved with rest breaks. Pt transferred w/c>mat table via stand-pivot with CGA. On mat table, pt performed sit>supine with SVN for safety. In this position, vitals were assessed d/t pt exhibiting some shortness of breath and reporting increased dizziness. /87, HR 77 bpm, SpO2 89% improving to 94% with pursed lip breathing. PT instructed pt on the following exercises to increase strength/endurance. Pt instructed to perform these exercises in bed outside of scheduled therapy.    Bridges X10 with VC to count out loud to facilitate breathing  SLR X10 each with VC/TC for quad activation  Alternating hip/knee flexion/extension X10 with VC for technique  Pt performed sup>sit with VC for technique, then transferred mat>w/c via stand-pivot with CGA. Pt wheeled back to room in order to provide seated rest break. Pt requested to remain seated in w/c an performed X1 STS with CGA, then CGA-Ana to maintain standing balance d/t posterior lean while chair alarm was placed in w/c. Pt left in w/c, chair alarm activated, call light and needs within reach. Will plan to discuss potential for pt to be untethered in w/c within unit with IDT so pt can perform w/c laps to improve activity tolerance. ASSESSMENT/PROGRESS TOWARDS GOALS       Assessment  Assessment: Pt with improved activity tolerance this session evidence by increased ambulation distance and toleration of stair training. Pt reported some dizziness with improvements during session. BP WFL when vitals were assessed. Pt does continue to be limited by decreased endurance. Pt continues to function below baseline and will benefit from further skilled therapy to address limitations and facilitate safe d/c home. Activity Tolerance: Patient tolerated treatment well;Patient limited by endurance  Discharge Recommendations: Continue to assess pending progress;24 hour supervision or assist;Home with Home health PT  PT Equipment Recommendations  Other: Ongoing assessment    Goals  Patient Goals   Patient Goals :  \"To get back to doing what I was doing\"  Short Term Goals  Time Frame for Short Term Goals: 7 days  Short Term Goal 1: Pt will perform bed mobility independently  Short Term Goal 2: Pt will perform all transfers excluding floor transfer with Ana  Short Term Goal 3: Pt will ambulate 10' on level surfaces with LRAD and CGA (goal met 10/11)  Short Term Goal 4: Patient will navigate 4 stairs with bilateral HR and CGA  Long Term Goals  Time Frame for Long Term Goals : 14 days  Long Term Goal 1: Pt will perform all transfers excluding floor transfer Mod I with LRAD  Long Term Goal 2: Pt will ambulate at least 150' on level surfaces Mod I with LRAD  Long Term Goal 3: Patient will navigate 12 stairs with 1 HR Mod I    PLAN OF CARE/SAFETY  Physcial Therapy Plan  General Plan:  (5x/week for 90 minutes)  Current Treatment Recommendations: Strengthening;ROM;Balance training;Functional mobility training;Transfer training; Endurance training; Wheelchair mobility training;Gait training;Stair training;Neuromuscular re-education;Pain management;Home exercise program;Safety education & training;Patient/Caregiver education & training;Equipment evaluation, education, & procurement;Positioning; Therapeutic activities  Safety Devices  Type of Devices: Gait belt;Nurse notified; Bed alarm in place;Call light within reach; Left in bed    EDUCATION  Education  Education Given To: Patient  Education Provided: Role of Therapy; Safety; Mobility Training;Transfer Training;Energy Conservation  Education Provided Comments: Pt educated on role of therapy activities, safety with transfer/mobility training  Education Method: Verbal  Barriers to Learning: None  Education Outcome: Demonstrated understanding        Therapy Time   Individual Concurrent Group Co-treatment   Time In       0945   Time Out       1030   Minutes       39     Second Session Therapy Time:    Individual Concurrent Group Co-treatment   Time In 3975      Time Out 5300      Minutes 53         Timed Code Treatment Minutes:  45 + 53     Total Treatment Minutes:   CHARITY Damon 10/11/22 at 3:47 PM
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
## 2022-10-11 NOTE — PROGRESS NOTES
Nephrology  Note                                                                                                                                                                                                                                                                                                                                                               Office : [phone]     Fax :[phone]              Patient's Name: Dmitriy Sanders MD      Reason for Consult:  Orthostatic   Requesting Physician:  Stuart Medina MD      Chief Complaint:  Trauma      Feels better  Dizzy on standing better     Past Medical History:   Diagnosis Date    Anemia     Aortic valve disorders     stenosis    Arthritis     Aspiration pneumonia ([facility] [address] 04/27/2015    Erectile dysfunction     Esophageal cancer ([facility] [address] 10/18/2012    Hernia, femoral, bilateral 05/12/2014    History of blood transfusion     Hyperlipidemia     Osteoporosis, senile 05/20/2014    Pancreatitis 08/01/2013    Patient underweight 08/08/2013    Prostate cancer ([facility] [address]     Unspecified essential hypertension        Past Surgical History:   Procedure Laterality Date    APPENDECTOMY      as a child    BONE GRAFT      for saddle nose    CARDIAC VALVE REPLACEMENT  01/01/2006    aorta    CHOLECYSTECTOMY      COLONOSCOPY  11/01/2009    COLONOSCOPY  10/05/2015    COLONOSCOPY N/A 02/17/2021    COLONOSCOPY DIAGNOSTIC/STOMA performed by Fleiz Santos MD at [place] [address]  02/18/2021    COLONOSCOPY POLYPECTOMY SNARE/COLD BIOPSY performed by Feliz Santos MD at [address] [place] [address]  01/01/2006    DILATATION, ESOPHAGUS      2010    ESOPHAGUS SURGERY      EYE SURGERY  1990& 1992    cataract bilat removal     GASTRECTOMY      IR PERC CATH PLEURAL DRAIN W/IMAG  09/21/2022    IR PERC CATH PLEURAL DRAIN W/IMAG    PROSTATE SURGERY      seeds    TONSILLECTOMY      UPPER GASTROINTESTINAL ENDOSCOPY N/A 02/16/2021    EGD BIOPSY performed by Tish Nguyen MD at [address] N/A 04/22/2022    EGD CONTROL HEMORRHAGE performed by Leslie Leary MD at [address] BRACHYTHERAPY         Family History   Problem Relation Age of Onset    Other Mother         bleeding peptic ulcer    Heart Disease Mother     High Blood Pressure Father     Stroke Father     Prostate Cancer Father     Other Father         cardiovascular disease    Elevated Lipids Father     Hypertension Father     Colon Cancer Paternal Cousin         reports that he has never smoked. He has never used smokeless tobacco. He reports current alcohol use. He reports that he does not use drugs.     Allergies:  No known allergies    Current Medications:    ipratropium-albuterol (DUONEB) nebulizer solution 1 ampule, Q4H PRN  warfarin (COUMADIN) tablet 3 mg, Once  amoxicillin-clavulanate (AUGMENTIN) 500-125 MG per tablet 1 tablet, 3 times per day  warfarin placeholder: dosing by pharmacy, RX Placeholder  zinc oxide (TRIAD HYDROPHILIC) paste, BID  fludrocortisone (FLORINEF) tablet 0.1 mg, Daily  alteplase (CATHFLO) injection 1 mg, Daily PRN  albuterol (PROVENTIL) nebulizer solution 2.5 mg, Q6H PRN  atorvastatin (LIPITOR) tablet 10 mg, Nightly  gabapentin (NEURONTIN) capsule 100 mg, Nightly  guaiFENesin (MUCINEX) extended release tablet 600 mg, BID  melatonin disintegrating tablet 5 mg, Nightly  methocarbamol (ROBAXIN) tablet 500 mg, TID  oxyCODONE (ROXICODONE) immediate release tablet 5 mg, Q4H PRN  pantoprazole (PROTONIX) tablet 40 mg, QAM AC  acetaminophen (TYLENOL) tablet 650 mg, Q4H PRN  bisacodyl (DULCOLAX) EC tablet 5 mg, Daily  magnesium hydroxide (MILK OF MAGNESIA) 400 MG/5ML suspension 30 mL, Daily PRN  polyethylene glycol (GLYCOLAX) packet 17 g, Daily PRN  simethicone (MYLICON) chewable tablet 80 mg, Q6H PRN  ferrous sulfate (IRON 325) tablet 325 mg, Daily with breakfast  insulin lispro (1 Unit Dial) (HUMALOG/ADMELOG) pen 0-4 Units, TID WC  insulin lispro (1 Unit Dial) (HUMALOG/ADMELOG) pen 0-4 Units, Nightly  glucose chewable tablet 16 g, PRN  dextrose bolus 10% 125 mL, PRN   Or  dextrose bolus 10% 250 mL, PRN  glucagon (rDNA) injection 1 mg, PRN  dextrose 10 % infusion, Continuous PRN      Review of Systems:   14 point ROS obtained but were negative except mentioned in HPI      Physical exam:     Vitals:  /74   Pulse 74   Temp 97.1 °F (36.2 °C) (Oral)   Resp 18   Ht 5' 3\" (1.6 m)   Wt 104 lb 8 oz (47.4 kg)   SpO2 99%   BMI 18.51 kg/m²   Constitutional:  AA  Skin: no rash, turgor wnl  Heent:  eomi, mmm  Neck: no bruits or jvd noted  Cardiovascular:  S1, S2 without m/r/g  Respiratory: CTA B without w/r/r  Abdomen:  +bs, soft, nt, nd  Ext: + lower extremity edema  Psychiatric: mood and affect appropriate  Musculoskeletal:  Rom, muscular strength intact    Data:   Labs:  CBC:   Recent Labs     10/10/22  0649   WBC 6.9   HGB 8.2*          BMP:    Recent Labs     10/10/22  0649      K 4.4      CO2 34*   BUN 41*   CREATININE 1.1   GLUCOSE 107*       Ca/Mg/Phos:   Recent Labs     10/10/22  0649   CALCIUM 8.3       Hepatic: No results for input(s): AST, ALT, ALB, BILITOT, ALKPHOS in the last 72 hours. Troponin: No results for input(s): TROPONINI in the last 72 hours. BNP: No results for input(s): BNP in the last 72 hours. Lipids: No results for input(s): CHOL, TRIG, HDL, LDLCALC, LABVLDL in the last 72 hours. ABGs: No results for input(s): PHART, PO2ART, UNU2HEP in the last 72 hours. INR:   Recent Labs     10/09/22  0633 10/10/22  0649 10/11/22  0604   INR 1.87* 1.79* 1.87*       UA:No results for input(s): Jackson Renee, GLUCOSEU, MARTÍN, Mateusz Shaffer, BLOODU, PHUR, PROTEINU, UROBILINOGEN, NITRU, LEUKOCYTESUR, David Luisito in the last 72 hours.    Urine Microscopic: No results for input(s): LABCAST, BACTERIA, COMU, HYALCAST, WBCUA, RBCUA, EPIU in the last 72 hours.  Urine Culture: No results for input(s): LABURIN in the last 72 hours. Urine Chemistry: No results for input(s): Livia Ke, PROTEINUR, NAUR in the last 72 hours. IMAGING:  XR CHEST PORTABLE   Final Result   1. Worsening left-sided airspace opacities, possibly asymmetric pulmonary edema or pneumonia. 2.  Left-sided pleural effusion. 3.  The previously described pneumothoraces are no longer visualized. The lucency projecting over the medial left lung apex is felt to represent air within the esophagus. Assessment/Plan   Orthostatic     2. Hyperkalemia     3. Anemia    4. Acid- base/ Electrolyte imbalance     5.  Debility     Plan   - cont florinef   - Monitor Orthostats   - Cortisol 8.6  - UPC - no proteinuria   - encourage solute intake    - Rehab                   Thank you for allowing us to participate in care of MD Rekha rAreola MD  Feel free to contact me   [facility] [address]  Office : [phone]  Fax :[phone]

## 2022-10-12 LAB
ANION GAP SERPL CALCULATED.3IONS-SCNC: 7 MMOL/L (ref 3–16)
BASOPHILS ABSOLUTE: 0.1 K/UL (ref 0–0.2)
BASOPHILS RELATIVE PERCENT: 0.8 %
BUN BLDV-MCNC: 36 MG/DL (ref 7–20)
CALCIUM SERPL-MCNC: 8.4 MG/DL (ref 8.3–10.6)
CHLORIDE BLD-SCNC: 103 MMOL/L (ref 99–110)
CO2: 33 MMOL/L (ref 21–32)
CREAT SERPL-MCNC: 1.1 MG/DL (ref 0.8–1.3)
EOSINOPHILS ABSOLUTE: 0.1 K/UL (ref 0–0.6)
EOSINOPHILS RELATIVE PERCENT: 1.5 %
GFR AFRICAN AMERICAN: >60
GFR NON-AFRICAN AMERICAN: >60
GLUCOSE BLD-MCNC: 111 MG/DL (ref 70–99)
GLUCOSE BLD-MCNC: 122 MG/DL (ref 70–99)
GLUCOSE BLD-MCNC: 150 MG/DL (ref 70–99)
GLUCOSE BLD-MCNC: 169 MG/DL (ref 70–99)
GLUCOSE BLD-MCNC: 97 MG/DL (ref 70–99)
HCT VFR BLD CALC: 24.5 % (ref 40.5–52.5)
HEMOGLOBIN: 7.5 G/DL (ref 13.5–17.5)
INR BLD: 1.92 (ref 0.87–1.14)
LYMPHOCYTES ABSOLUTE: 1 K/UL (ref 1–5.1)
LYMPHOCYTES RELATIVE PERCENT: 15 %
MCH RBC QN AUTO: 27.8 PG (ref 26–34)
MCHC RBC AUTO-ENTMCNC: 30.8 G/DL (ref 31–36)
MCV RBC AUTO: 90.5 FL (ref 80–100)
MONOCYTES ABSOLUTE: 0.6 K/UL (ref 0–1.3)
MONOCYTES RELATIVE PERCENT: 8.9 %
NEUTROPHILS ABSOLUTE: 4.9 K/UL (ref 1.7–7.7)
NEUTROPHILS RELATIVE PERCENT: 73.8 %
PDW BLD-RTO: 20.4 % (ref 12.4–15.4)
PERFORMED ON: ABNORMAL
PERFORMED ON: NORMAL
PLATELET # BLD: 283 K/UL (ref 135–450)
PMV BLD AUTO: 9 FL (ref 5–10.5)
POTASSIUM REFLEX MAGNESIUM: 4.6 MMOL/L (ref 3.5–5.1)
PROTHROMBIN TIME: 22 SEC (ref 11.7–14.5)
RBC # BLD: 2.71 M/UL (ref 4.2–5.9)
SODIUM BLD-SCNC: 143 MMOL/L (ref 136–145)
WBC # BLD: 6.6 K/UL (ref 4–11)

## 2022-10-12 PROCEDURE — 99232 SBSQ HOSP IP/OBS MODERATE 35: CPT | Performed by: INTERNAL MEDICINE

## 2022-10-12 PROCEDURE — 97116 GAIT TRAINING THERAPY: CPT

## 2022-10-12 PROCEDURE — 80048 BASIC METABOLIC PNL TOTAL CA: CPT

## 2022-10-12 PROCEDURE — 94640 AIRWAY INHALATION TREATMENT: CPT

## 2022-10-12 PROCEDURE — 6370000000 HC RX 637 (ALT 250 FOR IP): Performed by: PHYSICAL MEDICINE & REHABILITATION

## 2022-10-12 PROCEDURE — 97530 THERAPEUTIC ACTIVITIES: CPT

## 2022-10-12 PROCEDURE — 99232 SBSQ HOSP IP/OBS MODERATE 35: CPT | Performed by: PHYSICAL MEDICINE & REHABILITATION

## 2022-10-12 PROCEDURE — 97542 WHEELCHAIR MNGMENT TRAINING: CPT

## 2022-10-12 PROCEDURE — 36415 COLL VENOUS BLD VENIPUNCTURE: CPT

## 2022-10-12 PROCEDURE — 85025 COMPLETE CBC W/AUTO DIFF WBC: CPT

## 2022-10-12 PROCEDURE — 6370000000 HC RX 637 (ALT 250 FOR IP): Performed by: INTERNAL MEDICINE

## 2022-10-12 PROCEDURE — 6360000002 HC RX W HCPCS: Performed by: PHYSICAL MEDICINE & REHABILITATION

## 2022-10-12 PROCEDURE — 1280000000 HC REHAB R&B

## 2022-10-12 PROCEDURE — 85610 PROTHROMBIN TIME: CPT

## 2022-10-12 PROCEDURE — 94761 N-INVAS EAR/PLS OXIMETRY MLT: CPT

## 2022-10-12 PROCEDURE — 2700000000 HC OXYGEN THERAPY PER DAY

## 2022-10-12 PROCEDURE — 94669 MECHANICAL CHEST WALL OSCILL: CPT

## 2022-10-12 PROCEDURE — 97110 THERAPEUTIC EXERCISES: CPT

## 2022-10-12 PROCEDURE — 2580000003 HC RX 258: Performed by: PHYSICAL MEDICINE & REHABILITATION

## 2022-10-12 RX ORDER — WARFARIN SODIUM 1 MG/1
3 TABLET ORAL
Status: COMPLETED | OUTPATIENT
Start: 2022-10-12 | End: 2022-10-12

## 2022-10-12 RX ORDER — IPRATROPIUM BROMIDE AND ALBUTEROL SULFATE 2.5; .5 MG/3ML; MG/3ML
1 SOLUTION RESPIRATORY (INHALATION)
Status: DISCONTINUED | OUTPATIENT
Start: 2022-10-12 | End: 2022-10-12

## 2022-10-12 RX ADMIN — SODIUM CHLORIDE 3000 MG: 900 INJECTION INTRAVENOUS at 18:52

## 2022-10-12 RX ADMIN — ALBUTEROL SULFATE 2.5 MG: 2.5 SOLUTION RESPIRATORY (INHALATION) at 15:20

## 2022-10-12 RX ADMIN — SODIUM CHLORIDE 30 MG/ML INHALATION SOLUTION 4 ML: 30 SOLUTION INHALANT at 20:38

## 2022-10-12 RX ADMIN — GUAIFENESIN 600 MG: 600 TABLET, EXTENDED RELEASE ORAL at 21:39

## 2022-10-12 RX ADMIN — Medication: at 21:40

## 2022-10-12 RX ADMIN — METHOCARBAMOL TABLETS 500 MG: 500 TABLET, COATED ORAL at 21:38

## 2022-10-12 RX ADMIN — ALBUTEROL SULFATE 2.5 MG: 2.5 SOLUTION RESPIRATORY (INHALATION) at 07:55

## 2022-10-12 RX ADMIN — GABAPENTIN 100 MG: 100 CAPSULE ORAL at 21:39

## 2022-10-12 RX ADMIN — METHOCARBAMOL TABLETS 500 MG: 500 TABLET, COATED ORAL at 13:52

## 2022-10-12 RX ADMIN — PANTOPRAZOLE SODIUM 40 MG: 40 TABLET, DELAYED RELEASE ORAL at 06:16

## 2022-10-12 RX ADMIN — WARFARIN SODIUM 3 MG: 1 TABLET ORAL at 17:45

## 2022-10-12 RX ADMIN — ATORVASTATIN CALCIUM 10 MG: 10 TABLET, FILM COATED ORAL at 21:38

## 2022-10-12 RX ADMIN — SODIUM CHLORIDE 30 MG/ML INHALATION SOLUTION 4 ML: 30 SOLUTION INHALANT at 07:55

## 2022-10-12 RX ADMIN — SODIUM CHLORIDE 3000 MG: 900 INJECTION INTRAVENOUS at 12:46

## 2022-10-12 RX ADMIN — GUAIFENESIN 600 MG: 600 TABLET, EXTENDED RELEASE ORAL at 08:33

## 2022-10-12 RX ADMIN — BISACODYL 5 MG: 5 TABLET, COATED ORAL at 08:33

## 2022-10-12 RX ADMIN — FLUDROCORTISONE ACETATE 0.1 MG: 0.1 TABLET ORAL at 08:34

## 2022-10-12 RX ADMIN — Medication: at 12:47

## 2022-10-12 RX ADMIN — SODIUM CHLORIDE 30 MG/ML INHALATION SOLUTION 4 ML: 30 SOLUTION INHALANT at 15:20

## 2022-10-12 RX ADMIN — ALBUTEROL SULFATE 2.5 MG: 2.5 SOLUTION RESPIRATORY (INHALATION) at 20:38

## 2022-10-12 RX ADMIN — METHOCARBAMOL TABLETS 500 MG: 500 TABLET, COATED ORAL at 08:33

## 2022-10-12 RX ADMIN — AMOXICILLIN AND CLAVULANATE POTASSIUM 1 TABLET: 500; 125 TABLET, FILM COATED ORAL at 06:46

## 2022-10-12 ASSESSMENT — PAIN SCALES - GENERAL
PAINLEVEL_OUTOF10: 0
PAINLEVEL_OUTOF10: 0

## 2022-10-12 NOTE — PROGRESS NOTES
Physical Therapy  Facility/Department: [place] [address] ACUTE REHAB UNIT  Rehabilitation Physical Therapy Treatment Note    NAME: Daniel Sumner MD  : 1935 (82 y.o.)  MRN: [id number]  CODE STATUS: Full Code    Date of Service: 10/12/22       Restrictions:  Position Activity Restriction  Other position/activity restrictions: up w/ assist, ambulate pt, up as tolerated,     SUBJECTIVE  Subjective  Subjective: Pt semi-supine in bed upon PT/OT arrival, agreeable to therapy. Pain: Denied pain. Post Treatment Pain Screening     VITALS  Patient on 2L O2 via NC  Position Seated EOB Seated EOB Seated EOB after 10 chair pushups Stance Seated in w/c Seated in w/c Seated in recliner   BP (mmHg) 107/62 103/54 108/52 108/52 117/64 118/64 128/73   HR (bpm)       94   SpO2       94%       PT and OT worked collaboratively to integrate multiple skills to increase independence. OBJECTIVE  Cognition  Overall Cognitive Status: WFL  Arousal/Alertness: Appropriate responses to stimuli  Following Commands: Follows multistep commands consistently  Attention Span: Appears intact  Memory: Appears intact  Safety Judgement: Good awareness of safety precautions  Problem Solving: Assistance required to identify errors made;Assistance required to correct errors made  Insights: Decreased awareness of deficits  Initiation: Does not require cues  Sequencing: Requires cues for some  Orientation  Overall Orientation Status: Within Normal Limits  Orientation Level: Oriented X4    Functional Mobility  Bed Mobility  Overall Assistance Level: Stand By Assist  Additional Factors: Head of bed raised; With handrails; Increased time to complete  Supine to Sit  Assistance Level: Stand by assist  Skilled Clinical Factors: SBA for safety d/t pt with c/o fatigue/dizziness  Balance  Sitting Balance: Stand by assistance (Pt donned shoes seated EOB with SBA for safety (see OT note for details of dressing task))  Standing Balance:  (CGA-Ana)  Standing Balance  Activity: Pt tolerated standing for bouts <30 seconds during session. Req CGA-Ana d/t episodes of bilateral knee buckling and overall unsteadiness. Transfers  Surface: From bed; Wheelchair; To chair with arms  Additional Factors: Verbal cues; Hand placement cues  Device: Walker (RW)  Sit to Stand  Assistance Level: Contact guard assist  Skilled Clinical Factors: VC to scoot to EOS and for hand placement. CGA for safety d/t episodes of knee buckling. Stand to Sit  Assistance Level: Contact guard assist  Skilled Clinical Factors: VC for hand placement, CGA for controlled descent. Bed To/From Chair  Technique: Stand pivot (no AD)  Assistance Level: Minimal assistance  Skilled Clinical Factors: Ana for stability with pivot d/t unsteadiness  Stand Pivot  Assistance Level: Minimal assistance  Skilled Clinical Factors: w/c>recliner. Ana for stability with pivot d/t unsteadiness. VC fof hand placement with visual demonstration of head-hips relationship. Environmental Mobility  Wheelchair  Surface: Level surface  Device: Standard wheelchair  Additional Factors: Increased time to complete  Assistance Required to Manage Parts:  (Intermittent VC and HOHA req to manage brakes)  Assistance Level for Propulsion: Supervision  Propulsion Method: Bilateral upper extremities; Bilateral lower extremities  Propulsion Quality: Slow velocity; Short strokes  Propulsion Distance: 20' + 125'  Skilled Clinical Factors: Pt req VC to take rest breaks             PT Exercises  Exercise Treatment: Pt instructed to perform X10 chair pushups (from bed) to increase BP seated EOB (SBA for balance). 2nd Session  Pt seated in recliner upon PT arrival on 2L O2 via NC, agreeable to therapy. Pt denied pain and reported feeling better compared to am session. Pt denied having to use the bathroom, however PT re-educated pt on importance of getting up to use bathroom and pt agreeable to attempting with encouragement.  Pt performed STS with CGA up to RW and ambulated 15' to bathroom with CGA for stability (2nd person transported O2 tank). Pt performed LB clothing management without assist, CGA for balance), then sat to toilet without requiring cues for hand placement. Pt successfully urinated (RN notified). Pt stood to RW with VC for hand placement on GB and raised pants from knees without assist (CGA-Ana for standing bal). Pt ambulated 10' with RW and CGA, then sat to w/c with CGA where he sanitized his hands with a wipe. Pt ambulated the following distances with CGA for stability and RW, O2 transport and w/c follow provided by 2nd person:  22' + 25' + 60'   Pt ambulated with decreased step length and forward flexed posture. VC for upright posture given. Prolonged seated rest breaks req between bouts. Vitals assessed during mobility training d/t pt reporting dizziness and found to be /77,  bpm.   Pt returned to bed following ambulation, performing sit>supine with SVN. Pt scooted cephalo in bed with PT stabilizing LE's. Pt left in bed, bed alarm in place, call light and needs within reach. ASSESSMENT/PROGRESS TOWARDS GOALS       Assessment  Assessment: Pt was limited by fatigue this session, unable to tolerate ambulation. Vitals assessed throughout session d/t pt complaints of dizziness and found to be lower than previous sessions but [facility] and improved with activity. Pt is functioning below baseline and will benefit from further skilled PT to maximize independence and safety with functional mobility. Activity Tolerance: Patient limited by fatigue  Discharge Recommendations: Continue to assess pending progress;24 hour supervision or assist;Home with Home health PT  PT Equipment Recommendations  Other: Ongoing assessment    Goals  Patient Goals   Patient Goals :  \"To get back to doing what I was doing\"  Short Term Goals  Time Frame for Short Term Goals: 7 days  Short Term Goal 1: Pt will perform bed mobility independently  Short Term Goal 2: Pt will perform all transfers excluding floor transfer with Ana  Short Term Goal 3: Pt will ambulate 10' on level surfaces with LRAD and CGA (goal met 10/11)  Short Term Goal 4: Patient will navigate 4 stairs with bilateral HR and CGA  Long Term Goals  Time Frame for Long Term Goals : 14 days  Long Term Goal 1: Pt will perform all transfers excluding floor transfer Mod I with LRAD  Long Term Goal 2: Pt will ambulate at least 150' on level surfaces Mod I with LRAD  Long Term Goal 3: Patient will navigate 12 stairs with 1 HR Mod I    PLAN OF CARE/SAFETY  Physcial Therapy Plan  General Plan:  (5x/week for 90 minutes)  Current Treatment Recommendations: Strengthening;ROM;Balance training;Functional mobility training;Transfer training; Endurance training; Wheelchair mobility training;Gait training;Stair training;Neuromuscular re-education;Pain management;Home exercise program;Safety education & training;Patient/Caregiver education & training;Equipment evaluation, education, & procurement;Positioning; Therapeutic activities  Safety Devices  Type of Devices: Left in chair;Chair alarm in place;Call light within reach    EDUCATION  Education  Education Given To: Patient  Education Provided: Role of Therapy; Safety; Mobility Training;Transfer Training;Energy Conservation  Education Provided Comments: Pt educated on role of exercise to attempt to increase BP. PT/OT also reinforced importance of pt getting up to go to the bathroom to increase independence/simulate home environment/improve activity tolerance.   Education Method: Verbal  Barriers to Learning: None  Education Outcome: Verbalized understanding        Therapy Time   Individual Concurrent Group Co-treatment   Time In       0945   Time Out       8672   Minutes       39     Second Session Therapy Time:    Individual Concurrent Group Co-treatment   Time In 1350      Time Out 1435      Minutes 45         Timed Code Treatment Minutes:  45 + 45     Total Treatment Minutes:   [facility], SPT 10/12/22 at 4:28 PM  [address] [facility] present and directed patient's care , made skilled judgment and was responsible for assessment  and treatment of this pt for 945-1030 session and [id number]         Therapist was present, directed the patient's care, made skilled judgment, and was responsible for assessment and treatment of the patient from 14:20-14:35.   Laquita Gomez PT, DPT, NCS, CSRS
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
## 2022-10-12 NOTE — PROGRESS NOTES
Occupational Therapy  Facility/Department: [facility] ACUTE REHAB UNIT  Rehabilitation Occupational Therapy Daily Treatment Note    Date: 10/12/22  Patient Name: Trinity Worrell MD       Room: [facility]93[address]-  MRN: [id number]  Account: [de-identified]   : 1935  (82 y.o.) Gender: male                    Past Medical History:  has a past medical history of Anemia, Aortic valve disorders, Arthritis, Aspiration pneumonia ([address] Erectile dysfunction, Esophageal cancer ([address] Hernia, femoral, bilateral, History of blood transfusion, Hyperlipidemia, Osteoporosis, senile, Pancreatitis, Patient underweight, Prostate cancer ([address] and Unspecified essential hypertension. Past Surgical History:   has a past surgical history that includes Coronary artery bypass graft (2006); Cardiac valve replacement (2006); eye surgery (& ); Appendectomy; bone graft; gastrectomy; Cholecystectomy; Colonoscopy (2009); Prostate surgery; Tonsillectomy; Esophagus surgery;  Prostate/Transrectal/Vol Collins Brachyth; Colonoscopy (10/05/2015); Upper gastrointestinal endoscopy (N/A, 2021); Colonoscopy (N/A, 2021); Colonoscopy (2021); Upper gastrointestinal endoscopy (N/A, 2022); IR GUIDED PERC PLEURAL DRAIN W CATH INSERT (2022); and Dilatation, esophagus. Restrictions  Other position/activity restrictions: up w/ assist, ambulate pt, up as tolerated,    Subjective  Subjective: Pt was semi supine in bed upon arrival. Pt reported he was more tired today. Pt was pleasant and agreeable to OT/PT. Co treat indicated to maximize functional independence.  Pt on 2L O2                Objective     Cognition  Overall Cognitive Status: WFL  Orientation  Overall Orientation Status: Within Normal Limits         ADL  Putting On/Taking Off Footwear  Assistance Level: Set-up  Skilled Clinical Factors: Pt donned shoes seated at EOB          Functional Mobility  Device: Wheelchair  Activity:  (in the hallway)  Assistance Level: Modified independent  Skilled Clinical Factors: Pt self propelled w/c ~25ft + 125ft MOD I. Pt completed w/ rest breaks needed due to fatigue. Pt attempted functional mobility w/ RW however pt reported feeling like his legs are too weak. Supine to Sit  Assistance Level: Stand by assist  Skilled Clinical Factors: HOB elevated w/ min VCs for technique  Sit to Stand  Assistance Level: Contact guard assist  Skilled Clinical Factors: Multiple sit to stand transfers completed from EOB > RW and w/c > RW. Pt completed all sit to stand transfers w/ CGA. Pt however could only maintain stance for up to 30s at a time due to BLE weakness, sizziness, SOB, and fatigue. Pt demonstrated BLE buckling in stance requiring rest breaks at the EOB and in the w/c. Pt unable to attempt ambulation w/ RW as a result. Pt's BP was monitored throughout session w/ BPs as follows: BP seated- 107/62, 103/54. BP after 10 chair push ups 108/52. BP in stance after 10 chair push ups 108/52. Seated in w/c 117/64 and 118/64. Pt's HR maintained in the 90s and SpO2 over 92% when assessed. Increased time needed to recover between standing trials. Stand to Sit  Assistance Level: Contact guard assist  Skilled Clinical Factors: VCs needed to reach back  Sit Pivot  Assistance Level: Minimal assistance  Skilled Clinical Factors: from EOB > w/c and w/c > recliner chair. VCs needed for technique         Assessment    Assessment  Assessment: Pt was limited by BLE weakness, dizziness, and SOB this morning and was unable to tolerate ambulation. Pt completed all functional transfers w/ CGA-min A but continues to require min VCs for proper technique. Pt demonstrated improved activity tolerance in PM session and maintained stance for up to 1 min w/ CGA and pt was less limited by dizziness. Pt is functioning below baseline and continues to benefit from OT.  Cont OT per POC  Activity Tolerance: Patient limited by fatigue  Discharge Recommendations: Continue to assess pending progress;24 hour supervision or assist;Home with Home health OT  OT Equipment Recommendations  Other: cont to assess pending progress  Safety Devices  Safety Devices in place: Yes  Type of devices: Call light within reach; Left in chair;Chair alarm in place;Nurse notified    Patient Education  Education  Education Given To: Patient  Education Provided: Role of Therapy;Plan of Care;Safety;ADL Function;Transfer Training;Mobility Training;Precautions  Education Provided Comments: exercises to assist w/ raising BP  Education Method: Demonstration;Verbal  Education Outcome: Verbalized understanding;Continued education needed      2nd session: Pt was semi supine in bed upon arrival. Pt reported he is tired but agreeable to OT. Supine to sit completed w/ SBA. Sit to stand completed from EOB > Rw w/ CGA. Pt maintained stance for ~1 min w/ CGA-Min A while RN assisted w/ applying cream and a mepilex to buttocks. Pt required seated rest break due to fatigue and dizziness. BP assessed seated and was 128/73, SpO2 94% on 2L and HR 94. Pt was agreeable to go to the therapy gym. Scoot pivot completed from EOB > w/c w/ min A. Pt self propelled w/c to therapy gym MOD I w/ + time needed and IV pole in tow. Pt requested to complete his normal UE routine to increase UE strength and activity tolerance. Pt showed OT his routine using 3lb weights. Pt completed the following: chest press, elbow flexion, overhead press, shoulder abduction, horizontal abduction, forearm supination/pronation, wrist flexion/extension, and overhead tricep rows. Pt completed each exercise 10 reps w/ min VCs for slower pace needed. Pt self propelled w/c back to room MOD I. Pt completed sit to stand from w/c > hallway rail w/ CGA. Pt engaged in 3 standing exercises to increase activity tolerance for ADLs. Pt completed the following: mini squats x10, hip abduction x10, and standing marches x20 reps.  Pt completed w/ CGA however w/ rest break needed between each set due to fatigue. Pt was assisted back to recliner chair at EOS. Scoot pivot completed from w/c > recliner chair w/ min A. Pt was left seated in recliner chair w/ chair alarm on and call light within reach. Plan  Occupational Therapy Plan  Times Per Week: 5x/week, 90min/day  Current Treatment Recommendations: Strengthening;Balance training;Functional mobility training; Endurance training; Safety education & training;Patient/Caregiver education & training;Equipment evaluation, education, & procurement;Self-Care / ADL; Home management training; Wheelchair mobility training    Goals  Patient Goals   Patient goals : \"I want to return to what I was doing before. \"  Short Term Goals  Time Frame for Short Term Goals: 2 weeks- all ongoing  Short Term Goal 1: Pt will complete UE/LE dressing w/ Mod I and use of AE prn  Short Term Goal 2: Pt will complete bathing w/ spvn  Short Term Goal 3: Pt will complete shower transfer w/ spvn  Short Term Goal 4: Pt will complete toileting/toilet transfer w/ Mod I  Short Term Goal 5: Pt will complete grooming routine in stance w/ Mod I    AM-PAC Score               Therapy Time   Individual Concurrent Group Co-treatment   Time In       0945   Time Out       1030   Minutes       45   Timed Code Treatment Minutes: 39 Minutes       Second Session Therapy Time:   Individual Concurrent Group Co-treatment   Time In  9012         Time Out  1330         Minutes  45           Timed Code Treatment Minutes:  45    Total Treatment Minutes:   45+45= 241 [facility]

## 2022-10-12 NOTE — PROGRESS NOTES
Department of Physical Medicine & Rehabilitation  Progress Note    Patient Identification:  Lawrence Amaya MD  [id number]  : 1935  Admit date: 10/6/2022    Chief Complaint: Debility    Subjective:   Doing slightly better in therapy. Continues to have weakness throughout. He is tired from all the therapy. Currently in co-treat PT/OT. Hoping to go home soon. Seen in therapy today. ROS: No f/c, n/v, cp     Objective:  Patient Vitals for the past 24 hrs:   BP Temp Temp src Pulse Resp SpO2   10/12/22 0829 -- -- -- -- 20 91 %   10/12/22 0825 132/82 97.6 °F (36.4 °C) Oral 99 20 (!) 88 %   10/11/22 2011 -- -- -- 84 16 95 %   10/11/22 1954 136/70 97.3 °F (36.3 °C) Oral 90 18 96 %       Const: Alert. No distress, pleasant. HEENT: Normocephalic, atraumatic. Normal sclera/conjunctiva. MMM. CV: Regular rate and rhythm. Resp: No respiratory distress. Lungs CTAB. Abd: Soft, nontender, nondistended, NABS+   Ext: No edema. Neuro: Alert, oriented, appropriately interactive. Psych: Cooperative, appropriate mood and affect    Laboratory data: Available via EMR.    Last 24 hour lab  Recent Results (from the past 24 hour(s))   POCT Glucose    Collection Time: 10/11/22 11:52 AM   Result Value Ref Range    POC Glucose 125 (H) 70 - 99 mg/dl    Performed on ACCU-CHEK    POCT Glucose    Collection Time: 10/11/22  4:57 PM   Result Value Ref Range    POC Glucose 168 (H) 70 - 99 mg/dl    Performed on ACCU-CHEK    POCT Glucose    Collection Time: 10/11/22  8:57 PM   Result Value Ref Range    POC Glucose 159 (H) 70 - 99 mg/dl    Performed on ACCU-CHEK    Protime-INR    Collection Time: 10/12/22  5:55 AM   Result Value Ref Range    Protime 22.0 (H) 11.7 - 14.5 sec    INR 1.92 (H) 0.87 - 3.61   Basic Metabolic Panel w/ Reflex to MG    Collection Time: 10/12/22  5:55 AM   Result Value Ref Range    Sodium 143 136 - 145 mmol/L    Potassium reflex Magnesium 4.6 3.5 - 5.1 mmol/L    Chloride 103 99 - 110 mmol/L    CO2 33 (H) 21 - 32 mmol/L    Anion Gap 7 3 - 16    Glucose 111 (H) 70 - 99 mg/dL    BUN 36 (H) 7 - 20 mg/dL    Creatinine 1.1 0.8 - 1.3 mg/dL    GFR Non-African American >60 >60    GFR African American >60 >60    Calcium 8.4 8.3 - 10.6 mg/dL   CBC auto differential    Collection Time: 10/12/22  5:55 AM   Result Value Ref Range    WBC 6.6 4.0 - 11.0 K/uL    RBC 2.71 (L) 4.20 - 5.90 M/uL    Hemoglobin 7.5 (L) 13.5 - 17.5 g/dL    Hematocrit 24.5 (L) 40.5 - 52.5 %    MCV 90.5 80.0 - 100.0 fL    MCH 27.8 26.0 - 34.0 pg    MCHC 30.8 (L) 31.0 - 36.0 g/dL    RDW 20.4 (H) 12.4 - 15.4 %    Platelets 895 864 - 623 K/uL    MPV 9.0 5.0 - 10.5 fL    Neutrophils % 73.8 %    Lymphocytes % 15.0 %    Monocytes % 8.9 %    Eosinophils % 1.5 %    Basophils % 0.8 %    Neutrophils Absolute 4.9 1.7 - 7.7 K/uL    Lymphocytes Absolute 1.0 1.0 - 5.1 K/uL    Monocytes Absolute 0.6 0.0 - 1.3 K/uL    Eosinophils Absolute 0.1 0.0 - 0.6 K/uL    Basophils Absolute 0.1 0.0 - 0.2 K/uL   POCT Glucose    Collection Time: 10/12/22  8:07 AM   Result Value Ref Range    POC Glucose 122 (H) 70 - 99 mg/dl    Performed on ACCU-CHEK        Therapy progress:  PT  Position Activity Restriction  Other position/activity restrictions: up w/ assist, ambulate pt, up as tolerated,  Objective     Sit to Stand: Dependent/Total, 2 Person Assistance  Stand to Sit: Dependent/Total, 2 Person Assistance  Bed to Chair: Dependent/Total, 2 Person Assistance (Performed via squat pivot (see below))     OT  PT Equipment Recommendations  Other: Ongoing assessment     Assessment        SLP          Body mass index is 18.51 kg/m².     Assessment and Plan:  # Right pneumothorax  # R 1,3-6 nondisplaced rib fractures  # Right lung contusion             - 9/19 - Right chest tube placed in the ED  - 9/23 - Right chest tube removed  - Pulmonary expansion  - Multimodal pain control     # Left pneumothorax  # Left T1 and T3 posterior rib fractures  # Left lung contusion  - 9/21 - Left chest tube placed by IR  - 9/23 - chest tube to water seal  - Left CT removed 10/4     #LLL collapse, L mainstem bronchus occlusion  - 9/23 - s/p bronchoscopy by ICU with removal of large mucus plug  - 9/27 - CT chest demonstrating persistent total occlusion of the left central airway/left lung collapse with loculated L hydropneumothorax    - Aggressive respiratory care, hypertonic saline, mucomyst nebs BID  - Bronch with pulm 9/30 w/mucus plugging in the right and left tracheobronchial trees  - Bronch wash cultures with Ecoli resistant to cefazolin, on Ceftriaxone- 2 days left        # C7 TP Fracture  - 9/20 - CTA Head/Neck did not show evidence of BCVI   - No brace needed  - Activity as tolerated     #L shoulder ecchymoses  - L shoulder x-ray with no interosseous abnormalities      Medical Comorbidities  # Aortic Valve Replacement  - 9/23 - restarted home warfarin  - AM INR checks - prior goal 2-2.5  - check dosing with pharmacy     # Hyperlipidemia  - Continue atorvastatin 10 mg daily     #RUL pulmonary nodule  - 6mm RUL nodule noted on 9/22 CT chest  - Recommend 6-12 month interval CT scan for monitoring      #Stage 3 Coccygeal Ulcer  - 2/2 to rowing machine per patient now a stage 3 wound   - Recommending [facility]     #CIM  - prolonged hospital course   - PT/OT required      # orthostatic hypotension  - Likely associated with CIM  - nephrology consult      Rehab Progress: Improving  Anticipated Dispo: home  Services/DME: [facility]: 10/20      RAJI Cooper.P.H  PM&R  10/12/2022  10:46 AM
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
## 2022-10-12 NOTE — PROGRESS NOTES
Shift assessment complete, VSS, afebrile, on 2L 02 via NC, medications taken one by one without difficulty. Pt. Denies pain thus far this shift, remain sA & O X 4, no s/sx of distress. Bilateral heel boots applied to affected area. . Call light and over bed table within reach. Hourly rounding and frequent visual checks in place.

## 2022-10-12 NOTE — PROGRESS NOTES
Pt awake in bed eating breakfast. Physical assessment and vital signs as charted. Pt currently denies experiencing any pain at this time. Pt's SpO2 was found to be 88 % on 1.5 L of O2. RN increased the Pt's O2 to 2 L and after a few minutes his SpO2 had improved to 91 %. Call light placed within reach. RN will continue to monitor Pt.
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
## 2022-10-13 LAB
GLUCOSE BLD-MCNC: 119 MG/DL (ref 70–99)
INR BLD: 2.35 (ref 0.87–1.14)
PERFORMED ON: ABNORMAL
PROTHROMBIN TIME: 25.8 SEC (ref 11.7–14.5)

## 2022-10-13 PROCEDURE — 6360000002 HC RX W HCPCS: Performed by: PHYSICAL MEDICINE & REHABILITATION

## 2022-10-13 PROCEDURE — 99232 SBSQ HOSP IP/OBS MODERATE 35: CPT | Performed by: PHYSICAL MEDICINE & REHABILITATION

## 2022-10-13 PROCEDURE — 6370000000 HC RX 637 (ALT 250 FOR IP): Performed by: PHYSICAL MEDICINE & REHABILITATION

## 2022-10-13 PROCEDURE — 94640 AIRWAY INHALATION TREATMENT: CPT

## 2022-10-13 PROCEDURE — 99232 SBSQ HOSP IP/OBS MODERATE 35: CPT | Performed by: INTERNAL MEDICINE

## 2022-10-13 PROCEDURE — 94761 N-INVAS EAR/PLS OXIMETRY MLT: CPT

## 2022-10-13 PROCEDURE — 97110 THERAPEUTIC EXERCISES: CPT

## 2022-10-13 PROCEDURE — 2700000000 HC OXYGEN THERAPY PER DAY

## 2022-10-13 PROCEDURE — 97116 GAIT TRAINING THERAPY: CPT

## 2022-10-13 PROCEDURE — 94669 MECHANICAL CHEST WALL OSCILL: CPT

## 2022-10-13 PROCEDURE — 1280000000 HC REHAB R&B

## 2022-10-13 PROCEDURE — 2580000003 HC RX 258: Performed by: PHYSICAL MEDICINE & REHABILITATION

## 2022-10-13 PROCEDURE — 97530 THERAPEUTIC ACTIVITIES: CPT

## 2022-10-13 PROCEDURE — 97535 SELF CARE MNGMENT TRAINING: CPT

## 2022-10-13 PROCEDURE — 36415 COLL VENOUS BLD VENIPUNCTURE: CPT

## 2022-10-13 PROCEDURE — 85610 PROTHROMBIN TIME: CPT

## 2022-10-13 RX ORDER — FERROUS SULFATE 325(65) MG
325 TABLET ORAL
Status: DISCONTINUED | OUTPATIENT
Start: 2022-10-15 | End: 2022-10-17

## 2022-10-13 RX ORDER — WARFARIN SODIUM 1 MG/1
2 TABLET ORAL
Status: COMPLETED | OUTPATIENT
Start: 2022-10-13 | End: 2022-10-13

## 2022-10-13 RX ADMIN — ALBUTEROL SULFATE 2.5 MG: 2.5 SOLUTION RESPIRATORY (INHALATION) at 20:16

## 2022-10-13 RX ADMIN — GUAIFENESIN 600 MG: 600 TABLET, EXTENDED RELEASE ORAL at 08:21

## 2022-10-13 RX ADMIN — ALBUTEROL SULFATE 2.5 MG: 2.5 SOLUTION RESPIRATORY (INHALATION) at 07:48

## 2022-10-13 RX ADMIN — ATORVASTATIN CALCIUM 10 MG: 10 TABLET, FILM COATED ORAL at 22:25

## 2022-10-13 RX ADMIN — SODIUM CHLORIDE 3000 MG: 900 INJECTION INTRAVENOUS at 06:23

## 2022-10-13 RX ADMIN — WARFARIN SODIUM 2 MG: 1 TABLET ORAL at 17:36

## 2022-10-13 RX ADMIN — SODIUM CHLORIDE 30 MG/ML INHALATION SOLUTION 4 ML: 30 SOLUTION INHALANT at 07:48

## 2022-10-13 RX ADMIN — METHOCARBAMOL TABLETS 500 MG: 500 TABLET, COATED ORAL at 08:21

## 2022-10-13 RX ADMIN — Medication: at 08:23

## 2022-10-13 RX ADMIN — FERROUS SULFATE TAB 325 MG (65 MG ELEMENTAL FE) 325 MG: 325 (65 FE) TAB at 08:22

## 2022-10-13 RX ADMIN — SODIUM CHLORIDE 3000 MG: 900 INJECTION INTRAVENOUS at 00:22

## 2022-10-13 RX ADMIN — SODIUM CHLORIDE 30 MG/ML INHALATION SOLUTION 4 ML: 30 SOLUTION INHALANT at 20:16

## 2022-10-13 RX ADMIN — PANTOPRAZOLE SODIUM 40 MG: 40 TABLET, DELAYED RELEASE ORAL at 06:24

## 2022-10-13 RX ADMIN — GUAIFENESIN 600 MG: 600 TABLET, EXTENDED RELEASE ORAL at 22:25

## 2022-10-13 RX ADMIN — BISACODYL 5 MG: 5 TABLET, COATED ORAL at 08:21

## 2022-10-13 RX ADMIN — Medication: at 22:25

## 2022-10-13 NOTE — PROGRESS NOTES
Nephrology  Note                                                                                                                                                                                                                                                                                                                                                               Office : [phone]     Fax :[phone]              Patient's Name: Rory Olivarez MD      Reason for Consult:  Orthostatic   Requesting Physician:  Jumana Fam MD      Chief Complaint:  Trauma      Feels better  PT going well   Dizziness better     Past Medical History:   Diagnosis Date    Anemia     Aortic valve disorders     stenosis    Arthritis     Aspiration pneumonia ([address] 04/27/2015    Erectile dysfunction     Esophageal cancer ([facility] [address] 10/18/2012    Hernia, femoral, bilateral 05/12/2014    History of blood transfusion     Hyperlipidemia     Osteoporosis, senile 05/20/2014    Pancreatitis 08/01/2013    Patient underweight 08/08/2013    Prostate cancer ([facility] [address]     Unspecified essential hypertension        Past Surgical History:   Procedure Laterality Date    APPENDECTOMY      as a child    BONE GRAFT      for saddle nose    CARDIAC VALVE REPLACEMENT  01/01/2006    aorta    CHOLECYSTECTOMY      COLONOSCOPY  11/01/2009    COLONOSCOPY  10/05/2015    COLONOSCOPY N/A 02/17/2021    COLONOSCOPY DIAGNOSTIC/STOMA performed by Dawood Brothers MD at [address] [address]  02/18/2021    COLONOSCOPY POLYPECTOMY SNARE/COLD BIOPSY performed by Dawood Brothers MD at [facility]  01/01/2006    DILATATION, ESOPHAGUS      2010    ESOPHAGUS SURGERY      EYE SURGERY  1990& 1992    cataract bilat removal     GASTRECTOMY      IR PERC CATH PLEURAL DRAIN W/IMAG  09/21/2022    IR PERC CATH PLEURAL DRAIN W/IMAG    PROSTATE SURGERY      seeds    TONSILLECTOMY      UPPER GASTROINTESTINAL ENDOSCOPY N/A 02/16/2021    EGD BIOPSY performed by Asberry Cushing, MD at [facility] N/A 04/22/2022    EGD CONTROL HEMORRHAGE performed by Daniel James MD at [address]  BRACHYTHERAPY         Family History   Problem Relation Age of Onset    Other Mother         bleeding peptic ulcer    Heart Disease Mother     High Blood Pressure Father     Stroke Father     Prostate Cancer Father     Other Father         cardiovascular disease    Elevated Lipids Father     Hypertension Father     Colon Cancer Paternal Cousin         reports that he has never smoked. He has never used smokeless tobacco. He reports current alcohol use. He reports that he does not use drugs.     Allergies:  No known allergies    Current Medications:    warfarin (COUMADIN) tablet 2 mg, Once  [START ON 10/15/2022] ferrous sulfate (IRON 325) tablet 325 mg, Q48H  fludrocortisone (FLORINEF) tablet 0.1 mg, Once per day on Mon Wed Fri  albuterol (PROVENTIL) nebulizer solution 2.5 mg, TID  sodium chloride (Inhalant) 3 % nebulizer solution 4 mL, TID  warfarin placeholder: dosing by pharmacy, RX Placeholder  zinc oxide (TRIAD HYDROPHILIC) paste, BID  alteplase (CATHFLO) injection 1 mg, Daily PRN  atorvastatin (LIPITOR) tablet 10 mg, Nightly  guaiFENesin (MUCINEX) extended release tablet 600 mg, BID  melatonin disintegrating tablet 5 mg, Nightly  oxyCODONE (ROXICODONE) immediate release tablet 5 mg, Q4H PRN  pantoprazole (PROTONIX) tablet 40 mg, QAM AC  acetaminophen (TYLENOL) tablet 650 mg, Q4H PRN  bisacodyl (DULCOLAX) EC tablet 5 mg, Daily  magnesium hydroxide (MILK OF MAGNESIA) 400 MG/5ML suspension 30 mL, Daily PRN  polyethylene glycol (GLYCOLAX) packet 17 g, Daily PRN  simethicone (MYLICON) chewable tablet 80 mg, Q6H PRN  glucose chewable tablet 16 g, PRN  dextrose bolus 10% 125 mL, PRN   Or  dextrose bolus 10% 250 mL, PRN  glucagon (rDNA) injection 1 mg, PRN  dextrose 10 % infusion, Continuous PRN      Review of Systems:   14 point ROS obtained but were negative except mentioned in HPI      Physical exam:     Vitals:  BP (!) 151/87   Pulse 86   Temp 97.4 °F (36.3 °C) (Oral)   Resp 16   Ht 5' 3\" (1.6 m)   Wt 104 lb 8 oz (47.4 kg)   SpO2 92%   BMI 18.51 kg/m²   Constitutional:  AA  Skin: no rash, turgor wnl  Heent:  eomi, mmm  Neck: no bruits or jvd noted  Cardiovascular:  S1, S2 without m/r/g  Respiratory: CTA B without w/r/r  Abdomen:  +bs, soft, nt, nd  Ext: + lower extremity edema  Psychiatric: mood and affect appropriate  Musculoskeletal:  Rom, muscular strength intact    Data:   Labs:  CBC:   Recent Labs     10/12/22  0555   WBC 6.6   HGB 7.5*          BMP:    Recent Labs     10/12/22  0555      K 4.6      CO2 33*   BUN 36*   CREATININE 1.1   GLUCOSE 111*       Ca/Mg/Phos:   Recent Labs     10/12/22  0555   CALCIUM 8.4       Hepatic: No results for input(s): AST, ALT, ALB, BILITOT, ALKPHOS in the last 72 hours. Troponin: No results for input(s): TROPONINI in the last 72 hours. BNP: No results for input(s): BNP in the last 72 hours. Lipids: No results for input(s): CHOL, TRIG, HDL, LDLCALC, LABVLDL in the last 72 hours. ABGs: No results for input(s): PHART, PO2ART, PSL3GOT in the last 72 hours. INR:   Recent Labs     10/11/22  0604 10/12/22  0555 10/13/22  0651   INR 1.87* 1.92* 2.35*       UA:No results for input(s): [place], GLUCOSEU, BILIRUBINUR, Nanetta Pa, BLOODU, PHUR, PROTEINU, UROBILINOGEN, NITRU, LEUKOCYTESUR, Alicia Hensen in the last 72 hours. Urine Microscopic: No results for input(s): LABCAST, BACTERIA, COMU, HYALCAST, WBCUA, RBCUA, EPIU in the last 72 hours. Urine Culture: No results for input(s): LABURIN in the last 72 hours. Urine Chemistry: No results for input(s): Golden Brink, PROTEINUR, NAUR in the last 72 hours. IMAGING:  XR CHEST PORTABLE   Final Result   1.   Worsening left-sided airspace opacities, possibly asymmetric pulmonary edema or pneumonia. 2.  Left-sided pleural effusion. 3.  The previously described pneumothoraces are no longer visualized. The lucency projecting over the medial left lung apex is felt to represent air within the esophagus. Assessment/Plan   Orthostatic     2. Hyperkalemia     3. Anemia    4. Acid- base/ Electrolyte imbalance     5.  Debility     Plan   - cont florinef   - Monitor Orthostats   - Cortisol 8.6  - UPC - no proteinuria   - encourage solute intake    - Rehab                   Thank you for allowing us to participate in care of MD Loly Galeano MD  Feel free to contact me   [facility] [address]  Office : [phone]  Fax :[phone]

## 2022-10-13 NOTE — PROGRESS NOTES
Department of Physical Medicine & Rehabilitation  Progress Note    Patient Identification:  Yaquelin Sweet MD  [id number]  : 1935  Admit date: 10/6/2022    Chief Complaint: Debility    Subjective:   Walked up 6 steps today. Feeling better but still is very tired. He would like a change to his mules relaxer and gabapentin. Showing improvement. Seen in his room today with his wife. ROS: No f/c, n/v, cp     Objective:  Patient Vitals for the past 24 hrs:   BP Temp Temp src Pulse Resp SpO2   10/13/22 0815 (!) 151/87 97.4 °F (36.3 °C) Oral 86 16 92 %   10/13/22 0749 -- -- -- -- -- 92 %   10/12/22 2128 -- -- -- -- -- 94 %   10/12/22 2115 (!) 157/84 97.8 °F (36.6 °C) Oral 99 16 92 %   10/12/22 2043 -- -- -- 98 16 92 %       Const: Alert. No distress, pleasant. HEENT: Normocephalic, atraumatic. Normal sclera/conjunctiva. MMM. CV: Regular rate and rhythm. Resp: No respiratory distress. Lungs CTAB. Abd: Soft, nontender, nondistended, NABS+   Ext: No edema. Neuro: Alert, oriented, appropriately interactive. Psych: Cooperative, appropriate mood and affect    Laboratory data: Available via EMR.    Last 24 hour lab  Recent Results (from the past 24 hour(s))   POCT Glucose    Collection Time: 10/12/22  4:54 PM   Result Value Ref Range    POC Glucose 150 (H) 70 - 99 mg/dl    Performed on ACCU-CHEK    POCT Glucose    Collection Time: 10/12/22  8:11 PM   Result Value Ref Range    POC Glucose 97 70 - 99 mg/dl    Performed on ACCU-CHEK    Protime-INR    Collection Time: 10/13/22  6:51 AM   Result Value Ref Range    Protime 25.8 (H) 11.7 - 14.5 sec    INR 2.35 (H) 0.87 - 1.14   POCT Glucose    Collection Time: 10/13/22  7:50 AM   Result Value Ref Range    POC Glucose 119 (H) 70 - 99 mg/dl    Performed on ACCU-CHEK        Therapy progress:  PT  Position Activity Restriction  Other position/activity restrictions: up w/ assist, ambulate pt, up as tolerated,  Objective     Sit to Stand: Dependent/Total, 2 Person Assistance  Stand to Sit: Dependent/Total, 2 Person Assistance  Bed to Chair: Dependent/Total, 2 Person Assistance (Performed via squat pivot (see below))     OT  PT Equipment Recommendations  Other: Ongoing assessment     Assessment        SLP          Body mass index is 18.51 kg/m².     Assessment and Plan:  # Right pneumothorax  # R 1,3-6 nondisplaced rib fractures  # Right lung contusion             - 9/19 - Right chest tube placed in the ED  - 9/23 - Right chest tube removed  - Pulmonary expansion  - Multimodal pain control     # Left pneumothorax  # Left T1 and T3 posterior rib fractures  # Left lung contusion  - 9/21 - Left chest tube placed by IR  - 9/23 - chest tube to water seal  - Left CT removed 10/4     #LLL collapse, L mainstem bronchus occlusion  - 9/23 - s/p bronchoscopy by ICU with removal of large mucus plug  - 9/27 - CT chest demonstrating persistent total occlusion of the left central airway/left lung collapse with loculated L hydropneumothorax    - Aggressive respiratory care, hypertonic saline, mucomyst nebs BID  - Bronch with pulm 9/30 w/mucus plugging in the right and left tracheobronchial trees  - Bronch wash cultures with Ecoli resistant to cefazolin, on Ceftriaxone- 2 days left        # C7 TP Fracture  - 9/20 - CTA Head/Neck did not show evidence of BCVI   - No brace needed  - Activity as tolerated     #L shoulder ecchymoses  - L shoulder x-ray with no interosseous abnormalities      Medical Comorbidities  # Aortic Valve Replacement  - 9/23 - restarted home warfarin  - AM INR checks - prior goal 2-2.5  - check dosing with pharmacy     # Hyperlipidemia  - Continue atorvastatin 10 mg daily     #RUL pulmonary nodule  - 6mm RUL nodule noted on 9/22 CT chest  - Recommend 6-12 month interval CT scan for monitoring      #Stage 3 Coccygeal Ulcer  - 2/2 to rowing machine per patient now a stage 3 wound   - Recommending [facility]     #CIM  - prolonged hospital course   - PT/OT required      # orthostatic hypotension  - Likely associated with CIM  - nephrology consult      IKMBERLY gabapentin and robaxin      Rehab Progress: Improving  Anticipated Dispo: home  Services/DME: [facility]: 10/20      RAJI ConnerP.H  PM&R  10/13/2022  11:56 AM

## 2022-10-13 NOTE — PROGRESS NOTES
Nephrology  Note                                                                                                                                                                                                                                                                                                                                                               Office : [phone]     Fax :[phone]              Patient's Name: Michael Adams MD      Reason for Consult:  Orthostatic   Requesting Physician:  Can Hollingsworth MD      Chief Complaint:  Trauma      Feels better  Dizzy on standing better     Past Medical History:   Diagnosis Date    Anemia     Aortic valve disorders     stenosis    Arthritis     Aspiration pneumonia ([address] 04/27/2015    Erectile dysfunction     Esophageal cancer ([facility] 10/18/2012    Hernia, femoral, bilateral 05/12/2014    History of blood transfusion     Hyperlipidemia     Osteoporosis, senile 05/20/2014    Pancreatitis 08/01/2013    Patient underweight 08/08/2013    Prostate cancer ([facility] [address]     Unspecified essential hypertension        Past Surgical History:   Procedure Laterality Date    APPENDECTOMY      as a child    BONE GRAFT      for saddle nose    CARDIAC VALVE REPLACEMENT  01/01/2006    aorta    CHOLECYSTECTOMY      COLONOSCOPY  11/01/2009    COLONOSCOPY  10/05/2015    COLONOSCOPY N/A 02/17/2021    COLONOSCOPY DIAGNOSTIC/STOMA performed by Marli Murrell MD at [place] [address]  02/18/2021    COLONOSCOPY POLYPECTOMY SNARE/COLD BIOPSY performed by Marli Murrell MD at [address] [place] [address]  01/01/2006    DILATATION, ESOPHAGUS      2010    ESOPHAGUS SURGERY      EYE SURGERY  1990& 1992    cataract bilat removal     GASTRECTOMY      IR PERC CATH PLEURAL DRAIN W/IMAG  09/21/2022    IR PERC CATH PLEURAL DRAIN W/IMAG    PROSTATE SURGERY      seeds    TONSILLECTOMY      UPPER GASTROINTESTINAL ENDOSCOPY N/A 02/16/2021    EGD BIOPSY performed by Sarah Sloan MD at [address] N/A 04/22/2022    EGD CONTROL HEMORRHAGE performed by Monica Sears MD at [address] BRACHYTHERAPY         Family History   Problem Relation Age of Onset    Other Mother         bleeding peptic ulcer    Heart Disease Mother     High Blood Pressure Father     Stroke Father     Prostate Cancer Father     Other Father         cardiovascular disease    Elevated Lipids Father     Hypertension Father     Colon Cancer Paternal Cousin         reports that he has never smoked. He has never used smokeless tobacco. He reports current alcohol use. He reports that he does not use drugs.     Allergies:  No known allergies    Current Medications:    ampicillin-sulbactam (UNASYN) 3000 mg in 100 mL NS IVPB minibag, Q6H  fludrocortisone (FLORINEF) tablet 0.1 mg, Once per day on Mon Wed Fri  albuterol (PROVENTIL) nebulizer solution 2.5 mg, TID  sodium chloride (Inhalant) 3 % nebulizer solution 4 mL, TID  warfarin placeholder: dosing by pharmacy, RX Placeholder  zinc oxide (TRIAD HYDROPHILIC) paste, BID  alteplase (CATHFLO) injection 1 mg, Daily PRN  atorvastatin (LIPITOR) tablet 10 mg, Nightly  gabapentin (NEURONTIN) capsule 100 mg, Nightly  guaiFENesin (MUCINEX) extended release tablet 600 mg, BID  melatonin disintegrating tablet 5 mg, Nightly  methocarbamol (ROBAXIN) tablet 500 mg, TID  oxyCODONE (ROXICODONE) immediate release tablet 5 mg, Q4H PRN  pantoprazole (PROTONIX) tablet 40 mg, QAM AC  acetaminophen (TYLENOL) tablet 650 mg, Q4H PRN  bisacodyl (DULCOLAX) EC tablet 5 mg, Daily  magnesium hydroxide (MILK OF MAGNESIA) 400 MG/5ML suspension 30 mL, Daily PRN  polyethylene glycol (GLYCOLAX) packet 17 g, Daily PRN  simethicone (MYLICON) chewable tablet 80 mg, Q6H PRN  ferrous sulfate (IRON 325) tablet 325 mg, Daily with breakfast  insulin lispro (1 Unit Dial) (HUMALOG/ADMELOG) pen 0-4 Units, TID WC  insulin lispro (1 Unit Dial) (HUMALOG/ADMELOG) pen 0-4 Units, Nightly  glucose chewable tablet 16 g, PRN  dextrose bolus 10% 125 mL, PRN   Or  dextrose bolus 10% 250 mL, PRN  glucagon (rDNA) injection 1 mg, PRN  dextrose 10 % infusion, Continuous PRN      Review of Systems:   14 point ROS obtained but were negative except mentioned in HPI      Physical exam:     Vitals:  BP (!) 157/84   Pulse 99   Temp 97.8 °F (36.6 °C) (Oral)   Resp 16   Ht 5' 3\" (1.6 m)   Wt 104 lb 8 oz (47.4 kg)   SpO2 94%   BMI 18.51 kg/m²   Constitutional:  AA  Skin: no rash, turgor wnl  Heent:  eomi, mmm  Neck: no bruits or jvd noted  Cardiovascular:  S1, S2 without m/r/g  Respiratory: CTA B without w/r/r  Abdomen:  +bs, soft, nt, nd  Ext: + lower extremity edema  Psychiatric: mood and affect appropriate  Musculoskeletal:  Rom, muscular strength intact    Data:   Labs:  CBC:   Recent Labs     10/10/22  0649 10/12/22  0555   WBC 6.9 6.6   HGB 8.2* 7.5*    283       BMP:    Recent Labs     10/10/22  0649 10/12/22  0555    143   K 4.4 4.6    103   CO2 34* 33*   BUN 41* 36*   CREATININE 1.1 1.1   GLUCOSE 107* 111*       Ca/Mg/Phos:   Recent Labs     10/10/22  0649 10/12/22  0555   CALCIUM 8.3 8.4       Hepatic: No results for input(s): AST, ALT, ALB, BILITOT, ALKPHOS in the last 72 hours. Troponin: No results for input(s): TROPONINI in the last 72 hours. BNP: No results for input(s): BNP in the last 72 hours. Lipids: No results for input(s): CHOL, TRIG, HDL, LDLCALC, LABVLDL in the last 72 hours. ABGs: No results for input(s): PHART, PO2ART, SWU3UDJ in the last 72 hours. INR:   Recent Labs     10/10/22  0649 10/11/22  0604 10/12/22  0555   INR 1.79* 1.87* 1.92*       UA:No results for input(s): Christella Plants, GLUCOSEU, BILIRUBINUR, [place]ette [place], BLOODU, PHUR, PROTEINU, UROBILINOGEN, NITRU, LEUKOCYTESUR, Champ Ramírez in the last 72 hours.    Urine Microscopic: No results for input(s): LABCAST, BACTERIA,

## 2022-10-13 NOTE — PROGRESS NOTES
Comprehensive Nutrition Assessment    Type and Reason for Visit:  Reassess    Nutrition Recommendations/Plan:   Continue Regular CCC-5 Low Potassium Diet   Continue Nepro tid     Malnutrition Assessment:  Malnutrition Status: At risk for malnutrition (Comment) (10/13/22 2512)    Context:  Acute Illness     Findings of the 6 clinical characteristics of malnutrition:  Energy Intake:  Mild decrease in energy intake (Comment)  Weight Loss:  No significant weight loss     Body Fat Loss:  Mild body fat loss Buccal region, Orbital   Muscle Mass Loss:  Mild muscle mass loss Temples (temporalis)  Fluid Accumulation:  No significant fluid accumulation     Strength:  Not Performed    Nutrition Assessment:    Follow up: Pt continues to receive a Regular CCC-5, Low potassium diet with recorded meal intakes of 26-50%. Visited at noon meal today, stated that he will usually eat all of protein entree and less of veg, starch etc.  Noted % intake of ONS. Continue to encourage po intake for adequate nutrition. Nutrition Related Findings:    Na 143 K+ 4.6 glu 150. LLE non- pitting. Wound Type: Stage II (sacrum)       Current Nutrition Intake & Therapies:    Average Meal Intake: 26-50%  Average Supplements Intake: %  ADULT DIET; Regular; 5 carb choices (75 gm/meal); Low Potassium (Less than 3000 mg/day)  ADULT ORAL NUTRITION SUPPLEMENT; Breakfast, Lunch, Dinner; Renal Oral Supplement    Anthropometric Measures:  Height: 5' 3\" (160 cm)  Ideal Body Weight (IBW): 124 lbs (56 kg)       Current Body Weight: 104 lb (47.2 kg),   IBW.  Weight Source: Bed Scale  Current BMI (kg/m2): 18.4  Usual Body Weight: 106 lb (48.1 kg)  % Weight Change (Calculated): -1.9  Weight Adjustment For: No Adjustment                 BMI Categories: Underweight (BMI less than 22) age over 72    Estimated Daily Nutrient Needs:  Energy (kcal/day): 5948-5960  Protein (g/day): 71-85  Fluid (ml/day): 5623-6062    Nutrition Diagnosis:   Inadequate oral intake related to acute injury/trauma, inadequate protein-energy intake as evidenced by intake 26-50%, poor intake prior to admission    Nutrition Interventions:   Food and/or Nutrient Delivery: Continue Oral Nutrition Supplement, Continue Current Diet  Nutrition Education/Counseling: No recommendation at this time  Coordination of Nutrition Care: Continue to monitor while inpatient       Goals:  Previous Goal Met: Progressing toward Goal(s)  Goals: PO intake 50% or greater, within 7 days       Nutrition Monitoring and Evaluation:   Behavioral-Environmental Outcomes: None Identified  Food/Nutrient Intake Outcomes: Food and Nutrient Intake, Supplement Intake  Physical Signs/Symptoms Outcomes: Biochemical Data, Nutrition Focused Physical Findings, Skin, Weight    Discharge Planning:     Too soon to determine     Zainab Cuevas, [address]: [id number]

## 2022-10-13 NOTE — PLAN OF CARE
Problem: Discharge Planning  Goal: Discharge to home or other facility with appropriate resources  Outcome: Progressing  Flowsheets (Taken 10/13/2022 0427)  Discharge to home or other facility with appropriate resources: Identify barriers to discharge with patient and caregiver     Problem: Safety - Adult  Goal: Free from fall injury  Outcome: Progressing  Flowsheets (Taken 10/13/2022 0427)  Free From Fall Injury: Instruct family/caregiver on patient safety     Problem: ABCDS Injury Assessment  Goal: Absence of physical injury  Outcome: Progressing  Flowsheets (Taken 10/13/2022 0427)  Absence of Physical Injury: Implement safety measures based on patient assessment     Problem: Skin/Tissue Integrity  Goal: Absence of new skin breakdown  Description: 1.   Monitor for areas of redness and/or skin breakdown  Outcome: Progressing     Problem: Nutrition Deficit:  Goal: Optimize nutritional status  Outcome: Progressing  Flowsheets (Taken 10/13/2022 0427)  Nutrient intake appropriate for improving, restoring, or maintaining nutritional needs:   Assess nutritional status and recommend course of action   Monitor oral intake, labs, and treatment plans   Recommend appropriate diets, oral nutritional supplements, and vitamin/mineral supplements

## 2022-10-13 NOTE — PROGRESS NOTES
Occupational Therapy  Facility/Department: [facility] ACUTE REHAB UNIT  Rehabilitation Occupational Therapy Daily Treatment Note    Date: 10/13/22  Patient Name: Lawrence Amaya MD       Room: 3109/3109-01  MRN: [id number]  Account: [de-identified]   : 1935  (82 y.o.) Gender: male                    Past Medical History:  has a past medical history of Anemia, Aortic valve disorders, Arthritis, Aspiration pneumonia ([address] Erectile dysfunction, Esophageal cancer ([address] Hernia, femoral, bilateral, History of blood transfusion, Hyperlipidemia, Osteoporosis, senile, Pancreatitis, Patient underweight, Prostate cancer ([address] and Unspecified essential hypertension. Past Surgical History:   has a past surgical history that includes Coronary artery bypass graft (2006); Cardiac valve replacement (2006); eye surgery (& ); Appendectomy; bone graft; gastrectomy; Cholecystectomy; Colonoscopy (2009); Prostate surgery; Tonsillectomy; Esophagus surgery;  Prostate/Transrectal/Vol Collins Brachyth; Colonoscopy (10/05/2015); Upper gastrointestinal endoscopy (N/A, 2021); Colonoscopy (N/A, 2021); Colonoscopy (2021); Upper gastrointestinal endoscopy (N/A, 2022); IR GUIDED PERC PLEURAL DRAIN W CATH INSERT (2022); and Dilatation, esophagus. Restrictions  Other position/activity restrictions: up w/ assist, ambulate pt, up as tolerated,    Subjective  Subjective: Pt was seated in recliner chair upon arrival. Pt was pleasant and agreeable to OT. Pt agreeable to ADL. Pt on 2L O2                Objective       Cognition  Overall Cognitive Status: WFL  Orientation  Overall Orientation Status: Within Normal Limits         ADL  Feeding  Assistance Level:  Independent  Skilled Clinical Factors: Lunch tray delivered  Grooming/Oral Hygiene  Assistance Level: Set-up  Skilled Clinical Factors: Pt washed his face seated on TTB and combed hair in recliner chair  Upper Extremity Bathing  Assistance Level: Set-up; Supervision  Skilled Clinical Factors: Pt washed and dried 4/4 components of UE bathing seated on TTB  Lower Extremity Bathing  Assistance Level: Contact guard assist;Increased time to complete  Skilled Clinical Factors: Pt washed and dried 6/6 components of LE bathing seated on TTB. Pt maintained stance at GB w/ CGA to wash buttocks. ++ time needed due to fatigue  Upper Extremity Dressing  Assistance Level: Set-up; Increased time to complete  Skilled Clinical Factors: Pt donned his undershirt and tshirt seated on TTB. Lower Extremity Dressing  Assistance Level: Set-up; Verbal cues; Increased time to complete; Moderate assistance  Skilled Clinical Factors: Pt threaded RLE into underwear and pants but required assistance for LLE. Pt managed clothes over hips in the front but required assistance in the back. CGA required in stance at [facility] for pants management. Putting On/Taking Off Footwear  Assistance Level: Dependent  Skilled Clinical Factors: Pt doffed socks and shoes I'ly but required total A to don socks and shoes after the shower due to fatigue. Tub/Shower Transfers  Type: Shower  Transfer From: Walker  Transfer To: Tub transfer bench  Assistance Level: Contact guard assist;Verbal cues  Skilled Clinical Factors: Pt ambulated w/ RW to/from TTB w/ CGA. Pt required min VCs for safe approach and use of GB for entry and exit  *OT removed mepilex to buttocks, heels, and R elbow and replaced mepilex after shower. RN notified. Functional Mobility  Device: Rolling walker  Activity: To/From bathroom  Assistance Level: Contact guard assist  Skilled Clinical Factors: Pt ambulated w/ RW to/from bathroom w/ CGA and 2L O2 in tow. Pt w/ severe kyphosis resulting in forward flexed posture. No LOB noted  Sit to Stand  Assistance Level: Contact guard assist  Skilled Clinical Factors: completed from recliner chair and TTB.  No BLE buckling noted today  Stand to Sit  Assistance Level: Contact guard assist  Skilled Clinical Factors: VCs needed to reach back         Assessment  Assessment  Assessment: Pt demonstrated great progress in OT today as evident by pt ability to complete UE dressing w/ setup, shower transfer w/ CGA, and bathing tasks w/ CGA. Pt required increased assistance for LE dressing today due to fatigue following the shower. Pt had some c/o dizziness however BP was stable throughout session. Pt is making good progress and continues to benefit from OT. Cont OT per POC  Activity Tolerance: Patient tolerated treatment well  Discharge Recommendations: Continue to assess pending progress;24 hour supervision or assist;Home with Home health OT  OT Equipment Recommendations  Other: Pt reported he has a shower chair, GBs, and a reacher. Cont to assess for additional needs  Safety Devices  Safety Devices in place: Yes  Type of devices: Chair alarm in place;Call light within reach; Left in chair;Nurse notified    Patient Education  Education  Education Given To: Patient  Education Provided: Role of Therapy;Plan of Care;Safety;ADL Function;Transfer Training;Mobility Training;Precautions; Equipment; Energy Conservation  Education Provided Comments: LE dressing  Education Method: Demonstration;Verbal  Barriers to Learning: None  Education Outcome: Verbalized understanding;Continued education needed      2nd session: Pt was seated in recliner chair upon arrival. Pt on 2L O2. Pt pleasant and agreeable to OT. Sit to stand completed from recliner to RW w/ CGA. Pt ambulated w/ RW and CGA to the bathroom. Seated rest break required in w/c upon arrival to bathroom. Pt sat in w/c and completed oral care routine. Pt completed sit to stand from w/c to RW w/ CGA and ambulated back to the recliner chair. OT assisted w/ O2 management. OT educated pt regarding use of LE AE for energy conservation. OT demonstrated use of reacher, sock aid, and shoe horn w/ plans to trial in future sessions.  Pt was interested and OT provided information regarding where to purchase equipment if pt wants to buy it for home. Pt engaged in seated UE exercise w/ 3lb weights. Pt completed the following: elbow flexion, shoulder flexion, tricep rows, forward punches x10 reps each. Pt was left w/ weight to continue his UE HEP. Pt left in chair w/ chair alarm on and call light within reach. Plan  Occupational Therapy Plan  Times Per Week: 5x/week, 90min/day  Current Treatment Recommendations: Strengthening;Balance training;Functional mobility training; Endurance training; Safety education & training;Patient/Caregiver education & training;Equipment evaluation, education, & procurement;Self-Care / ADL; Home management training; Wheelchair mobility training    Goals  Patient Goals   Patient goals : \"I want to return to what I was doing before. \"  Short Term Goals  Time Frame for Short Term Goals: 2 weeks- all ongoing  Short Term Goal 1: Pt will complete UE/LE dressing w/ Mod I and use of AE prn  Short Term Goal 2: Pt will complete bathing w/ spvn  Short Term Goal 3: Pt will complete shower transfer w/ spvn  Short Term Goal 4: Pt will complete toileting/toilet transfer w/ Mod I  Short Term Goal 5: Pt will complete grooming routine in stance w/ Mod I    AM-PAC Score               Therapy Time   Individual Concurrent Group Co-treatment   Time In 1100         Time Out 1205         Minutes 65         Timed Code Treatment Minutes: 65 Minutes       Second Session Therapy Time:   Individual Concurrent Group Co-treatment   Time In  9550         Time Out  1315         Minutes  30           Timed Code Treatment Minutes:  30    Total Treatment Minutes:   65+30= 95    Khushbu Sim OT

## 2022-10-13 NOTE — PROGRESS NOTES
Pt in bed, eyes closed but easily aroused. Alert & oriented x 4. BP elevated, other VSS. Assessment completed. No complaints at this time. Nighttime medications given, pt tolerated well. Pt repositioned in bed. Reminded pt to call for assistance with any needs. Call light within reach. Safety measures in place.
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
## 2022-10-13 NOTE — PROGRESS NOTES
Physical Therapy  Facility/Department: [facility] ACUTE REHAB UNIT  Rehabilitation Physical Therapy Treatment Note    NAME: Nicolas Spring MD  : 1935 (82 y.o.)  MRN: [id number]  CODE STATUS: Full Code    Date of Service: 10/13/22       Restrictions:  Position Activity Restriction  Other position/activity restrictions: up w/ assist, ambulate pt, up as tolerated,     SUBJECTIVE  Subjective  Subjective: Pt semi-supine in bed upon PT arrival. Reported he wanted to stay in bed this am to rest.  Pain: Denied pain. Post Treatment Pain Screening     VITALS  Patient on 2L O2 via NC  Position Seated EOB Seated in w/c following ambulation Seated in w/c following ambulation   BP (mmHg) 135/75 140/82    HR (bpm) 93 98    SpO2  86 > 95% 95%         OBJECTIVE  Cognition  Overall Cognitive Status: WFL  Arousal/Alertness: Appropriate responses to stimuli  Following Commands: Follows multistep commands consistently  Attention Span: Appears intact  Memory: Appears intact  Safety Judgement: Good awareness of safety precautions  Problem Solving: Assistance required to identify errors made;Assistance required to correct errors made  Insights: Decreased awareness of deficits  Initiation: Does not require cues  Sequencing: Requires cues for some  Orientation  Overall Orientation Status: Within Normal Limits  Orientation Level: Oriented X4    Functional Mobility  Bed Mobility  Additional Factors: Head of bed flat; With handrails  Sit to Supine  Assistance Level: Supervision (performed on mat table)  Skilled Clinical Factors: SVN for safety d/t pt fatigued/unsteady  Supine to Sit  Assistance Level: Stand by assist (performed on bed with HOB flat with HRs x1, performed on mat table x1)  Skilled Clinical Factors: SBA for safety d/t pt unsteady  Scooting  Assistance Level: Supervision  Skilled Clinical Factors: PT provided VC for technique to scoot L on mat table.  PT also instructed pt to scoot along EOB with SVN for safety. Balance  Sitting Balance: Stand by assistance (Pt donned shoes seated EOB without assist. SBA for safety d/t pt unsteady this am.)  Standing Balance: Contact guard assistance  Standing Balance  Activity: Pt req CGA for short standing bout prior to ambulation. PT instructed pt perform lateral WS as pre-gait activity to \"warm up\" LE's. Transfers  Surface: From bed; Wheelchair; To chair with arms  Additional Factors: Verbal cues; Hand placement cues  Device: Walker (RW)  Sit to Stand  Assistance Level: Contact guard assist  Skilled Clinical Factors: VC to scoot to EOS, occasional VC for hand placement. CGA d/t unsteadiness. Stand to Sit  Assistance Level: Contact guard assist  Skilled Clinical Factors: VC for hand placement. CGA for controlled descent. Bed To/From Chair  Technique: Stand pivot  Assistance Level: Minimal assistance  Skilled Clinical Factors: Ana for stability with pivot d/t unsteadiness and mild knee buckling. VC for hand placement. Environmental Mobility  Ambulation  Surface: Level surface  Device: Rolling walker  Distance: 48' + 50'  Activity: Within Unit  Activity Comments: Seated rest breaks between bouts. O2 transport and w/c follow provided by PT. Additional Factors: Verbal cues; Increased time to complete  Assistance Level: Contact guard assist  Gait Deviations: Slow mingo;Decreased step length bilateral;Narrow base of support (forward flexed posture)  Skilled Clinical Factors: VC for upright posture  Stairs  Stair Height: 6''  Device: Bilateral handrails  Number of Stairs: 6 (3+3)  Additional Factors: Hand placement cues; Non-reciprocal going up;Non-reciprocal going down; Increased time to complete  Assistance Level: Contact guard assist  Skilled Clinical Factors: CGA for stability and controlled descent. VC for hand progression on railings. PT Exercises  Exercise Treatment: Pt has ++ forward flexed posture.  To stretch pt into extension, PT instructed pt to perform isometric exercise performed in supine with LE's and UE's abducted in which pt first produced bilateral isometric ankle DF, then knee extension, then hip extension, then shoulder extension. After global isometric contraction achieved, pt instructed to count out loud X15 seconds. On the second rep, pt performed the same exercise with 1 pillow instead of 2 to increase cervical extension ROM. On the third rep, PT instructed pt to maintain a chin tuck as the final isometric contraction. 2nd Session  Pt seated in recliner upon PT arrival, agreeable to therapy. Pt performed sit>stand up to RW with CGA for stability. In standing, PT instructed pt to perform lateral weight shifting as pre-gait activity to establish safety of attempting ambulation. Pt tolerated weight shifting with CGA and no episodes of knee buckling. Pt req seated rest break following this. Pt transferred chair>w/c with CGA, VC for hand placement. Pt then propelled w/c 20' to room Mod I. Pt completed sit>stand transfer from w/c with CGA and ambulated 50' using RW with CGA and PT providing w/c follow/O2 transport. Following seated rest break for recovery (see vitals assessed below), pt ambulated 30' with same assist levels. Following another seated rest break, pt ambulated 25' with same assist levels again. During first ambulation, PT instructed pt to name [place] states to add cognitive task and facilitate breathing. Pt able to name ~12 states. On second ambulation bout, PT instructed pt to name vegetables and pt named ~15. Following seated rest break, pt propelled w/c 95' to room Mod I. From w/c, pt transferred w/c>bed via stand-pivot with CGA and VC for hand placement. Pt performed sit>supine with HOB flat with SVN for safety d/t fatigue and using HR's. Pt left in bed, bed alarm in place, call light and needs within reach.      2nd Session Vitals  Patient on 2L O2 via NC  Position Seated in w/c following ambulation Seated in w/c following ambulation HR (bpm)  86 bpm   SpO2 88 > 94% 86 > 93%             ASSESSMENT/PROGRESS TOWARDS GOALS       Assessment  Assessment: Pt tolerated activity fair this morning. With ambulation bouts, pt's O2 with slight decreased but improved quickly with rest and pursed lip breathing technique. Pt's BP remained stable with activity. He continues to be limited by decreased activity tolerance. Pt is making progress towards goals but is functioning below baseline and will benefit from continued skilled PT to address limitations to facilitate safe d/c home. Activity Tolerance: Patient tolerated treatment well;Patient limited by fatigue  Discharge Recommendations: Continue to assess pending progress;24 hour supervision or assist;Home with Home health PT  PT Equipment Recommendations  Other: Ongoing assessment    Goals  Patient Goals   Patient Goals : \"To get back to doing what I was doing\"  Short Term Goals  Time Frame for Short Term Goals: 7 days  Short Term Goal 1: Pt will perform bed mobility independently  Short Term Goal 2: Pt will perform all transfers excluding floor transfer with Ana  Short Term Goal 3: Pt will ambulate 10' on level surfaces with LRAD and CGA  Short Term Goal 4: Patient will navigate 4 stairs with bilateral HR and CGA  Long Term Goals  Time Frame for Long Term Goals : 14 days  Long Term Goal 1: Pt will perform all transfers excluding floor transfer Mod I with LRAD  Long Term Goal 2: Pt will ambulate at least 150' on level surfaces Mod I with LRAD  Long Term Goal 3: Patient will navigate 12 stairs with 1 HR Mod I    PLAN OF CARE/SAFETY  Physcial Therapy Plan  General Plan:  (5x/week for 90 minutes)  Current Treatment Recommendations: Strengthening;ROM;Balance training;Functional mobility training;Transfer training; Endurance training; Wheelchair mobility training;Gait training;Stair training;Neuromuscular re-education;Pain management;Home exercise program;Safety education & training;Patient/Caregiver education & training;Equipment evaluation, education, & procurement;Positioning; Therapeutic activities  Safety Devices  Type of Devices: Left in chair;Chair alarm in place;Call light within reach    EDUCATION  Education  Education Given To: Patient  Education Provided: Role of Therapy; Safety; Mobility Training;Transfer Training;Energy Conservation  Education Provided Comments: Pt educated on role of exercises, energy conservation to improve activity tolerance throughout the day, and safety with mobility.   Education Method: Verbal  Barriers to Learning: None  Education Outcome: Verbalized understanding        Therapy Time   Individual Concurrent Group Co-treatment   Time In 0900         Time Out 1000         Minutes 60           Second Session Therapy Time:    Individual Concurrent Group Co-treatment   Time In 1400      Time Out 1430      Minutes 30         Timed Code Treatment Minutes:  60 + 30     Total Treatment Minutes:   268 [facility] 10/13/22 at 3:57 PM
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
## 2022-10-14 LAB
ANION GAP SERPL CALCULATED.3IONS-SCNC: 4 MMOL/L (ref 3–16)
BASOPHILS ABSOLUTE: 0.1 K/UL (ref 0–0.2)
BASOPHILS RELATIVE PERCENT: 1.1 %
BUN BLDV-MCNC: 32 MG/DL (ref 7–20)
CALCIUM SERPL-MCNC: 8.3 MG/DL (ref 8.3–10.6)
CHLORIDE BLD-SCNC: 105 MMOL/L (ref 99–110)
CO2: 33 MMOL/L (ref 21–32)
CREAT SERPL-MCNC: 1.1 MG/DL (ref 0.8–1.3)
EOSINOPHILS ABSOLUTE: 0.1 K/UL (ref 0–0.6)
EOSINOPHILS RELATIVE PERCENT: 1.4 %
GFR AFRICAN AMERICAN: >60
GFR NON-AFRICAN AMERICAN: >60
GLUCOSE BLD-MCNC: 97 MG/DL (ref 70–99)
HCT VFR BLD CALC: 27.3 % (ref 40.5–52.5)
HEMOGLOBIN: 8.4 G/DL (ref 13.5–17.5)
INR BLD: 2.65 (ref 0.87–1.14)
LYMPHOCYTES ABSOLUTE: 1.1 K/UL (ref 1–5.1)
LYMPHOCYTES RELATIVE PERCENT: 17 %
MCH RBC QN AUTO: 28.1 PG (ref 26–34)
MCHC RBC AUTO-ENTMCNC: 30.6 G/DL (ref 31–36)
MCV RBC AUTO: 92.1 FL (ref 80–100)
MONOCYTES ABSOLUTE: 0.6 K/UL (ref 0–1.3)
MONOCYTES RELATIVE PERCENT: 9.5 %
NEUTROPHILS ABSOLUTE: 4.4 K/UL (ref 1.7–7.7)
NEUTROPHILS RELATIVE PERCENT: 71 %
PDW BLD-RTO: 22.4 % (ref 12.4–15.4)
PLATELET # BLD: 241 K/UL (ref 135–450)
PMV BLD AUTO: 8.9 FL (ref 5–10.5)
POTASSIUM REFLEX MAGNESIUM: 4.8 MMOL/L (ref 3.5–5.1)
PROTHROMBIN TIME: 28.4 SEC (ref 11.7–14.5)
RBC # BLD: 2.97 M/UL (ref 4.2–5.9)
SODIUM BLD-SCNC: 142 MMOL/L (ref 136–145)
WBC # BLD: 6.2 K/UL (ref 4–11)

## 2022-10-14 PROCEDURE — 2580000003 HC RX 258: Performed by: PHYSICAL MEDICINE & REHABILITATION

## 2022-10-14 PROCEDURE — 6360000002 HC RX W HCPCS: Performed by: PHYSICAL MEDICINE & REHABILITATION

## 2022-10-14 PROCEDURE — 94761 N-INVAS EAR/PLS OXIMETRY MLT: CPT

## 2022-10-14 PROCEDURE — 99232 SBSQ HOSP IP/OBS MODERATE 35: CPT | Performed by: PHYSICAL MEDICINE & REHABILITATION

## 2022-10-14 PROCEDURE — 85025 COMPLETE CBC W/AUTO DIFF WBC: CPT

## 2022-10-14 PROCEDURE — 85610 PROTHROMBIN TIME: CPT

## 2022-10-14 PROCEDURE — 97530 THERAPEUTIC ACTIVITIES: CPT

## 2022-10-14 PROCEDURE — 97535 SELF CARE MNGMENT TRAINING: CPT

## 2022-10-14 PROCEDURE — 99232 SBSQ HOSP IP/OBS MODERATE 35: CPT | Performed by: INTERNAL MEDICINE

## 2022-10-14 PROCEDURE — 6370000000 HC RX 637 (ALT 250 FOR IP): Performed by: PHYSICAL MEDICINE & REHABILITATION

## 2022-10-14 PROCEDURE — 94664 DEMO&/EVAL PT USE INHALER: CPT

## 2022-10-14 PROCEDURE — 1280000000 HC REHAB R&B

## 2022-10-14 PROCEDURE — 80048 BASIC METABOLIC PNL TOTAL CA: CPT

## 2022-10-14 PROCEDURE — 36415 COLL VENOUS BLD VENIPUNCTURE: CPT

## 2022-10-14 PROCEDURE — 97116 GAIT TRAINING THERAPY: CPT

## 2022-10-14 PROCEDURE — 2700000000 HC OXYGEN THERAPY PER DAY

## 2022-10-14 PROCEDURE — 94640 AIRWAY INHALATION TREATMENT: CPT

## 2022-10-14 PROCEDURE — 94669 MECHANICAL CHEST WALL OSCILL: CPT

## 2022-10-14 PROCEDURE — 6370000000 HC RX 637 (ALT 250 FOR IP): Performed by: INTERNAL MEDICINE

## 2022-10-14 RX ORDER — WARFARIN SODIUM 1 MG/1
1 TABLET ORAL
Status: COMPLETED | OUTPATIENT
Start: 2022-10-14 | End: 2022-10-14

## 2022-10-14 RX ORDER — TRAMADOL HYDROCHLORIDE 50 MG/1
100 TABLET ORAL EVERY 6 HOURS PRN
Status: DISCONTINUED | OUTPATIENT
Start: 2022-10-14 | End: 2022-10-22 | Stop reason: HOSPADM

## 2022-10-14 RX ORDER — GABAPENTIN 100 MG/1
100 CAPSULE ORAL NIGHTLY
Status: DISCONTINUED | OUTPATIENT
Start: 2022-10-14 | End: 2022-10-22 | Stop reason: HOSPADM

## 2022-10-14 RX ADMIN — Medication 5 MG: at 20:07

## 2022-10-14 RX ADMIN — PANTOPRAZOLE SODIUM 40 MG: 40 TABLET, DELAYED RELEASE ORAL at 07:14

## 2022-10-14 RX ADMIN — SODIUM CHLORIDE 30 MG/ML INHALATION SOLUTION 4 ML: 30 SOLUTION INHALANT at 20:33

## 2022-10-14 RX ADMIN — GUAIFENESIN 600 MG: 600 TABLET, EXTENDED RELEASE ORAL at 20:08

## 2022-10-14 RX ADMIN — BISACODYL 5 MG: 5 TABLET, COATED ORAL at 08:55

## 2022-10-14 RX ADMIN — ALBUTEROL SULFATE 2.5 MG: 2.5 SOLUTION RESPIRATORY (INHALATION) at 08:38

## 2022-10-14 RX ADMIN — FLUDROCORTISONE ACETATE 0.1 MG: 0.1 TABLET ORAL at 08:54

## 2022-10-14 RX ADMIN — ALBUTEROL SULFATE 2.5 MG: 2.5 SOLUTION RESPIRATORY (INHALATION) at 16:34

## 2022-10-14 RX ADMIN — SODIUM CHLORIDE 30 MG/ML INHALATION SOLUTION 4 ML: 30 SOLUTION INHALANT at 16:34

## 2022-10-14 RX ADMIN — MAGNESIUM HYDROXIDE 30 ML: 400 SUSPENSION ORAL at 15:19

## 2022-10-14 RX ADMIN — ALBUTEROL SULFATE 2.5 MG: 2.5 SOLUTION RESPIRATORY (INHALATION) at 20:33

## 2022-10-14 RX ADMIN — ATORVASTATIN CALCIUM 10 MG: 10 TABLET, FILM COATED ORAL at 20:08

## 2022-10-14 RX ADMIN — GUAIFENESIN 600 MG: 600 TABLET, EXTENDED RELEASE ORAL at 08:54

## 2022-10-14 RX ADMIN — SODIUM CHLORIDE 30 MG/ML INHALATION SOLUTION 4 ML: 30 SOLUTION INHALANT at 08:38

## 2022-10-14 RX ADMIN — WARFARIN SODIUM 1 MG: 1 TABLET ORAL at 18:56

## 2022-10-14 RX ADMIN — Medication: at 08:56

## 2022-10-14 RX ADMIN — Medication: at 20:08

## 2022-10-14 RX ADMIN — GABAPENTIN 100 MG: 100 CAPSULE ORAL at 20:08

## 2022-10-14 NOTE — PLAN OF CARE
Problem: Discharge Planning  Goal: Discharge to home or other facility with appropriate resources  10/14/2022 1355 by Walt Butterfield RN  Outcome: Progressing  Flowsheets (Taken 10/14/2022 0337 by Carlena Carrel, RN)  Discharge to home or other facility with appropriate resources: Identify barriers to discharge with patient and caregiver     Problem: Safety - Adult  Goal: Free from fall injury  10/14/2022 1355 by Walt Butterfield RN  Outcome: Progressing  Note: Patient has been free from falls     Problem: ABCDS Injury Assessment  Goal: Absence of physical injury  10/14/2022 1355 by Walt Butterfield RN  Outcome: [place] (Taken 10/14/2022 0337 by Carlena Carrel, RN)  Absence of Physical Injury: Implement safety measures based on patient assessment     Problem: Skin/Tissue Integrity  Goal: Absence of new skin breakdown  Description: 1. Monitor for areas of redness and/or skin breakdown  2. Assess vascular access sites hourly  3. Every 4-6 hours minimum:  Change oxygen saturation probe site  4. Every 4-6 hours:  If on nasal continuous positive airway pressure, respiratory therapy assess nares and determine need for appliance change or resting period.   10/14/2022 1355 by Walt Butterfield RN  Outcome: Progressing     Problem: Nutrition Deficit:  Goal: Optimize nutritional status  10/14/2022 1355 by Walt Butterfield RN  Outcome: Progressing  Flowsheets (Taken 10/14/2022 0337 by Carlena Carrel, RN)  Nutrient intake appropriate for improving, restoring, or maintaining nutritional needs:   Assess nutritional status and recommend course of action   Monitor oral intake, labs, and treatment plans   Recommend appropriate diets, oral nutritional supplements, and vitamin/mineral supplements

## 2022-10-14 NOTE — PROGRESS NOTES
Occupational Therapy  Facility/Department: [facility] ACUTE REHAB UNIT  Rehabilitation Occupational Therapy Daily Treatment Note    Date: 10/14/22  Patient Name: Trinity Worrell MD       Room: 3109/3109-01  MRN: [id number]  Account: [de-identified]   : 1935  (82 y.o.) Gender: male                    Past Medical History:  has a past medical history of Anemia, Aortic valve disorders, Arthritis, Aspiration pneumonia ([address] Erectile dysfunction, Esophageal cancer ([address] Hernia, femoral, bilateral, History of blood transfusion, Hyperlipidemia, Osteoporosis, senile, Pancreatitis, Patient underweight, Prostate cancer ([address] and Unspecified essential hypertension. Past Surgical History:   has a past surgical history that includes Coronary artery bypass graft (2006); Cardiac valve replacement (2006); eye surgery (& ); Appendectomy; bone graft; gastrectomy; Cholecystectomy; Colonoscopy (2009); Prostate surgery; Tonsillectomy; Esophagus surgery;  Prostate/Transrectal/Vol Collins Brachyth; Colonoscopy (10/05/2015); Upper gastrointestinal endoscopy (N/A, 2021); Colonoscopy (N/A, 2021); Colonoscopy (2021); Upper gastrointestinal endoscopy (N/A, 2022); IR GUIDED PERC PLEURAL DRAIN W CATH INSERT (2022); and Dilatation, esophagus. Restrictions  Other position/activity restrictions: up w/ assist, ambulate pt, up as tolerated,    Subjective  Subjective: Pt was semi supine in bed upon arrival. Pt was pleasant and agreeable to OT. Per RN they are trying to wean pt to 1L O2.                 Objective     Cognition  Overall Cognitive Status: WFL  Orientation  Overall Orientation Status: Within Normal Limits         ADL    Grooming/Oral Hygiene  Assistance Level: Modified independent  Skilled Clinical Factors: Pt completed oral care seated in w/c at the sink  Putting On/Taking Off Footwear  Assistance Level: Set-up  Skilled Clinical Factors: Pt doffed non skid socks and donned shoes seated at EOB in figure 4            Functional Mobility  Device: Rolling walker  Activity: To/From bathroom  Assistance Level: Contact guard assist  Skilled Clinical Factors: Pt ambulated w/ RW to/from the bathroom w/ CGA and 1L O2. Pt required seated rest break in w/c upon arrival to the bathroom. Pt attempted to ambulate in the hallway and completed ~30ft of ambulation. Pt was initially on 1L O2 however when vitals were assessed pt's SpO2 was dropping to 82-84% and was not recovering. OT increased O2 to 2L O2. Pt self propelled w/c in the hallway ~125ft w/ spvn w/ use of BUEs and BLEs. Extended rest break needed upon arrival to the gym. Pt also ambulated back to his chair ~15ft w/ RW and CGA. BP aslo assessed during session. BP seated at /71, BP in stance 116/62. Pt w/ occasional c/o dizziness but no significant drop in BP noted. Sit to Stand  Assistance Level: Contact guard assist  Skilled Clinical Factors: completed from EOB and w/c  Stand to Sit  Assistance Level: Contact guard assist     OT Exercises  Static Standing Balance Exercises: Pt engaged in standing standing balance task at RW to address increased safety in stance for ADLs and improve activity tolerance. Pt completed 10 reps of shoulder flexion w/ use of small ball and 10 reps of chest press w/ small ball w/ CGA. Pt demonstrated no LOB however rest break in w/c needed due to fatigue. Assessment    Assessment  Assessment: Pt continues to make good progress in OT as evident by pt ability to complete all functional transfers w/ CGA and functional mobility w/ CGA. Pt remains limited by decreased activity tolerance and SOB/dizziness w/ O2 requirement at 2L w/ exertion and only 1L at rest. Pt is making good progress but continues to function below baseline.  Cont OT per POC  Activity Tolerance: Patient tolerated treatment well;Patient limited by fatigue  Discharge Recommendations: Continue to assess pending progress;24 hour supervision or assist;Home with Home health OT  OT Equipment Recommendations  Other: Pt reported he has a shower chair, GBs, and a reacher. Cont to assess for additional needs  Safety Devices  Safety Devices in place: Yes  Type of devices: Call light within reach; Left in chair;Chair alarm in place;Nurse notified    Patient Education  Education  Education Given To: Patient  Education Provided: Role of Therapy;Plan of Care;Safety;ADL Function;Transfer Training;Mobility Training;Precautions; Equipment; Energy Conservation  Education Provided Comments: monitoring vitals  Education Method: Demonstration;Verbal  Barriers to Learning: None  Education Outcome: Verbalized understanding;Continued education needed      2nd session: Pt was seated in recliner chair upon arrival. Pt was pleasant and agreeable to OT. Pt on 1L O2. OT talked w/ pt about his goals for going home and areas he wants to focus on. Pt stated he would be most happy if he could stand for up to 2 mins (this is how long he would normally stand before needing a rest at baseline) and he would like to have enough energy to be able to go to the [place] soccer game. Standing tolerance address this session to address pt's goal. Pt completed all sit to stand transfers this session w/ SBA. Pt maintained stance for 1 min 50s, 2 mins, and 2 mins 15s while engaging in sock folding activity. Pt completed this task w/ 0-1 UE support on RW w/ CGA-SBA. Kyphotic posture in stance which is pt's baseline. Pt was initially on 1L O2 during task but was at 97%. OT removed O2 to trial weaning per RN and pulmonology instructions and pt was able to maintain 90-94% on RA w/ extended seated rest breaks needed between trials. Pt was pleased w/ his progress and ability to stand for over 2 mins. Pt requested to return to bed for a quick rest before PT Pt ambulated w/ RW and CGA back to bed. Sit to supine completed w/ spvn. Pt left in bed w/ bed alarm on and call light within reach.  OT left pt on RA but kept nasal cannula next to pt in case he started to feel more SOB. Plan  Occupational Therapy Plan  Times Per Week: 5x/week, 90min/day  Current Treatment Recommendations: Strengthening;Balance training;Functional mobility training; Endurance training; Safety education & training;Patient/Caregiver education & training;Equipment evaluation, education, & procurement;Self-Care / ADL; Home management training; Wheelchair mobility training    Goals  Patient Goals   Patient goals : \"I want to return to what I was doing before. \"  Short Term Goals  Time Frame for Short Term Goals: 2 weeks- all ongoing  Short Term Goal 1: Pt will complete UE/LE dressing w/ Mod I and use of AE prn  Short Term Goal 2: Pt will complete bathing w/ spvn  Short Term Goal 3: Pt will complete shower transfer w/ spvn  Short Term Goal 4: Pt will complete toileting/toilet transfer w/ Mod I  Short Term Goal 5: Pt will complete grooming routine in stance w/ Mod I    AM-PAC Score               Therapy Time   Individual Concurrent Group Co-treatment   Time In  900         Time Out  1000         Minutes  60          Timed Code Minutes: 60        Second Session Therapy Time:   Individual Concurrent Group Co-treatment   Time In  2008         Time Out  1315         Minutes 30           Timed Code Treatment Minutes:  30    Total Treatment Minutes:   60+30= 90  Salena Michelle OT

## 2022-10-14 NOTE — PROGRESS NOTES
Physical Therapy  Facility/Department: [facility] ACUTE REHAB UNIT  Rehabilitation Physical Therapy Treatment Note    NAME: Chaz Ryan MD  : 1935 (82 y.o.)  MRN: [id number]  CODE STATUS: Full Code    Date of Service: 10/14/22       Restrictions:  Position Activity Restriction  Other position/activity restrictions: up w/ assist, ambulate pt, up as tolerated,     SUBJECTIVE  Subjective  Subjective: Pt seated in recliner upon PT arrival, agreeable to therapy. Pain: Denied pain. Post Treatment Pain Screening     VITALS  Patient on 1L O2 via NC  Position Seated in recliner Seated EOM following ambulation Seated in w/c following 3 stairs Seated in w/c following another 3 stairs   BP (mmHg) 112/69 125/73     HR (bpm) 86 88 89     SpO2 94% 91% 88% > 90% 86% > 90%         OBJECTIVE  Cognition  Overall Cognitive Status: WFL  Arousal/Alertness: Appropriate responses to stimuli  Following Commands: Follows multistep commands consistently  Attention Span: Appears intact  Memory: Appears intact  Safety Judgement: Good awareness of safety precautions  Problem Solving: Assistance required to identify errors made;Assistance required to correct errors made  Insights: Decreased awareness of deficits  Initiation: Does not require cues  Sequencing: Requires cues for some  Orientation  Overall Orientation Status: Within Normal Limits  Orientation Level: Oriented X4    Functional Mobility  Balance  Sitting Balance: Supervision (Pt in static sitting EOM req SVN d/t fatigue/shortness of breath during activity.)  Standing Balance: Stand by assistance  Standing Balance  Activity: For static standing, pt req SBA this session with no episodes of knee buckling. See environmental mobility for dynamic balance. Transfers  Surface: From chair with arms; Wheelchair; To chair with arms  Additional Factors: Verbal cues; Hand placement cues  Device: Walker (RW)  Sit to Stand  Assistance Level: Stand by assist  Skilled Clinical Factors: VC to scoot to EOS, occasional VC for hand placement. SBA for safety. Stand to Sit  Assistance Level: Contact guard assist  Skilled Clinical Factors: VC for hand plcament and CGA for controlled descent. Bed To/From Chair  Technique: Stand pivot  Assistance Level: Stand by assist;Contact guard assist  Skilled Clinical Factors: Overall SBA for safety with CGA for controlled descent. Pt did not req VC for hand placement. Environmental Mobility  Ambulation  Surface: Level surface  Device: Rolling walker  Distance: 15' + 25' + 36' + 10'  Activity: Within Unit; Within Room  Activity Comments: Seated rest breaks between bouts, O2 transport and w/c follow provided by PT. Additional Factors: Verbal cues; Increased time to complete  Assistance Level: Contact guard assist  Gait Deviations: Slow mingo;Decreased step length bilateral;Narrow base of support (forward flexed posture)  Skilled Clinical Factors: VC for upright posture  Stairs  Stair Height: 6''  Device: Bilateral handrails  Number of Stairs: 6 (3+3)  Additional Factors: Non-reciprocal going up;Non-reciprocal going down; Increased time to complete;Hand placement cues  Skilled Clinical Factors: PT instructed pt on standing rest break at top of stairs d/t SOB. CGA for stability and VC for hand progression on rails. Curb  Curb Height: 6''  Device: Rolling walker  Number of Curbs: 1 (up/down)  Additional Factors: Set-up; Verbal cues  Assistance Level: Contact guard assist;Minimal assistance  Skilled Clinical Factors: PT provided visual demo of technique and VC for step by step sequencing. Pt req Ana for propulsion ascending and CGA for controlled descent. 2nd Session  Pt supine in bed upon PT arrival, agreeable to therapy. Pt donned NC with 1L O2 for activity. Pt performed supine>sit with HOB elevated using HR. Seated EOB, pt donned shoes without assist, SVN for safety with sitting balance.  Pt transferred bed>w/c with SBA for safety, not VC req for hand placement and no knee buckling. PT wheeled pt to gym for energy conservation. In gym, PT requested pt instruct PT on pt's method for transferring from mat<>floor as pt reported he previously did this to perform exercises at home and would like to return to doing this. PT educated pt on safety concerns with his usual method and provided demonstration for safer technique. Pt transferred w/c>mat with SBA, then PT provided step by step VC for sequencing of floor transfer. Pt performed with Ana for controlled descent. Vitals assessed following mat>floor transfer and SpO2 found to be >90%. PT provided VC for step by step sequencing of floor>mat transfer. Pt req ModA to elevate hips to mat d/t weakness. Pt reported this was challenging and he would practice more at home. PT educated pt on safety of practicing with therapy in order to make sure pt is safe and independent with transfer prior to attempting at home. Pt agreeable. PT wheeled pt to room d/t fatigue and pt transferred w/c>bed with SBA. Pt performed sit>supine Mod I using bedrails with HOB flat. Pt left in bed, bed alarm in place, call light and needs within reach. ASSESSMENT/PROGRESS TOWARDS GOALS       Assessment  Assessment: Pt tolerated decreased ambulation distances this session req more frequent seated rest breaks on 1L O2. Overall, SpO2 remained at least 88% with the exception of a drop to 86% following stair training that recovered to 90% in less than 1 minute with pursed lip breathing. BP and HR remained stable with activity. Pt is making progress but functioning below baseline and will benefit from continued skilled PT to maximize potential and facilitate safe d/c home.   Activity Tolerance: Patient tolerated treatment well;Patient limited by fatigue;Patient limited by endurance  Discharge Recommendations: Continue to assess pending progress;24 hour supervision or assist;Home with Home health PT  PT Equipment Recommendations  Other: Ongoing assessment    Goals  Patient Goals   Patient Goals : \"To get back to doing what I was doing\"  Short Term Goals  Time Frame for Short Term Goals: 7 days  Short Term Goal 1: Pt will perform bed mobility independently  Short Term Goal 2: Pt will perform all transfers excluding floor transfer with Ana  Short Term Goal 3: Pt will ambulate 10' on level surfaces with LRAD and CGA  Short Term Goal 4: Patient will navigate 4 stairs with bilateral HR and CGA  Long Term Goals  Time Frame for Long Term Goals : 14 days  Long Term Goal 1: Pt will perform all transfers excluding floor transfer Mod I with LRAD  Long Term Goal 2: Pt will ambulate at least 150' on level surfaces Mod I with LRAD  Long Term Goal 3: Patient will navigate 12 stairs with 1 HR Mod I    PLAN OF CARE/SAFETY  Physcial Therapy Plan  General Plan:  (5x/week for 90 minutes)  Current Treatment Recommendations: Strengthening;ROM;Balance training;Functional mobility training;Transfer training; Endurance training; Wheelchair mobility training;Gait training;Stair training;Neuromuscular re-education;Pain management;Home exercise program;Safety education & training;Patient/Caregiver education & training;Equipment evaluation, education, & procurement;Positioning; Therapeutic activities  Safety Devices  Type of Devices: Left in chair;Chair alarm in place;Call light within reach    EDUCATION  Education  Education Given To: Patient  Education Provided: Role of Therapy; Safety; Energy Conservation;Transfer Training;Mobility Training;DME/Home Modifications  Education Provided Comments: PT educated pt on importance of energy conservation and importance of home setup to allow for rest breaks. Examples include placing a chair on stair landing for seated break shelter and having chairs located on main level for seated breaks during longer bouts of ambulation. PT also instructed pt on training with 6\" curb step in response to pt's wife concern about accessing home.  Will continue to practice.   Education Method: Verbal  Barriers to Learning: None  Education Outcome: Verbalized understanding        Therapy Time   Individual Concurrent Group Co-treatment   Time In 1100         Time Out 1200         Minutes 61           Second Session Therapy Time:    Individual Concurrent Group Co-treatment   Time In 2860      Time Out 1415      Minutes 30         Timed Code Treatment Minutes:  60 + 30     Total Treatment Minutes:   [address] [facility] 10/14/22 at 5:07 PM

## 2022-10-14 NOTE — PROGRESS NOTES
Nephrology  Note                                                                                                                                                                                                                                                                                                                                                               Office : [phone]     Fax :[phone]              Patient's Name: Carlos Alberto Hdz MD      Reason for Consult:  Orthostatic   Requesting Physician:  Nasima Trammell MD      Chief Complaint:  Trauma      Feels better  PT going well   Dizziness better     Past Medical History:   Diagnosis Date    Anemia     Aortic valve disorders     stenosis    Arthritis     Aspiration pneumonia ([facility] 04/27/2015    Erectile dysfunction     Esophageal cancer ([facility] 10/18/2012    Hernia, femoral, bilateral 05/12/2014    History of blood transfusion     Hyperlipidemia     Osteoporosis, senile 05/20/2014    Pancreatitis 08/01/2013    Patient underweight 08/08/2013    Prostate cancer ([facility] [address] [address]     Unspecified essential hypertension        Past Surgical History:   Procedure Laterality Date    APPENDECTOMY      as a child    BONE GRAFT      for saddle nose    CARDIAC VALVE REPLACEMENT  01/01/2006    aorta    CHOLECYSTECTOMY      COLONOSCOPY  11/01/2009    COLONOSCOPY  10/05/2015    COLONOSCOPY N/A 02/17/2021    COLONOSCOPY DIAGNOSTIC/STOMA performed by Lou Avila MD at [place] [address]  02/18/2021    COLONOSCOPY POLYPECTOMY SNARE/COLD BIOPSY performed by Lou Avila MD at [address] [place] [address]  01/01/2006    DILATATION, ESOPHAGUS      2010    ESOPHAGUS SURGERY      EYE SURGERY  1990& 1992    cataract bilat removal     GASTRECTOMY      IR PERC CATH PLEURAL DRAIN W/IMAG  09/21/2022    IR PERC CATH PLEURAL DRAIN W/IMAG    PROSTATE SURGERY      seeds    TONSILLECTOMY      UPPER GASTROINTESTINAL ENDOSCOPY N/A 02/16/2021    EGD BIOPSY performed by Feliz Santos MD at [facility] N/A 04/22/2022    EGD CONTROL HEMORRHAGE performed by Marcos Bernal MD at [address] [facility] BRACHYTHERAPY         Family History   Problem Relation Age of Onset    Other Mother         bleeding peptic ulcer    Heart Disease Mother     High Blood Pressure Father     Stroke Father     Prostate Cancer Father     Other Father         cardiovascular disease    Elevated Lipids Father     Hypertension Father     Colon Cancer Paternal Cousin         reports that he has never smoked. He has never used smokeless tobacco. He reports current alcohol use. He reports that he does not use drugs.     Allergies:  No known allergies    Current Medications:    traMADol (ULTRAM) tablet 100 mg, Q6H PRN  gabapentin (NEURONTIN) capsule 100 mg, Nightly  warfarin (COUMADIN) tablet 1 mg, Once  [START ON 10/15/2022] ferrous sulfate (IRON 325) tablet 325 mg, Q48H  fludrocortisone (FLORINEF) tablet 0.1 mg, Once per day on Mon Wed Fri  albuterol (PROVENTIL) nebulizer solution 2.5 mg, TID  sodium chloride (Inhalant) 3 % nebulizer solution 4 mL, TID  warfarin placeholder: dosing by pharmacy, RX Placeholder  zinc oxide (TRIAD HYDROPHILIC) paste, BID  alteplase (CATHFLO) injection 1 mg, Daily PRN  atorvastatin (LIPITOR) tablet 10 mg, Nightly  guaiFENesin (MUCINEX) extended release tablet 600 mg, BID  melatonin disintegrating tablet 5 mg, Nightly  pantoprazole (PROTONIX) tablet 40 mg, QAM AC  acetaminophen (TYLENOL) tablet 650 mg, Q4H PRN  bisacodyl (DULCOLAX) EC tablet 5 mg, Daily  magnesium hydroxide (MILK OF MAGNESIA) 400 MG/5ML suspension 30 mL, Daily PRN  polyethylene glycol (GLYCOLAX) packet 17 g, Daily PRN  simethicone (MYLICON) chewable tablet 80 mg, Q6H PRN  glucose chewable tablet 16 g, PRN  dextrose bolus 10% 125 mL, PRN   Or  dextrose bolus 10% 250 mL, PRN  glucagon (rDNA) injection 1 mg, PRN  dextrose 10 % infusion, Continuous PRN      Review of Systems:   14 point ROS obtained but were negative except mentioned in HPI      Physical exam:     Vitals:  BP (!) 158/89   Pulse 83   Temp 97.4 °F (36.3 °C) (Axillary)   Resp 18   Ht 5' 3\" (1.6 m)   Wt 104 lb 8 oz (47.4 kg)   SpO2 93%   BMI 18.51 kg/m²   Constitutional:  AA  Skin: no rash, turgor wnl  Heent:  eomi, mmm  Neck: no bruits or jvd noted  Cardiovascular:  S1, S2 without m/r/g  Respiratory: CTA B without w/r/r  Abdomen:  +bs, soft, nt, nd  Ext: + lower extremity edema  Psychiatric: mood and affect appropriate  Musculoskeletal:  Rom, muscular strength intact    Data:   Labs:  CBC:   Recent Labs     10/12/22  0555 10/14/22  0616   WBC 6.6 6.2   HGB 7.5* 8.4*    241       BMP:    Recent Labs     10/12/22  0555 10/14/22  0616    142   K 4.6 4.8    105   CO2 33* 33*   BUN 36* 32*   CREATININE 1.1 1.1   GLUCOSE 111* 97       Ca/Mg/Phos:   Recent Labs     10/12/22  0555 10/14/22  0616   CALCIUM 8.4 8.3       Hepatic: No results for input(s): AST, ALT, ALB, BILITOT, ALKPHOS in the last 72 hours. Troponin: No results for input(s): TROPONINI in the last 72 hours. BNP: No results for input(s): BNP in the last 72 hours. Lipids: No results for input(s): CHOL, TRIG, HDL, LDLCALC, LABVLDL in the last 72 hours. ABGs: No results for input(s): PHART, PO2ART, ZVN4IRG in the last 72 hours. INR:   Recent Labs     10/12/22  0555 10/13/22  0651 10/14/22  0616   INR 1.92* 2.35* 2.65*       UA:No results for input(s): Altagracia Walworth, GLUCOSEU, BILIRUBINUR, Ronni Last, BLOODU, PHUR, PROTEINU, UROBILINOGEN, NITRU, LEUKOCYTESUR, Karron Belts in the last 72 hours. Urine Microscopic: No results for input(s): LABCAST, BACTERIA, COMU, HYALCAST, WBCUA, RBCUA, EPIU in the last 72 hours. Urine Culture: No results for input(s): LABURIN in the last 72 hours.   Urine Chemistry: No results for input(s): Stephan Rose, Jo Ann Galeana in the last 72 hours.            IMAGING:  XR CHEST PORTABLE   Final Result   1. Worsening left-sided airspace opacities, possibly asymmetric pulmonary edema or pneumonia. 2.  Left-sided pleural effusion. 3.  The previously described pneumothoraces are no longer visualized. The lucency projecting over the medial left lung apex is felt to represent air within the esophagus. Assessment/Plan   Orthostatic     2. Hyperkalemia     3. Anemia    4. Acid- base/ Electrolyte imbalance     5.  Debility     Plan   - cont florinef   - Monitor Orthostats   - Cortisol 8.6  - UPC - no proteinuria   - encourage solute intake    - Rehab                   Thank you for allowing us to participate in care of MD Lala Napoles MD  Feel free to contact me   [facility] [address]  Office : [phone]  Fax :[phone]

## 2022-10-14 NOTE — PLAN OF CARE
Problem: Discharge Planning  Goal: Discharge to home or other facility with appropriate resources  Outcome: Progressing  Flowsheets (Taken 10/14/2022 9050)  Discharge to home or other facility with appropriate resources: Identify barriers to discharge with patient and caregiver     Problem: Safety - Adult  Goal: Free from fall injury  Outcome: Progressing  Flowsheets (Taken 10/14/2022 0337)  Free From Fall Injury: Instruct family/caregiver on patient safety     Problem: ABCDS Injury Assessment  Goal: Absence of physical injury  Outcome: Progressing  Flowsheets (Taken 10/14/2022 0337)  Absence of Physical Injury: Implement safety measures based on patient assessment     Problem: Skin/Tissue Integrity  Goal: Absence of new skin breakdown  Description: 1.   Monitor for areas of redness and/or skin breakdown  Outcome: Progressing     Problem: Nutrition Deficit:  Goal: Optimize nutritional status  Flowsheets  Taken 10/14/2022 0337 by Sergo Link RN  Nutrient intake appropriate for improving, restoring, or maintaining nutritional needs:   Assess nutritional status and recommend course of action   Monitor oral intake, labs, and treatment plans   Recommend appropriate diets, oral nutritional supplements, and vitamin/mineral supplements

## 2022-10-14 NOTE — PROGRESS NOTES
Patient is alert and oriented X4. Shift assessment done as charted. Patient denies any c/o pain. Trial 1L oxygen PNC. Does not tolerate upon exertion. OT put it back on 2L, therapist reports desat down to [de-identified]. Will keep on 1L while resting. Will continue to monitor. Safety measures in place.  Wife currently at bedside
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
## 2022-10-14 NOTE — PROGRESS NOTES
Pt in bed, eyes closed but easily aroused. Alert & oriented x 4. BP elevated, other VSS. Assessment completed. No complaints at this time. Nighttime medications given, pt tolerated. Pt refused the Melatonin. Reminded pt to call for assistance with any needs. Call light within reach. Safety measures in place.

## 2022-10-14 NOTE — PROGRESS NOTES
Department of Physical Medicine & Rehabilitation  Progress Note    Patient Identification:  Sterling Mahoney MD  [id number]  : 1935  Admit date: 10/6/2022    Chief Complaint: Debility    Subjective:   Doing well again today. Seen in his room with respiratory therapy. He did ask for a change to his pain medication. Will trial tramadol 100mg q6h. Also asking for Neurontin to be restarted at night. No other issues. ROS: No f/c, n/v, cp     Objective:  Patient Vitals for the past 24 hrs:   BP Temp Temp src Pulse Resp SpO2   10/14/22 0900 (!) 158/89 97.4 °F (36.3 °C) Axillary 83 18 93 %   10/14/22 0840 -- -- -- -- -- 94 %   10/13/22 2215 (!) 149/82 97.8 °F (36.6 °C) Oral 84 16 94 %   10/13/22 2019 -- -- -- -- -- 97 %       Const: Alert. No distress, pleasant. HEENT: Normocephalic, atraumatic. Normal sclera/conjunctiva. MMM. CV: Regular rate and rhythm. Resp: No respiratory distress. Lungs CTAB. Abd: Soft, nontender, nondistended, NABS+   Ext: No edema. Neuro: Alert, oriented, appropriately interactive. Psych: Cooperative, appropriate mood and affect    Laboratory data: Available via EMR.    Last 24 hour lab  Recent Results (from the past 24 hour(s))   Protime-INR    Collection Time: 10/14/22  6:16 AM   Result Value Ref Range    Protime 28.4 (H) 11.7 - 14.5 sec    INR 2.65 (H) 0.87 - 7.18   Basic Metabolic Panel w/ Reflex to MG    Collection Time: 10/14/22  6:16 AM   Result Value Ref Range    Sodium 142 136 - 145 mmol/L    Potassium reflex Magnesium 4.8 3.5 - 5.1 mmol/L    Chloride 105 99 - 110 mmol/L    CO2 33 (H) 21 - 32 mmol/L    Anion Gap 4 3 - 16    Glucose 97 70 - 99 mg/dL    BUN 32 (H) 7 - 20 mg/dL    Creatinine 1.1 0.8 - 1.3 mg/dL    GFR Non-African American >60 >60    GFR African American >60 >60    Calcium 8.3 8.3 - 10.6 mg/dL   CBC auto differential    Collection Time: 10/14/22  6:16 AM   Result Value Ref Range    WBC 6.2 4.0 - 11.0 K/uL    RBC 2.97 (L) 4.20 - 5.90 M/uL    Hemoglobin 8.4 (L) 13.5 - 17.5 g/dL    Hematocrit 27.3 (L) 40.5 - 52.5 %    MCV 92.1 80.0 - 100.0 fL    MCH 28.1 26.0 - 34.0 pg    MCHC 30.6 (L) 31.0 - 36.0 g/dL    RDW 22.4 (H) 12.4 - 15.4 %    Platelets 307 615 - 381 K/uL    MPV 8.9 5.0 - 10.5 fL    Neutrophils % 71.0 %    Lymphocytes % 17.0 %    Monocytes % 9.5 %    Eosinophils % 1.4 %    Basophils % 1.1 %    Neutrophils Absolute 4.4 1.7 - 7.7 K/uL    Lymphocytes Absolute 1.1 1.0 - 5.1 K/uL    Monocytes Absolute 0.6 0.0 - 1.3 K/uL    Eosinophils Absolute 0.1 0.0 - 0.6 K/uL    Basophils Absolute 0.1 0.0 - 0.2 K/uL       Therapy progress:  PT  Position Activity Restriction  Other position/activity restrictions: up w/ assist, ambulate pt, up as tolerated,  Objective     Sit to Stand: Dependent/Total, 2 Person Assistance  Stand to Sit: Dependent/Total, 2 Person Assistance  Bed to Chair: Dependent/Total, 2 Person Assistance (Performed via squat pivot (see below))     OT  PT Equipment Recommendations  Other: Ongoing assessment     Assessment        SLP          Body mass index is 18.51 kg/m².     Assessment and Plan:  # Right pneumothorax  # R 1,3-6 nondisplaced rib fractures  # Right lung contusion             - 9/19 - Right chest tube placed in the ED  - 9/23 - Right chest tube removed  - Pulmonary expansion  - tramadol pain control      # Left pneumothorax  # Left T1 and T3 posterior rib fractures  # Left lung contusion  - 9/21 - Left chest tube placed by IR  - 9/23 - chest tube to water seal  - Left CT removed 10/4     #LLL collapse, L mainstem bronchus occlusion  - 9/23 - s/p bronchoscopy by ICU with removal of large mucus plug  - 9/27 - CT chest demonstrating persistent total occlusion of the left central airway/left lung collapse with loculated L hydropneumothorax    - Aggressive respiratory care, hypertonic saline, mucomyst nebs BID  - Bronch with pulm 9/30 w/mucus plugging in the right and left tracheobronchial trees  - Bronch wash cultures with Ecoli resistant to cefazolin, on Ceftriaxone- 2 days left        # C7 TP Fracture  - 9/20 - CTA Head/Neck did not show evidence of BCVI   - No brace needed  - Activity as tolerated     #L shoulder ecchymoses  - L shoulder x-ray with no interosseous abnormalities      Medical Comorbidities  # Aortic Valve Replacement  - 9/23 - restarted home warfarin  - AM INR checks - prior goal 2-2.5  - check dosing with pharmacy     # Hyperlipidemia  - Continue atorvastatin 10 mg daily     #RUL pulmonary nodule  - 6mm RUL nodule noted on 9/22 CT chest  - Recommend 6-12 month interval CT scan for monitoring      #Stage 3 Coccygeal Ulcer  - 2/2 to rowing machine per patient now a stage 3 wound   - Recommending [facility]     #CIM  - prolonged hospital course   - PT/OT required      # orthostatic hypotension  - Likely associated with CIM  - nephrology consult      KIMBERLY  robaxin- restart gabapentin       Rehab Progress: Improving  Anticipated Dispo: home  Services/DME: [facility]: 10/20      Orly Ramirez D.O. M.P.H  PM&R  10/14/2022  9:12 AM

## 2022-10-15 LAB
INR BLD: 2.16 (ref 0.87–1.14)
PROTHROMBIN TIME: 24.1 SEC (ref 11.7–14.5)

## 2022-10-15 PROCEDURE — 6370000000 HC RX 637 (ALT 250 FOR IP): Performed by: PHYSICAL MEDICINE & REHABILITATION

## 2022-10-15 PROCEDURE — 36415 COLL VENOUS BLD VENIPUNCTURE: CPT

## 2022-10-15 PROCEDURE — 6360000002 HC RX W HCPCS: Performed by: PHYSICAL MEDICINE & REHABILITATION

## 2022-10-15 PROCEDURE — 94640 AIRWAY INHALATION TREATMENT: CPT

## 2022-10-15 PROCEDURE — 1280000000 HC REHAB R&B

## 2022-10-15 PROCEDURE — 94761 N-INVAS EAR/PLS OXIMETRY MLT: CPT

## 2022-10-15 PROCEDURE — 85610 PROTHROMBIN TIME: CPT

## 2022-10-15 PROCEDURE — 94669 MECHANICAL CHEST WALL OSCILL: CPT

## 2022-10-15 PROCEDURE — 99232 SBSQ HOSP IP/OBS MODERATE 35: CPT | Performed by: PHYSICAL MEDICINE & REHABILITATION

## 2022-10-15 PROCEDURE — 99232 SBSQ HOSP IP/OBS MODERATE 35: CPT | Performed by: INTERNAL MEDICINE

## 2022-10-15 PROCEDURE — 2700000000 HC OXYGEN THERAPY PER DAY

## 2022-10-15 PROCEDURE — 2580000003 HC RX 258: Performed by: PHYSICAL MEDICINE & REHABILITATION

## 2022-10-15 RX ORDER — WARFARIN SODIUM 2.5 MG/1
2.5 TABLET ORAL DAILY
Status: DISCONTINUED | OUTPATIENT
Start: 2022-10-15 | End: 2022-10-22 | Stop reason: HOSPADM

## 2022-10-15 RX ADMIN — ALBUTEROL SULFATE 2.5 MG: 2.5 SOLUTION RESPIRATORY (INHALATION) at 20:11

## 2022-10-15 RX ADMIN — IRON SUCROSE 300 MG: 20 INJECTION, SOLUTION INTRAVENOUS at 19:58

## 2022-10-15 RX ADMIN — GABAPENTIN 100 MG: 100 CAPSULE ORAL at 19:37

## 2022-10-15 RX ADMIN — ATORVASTATIN CALCIUM 10 MG: 10 TABLET, FILM COATED ORAL at 19:37

## 2022-10-15 RX ADMIN — SODIUM CHLORIDE 30 MG/ML INHALATION SOLUTION 4 ML: 30 SOLUTION INHALANT at 15:36

## 2022-10-15 RX ADMIN — Medication 5 MG: at 19:37

## 2022-10-15 RX ADMIN — Medication: at 08:21

## 2022-10-15 RX ADMIN — WARFARIN SODIUM 2.5 MG: 2.5 TABLET ORAL at 18:29

## 2022-10-15 RX ADMIN — Medication: at 19:38

## 2022-10-15 RX ADMIN — SODIUM CHLORIDE 30 MG/ML INHALATION SOLUTION 4 ML: 30 SOLUTION INHALANT at 08:32

## 2022-10-15 RX ADMIN — GUAIFENESIN 600 MG: 600 TABLET, EXTENDED RELEASE ORAL at 08:21

## 2022-10-15 RX ADMIN — GUAIFENESIN 600 MG: 600 TABLET, EXTENDED RELEASE ORAL at 19:37

## 2022-10-15 RX ADMIN — FERROUS SULFATE TAB 325 MG (65 MG ELEMENTAL FE) 325 MG: 325 (65 FE) TAB at 08:20

## 2022-10-15 RX ADMIN — ALBUTEROL SULFATE 2.5 MG: 2.5 SOLUTION RESPIRATORY (INHALATION) at 15:36

## 2022-10-15 RX ADMIN — PANTOPRAZOLE SODIUM 40 MG: 40 TABLET, DELAYED RELEASE ORAL at 05:52

## 2022-10-15 RX ADMIN — ALBUTEROL SULFATE 2.5 MG: 2.5 SOLUTION RESPIRATORY (INHALATION) at 08:32

## 2022-10-15 RX ADMIN — SODIUM CHLORIDE 30 MG/ML INHALATION SOLUTION 4 ML: 30 SOLUTION INHALANT at 20:12

## 2022-10-15 RX ADMIN — BISACODYL 5 MG: 5 TABLET, COATED ORAL at 08:20

## 2022-10-15 NOTE — PROGRESS NOTES
Nephrology  Note                                                                                                                                                                                                                                                                                                                                                               Office : [phone]     Fax :[phone]              Patient's Name: Td Damon MD      Reason for Consult:  Orthostatic   Requesting Physician:  Angela Ramírez MD      Chief Complaint:  Trauma      Feels better  PT going well   Dizziness better     Past Medical History:   Diagnosis Date    Anemia     Aortic valve disorders     stenosis    Arthritis     Aspiration pneumonia ([facility] 04/27/2015    Erectile dysfunction     Esophageal cancer ([facility] 10/18/2012    Hernia, femoral, bilateral 05/12/2014    History of blood transfusion     Hyperlipidemia     Osteoporosis, senile 05/20/2014    Pancreatitis 08/01/2013    Patient underweight 08/08/2013    Prostate cancer ([facility]     Unspecified essential hypertension        Past Surgical History:   Procedure Laterality Date    APPENDECTOMY      as a child    BONE GRAFT      for saddle nose    CARDIAC VALVE REPLACEMENT  01/01/2006    aorta    CHOLECYSTECTOMY      COLONOSCOPY  11/01/2009    COLONOSCOPY  10/05/2015    COLONOSCOPY N/A 02/17/2021    COLONOSCOPY DIAGNOSTIC/STOMA performed by Tish Nguyen MD at [place] [address]  02/18/2021    COLONOSCOPY POLYPECTOMY SNARE/COLD BIOPSY performed by Tish Nguyen MD at [address] [place] [address]  01/01/2006    DILATATION, ESOPHAGUS      2010    ESOPHAGUS SURGERY      EYE SURGERY  1990& 1992    cataract bilat removal     GASTRECTOMY      IR PERC CATH PLEURAL DRAIN W/IMAG  09/21/2022    IR PERC CATH PLEURAL DRAIN W/IMAG    PROSTATE SURGERY      seeds    TONSILLECTOMY      UPPER GASTROINTESTINAL ENDOSCOPY N/A 02/16/2021    EGD BIOPSY performed by Kvng Daugherty MD at [address] N/A 04/22/2022    EGD CONTROL HEMORRHAGE performed by Cash Haynes MD at [address]  BRACHYTHERAPY         Family History   Problem Relation Age of Onset    Other Mother         bleeding peptic ulcer    Heart Disease Mother     High Blood Pressure Father     Stroke Father     Prostate Cancer Father     Other Father         cardiovascular disease    Elevated Lipids Father     Hypertension Father     Colon Cancer Paternal Cousin         reports that he has never smoked. He has never used smokeless tobacco. He reports current alcohol use. He reports that he does not use drugs.     Allergies:  No known allergies    Current Medications:    warfarin (COUMADIN) tablet 2.5 mg, Daily  iron sucrose (VENOFER) 300 mg in sodium chloride 0.9 % 250 mL IVPB, Once  traMADol (ULTRAM) tablet 100 mg, Q6H PRN  gabapentin (NEURONTIN) capsule 100 mg, Nightly  ferrous sulfate (IRON 325) tablet 325 mg, Q48H  fludrocortisone (FLORINEF) tablet 0.1 mg, Once per day on Mon Wed Fri  albuterol (PROVENTIL) nebulizer solution 2.5 mg, TID  sodium chloride (Inhalant) 3 % nebulizer solution 4 mL, TID  zinc oxide (TRIAD HYDROPHILIC) paste, BID  alteplase (CATHFLO) injection 1 mg, Daily PRN  atorvastatin (LIPITOR) tablet 10 mg, Nightly  guaiFENesin (MUCINEX) extended release tablet 600 mg, BID  melatonin disintegrating tablet 5 mg, Nightly  pantoprazole (PROTONIX) tablet 40 mg, QAM AC  acetaminophen (TYLENOL) tablet 650 mg, Q4H PRN  bisacodyl (DULCOLAX) EC tablet 5 mg, Daily  magnesium hydroxide (MILK OF MAGNESIA) 400 MG/5ML suspension 30 mL, Daily PRN  polyethylene glycol (GLYCOLAX) packet 17 g, Daily PRN  simethicone (MYLICON) chewable tablet 80 mg, Q6H PRN  glucose chewable tablet 16 g, PRN  dextrose bolus 10% 125 mL, PRN   Or  dextrose bolus 10% 250 mL, PRN  glucagon (rDNA) injection 1 mg, PRN  dextrose 10 % infusion, Continuous PRN      Review of Systems:   14 point ROS obtained but were negative except mentioned in HPI      Physical exam:     Vitals:  /73   Pulse 76   Temp 97.2 °F (36.2 °C) (Oral)   Resp 18   Ht 5' 3\" (1.6 m)   Wt 104 lb 8 oz (47.4 kg)   SpO2 95%   BMI 18.51 kg/m²   Constitutional:  AA  Skin: no rash, turgor wnl  Heent:  eomi, mmm  Neck: no bruits or jvd noted  Cardiovascular:  S1, S2 without m/r/g  Respiratory: CTA B without w/r/r  Abdomen:  +bs, soft, nt, nd  Ext: + lower extremity edema  Psychiatric: mood and affect appropriate  Musculoskeletal:  Rom, muscular strength intact    Data:   Labs:  CBC:   Recent Labs     10/14/22  0616   WBC 6.2   HGB 8.4*          BMP:    Recent Labs     10/14/22  0616      K 4.8      CO2 33*   BUN 32*   CREATININE 1.1   GLUCOSE 97       Ca/Mg/Phos:   Recent Labs     10/14/22  0616   CALCIUM 8.3       Hepatic: No results for input(s): AST, ALT, ALB, BILITOT, ALKPHOS in the last 72 hours. Troponin: No results for input(s): TROPONINI in the last 72 hours. BNP: No results for input(s): BNP in the last 72 hours. Lipids: No results for input(s): CHOL, TRIG, HDL, LDLCALC, LABVLDL in the last 72 hours. ABGs: No results for input(s): PHART, PO2ART, KVV6ROJ in the last 72 hours. INR:   Recent Labs     10/13/22  0651 10/14/22  0616 10/15/22  0545   INR 2.35* 2.65* 2.16*       UA:No results for input(s): Sharad Beverley, GLUCOSEU, BILIRUBINUR, Myranda Slain, BLOODU, PHUR, PROTEINU, UROBILINOGEN, NITRU, LEUKOCYTESUR, Shekhar Guild in the last 72 hours. Urine Microscopic: No results for input(s): LABCAST, BACTERIA, COMU, HYALCAST, WBCUA, RBCUA, EPIU in the last 72 hours. Urine Culture: No results for input(s): LABURIN in the last 72 hours. Urine Chemistry: No results for input(s): Kandi Golds, PROTEINUR, NAUR in the last 72 hours. IMAGING:  XR CHEST PORTABLE   Final Result   1.   Worsening left-sided airspace opacities, possibly asymmetric pulmonary edema or pneumonia. 2.  Left-sided pleural effusion. 3.  The previously described pneumothoraces are no longer visualized. The lucency projecting over the medial left lung apex is felt to represent air within the esophagus. Assessment/Plan   Orthostatic     2. Hyperkalemia     3. Anemia    4. Acid- base/ Electrolyte imbalance     5.  Debility     Plan   - cont florinef   - Monitor Orthostats   - Cortisol 8.6  - UPC - no proteinuria   - encourage solute intake    - Rehab                   Thank you for allowing us to participate in care of MD Adamaris Pastor MD  Feel free to contact me   [facility] [address]  Office : [phone]  Fax :[phone]

## 2022-10-15 NOTE — PLAN OF CARE
Problem: Skin/Tissue Integrity  Goal: Absence of new skin breakdown  Description: 1. Monitor for areas of redness and/or skin breakdown  2. Assess vascular access sites hourly  3. Every 4-6 hours minimum:  Change oxygen saturation probe site  4. Every 4-6 hours:  If on nasal continuous positive airway pressure, respiratory therapy assess nares and determine need for appliance change or resting period. Outcome: Progressing  Note: Pressure ulcer on coccyx from admission improving, educated on importance of repositioning often while in bed or chair to take pressure off     Problem: ABCDS Injury Assessment  Goal: Absence of physical injury  Outcome: Progressing  Flowsheets (Taken 10/14/2022 0337 by Arjun Salomon, RN)  Absence of Physical Injury: Implement safety measures based on patient assessment     Problem: Safety - Adult  Goal: Free from fall injury  Outcome: Progressing  Flowsheets (Taken 10/14/2022 0337 by Arjun Salomon RN)  Free From Fall Injury: Instruct family/caregiver on patient safety     Problem: Discharge Planning  Goal: Discharge to home or other facility with appropriate resources  Outcome: Progressing  Flowsheets (Taken 10/14/2022 2005 by Chino Rosenberg RN)  Discharge to home or other facility with appropriate resources: Identify barriers to discharge with patient and caregiver     Problem: Nutrition Deficit:  Goal: Optimize nutritional status  Outcome: Not Progressing  Flowsheets (Taken 10/14/2022 0337 by Arjun Salomon RN)  Nutrient intake appropriate for improving, restoring, or maintaining nutritional needs:   Assess nutritional status and recommend course of action   Monitor oral intake, labs, and treatment plans   Recommend appropriate diets, oral nutritional supplements, and vitamin/mineral supplements  Note: Patient eats very little, but will drink some boost supplement every day.  Wife encourages

## 2022-10-15 NOTE — PLAN OF CARE
Problem: Discharge Planning  Goal: Discharge to home or other facility with appropriate resources  Outcome: Progressing  Discharge to home or other facility with appropriate resources:   Identify barriers to discharge with patient and caregiver     Problem: Safety - Adult  Goal: Free from fall injury  Outcome: Progressing  Note: Patient has been free from falls     Problem: ABCDS Injury Assessment  Goal: Absence of physical injury  Outcome: Progressing  Absence of Physical Injury:   Implement safety measures based on patient assessment     Problem: Skin/Tissue Integrity  Goal: Absence of new skin breakdown  Description: 1. Monitor for areas of redness and/or skin breakdown  2. Assess vascular access sites hourly  3. Every 4-6 hours minimum:  Change oxygen saturation probe site  4. Every 4-6 hours:  If on nasal continuous positive airway pressure, respiratory therapy assess nares and determine need for appliance change or resting period.   Outcome: Progressing       Problem: Nutrition Deficit:  Goal: Optimize nutritional status  Outcome: Progressing  Nutrient intake appropriate for improving, restoring, or maintaining nutritional needs:   Assess nutritional status and recommend course of action   Monitor oral intake, labs, and treatment plans   Recommend appropriate diets, oral nutritional supplements, and vitamin/mineral supplements

## 2022-10-15 NOTE — PROGRESS NOTES
Patient alert and oriented X4 shift assessment done as charted. Patient educated and able to verbalize what medications were for. New IV one time order for iron, ordered about 1200. Received from pharmacy, but patient does not have IV access. ICU nurse and support nurse came to attempt IV since patient has needed ultrasound in past for IV placement. Both nurse's able to to get blood return, but upon advancing catheter and flushing, infiltration present. Will see if someone on night shift can attempt another IV placement. If not patient, and spouse have verbalized agreement to continue to take oral iron as ordered. Message sent to Dr. Cristiano Mckeon to notify of lack of success so far.

## 2022-10-15 NOTE — PROGRESS NOTES
Department of Physical Medicine & Rehabilitation  Progress Note    Patient Identification:  Brenda Beckham MD  [id number]  : 1935  Admit date: 10/6/2022    Chief Complaint: Debility    Subjective:   No new complaints today. Improving daily in therapy. He would like an iron infusion today to increase his hgb. States his HGB at home was >12. Resting well this morning in bed on exam. No issues overnight. ROS: No f/c, n/v, cp     Objective:  Patient Vitals for the past 24 hrs:   BP Temp Temp src Pulse Resp SpO2   10/15/22 0837 -- -- -- 76 18 96 %   10/15/22 0833 -- -- -- 76 18 (!) 86 %   10/15/22 0756 119/73 97.2 °F (36.2 °C) Oral 74 20 95 %   10/15/22 0753 -- -- -- -- -- (!) 85 %   10/15/22 0750 -- -- -- -- -- 96 %   10/14/22 2032 -- -- -- -- -- 92 %   10/14/22 2000 (!) 156/88 98 °F (36.7 °C) Oral 88 18 92 %   10/14/22 1630 -- -- -- -- -- 91 %       Const: Alert. No distress, pleasant. HEENT: Normocephalic, atraumatic. Normal sclera/conjunctiva. MMM. CV: Regular rate and rhythm. Resp: No respiratory distress. Lungs CTAB. Abd: Soft, nontender, nondistended, NABS+   Ext: No edema. Neuro: Alert, oriented, appropriately interactive. Psych: Cooperative, appropriate mood and affect    Laboratory data: Available via EMR.    Last 24 hour lab  Recent Results (from the past 24 hour(s))   Protime-INR    Collection Time: 10/15/22  5:45 AM   Result Value Ref Range    Protime 24.1 (H) 11.7 - 14.5 sec    INR 2.16 (H) 0.87 - 1.14       Therapy progress:  PT  Position Activity Restriction  Other position/activity restrictions: up w/ assist, ambulate pt, up as tolerated,  Objective     Sit to Stand: Dependent/Total, 2 Person Assistance  Stand to Sit: Dependent/Total, 2 Person Assistance  Bed to Chair: Dependent/Total, 2 Person Assistance (Performed via squat pivot (see below))     OT  PT Equipment Recommendations  Other: Ongoing assessment     Assessment        SLP          Body mass index is 18.51 kg/m².     Assessment and Plan:  # Right pneumothorax  # R 1,3-6 nondisplaced rib fractures  # Right lung contusion             - 9/19 - Right chest tube placed in the ED  - 9/23 - Right chest tube removed  - Pulmonary expansion  - tramadol pain control      # Left pneumothorax  # Left T1 and T3 posterior rib fractures  # Left lung contusion  - 9/21 - Left chest tube placed by IR  - 9/23 - chest tube to water seal  - Left CT removed 10/4     #LLL collapse, L mainstem bronchus occlusion  - 9/23 - s/p bronchoscopy by ICU with removal of large mucus plug  - 9/27 - CT chest demonstrating persistent total occlusion of the left central airway/left lung collapse with loculated L hydropneumothorax    - Aggressive respiratory care, hypertonic saline, mucomyst nebs BID  - Bronch with pulm 9/30 w/mucus plugging in the right and left tracheobronchial trees  - Bronch wash cultures with Ecoli resistant to cefazolin, on Ceftriaxone- 2 days left        # C7 TP Fracture  - 9/20 - CTA Head/Neck did not show evidence of BCVI   - No brace needed  - Activity as tolerated     #L shoulder ecchymoses  - L shoulder x-ray with no interosseous abnormalities      Medical Comorbidities  # Aortic Valve Replacement  - 9/23 - restarted home warfarin  - AM INR checks - prior goal 2-2.5  - check dosing with pharmacy     # Hyperlipidemia  - Continue atorvastatin 10 mg daily     #RUL pulmonary nodule  - 6mm RUL nodule noted on 9/22 CT chest  - Recommend 6-12 month interval CT scan for monitoring      #Stage 3 Coccygeal Ulcer  - 2/2 to rowing machine per patient now a stage 3 wound   - Recommending [facility]     #CIM  - prolonged hospital course   - PT/OT required      # orthostatic hypotension  - Likely associated with CIM  - nephrology consult      DC  robaxin- restart gabapentin       Iron infusion once today      Rehab Progress: Improving  Anticipated Dispo: home  Services/DME: [facility] [place]  ELOS: 10/20      Orly Ramirez D.O. M.P.H  PM&R  10/15/2022  11:41 AM
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
## 2022-10-15 NOTE — PROGRESS NOTES
Shift assessment complete. Patient A/Ox4, No C/O pain or discomfort noted so far this shift. Patient continues with 1L oxygen NC while resting and 2L oxygen NC while ambulating. Safety measures in place. Call light and over bed table within reach. Hourly rounding and visual checks in place. Will continue to monitor.

## 2022-10-16 LAB
INR BLD: 1.99 (ref 0.87–1.14)
PROTHROMBIN TIME: 22.6 SEC (ref 11.7–14.5)

## 2022-10-16 PROCEDURE — 6360000002 HC RX W HCPCS: Performed by: PHYSICAL MEDICINE & REHABILITATION

## 2022-10-16 PROCEDURE — 99232 SBSQ HOSP IP/OBS MODERATE 35: CPT | Performed by: PHYSICAL MEDICINE & REHABILITATION

## 2022-10-16 PROCEDURE — 94669 MECHANICAL CHEST WALL OSCILL: CPT

## 2022-10-16 PROCEDURE — 6370000000 HC RX 637 (ALT 250 FOR IP): Performed by: PHYSICAL MEDICINE & REHABILITATION

## 2022-10-16 PROCEDURE — 36415 COLL VENOUS BLD VENIPUNCTURE: CPT

## 2022-10-16 PROCEDURE — 99232 SBSQ HOSP IP/OBS MODERATE 35: CPT | Performed by: INTERNAL MEDICINE

## 2022-10-16 PROCEDURE — 94640 AIRWAY INHALATION TREATMENT: CPT

## 2022-10-16 PROCEDURE — 94761 N-INVAS EAR/PLS OXIMETRY MLT: CPT

## 2022-10-16 PROCEDURE — 2700000000 HC OXYGEN THERAPY PER DAY

## 2022-10-16 PROCEDURE — 85610 PROTHROMBIN TIME: CPT

## 2022-10-16 PROCEDURE — 1280000000 HC REHAB R&B

## 2022-10-16 RX ADMIN — GUAIFENESIN 600 MG: 600 TABLET, EXTENDED RELEASE ORAL at 20:58

## 2022-10-16 RX ADMIN — SODIUM CHLORIDE 30 MG/ML INHALATION SOLUTION 4 ML: 30 SOLUTION INHALANT at 20:03

## 2022-10-16 RX ADMIN — ALBUTEROL SULFATE 2.5 MG: 2.5 SOLUTION RESPIRATORY (INHALATION) at 20:03

## 2022-10-16 RX ADMIN — ACETAMINOPHEN 650 MG: 325 TABLET, FILM COATED ORAL at 09:10

## 2022-10-16 RX ADMIN — Medication: at 20:58

## 2022-10-16 RX ADMIN — Medication: at 12:00

## 2022-10-16 RX ADMIN — SODIUM CHLORIDE 30 MG/ML INHALATION SOLUTION 4 ML: 30 SOLUTION INHALANT at 14:53

## 2022-10-16 RX ADMIN — ALBUTEROL SULFATE 2.5 MG: 2.5 SOLUTION RESPIRATORY (INHALATION) at 08:00

## 2022-10-16 RX ADMIN — WARFARIN SODIUM 2.5 MG: 2.5 TABLET ORAL at 17:23

## 2022-10-16 RX ADMIN — BISACODYL 5 MG: 5 TABLET, COATED ORAL at 09:10

## 2022-10-16 RX ADMIN — SODIUM CHLORIDE 30 MG/ML INHALATION SOLUTION 4 ML: 30 SOLUTION INHALANT at 08:00

## 2022-10-16 RX ADMIN — ATORVASTATIN CALCIUM 10 MG: 10 TABLET, FILM COATED ORAL at 20:58

## 2022-10-16 RX ADMIN — PANTOPRAZOLE SODIUM 40 MG: 40 TABLET, DELAYED RELEASE ORAL at 06:00

## 2022-10-16 RX ADMIN — GUAIFENESIN 600 MG: 600 TABLET, EXTENDED RELEASE ORAL at 09:10

## 2022-10-16 RX ADMIN — Medication 5 MG: at 20:58

## 2022-10-16 RX ADMIN — GABAPENTIN 100 MG: 100 CAPSULE ORAL at 20:58

## 2022-10-16 RX ADMIN — ALBUTEROL SULFATE 2.5 MG: 2.5 SOLUTION RESPIRATORY (INHALATION) at 14:52

## 2022-10-16 NOTE — PROGRESS NOTES
Patients Peripheral IV to Left anterior upper cephalic arm was removed due to patient requested to have it removed due it was causing discomfort. No bruising or swelling or redness around IV site noted. Patients arm was cool to touch. Patients arm was propped on pillow for comfort. Will continue to monitor.

## 2022-10-16 NOTE — PROGRESS NOTES
Shift assessment complete. Peripheral IV placed in Left upper cephalic arm with no difficulties with x1 attempt, patient tolerated well, One time order of IV Iron infusion infusing at this time. Patient A/Ox4, No C/O pain or discomfort noted so far this shift. Patient continues with 1L oxygen NC while resting and 2L oxygen NC while ambulating and transferring. Safety measures in place. Call light and over bed table within reach. Hourly rounding and visual checks in place. Will continue to monitor.
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
## 2022-10-16 NOTE — PROGRESS NOTES
Department of Physical Medicine & Rehabilitation  Progress Note    Patient Identification:  Oleg Salinas MD  [id number]  : 1935  Admit date: 10/6/2022    Chief Complaint: Debility    Subjective:   Iron infusion infiltrated yesterday. He is just going to wait to see what his Hgb is tomorrow. No new complaints overnight. Eating well. Plan to continue therapy this week. Seen in his bed today eating breakfast.     ROS: No f/c, n/v, cp     Objective:  Patient Vitals for the past 24 hrs:   BP Temp Temp src Pulse Resp SpO2   10/16/22 0910 (!) 162/85 98 °F (36.7 °C) Oral 89 18 --   10/16/22 0803 -- -- -- 87 18 93 %   10/15/22 1930 127/82 98 °F (36.7 °C) Oral 83 18 92 %   10/15/22 1536 -- -- -- 78 18 92 %   10/15/22 1234 -- -- -- -- -- 95 %   10/15/22 1230 -- -- -- -- -- (!) 78 %       Const: Alert. No distress, pleasant. HEENT: Normocephalic, atraumatic. Normal sclera/conjunctiva. MMM. CV: Regular rate and rhythm. Resp: No respiratory distress. Lungs CTAB. Abd: Soft, nontender, nondistended, NABS+   Ext: No edema. Neuro: Alert, oriented, appropriately interactive. Psych: Cooperative, appropriate mood and affect    Laboratory data: Available via EMR. Last 24 hour lab  Recent Results (from the past 24 hour(s))   Protime-INR    Collection Time: 10/16/22  6:34 AM   Result Value Ref Range    Protime 22.6 (H) 11.7 - 14.5 sec    INR 1.99 (H) 0.87 - 1.14       Therapy progress:  PT  Position Activity Restriction  Other position/activity restrictions: up w/ assist, ambulate pt, up as tolerated,  Objective     Sit to Stand: Dependent/Total, 2 Person Assistance  Stand to Sit: Dependent/Total, 2 Person Assistance  Bed to Chair: Dependent/Total, 2 Person Assistance (Performed via squat pivot (see below))     OT  PT Equipment Recommendations  Other: Ongoing assessment     Assessment        SLP          Body mass index is 18.51 kg/m².     Assessment and Plan:  # Right pneumothorax  # R 1,3-6 nondisplaced rib fractures  # Right lung contusion             - 9/19 - Right chest tube placed in the ED  - 9/23 - Right chest tube removed  - Pulmonary expansion  - tramadol pain control      # Left pneumothorax  # Left T1 and T3 posterior rib fractures  # Left lung contusion  - 9/21 - Left chest tube placed by IR  - 9/23 - chest tube to water seal  - Left CT removed 10/4     #LLL collapse, L mainstem bronchus occlusion  - 9/23 - s/p bronchoscopy by ICU with removal of large mucus plug  - 9/27 - CT chest demonstrating persistent total occlusion of the left central airway/left lung collapse with loculated L hydropneumothorax    - Aggressive respiratory care, hypertonic saline, mucomyst nebs BID  - Bronch with pulm 9/30 w/mucus plugging in the right and left tracheobronchial trees  - Bronch wash cultures with Ecoli resistant to cefazolin, on Ceftriaxone- 2 days left        # C7 TP Fracture  - 9/20 - CTA Head/Neck did not show evidence of BCVI   - No brace needed  - Activity as tolerated     #L shoulder ecchymoses  - L shoulder x-ray with no interosseous abnormalities      Medical Comorbidities  # Aortic Valve Replacement  - 9/23 - restarted home warfarin  - AM INR checks - prior goal 2-2.5  - check dosing with pharmacy     # Hyperlipidemia  - Continue atorvastatin 10 mg daily     #RUL pulmonary nodule  - 6mm RUL nodule noted on 9/22 CT chest  - Recommend 6-12 month interval CT scan for monitoring      #Stage 3 Coccygeal Ulcer  - 2/2 to rowing machine per patient now a stage 3 wound   - Recommending [facility]     #CIM  - prolonged hospital course   - PT/OT required      # orthostatic hypotension  - Likely associated with CIM  - nephrology consult      DC  robaxin-     restart gabapentin - 100mg qhs             Rehab Progress: Improving  Anticipated Dispo: home  Services/DME: [place]  ELOS: 10/20      Jolene Batres D.O. M.P.H  PM&R  10/16/2022  10:30 AM

## 2022-10-16 NOTE — RT PROTOCOL NOTE
RT Nebulizer Bronchodilator Protocol Note    There is a bronchodilator order in the chart from a provider indicating to follow the RT Bronchodilator Protocol and there is an Initiate RT Bronchodilator Protocol order as well (see protocol at bottom of note). CXR Findings:  No results found. The findings from the last RT Protocol Assessment were as follows:  Smoking: None or smoker <15 pack years  Respiratory Pattern: Regular pattern and RR 12-20 bpm  Breath Sounds: Slightly diminished and/or crackles  Cough: Strong, spontaneous, non-productive  Indication for Bronchodilator Therapy: Decreased or absent breath sounds  Bronchodilator Assessment Score: 2    Aerosolized bronchodilator medication orders have been revised according to the RT Nebulizer Bronchodilator Protocol below. Respiratory Therapist to perform RT Therapy Protocol Assessment initially then follow the protocol. Repeat RT Therapy Protocol Assessment PRN for score 0-3 or on second treatment, BID, and PRN for scores above 3. No Indications - adjust the frequency to every 6 hours PRN wheezing or bronchospasm, if no treatments needed after 48 hours then discontinue using Per Protocol order mode. If indication present, adjust the RT bronchodilator orders based on the Bronchodilator Assessment Score as indicated below. If a patient is on this medication at home then do not decrease Frequency below that used at home. 0-3 - enter or revise RT bronchodilator order(s) to equivalent RT Bronchodilator order with Frequency of every 4 hours PRN for wheezing or increased work of breathing using Per Protocol order mode. 4-6 - enter or revise RT Bronchodilator order(s) to two equivalent RT bronchodilator orders with one order with BID Frequency and one order with Frequency of every 4 hours PRN wheezing or increased work of breathing using Per Protocol order mode.          7-10 - enter or revise RT Bronchodilator order(s) to two equivalent RT bronchodilator orders with one order with TID Frequency and one order with Frequency of every 4 hours PRN wheezing or increased work of breathing using Per Protocol order mode. 11-13 - enter or revise RT Bronchodilator order(s) to one equivalent RT bronchodilator order with QID Frequency and an Albuterol order with Frequency of every 4 hours PRN wheezing or increased work of breathing using Per Protocol order mode. Greater than 13 - enter or revise RT Bronchodilator order(s) to one equivalent RT bronchodilator order with every 4 hours Frequency and an Albuterol order with Frequency of every 2 hours PRN wheezing or increased work of breathing using Per Protocol order mode. RT to enter RT Home Evaluation for COPD & MDI Assessment order using Per Protocol order mode.     Electronically signed by Warden Julienne RCP on 10/16/2022 at 8:11 AM

## 2022-10-16 NOTE — PROGRESS NOTES
10/16/22 0810   RT Protocol   History Pulmonary Disease 0   Respiratory pattern 0   Breath sounds 2   Cough 0   Bronchodilator Assessment Score 2     Pt scores out to be PRN, but likes us coming in TID.  Pt will remain on TID schedule

## 2022-10-16 NOTE — PLAN OF CARE
Problem: Discharge Planning  Goal: Discharge to home or other facility with appropriate resources  Outcome: Progressing  Discharge to home or other facility with appropriate resources: Identify barriers to discharge with patient and caregiver     Problem: Safety - Adult  Goal: Free from fall injury       Outcome: Progressing  Free From Fall Injury: Instruct family/caregiver on patient safety     Problem: ABCDS Injury Assessment  Goal: Absence of physical injury  Outcome: Progressing  Absence of Physical Injury: Implement safety measures based on patient assessment     Problem: Skin/Tissue Integrity  Goal: Absence of new skin breakdown  Description: 1. Monitor for areas of redness and/or skin breakdown  2. Assess vascular access sites hourly  3. Every 4-6 hours minimum:  Change oxygen saturation probe site  4. Every 4-6 hours:  If on nasal continuous positive airway pressure, respiratory therapy assess nares and determine need for appliance change or resting period. Outcome: Progressing  Note: Pressure ulcer on coccyx from admission improving, educated on importance of repositioning often while in bed or chair to take pressure off     Problem: Nutrition Deficit:  Goal: Optimize nutritional status  Outcome: Progressing  Nutrient intake appropriate for improving, restoring, or maintaining nutritional needs:   Assess nutritional status and recommend course of action   Monitor oral intake, labs, and treatment plans   Recommend appropriate diets, oral nutritional supplements, and vitamin/mineral supplements  Note: Patient eats very little, but will drink some boost supplement every day.  Wife encourages     Problem: Nutrition Deficit:  Goal: Optimize nutritional status  Outcome: Progressing  Nutrient intake appropriate for improving, restoring, or maintaining nutritional needs:   Assess nutritional status and recommend course of action   Monitor oral intake, labs, and treatment plans   Recommend appropriate diets, oral nutritional supplements, and vitamin/mineral supplements  Note: Patient eats very little, but will drink some boost supplement every day.  Wife encourages

## 2022-10-16 NOTE — PROGRESS NOTES
Nephrology  Note                                                                                                                                                                                                                                                                                                                                                               Office : [phone]     Fax :[phone]              Patient's Name: Chaz Ryan MD      Reason for Consult:  Orthostatic   Requesting Physician:  West Espinoza MD      Chief Complaint:  Trauma      Feels better  PT going well   Dizziness better     Past Medical History:   Diagnosis Date    Anemia     Aortic valve disorders     stenosis    Arthritis     Aspiration pneumonia ([facility] [address] 04/27/2015    Erectile dysfunction     Esophageal cancer ([facility] [address] 10/18/2012    Hernia, femoral, bilateral 05/12/2014    History of blood transfusion     Hyperlipidemia     Osteoporosis, senile 05/20/2014    Pancreatitis 08/01/2013    Patient underweight 08/08/2013    Prostate cancer ([facility] [address]     Unspecified essential hypertension        Past Surgical History:   Procedure Laterality Date    APPENDECTOMY      as a child    BONE GRAFT      for saddle nose    CARDIAC VALVE REPLACEMENT  01/01/2006    aorta    CHOLECYSTECTOMY      COLONOSCOPY  11/01/2009    COLONOSCOPY  10/05/2015    COLONOSCOPY N/A 02/17/2021    COLONOSCOPY DIAGNOSTIC/STOMA performed by Sarah Sloan MD at [place] [address]  02/18/2021    COLONOSCOPY POLYPECTOMY SNARE/COLD BIOPSY performed by Sarah Sloan MD at [address] [place] [address]  01/01/2006    DILATATION, ESOPHAGUS      2010    ESOPHAGUS SURGERY      EYE SURGERY  1990& 1992    cataract bilat removal     GASTRECTOMY      IR PERC CATH PLEURAL DRAIN W/IMAG  09/21/2022    IR PERC CATH PLEURAL DRAIN W/IMAG    PROSTATE SURGERY      seeds    TONSILLECTOMY      UPPER GASTROINTESTINAL ENDOSCOPY N/A 02/16/2021    EGD BIOPSY performed by Denny Head MD at [address] N/A 04/22/2022    EGD CONTROL HEMORRHAGE performed by Lawrence Robert MD at [address]  BRACHYTHERAPY         Family History   Problem Relation Age of Onset    Other Mother         bleeding peptic ulcer    Heart Disease Mother     High Blood Pressure Father     Stroke Father     Prostate Cancer Father     Other Father         cardiovascular disease    Elevated Lipids Father     Hypertension Father     Colon Cancer Paternal Cousin         reports that he has never smoked. He has never used smokeless tobacco. He reports current alcohol use. He reports that he does not use drugs.     Allergies:  No known allergies    Current Medications:    warfarin (COUMADIN) tablet 2.5 mg, Daily  traMADol (ULTRAM) tablet 100 mg, Q6H PRN  gabapentin (NEURONTIN) capsule 100 mg, Nightly  ferrous sulfate (IRON 325) tablet 325 mg, Q48H  fludrocortisone (FLORINEF) tablet 0.1 mg, Once per day on Mon Wed Fri  albuterol (PROVENTIL) nebulizer solution 2.5 mg, TID  sodium chloride (Inhalant) 3 % nebulizer solution 4 mL, TID  zinc oxide (TRIAD HYDROPHILIC) paste, BID  alteplase (CATHFLO) injection 1 mg, Daily PRN  atorvastatin (LIPITOR) tablet 10 mg, Nightly  guaiFENesin (MUCINEX) extended release tablet 600 mg, BID  melatonin disintegrating tablet 5 mg, Nightly  pantoprazole (PROTONIX) tablet 40 mg, QAM AC  acetaminophen (TYLENOL) tablet 650 mg, Q4H PRN  bisacodyl (DULCOLAX) EC tablet 5 mg, Daily  magnesium hydroxide (MILK OF MAGNESIA) 400 MG/5ML suspension 30 mL, Daily PRN  polyethylene glycol (GLYCOLAX) packet 17 g, Daily PRN  simethicone (MYLICON) chewable tablet 80 mg, Q6H PRN  glucose chewable tablet 16 g, PRN  dextrose bolus 10% 125 mL, PRN   Or  dextrose bolus 10% 250 mL, PRN  glucagon (rDNA) injection 1 mg, PRN  dextrose 10 % infusion, Continuous PRN      Review of Systems:   14 point ROS obtained but were negative except mentioned in HPI      Physical exam:     Vitals:  BP (!) 162/85   Pulse 89   Temp 98 °F (36.7 °C) (Oral)   Resp 18   Ht 5' 3\" (1.6 m)   Wt 104 lb 8 oz (47.4 kg)   SpO2 93%   BMI 18.51 kg/m²   Constitutional:  AA  Skin: no rash, turgor wnl  Heent:  eomi, mmm  Neck: no bruits or jvd noted  Cardiovascular:  S1, S2 without m/r/g  Respiratory: CTA B without w/r/r  Abdomen:  +bs, soft, nt, nd  Ext: + lower extremity edema  Psychiatric: mood and affect appropriate  Musculoskeletal:  Rom, muscular strength intact    Data:   Labs:  CBC:   Recent Labs     10/14/22  0616   WBC 6.2   HGB 8.4*          BMP:    Recent Labs     10/14/22  0616      K 4.8      CO2 33*   BUN 32*   CREATININE 1.1   GLUCOSE 97       Ca/Mg/Phos:   Recent Labs     10/14/22  0616   CALCIUM 8.3       Hepatic: No results for input(s): AST, ALT, ALB, BILITOT, ALKPHOS in the last 72 hours. Troponin: No results for input(s): TROPONINI in the last 72 hours. BNP: No results for input(s): BNP in the last 72 hours. Lipids: No results for input(s): CHOL, TRIG, HDL, LDLCALC, LABVLDL in the last 72 hours. ABGs: No results for input(s): PHART, PO2ART, [id number] in the last 72 hours. INR:   Recent Labs     10/14/22  0616 10/15/22  0545 10/16/22  0634   INR 2.65* 2.16* 1.99*       UA:No results for input(s): Deidra Lone, GLUCOSEU, BILIRUBINUR, Julious Ates, BLOODU, PHUR, PROTEINU, UROBILINOGEN, NITRU, LEUKOCYTESUR, Jinnie Paling in the last 72 hours. Urine Microscopic: No results for input(s): LABCAST, BACTERIA, COMU, HYALCAST, WBCUA, RBCUA, EPIU in the last 72 hours. Urine Culture: No results for input(s): LABURIN in the last 72 hours. Urine Chemistry: No results for input(s): Randalyn Co, PROTEINUR, NAUR in the last 72 hours. IMAGING:  XR CHEST PORTABLE   Final Result   1. Worsening left-sided airspace opacities, possibly asymmetric pulmonary edema or pneumonia.    2.  Left-sided pleural effusion. 3.  The previously described pneumothoraces are no longer visualized. The lucency projecting over the medial left lung apex is felt to represent air within the esophagus. Assessment/Plan   Orthostatic     2. Hyperkalemia     3. Anemia    4. Acid- base/ Electrolyte imbalance     5.  Debility     Plan   - cont florinef   - Monitor Orthostats   - Cortisol 8.6  - UPC - no proteinuria   - encourage solute intake    - Rehab                   Thank you for allowing us to participate in care of MD Mary Ghotra MD  Feel free to contact me   [facility] [address]  Office : [phone]  Fax :[phone]

## 2022-10-17 LAB
ANION GAP SERPL CALCULATED.3IONS-SCNC: 7 MMOL/L (ref 3–16)
BASOPHILS ABSOLUTE: 0 K/UL (ref 0–0.2)
BASOPHILS RELATIVE PERCENT: 0.4 %
BUN BLDV-MCNC: 34 MG/DL (ref 7–20)
CALCIUM SERPL-MCNC: 8.5 MG/DL (ref 8.3–10.6)
CHLORIDE BLD-SCNC: 101 MMOL/L (ref 99–110)
CO2: 31 MMOL/L (ref 21–32)
CREAT SERPL-MCNC: 1.1 MG/DL (ref 0.8–1.3)
EOSINOPHILS ABSOLUTE: 0.1 K/UL (ref 0–0.6)
EOSINOPHILS RELATIVE PERCENT: 0.6 %
GFR AFRICAN AMERICAN: >60
GFR NON-AFRICAN AMERICAN: >60
GLUCOSE BLD-MCNC: 225 MG/DL (ref 70–99)
HCT VFR BLD CALC: 28.5 % (ref 40.5–52.5)
HEMOGLOBIN: 8.8 G/DL (ref 13.5–17.5)
INR BLD: 2.09 (ref 0.87–1.14)
IRON SATURATION: 19 % (ref 20–50)
IRON: 45 UG/DL (ref 59–158)
LYMPHOCYTES ABSOLUTE: 0.7 K/UL (ref 1–5.1)
LYMPHOCYTES RELATIVE PERCENT: 8.8 %
MCH RBC QN AUTO: 28.6 PG (ref 26–34)
MCHC RBC AUTO-ENTMCNC: 30.8 G/DL (ref 31–36)
MCV RBC AUTO: 92.7 FL (ref 80–100)
MONOCYTES ABSOLUTE: 0.4 K/UL (ref 0–1.3)
MONOCYTES RELATIVE PERCENT: 5.3 %
NEUTROPHILS ABSOLUTE: 7 K/UL (ref 1.7–7.7)
NEUTROPHILS RELATIVE PERCENT: 84.9 %
PDW BLD-RTO: 22.5 % (ref 12.4–15.4)
PLATELET # BLD: 206 K/UL (ref 135–450)
PMV BLD AUTO: 9.7 FL (ref 5–10.5)
POTASSIUM REFLEX MAGNESIUM: 4.3 MMOL/L (ref 3.5–5.1)
PROTHROMBIN TIME: 23.5 SEC (ref 11.7–14.5)
RBC # BLD: 3.08 M/UL (ref 4.2–5.9)
SODIUM BLD-SCNC: 139 MMOL/L (ref 136–145)
TOTAL IRON BINDING CAPACITY: 243 UG/DL (ref 260–445)
WBC # BLD: 8.2 K/UL (ref 4–11)

## 2022-10-17 PROCEDURE — 97116 GAIT TRAINING THERAPY: CPT

## 2022-10-17 PROCEDURE — 2700000000 HC OXYGEN THERAPY PER DAY

## 2022-10-17 PROCEDURE — 6370000000 HC RX 637 (ALT 250 FOR IP): Performed by: PHYSICAL MEDICINE & REHABILITATION

## 2022-10-17 PROCEDURE — 6370000000 HC RX 637 (ALT 250 FOR IP): Performed by: INTERNAL MEDICINE

## 2022-10-17 PROCEDURE — 83540 ASSAY OF IRON: CPT

## 2022-10-17 PROCEDURE — 36415 COLL VENOUS BLD VENIPUNCTURE: CPT

## 2022-10-17 PROCEDURE — 97530 THERAPEUTIC ACTIVITIES: CPT

## 2022-10-17 PROCEDURE — 80048 BASIC METABOLIC PNL TOTAL CA: CPT

## 2022-10-17 PROCEDURE — 6360000002 HC RX W HCPCS: Performed by: PHYSICAL MEDICINE & REHABILITATION

## 2022-10-17 PROCEDURE — 85025 COMPLETE CBC W/AUTO DIFF WBC: CPT

## 2022-10-17 PROCEDURE — 97110 THERAPEUTIC EXERCISES: CPT

## 2022-10-17 PROCEDURE — 83550 IRON BINDING TEST: CPT

## 2022-10-17 PROCEDURE — 94640 AIRWAY INHALATION TREATMENT: CPT

## 2022-10-17 PROCEDURE — 99221 1ST HOSP IP/OBS SF/LOW 40: CPT | Performed by: INTERNAL MEDICINE

## 2022-10-17 PROCEDURE — 1280000000 HC REHAB R&B

## 2022-10-17 PROCEDURE — 99232 SBSQ HOSP IP/OBS MODERATE 35: CPT | Performed by: INTERNAL MEDICINE

## 2022-10-17 PROCEDURE — 85610 PROTHROMBIN TIME: CPT

## 2022-10-17 PROCEDURE — 97535 SELF CARE MNGMENT TRAINING: CPT

## 2022-10-17 PROCEDURE — 94761 N-INVAS EAR/PLS OXIMETRY MLT: CPT

## 2022-10-17 PROCEDURE — 6360000002 HC RX W HCPCS: Performed by: INTERNAL MEDICINE

## 2022-10-17 PROCEDURE — 2580000003 HC RX 258: Performed by: INTERNAL MEDICINE

## 2022-10-17 PROCEDURE — 99232 SBSQ HOSP IP/OBS MODERATE 35: CPT | Performed by: PHYSICAL MEDICINE & REHABILITATION

## 2022-10-17 RX ADMIN — ALBUTEROL SULFATE 2.5 MG: 2.5 SOLUTION RESPIRATORY (INHALATION) at 08:29

## 2022-10-17 RX ADMIN — SODIUM CHLORIDE 30 MG/ML INHALATION SOLUTION 4 ML: 30 SOLUTION INHALANT at 08:29

## 2022-10-17 RX ADMIN — PANTOPRAZOLE SODIUM 40 MG: 40 TABLET, DELAYED RELEASE ORAL at 06:35

## 2022-10-17 RX ADMIN — WARFARIN SODIUM 2.5 MG: 2.5 TABLET ORAL at 17:59

## 2022-10-17 RX ADMIN — ATORVASTATIN CALCIUM 10 MG: 10 TABLET, FILM COATED ORAL at 20:25

## 2022-10-17 RX ADMIN — GABAPENTIN 100 MG: 100 CAPSULE ORAL at 20:25

## 2022-10-17 RX ADMIN — ALBUTEROL SULFATE 2.5 MG: 2.5 SOLUTION RESPIRATORY (INHALATION) at 21:19

## 2022-10-17 RX ADMIN — ACETAMINOPHEN 650 MG: 325 TABLET, FILM COATED ORAL at 23:01

## 2022-10-17 RX ADMIN — FLUDROCORTISONE ACETATE 0.1 MG: 0.1 TABLET ORAL at 10:59

## 2022-10-17 RX ADMIN — SODIUM CHLORIDE 30 MG/ML INHALATION SOLUTION 4 ML: 30 SOLUTION INHALANT at 21:19

## 2022-10-17 RX ADMIN — GUAIFENESIN 600 MG: 600 TABLET, EXTENDED RELEASE ORAL at 20:25

## 2022-10-17 RX ADMIN — BISACODYL 5 MG: 5 TABLET, COATED ORAL at 10:58

## 2022-10-17 RX ADMIN — Medication: at 23:03

## 2022-10-17 RX ADMIN — GUAIFENESIN 600 MG: 600 TABLET, EXTENDED RELEASE ORAL at 10:58

## 2022-10-17 RX ADMIN — IRON SUCROSE 200 MG: 20 INJECTION, SOLUTION INTRAVENOUS at 11:50

## 2022-10-17 RX ADMIN — Medication: at 10:58

## 2022-10-17 RX ADMIN — Medication 5 MG: at 23:01

## 2022-10-17 ASSESSMENT — PAIN DESCRIPTION - DESCRIPTORS: DESCRIPTORS: SORE

## 2022-10-17 ASSESSMENT — PAIN - FUNCTIONAL ASSESSMENT: PAIN_FUNCTIONAL_ASSESSMENT: ACTIVITIES ARE NOT PREVENTED

## 2022-10-17 ASSESSMENT — PAIN DESCRIPTION - ORIENTATION: ORIENTATION: RIGHT;LEFT

## 2022-10-17 ASSESSMENT — PAIN SCALES - GENERAL: PAINLEVEL_OUTOF10: 4

## 2022-10-17 ASSESSMENT — PAIN DESCRIPTION - LOCATION: LOCATION: ARM

## 2022-10-17 NOTE — PROGRESS NOTES
Dr Cresencio Lee called and message left regarding infiltrated IV and patient receiving approx 1/2 59.9 ml of iron infusion. PICC Rn was unable to get a peripheral line in d/t poor vascular site.

## 2022-10-17 NOTE — PROGRESS NOTES
PICC RN called to see if she would be able to start a peripheral line on patient for iron infusion, she states that she will try to come up between 10-11

## 2022-10-17 NOTE — PROGRESS NOTES
Patient is alert and oriented. Vital signs stable. He has denied pain. Breath sounds diminished. Oxygen in use at 2L per nasal cannula. Fall precautions in place. Instructed to call with any dyspnea or pain. Call light in reach.

## 2022-10-17 NOTE — PROGRESS NOTES
Department of Physical Medicine & Rehabilitation  Progress Note    Patient Identification:  Yolis Light MD  [id number]  : 1935  Admit date: 10/6/2022    Chief Complaint: Debility    Subjective:   Feeling well. Awaiting labs this morning to monitor HH. He is taking iron PO now and feels like that should help his hemoglobin. Participating in therapy. His wife is here helping him around the room. ROS: No f/c, n/v, cp     Objective:  Patient Vitals for the past 24 hrs:   BP Temp Temp src Pulse Resp SpO2 Weight   10/17/22 0831 -- -- -- 84 16 92 % --   10/17/22 0445 -- -- -- -- -- -- 117 lb 11.6 oz (53.4 kg)   10/17/22 0430 -- -- -- 72 18 96 % --   10/16/22 2005 (!) 157/89 98 °F (36.7 °C) Oral 84 18 93 % --   10/16/22 1455 -- -- -- 83 18 91 % --       Const: Alert. No distress, pleasant. HEENT: Normocephalic, atraumatic. Normal sclera/conjunctiva. MMM. CV: Regular rate and rhythm. Resp: No respiratory distress. Lungs CTAB. Abd: Soft, nontender, nondistended, NABS+   Ext: No edema. Neuro: Alert, oriented, appropriately interactive. Psych: Cooperative, appropriate mood and affect    Laboratory data: Available via EMR. Last 24 hour lab  No results found for this or any previous visit (from the past 24 hour(s)). Therapy progress:  PT  Position Activity Restriction  Other position/activity restrictions: up w/ assist, ambulate pt, up as tolerated,  Objective     Sit to Stand: Dependent/Total, 2 Person Assistance  Stand to Sit: Dependent/Total, 2 Person Assistance  Bed to Chair: Dependent/Total, 2 Person Assistance (Performed via squat pivot (see below))     OT  PT Equipment Recommendations  Other: Ongoing assessment     Assessment        SLP          Body mass index is 20.85 kg/m².     Assessment and Plan:  # Right pneumothorax  # R 1,3-6 nondisplaced rib fractures  # Right lung contusion             -  - Right chest tube placed in the ED  -  - Right chest tube removed  - Pulmonary expansion  - tramadol pain control      # Left pneumothorax  # Left T1 and T3 posterior rib fractures  # Left lung contusion  - 9/21 - Left chest tube placed by IR  - 9/23 - chest tube to water seal  - Left CT removed 10/4     #LLL collapse, L mainstem bronchus occlusion  - 9/23 - s/p bronchoscopy by ICU with removal of large mucus plug  - 9/27 - CT chest demonstrating persistent total occlusion of the left central airway/left lung collapse with loculated L hydropneumothorax    - Aggressive respiratory care, hypertonic saline, mucomyst nebs BID  - Bronch with pulm 9/30 w/mucus plugging in the right and left tracheobronchial trees  - Bronch wash cultures with Ecoli resistant to cefazolin, on Ceftriaxone- 2 days left        # C7 TP Fracture  - 9/20 - CTA Head/Neck did not show evidence of BCVI   - No brace needed  - Activity as tolerated     #L shoulder ecchymoses  - L shoulder x-ray with no interosseous abnormalities      Medical Comorbidities  # Aortic Valve Replacement  - 9/23 - restarted home warfarin  - AM INR checks - prior goal 2-2.5  - check dosing with pharmacy     # Hyperlipidemia  - Continue atorvastatin 10 mg daily     #RUL pulmonary nodule  - 6mm RUL nodule noted on 9/22 CT chest  - Recommend 6-12 month interval CT scan for monitoring      #Stage 3 Coccygeal Ulcer  - 2/2 to rowing machine per patient now a stage 3 wound   - Recommending [facility]     #CIM  - prolonged hospital course   - PT/OT required      # orthostatic hypotension  - Likely associated with CIM  - nephrology consult      - awaiting labs             Rehab Progress: Improving  Anticipated Dispo: home  Services/DME: [facility]: 10/20      Edwige Forman D.O. M.P.H  PM&R  10/17/2022  9:20 AM

## 2022-10-17 NOTE — PATIENT CARE CONFERENCE
Inpatient Rehabilitation  Weekly Team Conference Note  The [address]  [phone]  Patient Name: Rory Olivarez MD        MRN: [id number]    : 1935  (82 y.o.)  Gender: male   Referring Practitioner: DO Susy     The team conference for this patient was held on 10/18/2022 at 10:30am by:  Yee Heredia.  DO Susy    Current/Goal QM SCORES  QM Current/Goal Score   Eating CARE Score: 6 / Discharge Goal: Independent   Oral Hygiene CARE Score: 6 / Discharge Goal: Independent   Shower/Bathing CARE Score: 4 / Discharge Goal: Supervision or touching assistance   UB Dressing CARE Score: 5 / Discharge Goal: Independent   LB Dressing CARE Score: 3 / Discharge Goal: Independent   Putting on/off Footwear CARE Score: 5 / Discharge Goal: Independent   Toileting Hygiene CARE Score: 6 / Discharge Goal: Independent   Bladder Continence Bladder Continence: Always continent    Bowel Continence Bowel Continence: Not rated    Toilet Transfers CARE Score: 4 / Discharge Goal: Independent   Shower/Bathe Self  CARE Score: 4 / Discharge Goal: Supervision or touching assistance   Rolling Left and Right CARE Score: 4 / Discharge Goal: Independent   Sit to Lying CARE Score: 4 / Discharge Goal: Independent   Lying to Sitting on Bedside CARE Score: 4 / Discharge Goal: Independent   Sit to Stand CARE Score: 4 / Discharge Goal: Independent   Chair/Bed to Chair Transfer CARE Score: 4 / Discharge Goal: Independent   Car Transfers CARE Score: 88 / Discharge Goal: Independent   Walk 10 Feet CARE Score: 1 / Discharge Goal: Independent   Walk 50 Feet with Two Turns CARE Score: 1 / Discharge Goal: Independent   Walk 150 Feet CARE Score: 88 / Discharge Goal: Independent   Walk 10 Feet on Uneven Surfaces CARE Score: 88 / Discharge Goal: Independent   1 Step (Curb) CARE Score: 4 / Discharge Goal: Independent   4 Steps CARE Score: 88 / Discharge Goal: Independent   12 Steps CARE Score: 88 / Discharge Goal: Independent   Picking up Object from Floor CARE Score: 88 / Discharge Goal: Independent   Wheel 50 Feet with 2 Turns CARE Score: 6 / Discharge Goal: Independent   Type         [] Manual        [] Motorized        [] N/A   Wheel 150 Feet CARE Score: 4 / Discharge Goal: Independent   Type         [] Manual        [] Motorized        [] N/A     NURSING:  A&Ox: Level of Consciousness: Alert (0)  Orientation Level: Oriented X4  Guillermo Fall Risk Score: Guillermo Total Score: 95  Admission BIMS: 14   [] Unable to complete BIMS on Admission, Reasoning:   Wounds/Incisions/Ulcers: pressure wound coccyx  Medication Review: reviewed with pt  Pain: denies  Consultations: pulmonolgy  Imaging:    XR CHEST PORTABLE   Final Result   1. Worsening left-sided airspace opacities, possibly asymmetric pulmonary edema or pneumonia. 2.  Left-sided pleural effusion. 3.  The previously described pneumothoraces are no longer visualized. The lucency projecting over the medial left lung apex is felt to represent air within the esophagus. Active Comorbid Conditions:  Systems Review:   Renal: N/A , Dialysis: N/A Type:N/A , Frequency:N/A  Neurological: AO x4  Musculoskeletal:general weakness  Respiratory: pneumonia, 1L O2 intermittently  Cardiac/Circulatory/Peripheral Vascular: N./A  Abnormal/Relevant Test Results: N/A  Abnormal/Relevant Lab Values:   CBC:   Recent Labs     10/17/22  1225   WBC 8.2   HGB 8.8*   HCT 28.5*   MCV 92.7        BMP:   Recent Labs     10/17/22  1225      K 4.3      CO2 31   BUN 34*   CREATININE 1.1     PT/INR:   Recent Labs     10/16/22  0634 10/17/22  1226   PROTIME 22.6* 23.5*   INR 1.99* 2.09*     APTT: No results for input(s): APTT in the last 72 hours.   Liver Profile:  Lab Results   Component Value Date/Time    AST 43 09/19/2022 05:14 PM    ALT 30 09/19/2022 05:14 PM    BILIDIR <0.2 03/28/2022 11:25 AM    BILITOT 0.5 09/19/2022 05:14 PM    ALKPHOS 62 09/19/2022 05:14 PM Lab Results   Component Value Date/Time    CHOL 159 05/11/2022 02:46 PM    HDL 76 05/11/2022 02:46 PM    TRIG 67 05/11/2022 02:46 PM     Recent Labs     10/17/22  1225   WBC 8.2   HGB 8.8*   HCT 28.5*      MCV 92.7     Recent Labs     10/17/22  1225      K 4.3      CO2 31   BUN 34*   CREATININE 1.1     No results for input(s): AST, ALT, ALB, BILIDIR, BILITOT, ALKPHOS in the last 72 hours. No results for input(s): MG in the last 72 hours. Recent Labs     10/17/22  1225   WBC 8.2   HGB 8.8*   HCT 28.5*        Recent Labs     10/17/22  1225      K 4.3      CO2 31   BUN 34*   CREATININE 1.1   CALCIUM 8.5     No results for input(s): AST, ALT, BILIDIR, BILITOT, ALKPHOS in the last 72 hours. Recent Labs     10/16/22  0634 10/17/22  1226   INR 1.99* 2.09*     No results for input(s): CKTOTAL, TROPONINI in the last 72 hours.     PHYSICAL THERAPY:  Bed Mobility: Scooting: Stand by assistance (to EOB)    Transfers:  Sit to Stand: Dependent/Total, 2 Person Assistance  Stand to Sit: Dependent/Total, 2 Person Assistance  Bed to Chair: Dependent/Total, 2 Person Assistance (Performed via squat pivot (see below))  Squat Pivot Transfers: Dependent/Total, 2 Person Assistance (bed>w/c; Ana + ModA for anterior weight shift and to guide hips to chair)              OCCUPATIONAL THERAPY:  ADL  Feeding: Setup  Feeding Skilled Clinical Factors: Pt eating breakfast upon arrival. PT reports assisting opening salt packet and syrup  container  Grooming: Contact guard assistance (to brush teeth and use mouthwash standing at sink with RW)  UE Bathing: Stand by assistance, Setup, Contact guard assistance (ventral, sponge bathing at sink with RW, standing with CGA for ~50% of task, SBA at seated level - limited by fatigue, initiating rest breaks appropriately)  LE Bathing: Contact guard assistance, Stand by assistance, Setup (sponge bathing at sink with RW, standing with CGA for radha-area, SBA for LEs - limited by fatigue, initiating rest breaks appropriately)  UE Dressing: Stand by assistance (to don/doff T-shirt seated)  UE Dressing Skilled Clinical Factors: Pt donned button up pj shirt. Pt threaded RUE into shirt and required assist to move shirt around back and to button up shirt. LE Dressing: Minimal assistance (to don brief and hospital pants seated and standing with RW and increased time, cues for romina-dressing technique; SBA to don R shoe seated EOB, L shoe donned by OT due to time constraints)  LE Dressing Skilled Clinical Factors: pt threaded BLEs into pants while seated in recliner w/ increased time and min A for thoroughness. Pt required assist x2 to pull briefs and pants up in stance- assist x1 for balance and assistx1 for clothing mgmt. Additional Comments: unable to complete additional ADLs this date d/t orthostatic hypotension    Toilet Transfers:       Tub/ShowerTransfers:          SPEECH THERAPY:  Assessment:      NUTRITION:  Please see nutrition note for details. Weight: 117 lb 11.6 oz (53.4 kg) / Body mass index is 20.85 kg/m². Diet Level/Order: ADULT DIET; Regular; 5 carb choices (75 gm/meal); Low Potassium (Less than 3000 mg/day)  Supplements:   Average Consumption per meal: PO Meals Eaten (%): 51 - 75%    CASE MANAGEMENT:  Psychosocial/Behavioral Issues: none  Assessment:  Pt will be returning home with spouse + skilled [address] SW will also assist with DME needs. Patient/Family Education provided by team:  Role of PT/OT, energy conservation, safety w/ ADLs, transfers and ambulation    Patient Goals for Rehab stay:  1. Return to [address] [place] [address] for patient for the Upcoming Week:  1. Increase independence with functional mobility  2. Increase independence w/ LE dressing and footwear    Barriers to Progress/Attainment of Goals & Efforts/Interventions to remove Barriers:  1.  Decreased activity tolerance - Continue PT/OT    Discharge Plan:  Estimated Length of Stay: 14 days  Destination: home health  Services at Discharge: [address], Occupational Therapy, and Nursing 3x week  Equipment at Discharge: no OT equipment needs at this time  Community Resources:   Factors facilitating achievement of predicted outcomes: Family support and Cooperative  Barriers to the achievement of predicted outcomes: Decreased endurance, Upper extremity weakness, and Lower extremity weakness    Special Needs in the Upcoming Week:   [x] Family/Caregiver Education  [] Home visit  []Therapeutic Pass [] Consults:_______   [] Family/Caregiver Training  [] Stroke Risk Factor Education     [] Other;_______     TEAM SUMMARY: Will continue with current poc & goals until anticipated d/c date of 10/22/2022.     MD:   Stroke Risk Factors:   [] N/A for this patient [x] HTN  []  Diabetes  [x] Hyperlipidemia  []Obesity BMI >25  [] Atrial Fibrillation [] Smoker (current)  [] Smoker (quit in last 12 months)  [] Sleep Apnea [] Other:     Risk for Readmission: Moderate: 19%    Justification for Continued Stay:   Criteria for continued IRF stay:  Based on my medical assessment of the patient and review of information from the interdisciplinary team, as part of this weekly team conference, the patient continues to meet the following criteria for IRF level of care:  [x] The patient requires 24-hour rehabilitation nursing care   [x] The patient requires an intensive rehabilitation therapy program  [x] The patient requires active and ongoing intervention of multiple therapy disciplines  [x] The patient requires continued physician supervision by a rehabilitation physician  [x] The patient requires an intensive and coordinated interdisciplinary team approach to the delivery of rehabilitative care    Medical Necessity-continued close physician medical management is required for:   [] Cardiac/Circulatory dysfunction  [] Respiratory/Pulmonary dysfunction  [] Integumentary complications  [] Peripheral Vascular dysfunction  [x] Musculoskeletal dysfunction  [x] Neurological dysfunction d/t:  [] CVA  [] SCI  [] TBI  [x] Other: ___CIM_______  [] Renal dysfunction  [] Hematologic dysfunction    [] Endocrine disorders  [] GI disorders     [] Genito-Urinary dysfunction    Assessment/Plan:  [x] The patient is making good progression towards their LTG's, is actively participating in, and has a reasonable expectation to continue to benefit from the intensive rehabilitation program.  [] The estimated discharge date has been changed from initial team conference due to:   [] The estimated discharge destination has been changed from initial team conference due to:     Rehab Team Members in attendance for Team Conference:  ARU Supervisor/PPS Coordinator:  Rema Thorpe, PT, DPT    Therapy Manager/ARU :  Basia Sauceda, PT, DPT    Social Work:  Agustin Kinney [place]    Nursing:   Devin Singletary, RN  Jen Smith, RN  Rosy Schwartz, CHADD    Therapy:  Madelaine Diaz, PT  Zeoy Tran, PT, DPT  Elissa Bamberger PT, DPT   Nicole Kang, SPT    Awilda Neal, OTR/L  Anastasiya Guevara, OTR/L    Dano Menezes, MA-CCC, SLP  Bora Faust, MA-CCC, SLP    I approve the established interdisciplinary plan of care as documented within the medical record of Michael Adams MD.    MD Signature Stan Augustine, D.O. M.P.H  PM&R  10/18/2022  11:55 AM

## 2022-10-17 NOTE — PROGRESS NOTES
Hospital Medicine  Progress Note    Chief Complaint: Lung collapse, anemia      SUBJECTIVE: Patient is improved. There are not new complaints. Still fatigued requiring O2    OBJECTIVE      Medications    Infusion Medications    dextrose       Scheduled Medications    warfarin  2.5 mg Oral Daily    gabapentin  100 mg Oral Nightly    ferrous sulfate  325 mg Oral Q48H    fludrocortisone  0.1 mg Oral Once per day on     albuterol  2.5 mg Nebulization TID    sodium chloride (Inhalant)  4 mL Nebulization TID    zinc oxide   Topical BID    atorvastatin  10 mg Oral Nightly    guaiFENesin  600 mg Oral BID    melatonin  5 mg Oral Nightly    pantoprazole  40 mg Oral QAM AC    bisacodyl  5 mg Oral Daily       Physical   Temperature:  Current - Temp: 98 °F (36.7 °C); Max - Temp  Av °F (36.7 °C)  Min: 98 °F (36.7 °C)  Max: 98 °F (36.7 °C)    Respiratory Rate : Resp  Av  Min: 18  Max: 18    Pulse Range: Pulse  Av  Min: 72  Max: 89    Blood Presuure Range:  Systolic (30EBO), UBV:510 , Min:157 , PTM:525   ; Diastolic (41YZB), XZQ:56, Min:85, Max:89      Pulse ox Range: SpO2  Av.3 %  Min: 91 %  Max: 96 %    24hr I & O:    Intake/Output Summary (Last 24 hours) at 10/17/2022 0810  Last data filed at 10/17/2022 0641  Gross per 24 hour   Intake 240 ml   Output 1050 ml   Net -810 ml       Constitutional:  awake, alert, cooperative, no apparent distress, and appears stated age  Eyes:  pupils equal, round and reactive to light  ENT:  normocepalic, without obvious abnormality  NECK:  supple, symmetrical, trachea midline  HEMATOLOGIC/LYMPHATICS:  no cervical lymphadenopathy  LUNGS:  No increased work of breathing, good air exchange, clear to auscultation bilaterally, no crackles or wheezing  CARDIOVASCULAR: regular rate and rhythm, S1, S2 normal, no murmur, click, rub or gallop  ABDOMEN:  soft and normal bowel sounds  MUSCULOSKELETAL:  There is no redness, warmth, or swelling of the joints.   Full range of motion noted.  Motor strength is 5 out of 5 all extremities bilaterally.   Tone is normal.  SKIN:  normal skin color, texture, turgor    Data      CBC:   Lab Results   Component Value Date/Time    WBC 6.2 10/14/2022 06:16 AM    RBC 2.97 10/14/2022 06:16 AM    RBC 4.41 06/26/2017 01:24 PM    HGB 8.4 10/14/2022 06:16 AM    HCT 27.3 10/14/2022 06:16 AM    MCV 92.1 10/14/2022 06:16 AM    MCH 28.1 10/14/2022 06:16 AM    MCHC 30.6 10/14/2022 06:16 AM    RDW 22.4 10/14/2022 06:16 AM     10/14/2022 06:16 AM    MPV 8.9 10/14/2022 06:16 AM     BMP:    Lab Results   Component Value Date/Time     10/14/2022 06:16 AM    K 4.8 10/14/2022 06:16 AM     10/14/2022 06:16 AM    CO2 33 10/14/2022 06:16 AM    BUN 32 10/14/2022 06:16 AM    LABALBU 2.4 10/08/2022 03:50 AM    CREATININE 1.1 10/14/2022 06:16 AM    CALCIUM 8.3 10/14/2022 06:16 AM    GFRAA >60 10/14/2022 06:16 AM    GFRAA >60 05/10/2013 02:07 PM    LABGLOM >60 10/14/2022 06:16 AM    LABGLOM >60>60 01/10/2012 09:42 AM    GLUCOSE 97 10/14/2022 06:16 AM    GLUCOSE 123 09/26/2016 01:51 PM         ASSESSMENT AND PLAN      Principal Problem:    Debility  Plan: continue rehab  Active Problems:    Hyperkalemia  Plan: Resolved    Orthostatic hypotension  Plan: Resolved    Electrolyte imbalance  Plan: Improved followed by nephrology    Anemia  Plan: IV iron, iron sudies    Abnormal CXR  Plan: Resp toilet    Ineffective airway clearance  Plan: Resp toilet, pulmonary folowing

## 2022-10-17 NOTE — PROGRESS NOTES
ARU Discharge Assessment    Transportation  \"Has lack of transportation kept you from medical appointments, meetings, work, or from getting things needed for daily living? \"Check all that apply:  [] A.  Yes, it has kept me from medical appointments or from getting my medications  [] B.  Yes, it has kept me from non-medical meetings, appointments, work, or from getting things that I need  [x] C.  No  [] X. Patient unable to respond  [] Y. Patient declines to respond    Provision of Current Reconciled Medication List to Subsequent Provider at Discharge  [] No, current reconciled medication list not provided to the subsequent provider. [x] Yes, current reconciled medication list provided to the subsequent provider. (**Select route of transmission below**)   [x] Via Electronic Health Record   [] Speak With Me Organization  [] Verbal (e.g. in person, telephone, video conferencing)  [] Paper-based (e.g. fax, copies, printouts)   [] Other Methods (e.g. texting, email, CDs)    Provision of Current Reconciled Medication List to Patient at Discharge  [] No, current reconciled medication list not provided to the patient, family and/or caregiver. [x] Yes, current reconciled medication list provided to the patient, family and/or caregiver.  (**Select route of transmission below**)   [] Via Electronic Health Record (e.g., electronic access to patient portal)   [] Via [employer] Organization  [x] Verbal (e.g. in person, telephone, video conferencing)  [x] Paper-based (e.g. fax, copies, printouts)   [] Other Methods (e.g. texting, email, CDs)    Health Literacy  \"How often do you need to have someone help you when you read instructions, pamphlets, or other written material from your doctor or pharmacy? \"  []  0. Never  [x]  1. Rarely  []  2. Sometimes  []  3. Often  []  4. Always  []  8.   Patient unable to respond    BIMS - **Must be completed in the flowsheet at discharge prior to proceeding with Delirium Assessment**  [x] BIMS completed in flowsheet at discharge    Signs and Symptoms of Delirium  A. Acute Onset Mental Status Change - Is there evidence of an acute change in mental status from the patient's baseline? [x] 0. No  [] 1. Yes    B. Inattention - Did the patient have difficulty focusing attention, for example being easily distractible or having difficulty keeping track of what was being said? [x]  0. Behavior not present  []  1. Behavior continuously present, does not fluctuate  []  2. Behavior present, fluctuates (comes and goes, changes in severity)    C. Disorganized thinking - Was the patient's thinking disorganized or incoherent (rambling or irrelevant conversation, unclear or illogical flow of ideas, or unpredictable switching from subject to subject)? [x]  0. Behavior not present  []  1. Behavior continuously present, does not fluctuate  []  2. Behavior present, fluctuates (comes and goes, changes in severity)    D. Altered level of consciousness - Did the patient have altered level of consciousness as indicated by any of the following criteria? Vigilant - startled easily to any sound or touch  Lethargic - repeatedly dozed off while being asked questions, but responded to voice or touch  Stuporous - very difficulty to arouse and keep aroused for the interview  Comatose - could not be aroused  [x]  0. Behavior not present  []  1. Behavior continuously present, does not fluctuate  []  2. Behavior present, fluctuates (comes and goes, changes in severity)    Mood    \"Over the last 2 weeks, have you been bothered by any of the following problems?\" 1. Symptom Presence    0 = No  1 = Yes  9 = No Response 2.  Symptom Frequency    0 = Never or 1 day  1 = 2-6 days (several days)  2 = 7-11 days (half or more of the days)  3 = 12-14 days (nearly every day)  **Leave blank if 'No Reponse'**      Enter scores in boxes    Column 1 Column 2   Little interest or pleasure in doing things   0 0   Feeling down, depressed, or hopeless   0 0   **If either A or B in column 2 is coded 2 or 3, CONTINUE asking the questions below. If not, END the interview. **     Trouble falling or staying asleep, or sleeping too much       Feeling tired or having little energy       Poor appetite or overeating       Feeling bad about yourself - or that you are a failure or have let yourself or your family down       Trouble concentrating on things, such as reading the newspaper or watching television       Moving or speaking so slowly that other people could have noticed. Or the opposite- being so fidgety or restless that you have been moving around a lot more than usual.       Thoughts that you would be better off dead, or of hurting yourself in some way. Total Severity: Add scores for all frequency responses in column 2 (possible score 0-27, or enter 99 if unable to complete (if symptom frequency (column 2) is blank for 3 or more items). Social Isolation  \"How often do you feel lonely or isolated from those around you? \"  [x] 0. Never  [] 1. Rarely  [] 2. Sometimes  [] 3. Often  [] 4. Always  [] 7. Patient declines to respond  [] 8. Patient unable to respond    Pain Effect on Sleep  \"Over the past 5 days, how much of the time has pain made it hard for you to sleep at night? \"  [x]  0. Does not apply - I have not had any pain or hurting in the past 5 days  []  1. Rarely or not at all  []  2. Occasionally  []  3. Frequently  []  4. Almost constantly  []  8. Unable to answer    **If the patient answers \"0. Does not apply\" to this question, skip the next two \"Pain Effect. Bety Ruffing Bety Ruffing \" questions**    Pain Interference with Therapy Activities  \"Over the past 5 days, how often have you limited your participation in rehabilitation therapy sessions due to pain? \"  [x]  0. Does not apply - I have not received rehabilitation therapy in the past 5 days  []  1. Rarely or not at all  []  2. Occasionally  []  3. Frequently  []  4. Almost constantly  []  8. Unable to answer    Pain Interference with Day-to-Day Activities: \"Over the past 5 days, how often have you limited your day-to-day activities (excluding rehabilitation therapy session)? \"  [x]  1. Rarely or not at all  []  2. Occasionally  []  3. Frequently  []  4. Almost constantly  []  8. Unable to answer    Nutritional Approaches  Last 7 Days - Check all of the following nutritional approaches that were received within the last 7 days:  []  A. Parenteral/IV feeding  []  B. Feeding tube (e.g., nasogastric or abdominal (PEG))  []  C. Mechanically altered diet - requires change in texture of food or liquids (e.g., pureed food, thickened liquids)  [x]  D. Therapeutic diet (e.g., low salt, diabetic, low cholesterol)  []  Z. None of the above    At time of Discharge - Check all of the following nutritional approaches that were being received at discharge:  []  A. Parenteral/IV feeding (including IV fluids if needed for hydration, but not as part of dialysis/chemo)  []  B. Feeding tube (e.g., nasogastric or abdominal (PEG))  []  C. Mechanically altered diet - requires change in texture of food or liquids (e.g., pureed food, thickened liquids)  [x]  D. Therapeutic diet (e.g., low salt, diabetic, low cholesterol  []  Z. None of the above    High Risk Drug Classes:  Use and Indication    Is taking: Check if the pt is taking any medications by pharmacological classification, not how it is used, in the following classes  Indication noted:  If column 1 is checked, check if there is an indication noted for all meds in the drug class Is taking  (check all that apply) Indication noted (check all that apply)   Antipsychotic [] []   Anticoagulant [x] []   Antibiotic [] []   Opioid [] []   Antiplatelet [] []   Hypoglycemic (including insulin) [] []   None of the above []     Special Treatments, Procedures, and Programs    Check all of the following treatments, procedures, and programs that apply at discharge. At Discharge (check all that apply)   Cancer Treatments   A1. Chemotherapy []           A2. IV []           A3. Oral []           A10. Other []   B1. Radiation []   Respiratory Therapies   C1. Oxygen Therapy []           C2. Continuous (continuously for at least 14 hours per day) []           C3. Intermittent [x]           C4. High-concentration []   D1. Suctioning (Does not include oral suctioning) []           D2. Scheduled []           D3. As needed []   E1. Tracheostomy Care []   F1. Invasive Mechanical Ventilator (ventilator or respirator) []   G1. Non-invasive Mechanical Ventilator []           G2. BiPAP []           G3. CPAP []   Other   H1. IV Medications (Do not include sub Q pumps, flushes, Dextrose 50% or lactated ringers) []           H2. Vasoactive medications []           H3. Antibiotics []           H4. Anticoagulation []           H10. Other []   I1. Transfusions []   J1. Dialysis []           J2. Hemodialysis []           J3. Peritoneal dialysis []   O1. IV access (including a catheter in a vein) []           O2. Peripheral []           O3. Midline []           O4.  Central (PICC, tunneled, port) []      None of the above (select if no Cancer, Respiratory, or Other boxes are checked) []
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
## 2022-10-17 NOTE — PROGRESS NOTES
Nephrology  Note                                                                                                                                                                                                                                                                                                                                                               Office : [phone]     Fax :[phone]              Patient's Name: Chaz Ryan MD      Reason for Consult:  Orthostatic   Requesting Physician:  West Espinoza MD      Chief Complaint:  Trauma      Feels better  PT going well   Dizziness better     Past Medical History:   Diagnosis Date    Anemia     Aortic valve disorders     stenosis    Arthritis     Aspiration pneumonia ([address] 04/27/2015    Erectile dysfunction     Esophageal cancer ([facility] 10/18/2012    Hernia, femoral, bilateral 05/12/2014    History of blood transfusion     Hyperlipidemia     Osteoporosis, senile 05/20/2014    Pancreatitis 08/01/2013    Patient underweight 08/08/2013    Prostate cancer ([facility]     Unspecified essential hypertension        Past Surgical History:   Procedure Laterality Date    APPENDECTOMY      as a child    BONE GRAFT      for saddle nose    CARDIAC VALVE REPLACEMENT  01/01/2006    aorta    CHOLECYSTECTOMY      COLONOSCOPY  11/01/2009    COLONOSCOPY  10/05/2015    COLONOSCOPY N/A 02/17/2021    COLONOSCOPY DIAGNOSTIC/STOMA performed by Sarah Sloan MD at [place] [address]  02/18/2021    COLONOSCOPY POLYPECTOMY SNARE/COLD BIOPSY performed by Sarah Sloan MD at [address] [place] [address]  01/01/2006    DILATATION, ESOPHAGUS      2010    ESOPHAGUS SURGERY      EYE SURGERY  1990& 1992    cataract bilat removal     GASTRECTOMY      IR PERC CATH PLEURAL DRAIN W/IMAG  09/21/2022    IR PERC CATH PLEURAL DRAIN W/IMAG    PROSTATE SURGERY      seeds    TONSILLECTOMY      UPPER GASTROINTESTINAL ENDOSCOPY N/A 02/16/2021    EGD BIOPSY performed by Dawood Brothers MD at [address] N/A 04/22/2022    EGD CONTROL HEMORRHAGE performed by Carlos Guzman MD at [address]  BRACHYTHERAPY         Family History   Problem Relation Age of Onset    Other Mother         bleeding peptic ulcer    Heart Disease Mother     High Blood Pressure Father     Stroke Father     Prostate Cancer Father     Other Father         cardiovascular disease    Elevated Lipids Father     Hypertension Father     Colon Cancer Paternal Cousin         reports that he has never smoked. He has never used smokeless tobacco. He reports current alcohol use. He reports that he does not use drugs.     Allergies:  No known allergies    Current Medications:    iron sucrose (VENOFER) 200 mg in sodium chloride 0.9 % 100 mL IVPB, Q24H  warfarin (COUMADIN) tablet 2.5 mg, Daily  traMADol (ULTRAM) tablet 100 mg, Q6H PRN  gabapentin (NEURONTIN) capsule 100 mg, Nightly  fludrocortisone (FLORINEF) tablet 0.1 mg, Once per day on Mon Wed Fri  albuterol (PROVENTIL) nebulizer solution 2.5 mg, TID  sodium chloride (Inhalant) 3 % nebulizer solution 4 mL, TID  zinc oxide (TRIAD HYDROPHILIC) paste, BID  alteplase (CATHFLO) injection 1 mg, Daily PRN  atorvastatin (LIPITOR) tablet 10 mg, Nightly  guaiFENesin (MUCINEX) extended release tablet 600 mg, BID  melatonin disintegrating tablet 5 mg, Nightly  pantoprazole (PROTONIX) tablet 40 mg, QAM AC  acetaminophen (TYLENOL) tablet 650 mg, Q4H PRN  bisacodyl (DULCOLAX) EC tablet 5 mg, Daily  magnesium hydroxide (MILK OF MAGNESIA) 400 MG/5ML suspension 30 mL, Daily PRN  polyethylene glycol (GLYCOLAX) packet 17 g, Daily PRN  simethicone (MYLICON) chewable tablet 80 mg, Q6H PRN  glucose chewable tablet 16 g, PRN  dextrose bolus 10% 125 mL, PRN   Or  dextrose bolus 10% 250 mL, PRN  glucagon (rDNA) injection 1 mg, PRN  dextrose 10 % infusion, Continuous PRN      Review of Systems:   14 point ROS obtained but were negative except mentioned in HPI      Physical exam:     Vitals:  /77   Pulse 74   Temp 97.5 °F (36.4 °C) (Oral)   Resp 16   Ht 5' 3\" (1.6 m)   Wt 117 lb 11.6 oz (53.4 kg)   SpO2 91%   BMI 20.85 kg/m²   Constitutional:  AA  Skin: no rash, turgor wnl  Heent:  eomi, mmm  Neck: no bruits or jvd noted  Cardiovascular:  S1, S2 without m/r/g  Respiratory: CTA B without w/r/r  Abdomen:  +bs, soft, nt, nd  Ext: + lower extremity edema  Psychiatric: mood and affect appropriate  Musculoskeletal:  Rom, muscular strength intact    Data:   Labs:  CBC:   No results for input(s): WBC, HGB, PLT in the last 72 hours. BMP:    No results for input(s): NA, K, CL, CO2, BUN, CREATININE, GLUCOSE in the last 72 hours. Ca/Mg/Phos:   No results for input(s): CALCIUM, MG, PHOS in the last 72 hours. Hepatic: No results for input(s): AST, ALT, ALB, BILITOT, ALKPHOS in the last 72 hours. Troponin: No results for input(s): TROPONINI in the last 72 hours. BNP: No results for input(s): BNP in the last 72 hours. Lipids: No results for input(s): CHOL, TRIG, HDL, LDLCALC, LABVLDL in the last 72 hours. ABGs: No results for input(s): PHART, PO2ART, XRO9PKR in the last 72 hours. INR:   Recent Labs     10/15/22  0545 10/16/22  0634   INR 2.16* 1.99*       UA:No results for input(s): Marychuy Munch, GLUCOSEU, BILIRUBINUR, Maria Elena Maw, BLOODU, PHUR, PROTEINU, UROBILINOGEN, NITRU, LEUKOCYTESUR, Lazarus Martyr in the last 72 hours. Urine Microscopic: No results for input(s): LABCAST, BACTERIA, COMU, HYALCAST, WBCUA, RBCUA, EPIU in the last 72 hours. Urine Culture: No results for input(s): LABURIN in the last 72 hours. Urine Chemistry: No results for input(s): MADDY Mathis NAUR in the last 72 hours. IMAGING:  XR CHEST PORTABLE   Final Result   1. Worsening left-sided airspace opacities, possibly asymmetric pulmonary edema or pneumonia.    2.  Left-sided pleural effusion. 3.  The previously described pneumothoraces are no longer visualized. The lucency projecting over the medial left lung apex is felt to represent air within the esophagus. Assessment/Plan   Orthostatic     2. Hyperkalemia     3. Anemia    4. Acid- base/ Electrolyte imbalance     5.  Debility     Plan   - cont florinef   - Monitor Orthostats   - Cortisol 8.6  - UPC - no proteinuria   - encourage solute intake    - Rehab                   Thank you for allowing us to participate in care of MD Odin Trivedi MD  Feel free to contact me   [facility] [address]  Office : [phone]  Fax :[phone]

## 2022-10-17 NOTE — PROGRESS NOTES
Patient IV is alarming occluded unable to flush, aspirate blood return, cap changed still unable to flush. Medina Franks Nurse mgr to evaluate iv site.

## 2022-10-17 NOTE — PROGRESS NOTES
Physical Therapy  Facility/Department: [facility] ACUTE REHAB UNIT  Rehabilitation Physical Therapy Treatment Note    NAME: Darrius Lund MD  : 1935 (82 y.o.)  MRN: [id number]  CODE STATUS: Full Code    Date of Service: 10/17/22       Restrictions:  Position Activity Restriction  Other position/activity restrictions: up w/ assist, ambulate pt, up as tolerated,     SUBJECTIVE  Subjective  Subjective: Pt supine in bed upon PT arrival, agreeable to therapy. Pain: Denied pain. Post Treatment Pain Screening       VITALS  Position Seated EOB donning shoes/socks Seated EOB Seated in w/c With activity   Oxygen Room air 1.5L 1L 1L   BP (mmHg)  112/69  124/75   HR (bpm)  92     SpO2 84% 91% 92% >95%       OBJECTIVE  Cognition  Overall Cognitive Status: WFL  Arousal/Alertness: Appropriate responses to stimuli  Following Commands: Follows multistep commands consistently  Attention Span: Appears intact  Memory: Appears intact  Safety Judgement: Good awareness of safety precautions  Problem Solving: Assistance required to identify errors made;Assistance required to correct errors made  Insights: Decreased awareness of deficits  Initiation: Does not require cues  Sequencing: Requires cues for some  Cognition Comment: Pt somewhat less conversational.  Orientation  Overall Orientation Status: Within Normal Limits  Orientation Level: Oriented X4    Functional Mobility  Bed Mobility  Additional Factors: Head of bed raised; Without handrails  Sit to Supine  Assistance Level: Supervision  Skilled Clinical Factors: SVN d/t fatigue/unsteadiness  Supine to Sit  Assistance Level: Supervision  Skilled Clinical Factors: SVN d/t fatigue/unsteadiness  Balance  Sitting Balance: Supervision (Pt doffed socks/donned socks and shoes sitting EOB with setup. SVN for balance.)  Standing Balance: Contact guard assistance  Standing Balance  Activity: Pt noted to stand with heavy UE assist on RW AEB unable to tie pants in standing.  To improve standing balance without AD, PT instructed pt to stand with RW and perform lateral reaching with small rubber ball in both directions, passing ball between hands to decrease UE support. Pt completed X10 reps of this activity with CGA, fluctuating between 1UE assist and no UE assist. PT also instructed pt to toss ball between hands while standing without UE support to facilitate standing with no UE support. Pt completed X10 reps of this with CGA. For all dynamic balance activities, pt noted to stand with bilateral knee flexion, VC given to attempt to correct this. Transfers  Surface: From bed; Wheelchair; To mat;From mat; To bed  Additional Factors: Verbal cues; Hand placement cues  Device: Walker (RW)  Sit to Stand  Assistance Level: Stand by assist;Contact guard assist  Skilled Clinical Factors: CGA on occasion 2/2 intermittent knee buckling. Stand to Sit  Assistance Level: Contact guard assist  Skilled Clinical Factors: CGA for controlled descent with fatigue. VC for hand placement. Bed To/From Chair  Technique: Stand pivot  Assistance Level: Contact guard assist;Minimal assistance  Skilled Clinical Factors: CGA-Ana for assist with pivot and 2/2 knee buckling. VC for hand placement and to scoot to EOS. Stand Pivot  Assistance Level: Contact guard assist;Minimal assistance (mat table<>w/c)  Skilled Clinical Factors: CGA-Ana for assist with pivot and 2/2 knee buckling. VC for hand placement and to scoot to EOS. Environmental Mobility  Ambulation  Surface: Level surface  Device: Rolling walker  Distance: 48' + 50'  Activity: Within Unit  Activity Comments: Seated rest breaks between bouts, O2 transport and w/c follow provided by PT. Additional Factors: Verbal cues; Increased time to complete  Assistance Level: Contact guard assist  Gait Deviations: Slow mingo;Decreased step length bilateral;Narrow base of support (forward flexed posture)  Skilled Clinical Factors: VC for upright posture  Stairs  Stair Height: 6''  Device: Bilateral handrails  Number of Stairs: 6 (3+3)  Additional Factors: Non-reciprocal going up;Non-reciprocal going down; Increased time to complete  Assistance Level: Contact guard assist  Skilled Clinical Factors: PT instructed pt on standing rest break at top of stairs d/t SOB. CGA for stability and controlled descent. 2nd Session  Pt supine in bed upon PT arrival, asleep but easily awoken to voice. Agreeable to therapy. Pt appeared to have pallor, so vitals were assessed. SpO2 initially 81% on room air, improved to 89% with deep breathing. Pt put on 1L O2 for mobility and maintained SpO2 89-91%. To improve circulation, PT instructed pt to perform bridges X10, SLR x10 each, and alt hip/knee flexion/extension X10. VC given to correct technique of these previously taught exercises to maximize effect. Pt with less pallor following exercise performance. Pt performed sup>sit with HOB elevated, without use of HR's with SVN, VC to avoid using HR's to simulate home environment. Seated EOB, pt donned shoes with setup and SVN for balance. Pt transferred bed>w/c via stand pivot with CGA, VC to scoot to EOS and no knee buckling noted. Pt propelled w/c 100' Mod I as a form of endurance training and to continue improving circulation. Pt performed sit>stand and ambulated 76' with RW, CGA for stability, PT providing O2 transport and w/c follow. Pt req seated rest break following this bout and performed stand>sit with SBA, VC for hand placement. To simulate entrance to home, PT instructed pt to practice navigating 1 6\" curb step using RW. Pt ascended curb step X1 with Ana, then descended via retro stepping with CGA. Following seated rest break, pt ascended then descended curb step with RW with CGA for controlled descent and stability.    PT educated pt on need to have PT present at home during first attempt navigating flight of stairs to basement and for floor transfer to perform desired home exercises in order to provide instruction and for safety. Pt verbalized understanding. PT also educated pt to continue with breathing exercises to maximize oxygen saturation. Pt performed w/c>bed transfer via stand-pivot with CGA, then sit>supine with SVN. Pt left in bed, call alarm and needs within reach, bed alarm in place. Pt on room air, SpO2 91% at rest.            ASSESSMENT/PROGRESS TOWARDS GOALS       Assessment  Assessment: While donning shoes EOB on room air, SpO2 measured at 84%. Following this, pt maintained SpO2>94% on 1L O2 with mobility this session. Pt tolerated increased ambulation distances, but was limited by fatigue and shortness of breath. Pt also demo'd 2 occasions of knee buckling with transfers req Ana. Pt is functioning below baseline and will benefit from continued skilled PT to maximize safety and independence to facilitate safe d/c home. Activity Tolerance: Patient tolerated treatment well;Patient limited by fatigue;Patient limited by endurance  Discharge Recommendations: Continue to assess pending progress;24 hour supervision or assist;Home with Home health PT  PT Equipment Recommendations  Other: Ongoing assessment    Goals  Patient Goals   Patient Goals :  \"To get back to doing what I was doing\"  Short Term Goals  Time Frame for Short Term Goals: 7 days  Short Term Goal 1: Pt will perform bed mobility independently  Short Term Goal 2: Pt will perform all transfers excluding floor transfer with Ana  Short Term Goal 3: Pt will ambulate 10' on level surfaces with LRAD and CGA  Short Term Goal 4: Patient will navigate 4 stairs with bilateral HR and CGA  Long Term Goals  Time Frame for Long Term Goals : 14 days  Long Term Goal 1: Pt will perform all transfers excluding floor transfer Mod I with LRAD  Long Term Goal 2: Pt will ambulate at least 150' on level surfaces Mod I with LRAD  Long Term Goal 3: Patient will navigate 12 stairs with 1 HR Mod I    PLAN OF CARE/SAFETY  Physcial

## 2022-10-17 NOTE — PROGRESS NOTES
Occupational Therapy  Facility/Department: [facility]  Rehabilitation Occupational Therapy Daily Treatment Note    Date: 10/17/22  Patient Name: Rory Olivarez MD       Room: 3109/3109-01  MRN: [id number]  Account: [de-identified]   : 1935  (82 y.o.) Gender: male                    Past Medical History:  has a past medical history of Anemia, Aortic valve disorders, Arthritis, Aspiration pneumonia ([address] Erectile dysfunction, Esophageal cancer ([address] Hernia, femoral, bilateral, History of blood transfusion, Hyperlipidemia, Osteoporosis, senile, Pancreatitis, Patient underweight, Prostate cancer ([address] and Unspecified essential hypertension. Past Surgical History:   has a past surgical history that includes Coronary artery bypass graft (2006); Cardiac valve replacement (2006); eye surgery (& ); Appendectomy; bone graft; gastrectomy; Cholecystectomy; Colonoscopy (2009); Prostate surgery; Tonsillectomy; Esophagus surgery;  Prostate/Transrectal/Vol Collins Brachyth; Colonoscopy (10/05/2015); Upper gastrointestinal endoscopy (N/A, 2021); Colonoscopy (N/A, 2021); Colonoscopy (2021); Upper gastrointestinal endoscopy (N/A, 2022); IR GUIDED PERC PLEURAL DRAIN W CATH INSERT (2022); and Dilatation, esophagus. Restrictions  Other position/activity restrictions: up w/ assist, ambulate pt, up as tolerated,    Subjective  Subjective: Pt was semi supine in bed upon arrival. Pt reported he hasn't been able to eat much today because it has been so busy. Pt was on RA. Pt was pleasant and agreeable to OT                Objective     Cognition  Overall Cognitive Status: WFL  Orientation  Overall Orientation Status: Within Normal Limits         ADL  Feeding  Assistance Level: Independent  Skilled Clinical Factors: Pt was finishing breakfast upon arrival. Pt appeared very lethargic and was somewhat falling asleep while attempting to eat.   Putting On/Taking Off Footwear  Assistance Level: Set-up  Skilled Clinical Factors: Pt donned his shoes seated at EOB w/ setup and ++ time needed to complete          Functional Mobility  Device: Rolling walker  Activity:  (from bed to chair)  Assistance Level: Contact guard assist  Skilled Clinical Factors: Pt ambulated ~5ft from bed to chair w/ RW and CGA. Pt w/ kyphotic posture and ambulated w/ short shuffled steps. Pt was on RA during transfer to the chair. SpO2 was 88% after rest. Per pulmonology pt's parameters are for 88% and greater. Supine to Sit  Assistance Level: Supervision  Skilled Clinical Factors: ++ time and effort noted today. Pt was more fatigued this morning. Vitals assessed at EOB. Pt's HR was 90 and BP was 126/62. Pt c/o mild dizziness and required + time before ready to stand. Sit to Stand  Assistance Level: Contact guard assist  Skilled Clinical Factors: from EOB > RW  Stand to Sit  Assistance Level: Contact guard assist  Skilled Clinical Factors: from RW to recliner chair         Assessment    Assessment  Assessment: Pt was more fatigued today and had increased difficulty w/ ADLs as a result. Pt maintained 88% and greater on RA today w/ occasional drop to 85% however pt recovered quickly w/ rest break and PLB. Pt was more limited by decreased activity tolerance today and only completed short distance ambulation to the bathroom and required + time for most tasks today. Pt is functioning below baseline and continues to benefit from OT. Cont OT per POC  Activity Tolerance: Patient limited by fatigue  Discharge Recommendations: Continue to assess pending progress;24 hour supervision or assist;Home with Home health OT  OT Equipment Recommendations  Other: Pt reported he has a shower chair, GBs, and a reacher. Cont to assess for additional needs  Safety Devices  Safety Devices in place: Yes  Type of devices: Call light within reach; Left in chair;Chair alarm in place;Nurse notified    Patient Education  Education  Education Given To: Patient; Family  Education Provided: Role of Therapy;Plan of Care;Safety;Transfer Training;Mobility Training;Energy Conservation  Education Provided Comments: monitoring vitals  Education Method: Demonstration;Verbal  Barriers to Learning: None  Education Outcome: Verbalized understanding;Continued education needed      2nd session: Pt was seated in recliner chair upon arrival. Pt reported his IV became infiltrated and he's waiting for an iron transfusion. Pt was pleasant and agreeable to OT but appeared fatigued. Pt on RA at beginning of session. SpO2 was 89%, HR 92, and /80. Pt completed sit to stand from recliner > RW w/ CGA. Pt ambulated w/ RW and CGA into the bathroom. Pt w/ short shuffled steps and kyphotic posture. Rest break needed in w/c upon arrival to bathroom. Pt completed oral care seated in w/c at the sink I'ly. Pt also doffed his tshirt and completed UE bathing seated at the sink w/ spvn. Pt donned clean tshirt and undershirt w/ setup. PICC team present during session to re-attempt IV. Pt was assisted from w/c back to bed via stand pivot w/ CGA. Sit to supine completed w/ spvn. 5 mins dedicated to attempted new IV site however PICC nurse unable to place new IV. Pt requested to lay down for a minute after PICC nurse left. Pt was very fatigued and appeared to be almost falling asleep. Pt's wife Alva Rider stated pt has been busy all day and he hasn't had much time to rest. Pt was agreeable to sit back at EOB to finish bathing/dressing tasks. Pt did not want to wash LE body but was interested in changing into clean clothes. Pt completed supine to sit w/ spvn. Pt completed sit to stand from EOB > RW w/ CGA. Pt removed clothes down in stance w/ CGA w/ B knee flexion noted due to fatigue. Pt required ++ time to remove clothes from BLEs and required use of reacher due to fatigue.  Pt threaded clean underwear and pants onto BLEs and again required use of reacher despite attempt to complete figure 4. ++ time and min VCs needed for success. Sit to stand completed from EOB > RW w/ CGA. Pt pulled clothes up in stance w/ min A in order to reach the back completely. Pt sat back down and requested to lay down until his next PT session. Pt completed sit to supine w/ spvn. Pt was left in the bed w/ bed alarm on and call light within reach. Pt's SpO2 assessed upon exit and was 89% on RA. OT left nasal cannula next to pt in case he started to feel more SOB. RN aware. Plan  Occupational Therapy Plan  Times Per Week: 5x/week, 90min/day  Current Treatment Recommendations: Strengthening;Balance training;Functional mobility training; Endurance training; Safety education & training;Patient/Caregiver education & training;Equipment evaluation, education, & procurement;Self-Care / ADL; Home management training; Wheelchair mobility training    Goals  Patient Goals   Patient goals : \"I want to return to what I was doing before. \"  Short Term Goals  Time Frame for Short Term Goals: 2 weeks- all ongoing  Short Term Goal 1: Pt will complete UE/LE dressing w/ Mod I and use of AE prn  Short Term Goal 2: Pt will complete bathing w/ spvn  Short Term Goal 3: Pt will complete shower transfer w/ spvn  Short Term Goal 4: Pt will complete toileting/toilet transfer w/ Mod I  Short Term Goal 5: Pt will complete grooming routine in stance w/ Mod I    AM-PAC Score               Therapy Time   Individual Concurrent Group Co-treatment   Time In 1130         Time Out 1200         Minutes 30         Timed Code Treatment Minutes: 30 Minutes       Second Session Therapy Time:   Individual Concurrent Group Co-treatment   Time In  1315         Time Out  1415         Minutes  60           Timed Code Treatment Minutes:  60    Total Treatment Minutes:   30+60= [facility], OT

## 2022-10-18 PROCEDURE — 97116 GAIT TRAINING THERAPY: CPT

## 2022-10-18 PROCEDURE — 97110 THERAPEUTIC EXERCISES: CPT

## 2022-10-18 PROCEDURE — 94640 AIRWAY INHALATION TREATMENT: CPT

## 2022-10-18 PROCEDURE — 6360000002 HC RX W HCPCS: Performed by: PHYSICAL MEDICINE & REHABILITATION

## 2022-10-18 PROCEDURE — 99233 SBSQ HOSP IP/OBS HIGH 50: CPT | Performed by: PHYSICAL MEDICINE & REHABILITATION

## 2022-10-18 PROCEDURE — 99232 SBSQ HOSP IP/OBS MODERATE 35: CPT | Performed by: INTERNAL MEDICINE

## 2022-10-18 PROCEDURE — 94761 N-INVAS EAR/PLS OXIMETRY MLT: CPT

## 2022-10-18 PROCEDURE — 97535 SELF CARE MNGMENT TRAINING: CPT

## 2022-10-18 PROCEDURE — 94669 MECHANICAL CHEST WALL OSCILL: CPT

## 2022-10-18 PROCEDURE — 1280000000 HC REHAB R&B

## 2022-10-18 PROCEDURE — 6370000000 HC RX 637 (ALT 250 FOR IP): Performed by: INTERNAL MEDICINE

## 2022-10-18 PROCEDURE — 6370000000 HC RX 637 (ALT 250 FOR IP): Performed by: PHYSICAL MEDICINE & REHABILITATION

## 2022-10-18 PROCEDURE — 97530 THERAPEUTIC ACTIVITIES: CPT

## 2022-10-18 RX ORDER — ALBUTEROL SULFATE 2.5 MG/3ML
2.5 SOLUTION RESPIRATORY (INHALATION) EVERY 4 HOURS PRN
Status: DISCONTINUED | OUTPATIENT
Start: 2022-10-18 | End: 2022-10-22 | Stop reason: HOSPADM

## 2022-10-18 RX ORDER — SODIUM CHLORIDE FOR INHALATION 3 %
4 VIAL, NEBULIZER (ML) INHALATION PRN
Status: DISCONTINUED | OUTPATIENT
Start: 2022-10-18 | End: 2022-10-22 | Stop reason: HOSPADM

## 2022-10-18 RX ORDER — FERROUS SULFATE 325(65) MG
325 TABLET ORAL EVERY OTHER DAY
Status: DISCONTINUED | OUTPATIENT
Start: 2022-10-18 | End: 2022-10-22 | Stop reason: HOSPADM

## 2022-10-18 RX ORDER — ATORVASTATIN CALCIUM 10 MG/1
TABLET, FILM COATED ORAL
Qty: 90 TABLET | Refills: 0 | Status: SHIPPED | OUTPATIENT
Start: 2022-10-18

## 2022-10-18 RX ORDER — ASCORBIC ACID 500 MG
500 TABLET ORAL EVERY OTHER DAY
Status: DISCONTINUED | OUTPATIENT
Start: 2022-10-18 | End: 2022-10-22 | Stop reason: HOSPADM

## 2022-10-18 RX ADMIN — GUAIFENESIN 600 MG: 600 TABLET, EXTENDED RELEASE ORAL at 20:43

## 2022-10-18 RX ADMIN — PANTOPRAZOLE SODIUM 40 MG: 40 TABLET, DELAYED RELEASE ORAL at 06:41

## 2022-10-18 RX ADMIN — GABAPENTIN 100 MG: 100 CAPSULE ORAL at 20:43

## 2022-10-18 RX ADMIN — Medication 5 MG: at 20:43

## 2022-10-18 RX ADMIN — SODIUM CHLORIDE 30 MG/ML INHALATION SOLUTION 4 ML: 30 SOLUTION INHALANT at 07:41

## 2022-10-18 RX ADMIN — Medication: at 08:07

## 2022-10-18 RX ADMIN — FERROUS SULFATE TAB 325 MG (65 MG ELEMENTAL FE) 325 MG: 325 (65 FE) TAB at 10:02

## 2022-10-18 RX ADMIN — ALBUTEROL SULFATE 2.5 MG: 2.5 SOLUTION RESPIRATORY (INHALATION) at 07:41

## 2022-10-18 RX ADMIN — ATORVASTATIN CALCIUM 10 MG: 10 TABLET, FILM COATED ORAL at 20:43

## 2022-10-18 RX ADMIN — WARFARIN SODIUM 2.5 MG: 2.5 TABLET ORAL at 17:27

## 2022-10-18 RX ADMIN — OXYCODONE HYDROCHLORIDE AND ACETAMINOPHEN 500 MG: 500 TABLET ORAL at 10:02

## 2022-10-18 RX ADMIN — BISACODYL 5 MG: 5 TABLET, COATED ORAL at 08:07

## 2022-10-18 RX ADMIN — SODIUM CHLORIDE 30 MG/ML INHALATION SOLUTION 4 ML: 30 SOLUTION INHALANT at 20:18

## 2022-10-18 RX ADMIN — Medication: at 20:43

## 2022-10-18 RX ADMIN — ALBUTEROL SULFATE 2.5 MG: 2.5 SOLUTION RESPIRATORY (INHALATION) at 20:17

## 2022-10-18 RX ADMIN — GUAIFENESIN 600 MG: 600 TABLET, EXTENDED RELEASE ORAL at 08:07

## 2022-10-18 NOTE — PLAN OF CARE
Problem: Discharge Planning  Goal: Discharge to home or other facility with appropriate resources  Outcome: Progressing  Flowsheets (Taken 10/17/2022 2010)  Discharge to home or other facility with appropriate resources: Identify barriers to discharge with patient and caregiver     Problem: Safety - Adult  Goal: Free from fall injury  Outcome: Progressing     Problem: ABCDS Injury Assessment  Goal: Absence of physical injury  Outcome: Progressing     Problem: Skin/Tissue Integrity  Goal: Absence of new skin breakdown  Description: 1. Monitor for areas of redness and/or skin breakdown  2. Assess vascular access sites hourly  3. Every 4-6 hours minimum:  Change oxygen saturation probe site  4. Every 4-6 hours:  If on nasal continuous positive airway pressure, respiratory therapy assess nares and determine need for appliance change or resting period.   Outcome: Progressing     Problem: Nutrition Deficit:  Goal: Optimize nutritional status  Outcome: Progressing

## 2022-10-18 NOTE — PROGRESS NOTES
Department of Physical Medicine & Rehabilitation  Progress Note    Patient Identification:  Brenda Beckham MD  [id number]  : 1935  Admit date: 10/6/2022    Chief Complaint: Debility    Subjective:   Doing well today. No new complaints overnight. Monitoring his Hgb. Team conference today. Seen in his room. Participating well in therapy with a minor drop in spO2. ROS: No f/c, n/v, cp     Objective:  Patient Vitals for the past 24 hrs:   BP Temp Temp src Pulse Resp SpO2   10/18/22 0741 -- -- -- 74 18 90 %   10/18/22 0730 (!) 140/83 97.6 °F (36.4 °C) Axillary 85 20 (!) 89 %   10/17/22 2121 -- -- -- -- -- 94 %   10/17/22 2037 -- -- -- -- -- (!) 89 %   10/17/22 2036 -- -- -- -- -- (!) 72 %   10/17/22 2010 (!) 157/80 97.5 °F (36.4 °C) Oral 85 24 (!) 89 %       Const: Alert. No distress, pleasant. HEENT: Normocephalic, atraumatic. Normal sclera/conjunctiva. MMM. CV: Regular rate and rhythm. Resp: No respiratory distress. Lungs CTAB. Abd: Soft, nontender, nondistended, NABS+   Ext: No edema. Neuro: Alert, oriented, appropriately interactive. Psych: Cooperative, appropriate mood and affect    Laboratory data: Available via EMR.    Last 24 hour lab  Recent Results (from the past 24 hour(s))   Basic Metabolic Panel w/ Reflex to MG    Collection Time: 10/17/22 12:25 PM   Result Value Ref Range    Sodium 139 136 - 145 mmol/L    Potassium reflex Magnesium 4.3 3.5 - 5.1 mmol/L    Chloride 101 99 - 110 mmol/L    CO2 31 21 - 32 mmol/L    Anion Gap 7 3 - 16    Glucose 225 (H) 70 - 99 mg/dL    BUN 34 (H) 7 - 20 mg/dL    Creatinine 1.1 0.8 - 1.3 mg/dL    GFR Non-African American >60 >60    GFR African American >60 >60    Calcium 8.5 8.3 - 10.6 mg/dL   CBC with Auto Differential    Collection Time: 10/17/22 12:25 PM   Result Value Ref Range    WBC 8.2 4.0 - 11.0 K/uL    RBC 3.08 (L) 4.20 - 5.90 M/uL    Hemoglobin 8.8 (L) 13.5 - 17.5 g/dL    Hematocrit 28.5 (L) 40.5 - 52.5 %    MCV 92.7 80.0 - 100.0 fL    MCH 28.6 26.0 - 34.0 pg    MCHC 30.8 (L) 31.0 - 36.0 g/dL    RDW 22.5 (H) 12.4 - 15.4 %    Platelets 605 194 - 331 K/uL    MPV 9.7 5.0 - 10.5 fL    Neutrophils % 84.9 %    Lymphocytes % 8.8 %    Monocytes % 5.3 %    Eosinophils % 0.6 %    Basophils % 0.4 %    Neutrophils Absolute 7.0 1.7 - 7.7 K/uL    Lymphocytes Absolute 0.7 (L) 1.0 - 5.1 K/uL    Monocytes Absolute 0.4 0.0 - 1.3 K/uL    Eosinophils Absolute 0.1 0.0 - 0.6 K/uL    Basophils Absolute 0.0 0.0 - 0.2 K/uL   Protime-INR    Collection Time: 10/17/22 12:26 PM   Result Value Ref Range    Protime 23.5 (H) 11.7 - 14.5 sec    INR 2.09 (H) 0.87 - 1.14         Therapy progress:  PT  Position Activity Restriction  Other position/activity restrictions: up w/ assist, ambulate pt, up as tolerated,  Objective     Sit to Stand: Dependent/Total, 2 Person Assistance  Stand to Sit: Dependent/Total, 2 Person Assistance  Bed to Chair: Dependent/Total, 2 Person Assistance (Performed via squat pivot (see below))     OT  PT Equipment Recommendations  Other: Ongoing assessment     Assessment        SLP          Body mass index is 20.85 kg/m².     Assessment and Plan:  # Right pneumothorax  # R 1,3-6 nondisplaced rib fractures  # Right lung contusion             - 9/19 - Right chest tube placed in the ED  - 9/23 - Right chest tube removed  - Pulmonary expansion  - tramadol pain control      # Left pneumothorax  # Left T1 and T3 posterior rib fractures  # Left lung contusion  - 9/21 - Left chest tube placed by IR  - 9/23 - chest tube to water seal  - Left CT removed 10/4     #LLL collapse, L mainstem bronchus occlusion  - 9/23 - s/p bronchoscopy by ICU with removal of large mucus plug  - 9/27 - CT chest demonstrating persistent total occlusion of the left central airway/left lung collapse with loculated L hydropneumothorax    - Aggressive respiratory care, hypertonic saline, mucomyst nebs BID  - Bronch with pulm 9/30 w/mucus plugging in the right and left tracheobronchial trees  - Bronch wash cultures with Ecoli resistant to cefazolin, on Ceftriaxone- 2 days left        # C7 TP Fracture  - 9/20 - CTA Head/Neck did not show evidence of BCVI   - No brace needed  - Activity as tolerated     #L shoulder ecchymoses  - L shoulder x-ray with no interosseous abnormalities      Medical Comorbidities  # Aortic Valve Replacement  - 9/23 - restarted home warfarin  - AM INR checks - prior goal 2-2.5  - check dosing with pharmacy     # Hyperlipidemia  - Continue atorvastatin 10 mg daily     #RUL pulmonary nodule  - 6mm RUL nodule noted on 9/22 CT chest  - Recommend 6-12 month interval CT scan for monitoring      #Stage 3 Coccygeal Ulcer  - 2/2 to rowing machine per patient now a stage 3 wound   - Recommending [facility]     #CIM  - prolonged hospital course   - PT/OT required      # orthostatic hypotension  - Likely associated with CIM  - nephrology consult      Improving Hgb.    02 sat test for DC. Team conference was held today on the patient and discussed directly with the patient utilizing their entire treatment team. Please see separate team note for details. Total treatment time for today's care >35 min. >50% of time spent counseling with patient and coordinating care.              Rehab Progress: Improving  Anticipated Dispo: home  Services/DME: [facility]  ELOS: 10/22      Byron Ramirez D.O. M.P.H  PM&R  10/18/2022  11:56 AM

## 2022-10-18 NOTE — PROGRESS NOTES
Occupational Therapy  Facility/Department: [facility] ACUTE REH[facility] UNIT  Rehabilitation Occupational Therapy Daily Treatment Note    Date: 10/18/22  Patient Name: Justin Ho MD       Room: 3109/3109-01  MRN: [id number]  Account: [de-identified]   : 1935  (82 y.o.) Gender: male                    Past Medical History:  has a past medical history of Anemia, Aortic valve disorders, Arthritis, Aspiration pneumonia ([address] Erectile dysfunction, Esophageal cancer ([address] Hernia, femoral, bilateral, History of blood transfusion, Hyperlipidemia, Osteoporosis, senile, Pancreatitis, Patient underweight, Prostate cancer ([address] and Unspecified essential hypertension. Past Surgical History:   has a past surgical history that includes Coronary artery bypass graft (2006); Cardiac valve replacement (2006); eye surgery (& ); Appendectomy; bone graft; gastrectomy; Cholecystectomy; Colonoscopy (2009); Prostate surgery; Tonsillectomy; Esophagus surgery;  Prostate/Transrectal/Vol Collins Brachyth; Colonoscopy (10/05/2015); Upper gastrointestinal endoscopy (N/A, 2021); Colonoscopy (N/A, 2021); Colonoscopy (2021); Upper gastrointestinal endoscopy (N/A, 2022); IR GUIDED PERC PLEURAL DRAIN W CATH INSERT (2022); and Dilatation, esophagus. Restrictions  Other position/activity restrictions: up w/ assist, ambulate pt, up as tolerated,    Subjective  Subjective: Pt seated in recliner upon arrival, pleasant and agreeable to therapy. Pt's wife present. Increased time spent discussing discharge plan as pt's tentative discharge date is in two days. Pt and his wife stated they are comfortable with d/c Thursday as long as his oxygen levels stay above 88% on RA.  Pt's O2 fluctuating 92-94% on RA throughout OT session with activity and during rest. Pt's wife stated she primarily wants to ensure he is able to do the steps to enter their house and was questioning need for a w/c for further distances. OT notified PT of pt's wifes concerns to address during PT session. Pt stated \"Anything I might have trouble with, my wife will be able to help me. \"                Objective     Cognition  Overall Cognitive Status: WFL  Arousal/Alertness: Appropriate responses to stimuli  Following Commands: Follows multistep commands consistently  Attention Span: Appears intact  Memory: Appears intact  Safety Judgement: Good awareness of safety precautions  Problem Solving: Assistance required to identify errors made  Insights: Decreased awareness of deficits  Initiation: Does not require cues  Sequencing: Requires cues for some  Orientation  Overall Orientation Status: Within Normal Limits  Orientation Level: Oriented X4         ADL  Grooming/Oral Hygiene  Assistance Level: Set-up; Increased time to complete  Skilled Clinical Factors: Pt completed oral care seated in w/c at the sink with increased time to manipulate toothpaste and mouthwash and required set up assist to open film on new mouthwash bottle          Functional Mobility  Device: Rolling walker  Activity: To/From bathroom  Assistance Level: Stand by assist  Skilled Clinical Factors: Pt ambulated to/from bathroom w/ RW and SBA. Pt w/ kyphotic posture and short, shuffled steps. Pt on RA and SpO2 was 93% after ambulation to bathroom and 92% after ambulation back to chair  Sit to Stand  Assistance Level: Stand by assist  Skilled Clinical Factors: recliner and w/c > RW  Stand to Sit  Assistance Level: Contact guard assist  Skilled Clinical Factors: RW > w/c and recliner w/ slightly decreased eccentric control   OT Exercises  Exercise Treatment: Pt completed the following seated BUE therex to increase strength and endurance for ADLs and functional mobility.  Pt completed exercises w/ medium resistance theraband: 2 sets x10 reps elbow flexion, 1 set x10 reps PNF shoulder flexion D1 and D2, 1 set x10 reps shoulder horizontal abduction, and 2 sets x5 reps PNF shouder extension D1 and D2. Pt tolerated exercises fair w/ short rest breaks between each set. Pt verbalized no pain during exercises. Pt endorsed fatigue after completing exercises but denied SOB, lightheadedness, or dizziness. Pt provided w/ HEP of the above 4 exercises and educated on performing exercises daily to promote increased activity tolerance. Pt and wife verbalized understanding of HEP. Second Session: Pt semi-supine in bed upon arrival, pleasant and agreeable to therapy. Pt's wife, Holland Murcia, present. Pt denied pain. ADLs: Pt donned tennis shoes seated EOB w/ setup using figure 4 tech w/ + time. Pt doffed tennis shoes seated EOB using figure 4 tech Mod I. Functional Transfers: Pt completed sit >< stand transfers from EOB >< RW and w/c >< RW with SBA-CGA. Pt required intermittent VC for hand placement and improved eccentric control when sitting down in w/c. Functional Mobility: Pt ambulated approx. 15 ft into hallway w/ RW and CGA-SBA. Pt w/ kyphotic posture and demo'd short, shuffled steps. Pt performed w/c mobility approx. 50 + 25 + 50 ft in hallway using BUEs and BLEs w/ spvn and increased time. Pt on RA throughout entire session. Pt's SpO2 dropped to 80-85% with ambulation and w/c mobility and pt required extended seated rest breaks and VC for PLB and diaphragmatic breathing for SpO2 to return to 90-95% range. Standing Balance: Pt completed the following activity to address dynamic standing balance w/ decreased UE support for improved stability for ADLs and functional mobility. Pt stood ~1 min x2 rounds while engaging in balloon volleyball activity. Pt initially tapped the balloon back and forth w/ therapist using BUEs w/o UE support on RW and CGA for the first ~20-30s of each round. As pt began to fatigue, pt required unilateral UE support on RW and CGA-Min A to maintain balance d/t posterior lean.  Pt also required VC for erect posture although presents w/ kyphotic posture and increasingly flexed knees as he fatigues. Endurance and Upper Body Strengthening: Pt completed the following activities to address endurance and BUE strength for improved performance during ADLs, transfers, and functional mobility: 1) Pt completed 3 rounds of \"zoom ball\" seated in w/c. First round- pt performed the motions of horizontal abduction and horizontal adduction to \"send\" a ball across a rope back and forth with OT for 1 min. Second round- pt performed the motions of shoulder flexion and extension w/ RUE on top of LUE for 30s x2 rounds and LUE on top of RUE for 30s x2 rounds. Pt required short rest breaks between each round d/t fatigue and mild SOB. SpO2 remained 90%+ during zoom ball. 2) Pt completed BUE exercises w/ 3# dowel kimi seated in w/c. Pt completed 2 sets x 8 reps of each exercise: elbow flexion, shoulder flexion, shoulder protraction, shoulder horizontal ab/adduction, forward rows, and backward rows. Pt demo'd fair form w/ slightly decreased L shoulder ROM observed, denied pain or discomfort during exercises. Pt took short rest breaks between each set. SpO2 remained 92%+ during exercises. Bed Mobility: Pt supine >< sit Mod I, HOB flat, w/ use of bed rail. Pt left semi-supine in bed at EOS w/ call light w/ in reach, bed alarm on, all needs met. Wife present. Assessment  Assessment  Assessment: Pt tolerated morning OT session well with SpO2 remaining 92%+ on RA. Pt w/ decreased activity tolerance during second OT session due to fatigue w/  SpO2 dropping to low 80s after brief ambulation and wheelchair mobility in hallway. Pt required CGA-Min A for dynamic standing balance activity and standing tolerance was limited to ~1 min at a time max today. Pt remains limited by decreased endurance and generalized weakness. Pt benefits from continued skilled OT to maximize independence and safety w/ functional tasks, cont OT per POC. Activity Tolerance: Patient limited by endurance; Patient limited by fatigue  Discharge Recommendations: 24 hour supervision or assist;Home with Home health OT  OT Equipment Recommendations  Other: Pt reported he has a shower chair, GBs, and a reacher. Cont to assess for additional needs  Safety Devices  Safety Devices in place: Yes  Type of devices: Call light within reach; Left in chair;Chair alarm in place;Nurse notified    Patient Education  Education  Education Given To: Patient; Family  Education Provided: Role of Therapy;Plan of Care;Safety;Transfer Training;Energy Conservation;Home Exercise Program  Education Provided Comments: Ensuring they have a pulse ox at home and purchasing a new one if they cannot find it. Theraband HEP. Using walk-in shower w/ shower chair at dc for fall prevention and placing a chair in the bathroom for grooming at sink for energy conservation  Education Method: Demonstration;Verbal  Barriers to Learning: None  Education Outcome: Verbalized understanding;Continued education needed    Plan  Occupational Therapy Plan  Times Per Week: 5x/week, 90min/day  Current Treatment Recommendations: Strengthening;Balance training;Functional mobility training; Endurance training; Safety education & training;Patient/Caregiver education & training;Equipment evaluation, education, & procurement;Self-Care / ADL; Home management training; Wheelchair mobility training    Goals  Patient Goals   Patient goals : \"I want to return to what I was doing before. \"  Short Term Goals  Time Frame for Short Term Goals: 2 weeks- all ongoing  Short Term Goal 1: Pt will complete UE/LE dressing w/ Mod I and use of AE prn  Short Term Goal 2: Pt will complete bathing w/ spvn  Short Term Goal 3: Pt will complete shower transfer w/ spvn  Short Term Goal 4: Pt will complete toileting/toilet transfer w/ Mod I  Short Term Goal 5: Pt will complete grooming routine in stance w/ Mod I                   Therapy Time   Individual Second Group Co-treatment   Time In 1007  1315       Time Out [id number] Minutes 30  60       Timed Code Treatment Minutes: 30 Minutes + 60 Minutes = 90 Minutes Total       Sheryl Weber

## 2022-10-18 NOTE — PROGRESS NOTES
Patient is alert, oriented x 4. He refuses to place o2 on he states that his o2 sat will go up. Started at 80 went up to 80. Encouraged patient to wear o2 especially with activity. Resp with patient at this time. Fall precautions are in place, call light and bedside table are within reach. Patient is aware to call for assistance to transfer.

## 2022-10-18 NOTE — PROGRESS NOTES
Pt assisted to bathroom with walker and gait belt. Returned to bed. Pt refuses to wear O2, SPO2 72% on RA with exertion.  Back up to 89-90% on RA after few minutes of rest. Will monitor

## 2022-10-18 NOTE — PROGRESS NOTES
Unable to infuse IV iron Sucrose d/t no IV access, poor vascular access.  Dr Noemy Serna was notified yesterday and will advise Dr Tommy Cantu

## 2022-10-18 NOTE — PROGRESS NOTES
Comprehensive Nutrition Assessment    Type and Reason for Visit:  Reassess    Nutrition Recommendations/Plan:   Diet; Regular, 5 carb choices, Low Potassium  Will monitor nutritional adequacy, pertinent labs, bowel habits, wt changes, and clinical progress. Malnutrition Assessment:  Malnutrition Status: At risk for malnutrition (Comment) (10/18/22 1315)    Context:  Acute Illness       Nutrition Assessment:    Follow up: Pt continues on a Regular 5 carb choices, Low K+ diet. PO intake varied with 26-50% & 51-75% of meals consumed. Pt drinking 2 Boost Plus supplements per day brought in by wife as he dislikes Ensure. CBW-117 lbs, up 13 lbs from admit wt. Discharge planned for Sat. Will continue to follow. Nutrition Related Findings:    Glu 225. lytes wnl. RLE/LLE trace edema. LBM 10/16. Wound Type: Pressure Injury, Stage II (sacrum)       Current Nutrition Intake & Therapies:    Average Meal Intake: 26-50%, 51-75%  Average Supplements Intake: % (Boost plus per wife)  ADULT DIET; Regular; 5 carb choices (75 gm/meal); Low Potassium (Less than 3000 mg/day)    Anthropometric Measures:  Height: 5' 3\" (160 cm)  Ideal Body Weight (IBW): 124 lbs (56 kg)       Current Body Weight: 117 lb (53.1 kg),   IBW.  Weight Source: Bed Scale  Current BMI (kg/m2): 20.7  Usual Body Weight: 106 lb (48.1 kg)  % Weight Change (Calculated): -1.9  Weight Adjustment For: No Adjustment                 BMI Categories: Underweight (BMI less than 22) age over 72    Estimated Daily Nutrient Needs:  Energy (kcal/day): 3084-3916  Protein (g/day): 71-85  Fluid (ml/day): 7339-1194    Nutrition Diagnosis:   Inadequate oral intake related to inadequate protein-energy intake, acute injury/trauma as evidenced by intake 26-50%, intake 51-75%  Increased nutrient needs related to increase demand for energy/nutrients as evidenced by wounds (St 2 PI on sacrum)    Nutrition Interventions:   Food and/or Nutrient Delivery: Continue Current Diet  Nutrition Education/Counseling: No recommendation at this time  Coordination of Nutrition Care: Continue to monitor while inpatient  Plan of Care discussed with:  Wife and pt    Goals:  Previous Goal Met: Progressing toward Goal(s)  Goals: PO intake 50% or greater, by next RD assessment       Nutrition Monitoring and Evaluation:   Behavioral-Environmental Outcomes: None Identified  Food/Nutrient Intake Outcomes: Food and Nutrient Intake  Physical Signs/Symptoms Outcomes: Biochemical Data, Skin, Weight, Nutrition Focused Physical Findings    Discharge Planning:    No discharge needs at this time     Kath Santana, [address]: [id number]

## 2022-10-18 NOTE — PROGRESS NOTES
Physical Therapy  Facility/Department: [facility] ACUTE REHAB UNIT  Rehabilitation Physical Therapy Treatment Note    NAME: Magnolia Zhou MD  : 1935 (82 y.o.)  MRN: [id number]  CODE STATUS: Full Code    Date of Service: 10/18/22       Restrictions:  Position Activity Restriction  Other position/activity restrictions: up w/ assist, ambulate pt, up as tolerated,     SUBJECTIVE  Subjective  Subjective: Pt supine in bed upon PT arrival, agreeable to therapy. Pain: Denied pain. Post Treatment Pain Screening     VITALS  Patient on 0.5L O2 via NC  Position Seated in w/c on room air Seated in w/c on 0.5L Seated in w/c following 25' amb Seated in w/c following 3 stairs   SpO2 82% 93% 88% 91%         OBJECTIVE  Cognition  Overall Cognitive Status: WFL  Arousal/Alertness: Appropriate responses to stimuli  Following Commands: Follows multistep commands consistently  Attention Span: Appears intact  Memory: Appears intact  Safety Judgement: Good awareness of safety precautions  Problem Solving: Assistance required to identify errors made;Assistance required to correct errors made  Insights: Decreased awareness of deficits  Initiation: Does not require cues  Sequencing: Requires cues for some  Orientation  Overall Orientation Status: Within Normal Limits  Orientation Level: Oriented X4    Functional Mobility  Supine to Sit  Assistance Level: Supervision  Skilled Clinical Factors: SVN to monitor changes in vitals  Balance  Sitting Balance:  (Pt doffed socks/donned socks and shoes sitting EOB with setup. Mod I for balance.)  Standing Balance:  (SBA with RW; CGA-Ana without device)  Standing Balance  Activity: Pt tied pants in standing without UE assist and CGA for balance. Transfers  Surface: From bed; Wheelchair; To chair with arms  Additional Factors: Verbal cues; Hand placement cues  Device: Walker (RW)  Sit to Stand  Assistance Level: Stand by assist  Skilled Clinical Factors: SBA for safety, VC to scoot to EOS  Stand increased difficulty breathing in supine. Pt practiced this technique ~2 min with VC from PT to count out loud on exhale and decrease speed. Pt provided with SpO2 readings as feedback (initially 88% on room air in supine, improving to 91-92%). Pt performed supine>sit IND. Pt stood from mat table with no AD and CGA to facilitate balance with no UE support. Pt tolerated standing for ~30 seconds with CGA-Ana d/t posterior lean. On second attempt, PT placed bolster anterior to pt and instructed pt to reach anteriorly with alternating UE's X10 reps each to facilitate anterior WS. Pt performed X5 reaches each UE to the R, then to the L to incorporate trunk rotation and lateral WS. Pt completed this activity with SBA-CGA. Pt ambulated 95' with RW, SBA-CGA, O2 transport provided by PT. Pt sat in chair with SBA, VC for RW management. PT re-educated pt importance of pressure relief techniques and on utilizing chair push-ups for this purpose. Pt practiced X5 reps held for 5 seconds each with SVN. Pt left in chair, chair alarm in place, call light and needs within reach. ASSESSMENT/PROGRESS TOWARDS GOALS       Assessment  Assessment: Pt maintained SpO2 88-91% on 0.5L. On room air with minimal activity including bed mobility and bed>chair transfer at beginning of session, SpO2 82%. Pt is progressing with independence in functional mobility no longer req w/c follow, but continues to function below baseline and will benefit from continued skilled PT to maximize potential.  Activity Tolerance: Patient tolerated treatment well;Patient limited by fatigue;Patient limited by endurance  Discharge Recommendations: Continue to assess pending progress;24 hour supervision or assist;Home with Home health PT  PT Equipment Recommendations  Other: Ongoing assessment    Goals  Patient Goals   Patient Goals :  \"To get back to doing what I was doing\"  Short Term Goals  Time Frame for Short Term Goals: 7 days  Short Term Goal 1: Pt will perform bed mobility independently  Short Term Goal 2: Pt will perform all transfers excluding floor transfer with Ana (goal met)  Short Term Goal 3: Pt will ambulate 10' on level surfaces with LRAD and CGA (goal met)  Short Term Goal 4: Patient will navigate 4 stairs with bilateral HR and CGA  Long Term Goals  Time Frame for Long Term Goals : 14 days  Long Term Goal 1: Pt will perform all transfers excluding floor transfer Mod I with LRAD  Long Term Goal 2: Pt will ambulate at least 150' on level surfaces Mod I with LRAD  Long Term Goal 3: Patient will navigate 12 stairs with 1 HR Mod I    PLAN OF CARE/SAFETY  Physcial Therapy Plan  General Plan:  (5x/week for 90 minutes)  Current Treatment Recommendations: Strengthening;ROM;Balance training;Functional mobility training;Transfer training; Endurance training; Wheelchair mobility training;Gait training;Stair training;Neuromuscular re-education;Pain management;Home exercise program;Safety education & training;Patient/Caregiver education & training;Equipment evaluation, education, & procurement;Positioning; Therapeutic activities  Safety Devices  Type of Devices: Left in chair;Chair alarm in place;Call light within reach    EDUCATION  Education  Education Given To: Patient  Education Provided: Role of Therapy;Plan of Care;Safety; Energy Conservation;Mobility Training;Transfer Training  Education Provided Comments: Pt educated on breathing techniques to improve oxygen saturation. PT used SpO2 values as evidence of improvement with pursed lip breathing.   Education Method: Verbal  Barriers to Learning: None  Education Outcome: Verbalized understanding        Therapy Time   Individual Concurrent Group Co-treatment   Time In 0900         Time Out 0930         Minutes 30           Second Session Therapy Time:    Individual Concurrent Group Co-treatment   Time In 1100      Time Out 1200      Minutes 60         Timed Code Treatment Minutes:  30 + 60     Total Treatment Minutes:   [facility], SPT 10/18/22 at 3:53 PM

## 2022-10-18 NOTE — PROGRESS NOTES
Hospital Medicine  Progress Note    Chief Complaint: Lung collapse, anemia      SUBJECTIVE: Patient is improved. There are not new complaints. NioIV access    OBJECTIVE      Medications    Infusion Medications    dextrose       Scheduled Medications    iron sucrose  200 mg IntraVENous Q24H    warfarin  2.5 mg Oral Daily    gabapentin  100 mg Oral Nightly    fludrocortisone  0.1 mg Oral Once per day on     albuterol  2.5 mg Nebulization TID    sodium chloride (Inhalant)  4 mL Nebulization TID    zinc oxide   Topical BID    atorvastatin  10 mg Oral Nightly    guaiFENesin  600 mg Oral BID    melatonin  5 mg Oral Nightly    pantoprazole  40 mg Oral QAM AC    bisacodyl  5 mg Oral Daily       Physical   Temperature:  Current - Temp: 97.6 °F (36.4 °C); Max - Temp  Av.5 °F (36.4 °C)  Min: 97.5 °F (36.4 °C)  Max: 97.6 °F (36.4 °C)    Respiratory Rate : Resp  Av.5  Min: 16  Max: 24    Pulse Range: Pulse  Av.5  Min: 74  Max: 85    Blood Presuure Range:  Systolic (54GGN), ZHF:693 , Min:130 , QRU:357   ; Diastolic (25MRS), LZS:28, Min:77, Max:83      Pulse ox Range: SpO2  Av.7 %  Min: 72 %  Max: 94 %    24hr I & O:    Intake/Output Summary (Last 24 hours) at 10/18/2022 0831  Last data filed at 10/18/2022 0641  Gross per 24 hour   Intake 480 ml   Output --   Net 480 ml       Constitutional:  awake, alert, cooperative, no apparent distress, and appears stated age  Eyes:  pupils equal, round and reactive to light  ENT:  normocepalic, without obvious abnormality  NECK:  supple, symmetrical, trachea midline  HEMATOLOGIC/LYMPHATICS:  no cervical lymphadenopathy  LUNGS:  diminished breath sounds throughout lungs  CARDIOVASCULAR: regular rate and rhythm, S1, S2 normal, no murmur, click, rub or gallop  ABDOMEN:  soft and nondistended  MUSCULOSKELETAL:  There is no redness, warmth, or swelling of the joints. Full range of motion noted. Motor strength is 5 out of 5 all extremities bilaterally.   Tone is normal.  SKIN:  normal skin color, texture, turgor    Data      CBC:   Lab Results   Component Value Date/Time    WBC 8.2 10/17/2022 12:25 PM    RBC 3.08 10/17/2022 12:25 PM    RBC 4.41 06/26/2017 01:24 PM    HGB 8.8 10/17/2022 12:25 PM    HCT 28.5 10/17/2022 12:25 PM    MCV 92.7 10/17/2022 12:25 PM    MCH 28.6 10/17/2022 12:25 PM    MCHC 30.8 10/17/2022 12:25 PM    RDW 22.5 10/17/2022 12:25 PM     10/17/2022 12:25 PM    MPV 9.7 10/17/2022 12:25 PM     CMP:    Lab Results   Component Value Date/Time     10/17/2022 12:25 PM    K 4.3 10/17/2022 12:25 PM     10/17/2022 12:25 PM    CO2 31 10/17/2022 12:25 PM    BUN 34 10/17/2022 12:25 PM    CREATININE 1.1 10/17/2022 12:25 PM    GFRAA >60 10/17/2022 12:25 PM    GFRAA >60 05/10/2013 02:07 PM    AGRATIO 1.2 09/19/2022 05:14 PM    LABGLOM >60 10/17/2022 12:25 PM    LABGLOM >60>60 01/10/2012 09:42 AM    GLUCOSE 225 10/17/2022 12:25 PM    GLUCOSE 123 09/26/2016 01:51 PM    PROT 7.8 09/19/2022 05:14 PM    PROT 6.8 09/26/2016 01:51 PM    LABALBU 2.4 10/08/2022 03:50 AM    CALCIUM 8.5 10/17/2022 12:25 PM    BILITOT 0.5 09/19/2022 05:14 PM    ALKPHOS 62 09/19/2022 05:14 PM    AST 43 09/19/2022 05:14 PM    ALT 30 09/19/2022 05:14 PM         ASSESSMENT AND PLAN      Principal Problem:    Debility  Plan: Continue rehab  Active Problems:    Anemia  Plan:Po iron no IV access    Ineffective airway clearance  Plan: Respiratory toilet

## 2022-10-18 NOTE — PLAN OF CARE
Problem: Skin/Tissue Integrity  Goal: Absence of new skin breakdown  10/18/2022 0753 by Lilo Guzman, RN  Outcome: Progress  Skin assessment every shift patient encouraged to shift weight when up in chair, pharmaceutical agent used for current stage 2 on buttocks, mepilex on heels for protection, heels floated, specialty bed
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
## 2022-10-18 NOTE — CARE COORDINATION
Team Conference held this AM and Team discussed DC date now being Saturday 10/22/22 to help with weaning Pt off 02. PT also advised that a 16 inch light weight wheelchair will be needed at DC. Dr. Jane Watson meet with Pt and spouse and informed of new DC date and both are in agreement. SW will continue to follow.     Keesha Gonzalez, [facility]  Case Management  [phone]

## 2022-10-19 LAB
ANION GAP SERPL CALCULATED.3IONS-SCNC: 6 MMOL/L (ref 3–16)
BASOPHILS ABSOLUTE: 0 K/UL (ref 0–0.2)
BASOPHILS RELATIVE PERCENT: 0.7 %
BUN BLDV-MCNC: 32 MG/DL (ref 7–20)
CALCIUM SERPL-MCNC: 8.6 MG/DL (ref 8.3–10.6)
CHLORIDE BLD-SCNC: 103 MMOL/L (ref 99–110)
CO2: 32 MMOL/L (ref 21–32)
CREAT SERPL-MCNC: 1 MG/DL (ref 0.8–1.3)
EOSINOPHILS ABSOLUTE: 0.1 K/UL (ref 0–0.6)
EOSINOPHILS RELATIVE PERCENT: 1.9 %
GFR SERPL CREATININE-BSD FRML MDRD: >60 ML/MIN/{1.73_M2}
GLUCOSE BLD-MCNC: 121 MG/DL (ref 70–99)
HCT VFR BLD CALC: 30.1 % (ref 40.5–52.5)
HEMOGLOBIN: 9.3 G/DL (ref 13.5–17.5)
INR BLD: 2.23 (ref 0.87–1.14)
LYMPHOCYTES ABSOLUTE: 1 K/UL (ref 1–5.1)
LYMPHOCYTES RELATIVE PERCENT: 14.6 %
MCH RBC QN AUTO: 28.4 PG (ref 26–34)
MCHC RBC AUTO-ENTMCNC: 31.1 G/DL (ref 31–36)
MCV RBC AUTO: 91.6 FL (ref 80–100)
MONOCYTES ABSOLUTE: 0.4 K/UL (ref 0–1.3)
MONOCYTES RELATIVE PERCENT: 6.4 %
NEUTROPHILS ABSOLUTE: 5.3 K/UL (ref 1.7–7.7)
NEUTROPHILS RELATIVE PERCENT: 76.4 %
PDW BLD-RTO: 22.5 % (ref 12.4–15.4)
PLATELET # BLD: 197 K/UL (ref 135–450)
PMV BLD AUTO: 9.8 FL (ref 5–10.5)
POTASSIUM REFLEX MAGNESIUM: 4.3 MMOL/L (ref 3.5–5.1)
PROTHROMBIN TIME: 24.7 SEC (ref 11.7–14.5)
RBC # BLD: 3.28 M/UL (ref 4.2–5.9)
SODIUM BLD-SCNC: 141 MMOL/L (ref 136–145)
WBC # BLD: 7 K/UL (ref 4–11)

## 2022-10-19 PROCEDURE — 6370000000 HC RX 637 (ALT 250 FOR IP): Performed by: PHYSICAL MEDICINE & REHABILITATION

## 2022-10-19 PROCEDURE — 99232 SBSQ HOSP IP/OBS MODERATE 35: CPT | Performed by: INTERNAL MEDICINE

## 2022-10-19 PROCEDURE — 97530 THERAPEUTIC ACTIVITIES: CPT

## 2022-10-19 PROCEDURE — 85025 COMPLETE CBC W/AUTO DIFF WBC: CPT

## 2022-10-19 PROCEDURE — 97535 SELF CARE MNGMENT TRAINING: CPT

## 2022-10-19 PROCEDURE — 97116 GAIT TRAINING THERAPY: CPT

## 2022-10-19 PROCEDURE — 1280000000 HC REHAB R&B

## 2022-10-19 PROCEDURE — 80048 BASIC METABOLIC PNL TOTAL CA: CPT

## 2022-10-19 PROCEDURE — 36415 COLL VENOUS BLD VENIPUNCTURE: CPT

## 2022-10-19 PROCEDURE — 85610 PROTHROMBIN TIME: CPT

## 2022-10-19 PROCEDURE — 99232 SBSQ HOSP IP/OBS MODERATE 35: CPT | Performed by: PHYSICAL MEDICINE & REHABILITATION

## 2022-10-19 RX ADMIN — Medication: at 13:35

## 2022-10-19 RX ADMIN — WARFARIN SODIUM 2.5 MG: 2.5 TABLET ORAL at 18:11

## 2022-10-19 RX ADMIN — PANTOPRAZOLE SODIUM 40 MG: 40 TABLET, DELAYED RELEASE ORAL at 06:38

## 2022-10-19 RX ADMIN — GUAIFENESIN 600 MG: 600 TABLET, EXTENDED RELEASE ORAL at 11:10

## 2022-10-19 RX ADMIN — GABAPENTIN 100 MG: 100 CAPSULE ORAL at 20:02

## 2022-10-19 RX ADMIN — ATORVASTATIN CALCIUM 10 MG: 10 TABLET, FILM COATED ORAL at 20:02

## 2022-10-19 RX ADMIN — Medication 5 MG: at 21:55

## 2022-10-19 RX ADMIN — GUAIFENESIN 600 MG: 600 TABLET, EXTENDED RELEASE ORAL at 20:02

## 2022-10-19 RX ADMIN — BISACODYL 5 MG: 5 TABLET, COATED ORAL at 11:10

## 2022-10-19 RX ADMIN — Medication: at 20:05

## 2022-10-19 NOTE — PROGRESS NOTES
Department of Physical Medicine & Rehabilitation  Progress Note    Patient Identification:  Lawrence Amaya MD  [id number]  : 1935  Admit date: 10/6/2022    Chief Complaint: Debility    Subjective:   Doing well today. Hgb 9.3 today. Plan to DC home this weekend. Likely will need sp02. Wife asking for wheelchair today. They do live in a handicap apartment. ROS: No f/c, n/v, cp     Objective:  Patient Vitals for the past 24 hrs:   BP Temp Temp src Pulse Resp SpO2   10/18/22 2030 (!) 152/82 98.5 °F (36.9 °C) Oral 95 18 90 %       Const: Alert. No distress, pleasant. HEENT: Normocephalic, atraumatic. Normal sclera/conjunctiva. MMM. CV: Regular rate and rhythm. Resp: No respiratory distress. Lungs CTAB. Abd: Soft, nontender, nondistended, NABS+   Ext: No edema. Neuro: Alert, oriented, appropriately interactive. Psych: Cooperative, appropriate mood and affect    Laboratory data: Available via EMR.    Last 24 hour lab  Recent Results (from the past 24 hour(s))   Protime-INR    Collection Time: 10/19/22  6:54 AM   Result Value Ref Range    Protime 24.7 (H) 11.7 - 14.5 sec    INR 2.23 (H) 0.87 - 5.22   Basic Metabolic Panel w/ Reflex to MG    Collection Time: 10/19/22  6:54 AM   Result Value Ref Range    Sodium 141 136 - 145 mmol/L    Potassium reflex Magnesium 4.3 3.5 - 5.1 mmol/L    Chloride 103 99 - 110 mmol/L    CO2 32 21 - 32 mmol/L    Anion Gap 6 3 - 16    Glucose 121 (H) 70 - 99 mg/dL    BUN 32 (H) 7 - 20 mg/dL    Creatinine 1.0 0.8 - 1.3 mg/dL    Est, Glom Filt Rate >60 >60    Calcium 8.6 8.3 - 10.6 mg/dL   CBC auto differential    Collection Time: 10/19/22  6:54 AM   Result Value Ref Range    WBC 7.0 4.0 - 11.0 K/uL    RBC 3.28 (L) 4.20 - 5.90 M/uL    Hemoglobin 9.3 (L) 13.5 - 17.5 g/dL    Hematocrit 30.1 (L) 40.5 - 52.5 %    MCV 91.6 80.0 - 100.0 fL    MCH 28.4 26.0 - 34.0 pg    MCHC 31.1 31.0 - 36.0 g/dL    RDW 22.5 (H) 12.4 - 15.4 %    Platelets 354 161 - 049 K/uL    MPV 9.8 5.0 - 10.5 fL Neutrophils % 76.4 %    Lymphocytes % 14.6 %    Monocytes % 6.4 %    Eosinophils % 1.9 %    Basophils % 0.7 %    Neutrophils Absolute 5.3 1.7 - 7.7 K/uL    Lymphocytes Absolute 1.0 1.0 - 5.1 K/uL    Monocytes Absolute 0.4 0.0 - 1.3 K/uL    Eosinophils Absolute 0.1 0.0 - 0.6 K/uL    Basophils Absolute 0.0 0.0 - 0.2 K/uL         Therapy progress:  PT  Position Activity Restriction  Other position/activity restrictions: up w/ assist, ambulate pt, up as tolerated,  Objective     Sit to Stand: Dependent/Total, 2 Person Assistance  Stand to Sit: Dependent/Total, 2 Person Assistance  Bed to Chair: Dependent/Total, 2 Person Assistance (Performed via squat pivot (see below))     OT  PT Equipment Recommendations  Other: Ongoing assessment     Assessment        SLP          Body mass index is 20.85 kg/m².     Assessment and Plan:  # Right pneumothorax  # R 1,3-6 nondisplaced rib fractures  # Right lung contusion             - 9/19 - Right chest tube placed in the ED  - 9/23 - Right chest tube removed  - Pulmonary expansion  - tramadol pain control      # Left pneumothorax  # Left T1 and T3 posterior rib fractures  # Left lung contusion  - 9/21 - Left chest tube placed by IR  - 9/23 - chest tube to water seal  - Left CT removed 10/4     #LLL collapse, L mainstem bronchus occlusion  - 9/23 - s/p bronchoscopy by ICU with removal of large mucus plug  - 9/27 - CT chest demonstrating persistent total occlusion of the left central airway/left lung collapse with loculated L hydropneumothorax    - Aggressive respiratory care, hypertonic saline, mucomyst nebs BID  - Bronch with pulm 9/30 w/mucus plugging in the right and left tracheobronchial trees  - Bronch wash cultures with Ecoli resistant to cefazolin, on Ceftriaxone- 2 days left        # C7 TP Fracture  - 9/20 - CTA Head/Neck did not show evidence of BCVI   - No brace needed  - Activity as tolerated     #L shoulder ecchymoses  - L shoulder x-ray with no interosseous abnormalities Medical Comorbidities  # Aortic Valve Replacement  - 9/23 - restarted home warfarin  - AM INR checks - prior goal 2-2.5  - check dosing with pharmacy     # Hyperlipidemia  - Continue atorvastatin 10 mg daily     #RUL pulmonary nodule  - 6mm RUL nodule noted on 9/22 CT chest  - Recommend 6-12 month interval CT scan for monitoring      #Stage 3 Coccygeal Ulcer  - 2/2 to rowing machine per patient now a stage 3 wound   - Recommending [facility]     #CIM  - prolonged hospital course   - PT/OT required      # orthostatic hypotension  - Likely associated with CIM  - nephrology consult      Improving Hgb.    02 sat test for DC. Wheelchair ordered today.          Rehab Progress: Improving  Anticipated Dispo: home  Services/DME: [facility]  ELOS: 10/22      Buster Thomason D.O. M.P.H  PM&R  10/19/2022  10:55 AM

## 2022-10-19 NOTE — PROGRESS NOTES
Nephrology  Note                                                                                                                                                                                                                                                                                                                                                               Office : [phone]     Fax :[phone]              Patient's Name: Liam Oneil MD      Reason for Consult:  Orthostatic   Requesting Physician:  Zetta Klinefelter, MD      Chief Complaint:  Trauma      Feels better  PT going well   Dizziness better     Past Medical History:   Diagnosis Date    Anemia     Aortic valve disorders     stenosis    Arthritis     Aspiration pneumonia ([address] 04/27/2015    Erectile dysfunction     Esophageal cancer ([address] 10/18/2012    Hernia, femoral, bilateral 05/12/2014    History of blood transfusion     Hyperlipidemia     Osteoporosis, senile 05/20/2014    Pancreatitis 08/01/2013    Patient underweight 08/08/2013    Prostate cancer ([facility]     Unspecified essential hypertension        Past Surgical History:   Procedure Laterality Date    APPENDECTOMY      as a child    BONE GRAFT      for saddle nose    CARDIAC VALVE REPLACEMENT  01/01/2006    aorta    CHOLECYSTECTOMY      COLONOSCOPY  11/01/2009    COLONOSCOPY  10/05/2015    COLONOSCOPY N/A 02/17/2021    COLONOSCOPY DIAGNOSTIC/STOMA performed by Jose Lee MD at [place] [address]  02/18/2021    COLONOSCOPY POLYPECTOMY SNARE/COLD BIOPSY performed by Jose Lee MD at [address] [place] [address]  01/01/2006    DILATATION, ESOPHAGUS      2010    ESOPHAGUS SURGERY      EYE SURGERY  1990& 1992    cataract bilat removal     GASTRECTOMY      IR PERC CATH PLEURAL DRAIN W/IMAG  09/21/2022    IR PERC CATH PLEURAL DRAIN W/IMAG    PROSTATE SURGERY      seeds    TONSILLECTOMY      UPPER GASTROINTESTINAL ENDOSCOPY N/A 02/16/2021    EGD BIOPSY performed by Waylon Cabrera MD at [address]  N/A 04/22/2022    EGD CONTROL HEMORRHAGE performed by John Olson MD at [address]  BRACHYTHERAPY         Family History   Problem Relation Age of Onset    Other Mother         bleeding peptic ulcer    Heart Disease Mother     High Blood Pressure Father     Stroke Father     Prostate Cancer Father     Other Father         cardiovascular disease    Elevated Lipids Father     Hypertension Father     Colon Cancer Paternal Cousin         reports that he has never smoked. He has never used smokeless tobacco. He reports current alcohol use. He reports that he does not use drugs.     Allergies:  No known allergies    Current Medications:    ferrous sulfate (IRON 325) tablet 325 mg, Every Other Day  ascorbic acid (VITAMIN C) tablet 500 mg, Every Other Day  albuterol (PROVENTIL) nebulizer solution 2.5 mg, Q4H PRN  sodium chloride (Inhalant) 3 % nebulizer solution 4 mL, PRN  warfarin (COUMADIN) tablet 2.5 mg, Daily  traMADol (ULTRAM) tablet 100 mg, Q6H PRN  gabapentin (NEURONTIN) capsule 100 mg, Nightly  fludrocortisone (FLORINEF) tablet 0.1 mg, Once per day on Mon Wed Fri  zinc oxide (TRIAD HYDROPHILIC) paste, BID  alteplase (CATHFLO) injection 1 mg, Daily PRN  atorvastatin (LIPITOR) tablet 10 mg, Nightly  guaiFENesin (MUCINEX) extended release tablet 600 mg, BID  melatonin disintegrating tablet 5 mg, Nightly  pantoprazole (PROTONIX) tablet 40 mg, QAM AC  acetaminophen (TYLENOL) tablet 650 mg, Q4H PRN  bisacodyl (DULCOLAX) EC tablet 5 mg, Daily  magnesium hydroxide (MILK OF MAGNESIA) 400 MG/5ML suspension 30 mL, Daily PRN  polyethylene glycol (GLYCOLAX) packet 17 g, Daily PRN  simethicone (MYLICON) chewable tablet 80 mg, Q6H PRN  glucose chewable tablet 16 g, PRN  dextrose bolus 10% 125 mL, PRN   Or  dextrose bolus 10% 250 mL, PRN  glucagon (rDNA) injection 1 mg, PRN  dextrose 10 % infusion, Continuous PRN      Review of Systems:   14 point ROS obtained but were negative except mentioned in HPI      Physical exam:     Vitals:  BP (!) 152/82   Pulse 95   Temp 98.5 °F (36.9 °C) (Oral)   Resp 18   Ht 5' 3\" (1.6 m)   Wt 117 lb 11.6 oz (53.4 kg)   SpO2 90%   BMI 20.85 kg/m²   Constitutional:  AA  Skin: no rash, turgor wnl  Heent:  eomi, mmm  Neck: no bruits or jvd noted  Cardiovascular:  S1, S2 without m/r/g  Respiratory: CTA B without w/r/r  Abdomen:  +bs, soft, nt, nd  Ext: + lower extremity edema  Psychiatric: mood and affect appropriate  Musculoskeletal:  Rom, muscular strength intact    Data:   Labs:  CBC:   Recent Labs     10/17/22  1225   WBC 8.2   HGB 8.8*          BMP:    Recent Labs     10/17/22  1225      K 4.3      CO2 31   BUN 34*   CREATININE 1.1   GLUCOSE 225*       Ca/Mg/Phos:   Recent Labs     10/17/22  1225   CALCIUM 8.5       Hepatic: No results for input(s): AST, ALT, ALB, BILITOT, ALKPHOS in the last 72 hours. Troponin: No results for input(s): TROPONINI in the last 72 hours. BNP: No results for input(s): BNP in the last 72 hours. Lipids: No results for input(s): CHOL, TRIG, HDL, LDLCALC, LABVLDL in the last 72 hours. ABGs: No results for input(s): PHART, PO2ART, XTR7JMH in the last 72 hours. INR:   Recent Labs     10/16/22  0634 10/17/22  1226   INR 1.99* 2.09*       UA:No results for input(s): Altagracia David, GLUCOSEU, BILIRUBINUR, Brandt Breslow, SPECGRAV, BLOODU, PHUR, PROTEINU, UROBILINOGEN, NITRU, LEUKOCYTESUR, Karron Belts in the last 72 hours. Urine Microscopic: No results for input(s): LABCAST, BACTERIA, COMU, HYALCAST, WBCUA, RBCUA, EPIU in the last 72 hours. Urine Culture: No results for input(s): LABURIN in the last 72 hours. Urine Chemistry: No results for input(s): Sherial Blower, PROTEINUR, NAUR in the last 72 hours. IMAGING:  XR CHEST PORTABLE   Final Result   1.   Worsening left-sided airspace opacities, possibly asymmetric pulmonary edema or pneumonia. 2.  Left-sided pleural effusion. 3.  The previously described pneumothoraces are no longer visualized. The lucency projecting over the medial left lung apex is felt to represent air within the esophagus. Assessment/Plan   Orthostatic     2. Hyperkalemia     3. Anemia    4. Acid- base/ Electrolyte imbalance     5.  Debility     Plan   - d/c  florinef  and monitor   - Monitor Orthostats   - Cortisol 8.6  - UPC - no proteinuria   - encourage solute intake    - Rehab                   Thank you for allowing us to participate in care of MD Chito Cisneros MD  Feel free to contact me   [facility] [address]  Office : [phone]  Fax :[phone]
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
## 2022-10-19 NOTE — PROGRESS NOTES
Hospital Medicine  Progress Note    Chief Complaint Anemia , collapse of lung      SUBJECTIVE: Patient is improved. There are not new complaints. Off O2 at rest    OBJECTIVE      Medications    Infusion Medications    dextrose       Scheduled Medications    ferrous sulfate  325 mg Oral Every Other Day    vitamin C  500 mg Oral Every Other Day    warfarin  2.5 mg Oral Daily    gabapentin  100 mg Oral Nightly    zinc oxide   Topical BID    atorvastatin  10 mg Oral Nightly    guaiFENesin  600 mg Oral BID    melatonin  5 mg Oral Nightly    pantoprazole  40 mg Oral QAM AC    bisacodyl  5 mg Oral Daily       Physical   Temperature:  Current - Temp: 98.5 °F (36.9 °C); Max - Temp  Av.5 °F (36.9 °C)  Min: 98.5 °F (36.9 °C)  Max: 98.5 °F (36.9 °C)    Respiratory Rate : Resp  Av  Min: 18  Max: 18    Pulse Range: Pulse  Av  Min: 95  Max: 95    Blood Presuure Range:  Systolic (55DDX), LOQ:509 , Min:152 , YZZ:484   ; Diastolic (92YGL), KVA:63, Min:82, Max:82      Pulse ox Range: SpO2  Av %  Min: 90 %  Max: 90 %    24hr I & O:    Intake/Output Summary (Last 24 hours) at 10/19/2022 0845  Last data filed at 10/18/2022 1752  Gross per 24 hour   Intake 480 ml   Output --   Net 480 ml       Constitutional:  awake, alert, cooperative, no apparent distress, and appears stated age  Eyes:  pupils equal, round and reactive to light  ENT:  normocepalic, without obvious abnormality  NECK:  supple, symmetrical, trachea midline  HEMATOLOGIC/LYMPHATICS:  no cervical lymphadenopathy  LUNGS:  No increased work of breathing, good air exchange, clear to auscultation RT decreased breath sounds at Lt base no crackles or wheezing  CARDIOVASCULAR: regular rate and rhythm, S1, S2 normal, no murmur, click, rub or gallop  ABDOMEN:  soft, non-tender, without masses or organomegaly  MUSCULOSKELETAL:  There is no redness, warmth, or swelling of the joints. Full range of motion noted.   Motor strength is 5 out of 5 all extremities bilaterally.   Tone is normal.  SKIN:  normal skin color, texture, turgor    Data      CBC:   Lab Results   Component Value Date/Time    WBC 7.0 10/19/2022 06:54 AM    RBC 3.28 10/19/2022 06:54 AM    RBC 4.41 06/26/2017 01:24 PM    HGB 9.3 10/19/2022 06:54 AM    HCT 30.1 10/19/2022 06:54 AM    MCV 91.6 10/19/2022 06:54 AM    MCH 28.4 10/19/2022 06:54 AM    MCHC 31.1 10/19/2022 06:54 AM    RDW 22.5 10/19/2022 06:54 AM     10/19/2022 06:54 AM    MPV 9.8 10/19/2022 06:54 AM     BMP:    Lab Results   Component Value Date/Time     10/19/2022 06:54 AM    K 4.3 10/19/2022 06:54 AM     10/19/2022 06:54 AM    CO2 32 10/19/2022 06:54 AM    BUN 32 10/19/2022 06:54 AM    LABALBU 2.4 10/08/2022 03:50 AM    CREATININE 1.0 10/19/2022 06:54 AM    CALCIUM 8.6 10/19/2022 06:54 AM    GFRAA >60 10/17/2022 12:25 PM    GFRAA >60 05/10/2013 02:07 PM    LABGLOM >60 10/19/2022 06:54 AM    GLUCOSE 121 10/19/2022 06:54 AM    GLUCOSE 123 09/26/2016 01:51 PM         ASSESSMENT AND PLAN      Principal Problem:    Debility  Plan: PT/OT  Active Problems:        Anemia  Plan: Iron and vit C    Abnormal CXR  Plan: Repeat CXR on Thursday    Ineffective airway clearance  Plan: Repiratory toilet

## 2022-10-19 NOTE — PROGRESS NOTES
Patient assessment completed. VSS and patient is afebrile. Denies any pain or discomfort at this time. Bed in low position and call light within reach.

## 2022-10-19 NOTE — PLAN OF CARE
Problem: Discharge Planning  Goal: Discharge to home or other facility with appropriate resources  Outcome: Progressing  Flowsheets (Taken 10/19/2022 0246)  Discharge to home or other facility with appropriate resources: Identify barriers to discharge with patient and caregiver     Problem: Safety - Adult  Goal: Free from fall injury  Outcome: Progressing  Flowsheets (Taken 10/19/2022 0246)  Free From Fall Injury: Instruct family/caregiver on patient safety     Problem: ABCDS Injury Assessment  Goal: Absence of physical injury  Outcome: Progressing  Flowsheets (Taken 10/19/2022 0246)  Absence of Physical Injury: Implement safety measures based on patient assessment     Problem: Nutrition Deficit:  Goal: Optimize nutritional status  Outcome: Progressing  Flowsheets (Taken 10/19/2022 0246)  Nutrient intake appropriate for improving, restoring, or maintaining nutritional needs:   Assess nutritional status and recommend course of action   Monitor oral intake, labs, and treatment plans   Recommend appropriate diets, oral nutritional supplements, and vitamin/mineral supplements

## 2022-10-19 NOTE — RT PROTOCOL NOTE
RT Inhaler-Nebulizer Bronchodilator Protocol Note    There is a bronchodilator order in the chart from a provider indicating to follow the RT Bronchodilator Protocol and there is an Initiate RT Inhaler-Nebulizer Bronchodilator Protocol order as well (see protocol at bottom of note). CXR Findings:  No results found. The findings from the last RT Protocol Assessment were as follows:   History Pulmonary Disease: None or smoker <15 pack years  Respiratory Pattern: Regular pattern and RR 12-20 bpm  Breath Sounds: Clear breath sounds  Cough: Strong, spontaneous, non-productive  Indication for Bronchodilator Therapy: Decreased or absent breath sounds  Bronchodilator Assessment Score: 0    Aerosolized bronchodilator medication orders have been revised according to the RT Inhaler-Nebulizer Bronchodilator Protocol below. Respiratory Therapist to perform RT Therapy Protocol Assessment initially then follow the protocol. Repeat RT Therapy Protocol Assessment PRN for score 0-3 or on second treatment, BID, and PRN for scores above 3. No Indications - adjust the frequency to every 6 hours PRN wheezing or bronchospasm, if no treatments needed after 48 hours then discontinue using Per Protocol order mode. If indication present, adjust the RT bronchodilator orders based on the Bronchodilator Assessment Score as indicated below. Use Inhaler orders unless patient has one or more of the following: on home nebulizer, not able to hold breath for 10 seconds, is not alert and oriented, cannot activate and use MDI correctly, or respiratory rate 25 breaths per minute or more, then use the equivalent nebulizer order(s) with same Frequency and PRN reasons based on the score. If a patient is on this medication at home then do not decrease Frequency below that used at home.     0-3 - enter or revise RT bronchodilator order(s) to equivalent RT Bronchodilator order with Frequency of every 4 hours PRN for wheezing or increased work of breathing using Per Protocol order mode. 4-6 - enter or revise RT Bronchodilator order(s) to two equivalent RT bronchodilator orders with one order with BID Frequency and one order with Frequency of every 4 hours PRN wheezing or increased work of breathing using Per Protocol order mode. 7-10 - enter or revise RT Bronchodilator order(s) to two equivalent RT bronchodilator orders with one order with TID Frequency and one order with Frequency of every 4 hours PRN wheezing or increased work of breathing using Per Protocol order mode. 11-13 - enter or revise RT Bronchodilator order(s) to one equivalent RT bronchodilator order with QID Frequency and an Albuterol order with Frequency of every 4 hours PRN wheezing or increased work of breathing using Per Protocol order mode. Greater than 13 - enter or revise RT Bronchodilator order(s) to one equivalent RT bronchodilator order with every 4 hours Frequency and an Albuterol order with Frequency of every 2 hours PRN wheezing or increased work of breathing using Per Protocol order mode. RT to enter RT Home Evaluation for COPD & MDI Assessment order using Per Protocol order mode.     Electronically signed by Anita Guillen RCP on 10/18/2022 at 8:21 PM

## 2022-10-19 NOTE — PROGRESS NOTES
Occupational Therapy  Facility/Department: [facility] ACUTE [facility] UN[facility]  Rehabilitation Occupational Therapy Daily Treatment Note    Date: 10/19/22  Patient Name: Kevon Maxwell MD       Room: 3109/3109-01  MRN: [id number]  Account: [de-identified]   : 1935  (82 y.o.) Gender: male                    Past Medical History:  has a past medical history of Anemia, Aortic valve disorders, Arthritis, Aspiration pneumonia ([address] Erectile dysfunction, Esophageal cancer ([address] Hernia, femoral, bilateral, History of blood transfusion, Hyperlipidemia, Osteoporosis, senile, Pancreatitis, Patient underweight, Prostate cancer ([address] and Unspecified essential hypertension. Past Surgical History:   has a past surgical history that includes Coronary artery bypass graft (2006); Cardiac valve replacement (2006); eye surgery (& ); Appendectomy; bone graft; gastrectomy; Cholecystectomy; Colonoscopy (2009); Prostate surgery; Tonsillectomy; Esophagus surgery;  Prostate/Transrectal/Vol Collins Brachyth; Colonoscopy (10/05/2015); Upper gastrointestinal endoscopy (N/A, 2021); Colonoscopy (N/A, 2021); Colonoscopy (2021); Upper gastrointestinal endoscopy (N/A, 2022); IR GUIDED PERC PLEURAL DRAIN W CATH INSERT (2022); and Dilatation, esophagus. Restrictions  Other position/activity restrictions: up w/ assist, ambulate pt, up as tolerated,    Subjective  Subjective: Pt seated in bed upon arrival, pleasant and agreeable to therapy. Pt on room air and SpO2 78% after sitting up on edge of bed and beginning to put his shoes on. Pt only recovering to 82% with extended rest break and placed on 1L of O2 for remainder of session to maintain sats 88%+. Objective     Cognition  Overall Cognitive Status: WFL  Arousal/Alertness: Appropriate responses to stimuli  Following Commands:  Follows multistep commands consistently  Attention Span: Appears intact  Memory: Appears intact  Safety Judgement: Good awareness of safety precautions  Problem Solving: Assistance required to identify errors made;Assistance required to correct errors made  Insights: Decreased awareness of deficits  Initiation: Does not require cues  Sequencing: Requires cues for some  Orientation  Overall Orientation Status: Within Normal Limits  Orientation Level: Oriented X4         ADL  Feeding  Assistance Level: Independent  Skilled Clinical Factors: Pt began to self-feed breakfast at end of session, did not require assistance to open any containers  Putting On/Taking Off Footwear  Assistance Level: Set-up  Skilled Clinical Factors: Pt donned his shoes seated at EOB using figure 4 w/ setup and increased time to untie and tie laces          Functional Mobility  Device: Rolling walker  Activity:  (ambulation in hallway)  Assistance Level: Stand by assist  Skilled Clinical Factors: Pt ambulated approx. 25 ft into hallway w/ RW and SBA. Pt w/ kyphotic posture and short, shuffled steps. Pt on 1L of oxygen and SpO2 87% after ambulation. Pt self-propelled w/c ~65 ft the rest of the way to the therapy gym using BUEs and BLEs and SpO2 94% after w/c mobility. Pt pushed back to room in w/c at EOS d/t time constraints and ambulated 5 ft from w/c > recliner w/ RW and SBA. Supine to Sit  Assistance Level: Modified independent  Skilled Clinical Factors: HOB slightly elevated with use of bedrail  Sit to Stand  Assistance Level: Stand by assist  Skilled Clinical Factors: recliner and w/c > RW  Stand to Sit  Assistance Level: Contact guard assist  Skilled Clinical Factors: RW > w/c and recliner w/ VC for hand placement and eccentric control   OT Exercises  Dynamic Standing Balance Exercises: Pt completed the following activity to address balance and stability w/ decreased UE support for improved ADL performance: Pt stood at rebounder to toss/catch 1.1 lb weighted ball for 1 min and 30s without UE support.  Pt required CGA and demonstrated no overt loss of balance. Pt completed a second round of toss/catch w/ 1.1 lb weighted ball lasting 2 mins w/o UE support and required CGA-Min A to maintain balance d/t intermittent posterior lean. Pt required extended seated rest breaks after each round. Pt on 1L of O2 and SpO2 remained 92%+       Second Session  Pt eager to complete showering and dressing ADL tasks this session. Pt completing tasks with rest breaks on room air with O2 monitored throughout sesssion above 90% throughout. ADLs  Pt completing UE washing seated on TTB with SBA and increased time with VCS for completion. Pt requesting assist for washing back, this therapist assisted in washing back but also educated on use of long handle sponge. Pt receptive to education and able to demonstrate understanding. Pt able to dry all components of UE. LE washing completed with SBA seated using long handle sponge for LE then in stance requiring CGA to Min A for radha/buttocks washing with education on keeping one hand on GB. R knee blocking provided due to pt stating he knees where feeling weak. Pt donning briefs seated on TTB with Mod A due to tight space and donning gown with CGA. Pt finishing dressing tasks seated on recliner donning T shirts with Supervision and set up, pants with CGA to steady stance while pulling over hips,pt using figure 4 for threading. Socks donned seated with Set up. Functional mobility   Pt competing transfer to and from recliner chair with CGA and shower transfer with CGA and VCS for safe hand placement on RW then on GB. Functional mobility demonstrated to and from bathroom with RW and CGA to Min A with slight knee buckling noted with pt able to correct. Education  Pt educated on chunking ADLs into doable portions with rest breaks when needed. Pt and wife with questions about home set up. All questions answered and no equipment needed. Pt on room air upon entry and able to remain on room air throughout session.  Pt left with lunch tray seated in recliner chair with alarm on and call light in reach. Wife in room upon exit. Assessment  Assessment  Assessment: Pt demo'd improved standing tolerance this date as evidenced by standing up to 2 mins during dynamic balance activity w/ CGA-Min A to maintain balance without UE support. Pt progressing to SBA for functional transfers and short distance functional mobility w/ RW. Pt's SpO2 remained 87% and above on 1L of oxygen throughout session. Pt continues to present below baseline and benefits from continued skilled OT to maximize functional independence. Cont OT per POC. Activity Tolerance: Patient tolerated treatment well;Patient limited by endurance  Discharge Recommendations: 24 hour supervision or assist;Home with Home health OT  OT Equipment Recommendations  Other: Pt reported he has a shower chair, GBs, and a reacher. Cont to assess for additional needs  Safety Devices  Safety Devices in place: Yes  Type of devices: Call light within reach; Left in chair;Chair alarm in place;Nurse notified    Patient Education  Education  Education Given To: Patient; Family  Education Provided: Role of Therapy;Plan of Care;Safety;Transfer Training;Energy Conservation  Education Provided Comments: Checking oxygen periodically in room to ensure it is staying 88%+, taking rest breaks when beginning to feel SOB  Education Method: Demonstration;Verbal  Barriers to Learning: None  Education Outcome: Verbalized understanding;Continued education needed    Plan  Occupational Therapy Plan  Times Per Week: 5x/week, 90min/day  Current Treatment Recommendations: Strengthening;Balance training;Functional mobility training; Endurance training; Safety education & training;Patient/Caregiver education & training;Equipment evaluation, education, & procurement;Self-Care / ADL; Home management training; Wheelchair mobility training    Goals  Patient Goals   Patient goals : \"I want to return to what I was doing before. \"  Short Term Goals  Time Frame for Short Term Goals: 2 weeks- all ongoing  Short Term Goal 1: Pt will complete UE/LE dressing w/ Mod I and use of AE prn  Short Term Goal 2: Pt will complete bathing w/ spvn  Short Term Goal 3: Pt will complete shower transfer w/ spvn  Short Term Goal 4: Pt will complete toileting/toilet transfer w/ Mod I  Short Term Goal 5: Pt will complete grooming routine in stance w/ Mod I                   Therapy Time   Individual Second Session Group Co-treatment   Time In 0900  1100       Time Out 0930  1200       Minutes 30  60       Timed Code Treatment Minutes: 30 Minutes + 60 min   Total Treatment Min 90 min        Loretta Calderón OT (first session only)  [address], [address] [place] (second session)

## 2022-10-19 NOTE — PROGRESS NOTES
Pt in bed, awake watching television. Alert & oriented x 4. Temp 98.5, /82, HR 95, Resp 18, SpO20 90% on RA. Pt declines wearing oxygen. Assessment completed. Nighttime medications given one at a time, pt tolerated well. Pulled pt up in the bed. Reminded pt to call for assistance with any needs. Call light within reach. Safety measures in place.

## 2022-10-19 NOTE — PLAN OF CARE
Problem: Discharge Planning  Goal: Discharge to home or other facility with appropriate resources  10/19/2022 1331 by Osie Rater  Outcome: Progressing  Flowsheets (Taken 10/19/2022 1136)  Discharge to home or other facility with appropriate resources: Identify barriers to discharge with patient and caregiver     Problem: Safety - Adult  Goal: Free from fall injury  10/19/2022 1331 by Osie Rater  Outcome: Progressing     Problem: ABCDS Injury Assessment  Goal: Absence of physical injury  10/19/2022 1331 by Osie Rater  Outcome: Progressing     Problem: Skin/Tissue Integrity  Goal: Absence of new skin breakdown  Description: 1. Monitor for areas of redness and/or skin breakdown  2. Assess vascular access sites hourly  3. Every 4-6 hours minimum:  Change oxygen saturation probe site  4. Every 4-6 hours:  If on nasal continuous positive airway pressure, respiratory therapy assess nares and determine need for appliance change or resting period.   10/19/2022 1331 by Osie Rater  Outcome: Progressing

## 2022-10-19 NOTE — PROGRESS NOTES
Physical Therapy  Facility/Department: [facility] ACUTE [facility] UN[facility]  Rehabilitation Physical Therapy Treatment Note    NAME: Carlos Alberto Hdz MD  : 1935 (82 y.o.)  MRN: [id number]  CODE STATUS: Full Code    Date of Service: 10/19/22       Restrictions:  Position Activity Restriction  Other position/activity restrictions: up w/ assist, ambulate pt, up as tolerated,     SUBJECTIVE  Subjective  Subjective: Pt seated in recliner upon PT arrival, agreeable to therapy. Pain: Denied pain. Post Treatment Pain Screening         OBJECTIVE  Cognition  Overall Cognitive Status: WFL  Arousal/Alertness: Appropriate responses to stimuli  Following Commands: Follows multistep commands consistently  Attention Span: Appears intact  Memory: Appears intact  Safety Judgement: Good awareness of safety precautions  Problem Solving: Assistance required to identify errors made;Assistance required to correct errors made  Insights: Decreased awareness of deficits  Initiation: Does not require cues  Sequencing: Requires cues for some  Orientation  Overall Orientation Status: Within Normal Limits  Orientation Level: Oriented X4    Functional Mobility  Balance  Sitting Balance: Modified independent  (Pt seated in recliner and w/c)  Standing Balance: Stand by assistance (SBA with RW, CGA for short period without AD)  Standing Balance  Activity: Pt tied pants in standing without UE assist and CGA for balance. Transfers  Surface: From chair with arms; Wheelchair; To chair with arms  Additional Factors: Hand placement cues; Verbal cues  Device: Walker (RW)  Sit to Stand  Assistance Level: Stand by assist  Skilled Clinical Factors: SBA for safety, VC to scoot to EOS  Stand to Sit  Assistance Level: Contact guard assist  Skilled Clinical Factors: CGA for controlled descent with fatigue. VC for hand placement. Bed To/From Chair  Skilled Clinical Factors: VC for hand placement and to scoot to EOS.  CGA for stability and controlled descent with fatigue. Stand Pivot  Assistance Level: Contact guard assist (recliner<>w/c)  Skilled Clinical Factors: VC for hand placement and to scoot to EOS. CGA for stability and controlled descent with fatigue. Environmental Mobility  Ambulation  Surface: Level surface  Device: Rolling walker  Distance: 115'  Activity: Within Unit  Activity Comments: O2 transport provided by therapist.  Additional Factors: Verbal cues  Assistance Level: Stand by assist  Gait Deviations: Slow mingo;Decreased step length bilateral;Narrow base of support (forward flexed posture)  Skilled Clinical Factors: VC for upright posture. SBA for safety, pt demo'd no knee buckling and no LOB. Stairs  Stair Height: 6''  Device: Bilateral handrails  Number of Stairs: 4  Additional Factors: Non-reciprocal going up;Non-reciprocal going down; Increased time to complete  Assistance Level: Contact guard assist  Skilled Clinical Factors: CGA for stability and controlled descent. Pt appeared shaky following stair navigation and req prolonged seated rest break. Wheelchair  Surface: Level surface  Device: Standard wheelchair  Assistance Required to Manage Parts: No assistance (no assistance managing brakes)  Assistance Level for Propulsion: Modified independent  Propulsion Method: Bilateral lower extremities; Bilateral upper extremities  Propulsion Quality: Slow velocity; Short strokes  Propulsion Distance: 25'         2nd Session  Pt supine in bed upon PT arrival, agreeable to therapy. Pt performed supine>sit with HOB elevated and HR Mod I. Pt transferred bed>w/c to the  via stand pivot with SBA. Pt on room air and vitals assessed prior to further mobility to assess response. SpO2 84% and pt put on 0.5L O2 (improved to >90%). Seated in w/c, pt doffed gripper socks and donned shoes/socks with setup. Pt stood to RW with SBA, VC for hand placement, and ambulated 50' with SBA, PT provided O2 transport.  After 50', PT instructed pt to take a standing rest break to assess vitals. SpO2 found to be 95%. Pt continued ambulation with SBA for a total distance of 135'. SpO2 during seated rest found to be 88%, HR 90 bpm. SpO2 improved to 93% with pursed lip breathing. Following prolonged seated rest, pt navigated 4 stairs with BHR's and CGA for controlled descent maintaining O2 at least 88%. PT wheeled pt to gym dependently d/t energy conservation. Pt transferred w/c>mat table with SBA, VC for hand placement and to scoot to EOS. D/t pt reporting one of his goals is to return to exercising involving transferring couch>floor to complete supine exercises, PT instructed pt on technique for floor transfer with VC for step by step sequencing. Pt completed transfer from mat table<>floor (blue mat placed on floor) with CGA for controlled descent. Pt ambulated 50' + 76' with RW and SBA, PT provided O2 transport, VC for upright posture. Pt requested seated rest breaks between bouts d/t fatigue. Pt transferred w/c>bed via stand-pivot with SBA, VC for hand placement and to scoot to EOS. Pt performed sit>supine with HOB elevated Mod I. Pt left in bed, bed alarm in place, call light and needs within reach. SpO2 89% improving to 93% with pursed lip breathing on RA.            ASSESSMENT/PROGRESS TOWARDS GOALS       Assessment  Assessment: Pt with improvements this session including increased ambulation distance and number of consecutive stairs navigated. Pt maintained SpO2 88% with stair navigation on 0.5L O2. Following ambulation, SpO2 decreased to 85%, recovered to 92% after ~30 seconds of pursed lip breathing technique. Pt continues to require oxygen with activity but is demonstrating progress towards goals. He continues to function below baseline and will benefit from continued skilled PT to maximize safety and independence for d/c home.   Activity Tolerance: Patient tolerated treatment well;Patient limited by fatigue;Patient limited by endurance  Discharge Recommendations: Continue to assess pending progress;24 hour supervision or assist;Home with Home health PT  PT Equipment Recommendations  Other: Ongoing assessment    Goals  Patient Goals   Patient Goals : \"To get back to doing what I was doing\"  Short Term Goals  Time Frame for Short Term Goals: 7 days  Short Term Goal 1: Pt will perform bed mobility independently  Short Term Goal 2: Pt will perform all transfers excluding floor transfer with Ana (goal met)  Short Term Goal 3: Pt will ambulate 10' on level surfaces with LRAD and CGA (goal met)  Short Term Goal 4: Patient will navigate 4 stairs with bilateral HR and CGA  Long Term Goals  Time Frame for Long Term Goals : 14 days  Long Term Goal 1: Pt will perform all transfers excluding floor transfer Mod I with LRAD  Long Term Goal 2: Pt will ambulate at least 150' on level surfaces Mod I with LRAD  Long Term Goal 3: Patient will navigate 12 stairs with 1 HR Mod I    PLAN OF CARE/SAFETY  Physcial Therapy Plan  General Plan:  (5x/week for 90 minutes)  Current Treatment Recommendations: Strengthening;ROM;Balance training;Functional mobility training;Transfer training; Endurance training; Wheelchair mobility training;Gait training;Stair training;Neuromuscular re-education;Pain management;Home exercise program;Safety education & training;Patient/Caregiver education & training;Equipment evaluation, education, & procurement;Positioning; Therapeutic activities  Safety Devices  Type of Devices: Left in chair;Chair alarm in place;Call light within reach    EDUCATION  Education  Education Given To: Patient  Education Provided: Role of Therapy; Safety; Mobility Training;Transfer Training  Education Provided Comments: PT educated pt on using pursed lip breathing technique often to improve oxygen saturation. PT instructed pt to use SpO2 value on monitor as visual feedback to monitor improvement with breathing technique.  PT left dynamap near pt upon end of session and instructed pt to self-monitor SpO2 if feeling short of breath.   Education Method: Verbal;Demonstration  Barriers to Learning: Cognition  Education Outcome: Verbalized understanding        Therapy Time   Individual Concurrent Group Co-treatment   Time In 1000         Time Out 1030         Minutes 30           Second Session Therapy Time:    Individual Concurrent Group Co-treatment   Time In 1423      Time Out 1523      Minutes 60         Timed Code Treatment Minutes:  30 + 60     Total Treatment Minutes:   268 [facility] 10/19/22 at 5:04 PM

## 2022-10-20 PROCEDURE — 97116 GAIT TRAINING THERAPY: CPT

## 2022-10-20 PROCEDURE — 6370000000 HC RX 637 (ALT 250 FOR IP): Performed by: PHYSICAL MEDICINE & REHABILITATION

## 2022-10-20 PROCEDURE — 94680 O2 UPTK RST&XERS DIR SIMPLE: CPT

## 2022-10-20 PROCEDURE — 1280000000 HC REHAB R&B

## 2022-10-20 PROCEDURE — 97535 SELF CARE MNGMENT TRAINING: CPT

## 2022-10-20 PROCEDURE — 97530 THERAPEUTIC ACTIVITIES: CPT

## 2022-10-20 PROCEDURE — 99232 SBSQ HOSP IP/OBS MODERATE 35: CPT | Performed by: INTERNAL MEDICINE

## 2022-10-20 PROCEDURE — 94640 AIRWAY INHALATION TREATMENT: CPT

## 2022-10-20 PROCEDURE — 6360000002 HC RX W HCPCS: Performed by: PHYSICAL MEDICINE & REHABILITATION

## 2022-10-20 PROCEDURE — 99232 SBSQ HOSP IP/OBS MODERATE 35: CPT | Performed by: PHYSICAL MEDICINE & REHABILITATION

## 2022-10-20 PROCEDURE — 6370000000 HC RX 637 (ALT 250 FOR IP): Performed by: INTERNAL MEDICINE

## 2022-10-20 RX ADMIN — WARFARIN SODIUM 2.5 MG: 2.5 TABLET ORAL at 18:20

## 2022-10-20 RX ADMIN — Medication: at 21:11

## 2022-10-20 RX ADMIN — GUAIFENESIN 600 MG: 600 TABLET, EXTENDED RELEASE ORAL at 09:03

## 2022-10-20 RX ADMIN — FERROUS SULFATE TAB 325 MG (65 MG ELEMENTAL FE) 325 MG: 325 (65 FE) TAB at 09:03

## 2022-10-20 RX ADMIN — Medication: at 09:03

## 2022-10-20 RX ADMIN — ACETAMINOPHEN 650 MG: 325 TABLET, FILM COATED ORAL at 23:51

## 2022-10-20 RX ADMIN — GUAIFENESIN 600 MG: 600 TABLET, EXTENDED RELEASE ORAL at 21:09

## 2022-10-20 RX ADMIN — ALBUTEROL SULFATE 2.5 MG: 2.5 SOLUTION RESPIRATORY (INHALATION) at 14:58

## 2022-10-20 RX ADMIN — GABAPENTIN 100 MG: 100 CAPSULE ORAL at 21:09

## 2022-10-20 RX ADMIN — ATORVASTATIN CALCIUM 10 MG: 10 TABLET, FILM COATED ORAL at 21:09

## 2022-10-20 RX ADMIN — OXYCODONE HYDROCHLORIDE AND ACETAMINOPHEN 500 MG: 500 TABLET ORAL at 09:03

## 2022-10-20 RX ADMIN — PANTOPRAZOLE SODIUM 40 MG: 40 TABLET, DELAYED RELEASE ORAL at 06:17

## 2022-10-20 RX ADMIN — BISACODYL 5 MG: 5 TABLET, COATED ORAL at 09:04

## 2022-10-20 ASSESSMENT — PAIN SCALES - GENERAL: PAINLEVEL_OUTOF10: 3

## 2022-10-20 ASSESSMENT — PAIN DESCRIPTION - FREQUENCY: FREQUENCY: INTERMITTENT

## 2022-10-20 ASSESSMENT — PAIN DESCRIPTION - ORIENTATION: ORIENTATION: RIGHT;LEFT

## 2022-10-20 ASSESSMENT — PAIN DESCRIPTION - PAIN TYPE: TYPE: ACUTE PAIN

## 2022-10-20 ASSESSMENT — PAIN DESCRIPTION - LOCATION: LOCATION: ELBOW

## 2022-10-20 ASSESSMENT — PAIN DESCRIPTION - DESCRIPTORS: DESCRIPTORS: SORE

## 2022-10-20 NOTE — PROGRESS NOTES
Physical Therapy  Facility/Department: [facility] ACUTE REHAB UNIT  Rehabilitation Physical Therapy Treatment Note    NAME: Aleja Cuellar MD  : 1935 (82 y.o.)  MRN: [id number]  CODE STATUS: Full Code    Date of Service: 10/20/22       Restrictions:  Position Activity Restriction  Other position/activity restrictions: up w/ assist, ambulate pt, up as tolerated,     SUBJECTIVE  Subjective  Subjective: Pt seated in recliner upon PT arrival, agreeable to therapy. Pain: Denied pain. Post Treatment Pain Screening     VITALS  Oxygen (L) Room air 0.5 0.5 1.0   Position Seated in recliner at rest After donning shoes in recliner After amb 50' After amb 25'   SpO2 88% 91% 85% 88%         OBJECTIVE  Cognition  Overall Cognitive Status: WFL  Arousal/Alertness: Appropriate responses to stimuli  Following Commands: Follows multistep commands consistently  Attention Span: Appears intact  Memory: Appears intact  Safety Judgement: Good awareness of safety precautions  Problem Solving: Assistance required to identify errors made;Assistance required to correct errors made  Insights: Decreased awareness of deficits  Initiation: Does not require cues  Sequencing: Requires cues for some  Orientation  Overall Orientation Status: Within Normal Limits  Orientation Level: Oriented X4    Functional Mobility  Balance  Sitting Balance: Modified independent  (Pt doffed gripper socks and donned shoes seated in recliner without assist)  Standing Balance: Stand by assistance (SBA with RW and for short period <10 seconds with no UE support on RW to tie pants)  Transfers  Surface: From chair with arms; Wheelchair; To chair with arms  Additional Factors: Increased time to complete;Verbal cues  Device: Walker (RW)  Sit to Stand  Assistance Level: Stand by assist;Contact guard assist  Skilled Clinical Factors: Occasional VC for hand placement. Pt self-corrected with increased time. On initial attempt, req GCA d/t LE shakiness.  SBA for following attempts. Stand to Sit  Assistance Level: Stand by assist  Skilled Clinical Factors: VC for hand placement when fatigued. Stand Pivot  Assistance Level: Contact guard assist  Skilled Clinical Factors: VC for hand placement recliner>w/c, CGA d/t some unsteadiness. SBA and pt self-correct hand placement on second attempt. Environmental Mobility  Ambulation  Surface: Level surface  Device: Rolling walker  Distance: 48' with 2 turns + 35'  Activity: Within Unit  Activity Comments: Pt requested seated rest breaks between bouts d/t shortness of breath and fatigue. PT provided O2 transport. Additional Factors: Verbal cues; Increased time to complete  Assistance Level: Stand by assist  Gait Deviations: Slow mingo;Decreased step length bilateral;Narrow base of support (forward flexed posture)  Skilled Clinical Factors: VC for upright posture. SBA for safety, pt demo'd no knee buckling and no LOB. 2nd Session  Pt seated in recliner upon PT arrival, agreeable to therapy. SpO2 initially 89% on room air, improved to 96% on 1.0L O2 prior to mobility. Pt transferred recliner>w/c with SBA for safety. Pt ambulated 100' with RW and SBA, PT transported O2, VC for upright posture. Pt req seated rest break following this. Vitals assessed 2/2 pt short of breath. SpO2 found to be 89-92%. To target ambulation in multiple planes, PT instructed pt on the following gait activities performed with RW and PT providing O2 transport: Forward/retro walking X25' each (SBA)  Side steps to the L X25' (SBA). VC for sequencing to maintain appropriate CHRIS and to maintain lateral plane stepping as pt noted to step in diagonal plane. Side steps to the R X25' (SBA). VC to maintain lateral plane motion. Seated rest breaks required between each of the above activities. Pt ambulated 80' + 85' with the same assist levels. Pt sat to bed with SVN and performed sit>supine IND with HOB flat, no HR's.  Supine in bed on room air at end of session, SpO2 initially 87%. HOB elevated and pt performed pursed lip breathing - SpO2 increased to 95%. PT educated pt on self-monitoring breathing and requesting vitals assessment/using pursed lip breathing when pt feels short of breath. Pt left in bed, bed alarm in place, call light and needs within reach. ASSESSMENT/PROGRESS TOWARDS GOALS       Assessment  Assessment: Pt continues to require oxygen during mobility to maintain SpO2 at least 88% recommended per MD note (see vitals section for details). Pt tolerated shorter ambulation distances this session and prolonged rest breaks for recovery. Pt continues to function below baseline and will benefit from continued skilled PT to maximize safety and independence for safe d/c home. Activity Tolerance: Patient tolerated treatment well;Patient limited by fatigue;Patient limited by endurance  Discharge Recommendations: Continue to assess pending progress;24 hour supervision or assist;Home with Home health PT  PT Equipment Recommendations  Equipment Needed: Yes  Mobility Devices: Wheelchair  Wheelchair: Light Weight (16\", elevating leg rests)  Other: Ongoing assessment    Goals  Patient Goals   Patient Goals :  \"To get back to doing what I was doing\"  Short Term Goals  Time Frame for Short Term Goals: 7 days  Short Term Goal 1: Pt will perform bed mobility independently  Short Term Goal 2: Pt will perform all transfers excluding floor transfer with Ana (goal met)  Short Term Goal 3: Pt will ambulate 10' on level surfaces with LRAD and CGA (goal met)  Short Term Goal 4: Patient will navigate 4 stairs with bilateral HR and CGA  Long Term Goals  Time Frame for Long Term Goals : 14 days  Long Term Goal 1: Pt will perform all transfers excluding floor transfer Mod I with LRAD  Long Term Goal 2: Pt will ambulate at least 150' on level surfaces Mod I with LRAD  Long Term Goal 3: Patient will navigate 12 stairs with 1 HR Mod I    PLAN OF CARE/SAFETY  Physcial Therapy Plan  General Plan:  (5x/week for 90 minutes)  Current Treatment Recommendations: Strengthening;ROM;Balance training;Functional mobility training;Transfer training; Endurance training; Wheelchair mobility training;Gait training;Stair training;Neuromuscular re-education;Pain management;Home exercise program;Safety education & training;Patient/Caregiver education & training;Equipment evaluation, education, & procurement;Positioning; Therapeutic activities  Safety Devices  Type of Devices: Left in chair;Chair alarm in place;Call light within reach    EDUCATION  Education  Education Given To: Patient  Education Provided: Role of Therapy; Safety; Mobility Training;Transfer Training;Energy Conservation  Education Provided Comments: Pt educated on decreases in oxygen saturation with activity and continued need to use O2 per MD recommendation to maintain SpO2 at least 88%. Education Method: Verbal;Demonstration  Barriers to Learning: None  Education Outcome: Verbalized understanding  Skilled Clinical Factors: SpO2 readings on [place] shown to patient throughout session.         Therapy Time   Individual Concurrent Group Co-treatment   Time In 1000         Time Out 1030         Minutes 30           Second Session Therapy Time:    Individual Concurrent Group Co-treatment   Time In 1310      Time Out 1410      Minutes 60         Timed Code Treatment Minutes:  30 + 60     Total Treatment Minutes:   [address] [facility] 10/20/22 at 3:41 PM

## 2022-10-20 NOTE — CARE COORDINATION
CM following: RIZWAN spoke with [phone] to order the pt a light weight 16 inch wheelchair for outpatient use. [facility] took down request and is aware of pt's planned discharge on Saturday. RIZWAN spoke with Deborah Tate with [facility] concerning skilled Kamila  needs for pt. Candi Kelly was able to accept the referral and will follow the pt for an expected Saturday discharge.   Electronically signed by AFTAB Flores on 10/20/2022 at 2:11 PM  [phone]

## 2022-10-20 NOTE — PROGRESS NOTES
Department of Physical Medicine & Rehabilitation  Progress Note    Patient Identification:  Alvaro Olguin MD  [id number]  : 1935  Admit date: 10/6/2022    Chief Complaint: Debility    Subjective:    No new complaints overnight. He would like to have his BP meds changed to lower the dose. He also needs an O2 screen before discharge home. Slept well. Seen in his room. Participating well in therapy. ROS: No f/c, n/v, cp     Objective:  Patient Vitals for the past 24 hrs:   BP Temp Temp src Pulse Resp SpO2   10/20/22 0900 129/78 97.3 °F (36.3 °C) Oral 79 18 95 %   10/20/22 0043 -- -- -- -- -- 92 %   10/20/22 0040 -- -- -- -- -- (!) 76 %   10/19/22 1957 (!) 148/82 98.1 °F (36.7 °C) Oral 94 20 91 %       Const: Alert. No distress, pleasant. HEENT: Normocephalic, atraumatic. Normal sclera/conjunctiva. MMM. CV: Regular rate and rhythm. Resp: No respiratory distress. Lungs CTAB. Abd: Soft, nontender, nondistended, NABS+   Ext: No edema. Neuro: Alert, oriented, appropriately interactive. Psych: Cooperative, appropriate mood and affect    Laboratory data: Available via EMR. Last 24 hour lab  No results found for this or any previous visit (from the past 24 hour(s)). Therapy progress:  PT  Position Activity Restriction  Other position/activity restrictions: up w/ assist, ambulate pt, up as tolerated,  Objective     Sit to Stand: Dependent/Total, 2 Person Assistance  Stand to Sit: Dependent/Total, 2 Person Assistance  Bed to Chair: Dependent/Total, 2 Person Assistance (Performed via squat pivot (see below))     OT  PT Equipment Recommendations  Other: Ongoing assessment     Assessment        SLP          Body mass index is 20.85 kg/m².     Assessment and Plan:  # Right pneumothorax  # R 1,3-6 nondisplaced rib fractures  # Right lung contusion             -  - Right chest tube placed in the ED  -  - Right chest tube removed  - Pulmonary expansion  - tramadol pain control      # Left pneumothorax  # Left T1 and T3 posterior rib fractures  # Left lung contusion  - 9/21 - Left chest tube placed by IR  - 9/23 - chest tube to water seal  - Left CT removed 10/4     #LLL collapse, L mainstem bronchus occlusion  - 9/23 - s/p bronchoscopy by ICU with removal of large mucus plug  - 9/27 - CT chest demonstrating persistent total occlusion of the left central airway/left lung collapse with loculated L hydropneumothorax    - Aggressive respiratory care, hypertonic saline, mucomyst nebs BID  - Bronch with pulm 9/30 w/mucus plugging in the right and left tracheobronchial trees  - Bronch wash cultures with Ecoli resistant to cefazolin, on Ceftriaxone- 2 days left        # C7 TP Fracture  - 9/20 - CTA Head/Neck did not show evidence of BCVI   - No brace needed  - Activity as tolerated     #L shoulder ecchymoses  - L shoulder x-ray with no interosseous abnormalities      Medical Comorbidities  # Aortic Valve Replacement  - 9/23 - restarted home warfarin  - AM INR checks - prior goal 2-2.5  - check dosing with pharmacy     # Hyperlipidemia  - Continue atorvastatin 10 mg daily     #RUL pulmonary nodule  - 6mm RUL nodule noted on 9/22 CT chest  - Recommend 6-12 month interval CT scan for monitoring      #Stage 3 Coccygeal Ulcer  - 2/2 to rowing machine per patient now a stage 3 wound   - Recommending [facility]     #CIM  - prolonged hospital course   - PT/OT required      # orthostatic hypotension  - Likely associated with CIM  - nephrology consult      O2 sats for DC today.        Rehab Progress: Improving  Anticipated Dispo: home  Services/DME: [facility]  ELOS: 10/22      Estela Mims D.O. M.P.H  PM&R  10/20/2022  12:27 PM

## 2022-10-20 NOTE — PROGRESS NOTES
Pt requested to go to the bathroom. Assisted pt to the bathroom with CGA, gait belt and walker. Pt voided. Assisted pt change into hospital bottoms. Pt returned to the bed. Pt alert & oriented x 4. /82, Temp 98.1, HR 94, Resp 20, SpO2 91% on RA. Assessment completed. Nighttime medications given except Melatonin which pt requested to have around 10 pm. Reminded pt to call for assistance with any additional needs. Call light within reach. Safety measures in place.

## 2022-10-20 NOTE — PLAN OF CARE
Problem: Discharge Planning  Goal: Discharge to home or other facility with appropriate resources  10/20/2022 1243 by William Wilcox RN  Outcome: Progressing  Flowsheets (Taken 10/20/2022 0900)  Discharge to home or other facility with appropriate resources: Identify barriers to discharge with patient and caregiver     Problem: Safety - Adult  Goal: Free from fall injury  10/20/2022 1243 by William Wilcox RN  Outcome: Progressing     Problem: Skin/Tissue Integrity  Goal: Absence of new skin breakdown  Description: 1. Monitor for areas of redness and/or skin breakdown  2. Assess vascular access sites hourly  3. Every 4-6 hours minimum:  Change oxygen saturation probe site  4. Every 4-6 hours:  If on nasal continuous positive airway pressure, respiratory therapy assess nares and determine need for appliance change or resting period.   10/20/2022 1243 by William Wilcox RN  Outcome: Progressing     Problem: Nutrition Deficit:  Goal: Optimize nutritional status  10/20/2022 1243 by William Wilcox RN  Outcome: Progressing

## 2022-10-20 NOTE — PROGRESS NOTES
Upon checking pt's Sp02 before giving requested HHN treatment, saturation was found to be in the low 80's at rest on room air. With minimal exertion/ambulation, pt's Sp02 remained 88% on 2 liters.  Sp02 quickly recovered to 94% after placing cannula and having patient rest.

## 2022-10-20 NOTE — PROGRESS NOTES
Occupational Therapy  Facility/Department: [facility] ACUTE REHAB UNIT  Rehabilitation Occupational Therapy Daily Treatment Note    Date: 10/20/22  Patient Name: Nelson Brothers MD       Room: 3109/3109-01  MRN: [id number]  Account: [de-identified]   : 1935  (82 y.o.) Gender: male                    Past Medical History:  has a past medical history of Anemia, Aortic valve disorders, Arthritis, Aspiration pneumonia ([address] Erectile dysfunction, Esophageal cancer ([address] Hernia, femoral, bilateral, History of blood transfusion, Hyperlipidemia, Osteoporosis, senile, Pancreatitis, Patient underweight, Prostate cancer ([address] and Unspecified essential hypertension. Past Surgical History:   has a past surgical history that includes Coronary artery bypass graft (2006); Cardiac valve replacement (2006); eye surgery (& ); Appendectomy; bone graft; gastrectomy; Cholecystectomy; Colonoscopy (2009); Prostate surgery; Tonsillectomy; Esophagus surgery;  Prostate/Transrectal/Vol Collins Brachyth; Colonoscopy (10/05/2015); Upper gastrointestinal endoscopy (N/A, 2021); Colonoscopy (N/A, 2021); Colonoscopy (2021); Upper gastrointestinal endoscopy (N/A, 2022); IR GUIDED PERC PLEURAL DRAIN W CATH INSERT (2022); and Dilatation, esophagus. Restrictions  Other position/activity restrictions: up w/ assist, ambulate pt, up as tolerated,    Subjective  Subjective: Pt was seated in recliner chair upon arrival w/ wife present. Pt was pleasant and agreeable to OT. Pt on 1L O2 today. Objective     Cognition  Overall Cognitive Status: WFL  Orientation  Overall Orientation Status: Within Normal Limits         ADL  Feeding  Assistance Level: Independent  Skilled Clinical Factors: Lunch tray delivered  Grooming/Oral Hygiene  Assistance Level: Modified independent  Skilled Clinical Factors: Pt washed his face, combed his hair, and completed oral care seated in w/c at the sink.  Pt stated this is what his plan is going to be when he is home to conserve energy. Lower Extremity Dressing  Assistance Level: Set-up; Contact guard assist;Increased time to complete  Skilled Clinical Factors: Pt threaded clean pants onto BLEs seated in recliner chair in figure 4 w/ CGA in stance to manage clothes over hips. Pt w/ kyphotic posture in stance at the RW during clothing management. Putting On/Taking Off Footwear  Assistance Level: Set-up  Skilled Clinical Factors: Pt donned shoes seated in recliner chair in figure 4            Functional Mobility  Device: Rolling walker  Activity: To/From bathroom (completed 2x)  Assistance Level: Contact guard assist  Skilled Clinical Factors: Pt ambulated w/ RW and CGA to/from the bathroom 2x during session. Pt limited by kyphotic posture and short shuffled steps. Pt on 1L O2 during session. Pt's wife Bryan De La Rosa performed family training this session since she will be the primary caregiver. OT asked pt to identify his morning routine in order to replicate and simulate it before d/c. Pt explained that he normally gets OOB, walks to the bathroom to void and brush his teeth and then he walks to the kitchen to read the news on his computer. Pt identified that the distance from his bed to the bathroom is ~10ft and ~20ft from the bathroom to the kitchen. OT asked pt to simulate this routine. Pt practiced getting in and out of the bed, walked 10ft to the w/c to simulate transfer to toilet, and ambulated 20ft back to the reclinre chair to simulate distance to his kitchen. Bryan De La Rosa was present w/ pt during this simulation and OT instructed Bryan De La Rosa how to don the gait belt and where to stand and place her hands on the belt while pt was ambulating. Pt performed task w/ CGA and no LOB.  OT spent increased time educating pt and Bryan Estrella about safety manuevering w/ O2 tubing and educated them to have chairs nearby every 10-20ft in the house since pt normally can only walk short distances before he needs to sit. Pt and Alva Rider verb understanding and expressed feeling comfortable w/ transfers and mobility. Sit to Supine  Assistance Level: Supervision  Skilled Clinical Factors: Pt's bed was elevated to simulate height of his bed at home. Even at elevated height pt was able to enter the bed w/ spvn w/o needing to step on anything to lift into the bed  Supine to Sit  Assistance Level: Supervision  Sit to Stand  Assistance Level: Contact guard assist  Stand to Sit  Assistance Level: Contact guard assist  Skilled Clinical Factors: VCs needed to back up completely and reach hands back before sitting         Assessment    Assessment  Assessment: Pt demonstrated great participation in OT today and completed simulated morning routine w/ his wife Alva Rider for family training in preparation for d/c Saturday. Pt completed all transfers and functional mobility w/ CGA w/ no LOB. Pt required 1L O2 today however pt was not limited by SOB or dizziness. Increased time spent addressing fall prevention which pt verbalized good understanding of recommendations for safety. Pt continues to benefit from OT. Cont OT per POC  Activity Tolerance: Patient tolerated treatment well  Discharge Recommendations: 24 hour supervision or assist;Home with Home health OT  OT Equipment Recommendations  Equipment Needed: Yes  Mobility Devices: ADL Assistive Devices  ADL Assistive Devices: Toilet Safety Frame  Other: Pt reported he has a shower chair, GBs, and a reacher. Safety Devices  Safety Devices in place: Yes  Type of devices: Call light within reach; Left in chair;Chair alarm in place    Patient Education  Education  Education Given To: Patient; Family  Education Provided: Role of Therapy;Plan of Care;Safety;Transfer Training;Energy Conservation;Equipment;DME/Home Modifications; Family Education  Education Provided Comments: Family training completed this session w/ pt's wife in preparation for d/c Saturday.  Increased time spent reviewing equipment recommendations and safety. OT suggested a toilet safety frame since pt reported he does not have any room for GBs by his toilet. OT showed pt and Eliel Pen of equipment and how to purchase it. OT also reviewed the fall prevention checklist, including home modifications and safety recommendations (removal of loose rugs, removal of clutter, clear pathways, management of bladder/bowel urgency, proper lighting in the home, medication management and side effects, vision asssessments, etc). Pt verb understanding of all education and reported he is very careful at home. OT spent increased time explaining why furniture walking isn't safe and OT also explained plans for [facility] OT/PT once d/c home. Education Method: Demonstration;Verbal  Barriers to Learning: None  Education Outcome: Verbalized understanding;Continued education needed    Plan  Occupational Therapy Plan  Times Per Week: 5x/week, 90min/day  Current Treatment Recommendations: Strengthening;Balance training;Functional mobility training; Endurance training; Safety education & training;Patient/Caregiver education & training;Equipment evaluation, education, & procurement;Self-Care / ADL; Home management training; Wheelchair mobility training    Goals  Patient Goals   Patient goals : \"I want to return to what I was doing before. \"  Short Term Goals  Time Frame for Short Term Goals: 2 weeks- all ongoing  Short Term Goal 1: Pt will complete UE/LE dressing w/ Mod I and use of AE prn  Short Term Goal 2: Pt will complete bathing w/ spvn  Short Term Goal 3: Pt will complete shower transfer w/ spvn  Short Term Goal 4: Pt will complete toileting/toilet transfer w/ Mod I  Short Term Goal 5: Pt will complete grooming routine in stance w/ Mod I    AM-PAC Score               Therapy Time   Individual Concurrent Group Co-treatment   Time In 1030         Time Out 1200         Minutes 90         Timed Code Treatment Minutes: 90 Minutes       Boogie Fletcher OT

## 2022-10-20 NOTE — PROGRESS NOTES
Shift assessment complete. VSS. A&Ox4. Denies pain at this time. Fall precautions in place. Patient up in chair for breakfast, chair alarm utilized, call light within reach. Patient reports no new complaints at this time. Morning medications administered without complication.      Vitals:    10/20/22 0900   BP: 129/78   Pulse: 79   Resp: 18   Temp: 97.3 °F (36.3 °C)   SpO2: 95%
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
## 2022-10-20 NOTE — PROGRESS NOTES
Nephrology  Note                                                                                                                                                                                                                                                                                                                                                               Office : [phone]     Fax :[phone]              Patient's Name: Wellington Stone MD      Reason for Consult:  Orthostatic   Requesting Physician:  Maryse Jones MD      Chief Complaint:  Trauma      Feels better  PT going well   Dizziness better     Past Medical History:   Diagnosis Date    Anemia     Aortic valve disorders     stenosis    Arthritis     Aspiration pneumonia ([facility] [address] 04/27/2015    Erectile dysfunction     Esophageal cancer ([facility] [address] 10/18/2012    Hernia, femoral, bilateral 05/12/2014    History of blood transfusion     Hyperlipidemia     Osteoporosis, senile 05/20/2014    Pancreatitis 08/01/2013    Patient underweight 08/08/2013    Prostate cancer ([facility] [address]     Unspecified essential hypertension        Past Surgical History:   Procedure Laterality Date    APPENDECTOMY      as a child    BONE GRAFT      for saddle nose    CARDIAC VALVE REPLACEMENT  01/01/2006    aorta    CHOLECYSTECTOMY      COLONOSCOPY  11/01/2009    COLONOSCOPY  10/05/2015    COLONOSCOPY N/A 02/17/2021    COLONOSCOPY DIAGNOSTIC/STOMA performed by Jignesh Armenta MD at [place] [address]  02/18/2021    COLONOSCOPY POLYPECTOMY SNARE/COLD BIOPSY performed by Jignesh Armenta MD at [address] [place] [address]  01/01/2006    DILATATION, ESOPHAGUS      2010    ESOPHAGUS SURGERY      EYE SURGERY  1990& 1992    cataract bilat removal     GASTRECTOMY      IR PERC CATH PLEURAL DRAIN W/IMAG  09/21/2022    IR PERC CATH PLEURAL DRAIN W/IMAG    PROSTATE SURGERY      seeds    TONSILLECTOMY      UPPER GASTROINTESTINAL ENDOSCOPY N/A 02/16/2021    EGD BIOPSY performed by Jennifer Baca MD at [address] [place] [address] N/A 04/22/2022    EGD CONTROL HEMORRHAGE performed by Gwynn Epley, MD at [address] [facility]         Family History   Problem Relation Age of Onset    Other Mother         bleeding peptic ulcer    Heart Disease Mother     High Blood Pressure Father     Stroke Father     Prostate Cancer Father     Other Father         cardiovascular disease    Elevated Lipids Father     Hypertension Father     Colon Cancer Paternal Cousin         reports that he has never smoked. He has never used smokeless tobacco. He reports current alcohol use. He reports that he does not use drugs.     Allergies:  No known allergies    Current Medications:    ferrous sulfate (IRON 325) tablet 325 mg, Every Other Day  ascorbic acid (VITAMIN C) tablet 500 mg, Every Other Day  albuterol (PROVENTIL) nebulizer solution 2.5 mg, Q4H PRN  sodium chloride (Inhalant) 3 % nebulizer solution 4 mL, PRN  warfarin (COUMADIN) tablet 2.5 mg, Daily  traMADol (ULTRAM) tablet 100 mg, Q6H PRN  gabapentin (NEURONTIN) capsule 100 mg, Nightly  zinc oxide (TRIAD HYDROPHILIC) paste, BID  alteplase (CATHFLO) injection 1 mg, Daily PRN  atorvastatin (LIPITOR) tablet 10 mg, Nightly  guaiFENesin (MUCINEX) extended release tablet 600 mg, BID  melatonin disintegrating tablet 5 mg, Nightly  pantoprazole (PROTONIX) tablet 40 mg, QAM AC  acetaminophen (TYLENOL) tablet 650 mg, Q4H PRN  bisacodyl (DULCOLAX) EC tablet 5 mg, Daily  magnesium hydroxide (MILK OF MAGNESIA) 400 MG/5ML suspension 30 mL, Daily PRN  polyethylene glycol (GLYCOLAX) packet 17 g, Daily PRN  simethicone (MYLICON) chewable tablet 80 mg, Q6H PRN  glucose chewable tablet 16 g, PRN  dextrose bolus 10% 125 mL, PRN   Or  dextrose bolus 10% 250 mL, PRN  glucagon (rDNA) injection 1 mg, PRN  dextrose 10 % infusion, Continuous PRN      Review of Systems:   14 point ROS obtained but were negative except mentioned in HPI      Physical exam:     Vitals:  BP (!) 148/82   Pulse 94   Temp 98.1 °F (36.7 °C) (Oral)   Resp 20   Ht 5' 3\" (1.6 m)   Wt 117 lb 11.6 oz (53.4 kg)   SpO2 91%   BMI 20.85 kg/m²   Constitutional:  AA  Skin: no rash, turgor wnl  Heent:  eomi, mmm  Neck: no bruits or jvd noted  Cardiovascular:  S1, S2 without m/r/g  Respiratory: CTA B without w/r/r  Abdomen:  +bs, soft, nt, nd  Ext: + lower extremity edema  Psychiatric: mood and affect appropriate  Musculoskeletal:  Rom, muscular strength intact    Data:   Labs:  CBC:   Recent Labs     10/17/22  1225 10/19/22  0654   WBC 8.2 7.0   HGB 8.8* 9.3*    197       BMP:    Recent Labs     10/17/22  1225 10/19/22  0654    141   K 4.3 4.3    103   CO2 31 32   BUN 34* 32*   CREATININE 1.1 1.0   GLUCOSE 225* 121*       Ca/Mg/Phos:   Recent Labs     10/17/22  1225 10/19/22  0654   CALCIUM 8.5 8.6       Hepatic: No results for input(s): AST, ALT, ALB, BILITOT, ALKPHOS in the last 72 hours. Troponin: No results for input(s): TROPONINI in the last 72 hours. BNP: No results for input(s): BNP in the last 72 hours. Lipids: No results for input(s): CHOL, TRIG, HDL, LDLCALC, LABVLDL in the last 72 hours. ABGs: No results for input(s): PHART, PO2ART, LPS9EOJ in the last 72 hours. INR:   Recent Labs     10/17/22  1226 10/19/22  0654   INR 2.09* 2.23*       UA:No results for input(s): Sydell Southerly, GLUCOSEU, BILIRUBINUR, KETUA, SPECGRAV, BLOODU, PHUR, PROTEINU, UROBILINOGEN, NITRU, LEUKOCYTESUR, Rhesa Sauger in the last 72 hours. Urine Microscopic: No results for input(s): LABCAST, BACTERIA, COMU, HYALCAST, WBCUA, RBCUA, EPIU in the last 72 hours. Urine Culture: No results for input(s): LABURIN in the last 72 hours. Urine Chemistry: No results for input(s): Threasa Ian, PROTEINUR, NAUR in the last 72 hours. IMAGING:  XR CHEST PORTABLE   Final Result   1.   Worsening left-sided airspace opacities, possibly asymmetric pulmonary edema or pneumonia. 2.  Left-sided pleural effusion. 3.  The previously described pneumothoraces are no longer visualized. The lucency projecting over the medial left lung apex is felt to represent air within the esophagus. Assessment/Plan   Orthostatic     2. Hyperkalemia     3. Anemia    4. Acid- base/ Electrolyte imbalance     5.  Debility     Plan   - d/c  florinef  and monitor   - Monitor Orthostats   - Cortisol 8.6  - UPC - no proteinuria   - encourage solute intake    - Rehab                   Thank you for allowing us to participate in care of MD Adamaris Pastor MD  Feel free to contact me   [facility] [address]  Office : [phone]  Fax :[phone]

## 2022-10-20 NOTE — PLAN OF CARE
Problem: Discharge Planning  Goal: Discharge to home or other facility with appropriate resources  10/19/2022 2356 by Stephan Jones RN  Outcome: Progressing  Flowsheets (Taken 10/19/2022 2356)  Discharge to home or other facility with appropriate resources: Identify barriers to discharge with patient and caregiver     Problem: Safety - Adult  Goal: Free from fall injury  10/19/2022 2356 by Stephan Jones RN  Outcome: Progressing  Flowsheets (Taken 10/19/2022 2356)  Free From Fall Injury: Instruct family/caregiver on patient safety     Problem: ABCDS Injury Assessment  Goal: Absence of physical injury  10/19/2022 2356 by Stephan Jones RN  Outcome: Progressing  Flowsheets (Taken 10/19/2022 2356)  Absence of Physical Injury: Implement safety measures based on patient assessment     Problem: Skin/Tissue Integrity  Goal: Absence of new skin breakdown  Description: 1.   Monitor for areas of redness and/or skin breakdown  10/19/2022 2356 by Stephan Jones RN  Outcome: Progressing     Problem: Nutrition Deficit:  Goal: Optimize nutritional status  10/19/2022 2356 by Stephan Jones RN  Flowsheets (Taken 10/19/2022 2356)  Nutrient intake appropriate for improving, restoring, or maintaining nutritional needs:   Assess nutritional status and recommend course of action   Monitor oral intake, labs, and treatment plans   Recommend appropriate diets, oral nutritional supplements, and vitamin/mineral supplements

## 2022-10-21 ENCOUNTER — APPOINTMENT (OUTPATIENT)
Dept: GENERAL RADIOLOGY | Age: 87
DRG: 091 | End: 2022-10-21
Attending: PHYSICAL MEDICINE & REHABILITATION
Payer: MEDICARE

## 2022-10-21 LAB
ANION GAP SERPL CALCULATED.3IONS-SCNC: 5 MMOL/L (ref 3–16)
BASOPHILS ABSOLUTE: 0 K/UL (ref 0–0.2)
BASOPHILS RELATIVE PERCENT: 0.9 %
BUN BLDV-MCNC: 35 MG/DL (ref 7–20)
CALCIUM SERPL-MCNC: 8.2 MG/DL (ref 8.3–10.6)
CHLORIDE BLD-SCNC: 102 MMOL/L (ref 99–110)
CO2: 31 MMOL/L (ref 21–32)
CREAT SERPL-MCNC: 1.1 MG/DL (ref 0.8–1.3)
EOSINOPHILS ABSOLUTE: 0.2 K/UL (ref 0–0.6)
EOSINOPHILS RELATIVE PERCENT: 3.9 %
GFR SERPL CREATININE-BSD FRML MDRD: >60 ML/MIN/{1.73_M2}
GLUCOSE BLD-MCNC: 174 MG/DL (ref 70–99)
GLUCOSE BLD-MCNC: 99 MG/DL (ref 70–99)
HCT VFR BLD CALC: 29.1 % (ref 40.5–52.5)
HEMOGLOBIN: 9.1 G/DL (ref 13.5–17.5)
INR BLD: 2.28 (ref 0.87–1.14)
LYMPHOCYTES ABSOLUTE: 0.9 K/UL (ref 1–5.1)
LYMPHOCYTES RELATIVE PERCENT: 17.8 %
MCH RBC QN AUTO: 28.7 PG (ref 26–34)
MCHC RBC AUTO-ENTMCNC: 31.3 G/DL (ref 31–36)
MCV RBC AUTO: 91.5 FL (ref 80–100)
MONOCYTES ABSOLUTE: 0.5 K/UL (ref 0–1.3)
MONOCYTES RELATIVE PERCENT: 9.2 %
NEUTROPHILS ABSOLUTE: 3.6 K/UL (ref 1.7–7.7)
NEUTROPHILS RELATIVE PERCENT: 68.2 %
PDW BLD-RTO: 20.7 % (ref 12.4–15.4)
PERFORMED ON: ABNORMAL
PLATELET # BLD: 172 K/UL (ref 135–450)
PMV BLD AUTO: 9.8 FL (ref 5–10.5)
POTASSIUM REFLEX MAGNESIUM: 4.7 MMOL/L (ref 3.5–5.1)
PROTHROMBIN TIME: 25.2 SEC (ref 11.7–14.5)
RBC # BLD: 3.18 M/UL (ref 4.2–5.9)
SODIUM BLD-SCNC: 138 MMOL/L (ref 136–145)
WBC # BLD: 5.3 K/UL (ref 4–11)

## 2022-10-21 PROCEDURE — 6370000000 HC RX 637 (ALT 250 FOR IP): Performed by: PHYSICAL MEDICINE & REHABILITATION

## 2022-10-21 PROCEDURE — 80048 BASIC METABOLIC PNL TOTAL CA: CPT

## 2022-10-21 PROCEDURE — 85610 PROTHROMBIN TIME: CPT

## 2022-10-21 PROCEDURE — 85025 COMPLETE CBC W/AUTO DIFF WBC: CPT

## 2022-10-21 PROCEDURE — 97530 THERAPEUTIC ACTIVITIES: CPT

## 2022-10-21 PROCEDURE — 97535 SELF CARE MNGMENT TRAINING: CPT

## 2022-10-21 PROCEDURE — 99232 SBSQ HOSP IP/OBS MODERATE 35: CPT | Performed by: INTERNAL MEDICINE

## 2022-10-21 PROCEDURE — 97116 GAIT TRAINING THERAPY: CPT

## 2022-10-21 PROCEDURE — 71046 X-RAY EXAM CHEST 2 VIEWS: CPT

## 2022-10-21 PROCEDURE — 36415 COLL VENOUS BLD VENIPUNCTURE: CPT

## 2022-10-21 PROCEDURE — 99232 SBSQ HOSP IP/OBS MODERATE 35: CPT | Performed by: PHYSICAL MEDICINE & REHABILITATION

## 2022-10-21 PROCEDURE — 1280000000 HC REHAB R&B

## 2022-10-21 RX ORDER — TRAMADOL HYDROCHLORIDE 50 MG/1
100 TABLET ORAL EVERY 6 HOURS PRN
Qty: 30 TABLET | Refills: 0 | Status: SHIPPED | OUTPATIENT
Start: 2022-10-21 | End: 2022-10-24

## 2022-10-21 RX ORDER — GUAIFENESIN 600 MG/1
600 TABLET, EXTENDED RELEASE ORAL 2 TIMES DAILY
Qty: 30 TABLET | Refills: 0 | Status: SHIPPED | OUTPATIENT
Start: 2022-10-21

## 2022-10-21 RX ORDER — ALBUTEROL SULFATE 90 UG/1
2 AEROSOL, METERED RESPIRATORY (INHALATION) 4 TIMES DAILY PRN
Qty: 54 G | Refills: 1 | Status: SHIPPED | OUTPATIENT
Start: 2022-10-21

## 2022-10-21 RX ORDER — GABAPENTIN 100 MG/1
100 CAPSULE ORAL NIGHTLY
Qty: 90 CAPSULE | Refills: 1 | Status: SHIPPED | OUTPATIENT
Start: 2022-10-21 | End: 2022-11-20

## 2022-10-21 RX ADMIN — Medication: at 20:36

## 2022-10-21 RX ADMIN — GABAPENTIN 100 MG: 100 CAPSULE ORAL at 20:36

## 2022-10-21 RX ADMIN — GUAIFENESIN 600 MG: 600 TABLET, EXTENDED RELEASE ORAL at 08:14

## 2022-10-21 RX ADMIN — WARFARIN SODIUM 2.5 MG: 2.5 TABLET ORAL at 17:54

## 2022-10-21 RX ADMIN — PANTOPRAZOLE SODIUM 40 MG: 40 TABLET, DELAYED RELEASE ORAL at 06:22

## 2022-10-21 RX ADMIN — ATORVASTATIN CALCIUM 10 MG: 10 TABLET, FILM COATED ORAL at 20:36

## 2022-10-21 RX ADMIN — BISACODYL 5 MG: 5 TABLET, COATED ORAL at 08:15

## 2022-10-21 RX ADMIN — Medication: at 08:15

## 2022-10-21 RX ADMIN — GUAIFENESIN 600 MG: 600 TABLET, EXTENDED RELEASE ORAL at 20:36

## 2022-10-21 ASSESSMENT — PAIN SCALES - WONG BAKER: WONGBAKER_NUMERICALRESPONSE: 0

## 2022-10-21 ASSESSMENT — PAIN SCALES - GENERAL: PAINLEVEL_OUTOF10: 0

## 2022-10-21 NOTE — CARE COORDINATION
CM following: RIZWAN spoke with Felipe Vázquez with [employer] and ordered home O2 for the pt. Felipe Vázquez is working on [employer] wheelchair currently and will pull information for home O2 as well. RIZWAN will continue to follow for dc planning.   Electronically signed by AFTAB Horton on 10/21/2022 at 11:38 AM  [phone]

## 2022-10-21 NOTE — PLAN OF CARE
Problem: Discharge Planning  Goal: Discharge to home or other facility with appropriate resources  10/20/2022 2250 by Karly Mckay RN  Outcome: Progressing  Flowsheets (Taken 10/20/2022 2250)  Discharge to home or other facility with appropriate resources:   Identify barriers to discharge with patient and caregiver   Identify discharge learning needs (meds, wound care, etc)     Problem: Safety - Adult  Goal: Free from fall injury  10/20/2022 2250 by Karly Mckay RN  Outcome: Progressing  Note: Fall precautions in place. Bed is in low and locked position. Call light and bedside table within reach. Bed alarm set. Patient has called appropriately for assistance with needs. Problem: Skin/Tissue Integrity  Goal: Absence of new skin breakdown  10/20/2022 2250 by Karly Mckay RN  Outcome: Progressing  Note: Patient reminded to turn and reposition frequently in bed and off load weight for coccyx. Coccyx reddened with small open area. Skin cleansed and Triad cream applied. Sacral heart in place. Scattered redness and ecchymotic areas noted on extremities.

## 2022-10-21 NOTE — PROGRESS NOTES
Shift assessment complete. VSS. A&Ox4. Denies pain at this time. Fall precautions in place. Bed/chair alarm utilized throughout shift. Patient calls out for assistance. Patient reported feeling tired this morning. Patient had a fall on the way to restroom this morning, Dr. Pratibha Cabrera aware, fall witnessed by staff, reports no pain and VSS. No new complaints at this time. Patient educated on importance of wearing his O2 via [place].      Vitals:    10/21/22 0805   BP: 133/70   Pulse: 84   Resp: 18   Temp:    SpO2: 92%

## 2022-10-21 NOTE — CARE COORDINATION
CTN contacted Archana with [employer] [phone]. They have accepted this patient and will pull referral from [facility].  They will contact patient and make arrangements for [facility] by 10/24  Electronically signed by Josh Hubbard LPN on 12/58/2617 at 3:13 PM

## 2022-10-21 NOTE — PROGRESS NOTES
Nephrology  Note                                                                                                                                                                                                                                                                                                                                                               Office : [phone]     Fax :[phone]              Patient's Name: Yolis Light MD      Reason for Consult:  Orthostatic   Requesting Physician:  Pamela Villatoro MD      Chief Complaint:  Trauma      Feels better  PT going well   Dizziness better     Past Medical History:   Diagnosis Date    Anemia     Aortic valve disorders     stenosis    Arthritis     Aspiration pneumonia ([facility] [address] 04/27/2015    Erectile dysfunction     Esophageal cancer ([facility] [address] 10/18/2012    Hernia, femoral, bilateral 05/12/2014    History of blood transfusion     Hyperlipidemia     Osteoporosis, senile 05/20/2014    Pancreatitis 08/01/2013    Patient underweight 08/08/2013    Prostate cancer ([facility] [address]     Unspecified essential hypertension        Past Surgical History:   Procedure Laterality Date    APPENDECTOMY      as a child    BONE GRAFT      for saddle nose    CARDIAC VALVE REPLACEMENT  01/01/2006    aorta    CHOLECYSTECTOMY      COLONOSCOPY  11/01/2009    COLONOSCOPY  10/05/2015    COLONOSCOPY N/A 02/17/2021    COLONOSCOPY DIAGNOSTIC/STOMA performed by Ruddy Zamora MD at [place] [address]  02/18/2021    COLONOSCOPY POLYPECTOMY SNARE/COLD BIOPSY performed by Ruddy Zamora MD at [address] [place] [address]  01/01/2006    DILATATION, ESOPHAGUS      2010    ESOPHAGUS SURGERY      EYE SURGERY  1990& 1992    cataract bilat removal     GASTRECTOMY      IR PERC CATH PLEURAL DRAIN W/IMAG  09/21/2022    IR PERC CATH PLEURAL DRAIN W/IMAG    PROSTATE SURGERY      seeds    TONSILLECTOMY      UPPER GASTROINTESTINAL ENDOSCOPY N/A 02/16/2021    EGD BIOPSY performed by Telma Najera MD at [address] N/A 04/22/2022    EGD CONTROL HEMORRHAGE performed by Ginny Hernandez MD at [address]  BRACHYTHERAPY         Family History   Problem Relation Age of Onset    Other Mother         bleeding peptic ulcer    Heart Disease Mother     High Blood Pressure Father     Stroke Father     Prostate Cancer Father     Other Father         cardiovascular disease    Elevated Lipids Father     Hypertension Father     Colon Cancer Paternal Cousin         reports that he has never smoked. He has never used smokeless tobacco. He reports current alcohol use. He reports that he does not use drugs.     Allergies:  No known allergies    Current Medications:    ferrous sulfate (IRON 325) tablet 325 mg, Every Other Day  ascorbic acid (VITAMIN C) tablet 500 mg, Every Other Day  albuterol (PROVENTIL) nebulizer solution 2.5 mg, Q4H PRN  sodium chloride (Inhalant) 3 % nebulizer solution 4 mL, PRN  warfarin (COUMADIN) tablet 2.5 mg, Daily  traMADol (ULTRAM) tablet 100 mg, Q6H PRN  gabapentin (NEURONTIN) capsule 100 mg, Nightly  zinc oxide (TRIAD HYDROPHILIC) paste, BID  alteplase (CATHFLO) injection 1 mg, Daily PRN  atorvastatin (LIPITOR) tablet 10 mg, Nightly  guaiFENesin (MUCINEX) extended release tablet 600 mg, BID  melatonin disintegrating tablet 5 mg, Nightly  pantoprazole (PROTONIX) tablet 40 mg, QAM AC  acetaminophen (TYLENOL) tablet 650 mg, Q4H PRN  bisacodyl (DULCOLAX) EC tablet 5 mg, Daily  magnesium hydroxide (MILK OF MAGNESIA) 400 MG/5ML suspension 30 mL, Daily PRN  polyethylene glycol (GLYCOLAX) packet 17 g, Daily PRN  simethicone (MYLICON) chewable tablet 80 mg, Q6H PRN  glucose chewable tablet 16 g, PRN  dextrose bolus 10% 125 mL, PRN   Or  dextrose bolus 10% 250 mL, PRN  glucagon (rDNA) injection 1 mg, PRN  dextrose 10 % infusion, Continuous PRN      Review of Systems:   14 point ROS obtained but were negative except mentioned in HPI      Physical exam:     Vitals:  /70   Pulse 84   Temp 97.7 °F (36.5 °C) (Oral)   Resp 18   Ht 5' 3\" (1.6 m)   Wt 117 lb 11.6 oz (53.4 kg)   SpO2 92%   BMI 20.85 kg/m²   Constitutional:  AA  Skin: no rash, turgor wnl  Heent:  eomi, mmm  Neck: no bruits or jvd noted  Cardiovascular:  S1, S2 without m/r/g  Respiratory: CTA B without w/r/r  Abdomen:  +bs, soft, nt, nd  Ext: + lower extremity edema  Psychiatric: mood and affect appropriate  Musculoskeletal:  Rom, muscular strength intact    Data:   Labs:  CBC:   Recent Labs     10/19/22  0654 10/21/22  0638   WBC 7.0 5.3   HGB 9.3* 9.1*    172       BMP:    Recent Labs     10/19/22  0654 10/21/22  0638    138   K 4.3 4.7    102   CO2 32 31   BUN 32* 35*   CREATININE 1.0 1.1   GLUCOSE 121* 99       Ca/Mg/Phos:   Recent Labs     10/19/22  0654 10/21/22  0638   CALCIUM 8.6 8.2*       Hepatic: No results for input(s): AST, ALT, ALB, BILITOT, ALKPHOS in the last 72 hours. Troponin: No results for input(s): TROPONINI in the last 72 hours. BNP: No results for input(s): BNP in the last 72 hours. Lipids: No results for input(s): CHOL, TRIG, HDL, LDLCALC, LABVLDL in the last 72 hours. ABGs: No results for input(s): PHART, PO2ART, [id number] in the last 72 hours. INR:   Recent Labs     10/19/22  0654 10/21/22  0638   INR 2.23* 2.28*       UA:No results for input(s): Sharad Beverley, GLUCOSEU, BILIRUBINUR, Myranda Slain, BLOODU, PHUR, PROTEINU, UROBILINOGEN, NITRU, LEUKOCYTESUR, Shekhar Guild in the last 72 hours. Urine Microscopic: No results for input(s): LABCAST, BACTERIA, COMU, HYALCAST, WBCUA, RBCUA, EPIU in the last 72 hours. Urine Culture: No results for input(s): LABURIN in the last 72 hours. Urine Chemistry: No results for input(s): Kandi Golds, PROTEINUR, NAUR in the last 72 hours.             IMAGING:  XR CHEST (2 VW)   Final Result      Significant elevation of the left hemidiaphragm with left lower lobe atelectasis. Cardiac enlargement and vascular congestion            XR CHEST PORTABLE   Final Result   1. Worsening left-sided airspace opacities, possibly asymmetric pulmonary edema or pneumonia. 2.  Left-sided pleural effusion. 3.  The previously described pneumothoraces are no longer visualized. The lucency projecting over the medial left lung apex is felt to represent air within the esophagus. Assessment/Plan   Orthostatic     2. Hyperkalemia     3. Anemia    4. Acid- base/ Electrolyte imbalance     5.  Debility     Plan   - d/c  florinef  and monitor   - Monitor Orthostats   - Cortisol 8.6  - UPC - no proteinuria   - encourage solute intake    - Rehab                   Thank you for allowing us to participate in care of MD Raffaele Garcia MD  Feel free to contact me   [facility] [address]  Office : [phone]  Fax :[phone]

## 2022-10-21 NOTE — PROGRESS NOTES
Nephrology  Note                                                                                                                                                                                                                                                                                                                                                               Office : [phone]     Fax :[phone]              Patient's Name: Casey Blount MD      Reason for Consult:  Orthostatic   Requesting Physician:  Cathy Franklin MD      Chief Complaint:  Trauma      Feels better  PT going well   Dizziness better     Past Medical History:   Diagnosis Date    Anemia     Aortic valve disorders     stenosis    Arthritis     Aspiration pneumonia ([address] 04/27/2015    Erectile dysfunction     Esophageal cancer ([facility] 10/18/2012    Hernia, femoral, bilateral 05/12/2014    History of blood transfusion     Hyperlipidemia     Osteoporosis, senile 05/20/2014    Pancreatitis 08/01/2013    Patient underweight 08/08/2013    Prostate cancer ([facility]     Unspecified essential hypertension        Past Surgical History:   Procedure Laterality Date    APPENDECTOMY      as a child    BONE GRAFT      for saddle nose    CARDIAC VALVE REPLACEMENT  01/01/2006    aorta    CHOLECYSTECTOMY      COLONOSCOPY  11/01/2009    COLONOSCOPY  10/05/2015    COLONOSCOPY N/A 02/17/2021    COLONOSCOPY DIAGNOSTIC/STOMA performed by Juliana Morris MD at [facility], [place]  02/18/2021    COLONOSCOPY POLYPECTOMY SNARE/COLD BIOPSY performed by Juliana Morris MD at [address] [place] [address]  01/01/2006    DILATATION, ESOPHAGUS      2010    ESOPHAGUS SURGERY      EYE SURGERY  1990& 1992    cataract bilat removal     GASTRECTOMY      IR PERC CATH PLEURAL DRAIN W/IMAG  09/21/2022    IR PERC CATH PLEURAL DRAIN W/IMAG    PROSTATE SURGERY      seeds    TONSILLECTOMY      UPPER GASTROINTESTINAL ENDOSCOPY N/A 02/16/2021    EGD BIOPSY performed by Caitlin Chakraborty MD at [address] [employer] N/A 04/22/2022    EGD CONTROL HEMORRHAGE performed by Amira Vargas MD at [address]  BRACHYTHERAPY         Family History   Problem Relation Age of Onset    Other Mother         bleeding peptic ulcer    Heart Disease Mother     High Blood Pressure Father     Stroke Father     Prostate Cancer Father     Other Father         cardiovascular disease    Elevated Lipids Father     Hypertension Father     Colon Cancer Paternal Cousin         reports that he has never smoked. He has never used smokeless tobacco. He reports current alcohol use. He reports that he does not use drugs.     Allergies:  No known allergies    Current Medications:    ferrous sulfate (IRON 325) tablet 325 mg, Every Other Day  ascorbic acid (VITAMIN C) tablet 500 mg, Every Other Day  albuterol (PROVENTIL) nebulizer solution 2.5 mg, Q4H PRN  sodium chloride (Inhalant) 3 % nebulizer solution 4 mL, PRN  warfarin (COUMADIN) tablet 2.5 mg, Daily  traMADol (ULTRAM) tablet 100 mg, Q6H PRN  gabapentin (NEURONTIN) capsule 100 mg, Nightly  zinc oxide (TR[facility]AD HYDROPHILIC) paste, BID  alteplase (CATHFLO) injection 1 mg, Daily PRN  atorvastatin (LIPITOR) tablet 10 mg, Nightly  guaiFENesin (MUCINEX) extended release tablet 600 mg, BID  melatonin disintegrating tablet 5 mg, Nightly  pantoprazole (PROTONIX) tablet 40 mg, QAM AC  acetaminophen (TYLENOL) tablet 650 mg, Q4H PRN  bisacodyl (DULCOLAX) EC tablet 5 mg, Daily  magnesium hydroxide (MILK OF MAGNESIA) 400 MG/5ML suspension 30 mL, Daily PRN  polyethylene glycol (GLYCOLAX) packet 17 g, Daily PRN  simethicone (MYLICON) chewable tablet 80 mg, Q6H PRN  glucose chewable tablet 16 g, PRN  dextrose bolus 10% 125 mL, PRN   Or  dextrose bolus 10% 250 mL, PRN  glucagon (rDNA) injection 1 mg, PRN  dextrose 10 % infusion, Continuous PRN      Review of Systems:   14 point ROS obtained but were negative except mentioned in HPI      Physical exam:     Vitals:  BP (!) 142/81   Pulse 90   Temp 98 °F (36.7 °C) (Oral)   Resp 18   Ht 5' 3\" (1.6 m)   Wt 117 lb 11.6 oz (53.4 kg)   SpO2 94%   BMI 20.85 kg/m²   Constitutional:  AA  Skin: no rash, turgor wnl  Heent:  eomi, mmm  Neck: no bruits or jvd noted  Cardiovascular:  S1, S2 without m/r/g  Respiratory: CTA B without w/r/r  Abdomen:  +bs, soft, nt, nd  Ext: + lower extremity edema  Psychiatric: mood and affect appropriate  Musculoskeletal:  Rom, muscular strength intact    Data:   Labs:  CBC:   Recent Labs     10/19/22  0654   WBC 7.0   HGB 9.3*          BMP:    Recent Labs     10/19/22  0654      K 4.3      CO2 32   BUN 32*   CREATININE 1.0   GLUCOSE 121*       Ca/Mg/Phos:   Recent Labs     10/19/22  0654   CALCIUM 8.6       Hepatic: No results for input(s): AST, ALT, ALB, BILITOT, ALKPHOS in the last 72 hours. Troponin: No results for input(s): TROPONINI in the last 72 hours. BNP: No results for input(s): BNP in the last 72 hours. Lipids: No results for input(s): CHOL, TRIG, HDL, LDLCALC, LABVLDL in the last 72 hours. ABGs: No results for input(s): PHART, PO2ART, EXA5TRZ in the last 72 hours. INR:   Recent Labs     10/19/22  0654   INR 2.23*       UA:No results for input(s): Shwetha Savin, GLUCOSEU, BILIRUBINUR, Radha Rashel, BLOODU, PHUR, PROTEINU, UROBILINOGEN, NITRU, LEUKOCYTESUR, Eppie Pilar in the last 72 hours. Urine Microscopic: No results for input(s): LABCAST, BACTERIA, COMU, HYALCAST, WBCUA, RBCUA, EPIU in the last 72 hours. Urine Culture: No results for input(s): LABURIN in the last 72 hours. Urine Chemistry: No results for input(s): Clearance Bookbinder, PROTEINUR, NAUR in the last 72 hours. IMAGING:  XR CHEST PORTABLE   Final Result   1. Worsening left-sided airspace opacities, possibly asymmetric pulmonary edema or pneumonia. 2.  Left-sided pleural effusion.    3.  The previously described pneumothoraces are no longer visualized. The lucency projecting over the medial left lung apex is felt to represent air within the esophagus. Assessment/Plan   Orthostatic     2. Hyperkalemia     3. Anemia    4. Acid- base/ Electrolyte imbalance     5.  Debility     Plan   - d/c  florinef  and monitor   - Monitor Orthostats   - Cortisol 8.6  - UPC - no proteinuria   - encourage solute intake    - Rehab                   Thank you for allowing us to participate in care of MD Neftaly Castañeda MD  Feel free to contact me   [facility] [address]  Office : [phone]  Fax :[phone]

## 2022-10-21 NOTE — PROGRESS NOTES
10/21/22 1522   Encounter Summary   Encounter Overview/Reason  Initial Encounter   Service Provided For: Patient   [facility]   Support System Family members   Complexity of Encounter Moderate   Begin Time 1255   End Time  0927   Total Time Calculated 10 min   Encounter    Type Initial Screen/Assessment   Spiritual/Emotional needs   Type Spiritual Support   Assessment/Intervention/Outcome   Assessment Calm   Intervention Nurtured Hope; Active listening   Outcome Connection/Belonging;Coping;Expressed Gratitude;Receptive

## 2022-10-21 NOTE — PROGRESS NOTES
Department of Physical Medicine & Rehabilitation  Progress Note    Patient Identification:  Aminata Pan MD  [id number]  : 1935  Admit date: 10/6/2022    Chief Complaint: Debility    Subjective:   Feels well. No new complaints overnight. He did have a slight fall this morning but states he was just not awake yet. Plan for O2 at home. BP stable. CXR ordered for today. ROS: No f/c, n/v, cp     Objective:  Patient Vitals for the past 24 hrs:   BP Temp Temp src Pulse Resp SpO2   10/21/22 0805 133/70 -- -- 84 18 92 %   10/21/22 0750 (!) 147/46 97.7 °F (36.5 °C) Oral 76 18 97 %   10/21/22 0222 -- -- -- -- -- 94 %   10/20/22 2351 -- -- -- -- -- 98 %   10/20/22 1955 (!) 142/81 98 °F (36.7 °C) Oral 90 18 94 %   10/20/22 1618 -- -- -- -- -- 97 %   10/20/22 1615 -- -- -- -- -- 99 %   10/20/22 1502 -- -- -- -- -- (!) 84 %       Const: Alert. No distress, pleasant. HEENT: Normocephalic, atraumatic. Normal sclera/conjunctiva. MMM. CV: Regular rate and rhythm. Resp: No respiratory distress. Lungs CTAB. Abd: Soft, nontender, nondistended, NABS+   Ext: No edema. Neuro: Alert, oriented, appropriately interactive. Psych: Cooperative, appropriate mood and affect    Laboratory data: Available via EMR.    Last 24 hour lab  Recent Results (from the past 24 hour(s))   Protime-INR    Collection Time: 10/21/22  6:38 AM   Result Value Ref Range    Protime 25.2 (H) 11.7 - 14.5 sec    INR 2.28 (H) 0.87 - 0.98   Basic Metabolic Panel w/ Reflex to MG    Collection Time: 10/21/22  6:38 AM   Result Value Ref Range    Sodium 138 136 - 145 mmol/L    Potassium reflex Magnesium 4.7 3.5 - 5.1 mmol/L    Chloride 102 99 - 110 mmol/L    CO2 31 21 - 32 mmol/L    Anion Gap 5 3 - 16    Glucose 99 70 - 99 mg/dL    BUN 35 (H) 7 - 20 mg/dL    Creatinine 1.1 0.8 - 1.3 mg/dL    Est, Glom Filt Rate >60 >60    Calcium 8.2 (L) 8.3 - 10.6 mg/dL   CBC auto differential    Collection Time: 10/21/22  6:38 AM   Result Value Ref Range    WBC 5.3 4.0 - 11.0 K/uL    RBC 3.18 (L) 4.20 - 5.90 M/uL    Hemoglobin 9.1 (L) 13.5 - 17.5 g/dL    Hematocrit 29.1 (L) 40.5 - 52.5 %    MCV 91.5 80.0 - 100.0 fL    MCH 28.7 26.0 - 34.0 pg    MCHC 31.3 31.0 - 36.0 g/dL    RDW 20.7 (H) 12.4 - 15.4 %    Platelets 757 629 - 808 K/uL    MPV 9.8 5.0 - 10.5 fL    Neutrophils % 68.2 %    Lymphocytes % 17.8 %    Monocytes % 9.2 %    Eosinophils % 3.9 %    Basophils % 0.9 %    Neutrophils Absolute 3.6 1.7 - 7.7 K/uL    Lymphocytes Absolute 0.9 (L) 1.0 - 5.1 K/uL    Monocytes Absolute 0.5 0.0 - 1.3 K/uL    Eosinophils Absolute 0.2 0.0 - 0.6 K/uL    Basophils Absolute 0.0 0.0 - 0.2 K/uL   POCT Glucose    Collection Time: 10/21/22  9:20 AM   Result Value Ref Range    POC Glucose 174 (H) 70 - 99 mg/dl    Performed on ACCU-CHEK            Therapy progress:  PT  Position Activity Restriction  Other position/activity restrictions: up w/ assist, ambulate pt, up as tolerated,  Objective     Sit to Stand: Dependent/Total, 2 Person Assistance  Stand to Sit: Dependent/Total, 2 Person Assistance  Bed to Chair: Dependent/Total, 2 Person Assistance (Performed via squat pivot (see below))     OT  PT Equipment Recommendations  Equipment Needed: Yes  Mobility Devices: Wheelchair  Wheelchair: Light Weight (16\", elevating leg rests)  Other: Ongoing assessment     Assessment        SLP          Body mass index is 20.85 kg/m².     Assessment and Plan:  # Right pneumothorax  # R 1,3-6 nondisplaced rib fractures  # Right lung contusion             - 9/19 - Right chest tube placed in the ED  - 9/23 - Right chest tube removed  - Pulmonary expansion  - tramadol pain control      # Left pneumothorax  # Left T1 and T3 posterior rib fractures  # Left lung contusion  - 9/21 - Left chest tube placed by IR  - 9/23 - chest tube to water seal  - Left CT removed 10/4     #LLL collapse, L mainstem bronchus occlusion  - 9/23 - s/p bronchoscopy by ICU with removal of large mucus plug  - 9/27 - CT chest demonstrating persistent total occlusion of the left central airway/left lung collapse with loculated L hydropneumothorax    - Aggressive respiratory care, hypertonic saline, mucomyst nebs BID  - Bronch with pulm 9/30 w/mucus plugging in the right and left tracheobronchial trees  - Bronch wash cultures with Ecoli resistant to cefazolin, on Ceftriaxone- 2 days left        # C7 TP Fracture  - 9/20 - CTA Head/Neck did not show evidence of BCVI   - No brace needed  - Activity as tolerated     #L shoulder ecchymoses  - L shoulder x-ray with no interosseous abnormalities      Medical Comorbidities  # Aortic Valve Replacement  - 9/23 - restarted home warfarin  - AM INR checks - prior goal 2-2.5  - check dosing with pharmacy     # Hyperlipidemia  - Continue atorvastatin 10 mg daily     #RUL pulmonary nodule  - 6mm RUL nodule noted on 9/22 CT chest  - Recommend 6-12 month interval CT scan for monitoring      #Stage 3 Coccygeal Ulcer  - 2/2 to rowing machine per patient now a stage 3 wound   - Recommending [facility]     #CIM  - prolonged hospital course   - PT/OT required      # orthostatic hypotension  - Likely associated with CIM  - nephrology consult    # asthma-  - albuterol  - 02 required       DC planned for tomorrow  CXR pending     Rehab Progress: Improving  Anticipated Dispo: home  Services/DME: [facility] [place]  ELOS: 10/22      Usha Callahan D.O. M.P.H  PM&R  10/21/2022  10:24 AM

## 2022-10-21 NOTE — PROGRESS NOTES
Hospital Medicine  Progress Note    Chief Complaint: Anemia, collapse of lung    SUBJECTIVE: Patient is not changed. There are not new complaints. On 1 L O2    OBJECTIVE      Medications    Infusion Medications    dextrose       Scheduled Medications    ferrous sulfate  325 mg Oral Every Other Day    vitamin C  500 mg Oral Every Other Day    warfarin  2.5 mg Oral Daily    gabapentin  100 mg Oral Nightly    zinc oxide   Topical BID    atorvastatin  10 mg Oral Nightly    guaiFENesin  600 mg Oral BID    melatonin  5 mg Oral Nightly    pantoprazole  40 mg Oral QAM AC    bisacodyl  5 mg Oral Daily       Physical   Temperature:  Current - Temp: 97.7 °F (36.5 °C); Max - Temp  Av.7 °F (36.5 °C)  Min: 97.3 °F (36.3 °C)  Max: 98 °F (36.7 °C)    Respiratory Rate : Resp  Av  Min: 18  Max: 18    Pulse Range: Pulse  Av.3  Min: 76  Max: 90    Blood Presuure Range:  Systolic (36BUF), YPS:243 , Min:129 , HZ   ; Diastolic (17VVH), G, Min:46, Max:81      Pulse ox Range: SpO2  Av.4 %  Min: 84 %  Max: 99 %    24hr I & O:    Intake/Output Summary (Last 24 hours) at 10/21/2022 2651  Last data filed at 10/20/2022 1237  Gross per 24 hour   Intake 480 ml   Output --   Net 480 ml       Constitutional:  awake, alert, cooperative, no apparent distress, and appears stated age  Eyes:  pupils equal, round and reactive to light  ENT:  normocepalic, without obvious abnormality  NECK:  supple, symmetrical, trachea midline  HEMATOLOGIC/LYMPHATICS:  no cervical lymphadenopathy  LUNGS:  diminished breath sounds throughout lungs  CARDIOVASCULAR: regular rate and rhythm, S1, S2 normal, no murmur, click, rub or gallop  ABDOMEN:  soft, non-tender, without masses or organomegaly  MUSCULOSKELETAL:  There is no redness, warmth, or swelling of the joints. Full range of motion noted. Motor strength is 5 out of 5 all extremities bilaterally.   Tone is normal.  SKIN:  normal skin color, texture, turgor    Data      CBC:   Lab Results Component Value Date/Time    WBC 5.3 10/21/2022 06:38 AM    RBC 3.18 10/21/2022 06:38 AM    RBC 4.41 06/26/2017 01:24 PM    HGB 9.1 10/21/2022 06:38 AM    HCT 29.1 10/21/2022 06:38 AM    MCV 91.5 10/21/2022 06:38 AM    MCH 28.7 10/21/2022 06:38 AM    MCHC 31.3 10/21/2022 06:38 AM    RDW 20.7 10/21/2022 06:38 AM     10/21/2022 06:38 AM    MPV 9.8 10/21/2022 06:38 AM     BMP:    Lab Results   Component Value Date/Time     10/21/2022 06:38 AM    K 4.7 10/21/2022 06:38 AM     10/21/2022 06:38 AM    CO2 31 10/21/2022 06:38 AM    BUN 35 10/21/2022 06:38 AM    LABALBU 2.4 10/08/2022 03:50 AM    CREATININE 1.1 10/21/2022 06:38 AM    CALCIUM 8.2 10/21/2022 06:38 AM    GFRAA >60 10/17/2022 12:25 PM    GFRAA >60 05/10/2013 02:07 PM    LABGLOM >60 10/21/2022 06:38 AM    GLUCOSE 99 10/21/2022 06:38 AM    GLUCOSE 123 09/26/2016 01:51 PM         ASSESSMENT AND PLAN      Principal Problem:    Debility  Plan: PT/OT  Active Problems:      Abnormal CXR  Plan: Repeat CXR today    Ineffective airway clearance  Plan: Pulmonary toilet  Anemia Iron and vit c

## 2022-10-21 NOTE — PROGRESS NOTES
Occupational Therapy  Facility/Department: [facility] UNIT  Rehabilitation Occupational Therapy Daily Treatment Note/Discharge Summary     Date: 10/21/22  Patient Name: Joel Lin MD       Room: 3109/3109-01  MRN: [id number]  Account: [de-identified]   : 1935  (82 y.o.) Gender: male                    Past Medical History:  has a past medical history of Anemia, Aortic valve disorders, Arthritis, Aspiration pneumonia ([address] Erectile dysfunction, Esophageal cancer ([address] Hernia, femoral, bilateral, History of blood transfusion, Hyperlipidemia, Osteoporosis, senile, Pancreatitis, Patient underweight, Prostate cancer ([address] and Unspecified essential hypertension. Past Surgical History:   has a past surgical history that includes Coronary artery bypass graft (2006); Cardiac valve replacement (2006); eye surgery (& ); Appendectomy; bone graft; gastrectomy; Cholecystectomy; Colonoscopy (2009); Prostate surgery; Tonsillectomy; Esophagus surgery;  Prostate/Transrectal/Vol Collins Brachyth; Colonoscopy (10/05/2015); Upper gastrointestinal endoscopy (N/A, 2021); Colonoscopy (N/A, 2021); Colonoscopy (2021); Upper gastrointestinal endoscopy (N/A, 2022); IR GUIDED PERC PLEURAL DRAIN W CATH INSERT (2022); and Dilatation, esophagus. Restrictions  Other position/activity restrictions: up w/ assist, ambulate pt, up as tolerated,    Subjective  Subjective: Pt was semi supine in bed upon arrival w/ wife Heron Adry present. Pt on 1 L O2.  Pt was pleasant and agreeable to ADL w/ OT                Objective     Cognition  Overall Cognitive Status: WFL  Orientation  Overall Orientation Status: Within Normal Limits         ADL  Grooming/Oral Hygiene  Assistance Level: Modified independent  Skilled Clinical Factors: Pt combed his hair seated in w/c at the sink  Upper Extremity Bathing  Assistance Level: Modified independent  Skilled Clinical Factors: Pt washed and dried 4/4 components of UE bathing seated on TTB  Lower Extremity Bathing  Assistance Level: Supervision  Skilled Clinical Factors: Pt washed and dried 5/5 components of LE bathing seated on TTB. Pt did not wash his buttocks. Upper Extremity Dressing  Assistance Level: Independent  Skilled Clinical Factors: Pt donned undershirt and tshirt seated. Pt did not retrieve clothes however stated his wife will normally do this for him  Lower Extremity Dressing  Assistance Level: Contact guard assist;Increased time to complete  Skilled Clinical Factors: Pt threaded BLEs into underwear and pants seated in w/c w/ CGA in stance at RW to manage clothes over hips. + time needed due to fatigue  Putting On/Taking Off Footwear  Assistance Level: Independent  Skilled Clinical Factors: Pt donned socks and shoes seated in w/c  Toileting  Assistance Level: Contact guard assist  Skilled Clinical Factors: Pt was continent of urine and completed pants management in stance at RW w/ CGA  Toilet Transfers  Technique:  (ambulating)  Equipment: Standard toilet;Grab bars  Additional Factors: Verbal cues  Assistance Level: Contact guard assist  Skilled Clinical Factors: Pt ambulated to the toilet w/ RW and required CGA for slow descent to toilet. Use of GB required upon stance from toilet    Tub/Shower Transfers  Type: Shower  Transfer From: [employer] Yoder  Transfer To: Tub transfer bench  Additional Factors: Verbal cues  Assistance Level: Contact guard assist  Skilled Clinical Factors: Pt ambulated w/ RW to/from TTB w/ CGA. Pt required min VCs for safe approach and use of GB for entry and exit            Functional Mobility  Device: Rolling walker  Activity: To/From bathroom  Assistance Level: Contact guard assist  Skilled Clinical Factors: Pt ambulated w/ RW to the bathroom w/ CGA. Assistance needed to manuever O2 cord. Pt demonstrated kyphotic posture which is his baseline and pt demonstrated decreased step length.   Supine to Sit  Assistance Level: Modified independent  Sit to Stand  Assistance Level: Contact guard assist  Stand to Sit  Assistance Level: Contact guard assist  Skilled Clinical Factors: VCs needed to back up completely and reach hands back before sitting         Assessment    Assessment  Assessment: Pt has demonstrated fair progress in OT since admission and has met 2/5 OT goals. Pt is MOD I for seated ADLs including seated bathing and seated grooming tasks. Pt continues to require CGA for safety in stance during LE dressing and toileting due to BLE weakness and decreased activity tolerance. Pt remains on 1L O2 and requires assistance to navigate around O2 cord. Pt is a high fall risk and it is recommended pt have 24 hr assistance at d/c. Pt's wife Marlinda Felty completed family training and plans to assist pt w/ 24 hr assist. Pt has a shower chair, GBs, handicap height toilet, and reacher. OT recommended a toilet safety frame which they plan to install. OT also recommended ongoing OT w/ [facility] services. Pt and family w/ no questions or concerns for d/c at this time. Activity Tolerance: Patient tolerated treatment well  Discharge Recommendations: 24 hour supervision or assist;Home with Home health OT  OT Equipment Recommendations  ADL Assistive Devices: Toilet Safety Frame  Other: Pt reported he has a shower chair, GBs, and a reacher. Safety Devices  Safety Devices in place: Yes  Type of devices: Left in chair;Call light within reach; Chair alarm in place    Patient Education  Education  Education Given To: Patient; Family  Education Provided: Role of Therapy;Plan of Care;Safety;Transfer Training;Energy Conservation;Equipment;DME/Home Modifications; Family Education;ADL Function  Education Provided Comments: OT reviewed d/c plans and equipment recommendations. Pt reported no questions or concerns at this time. Marlinda Felty denied any questions or concerns.   Education Method: Demonstration;Verbal  Barriers to Learning: None  Education Outcome: Verbalized understanding;Continued education needed    Plan  Occupational Therapy Plan  Times Per Week: 5x/week, 90min/day  Times Per Day: Once a day  Current Treatment Recommendations: Strengthening;Balance training;Functional mobility training; Endurance training; Safety education & training;Patient/Caregiver education & training;Equipment evaluation, education, & procurement;Self-Care / ADL; Home management training; Wheelchair mobility training      1st session: Pt was semi supine in bed upon arrival. Pt on 1L O2. Pt was very lethargic and had increased difficulty keeping his eyes open. Pt reported he fell this morning when going to the bathroom and he stated he was sleeping when it happened. Pt endorsed feeling very sleepy today and had increased difficulty keeping his eyes open. Pt was pleasant and agreeable to OT but reported he didn't feel safe to walk right now due to how tired he felt. Pt completed supine to sit w/ MOD I however + time needed to initiate due to pt closing his eyes. Once seated EOB pt donned his shoes in figure 4 MOD I but required + time to complete task. SpO2 checked prior to transfer to w/c. Pt was 91% on 1L O2. Pt completed stand pivot from the bed to the w/c w/ CGA. Pt self propelled the w/c into the bathroom w/ spvn and assistance for O2 cord management. Pt completed oral care and brushed his hair seated in the w/c at the sink MOD I. Pt required ++ time to complete task. Pt again was somewhat closing his eyes and needed cues to stay awake. Pt self propelled w/c back to the recliner chair w/ spvn. Stand pivot completed from w/c > recliner chair w/ CGA. Pt was left seated in the recliner chair w/ chair alarm on and call light within reach. Goals  Patient Goals   Patient goals : \"I want to return to what I was doing before. \"  Short Term Goals  Time Frame for Short Term Goals: 2 weeks  Short Term Goal 1: Pt will complete UE/LE dressing w/ Mod I and use of AE prn- goal met for UE dressing 10/21; not met LE dressing  Short Term Goal 2: Pt will complete bathing w/ spvn- met 10/21 for seated tasks  Short Term Goal 3: Pt will complete shower transfer w/ spvn- not met  Short Term Goal 4: Pt will complete toileting/toilet transfer w/ Mod I- not met  Short Term Goal 5: Pt will complete grooming routine in stance w/ Mod I-not met    AM-PAC Score               Therapy Time   Individual Concurrent Group Co-treatment   Time In 1330         Time Out 1430         Minutes 60         Timed Code Treatment Minutes: 60 Minutes       1st Session Therapy Time:   Individual Concurrent Group Co-treatment   Time In 1000         Time Out 1030         Minutes 30           Timed Code Treatment Minutes:  30    Total Treatment Minutes:   60+30= [address], OT

## 2022-10-21 NOTE — PROGRESS NOTES
Physical Therapy  Facility/Department: [address] [place] [address]  Rehabilitation Physical Therapy Treatment Note & Discharge Summary    NAME: Michael Adams MD  : 1935 (82 y.o.)  MRN: [id number]  CODE STATUS: Full Code    Date of Service: 10/21/22       Restrictions:  Position Activity Restriction  Other position/activity restrictions: up w/ assist, ambulate pt, up as tolerated,     SUBJECTIVE  Subjective  Subjective: Pt seated in recliner upon PT arrival, agreeable to therapy. Pain: Denied pain. Post Treatment Pain Screening     Pt reported that he fell earlier in the morning prior to PT session and reported \"I was still asleep\" regarding reason. Pt reported increased fatigue and tiredness upon initiation of session. Pt concerned about hypoglycemia. RN to room to assess blood glucose and found to be 174. OBJECTIVE  Cognition  Overall Cognitive Status: WFL  Arousal/Alertness: Appropriate responses to stimuli  Following Commands: Follows multistep commands consistently  Attention Span: Appears intact  Memory: Appears intact  Safety Judgement: Good awareness of safety precautions  Problem Solving: Assistance required to identify errors made;Assistance required to correct errors made  Insights: Decreased awareness of deficits  Initiation: Does not require cues  Sequencing: Requires cues for some  Orientation  Overall Orientation Status: Within Normal Limits  Orientation Level: Oriented X4    Functional Mobility  Bed Mobility  Additional Factors: Head of bed flat; Without handrails (to simulate home environment)  Bridging  Assistance Level: Independent  Roll Left  Assistance Level: Independent  Roll Right  Assistance Level: Independent  Sit to Supine  Assistance Level: Independent  Supine to Sit  Assistance Level: Independent  Balance  Sitting Balance: Modified independent   Transfers  Surface: From chair with arms; Wheelchair; To bed  Additional Factors: Increased time to complete  Device: [employer] Yoder (RW)  Sit to Stand  Assistance Level: Moderate assistance  Skilled Clinical Factors: Attempted STS with RW. Pt unable to maintain full stand and req ModA to maintain upright d/t bilateral knee buckling. Stand to Sit  Assistance Level: Minimal assistance  Skilled Clinical Factors: Ana for controlled descent. Stand Pivot  Assistance Level: Contact guard assist  Skilled Clinical Factors: no AD, chair>w/c and w/c>bed. CGA for stability d/t UE/LE shakiness. VC to scoot to EOS. PT Exercises  Exercise Treatment: PT instructed pt to complete X5 chair pushups with 3 second hold and controlled descent to increase alertness prior to mobility 2/2 pt reporting tiredness/fatigue. 2nd Session  Pt seated in recliner upon PT arrival, agreeable to therapy. PT stood with SBA up to RW and PT instructed pt to complete side steps X10 alternating directions in place, then fwd/bwd steps alternating X10 as pre-gait activity d/t pt with knee buckling upon standing. Pt completed with CGA and demo'd decreased knee buckling  in stance following this task. Bed mobility: sit>supine: IND  Transfers:   W/c<>recliner: with RW, SBA for safety  Car transfer: X2 attempts with RW. On first attempt, pt req Ana d/t LE unsteadiness and VC for step by step sequencing. On second attempt with RW, pt completed with CGA for stability and did not req VC. Pt completed X1 repw with no AD via sit pivot with w/c armrest removed. Pt completed with CGA for stability. Ambulation: Pt amb 100' (level surface) with RW and SBA overall up to CGA near the end of the bout d/t LE unsteadiness. Pt also ambulated 10' up/10' down ramp with RW and CGA. PT transported O2 for all ambulation  Stairs: Pt navigated 4 stairs with BHRs and CGA d/t intermittent LE shakiness. Balance: Pt picked up object from ground using reacher and RW with SBA. Wheelchair mobility: Pt navigated 150' with manual w/c Mod I. No assistance req to manage brakes.    PT educated pt regarding making safe decisions about mobility including assessing stability of LE's in stance prior to initiating ambulation. When questioned by PT, pt reported if his LE's felt unstable at home he would return to sitting. Pt denied further questions/concerns. Pt left in bed, bed alarm in place, call light and need within reach. ASSESSMENT/PROGRESS TOWARDS GOALS       Assessment  Assessment: Pt limited during morning session by increased fatigue and weakness, limiting pt from attempting ambulation. Overall pt appeared weaker overall today. For this reason, PT discussed a possible extension with pt/pt's wife to maximize overall strength and independence. Pt and pt's wife declined providing multiple reasons. Therapy questioning insight and awareness of deficits both from pt and family. Pt improved in second session, tolerating ambulation, stairs, and transfers with rest breaks, however still with increased unsteadiness. Pt has met 4/4 STGs and 0/3 LTGs at this time. Despite not achieving LTGs, he has demonstrated significant progress towards them including increased ambulation distances and progressively less physical assist, but still requires CGA-SBA for safety with functional mobility. Pt continues to require oxygen to maintain SpO2 at least 88% with mobility. He will benefit from continued skilled PT to maximize independence and safety with functional mobility for return to PLOF. Activity Tolerance: Patient tolerated treatment well;Patient limited by endurance; Patient limited by fatigue  Discharge Recommendations: 24 hour supervision or assist;Home with Home health PT  PT Equipment Recommendations  Equipment Needed: Yes  Wheelchair: Light Weight (16\", elevating leg rests)    Goals  Patient Goals   Patient Goals :  \"To get back to doing what I was doing\"  Short Term Goals  Time Frame for Short Term Goals: 7 days  Short Term Goal 1: Pt will perform bed mobility independently (goal met)  Short Term Goal 2: Pt will perform all transfers excluding floor transfer with Ana (goal met)  Short Term Goal 3: Pt will ambulate 10' on level surfaces with LRAD and CGA (goal met)  Short Term Goal 4: Patient will navigate 4 stairs with bilateral HR and CGA (goal met)  Long Term Goals  Time Frame for Long Term Goals : 14 days  Long Term Goal 1: Pt will perform all transfers excluding floor transfer Mod I with LRAD (goal not achieved)  Long Term Goal 2: Pt will ambulate at least 150' on level surfaces Mod I with LRAD (goal not achieved)  Long Term Goal 3: Patient will navigate 12 stairs with 1 HR Mod I (goal not achieved)    PLAN OF CARE/SAFETY  Physcial Therapy Plan  Additional Comments: Tentative d/c home with 24 hour supervision/assist. Home health PT to follow. Safety Devices  Type of Devices: Left in chair;Chair alarm in place;Call light within reach    EDUCATION  Education  Education Given To: Patient; Family  Education Provided: Role of Therapy;Plan of Care;Safety; Mobility Training;Transfer Training;Energy Conservation  Education Provided Comments: Pt educated on safety with functional mobility and need for guarding using gait belt with all mobility upon d/c home. Pt and family verbalized understanding.   Education Method: Verbal  Barriers to Learning: None  Education Outcome: Verbalized understanding        Therapy Time   Individual Concurrent Group Co-treatment   Time In 0900         Time Out 0930         Minutes 30           Second Session Therapy Time:    Individual Concurrent Group Co-treatment   Time In 1100      Time Out 1200      Minutes 60         Timed Code Treatment Minutes:  30 + 60     Total Treatment Minutes:   [address] [facility] 10/21/22 at 2:12 PM
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
## 2022-10-21 NOTE — DISCHARGE INSTR - COC
Continuity of Care Form    Patient Name: Alvaro Olguin MD   :  1935  MRN:  [id number]    Admit date:  10/6/2022  Discharge date:  10/22/2022        Code Status Order: Full Code   Advance Directives:     Admitting Physician:  Carl Quinones DO  PCP: Jose Manuel Carmen MD    Discharging Nurse:  Cece Nava 23 Unit/Room#: 4809/3324-24  Discharging Unit Phone Number: [phone]    Emergency Contact:   Extended Emergency Contact Information  Primary Emergency Contact: Yvrose Ricketts  Address: [address]           [address], [address] [place] [address] Phone: [phone]  Work Phone: [phone]  Mobile Phone: [phone]  Relation: Spouse  Secondary Emergency Contact: [address] Phone: [phone]  Relation: Child    Past Surgical History:  Past Surgical History:   Procedure Laterality Date    APPENDECTOMY      as a child    BONE GRAFT      for saddle nose    CARDIAC VALVE REPLACEMENT  2006    aorta    CHOLECYSTECTOMY      COLONOSCOPY  2009    COLONOSCOPY  10/05/2015    COLONOSCOPY N/A 2021    COLONOSCOPY DIAGNOSTIC/STOMA performed by Madeline Wilcox MD at [address] [facility]  2021    COLONOSCOPY POLYPECTOMY SNARE/COLD BIOPSY performed by Madeline Wilcox MD at [facility]  2006    DILATATION, ESOPHAGUS          ESOPHAGUS SURGERY      EYE SURGERY  &     cataract bilat removal     GASTRECTOMY      IR PERC CATH PLEURAL DRAIN W/IMAG  2022    IR PERC CATH PLEURAL DRAIN W/IMAG    PROSTATE SURGERY      seeds    TONSILLECTOMY      UPPER GASTROINTESTINAL ENDOSCOPY N/A 2021    EGD BIOPSY performed by Madeline Wilcox MD at [address] 2022    EGD CONTROL HEMORRHAGE performed by Izabella Robles MD at [place] [address]         Immunization History: Immunization History   Administered Date(s) Administered    COVID-19, MODERNA BLUE border, Primary or Immunocompromised, (age 12y+), IM, 100 mcg/0.5mL 01/30/2021, 03/02/2021, 11/06/2021    Influenza 10/01/2009    Influenza Virus Vaccine 10/01/2006, 10/28/2010, 11/08/2011    Influenza, FLUAD, (age 72 y+), Adjuvanted, 0.5mL 09/10/2020, 03/31/2022, 09/09/2022    Influenza, High Dose (Fluzone 65 yrs and older) 10/18/2012, 10/14/2013, 11/06/2014, 10/14/2015, 09/16/2016, 10/25/2017, 09/11/2018    Influenza, Triv, inactivated, subunit, adjuvanted, IM (Fluad 65 yrs and older) 09/16/2019    Pneumococcal Conjugate 13-valent ([id number]) 07/07/2015    Pneumococcal Polysaccharide ([id number]) 01/01/2006, 01/01/2007, 10/20/2013    Tdap (Boostrix, Adacel) 05/12/2014, 09/19/2022    Zoster Live (Zostavax) 09/20/2013    Zoster Recombinant (Shingrix) 07/06/2018, 09/11/2018       Active Problems:  Patient Active Problem List   Diagnosis Code    Esophageal cancer ([facility] [address] C15.9    Coronary artery disease involving native coronary artery of native heart without angina pectoris I25.10    Asthma J45.909    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, D63.1    Intractable nausea and vomiting R11.2    Patient underweight R63.6    Hernia, femoral, bilateral K41.20    Osteoporosis, senile M81.0    Aspiration into lower respiratory tract T17.800A    Hypoxia R09.02    Chronic kidney disease, stage III (moderate) (HCC) N18.30    Personal history of esophageal cancer Z85.01    History of esophageal cancer Z85.01    History of prostate cancer Z85.46    History of mechanical aortic valve replacement Z95.2    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    Gastroparesis K31.84    Iron deficiency anemia D50.9    Essential hypertension I10    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    Foreign body accidentally left during a procedure T81.509A    History of disease Z87.898    Abdominal pain R10.9    Tachycardia R00.0    Physical debility R53.81    Thrombocytopenia, unspecified D69.6    Acute blood loss anemia D62    Chronic anticoagulation Z79.01    Malnutrition, unspecified type ([facility] E46    Debility R53.81    Hyperkalemia E87.5    Orthostatic hypotension I95.1    Electrolyte imbalance E87.8    Anemia D64.9    Abnormal CXR R93.89    Ineffective airway clearance R06.89       Isolation/Infection:   Isolation            No Isolation          Patient Infection Status       Infection Onset Added Last Indicated Last Indicated By Review Planned Expiration Resolved Resolved By    None active    Resolved    COVID-19 (Rule Out) 03/28/22 03/28/22 03/28/22 COVID-19, Rapid (Ordered)   03/28/22 Rule-Out Test Resulted    COVID-19 (Rule Out) 11/19/20 11/19/20 11/19/20 COVID-19 (Ordered)   11/19/20 Rule-Out Test Resulted    COVID-19 (Rule Out) 08/19/20 08/19/20 08/19/20 COVID-19 (Ordered)   08/20/20 Osmar Brandt            Nurse Assessment:  Last Vital Signs: /70   Pulse 84   Temp 97.7 °F (36.5 °C) (Oral)   Resp 18   Ht 5' 3\" (1.6 m)   Wt 117 lb 11.6 oz (53.4 kg)   SpO2 92%   BMI 20.85 kg/m²     Last documented pain score (0-10 scale): Pain Level: 0  Last Weight:   Wt Readings from Last 1 Encounters:   10/17/22 117 lb 11.6 oz (53.4 kg)     Mental Status:  oriented and alert    IV Access:  - None    Nursing Mobility/ADLs:  Walking   Assisted  Transfer  Assisted  Bathing  Assisted  Dressing  Assisted  Toileting  Assisted  Feeding  Independent  Med Admin  Assisted  Med Delivery   whole    Wound Care Documentation and Therapy:  Wound 10/07/22 Sacrum (Active)   Wound Image   10/07/22 1450   Wound Etiology Pressure Stage 2 10/20/22 0900   Dressing Status Dry; Intact 10/21/22 0805   Wound Cleansed Cleansed with saline 10/18/22 0746   Dressing/Treatment Petroleum impregnated gauze 10/20/22 0900   Dressing Change Due 10/07/22 10/07/22 1450 Wound Length (cm) 2 cm 10/07/22 1450   Wound Width (cm) 1.5 cm 10/07/22 1450   Wound Depth (cm) 0.1 cm 10/07/22 1450   Wound Surface Area (cm^2) 3 cm^2 10/07/22 1450   Wound Volume (cm^3) 0.3 cm^3 10/07/22 1450   Wound Assessment Pale granulation tissue 10/20/22 0900   Drainage Amount None 10/19/22 1957   Odor None 10/19/22 1957   Rossy-wound Assessment Blanchable erythema 10/21/22 0805   Margins Attached edges; Defined edges 10/21/22 0805   Number of days: 13        Elimination:  Continence: Bowel: Yes  Bladder: Yes  Urinary Catheter: None   Colostomy/Ileostomy/Ileal Conduit: No       Date of Last BM: 10/22/2022      Intake/Output Summary (Last 24 hours) at 10/21/2022 1030  Last data filed at 10/20/2022 1237  Gross per 24 hour   Intake 240 ml   Output --   Net 240 ml     I/O last 3 completed shifts: In: 480 [P.O.:480]  Out: -     Safety Concerns: At Risk for Falls    Impairments/Disabilities:      Vision    Nutrition Therapy:  Current Nutrition Therapy:   - Oral Diet:  General, Carb Control 5 carbs/meal (2000kcals/day), and LOW POTASSIUM, LESS THAT 3000 MG DAILY    Routes of Feeding: Oral  Liquids: Thin Liquids  Daily Fluid Restriction: no  Last Modified Barium Swallow with Video (Video Swallowing Test): not done    Treatments at the Time of Hospital Discharge:   Respiratory Treatments: Yes  Oxygen Therapy:  is on oxygen at 1 L/min per nasal cannula.   Ventilator:    - No ventilator support    Rehab Therapies: Physical Therapy and Occupational Therapy  Weight Bearing Status/Restrictions: No weight bearing restrictions  Other Medical Equipment (for information only, NOT a DME order):  walker  Other Treatments: stage 2 wound with zinc     Patient's personal belongings (please select all that are sent with patient):  Sent with patient     RN SIGNATURE:  Electronically signed by Kamran Richmond RN on 10/22/22 at 9:37 AM EDT    CASE MANAGEMENT/SOCIAL WORK SECTION    Inpatient Status Date: ***    Readmission Risk Assessment Score:  Readmission Risk              Risk of Unplanned Readmission:  34           Discharging to Facility/ Agency   Name:   Address:  Phone:  Fax:    Dialysis Facility (if applicable)   Name:  Address:  Dialysis Schedule:  Phone:  Fax:    / signature: {Esignature:[id number]}    PHYSICIAN SECTION    Prognosis: Fair    Condition at Discharge: Stable    Rehab Potential (if transferring to Rehab): Good    Recommended Labs or Other Treatments After Discharge: PT/OT    Physician Certification: I certify the above information and transfer of Alvaro Olguin MD  is necessary for the continuing treatment of the diagnosis listed and that he requires Home Care for greater 30 days.      Update Admission H&P: No change in H&P    PHYSICIAN SIGNATURE:  Electronically signed by Carl Quinones DO on 10/21/22 at 10:30 AM EDT

## 2022-10-22 VITALS
TEMPERATURE: 98.4 F | WEIGHT: 117.73 LBS | RESPIRATION RATE: 20 BRPM | BODY MASS INDEX: 20.86 KG/M2 | HEART RATE: 95 BPM | HEIGHT: 63 IN | DIASTOLIC BLOOD PRESSURE: 79 MMHG | SYSTOLIC BLOOD PRESSURE: 133 MMHG | OXYGEN SATURATION: 95 %

## 2022-10-22 PROCEDURE — 6370000000 HC RX 637 (ALT 250 FOR IP): Performed by: INTERNAL MEDICINE

## 2022-10-22 PROCEDURE — 99232 SBSQ HOSP IP/OBS MODERATE 35: CPT | Performed by: INTERNAL MEDICINE

## 2022-10-22 PROCEDURE — 99239 HOSP IP/OBS DSCHRG MGMT >30: CPT | Performed by: PHYSICAL MEDICINE & REHABILITATION

## 2022-10-22 PROCEDURE — 97530 THERAPEUTIC ACTIVITIES: CPT

## 2022-10-22 PROCEDURE — 94680 O2 UPTK RST&XERS DIR SIMPLE: CPT

## 2022-10-22 PROCEDURE — 6370000000 HC RX 637 (ALT 250 FOR IP): Performed by: PHYSICAL MEDICINE & REHABILITATION

## 2022-10-22 PROCEDURE — 97116 GAIT TRAINING THERAPY: CPT

## 2022-10-22 RX ADMIN — OXYCODONE HYDROCHLORIDE AND ACETAMINOPHEN 500 MG: 500 TABLET ORAL at 09:40

## 2022-10-22 RX ADMIN — BISACODYL 5 MG: 5 TABLET, COATED ORAL at 09:40

## 2022-10-22 RX ADMIN — Medication: at 09:40

## 2022-10-22 RX ADMIN — FERROUS SULFATE TAB 325 MG (65 MG ELEMENTAL FE) 325 MG: 325 (65 FE) TAB at 09:40

## 2022-10-22 RX ADMIN — GUAIFENESIN 600 MG: 600 TABLET, EXTENDED RELEASE ORAL at 09:39

## 2022-10-22 RX ADMIN — PANTOPRAZOLE SODIUM 40 MG: 40 TABLET, DELAYED RELEASE ORAL at 06:41

## 2022-10-22 NOTE — PROGRESS NOTES
Patient walked to bathroom with assistance x 1 gb and walker, knees buckling. X2 assist needed for toileting hygiene d/t balance. Transferred to w/c with assistance and then transferred to recliner as requested. Fall precautions are in place, call light and bedside table are within reach. Patient educated to call for assistance to transfer.

## 2022-10-22 NOTE — PROGRESS NOTES
10/22/22 1041   Resting (Room Air)   SpO2 92   Resting (On O2)   SpO2 95   O2 Device Nasal cannula   O2 Flow Rate (l/min) 1 l/min   During Walk (Room Air)   SpO2 85   Walk/Assistance Device Walker   Rate of Dyspnea 1   During Walk (On O2)   SpO2 92   O2 Device Nasal cannula   O2 Flow Rate (l/min) 1 l/min   Need Additional O2 Flow Rate Rows No   After Walk   SpO2 92   O2 Device Nasal cannula   O2 Flow Rate (l/min) 1 l/min   Does the Patient Qualify for Home O2 Yes   Does the Patient Need Portable Oxygen Tanks Yes

## 2022-10-22 NOTE — PROGRESS NOTES
Patient and wife given discharge instructions, patient is able to teach back medications. Questions answered and Unit number given to patient for any additional questions or concerns. patient assisted to car via w/c with PCA.

## 2022-10-22 NOTE — PROGRESS NOTES
Pt requesting to go to the bathroom. Assisted pt to the bathroom with CGA, gait belt and walker. Pt voided and returned to the bed. Sutures to right thumb removed per physician, pt tolerated well. Dressing to right elbow saturated with blood. Elbow cleansed with normal saline. Steri strips and border gauze applied. Pt tolerated well. Reminded pt to call for assistance with any needs. Call light within reach.

## 2022-10-22 NOTE — PROGRESS NOTES
Physical Therapy  Facility/Department: [address] [place] [address]  Physical Therapy Treatment    Name: Suleiman Laughlin MD  : 1935  MRN: [id number]  Date of Service: 10/22/2022    Discharge Recommendations:  24 hour supervision or assist, Home with Home health PT   PT Equipment Recommendations  Equipment Needed: Yes  Mobility Devices: Wheelchair  Wheelchair: Light Weight (16\", elevating leg rests)      Patient Diagnosis(es): The primary encounter diagnosis was Physical debility. A diagnosis of Thrombocytopenia, unspecified was also pertinent to this visit. Past Medical History:  has a past medical history of Anemia, Aortic valve disorders, Arthritis, Aspiration pneumonia ([address] Erectile dysfunction, Esophageal cancer ([address] Hernia, femoral, bilateral, History of blood transfusion, Hyperlipidemia, Osteoporosis, senile, Pancreatitis, Patient underweight, Prostate cancer ([address] and Unspecified essential hypertension. Past Surgical History:  has a past surgical history that includes Coronary artery bypass graft (2006); Cardiac valve replacement (2006); eye surgery (& ); Appendectomy; bone graft; gastrectomy; Cholecystectomy; Colonoscopy (2009); Prostate surgery; Tonsillectomy; Esophagus surgery;  Prostate/Transrectal/Vol Collins Brachyth; Colonoscopy (10/05/2015); Upper gastrointestinal endoscopy (N/A, 2021); Colonoscopy (N/A, 2021); Colonoscopy (2021); Upper gastrointestinal endoscopy (N/A, 2022); IR GUIDED PERC PLEURAL DRAIN W CATH INSERT (2022); and Dilatation, esophagus. Assessment   Body Structures, Functions, Activity Limitations Requiring Skilled Therapeutic Intervention: Decreased functional mobility ; Decreased ROM; Decreased body mechanics; Decreased strength;Decreased safe awareness;Decreased endurance;Decreased sensation;Decreased balance;Decreased coordination;Decreased posture  Assessment: Pt continues to require CGA-Ana for all functional mobility with RW, with intermittent safety cues (ex pt attempts to abandon RW to enter car simulator and nearly falls). Pt demonstrating B knee instability this session, requiring CGA for all ambulation. Pt's wife present for session and denying need for any training other than donning of gait belt. PT educating pt and wife that pt should have 24 hr supervision/assist at d/c, preferably assist from pt's son for entering/exiting home and when pt first gets home to help wife with transfers and ambulation. Pt will benefit from continued skilled therapy to maximize safety and independence. PT recommends 24 hr supervision/assist with RW and HHPT for increased safety. Requires PT Follow-Up: Yes  Activity Tolerance  Activity Tolerance: Patient tolerated treatment well  Activity Tolerance Comments: intermittent B knee buckling, SpO2 between 88-90% throughout session on 1L     Plan   Physcial Therapy Plan  General Plan:  (5x/week for 90 minutes)  Current Treatment Recommendations: Strengthening, ROM, Balance training, Functional mobility training, Transfer training, Endurance training, Wheelchair mobility training, Gait training, Stair training, Neuromuscular re-education, Pain management, Home exercise program, Safety education & training, Patient/Caregiver education & training, Equipment evaluation, education, & procurement, Positioning, Therapeutic activities  Additional Comments: Tentative d/c home with 24 hour supervision/assist. Home health PT to follow. Safety Devices  Type of Devices:  All fall risk precautions in place, Call light within reach, Chair alarm in place, Gait belt, Left in chair     Restrictions  Position Activity Restriction  Other position/activity restrictions: up w/ assist, ambulate pt, up as tolerated     Subjective   General  Chart Reviewed: Yes  Patient assessed for rehabilitation services?: Yes  Family / Caregiver Present: Yes (wife)  Referring Practitioner: DO Susy  Follows Commands: Within Functional Limits  General Comment  Comments: Pt found seated in recliner upon PT arrival.  Pt agreeable to therapy session. Pt's wife present in room at beginning and end of session. Pt's wife denying need to practice any functional mobility with pt, stating that she and her son plan to assist pt at d/c.  Pt's wife does request to be trained on how to don gait belt, PT demonstrating and pt's wife able to demonstrate back. At end of session, PT recommends that pt have 24 hr physical assist, with son helping enter/exit home and providing initial assist at d/c for increased safety. Pt and wife verbalized understanding. Subjective  Subjective: \"My legs get shaky and just give out on me. \"         Social/Functional History  Social/Functional History  Lives With: Spouse  Type of Home: Condo  Home Layout: Two level (bed/bath on main level; exercises on lower level (1 flight of stairs to access))  Home Access: Stairs to enter without rails  Entrance Stairs - Number of Steps: 2  Bathroom Shower/Tub: Walk-in shower  Bathroom Toilet: Handicap height  Bathroom Equipment: Grab bars in shower, Shower chair  Bathroom Accessibility: Not accessible  Home Equipment: Walker, [place], Melvia Crofts, 4 wheeled  Has the patient had two or more falls in the past year or any fall with injury in the past year?: Yes (1 - fell in garage when he reached for walker, resulted in \"large hematoma\")  Receives Help From: Family (son lives a block away - rarely available)  ADL Assistance: Independent  Homemaking Assistance: Needs assistance (wife completes; occasionally cooked)  Homemaking Responsibilities: No  Ambulation Assistance: Independent (w/ RW)  Transfer Assistance: Independent  Active : Yes  Education: MD  Occupation: Retired  Leisure & Hobbies: \"use my computer\", reading  Additional Comments: Spouse can provide 24 hr supervision, not physical assist  Vision/Hearing       Cognition         Objective   Heart Rate: 95  Heart Rate Source: Monitor  BP: 133/79  BP Location: Left upper arm  BP Method: Automatic  Patient Position: Up in chair  MAP (Calculated): 97  Resp: 20  SpO2: 95 %  O2 Device: Nasal cannula                                Transfers  Sit to Stand: Stand by assistance;Contact guard assistance (pt fluctuating between CGA-SBA, pt with intermittent B knee buckling upon initial stand)  Stand to Sit: Contact guard assistance (cues to fully align with surface prior to sitting)  Bed to Chair: Contact guard assistance (recliner to w/c with RW, intermittent B knee buckling)  Car Transfer: Minimal Assistance (pt attempting to abandon RW and climb into car seat sideways despite cues to back up to car seat, pt with B knee buckling requiring Devang to recover)  Ambulation  Surface: Level tile  Device: Rolling Walker  Other Apparatus: O2 (1L)  Assistance: Contact guard assistance  Quality of Gait: decreased mingo, intermittent B knee buckling, decreased B step height/length, poor RW management, forward trunk flexion  Distance: 76' (pt stated that he wanted to sit to \"save energy\") + 150' + 10'  Comments: Cues for upright posture and to keep RW close to body. Pt reporting that he typically does not ambulate further than 25' at home typically.   Stairs/Curb  Stairs?: Yes  Stairs  # Steps : 6  Stairs Height: 6\"  Rails: Bilateral  Assistance: Contact guard assistance  Comment: step to leading with RLE (despite pt stating the LLE is worse, PT instructing pt to lead with \"good leg\"), cues for safe foot placement, number of stairs limited by fatigue     Balance  Comments: CGA to  box of tissues from floor in standing position with RW for balance           OutComes Score                                                  AM-PAC Score             Tinneti Score       Goals  Short Term Goals  Time Frame for Short Term Goals: 7 days  Short Term Goal 1: Pt will perform bed mobility independently -met  Short Term Goal 2: Pt will perform all transfers excluding floor transfer with Ana -met  Short Term Goal 3: Pt will ambulate 10' on level surfaces with LRAD and CGA -met  Short Term Goal 4: Patient will navigate 4 stairs with bilateral HR and CGA -met  Long Term Goals  Time Frame for Long Term Goals : 14 days  Long Term Goal 1: Pt will perform all transfers excluding floor transfer Mod I with LRAD -not met 10/22; pt will require 24 hr supervision/assist at d/c for increased safety  Long Term Goal 2: Pt will ambulate at least 150' on level surfaces Mod I with LRAD -not met 10/22; pt will require 24 hr supervision/assist at d/c for increased safety  Long Term Goal 3: Patient will navigate 12 stairs with 1 HR Mod I -not met 10/22; pt will require 24 hr supervision/assist at d/c for increased safety  Patient Goals   Patient Goals :  \"To get back to doing what I was doing\"       Education         Therapy Time   Individual Concurrent Group Co-treatment   Time In 0840         Time Out 0936         Minutes [address], PT

## 2022-10-22 NOTE — PROGRESS NOTES
Hospital Medicine  Progress Note    Chief Complaint: Left lower lobe atelectesis, anemia      SUBJECTIVE: Patient is improved. There are not new complaints. To be discharged    OBJECTIVE      Medications    Infusion Medications    dextrose       Scheduled Medications    ferrous sulfate  325 mg Oral Every Other Day    vitamin C  500 mg Oral Every Other Day    warfarin  2.5 mg Oral Daily    gabapentin  100 mg Oral Nightly    zinc oxide   Topical BID    atorvastatin  10 mg Oral Nightly    guaiFENesin  600 mg Oral BID    melatonin  5 mg Oral Nightly    pantoprazole  40 mg Oral QAM AC    bisacodyl  5 mg Oral Daily       Physical   Temperature:  Current - Temp: 98.4 °F (36.9 °C); Max - Temp  Av.2 °F (36.8 °C)  Min: 98 °F (36.7 °C)  Max: 98.4 °F (36.9 °C)    Respiratory Rate : Resp  Av  Min: 20  Max: 20    Pulse Range: Pulse  Av  Min: 89  Max: 95    Blood Presuure Range:  Systolic (19FWB), ETL:364 , Min:133 , ZDQ:072   ; Diastolic (84NIX), FMI:73, Min:79, Max:99      Pulse ox Range: SpO2  Av.5 %  Min: 92 %  Max: 95 %    24hr I & O:    Intake/Output Summary (Last 24 hours) at 10/22/2022 1113  Last data filed at 10/21/2022 1755  Gross per 24 hour   Intake 480 ml   Output --   Net 480 ml       Constitutional:  awake, alert, cooperative, no apparent distress, and appears stated age  Eyes:  pupils equal, round and reactive to light  ENT:  normocepalic, without obvious abnormality  NECK:  supple, symmetrical, trachea midline  HEMATOLOGIC/LYMPHATICS:  no cervical lymphadenopathy  LUNGS:  diminished breath sounds left base  CARDIOVASCULAR: regular rate and rhythm, S1, S2 normal, no murmur, click, rub or gallop  ABDOMEN:  soft, non-tender, without masses or organomegaly  MUSCULOSKELETAL:  There is no redness, warmth, or swelling of the joints. Full range of motion noted. Motor strength is 5 out of 5 all extremities bilaterally.   Tone is normal.  SKIN:  normal skin color, texture, turgor    Data      CBC:   Lab Results   Component Value Date/Time    WBC 5.3 10/21/2022 06:38 AM    RBC 3.18 10/21/2022 06:38 AM    RBC 4.41 06/26/2017 01:24 PM    HGB 9.1 10/21/2022 06:38 AM    HCT 29.1 10/21/2022 06:38 AM    MCV 91.5 10/21/2022 06:38 AM    MCH 28.7 10/21/2022 06:38 AM    MCHC 31.3 10/21/2022 06:38 AM    RDW 20.7 10/21/2022 06:38 AM     10/21/2022 06:38 AM    MPV 9.8 10/21/2022 06:38 AM     BMP:    Lab Results   Component Value Date/Time     10/21/2022 06:38 AM    K 4.7 10/21/2022 06:38 AM     10/21/2022 06:38 AM    CO2 31 10/21/2022 06:38 AM    BUN 35 10/21/2022 06:38 AM    LABALBU 2.4 10/08/2022 03:50 AM    CREATININE 1.1 10/21/2022 06:38 AM    CALCIUM 8.2 10/21/2022 06:38 AM    GFRAA >60 10/17/2022 12:25 PM    GFRAA >60 05/10/2013 02:07 PM    LABGLOM >60 10/21/2022 06:38 AM    GLUCOSE 99 10/21/2022 06:38 AM    GLUCOSE 123 09/26/2016 01:51 PM         ASSESSMENT AND PLAN      Principal Problem:    Debility  Plan: Home PT/OT  Active Problems:        Anemia  Plan: Oral iron and vit C    Abnormal CXR  Plan: Improved    Ineffective airway clearance  Plan: Resp toilet on dischargea and O2

## 2022-10-22 NOTE — PROGRESS NOTES
Nephrology  Note                                                                                                                                                                                                                                                                                                                                                               Office : [phone]     Fax :[phone]              Patient's Name: Grace Almaraz MD      Reason for Consult:  Orthostatic   Requesting Physician:  Niko Dumont MD      Chief Complaint:  Trauma      Feels better  PT going well   Dizziness better     Past Medical History:   Diagnosis Date    Anemia     Aortic valve disorders     stenosis    Arthritis     Aspiration pneumonia ([facility] 04/27/2015    Erectile dysfunction     Esophageal cancer ([facility] [address] 10/18/2012    Hernia, femoral, bilateral 05/12/2014    History of blood transfusion     Hyperlipidemia     Osteoporosis, senile 05/20/2014    Pancreatitis 08/01/2013    Patient underweight 08/08/2013    Prostate cancer ([facility] [address]     Unspecified essential hypertension        Past Surgical History:   Procedure Laterality Date    APPENDECTOMY      as a child    BONE GRAFT      for saddle nose    CARDIAC VALVE REPLACEMENT  01/01/2006    aorta    CHOLECYSTECTOMY      COLONOSCOPY  11/01/2009    COLONOSCOPY  10/05/2015    COLONOSCOPY N/A 02/17/2021    COLONOSCOPY DIAGNOSTIC/STOMA performed by Cem Anna MD at [place] [address]  02/18/2021    COLONOSCOPY POLYPECTOMY SNARE/COLD BIOPSY performed by Cem Anna MD at [address] [place] [address]  01/01/2006    DILATATION, ESOPHAGUS      2010    ESOPHAGUS SURGERY      EYE SURGERY  1990& 1992    cataract bilat removal     GASTRECTOMY      IR PERC CATH PLEURAL DRAIN W/IMAG  09/21/2022    IR PERC CATH PLEURAL DRAIN W/IMAG    PROSTATE SURGERY      seeds    TONSILLECTOMY      UPPER GASTROINTESTINAL ENDOSCOPY N/A 02/16/2021    EGD BIOPSY performed by Karina Gomez MD at [address] N/A 04/22/2022    EGD CONTROL HEMORRHAGE performed by Lydia Herring MD at [address]  BRACHYTHERAPY         Family History   Problem Relation Age of Onset    Other Mother         bleeding peptic ulcer    Heart Disease Mother     High Blood Pressure Father     Stroke Father     Prostate Cancer Father     Other Father         cardiovascular disease    Elevated Lipids Father     Hypertension Father     Colon Cancer Paternal Cousin         reports that he has never smoked. He has never used smokeless tobacco. He reports current alcohol use. He reports that he does not use drugs.     Allergies:  No known allergies    Current Medications:    ferrous sulfate (IRON 325) tablet 325 mg, Every Other Day  ascorbic acid (VITAMIN C) tablet 500 mg, Every Other Day  albuterol (PROVENTIL) nebulizer solution 2.5 mg, Q4H PRN  sodium chloride (Inhalant) 3 % nebulizer solution 4 mL, PRN  warfarin (COUMADIN) tablet 2.5 mg, Daily  traMADol (ULTRAM) tablet 100 mg, Q6H PRN  gabapentin (NEURONTIN) capsule 100 mg, Nightly  zinc oxide (TRIAD HYDROPHILIC) paste, BID  alteplase (CATHFLO) injection 1 mg, Daily PRN  atorvastatin (LIPITOR) tablet 10 mg, Nightly  guaiFENesin (MUCINEX) extended release tablet 600 mg, BID  melatonin disintegrating tablet 5 mg, Nightly  pantoprazole (PROTONIX) tablet 40 mg, QAM AC  acetaminophen (TYLENOL) tablet 650 mg, Q4H PRN  bisacodyl (DULCOLAX) EC tablet 5 mg, Daily  magnesium hydroxide (MILK OF MAGNESIA) 400 MG/5ML suspension 30 mL, Daily PRN  polyethylene glycol (GLYCOLAX) packet 17 g, Daily PRN  simethicone (MYLICON) chewable tablet 80 mg, Q6H PRN  glucose chewable tablet 16 g, PRN  dextrose bolus 10% 125 mL, PRN   Or  dextrose bolus 10% 250 mL, PRN  glucagon (rDNA) injection 1 mg, PRN  dextrose 10 % infusion, Continuous PRN      Review of Systems:   14 point ROS obtained but Final Result   1. Worsening left-sided airspace opacities, possibly asymmetric pulmonary edema or pneumonia. 2.  Left-sided pleural effusion. 3.  The previously described pneumothoraces are no longer visualized. The lucency projecting over the medial left lung apex is felt to represent air within the esophagus. Assessment/Plan   Orthostatic     2. Hyperkalemia     3. Anemia    4. Acid- base/ Electrolyte imbalance     5.  Debility     Plan   - d/c  florinef  and monitor   - Monitor Orthostats   - Cortisol 8.6  - UPC - no proteinuria   - encourage solute intake    - Rehab                   Thank you for allowing us to participate in care of MD Ashanti Luna MD  Feel free to contact me   [facility] [address]  Office : [phone]  Fax :[phone]

## 2022-10-22 NOTE — PLAN OF CARE
Problem: Discharge Planning  Goal: Discharge to home or other facility with appropriate resources  Outcome: Progressing  Flowsheets (Taken 10/22/2022 0222)  Discharge to home or other facility with appropriate resources: Identify barriers to discharge with patient and caregiver     Problem: Safety - Adult  Goal: Free from fall injury  Outcome: Not Progressing  Flowsheets (Taken 10/22/2022 0222)  Free From Fall Injury: Instruct family/caregiver on patient safety     Problem: ABCDS Injury Assessment  Goal: Absence of physical injury  Outcome: Not Progressing  Flowsheets (Taken 10/22/2022 0222)  Absence of Physical Injury: Implement safety measures based on patient assessment

## 2022-10-22 NOTE — CARE COORDINATION
SW following Pt today, Saturday, and received call from staff RN that RT Home 02 eval is done and Pt needs Home 02 at 1 liter. RNCM placed DME order. SW called and spoke with on-call rep with [employer] - she has info on Pt and will be in the office in 15 minutes to pull all info from Epic and will call SW if she needs any other info. SW provided Pt and wife a portable 02 tank from [employer] for ride home and advised them that [employer] will be at their home this afternoon.      GARRY Arevalo  Case Management  [phone]

## 2022-10-22 NOTE — PROGRESS NOTES
Pt in bed, awake requesting to go to the bathroom. Assisted pt to the bathroom with CGA, gait belt and walker. Pt  voided and had medium bowel movement. Cleansed pt buttocks with bath wipes. Pt returned to the bed. Pt alert & oriented x 4. BP elevated after returning from bathroom, other VSS. Assessment completed. Rechecked pt's BP which was stable. Nighttime medications given excluding the Melatonin which pt refused. Pt tolerated medications well. Reminded pt to call for assistance with any additional needs. Call light within reach. Safety measures in placed.
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
## 2022-10-24 ENCOUNTER — CARE COORDINATION (OUTPATIENT)
Dept: CASE MANAGEMENT | Age: 87
End: 2022-10-24
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
## 2022-10-27 ENCOUNTER — TELEPHONE (OUTPATIENT)
Dept: PULMONOLOGY | Age: 87
End: 2022-10-27

## 2022-10-27 ENCOUNTER — TELEPHONE (OUTPATIENT)
Dept: INTERNAL MEDICINE CLINIC | Age: 87
End: 2022-10-27

## 2022-10-27 ENCOUNTER — OFFICE VISIT (OUTPATIENT)
Dept: INTERNAL MEDICINE CLINIC | Age: 87
End: 2022-10-27
Payer: MEDICARE

## 2022-10-27 VITALS
HEART RATE: 90 BPM | OXYGEN SATURATION: 89 % | SYSTOLIC BLOOD PRESSURE: 124 MMHG | TEMPERATURE: 98.6 F | DIASTOLIC BLOOD PRESSURE: 68 MMHG | WEIGHT: 108 LBS | BODY MASS INDEX: 19.13 KG/M2

## 2022-10-27 DIAGNOSIS — E44.0 MODERATE PROTEIN-CALORIE MALNUTRITION (HCC): ICD-10-CM

## 2022-10-27 DIAGNOSIS — D50.9 IRON DEFICIENCY ANEMIA, UNSPECIFIED IRON DEFICIENCY ANEMIA TYPE: ICD-10-CM

## 2022-10-27 DIAGNOSIS — Z09 HOSPITAL DISCHARGE FOLLOW-UP: ICD-10-CM

## 2022-10-27 DIAGNOSIS — D50.9 IRON DEFICIENCY ANEMIA, UNSPECIFIED IRON DEFICIENCY ANEMIA TYPE: Primary | ICD-10-CM

## 2022-10-27 PROBLEM — E46 PROTEIN CALORIE MALNUTRITION (HCC): Status: ACTIVE | Noted: 2022-10-27

## 2022-10-27 PROCEDURE — 3074F SYST BP LT 130 MM HG: CPT | Performed by: INTERNAL MEDICINE

## 2022-10-27 PROCEDURE — 99213 OFFICE O/P EST LOW 20 MIN: CPT | Performed by: INTERNAL MEDICINE

## 2022-10-27 PROCEDURE — 1123F ACP DISCUSS/DSCN MKR DOCD: CPT | Performed by: INTERNAL MEDICINE

## 2022-10-27 PROCEDURE — 1111F DSCHRG MED/CURRENT MED MERGE: CPT | Performed by: INTERNAL MEDICINE

## 2022-10-27 PROCEDURE — 3078F DIAST BP <80 MM HG: CPT | Performed by: INTERNAL MEDICINE
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
## 2022-10-27 NOTE — TELEPHONE ENCOUNTER
Pt's spouse calls for a hospital follow up appt  with Dr Gerald Brown. Dr Telma Barbour seen pt in the hospital but spouse said she knows Dr Nini Bella schedule is booked up and could they see Dr Gerald Brown. I advised that Dr Gerald Brown has no avail appt until 1/2023 and Dr Gerald Brown last seen pt in 2021. She feels this hospital problem is similar to when Dr Gerald Brown saw pt in 2021 and it should be ok to schedule with him. I told her I would send note to Dr Gerald Brown and have him review chart/schedule and advise but I was also sending to Dr Telma Barbour to see if pt can be seen 11/14 (floors) in the afternoon. She verbalized understanding. She is aware that she will not hear back from the office until Dr Telma Barbour advises day/time unless Dr Gerald Brown can see pt.

## 2022-10-27 NOTE — TELEPHONE ENCOUNTER
Calling to get a order for small oxygen tanks to [facility]  POC pulmonary setting [address]  Fax [phone]  Phone [phone]        Please advise

## 2022-10-28 LAB
A/G RATIO: 0.9 (ref 1.1–2.2)
ALBUMIN SERPL-MCNC: 3.4 G/DL (ref 3.4–5)
ALP BLD-CCNC: 109 U/L (ref 40–129)
ALT SERPL-CCNC: 14 U/L (ref 10–40)
ANION GAP SERPL CALCULATED.3IONS-SCNC: 11 MMOL/L (ref 3–16)
AST SERPL-CCNC: 22 U/L (ref 15–37)
BASOPHILS ABSOLUTE: 0.1 K/UL (ref 0–0.2)
BASOPHILS RELATIVE PERCENT: 1.1 %
BILIRUB SERPL-MCNC: 0.5 MG/DL (ref 0–1)
BUN BLDV-MCNC: 29 MG/DL (ref 7–20)
CALCIUM SERPL-MCNC: 8.8 MG/DL (ref 8.3–10.6)
CHLORIDE BLD-SCNC: 96 MMOL/L (ref 99–110)
CO2: 32 MMOL/L (ref 21–32)
CREAT SERPL-MCNC: 1.1 MG/DL (ref 0.8–1.3)
EOSINOPHILS ABSOLUTE: 0.1 K/UL (ref 0–0.6)
EOSINOPHILS RELATIVE PERCENT: 1.8 %
GFR SERPL CREATININE-BSD FRML MDRD: >60 ML/MIN/{1.73_M2}
GLUCOSE BLD-MCNC: 161 MG/DL (ref 70–99)
HCT VFR BLD CALC: 34.4 % (ref 40.5–52.5)
HEMOGLOBIN: 10.7 G/DL (ref 13.5–17.5)
IMMATURE RETIC FRACT: 0.45 (ref 0.21–0.37)
IRON SATURATION: 26 % (ref 20–50)
IRON: 73 UG/DL (ref 59–158)
LYMPHOCYTES ABSOLUTE: 1.1 K/UL (ref 1–5.1)
LYMPHOCYTES RELATIVE PERCENT: 18.2 %
MCH RBC QN AUTO: 28.5 PG (ref 26–34)
MCHC RBC AUTO-ENTMCNC: 31 G/DL (ref 31–36)
MCV RBC AUTO: 92 FL (ref 80–100)
MONOCYTES ABSOLUTE: 0.6 K/UL (ref 0–1.3)
MONOCYTES RELATIVE PERCENT: 9.4 %
NEUTROPHILS ABSOLUTE: 4.2 K/UL (ref 1.7–7.7)
NEUTROPHILS RELATIVE PERCENT: 69.5 %
PDW BLD-RTO: 20.9 % (ref 12.4–15.4)
PLATELET # BLD: 168 K/UL (ref 135–450)
PMV BLD AUTO: 10.3 FL (ref 5–10.5)
POTASSIUM SERPL-SCNC: 4.7 MMOL/L (ref 3.5–5.1)
RBC # BLD: 3.74 M/UL (ref 4.2–5.9)
RETICULOCYTE ABSOLUTE COUNT: 0.05 M/UL
RETICULOCYTE COUNT PCT: 1.26 % (ref 0.5–2.18)
SODIUM BLD-SCNC: 139 MMOL/L (ref 136–145)
TOTAL IRON BINDING CAPACITY: 277 UG/DL (ref 260–445)
TOTAL PROTEIN: 7.3 G/DL (ref 6.4–8.2)
WBC # BLD: 6 K/UL (ref 4–11)

## 2022-10-31 ENCOUNTER — CARE COORDINATION (OUTPATIENT)
Dept: CASE MANAGEMENT | Age: 87
End: 2022-10-31

## 2022-10-31 NOTE — CARE COORDINATION
[facility] Transitions Initial Follow Up Call    Patient:  Genaro Saba MD Patient :  1935  MRN:  [id number]    Reason for Admission:  debility / bilateral pneumothorax  Discharge Date:  10/22/22   RARS:       Transitions of Care Initial Call    Was this an external facility discharge? no    Discharge [facility]      Challenges to be reviewed by the provider   Additional needs identified to be addressed with provider:    radha    AMB CC Provider Discharge Needs: none            CTC attempt to reach Pt regarding recent hospital discharge. CTC unable to leave voice recording with call back number requesting a call back / no answer. Follow up appointments:    Future Appointments   Date Time Provider Miladis Mchugh   2022  2:00 PM Lars Mariscal MD [address]  APRIL Bourgeois, RN  Care Transition Coordinator  Contact Number:  [phone]

## 2022-11-07 ENCOUNTER — CARE COORDINATION (OUTPATIENT)
Dept: CASE MANAGEMENT | Age: 87
End: 2022-11-07

## 2022-11-14 ENCOUNTER — OFFICE VISIT (OUTPATIENT)
Dept: PULMONOLOGY | Age: 87
End: 2022-11-14
Payer: MEDICARE

## 2022-11-14 DIAGNOSIS — R09.02 HYPOXIA: Primary | ICD-10-CM

## 2022-11-14 DIAGNOSIS — S27.322A CONTUSION OF BOTH LUNGS, INITIAL ENCOUNTER: ICD-10-CM

## 2022-11-14 PROCEDURE — 99213 OFFICE O/P EST LOW 20 MIN: CPT | Performed by: INTERNAL MEDICINE

## 2022-11-14 PROCEDURE — 1123F ACP DISCUSS/DSCN MKR DOCD: CPT | Performed by: INTERNAL MEDICINE
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
## 2022-12-05 ENCOUNTER — TELEPHONE (OUTPATIENT)
Dept: INTERNAL MEDICINE CLINIC | Age: 87
End: 2022-12-05

## 2022-12-05 DIAGNOSIS — E87.5 HYPERKALEMIA: ICD-10-CM

## 2022-12-05 DIAGNOSIS — D50.9 IRON DEFICIENCY ANEMIA, UNSPECIFIED IRON DEFICIENCY ANEMIA TYPE: Primary | ICD-10-CM

## 2022-12-05 DIAGNOSIS — N18.31 STAGE 3A CHRONIC KIDNEY DISEASE (HCC): ICD-10-CM

## 2022-12-05 NOTE — TELEPHONE ENCOUNTER
lab work order  Danny Contreras [facility])  You 23 minutes ago (4:39 PM)     MW  From: Casey Blount MD  To: Dr. El Ying: 12/5/2022  2:37 PM EST  Subject: lab work order    Please order lab work for my upcoming visit.

## 2022-12-07 DIAGNOSIS — E87.5 HYPERKALEMIA: ICD-10-CM

## 2022-12-07 DIAGNOSIS — N18.31 STAGE 3A CHRONIC KIDNEY DISEASE (HCC): ICD-10-CM

## 2022-12-07 DIAGNOSIS — D50.9 IRON DEFICIENCY ANEMIA, UNSPECIFIED IRON DEFICIENCY ANEMIA TYPE: ICD-10-CM

## 2022-12-07 LAB
A/G RATIO: 0.9 (ref 1.1–2.2)
ALBUMIN SERPL-MCNC: 3.3 G/DL (ref 3.4–5)
ALP BLD-CCNC: 163 U/L (ref 40–129)
ALT SERPL-CCNC: 11 U/L (ref 10–40)
ANION GAP SERPL CALCULATED.3IONS-SCNC: 8 MMOL/L (ref 3–16)
AST SERPL-CCNC: 16 U/L (ref 15–37)
BASOPHILS ABSOLUTE: 0 K/UL (ref 0–0.2)
BASOPHILS RELATIVE PERCENT: 0.8 %
BILIRUB SERPL-MCNC: 0.9 MG/DL (ref 0–1)
BUN BLDV-MCNC: 37 MG/DL (ref 7–20)
CALCIUM SERPL-MCNC: 9.2 MG/DL (ref 8.3–10.6)
CHLORIDE BLD-SCNC: 97 MMOL/L (ref 99–110)
CO2: 34 MMOL/L (ref 21–32)
CREAT SERPL-MCNC: 1.2 MG/DL (ref 0.8–1.3)
EOSINOPHILS ABSOLUTE: 0.2 K/UL (ref 0–0.6)
EOSINOPHILS RELATIVE PERCENT: 2.6 %
GFR SERPL CREATININE-BSD FRML MDRD: 59 ML/MIN/{1.73_M2}
GLUCOSE BLD-MCNC: 123 MG/DL (ref 70–99)
HCT VFR BLD CALC: 30.4 % (ref 40.5–52.5)
HEMOGLOBIN: 9.4 G/DL (ref 13.5–17.5)
IMMATURE RETIC FRACT: 0.56 (ref 0.21–0.37)
IRON SATURATION: 26 % (ref 20–50)
IRON: 64 UG/DL (ref 59–158)
LYMPHOCYTES ABSOLUTE: 1.3 K/UL (ref 1–5.1)
LYMPHOCYTES RELATIVE PERCENT: 21.5 %
MCH RBC QN AUTO: 27.9 PG (ref 26–34)
MCHC RBC AUTO-ENTMCNC: 31.1 G/DL (ref 31–36)
MCV RBC AUTO: 89.8 FL (ref 80–100)
MONOCYTES ABSOLUTE: 0.5 K/UL (ref 0–1.3)
MONOCYTES RELATIVE PERCENT: 8 %
NEUTROPHILS ABSOLUTE: 4 K/UL (ref 1.7–7.7)
NEUTROPHILS RELATIVE PERCENT: 67.1 %
PDW BLD-RTO: 16.4 % (ref 12.4–15.4)
PLATELET # BLD: 170 K/UL (ref 135–450)
PMV BLD AUTO: 10.1 FL (ref 5–10.5)
POTASSIUM SERPL-SCNC: 4.7 MMOL/L (ref 3.5–5.1)
RBC # BLD: 3.38 M/UL (ref 4.2–5.9)
RETICULOCYTE ABSOLUTE COUNT: 0.04 M/UL
RETICULOCYTE COUNT PCT: 1.32 % (ref 0.5–2.18)
SODIUM BLD-SCNC: 139 MMOL/L (ref 136–145)
TOTAL IRON BINDING CAPACITY: 249 UG/DL (ref 260–445)
TOTAL PROTEIN: 7 G/DL (ref 6.4–8.2)
WBC # BLD: 5.9 K/UL (ref 4–11)

## 2022-12-08 ENCOUNTER — OFFICE VISIT (OUTPATIENT)
Dept: INTERNAL MEDICINE CLINIC | Age: 87
End: 2022-12-08
Payer: MEDICARE

## 2022-12-08 VITALS
SYSTOLIC BLOOD PRESSURE: 116 MMHG | DIASTOLIC BLOOD PRESSURE: 66 MMHG | OXYGEN SATURATION: 96 % | WEIGHT: 107 LBS | BODY MASS INDEX: 18.95 KG/M2

## 2022-12-08 DIAGNOSIS — R68.81 EARLY SATIETY: Primary | ICD-10-CM

## 2022-12-08 DIAGNOSIS — D50.9 IRON DEFICIENCY ANEMIA, UNSPECIFIED IRON DEFICIENCY ANEMIA TYPE: ICD-10-CM

## 2022-12-08 DIAGNOSIS — E46 MALNUTRITION, UNSPECIFIED TYPE (HCC): ICD-10-CM

## 2022-12-08 PROCEDURE — 99214 OFFICE O/P EST MOD 30 MIN: CPT | Performed by: INTERNAL MEDICINE

## 2022-12-08 PROCEDURE — 3074F SYST BP LT 130 MM HG: CPT | Performed by: INTERNAL MEDICINE

## 2022-12-08 PROCEDURE — 1123F ACP DISCUSS/DSCN MKR DOCD: CPT | Performed by: INTERNAL MEDICINE

## 2022-12-08 PROCEDURE — 3078F DIAST BP <80 MM HG: CPT | Performed by: INTERNAL MEDICINE
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
## 2022-12-09 DIAGNOSIS — N18.9 ANEMIA ASSOCIATED WITH CHRONIC RENAL FAILURE: Primary | ICD-10-CM

## 2022-12-09 DIAGNOSIS — D63.1 ANEMIA ASSOCIATED WITH CHRONIC RENAL FAILURE: Primary | ICD-10-CM

## 2022-12-09 RX ORDER — SODIUM CHLORIDE 0.9 % (FLUSH) 0.9 %
5-40 SYRINGE (ML) INJECTION PRN
OUTPATIENT
Start: 2022-12-09

## 2022-12-09 RX ORDER — HEPARIN SODIUM (PORCINE) LOCK FLUSH IV SOLN 100 UNIT/ML 100 UNIT/ML
500 SOLUTION INTRAVENOUS PRN
OUTPATIENT
Start: 2022-12-09

## 2022-12-09 RX ORDER — ONDANSETRON 2 MG/ML
8 INJECTION INTRAMUSCULAR; INTRAVENOUS
OUTPATIENT
Start: 2022-12-09

## 2022-12-09 RX ORDER — DIPHENHYDRAMINE HYDROCHLORIDE 50 MG/ML
50 INJECTION INTRAMUSCULAR; INTRAVENOUS
OUTPATIENT
Start: 2022-12-09

## 2022-12-09 RX ORDER — SODIUM CHLORIDE 9 MG/ML
INJECTION, SOLUTION INTRAVENOUS CONTINUOUS
OUTPATIENT
Start: 2022-12-09

## 2022-12-09 RX ORDER — SODIUM CHLORIDE 9 MG/ML
5-250 INJECTION, SOLUTION INTRAVENOUS PRN
OUTPATIENT
Start: 2022-12-09

## 2022-12-09 RX ORDER — ALBUTEROL SULFATE 90 UG/1
4 AEROSOL, METERED RESPIRATORY (INHALATION) PRN
OUTPATIENT
Start: 2022-12-09

## 2022-12-09 RX ORDER — ACETAMINOPHEN 325 MG/1
650 TABLET ORAL
OUTPATIENT
Start: 2022-12-09

## 2022-12-12 ENCOUNTER — HOSPITAL ENCOUNTER (OUTPATIENT)
Dept: ONCOLOGY | Age: 87
Setting detail: INFUSION SERIES
Discharge: HOME | End: 2022-12-12
Payer: MEDICARE

## 2022-12-12 ENCOUNTER — TELEPHONE (OUTPATIENT)
Dept: INTERNAL MEDICINE CLINIC | Age: 87
End: 2022-12-12

## 2022-12-12 VITALS
TEMPERATURE: 97.8 F | HEART RATE: 76 BPM | OXYGEN SATURATION: 96 % | DIASTOLIC BLOOD PRESSURE: 83 MMHG | SYSTOLIC BLOOD PRESSURE: 131 MMHG | RESPIRATION RATE: 16 BRPM

## 2022-12-12 DIAGNOSIS — D50.9 IRON DEFICIENCY ANEMIA, UNSPECIFIED IRON DEFICIENCY ANEMIA TYPE: Primary | ICD-10-CM

## 2022-12-12 PROCEDURE — 2580000003 HC RX 258: Performed by: INTERNAL MEDICINE

## 2022-12-12 PROCEDURE — 6360000002 HC RX W HCPCS: Performed by: INTERNAL MEDICINE

## 2022-12-12 PROCEDURE — 96365 THER/PROPH/DIAG IV INF INIT: CPT

## 2022-12-12 RX ORDER — ALBUTEROL SULFATE 90 UG/1
4 AEROSOL, METERED RESPIRATORY (INHALATION) PRN
Status: CANCELLED | OUTPATIENT
Start: 2022-12-13

## 2022-12-12 RX ORDER — ACETAMINOPHEN 325 MG/1
650 TABLET ORAL
Status: CANCELLED | OUTPATIENT
Start: 2022-12-13

## 2022-12-12 RX ORDER — SODIUM CHLORIDE 0.9 % (FLUSH) 0.9 %
5-40 SYRINGE (ML) INJECTION PRN
Status: CANCELLED | OUTPATIENT
Start: 2022-12-13

## 2022-12-12 RX ORDER — HEPARIN SODIUM (PORCINE) LOCK FLUSH IV SOLN 100 UNIT/ML 100 UNIT/ML
500 SOLUTION INTRAVENOUS PRN
Status: CANCELLED | OUTPATIENT
Start: 2022-12-13

## 2022-12-12 RX ORDER — SODIUM CHLORIDE 9 MG/ML
INJECTION, SOLUTION INTRAVENOUS CONTINUOUS
Status: CANCELLED | OUTPATIENT
Start: 2022-12-13

## 2022-12-12 RX ORDER — ONDANSETRON 2 MG/ML
8 INJECTION INTRAMUSCULAR; INTRAVENOUS
Status: CANCELLED | OUTPATIENT
Start: 2022-12-13

## 2022-12-12 RX ORDER — SODIUM CHLORIDE 9 MG/ML
INJECTION, SOLUTION INTRAVENOUS CONTINUOUS
Status: DISCONTINUED | OUTPATIENT
Start: 2022-12-12 | End: 2022-12-13 | Stop reason: HOSPADM

## 2022-12-12 RX ORDER — SODIUM CHLORIDE 9 MG/ML
5-250 INJECTION, SOLUTION INTRAVENOUS PRN
Status: CANCELLED | OUTPATIENT
Start: 2022-12-13

## 2022-12-12 RX ORDER — DIPHENHYDRAMINE HYDROCHLORIDE 50 MG/ML
50 INJECTION INTRAMUSCULAR; INTRAVENOUS
Status: CANCELLED | OUTPATIENT
Start: 2022-12-13

## 2022-12-12 RX ADMIN — IRON SUCROSE 200 MG: 20 INJECTION, SOLUTION INTRAVENOUS at 14:38

## 2022-12-12 NOTE — TELEPHONE ENCOUNTER
----- Message from Amy Fenton sent at 12/12/2022  1:03 PM EST -----  Subject: Message to Provider    QUESTIONS  Information for Provider? Pt called and stated he will not be getting the   X-rays that Dr. Bhupendra Pinto ordered. Please call pt with any questions.   ---------------------------------------------------------------------------  --------------  Zora Bosworth INFO  [phone]; OK to leave message on voicemail  ---------------------------------------------------------------------------  --------------  SCRIPT ANSWERS  Relationship to Patient?  Self

## 2022-12-12 NOTE — PROGRESS NOTES
Pt seen and assessed at [address] [facility] for Venofer infusion per orders from Dr. West Espinoza. Infused per [facility] policy. Monitoring completed for infusion reactions - see flowsheet. Pt tolerated infusion well and without incident. Pt verbalizes understanding of discharge instructions. Discharged via wheelchair to home.     Gisselle George RN

## 2022-12-13 ENCOUNTER — TELEPHONE (OUTPATIENT)
Dept: INTERNAL MEDICINE CLINIC | Age: 87
End: 2022-12-13

## 2022-12-13 NOTE — TELEPHONE ENCOUNTER
Orders  (Newest Message First)  Marti Guillermo MD \"Terrance\"  P [facility] [place] [address] Support (supporting Dalton Angeles) 17 hours ago (5:32 PM)     I don't think that x-ray studies are needed now. I have no vomiting and bowel function is normal.  I had the first of several iron infusions today.   Is it time for more prolia (for both me and my wife Rosamaria Olszewski?)

## 2022-12-14 ENCOUNTER — HOSPITAL ENCOUNTER (OUTPATIENT)
Dept: ONCOLOGY | Age: 87
Setting detail: INFUSION SERIES
Discharge: HOME | End: 2022-12-14
Payer: MEDICARE

## 2022-12-14 VITALS
DIASTOLIC BLOOD PRESSURE: 80 MMHG | OXYGEN SATURATION: 93 % | RESPIRATION RATE: 16 BRPM | SYSTOLIC BLOOD PRESSURE: 128 MMHG | TEMPERATURE: 97.9 F | HEART RATE: 75 BPM

## 2022-12-14 DIAGNOSIS — D50.9 IRON DEFICIENCY ANEMIA, UNSPECIFIED IRON DEFICIENCY ANEMIA TYPE: Primary | ICD-10-CM

## 2022-12-14 PROCEDURE — 96365 THER/PROPH/DIAG IV INF INIT: CPT

## 2022-12-14 PROCEDURE — 2580000003 HC RX 258: Performed by: INTERNAL MEDICINE

## 2022-12-14 PROCEDURE — 6360000002 HC RX W HCPCS: Performed by: INTERNAL MEDICINE

## 2022-12-14 RX ORDER — ACETAMINOPHEN 325 MG/1
650 TABLET ORAL
OUTPATIENT
Start: 2022-12-15

## 2022-12-14 RX ORDER — SODIUM CHLORIDE 0.9 % (FLUSH) 0.9 %
5-40 SYRINGE (ML) INJECTION PRN
OUTPATIENT
Start: 2022-12-15

## 2022-12-14 RX ORDER — ONDANSETRON 2 MG/ML
8 INJECTION INTRAMUSCULAR; INTRAVENOUS
OUTPATIENT
Start: 2022-12-15

## 2022-12-14 RX ORDER — SODIUM CHLORIDE 9 MG/ML
INJECTION, SOLUTION INTRAVENOUS CONTINUOUS
OUTPATIENT
Start: 2022-12-15

## 2022-12-14 RX ORDER — ALBUTEROL SULFATE 90 UG/1
4 AEROSOL, METERED RESPIRATORY (INHALATION) PRN
OUTPATIENT
Start: 2022-12-15

## 2022-12-14 RX ORDER — HEPARIN SODIUM (PORCINE) LOCK FLUSH IV SOLN 100 UNIT/ML 100 UNIT/ML
500 SOLUTION INTRAVENOUS PRN
OUTPATIENT
Start: 2022-12-15

## 2022-12-14 RX ORDER — DIPHENHYDRAMINE HYDROCHLORIDE 50 MG/ML
50 INJECTION INTRAMUSCULAR; INTRAVENOUS
OUTPATIENT
Start: 2022-12-15

## 2022-12-14 RX ORDER — SODIUM CHLORIDE 9 MG/ML
5-250 INJECTION, SOLUTION INTRAVENOUS PRN
OUTPATIENT
Start: 2022-12-15

## 2022-12-14 RX ORDER — SODIUM CHLORIDE 9 MG/ML
INJECTION, SOLUTION INTRAVENOUS CONTINUOUS
Status: CANCELLED | OUTPATIENT
Start: 2022-12-15

## 2022-12-14 RX ADMIN — IRON SUCROSE 200 MG: 20 INJECTION, SOLUTION INTRAVENOUS at 10:52

## 2022-12-14 NOTE — PROGRESS NOTES
Pt seen and assessed at [address] [facility] for Venofer infusion per orders from Dr. Rafael Peter. Infused per [facility] policy. Monitoring completed for infusion reactions - see flowsheet. Pt tolerated infusion well and without incident. Pt verbalizes understanding of discharge instructions. Discharged via wheelchair to home.     Gustabo Patel RN

## 2022-12-15 ENCOUNTER — TELEPHONE (OUTPATIENT)
Dept: INTERNAL MEDICINE CLINIC | Age: 87
End: 2022-12-15

## 2022-12-15 NOTE — TELEPHONE ENCOUNTER
----- Message from Richard Arias. Julita Lamar sent at 12/15/2022  8:31 AM EST -----  Regarding: Orders    is it time for more Prolia for my wife and me? Do I have four or seven more iron infusions?     Prolia last given on 04/08/2022

## 2022-12-16 ENCOUNTER — HOSPITAL ENCOUNTER (OUTPATIENT)
Dept: ONCOLOGY | Age: 87
Setting detail: INFUSION SERIES
Discharge: HOME | End: 2022-12-16
Payer: MEDICARE

## 2022-12-16 VITALS
HEART RATE: 74 BPM | OXYGEN SATURATION: 94 % | RESPIRATION RATE: 18 BRPM | TEMPERATURE: 97.4 F | DIASTOLIC BLOOD PRESSURE: 73 MMHG | SYSTOLIC BLOOD PRESSURE: 122 MMHG

## 2022-12-16 DIAGNOSIS — D63.1 ANEMIA ASSOCIATED WITH CHRONIC RENAL FAILURE: ICD-10-CM

## 2022-12-16 DIAGNOSIS — D50.9 IRON DEFICIENCY ANEMIA, UNSPECIFIED IRON DEFICIENCY ANEMIA TYPE: Primary | ICD-10-CM

## 2022-12-16 DIAGNOSIS — M81.0 OSTEOPOROSIS, SENILE: ICD-10-CM

## 2022-12-16 DIAGNOSIS — N18.9 ANEMIA ASSOCIATED WITH CHRONIC RENAL FAILURE: ICD-10-CM

## 2022-12-16 DIAGNOSIS — R63.6 PATIENT UNDERWEIGHT: ICD-10-CM

## 2022-12-16 PROCEDURE — 96365 THER/PROPH/DIAG IV INF INIT: CPT

## 2022-12-16 PROCEDURE — 2580000003 HC RX 258: Performed by: INTERNAL MEDICINE

## 2022-12-16 PROCEDURE — 96372 THER/PROPH/DIAG INJ SC/IM: CPT

## 2022-12-16 PROCEDURE — 6360000002 HC RX W HCPCS: Performed by: INTERNAL MEDICINE

## 2022-12-16 RX ORDER — SODIUM CHLORIDE 9 MG/ML
5-250 INJECTION, SOLUTION INTRAVENOUS PRN
OUTPATIENT
Start: 2022-12-19

## 2022-12-16 RX ORDER — DIPHENHYDRAMINE HYDROCHLORIDE 50 MG/ML
50 INJECTION INTRAMUSCULAR; INTRAVENOUS
OUTPATIENT
Start: 2022-12-19

## 2022-12-16 RX ORDER — HEPARIN SODIUM (PORCINE) LOCK FLUSH IV SOLN 100 UNIT/ML 100 UNIT/ML
500 SOLUTION INTRAVENOUS PRN
OUTPATIENT
Start: 2022-12-19

## 2022-12-16 RX ORDER — SODIUM CHLORIDE 9 MG/ML
INJECTION, SOLUTION INTRAVENOUS CONTINUOUS
Status: ACTIVE | OUTPATIENT
Start: 2022-12-16 | End: 2022-12-16

## 2022-12-16 RX ORDER — ACETAMINOPHEN 325 MG/1
650 TABLET ORAL
OUTPATIENT
Start: 2022-12-19

## 2022-12-16 RX ORDER — SODIUM CHLORIDE 9 MG/ML
INJECTION, SOLUTION INTRAVENOUS CONTINUOUS
OUTPATIENT
Start: 2023-04-07

## 2022-12-16 RX ORDER — SODIUM CHLORIDE 0.9 % (FLUSH) 0.9 %
5-40 SYRINGE (ML) INJECTION PRN
OUTPATIENT
Start: 2022-12-19

## 2022-12-16 RX ORDER — ONDANSETRON 2 MG/ML
8 INJECTION INTRAMUSCULAR; INTRAVENOUS
OUTPATIENT
Start: 2023-04-07

## 2022-12-16 RX ORDER — ACETAMINOPHEN 325 MG/1
650 TABLET ORAL
OUTPATIENT
Start: 2023-04-07

## 2022-12-16 RX ORDER — ONDANSETRON 2 MG/ML
8 INJECTION INTRAMUSCULAR; INTRAVENOUS
OUTPATIENT
Start: 2022-12-19

## 2022-12-16 RX ORDER — SODIUM CHLORIDE 9 MG/ML
INJECTION, SOLUTION INTRAVENOUS CONTINUOUS
OUTPATIENT
Start: 2022-12-19

## 2022-12-16 RX ORDER — DIPHENHYDRAMINE HYDROCHLORIDE 50 MG/ML
50 INJECTION INTRAMUSCULAR; INTRAVENOUS
OUTPATIENT
Start: 2023-04-07

## 2022-12-16 RX ORDER — ALBUTEROL SULFATE 90 UG/1
4 AEROSOL, METERED RESPIRATORY (INHALATION) PRN
OUTPATIENT
Start: 2022-12-19

## 2022-12-16 RX ADMIN — IRON SUCROSE 200 MG: 20 INJECTION, SOLUTION INTRAVENOUS at 10:43

## 2022-12-16 RX ADMIN — SODIUM CHLORIDE 1 ML: 9 INJECTION, SOLUTION INTRAVENOUS at 10:40

## 2022-12-16 RX ADMIN — DENOSUMAB 60 MG: 60 INJECTION SUBCUTANEOUS at 11:52

## 2022-12-16 NOTE — PROGRESS NOTES
Pt arrive for 200 mg mg Venofer infusion and 60 mg Prolia injection per orders from Dr. Agueda Ray. Pt tolerated infusion and injection well and without incident. Discharge instructions explained to pt. Pt verbalized understanding. Pt discharged via wheelchair to his home with his Wife.     Electronically signed by Felipe March RN on 12/16/2022 at 12:06 PM

## 2022-12-19 ENCOUNTER — HOSPITAL ENCOUNTER (OUTPATIENT)
Dept: ONCOLOGY | Age: 87
Setting detail: INFUSION SERIES
Discharge: HOME | End: 2022-12-19
Payer: MEDICARE

## 2022-12-19 VITALS
SYSTOLIC BLOOD PRESSURE: 116 MMHG | HEART RATE: 79 BPM | RESPIRATION RATE: 18 BRPM | OXYGEN SATURATION: 95 % | DIASTOLIC BLOOD PRESSURE: 66 MMHG | TEMPERATURE: 97.6 F

## 2022-12-19 DIAGNOSIS — D50.9 IRON DEFICIENCY ANEMIA, UNSPECIFIED IRON DEFICIENCY ANEMIA TYPE: Primary | ICD-10-CM

## 2022-12-19 PROCEDURE — 6360000002 HC RX W HCPCS: Performed by: INTERNAL MEDICINE

## 2022-12-19 PROCEDURE — 96365 THER/PROPH/DIAG IV INF INIT: CPT

## 2022-12-19 PROCEDURE — 2580000003 HC RX 258: Performed by: INTERNAL MEDICINE

## 2022-12-19 RX ORDER — SODIUM CHLORIDE 9 MG/ML
INJECTION, SOLUTION INTRAVENOUS CONTINUOUS
Status: ACTIVE | OUTPATIENT
Start: 2022-12-19 | End: 2022-12-19

## 2022-12-19 RX ORDER — DIPHENHYDRAMINE HYDROCHLORIDE 50 MG/ML
50 INJECTION INTRAMUSCULAR; INTRAVENOUS
OUTPATIENT
Start: 2022-12-21

## 2022-12-19 RX ORDER — SODIUM CHLORIDE 9 MG/ML
INJECTION, SOLUTION INTRAVENOUS CONTINUOUS
OUTPATIENT
Start: 2022-12-21

## 2022-12-19 RX ORDER — ALBUTEROL SULFATE 90 UG/1
4 AEROSOL, METERED RESPIRATORY (INHALATION) PRN
OUTPATIENT
Start: 2022-12-21

## 2022-12-19 RX ORDER — ONDANSETRON 2 MG/ML
8 INJECTION INTRAMUSCULAR; INTRAVENOUS
OUTPATIENT
Start: 2022-12-21

## 2022-12-19 RX ORDER — ACETAMINOPHEN 325 MG/1
650 TABLET ORAL
OUTPATIENT
Start: 2022-12-21

## 2022-12-19 RX ORDER — HEPARIN SODIUM (PORCINE) LOCK FLUSH IV SOLN 100 UNIT/ML 100 UNIT/ML
500 SOLUTION INTRAVENOUS PRN
OUTPATIENT
Start: 2022-12-21

## 2022-12-19 RX ORDER — SODIUM CHLORIDE 9 MG/ML
5-250 INJECTION, SOLUTION INTRAVENOUS PRN
OUTPATIENT
Start: 2022-12-21

## 2022-12-19 RX ORDER — SODIUM CHLORIDE 0.9 % (FLUSH) 0.9 %
5-40 SYRINGE (ML) INJECTION PRN
OUTPATIENT
Start: 2022-12-21

## 2022-12-19 RX ADMIN — IRON SUCROSE 200 MG: 20 INJECTION, SOLUTION INTRAVENOUS at 09:55

## 2022-12-21 ENCOUNTER — HOSPITAL ENCOUNTER (OUTPATIENT)
Dept: ONCOLOGY | Age: 87
Setting detail: INFUSION SERIES
Discharge: HOME | End: 2022-12-21
Payer: MEDICARE

## 2022-12-21 VITALS
HEART RATE: 83 BPM | RESPIRATION RATE: 16 BRPM | OXYGEN SATURATION: 93 % | TEMPERATURE: 97.8 F | SYSTOLIC BLOOD PRESSURE: 145 MMHG | DIASTOLIC BLOOD PRESSURE: 81 MMHG

## 2022-12-21 DIAGNOSIS — D50.9 IRON DEFICIENCY ANEMIA, UNSPECIFIED IRON DEFICIENCY ANEMIA TYPE: Primary | ICD-10-CM

## 2022-12-21 PROCEDURE — 6360000002 HC RX W HCPCS: Performed by: INTERNAL MEDICINE

## 2022-12-21 PROCEDURE — 2580000003 HC RX 258: Performed by: INTERNAL MEDICINE

## 2022-12-21 RX ORDER — SODIUM CHLORIDE 0.9 % (FLUSH) 0.9 %
5-40 SYRINGE (ML) INJECTION PRN
OUTPATIENT
Start: 2022-12-23

## 2022-12-21 RX ORDER — SODIUM CHLORIDE 9 MG/ML
5-250 INJECTION, SOLUTION INTRAVENOUS PRN
OUTPATIENT
Start: 2022-12-23

## 2022-12-21 RX ORDER — DIPHENHYDRAMINE HYDROCHLORIDE 50 MG/ML
50 INJECTION INTRAMUSCULAR; INTRAVENOUS
OUTPATIENT
Start: 2022-12-23

## 2022-12-21 RX ORDER — ALBUTEROL SULFATE 90 UG/1
4 AEROSOL, METERED RESPIRATORY (INHALATION) PRN
OUTPATIENT
Start: 2022-12-23

## 2022-12-21 RX ORDER — ACETAMINOPHEN 325 MG/1
650 TABLET ORAL
OUTPATIENT
Start: 2022-12-23

## 2022-12-21 RX ORDER — SODIUM CHLORIDE 9 MG/ML
INJECTION, SOLUTION INTRAVENOUS CONTINUOUS
OUTPATIENT
Start: 2022-12-23

## 2022-12-21 RX ORDER — ONDANSETRON 2 MG/ML
8 INJECTION INTRAMUSCULAR; INTRAVENOUS
OUTPATIENT
Start: 2022-12-23

## 2022-12-21 RX ORDER — HEPARIN SODIUM (PORCINE) LOCK FLUSH IV SOLN 100 UNIT/ML 100 UNIT/ML
500 SOLUTION INTRAVENOUS PRN
OUTPATIENT
Start: 2022-12-23

## 2022-12-21 RX ADMIN — IRON SUCROSE 200 MG: 20 INJECTION, SOLUTION INTRAVENOUS at 10:29

## 2022-12-26 ENCOUNTER — HOSPITAL ENCOUNTER (OUTPATIENT)
Dept: ONCOLOGY | Age: 87
Setting detail: INFUSION SERIES
Discharge: HOME | End: 2022-12-26
Payer: MEDICARE

## 2022-12-26 VITALS
TEMPERATURE: 98.2 F | OXYGEN SATURATION: 97 % | SYSTOLIC BLOOD PRESSURE: 117 MMHG | RESPIRATION RATE: 16 BRPM | HEART RATE: 77 BPM | DIASTOLIC BLOOD PRESSURE: 73 MMHG

## 2022-12-26 DIAGNOSIS — D50.9 IRON DEFICIENCY ANEMIA, UNSPECIFIED IRON DEFICIENCY ANEMIA TYPE: Primary | ICD-10-CM

## 2022-12-26 PROCEDURE — 6360000002 HC RX W HCPCS: Performed by: INTERNAL MEDICINE

## 2022-12-26 PROCEDURE — 2580000003 HC RX 258: Performed by: INTERNAL MEDICINE

## 2022-12-26 PROCEDURE — 96365 THER/PROPH/DIAG IV INF INIT: CPT

## 2022-12-26 RX ORDER — SODIUM CHLORIDE 9 MG/ML
INJECTION, SOLUTION INTRAVENOUS CONTINUOUS
OUTPATIENT
Start: 2023-01-02

## 2022-12-26 RX ORDER — HEPARIN SODIUM (PORCINE) LOCK FLUSH IV SOLN 100 UNIT/ML 100 UNIT/ML
500 SOLUTION INTRAVENOUS PRN
OUTPATIENT
Start: 2023-01-02

## 2022-12-26 RX ORDER — SODIUM CHLORIDE 9 MG/ML
INJECTION, SOLUTION INTRAVENOUS CONTINUOUS
Status: ACTIVE | OUTPATIENT
Start: 2022-12-26 | End: 2022-12-26

## 2022-12-26 RX ORDER — SODIUM CHLORIDE 0.9 % (FLUSH) 0.9 %
5-40 SYRINGE (ML) INJECTION PRN
OUTPATIENT
Start: 2023-01-02

## 2022-12-26 RX ORDER — DIPHENHYDRAMINE HYDROCHLORIDE 50 MG/ML
50 INJECTION INTRAMUSCULAR; INTRAVENOUS
OUTPATIENT
Start: 2023-01-02

## 2022-12-26 RX ORDER — ONDANSETRON 2 MG/ML
8 INJECTION INTRAMUSCULAR; INTRAVENOUS
OUTPATIENT
Start: 2023-01-02

## 2022-12-26 RX ORDER — SODIUM CHLORIDE 9 MG/ML
5-250 INJECTION, SOLUTION INTRAVENOUS PRN
OUTPATIENT
Start: 2023-01-02

## 2022-12-26 RX ORDER — ALBUTEROL SULFATE 90 UG/1
4 AEROSOL, METERED RESPIRATORY (INHALATION) PRN
OUTPATIENT
Start: 2023-01-02

## 2022-12-26 RX ORDER — ACETAMINOPHEN 325 MG/1
650 TABLET ORAL
OUTPATIENT
Start: 2023-01-02

## 2022-12-26 RX ADMIN — IRON SUCROSE 200 MG: 20 INJECTION, SOLUTION INTRAVENOUS at 09:32

## 2022-12-26 NOTE — PROGRESS NOTES
Pt arrive for 200 mg mg Venofer infusion per orders from Dr. Otto Dykes. Pt tolerated infusion and injection well and without incident. Discharge instructions explained to pt. Pt verbalized understanding. Pt discharged via wheelchair to his home with his wife.   Jana Sena RN

## 2023-01-09 RX ORDER — WARFARIN SODIUM 5 MG/1
TABLET ORAL
Qty: 30 TABLET | Refills: 4 | Status: SHIPPED | OUTPATIENT
Start: 2023-01-09

## 2023-01-19 ENCOUNTER — TELEPHONE (OUTPATIENT)
Dept: INTERNAL MEDICINE CLINIC | Age: 88
End: 2023-01-19

## 2023-01-19 DIAGNOSIS — N18.31 STAGE 3A CHRONIC KIDNEY DISEASE (HCC): Primary | ICD-10-CM

## 2023-01-19 DIAGNOSIS — D50.9 IRON DEFICIENCY ANEMIA, UNSPECIFIED IRON DEFICIENCY ANEMIA TYPE: ICD-10-CM

## 2023-01-19 DIAGNOSIS — I10 ESSENTIAL HYPERTENSION: ICD-10-CM

## 2023-01-19 NOTE — TELEPHONE ENCOUNTER
----- Message from Ifeoma Mcfarland sent at 1/19/2023  9:38 AM EST -----  Regarding: lab studies  Please order appropriate lab studies prior to my office visit.

## 2023-01-26 ENCOUNTER — OFFICE VISIT (OUTPATIENT)
Dept: INTERNAL MEDICINE CLINIC | Age: 88
End: 2023-01-26

## 2023-01-26 VITALS
OXYGEN SATURATION: 94 % | SYSTOLIC BLOOD PRESSURE: 128 MMHG | TEMPERATURE: 97.9 F | WEIGHT: 108 LBS | DIASTOLIC BLOOD PRESSURE: 84 MMHG | BODY MASS INDEX: 19.13 KG/M2 | HEART RATE: 84 BPM

## 2023-01-26 DIAGNOSIS — D69.6 THROMBOCYTOPENIA, UNSPECIFIED (HCC): ICD-10-CM

## 2023-01-26 DIAGNOSIS — E44.1 MILD PROTEIN-CALORIE MALNUTRITION (HCC): ICD-10-CM

## 2023-01-26 DIAGNOSIS — I10 ESSENTIAL HYPERTENSION: Primary | ICD-10-CM

## 2023-01-26 DIAGNOSIS — J45.909 UNCOMPLICATED ASTHMA, UNSPECIFIED ASTHMA SEVERITY, UNSPECIFIED WHETHER PERSISTENT: ICD-10-CM

## 2023-01-26 DIAGNOSIS — C61 PROSTATE CANCER (HCC): ICD-10-CM

## 2023-01-26 DIAGNOSIS — E78.5 HYPERLIPIDEMIA, UNSPECIFIED HYPERLIPIDEMIA TYPE: ICD-10-CM

## 2023-01-26 NOTE — PROGRESS NOTES
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 who presents for follow-up hyperlipidemia prostate cancer hypertension    HPI: Patient presented with follow-up of the above his major complaint is that his knee is bothering him he requests a steroid and or some type of injection for that I will send him to orthopedics for that he otherwise has been feeling fine no chest pain shortness of breath or any other problem    Review of Systems:   Constitutional:  Denies fever or chills   Eyes:  Denies change in visual acuity   HENT:  Denies nasal congestion or sore throat   Respiratory:  Denies cough or shortness of breath   Cardiovascular:  Denies chest pain or edema   GI:  Denies abdominal pain, nausea, vomiting, bloody stools or diarrhea   :  Denies dysuria   Musculoskeletal:  Denies back pain or joint pain   Integument:  Denies rash   Neurologic:  Denies headache, focal weakness or sensory changes   Endocrine:  Denies polyuria or polydipsia   Lymphatic:  Denies swollen glands   Psychiatric:  Denies depression or anxiety     Past Medical History:        Diagnosis Date    Anemia     Aortic valve disorders     stenosis    Arthritis     Aspiration pneumonia (HCC) 04/27/2015    Erectile dysfunction     Esophageal cancer ([facility] [address] 10/18/2012    Hernia, femoral, bilateral 05/12/2014    History of blood transfusion     Hyperlipidemia     Osteoporosis, senile 05/20/2014    Pancreatitis 08/01/2013    Patient underweight 08/08/2013    Prostate cancer ([facility] [address]     Unspecified essential hypertension        Past Surgical History:        Procedure Laterality Date    APPENDECTOMY      as a child    BONE GRAFT      for saddle nose    CARDIAC VALVE REPLACEMENT  01/01/2006    aorta    CHOLECYSTECTOMY      COLONOSCOPY  11/01/2009    COLONOSCOPY  10/05/2015    COLONOSCOPY N/A 02/17/2021    COLONOSCOPY DIAGNOSTIC/STOMA performed by Michael Mazariegos MD at [place] [address]  02/18/2021    COLONOSCOPY POLYPECTOMY SNARE/COLD BIOPSY performed by Bhavik Mazariegos MD at [address] GRAFT  01/01/2006    DILATATION, ESOPHAGUS      2010    ESOPHAGUS SURGERY      EYE SURGERY  1990& 1992    cataract bilat removal     GASTRECTOMY      IR PERC CATH PLEURAL DRAIN W/IMAG  09/21/2022    IR PERC CATH PLEURAL DRAIN W/IMAG    PROSTATE SURGERY      seeds    TONSILLECTOMY      UPPER GASTROINTESTINAL ENDOSCOPY N/A 02/16/2021    EGD BIOPSY performed by Bhavik Mazariegos MD at [address] [place] N/A 04/22/2022    EGD CONTROL HEMORRHAGE performed by Aaron Vaughan MD at [address] [place] PROSTATE/TRANSRECTAL VOL STUDY BRACHYTHERAPY         Family History:  Family History   Problem Relation Age of Onset    Other Mother         bleeding peptic ulcer    Heart Disease Mother     High Blood Pressure Father     Stroke Father     Prostate Cancer Father     Other Father         cardiovascular disease    Elevated Lipids Father     Hypertension Father     Colon Cancer Paternal Cousin        Social History:  Social History     Socioeconomic History    Marital status:    Tobacco Use    Smoking status: Never    Smokeless tobacco: Never   Vaping Use    Vaping Use: Never used   Substance and Sexual Activity    Alcohol use: Yes     Comment: occassionally    Drug use: No     Social Determinants of Health     Financial Resource Strain: Low Risk     Difficulty of Paying Living Expenses: Not hard at all   Food Insecurity: No Food Insecurity    Worried About Running Out of Food in the Last Year: Never true    Ran Out of Food in the Last Year: Never true   Physical Activity: Sufficiently Active    Days of Exercise per Week: 7 days    Minutes of Exercise per Session: 30 min   Intimate Partner Violence: Not At Risk    Fear of Current or Ex-Partner: No    Emotionally Abused: No    Physically Abused: No    Sexually Abused: No         Allergies:  No known allergies    Current Medications:    Prior to Admission medications    Medication Sig Start Date End Date Taking? Authorizing Provider   warfarin (COUMADIN) 5 MG tablet TAKE ONE HALF TO ONE TABLET BY MOUTH DAILY AS DIRECTED 1/9/23   Geoff Brian MD   gabapentin (NEURONTIN) 100 MG capsule Take 1 capsule by mouth nightly for 30 days.   Patient not taking: Reported on 10/24/2022 10/21/22 11/20/22  Paralee Daniel Jain DO   guaiFENesin (MUCINEX) 600 MG extended release tablet Take 1 tablet by mouth 2 times daily 10/21/22   Paralee Daniel Jain DO   albuterol sulfate HFA (VENTOLIN HFA) 108 (90 Base) MCG/ACT inhaler Inhale 2 puffs into the lungs 4 times daily as needed for Wheezing 10/21/22   Dieudonne Jain DO   atorvastatin (LIPITOR) 10 MG tablet TAKE ONE TABLET BY MOUTH DAILY 10/18/22   Geoff Brian MD   omeprazole (PRILOSEC) 40 MG delayed release capsule 1 po bid for 1 week then 1 po daily 5/2/22   Geoff Brian MD   vitamin D (CHOLECALCIFEROL) 25 MCG (1000 UT) TABS tablet Take 1,000 Units by mouth daily    Historical Provider, MD   polyethylene glycol (MIRALAX) 17 g PACK packet Take 17 g by mouth daily    Historical Provider, MD   ferrous sulfate (IRON 325) 325 (65 Fe) MG tablet Take 325 mg by mouth every other day    Historical Provider, MD   vitamin E 1000 units capsule Take 1,000 Units by mouth once a week     Historical Provider, MD   warfarin (COUMADIN) 2.5 MG tablet Take 2.5 mg by mouth daily Pt tests INR at home and self adjusts every Friday (GOAL 2-2.5)    Historical Provider, MD   simethicone (MYLICON) 80 MG chewable tablet Take 80 mg by mouth three times daily     Historical Provider, MD   magnesium oxide (MAG-OX) 400 MG tablet Take 400 mg by mouth daily     Historical Provider, MD   Omega-3 Fatty Acids (FISH OIL) 1000 MG CAPS Take 1,000 mg by mouth daily     Historical Provider, MD   calcium carbonate (TUMS) 500 MG chewable tablet Take 3 tablets by mouth 2 times daily    Historical Provider, MD   Alpha Lipoic Acid-Biotin 300-333 MG-MCG CAPS 600 mg 2 times daily Historical Provider, MD   Coenzyme Q10 (COQ10) 400 MG CAPS Take 1 capsule by mouth    Historical Provider, MD   vitamin B-12 (CYANOCOBALAMIN) 1000 MCG tablet Take 1,000 mcg by mouth daily. Historical Provider, MD       Physical Exam:  Vital Signs: /84   Pulse 84   Temp 97.9 °F (36.6 °C)   Wt 108 lb (49 kg)   SpO2 94%   BMI 19.13 kg/m²   General: Patient appears  non-toxic  HENT: Atraumatic, normocephalic, oral mucosa moist  Lungs:  Clear bilaterally  Heart: Regular rate and rhythm  Abdomen: Non-distended, soft, non-tender  Extremities: No edema  Neuro: Nonfocal    Medical Decision Making and Plan:  Pertinent Labs & Imaging studies reviewed. (See chart for details)  Blood studies are pending     1. Thrombocytopenia, unspecified  This problem is stable check above lab  - [address] Sumit Villalobos MD, Orthopedic Surgery (Hip; Knee; Shoulder), [place]  - CBC with Auto Differential; Future  - Comprehensive Metabolic Panel; Future  - PSA, Prostatic Specific Antigen; Future  - LIPID PANEL; Future  - TSH with Reflex; Future    2. Uncomplicated asthma, unspecified asthma severity, unspecified whether persistent  Problem is well controlled  - [address] Sumit Villalobos MD, Orthopedic Surgery (Hip; Knee; Shoulder), [place]  - CBC with Auto Differential; Future  - Comprehensive Metabolic Panel; Future  - PSA, Prostatic Specific Antigen; Future  - LIPID PANEL; Future  - TSH with Reflex; Future    3. Essential hypertension  This problem is stable continue present meds  - [address] Smuit Villalobos MD, Orthopedic Surgery (Hip; Knee; Shoulder), [place]  - CBC with Auto Differential; Future  - Comprehensive Metabolic Panel; Future  - PSA, Prostatic Specific Antigen; Future    4. Prostate cancer [facility])  Check PSA  - PSA, Prostatic Specific Antigen; Future    5.  Hyperlipidemia, unspecified hyperlipidemia type  This problem is stable check above labs  - [address] Sumit Villalobos MD, Orthopedic Surgery (Hip; Knee; Shoulder), [place]  - CBC with Auto Differential; Future  - Comprehensive Metabolic Panel; Future  - PSA, Prostatic Specific Antigen; Future  - LIPID PANEL; Future  - TSH with Reflex; Future    6.  Mild protein-calorie malnutrition ([facility] [address]  Continue nutritional support  Knee pain referral to orthopedics

## 2023-01-27 ENCOUNTER — OFFICE VISIT (OUTPATIENT)
Dept: ORTHOPEDIC SURGERY | Age: 88
End: 2023-01-27

## 2023-01-27 VITALS — WEIGHT: 108 LBS | HEIGHT: 63 IN | BODY MASS INDEX: 19.14 KG/M2

## 2023-01-27 DIAGNOSIS — M17.12 LOCALIZED OSTEOARTHRITIS OF LEFT KNEE: Primary | ICD-10-CM

## 2023-01-27 DIAGNOSIS — M25.562 ACUTE PAIN OF LEFT KNEE: ICD-10-CM

## 2023-01-27 NOTE — PROGRESS NOTES
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 male who presents with  left knee pain, . Sent in consultation by Louie Monk MD, . Injury Mechanism:  none. Worker's Comp. & legal issues:   none. Previous Treatments: Ice, Heat, and NSAIDs    Patient presents to the office today for new problem. Patient here with a chief complaint of left knee pain. Patient states his left knee has been painful off and on for years. It has increased recently. He does not recall any specific injury or trauma. His pain is global in nature. He has had cortisone injections in the past with limited relief.     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    Pancreatitis 08/01/2013    Patient underweight 08/08/2013    Prostate cancer ([address]     Unspecified essential hypertension      Past Surgical History:   Procedure Laterality Date    APPENDECTOMY      as a child    BONE GRAFT      for saddle nose    CARDIAC VALVE REPLACEMENT  01/01/2006    aorta    CHOLECYSTECTOMY      COLONOSCOPY  11/01/2009    COLONOSCOPY  10/05/2015    COLONOSCOPY N/A 02/17/2021    COLONOSCOPY DIAGNOSTIC/STOMA [facility]  02/18/2021    COLONOSCOPY POLYPECTOMY SNARE/COLD BIOPSY performed by Sea Murillo MD at [address]  01/01/2006    DILATATION, ESOPHAGUS 2010    ESOPHAGUS SURGERY      EYE SURGERY  1990& 1992    cataract bilat removal     GASTRECTOMY      IR PERC CATH PLEURAL DRAIN W/IMAG  09/21/2022    IR PERC CATH PLEURAL DRAIN W/IMAG    PROSTATE SURGERY      seeds    TONSILLECTOMY      UPPER GASTROINTESTINAL ENDOSCOPY N/A 02/16/2021    EGD BIOPSY performed by Lm Duque MD at [facility] N/A 04/22/2022    EGD CONTROL HEMORRHAGE performed by Jaime Aden MD at [address] PROSTATE/TRANSRECTAL VOL STUDY BRACHYTHERAPY       Social History     Tobacco Use    Smoking status: Never    Smokeless tobacco: Never   Substance Use Topics    Alcohol use: Yes     Comment: occassionally      Current Outpatient Medications on File Prior to Visit   Medication Sig Dispense Refill    warfarin (COUMADIN) 5 MG tablet TAKE ONE HALF TO ONE TABLET BY MOUTH DAILY AS DIRECTED 30 tablet 4    guaiFENesin (MUCINEX) 600 MG extended release tablet Take 1 tablet by mouth 2 times daily 30 tablet 0    albuterol sulfate HFA (VENTOLIN HFA) 108 (90 Base) MCG/ACT inhaler Inhale 2 puffs into the lungs 4 times daily as needed for Wheezing 54 g 1    atorvastatin (LIPITOR) 10 MG tablet TAKE ONE TABLET BY MOUTH DAILY 90 tablet 0    omeprazole (PRILOSEC) 40 MG delayed release capsule 1 po bid for 1 week then 1 po daily 90 capsule 3    vitamin D (CHOLECALCIFEROL) 25 MCG (1000 UT) TABS tablet Take 1,000 Units by mouth daily      polyethylene glycol (MIRALAX) 17 g PACK packet Take 17 g by mouth daily      ferrous sulfate (IRON 325) 325 (65 Fe) MG tablet Take 325 mg by mouth every other day      vitamin E 1000 units capsule Take 1,000 Units by mouth once a week       warfarin (COUMADIN) 2.5 MG tablet Take 2.5 mg by mouth daily Pt tests INR at home and self adjusts every Friday (GOAL 2-2.5)      simethicone (MYLICON) 80 MG chewable tablet Take 80 mg by mouth three times daily       magnesium oxide (MAG-OX) 400 MG tablet Take 400 mg by mouth daily Omega-3 Fatty Acids (FISH OIL) 1000 MG CAPS Take 1,000 mg by mouth daily       calcium carbonate (TUMS) 500 MG chewable tablet Take 3 tablets by mouth 2 times daily      Alpha Lipoic Acid-Biotin 300-333 MG-MCG CAPS 600 mg 2 times daily       Coenzyme Q10 (COQ10) 400 MG CAPS Take 1 capsule by mouth      vitamin B-12 (CYANOCOBALAMIN) 1000 MCG tablet Take 1,000 mcg by mouth daily. gabapentin (NEURONTIN) 100 MG capsule Take 1 capsule by mouth nightly for 30 days. (Patient not taking: Reported on 10/24/2022) 90 capsule 1     No current facility-administered medications on file prior to visit. ASCVD 10-YEAR RISK SCORE  The ASCVD Risk score (Valeria DK, et al., 2019) failed to calculate for the following reasons: The 2019 ASCVD risk score is only valid for ages 36 to 78     Review of Systems  10-point ROS is negative other than HPI. Physical Exam  Based off 1997 Exam Criteria  Ht 5' 3\" (1.6 m)   Wt 108 lb (49 kg)   BMI 19.13 kg/m²      Constitutional:       General: He is not in acute distress. Appearance: Normal appearance. Cardiovascular:      Rate and Rhythm: Normal rate and regular rhythm. Pulses: Normal pulses. Pulmonary:      Effort: Pulmonary effort is normal. No respiratory distress. Neurological:      Mental Status: He is alert and oriented to person, place, and time. Mental status is at baseline. Skin: Mean, dry, intact. No open sores  Lymphatics: No palpable lymph nodes    Musculoskeletal:  Gait:  altered  Lumbar spine: There is no swelling, warmth, or erythema. Range of motion is within normal limits. There is no paraspinal or spinous process tenderness. . The distal neurovascular exam is grossly intact and symmetric. Rigoberto Hip: Examination of the right and left hip reveals intact skin. The patient demonstrates full painless range of motion with regards to flexion, abduction, internal and external rotation. There is no tenderness about the greater trochanter.     R Knee: Examination of the right knee reveals intact skin. There is no focal tenderness. The patient demonstrates full painless range of motion with regard to flexion and extension. L Knee: Physical exam of the knee demonstrates painful range of motion . Tenderness over the lateral joint line. No gross instability to either varus or valgus stress test.  Valgus alignment. No tenderness to palpation over the patella. Imaging  Left Knee: [address] [facility]  Radiographs: X-rays were ordered today and reviewed of the left knee. 4 views. Standing AP, standing AP flexed, lateral, and skyline views. They demonstrate advanced lateral joint space osteoarthritis with complete collapse of the joint surface. Mild thinning of the patellofemoral surface. There is a vertical fracture line seen within the patella laterally. Procedure:  Orders Placed This Encounter   Procedures    XR KNEE LEFT (MIN 4 VIEWS)     Standing Status:   Future     Number of Occurrences:   1     Standing Expiration Date:   1/27/2024     Order Specific Question:   Reason for exam:     Answer:   pain       Assessment and Plan  Jayson Michelle was seen today for new patient. Diagnoses and all orders for this visit:    Acute pain of left knee  -     XR KNEE LEFT (MIN 4 VIEWS); Future    Localized osteoarthritis of left knee      Patient is suffering with advanced left knee osteoarthritis. He has an age-indeterminate patella fracture but it does not appear to be symptomatic. He would like to proceed with left knee viscosupplementation injections. Patient will follow-up in the office once the injections are approved. Mobic sent in. I discussed with Patrick Naqvi MD that his history, symptoms, signs, and imaging are most consistent with knee arthritis.     We reviewed the natural history of these conditions and treatment options ranging from conservative measures (rest, icing, activity modification, physical therapy, pain meds, cortisone injection) to surgical options. In terms of treatment, I recommended continuing with rest, icing, avoidance of painful activities, NSAIDs or pain meds as tolerated, and physical therapy. If these are not effective, cortisone injection can be considered. We discussed surgical options as well, should conservative measures fail. Electronically signed by Curt Marvin MD on 1/27/2023 at 1:04 PM  This dictation was generated by voice recognition computer software. Although all attempts are made to edit the dictation for accuracy, there may be errors in the transcription that are not intended.

## 2023-02-01 ENCOUNTER — OFFICE VISIT (OUTPATIENT)
Dept: ORTHOPEDIC SURGERY | Age: 88
End: 2023-02-01

## 2023-02-01 VITALS — BODY MASS INDEX: 19.14 KG/M2 | WEIGHT: 108 LBS | HEIGHT: 63 IN

## 2023-02-01 DIAGNOSIS — M17.12 LOCALIZED OSTEOARTHRITIS OF LEFT KNEE: Primary | ICD-10-CM

## 2023-02-01 RX ORDER — ATORVASTATIN CALCIUM 10 MG/1
TABLET, FILM COATED ORAL
Qty: 90 TABLET | Refills: 0 | Status: SHIPPED | OUTPATIENT
Start: 2023-02-01

## 2023-02-01 NOTE — PROGRESS NOTES
Diagnosis: Left knee osteoarthritis    Procedure: Left knee viscosupplementation #1    The patient is symptomatic from osteoarthritis of the left knee joint with documented radiological signs of arthritis. The patient has also failed 3 months of conservative treatment including home exercise, education, Tylenol and/or NSAIDs use. The patient was offered a Visco supplementation today. Risks, benefits, and alternatives to the injections were discussed in detail with the patient. The risks discussed included but are not limited to infection, skin reactions, hot swollen joints, and anaphylaxis. The patient gave verbal informed consent for the injection. The patient's skin was prepped with  3 sterile gauze  pads soaked with alcohol solution and the knee joint was injected with 2 mL of Orthovisc intra-articularly under sterile conditions. Technique: Under sterile conditions a [employer] ultrasound unit with a variable frequency (6.0-15.0 MHz) linear transducer was used to localize the placement of a 22-gauge needle into the knee joint. Findings: Successful needle placement for intra-articular Visco supplementation injection. Final images were taken and saved for permanent record. The patient tolerated the injection reasonably well. The patient was given instructions to ice the kne and avoid strenuous activities for 24-48 hours. The patient was instructed to call the office immediately if there is increased pain, redness, warmth, fever, or chills. We will see the patient back in one week for their second injection.

## 2023-02-08 ENCOUNTER — OFFICE VISIT (OUTPATIENT)
Dept: ORTHOPEDIC SURGERY | Age: 88
End: 2023-02-08
Payer: MEDICARE

## 2023-02-08 VITALS — HEIGHT: 63 IN | BODY MASS INDEX: 19.14 KG/M2 | WEIGHT: 108 LBS

## 2023-02-08 DIAGNOSIS — M17.12 LOCALIZED OSTEOARTHRITIS OF LEFT KNEE: Primary | ICD-10-CM

## 2023-02-08 PROCEDURE — 99999 PR OFFICE/OUTPT VISIT,PROCEDURE ONLY: CPT | Performed by: PHYSICIAN ASSISTANT

## 2023-02-08 PROCEDURE — 20611 DRAIN/INJ JOINT/BURSA W/US: CPT | Performed by: PHYSICIAN ASSISTANT

## 2023-02-08 NOTE — PROGRESS NOTES
Diagnosis: Left knee osteoarthritis    Procedure: Left knee viscosupplementation #2      The patient returns today for their second Euflexxa injection in the left knee. The risks, benefits, and complications of the injections were again discussed in detail with the patient. The risks discussed included but are not limited to infection, skin reactions, hot swollen joints, and anaphylaxis. The patient gave verbal informed consent for the injection. The patient skin was prepped with 3 sterile gauze pads soaked with alcohol solution and the knee joint was injected with 2 mL of Orthovisc intra-articularly under sterile conditions . Technique: Under sterile conditions a [employer] ultrasound unit with a variable frequency (6.0-15.0 MHz) linear transducer was used to localize the placement of a 22-gauge needle into the farheen joint. Findings: Successful needle placement for intra-articular Visco supplementation injection. Final images were taken and saved for permanent record. The patient tolerated the injection reasonably well. The patient was given instructions to ice the the knee and avoid strenuous activities for 24-48 hours. The patient was instructed to call the office immediately if there is increased pain, redness, warmth, fever, or chills. We will see the patient back in one week for the third injection.

## 2023-02-15 ENCOUNTER — OFFICE VISIT (OUTPATIENT)
Dept: ORTHOPEDIC SURGERY | Age: 88
End: 2023-02-15
Payer: MEDICARE

## 2023-02-15 VITALS — BODY MASS INDEX: 19.14 KG/M2 | HEIGHT: 63 IN | WEIGHT: 108 LBS

## 2023-02-15 DIAGNOSIS — M17.12 LOCALIZED OSTEOARTHRITIS OF LEFT KNEE: Primary | ICD-10-CM

## 2023-02-15 PROCEDURE — 20611 DRAIN/INJ JOINT/BURSA W/US: CPT | Performed by: PHYSICIAN ASSISTANT

## 2023-02-15 PROCEDURE — 99999 PR OFFICE/OUTPT VISIT,PROCEDURE ONLY: CPT | Performed by: PHYSICIAN ASSISTANT

## 2023-02-15 NOTE — PROGRESS NOTES
Diagnosis: Left knee osteoarthritis    Procedure: Left knee viscosupplementation #3    The patient returns today for their third and final Orthovisc injection in the left knee. The risks, benefits, and complications of the injections were again discussed in detail with the patient. The risks discussed include but are not limited to infection, skin reactions, hot swollen joints, and anaphylaxis. The patient gave verbal informed consent for the injection. The patient's skin was prepped with 3 sterile gauze pads soaked with alcohol solution and the knee joint was injected with 2 mL of Orthovisc intra-articularly under sterile conditions. Technique: Under sterile conditions a [employer] ultrasound unit with a variable frequency (6.0-15.0 MHz) linear transducer was used to localize the placement of a 22-gauge needle into the knee joint. Findings: Successful needle placement for intra-articular Visco supplementation injection. Final images were taken and saved for permanent record. The patient tolerated the injection reasonably well. The patient was given instructions to ice of the knee and avoid strenuous activity for 24-48 hours. The patient was instructed to call the office immediately if there is increased pain, redness, warmth, fever, or chills. We will see the patient back on an as-needed basis  from this point.

## 2023-04-26 ENCOUNTER — PATIENT MESSAGE (OUTPATIENT)
Dept: INTERNAL MEDICINE CLINIC | Age: 88
End: 2023-04-26

## 2023-04-27 ENCOUNTER — TELEPHONE (OUTPATIENT)
Dept: INTERNAL MEDICINE CLINIC | Age: 88
End: 2023-04-27

## 2023-04-27 DIAGNOSIS — I10 ESSENTIAL HYPERTENSION: ICD-10-CM

## 2023-04-27 DIAGNOSIS — D50.9 IRON DEFICIENCY ANEMIA, UNSPECIFIED IRON DEFICIENCY ANEMIA TYPE: ICD-10-CM

## 2023-04-27 DIAGNOSIS — D69.6 THROMBOCYTOPENIA, UNSPECIFIED (HCC): Primary | ICD-10-CM

## 2023-04-27 DIAGNOSIS — N18.31 STAGE 3A CHRONIC KIDNEY DISEASE (HCC): ICD-10-CM

## 2023-04-27 DIAGNOSIS — C61 PROSTATE CANCER (HCC): ICD-10-CM

## 2023-04-27 NOTE — TELEPHONE ENCOUNTER
----- Message from Justen Jones. Justin Noel sent at 4/26/2023 10:11 PM EDT -----  Regarding: order blood to be drawn  Contact: [phone]  before my upcoming appointment. It may be time to include a PSA.     Thanks

## 2023-04-27 NOTE — TELEPHONE ENCOUNTER
From: Yaquelin Sweet MD  To: Dr. Shirleyann Gowers: 4/26/2023 10:11 PM EDT  Subject: order blood to be drawn    before my upcoming appointment. It may be time to include a PSA.     Thanks

## 2023-05-01 DIAGNOSIS — D50.9 IRON DEFICIENCY ANEMIA, UNSPECIFIED IRON DEFICIENCY ANEMIA TYPE: ICD-10-CM

## 2023-05-01 DIAGNOSIS — C61 PROSTATE CANCER (HCC): ICD-10-CM

## 2023-05-01 DIAGNOSIS — I10 ESSENTIAL HYPERTENSION: ICD-10-CM

## 2023-05-01 DIAGNOSIS — D69.6 THROMBOCYTOPENIA, UNSPECIFIED (HCC): ICD-10-CM

## 2023-05-01 DIAGNOSIS — N18.31 STAGE 3A CHRONIC KIDNEY DISEASE (HCC): ICD-10-CM

## 2023-05-01 LAB
ALBUMIN SERPL-MCNC: 3.9 G/DL (ref 3.4–5)
ALBUMIN/GLOB SERPL: 1.1 {RATIO} (ref 1.1–2.2)
ALP SERPL-CCNC: 89 U/L (ref 40–129)
ALT SERPL-CCNC: 13 U/L (ref 10–40)
ANION GAP SERPL CALCULATED.3IONS-SCNC: 9 MMOL/L (ref 3–16)
AST SERPL-CCNC: 21 U/L (ref 15–37)
BASOPHILS # BLD: 0 K/UL (ref 0–0.2)
BASOPHILS NFR BLD: 0.8 %
BILIRUB SERPL-MCNC: 0.5 MG/DL (ref 0–1)
BUN SERPL-MCNC: 36 MG/DL (ref 7–20)
CALCIUM SERPL-MCNC: 9 MG/DL (ref 8.3–10.6)
CHLORIDE SERPL-SCNC: 100 MMOL/L (ref 99–110)
CO2 SERPL-SCNC: 31 MMOL/L (ref 21–32)
CREAT SERPL-MCNC: 1.1 MG/DL (ref 0.8–1.3)
DEPRECATED RDW RBC AUTO: 14.8 % (ref 12.4–15.4)
EOSINOPHIL # BLD: 0.3 K/UL (ref 0–0.6)
EOSINOPHIL NFR BLD: 4.5 %
GFR SERPLBLD CREATININE-BSD FMLA CKD-EPI: >60 ML/MIN/{1.73_M2}
GLUCOSE SERPL-MCNC: 92 MG/DL (ref 70–99)
HGB BLD-MCNC: 14 G/DL (ref 13.5–17.5)
IRON SATN MFR SERPL: 35 % (ref 20–50)
IRON SERPL-MCNC: 73 UG/DL (ref 59–158)
LYMPHOCYTES # BLD: 1.5 K/UL (ref 1–5.1)
LYMPHOCYTES NFR BLD: 26.6 %
MCH RBC QN AUTO: 28.8 PG (ref 26–34)
MCHC RBC AUTO-ENTMCNC: 31.9 G/DL (ref 31–36)
MCV RBC AUTO: 90.4 FL (ref 80–100)
MONOCYTES # BLD: 0.4 K/UL (ref 0–1.3)
MONOCYTES NFR BLD: 7.6 %
NEUTROPHILS # BLD: 3.5 K/UL (ref 1.7–7.7)
NEUTROPHILS NFR BLD: 60.5 %
PLATELET # BLD AUTO: 102 K/UL (ref 135–450)
PMV BLD AUTO: 11.4 FL (ref 5–10.5)
POTASSIUM SERPL-SCNC: 5.3 MMOL/L (ref 3.5–5.1)
PROT SERPL-MCNC: 7.4 G/DL (ref 6.4–8.2)
PSA SERPL DL<=0.01 NG/ML-MCNC: <0.01 NG/ML (ref 0–4)
RBC # BLD AUTO: 4.86 M/UL (ref 4.2–5.9)
SODIUM SERPL-SCNC: 140 MMOL/L (ref 136–145)
TIBC SERPL-MCNC: 208 UG/DL (ref 260–445)
WBC # BLD AUTO: 5.8 K/UL (ref 4–11)

## 2023-05-01 RX ORDER — ATORVASTATIN CALCIUM 10 MG/1
TABLET, FILM COATED ORAL
Qty: 90 TABLET | Refills: 3 | Status: SHIPPED | OUTPATIENT
Start: 2023-05-01

## 2023-05-02 LAB
HCT VFR BLD AUTO: 44 % (ref 40.5–52.5)
IMMATURE RETIC FRACT: 0.34 (ref 0.21–0.37)
RETICS # AUTO: 0.02 M/UL
RETICS/RBC NFR AUTO: 0.44 % (ref 0.5–2.18)

## 2023-05-03 ENCOUNTER — OFFICE VISIT (OUTPATIENT)
Dept: INTERNAL MEDICINE CLINIC | Age: 88
End: 2023-05-03

## 2023-05-03 VITALS — TEMPERATURE: 97.5 F | WEIGHT: 106 LBS | OXYGEN SATURATION: 88 % | BODY MASS INDEX: 18.78 KG/M2

## 2023-05-03 DIAGNOSIS — Z91.81 AT HIGH RISK FOR FALLS: ICD-10-CM

## 2023-05-03 DIAGNOSIS — I25.708 CORONARY ARTERY DISEASE OF BYPASS GRAFT OF NATIVE HEART WITH STABLE ANGINA PECTORIS (HCC): Primary | ICD-10-CM

## 2023-05-03 DIAGNOSIS — N18.31 STAGE 3A CHRONIC KIDNEY DISEASE (HCC): ICD-10-CM

## 2023-05-03 DIAGNOSIS — R53.83 OTHER FATIGUE: ICD-10-CM

## 2023-05-03 DIAGNOSIS — C15.9 MALIGNANT NEOPLASM OF ESOPHAGUS, UNSPECIFIED LOCATION (HCC): ICD-10-CM

## 2023-05-03 LAB
T4 FREE SERPL-MCNC: 1.2 NG/DL (ref 0.9–1.8)
TSH SERPL DL<=0.005 MIU/L-ACNC: 5.88 UIU/ML (ref 0.27–4.2)

## 2023-05-03 SDOH — ECONOMIC STABILITY: FOOD INSECURITY: WITHIN THE PAST 12 MONTHS, YOU WORRIED THAT YOUR FOOD WOULD RUN OUT BEFORE YOU GOT MONEY TO BUY MORE.: NEVER TRUE

## 2023-05-03 SDOH — ECONOMIC STABILITY: FOOD INSECURITY: WITHIN THE PAST 12 MONTHS, THE FOOD YOU BOUGHT JUST DIDN'T LAST AND YOU DIDN'T HAVE MONEY TO GET MORE.: NEVER TRUE

## 2023-05-03 SDOH — ECONOMIC STABILITY: INCOME INSECURITY: HOW HARD IS IT FOR YOU TO PAY FOR THE VERY BASICS LIKE FOOD, HOUSING, MEDICAL CARE, AND HEATING?: NOT HARD AT ALL

## 2023-05-03 SDOH — ECONOMIC STABILITY: HOUSING INSECURITY
IN THE LAST 12 MONTHS, WAS THERE A TIME WHEN YOU DID NOT HAVE A STEADY PLACE TO SLEEP OR SLEPT IN A SHELTER (INCLUDING NOW)?: NO

## 2023-05-03 ASSESSMENT — PATIENT HEALTH QUESTIONNAIRE - PHQ9
SUM OF ALL RESPONSES TO PHQ QUESTIONS 1-9: 0
2. FEELING DOWN, DEPRESSED OR HOPELESS: 0
1. LITTLE INTEREST OR PLEASURE IN DOING THINGS: 0
SUM OF ALL RESPONSES TO PHQ9 QUESTIONS 1 & 2: 0
SUM OF ALL RESPONSES TO PHQ QUESTIONS 1-9: 0

## 2023-05-03 NOTE — PROGRESS NOTES
On the basis of positive falls risk screening, assessment and plan is as follows: patient declines any further evaluation/treatment for increased falls risk.   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 male who presents for : Follow-up chronic kidney disease fatigue    HPI: Patient presented with follow-up of the above he still feeling fatigued he denies any chest pain shortness of breath or any other problems he is at high risk for falls but does not do any new physical therapy he remains using a walker    Review of Systems:   Constitutional:  Denies fever or chills   Eyes:  Denies change in visual acuity   HENT:  Denies nasal congestion or sore throat   Respiratory:  Denies cough or shortness of breath   Cardiovascular:  Denies chest pain or edema   GI:  Denies abdominal pain, nausea, vomiting, bloody stools or diarrhea   :  Denies dysuria   Musculoskeletal:  Denies back pain or joint pain   Integument:  Denies rash   Neurologic:  Denies headache, focal weakness or sensory changes   Endocrine:  Denies polyuria or polydipsia   Lymphatic:  Denies swollen glands   Psychiatric:  Denies depression or anxiety     Past Medical History:        Diagnosis Date    Anemia     Aortic valve disorders     stenosis    Arthritis     Aspiration pneumonia ([address] 04/27/2015    Erectile dysfunction     Esophageal cancer ([address] 10/18/2012    Hernia, femoral, bilateral 05/12/2014    History of blood transfusion     Hyperlipidemia     Osteoporosis, 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 [address]     Unspecified essential hypertension        Past Surgical History:        Procedure Laterality Date    APPENDECTOMY      as a child    BONE GRAFT      for saddle nose    CARDIAC VALVE REPLACEMENT  01/01/2006    aorta    CHOLECYSTECTOMY      COLONOSCOPY  11/01/2009    COLONOSCOPY  10/05/2015    COLONOSCOPY N/A 02/17/2021    COLONOSCOPY DIAGNOSTIC/STOMA performed by Claudeen Banning

## 2023-05-06 RX ORDER — LEVOTHYROXINE SODIUM 0.05 MG/1
50 TABLET ORAL DAILY
Qty: 30 TABLET | Refills: 5 | Status: SHIPPED | OUTPATIENT
Start: 2023-05-06

## 2023-05-08 NOTE — PROGRESS NOTES
Internal Medicine  PGY 1  Progress note    CC abd pain, dark stools    History Obtained From:  Patient    Interval Hx:  Irene Vaughan. Overnight vitals stable , Hemoglobin last night 8.8 from 9.5. CBC, WBCs this am pending. He did have 4 bowel movements last night and he drank 75 percent of his prep. No melena/hematochezia/hematemesis/coffee ground emesis overnight. No lightheadedness or SOB. Pt however continues to feel weak. Unsure if he could make it to the bathroom on his own. He has peripheral neuropathy  From his radiation and has felt weak but this weakness is more acute. 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 Colonoscopy scheduled for today since he could not have it last night due to inadequate bowel prep. Neurology saw him yesterday and stated his weakness is from anemia and he does not need any more inpatient workup. Recommend PT as an outpatient and can see neurology for repeat EMG and further eval if he doesn't feel that walking improves. HISTORY OF PRESENT ILLNESS:  83 y/o M w pmhx of CAD s/p CABG x2 in 2006, prostate ca, aortic valve replacement and esophageal adenocarcinoma w/ esophagectomy and gastrectomy followed by chemo & radiation tx, Erectile dysfunction presents w dark stools. Patient reports that for approximately the past 1 month he has been experiencing dark stools. States that for the past several weeks, he has been having increasing fatigue and generalized malaise. He was notified bvy his PCP  that his hemoglobin was low at 7.0, prompting presentation to the emergency department. Patient states that he always has abdominal pain, although it has mildly increased recently. States that he feels mildly short of breath while walking, likely secondary to anemia. Denies any wheezing, cough, or fever. Denies any chest pain, urinary symptoms. In the ED patient's vital vitals were stable.  His FOBT was positive, his CBC showed a hemoglobin of 6.9, MCV 89.2.  His renal function panel was within normal limits. Lurlene Oyster was 2.43. Kamryn Carlton showed a diaphragmatic hernia on the left side, no significant bowel dilation.  In the ED he received 1 unit of RBCs. Past Medical History:        Diagnosis Date    Anemia     Aortic valve disorders     stenosis    Aspiration pneumonia ([facility] [address] 4/27/2015    Erectile dysfunction     Esophageal cancer ([address] 10/18/2012    Hernia, femoral, bilateral 5/12/2014    Hyperlipidemia     Osteoporosis, senile 5/20/2014    Pancreatitis 8/1/2013    Patient underweight 8/8/2013    Prostate cancer ([facility] [address]     Unspecified essential hypertension        Past Surgical History:        Procedure Laterality Date    APPENDECTOMY      as a child    BONE GRAFT      for saddle nose    CARDIAC VALVE REPLACEMENT  2006    aorta    CHOLECYSTECTOMY      COLONOSCOPY  11/2009    COLONOSCOPY  10/05/15    COLONOSCOPY N/A 2/17/2021    COLONOSCOPY DIAGNOSTIC/STOMA performed by Home Gayle MD at [place] [address]  2006    ESOPHAGUS SURGERY      EYE SURGERY  1990& 1992    cataract bilat removal     GASTRECTOMY      PROSTATE SURGERY      seeds    TONSILLECTOMY      UPPER GASTROINTESTINAL ENDOSCOPY N/A 2/16/2021    EGD BIOPSY performed by Home Gayle MD at [address] PROSTATE/TRANSRECTAL VOL STUDY BRACHYTHERAPY         Medications Priorto Admission:    Medications Prior to Admission: omeprazole (PRILOSEC) 40 MG delayed release capsule, TAKE ONE CAPSULE BY MOUTH DAILY  atorvastatin (LIPITOR) 10 MG tablet, TAKE ONE TABLET BY MOUTH DAILY  Alpha Lipoic Acid-Biotin 300-333 MG-MCG CAPS, 300 mg  warfarin (COUMADIN) 5 MG tablet, TAKE ONE TABLET BY MOUTH ONCE NIGHTLY  Coenzyme Q10 (COQ10) 400 MG CAPS, Take 1 capsule by mouth  Simethicone 80 MG TABS, Take 80 tablets by mouth 3 times daily.  (Patient taking differently: Take 125 mg by mouth 3 times daily. )  warfarin (COUMADIN) 1 MG tablet, Take 1 mg by mouth.  vitamin B-12 (CYANOCOBALAMIN) 1000 MCG tablet, Take 1,000 mcg by mouth daily. docusate sodium (COLACE) 100 MG capsule, Take 100 mg by mouth daily. One  Capsule daily  polyethylene glycol (GLYCOLAX) powder, Take 17 g by mouth 2 times daily   phytonadione (VITAMIN K) 5 MG tablet, Take 1 tablet by mouth once for 1 dose  calcium carbonate (OSCAL) 500 MG TABS tablet, Take 500 mg by mouth daily  Magnesium 65 MG TABS, Take by mouth  sildenafil (VIAGRA) 50 MG tablet, Take 50 mg by mouth as needed for Erectile Dysfunction. Allergies:  No known allergies    Social History:   · TOBACCO:   reports that he has never smoked. He has never used smokeless tobacco.  · ETOH:   reports current alcohol use. ·   Family History:       Problem Relation Age of Onset    Other Mother         bleeding peptic ulcer    Heart Disease Mother     Other Father         cardiovascular disease    Stroke Father     Elevated Lipids Father     Hypertension Father      ·   Physical Exam  Constitutional:       General: He is not in acute distress. Appearance: Normal appearance. He is well-developed. He is not ill-appearing or diaphoretic. HENT:      Head: Normocephalic and atraumatic. Eyes:      Conjunctiva/sclera: Conjunctivae normal.      Pupils: Pupils are equal, round, and reactive to light. Neck:      Musculoskeletal: Normal range of motion. Cardiovascular:      Rate and Rhythm: Normal rate and regular rhythm. Heart sounds: Normal heart sounds. 1/6 systolic ejection murmur. No friction rub. Pulmonary:      Effort: Pulmonary effort is normal. No respiratory distress. Breath sounds: Normal breath sounds. No wheezing or rales. Abdominal:      General: There is no distension. Palpations: Abdomen is soft. Tenderness: There is no abdominal tenderness. There is no guarding or rebound.    Genitourinary:     no hematuria  Musculoskeletal: Normal range of motion. Skin:     General: Skin is warm and dry. Neurological:      Mental Status: He is alert and oriented to person, place, and time. Psychiatric:         Behavior: Behavior normal.         Thought Content: Thought content normal.         Judgment: Judgment normal       Vitals:    02/18/21 0351   BP: 126/80   Pulse: 76   Resp: 16   Temp: 97.5 °F (36.4 °C)   SpO2: 94%       DATA:    Labs:  CBC:   Recent Labs     02/15/21  1044 02/16/21  0937 02/16/21  1510 02/16/21  1510 02/17/21  0928 02/17/21  1537 02/18/21  0033   WBC 8.2 6.0 4.4  --   --   --   --    HGB 7.0* 6.9* 7.8*   < > 9.0* 9.5* 8.8*   HCT 22.7* 22.5* 24.7*   < > 27.5* 29.2* 27.6*    201 141  --   --   --   --     < > = values in this interval not displayed. BMP:   Recent Labs     02/16/21  0937 02/17/21  0324 02/18/21  0403    138 142   K 4.2 4.7 4.0    104 106   CO2 28 27 26   BUN 42* 34* 25*   CREATININE 1.3 1.3 1.2   GLUCOSE 109* 121* 71   PHOS  --  4.4 3.2     LFT's:   Recent Labs     02/15/21  1044   AST 27   ALT 11   BILITOT <0.2   ALKPHOS 51     INR:   Recent Labs     02/16/21  0937 02/17/21  0324   INR 2.43* 2.18*       XR ABDOMEN (KUB) (SINGLE AP VIEW)   Final Result      Diaphragmatic hernia on the left as described      No significant bowel dilatation      If concern for obstruction CT recommended. 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  colonoscopy today  -N.p.o.  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  -As above    Aortic valve replacement for bicuspid aortic valve  -Patient underwent this procedure LDL 69  -On warfarin at home  -Hold warfarin in light of suspected GI bleed    Coronary artery disease s/p CABG in 2006  -No chest pain on this admission  -Trend H&H every 8 hours  -Transfuse for hemoglobin less than 8  -Monitor vitals    Prostate cancer  -Last PSA on 8/27/2020 was normal      Will discuss with attending physician Dr. Christy Foy Status:Full code  FEN: NPO  PPX: protonix, SCD  DISPO: Bravo Alvarez MD PGY-1  2/18/2021,  7:47 AM Ambulatory

## 2023-05-24 ENCOUNTER — OFFICE VISIT (OUTPATIENT)
Dept: ORTHOPEDIC SURGERY | Age: 88
End: 2023-05-24
Payer: MEDICARE

## 2023-05-24 VITALS — HEIGHT: 63 IN | WEIGHT: 106 LBS | BODY MASS INDEX: 18.78 KG/M2

## 2023-05-24 DIAGNOSIS — M17.12 LOCALIZED OSTEOARTHRITIS OF LEFT KNEE: Primary | ICD-10-CM

## 2023-05-24 PROCEDURE — 1123F ACP DISCUSS/DSCN MKR DOCD: CPT | Performed by: PHYSICIAN ASSISTANT

## 2023-05-24 PROCEDURE — 99213 OFFICE O/P EST LOW 20 MIN: CPT | Performed by: PHYSICIAN ASSISTANT

## 2023-05-24 NOTE — PROGRESS NOTES
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 Injury Mechanism:  none. Worker's Comp. & legal issues:   none. Previous Treatments: Ice, Heat, and NSAIDs    Patient presents to the office today for a follow-up visit. Patient being treated for advanced left knee osteoarthritis mostly concentrated in his lateral compartment. He has had cortisone injections in the past with limited relief. We did receive and complete a series of viscosupplementation injections with the last injection done on February 15, 2023. Unfortunately these injections have not worked well for him. He continues to complain of significant pain throughout his knee and dysfunction. He also suffers with cardiac pathology. 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 Patient here with a chief complaint of left knee pain. Patient states his left knee has been painful off and on for years. It has increased recently. He does not recall any specific injury or trauma. His pain is global in nature. He has had cortisone injections in the past with limited relief.     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

## 2023-06-02 ENCOUNTER — TELEPHONE (OUTPATIENT)
Dept: INTERNAL MEDICINE CLINIC | Age: 88
End: 2023-06-02

## 2023-06-02 NOTE — TELEPHONE ENCOUNTER
Recent weakness, dehydration, and foggy head. I stopped Thyroid medication and still feal poorly.   Sense of taste is messed up, and  I will shortly do a Co-Vid test.

## 2023-06-06 ENCOUNTER — TELEPHONE (OUTPATIENT)
Dept: INTERNAL MEDICINE CLINIC | Age: 88
End: 2023-06-06

## 2023-06-06 NOTE — TELEPHONE ENCOUNTER
Not feeling well  (Newest Message First)  Elissa Pereyra MD \"Terrance\"  P [facility] [address] Support (supporting Itpablito, Dalton Awan) 4 days ago       The test is negative. Yoanna Leader, Dalton Bowers Aas, MD \"Terrance\" 4 days ago     [address]  Hi Myrna Fishman definitely recommends doing the Covid test.     Please let us know the results.      Thanks,     Emperatriz

## 2023-06-08 NOTE — TELEPHONE ENCOUNTER
Message sent to the patient. Do you need an appointment or anything from , are you making progress towards feeling better?

## 2023-06-20 RX ORDER — OMEPRAZOLE 40 MG/1
CAPSULE, DELAYED RELEASE ORAL
Qty: 90 CAPSULE | Refills: 1 | Status: SHIPPED | OUTPATIENT
Start: 2023-06-20

## 2023-06-30 ENCOUNTER — OFFICE VISIT (OUTPATIENT)
Dept: ENT CLINIC | Age: 88
End: 2023-06-30

## 2023-06-30 VITALS
HEIGHT: 63 IN | SYSTOLIC BLOOD PRESSURE: 132 MMHG | BODY MASS INDEX: 17.68 KG/M2 | HEART RATE: 78 BPM | TEMPERATURE: 98.2 F | WEIGHT: 99.8 LBS | DIASTOLIC BLOOD PRESSURE: 78 MMHG

## 2023-06-30 DIAGNOSIS — H90.6 MIXED CONDUCTIVE AND SENSORINEURAL HEARING LOSS OF BOTH EARS: Primary | ICD-10-CM

## 2023-07-07 ENCOUNTER — OFFICE VISIT (OUTPATIENT)
Dept: ENT CLINIC | Age: 88
End: 2023-07-07
Payer: MEDICARE

## 2023-07-07 VITALS
SYSTOLIC BLOOD PRESSURE: 102 MMHG | HEIGHT: 63 IN | HEART RATE: 69 BPM | DIASTOLIC BLOOD PRESSURE: 55 MMHG | TEMPERATURE: 98 F | BODY MASS INDEX: 17.68 KG/M2

## 2023-07-07 DIAGNOSIS — H90.6 MIXED CONDUCTIVE AND SENSORINEURAL HEARING LOSS OF BOTH EARS: Primary | ICD-10-CM

## 2023-07-07 DIAGNOSIS — H61.22 IMPACTED CERUMEN OF LEFT EAR: ICD-10-CM

## 2023-07-07 PROCEDURE — 1123F ACP DISCUSS/DSCN MKR DOCD: CPT | Performed by: OTOLARYNGOLOGY

## 2023-07-07 PROCEDURE — 99212 OFFICE O/P EST SF 10 MIN: CPT | Performed by: OTOLARYNGOLOGY

## 2023-07-07 RX ORDER — CIPROFLOXACIN AND DEXAMETHASONE 3; 1 MG/ML; MG/ML
4 SUSPENSION/ DROPS AURICULAR (OTIC) 2 TIMES DAILY
Qty: 7.5 ML | Refills: 1 | Status: SHIPPED | OUTPATIENT
Start: 2023-07-07 | End: 2023-07-14

## 2023-07-07 NOTE — PROGRESS NOTES
Seen with severe impacted cerumen of the left external auditory canal.  I was unable to completely clean the ear last time. He has been using Debrox drops for a week. Unable to get more cerumen out and unable to see to the tympanic membrane but there is good deal of inflammation in the ear canal and looks like some granulations on the tympanic membrane itself. I have prescribed Ciprodex drops and will follow-up if his cyst fullness does not resolve completely.

## 2023-08-10 ENCOUNTER — OFFICE VISIT (OUTPATIENT)
Dept: INTERNAL MEDICINE CLINIC | Age: 88
End: 2023-08-10

## 2023-08-10 DIAGNOSIS — R53.83 OTHER FATIGUE: Primary | ICD-10-CM

## 2023-08-10 DIAGNOSIS — R53.83 OTHER FATIGUE: ICD-10-CM

## 2023-08-10 DIAGNOSIS — R53.1 WEAKNESS: ICD-10-CM

## 2023-08-10 NOTE — PROGRESS NOTES
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 who presents for : 2-week history of severe diffuse weakness    HPI: Patient presented with 2-week history of severe weakness he has not been taking his thyroid he is otherwise been doing okay but it is so weak he is unable to walk without assistance    Review of Systems:   Constitutional:  Denies fever or chills   Eyes:  Denies change in visual acuity   HENT:  Denies nasal congestion or sore throat   Respiratory:  Denies cough or shortness of breath   Cardiovascular:  Denies chest pain or edema   GI:  Denies abdominal pain, nausea, vomiting, bloody stools or diarrhea   :  Denies dysuria   Musculoskeletal:  Denies back pain or joint pain   Integument:  Denies rash   Neurologic:  Denies headache, focal weakness or sensory changes   Endocrine:  Denies polyuria or polydipsia   Lymphatic:  Denies swollen glands   Psychiatric:  Denies depression or anxiety     Past Medical History:        Diagnosis Date    Anemia     Aortic valve disorders     stenosis    Arthritis     Aspiration pneumonia ([facility]) 04/27/2015    Asthma     Erectile dysfunction     Esophageal cancer ([address]) 10/18/2012    Hearing loss     Hernia, femoral, bilateral 05/12/2014    History of blood transfusion     Hyperlipidemia     Osteoporosis, senile 05/20/2014    Pancreatitis 08/01/2013    Patient underweight 08/08/2013    Prostate cancer ([address])     Unspecified essential hypertension        Past Surgical History:        Procedure Laterality Date    APPENDECTOMY      as a child    BONE GRAFT      for saddle nose    CARDIAC VALVE REPLACEMENT  01/01/2006    aorta    CHOLECYSTECTOMY      COLONOSCOPY  11/01/2009    COLONOSCOPY  10/05/2015    COLONOSCOPY N/A 02/17/2021    COLONOSCOPY DIAGNOSTIC/STOMA performed by Edel Soto MD at [address]  02/18/2021    COLONOSCOPY POLYPECTOMY SNARE/COLD BIOPSY performed by Edel Soto MD at [address]

## 2023-08-11 LAB
ALBUMIN SERPL-MCNC: 3.6 G/DL (ref 3.4–5)
ALBUMIN/GLOB SERPL: 1.2 {RATIO} (ref 1.1–2.2)
ALP SERPL-CCNC: 126 U/L (ref 40–129)
ALT SERPL-CCNC: 15 U/L (ref 10–40)
ANION GAP SERPL CALCULATED.3IONS-SCNC: 7 MMOL/L (ref 3–16)
AST SERPL-CCNC: 26 U/L (ref 15–37)
BASOPHILS # BLD: 0 K/UL (ref 0–0.2)
BASOPHILS NFR BLD: 0.6 %
BILIRUB SERPL-MCNC: 0.4 MG/DL (ref 0–1)
BUN SERPL-MCNC: 34 MG/DL (ref 7–20)
CALCIUM SERPL-MCNC: 9.1 MG/DL (ref 8.3–10.6)
CHLORIDE SERPL-SCNC: 99 MMOL/L (ref 99–110)
CO2 SERPL-SCNC: 36 MMOL/L (ref 21–32)
CREAT SERPL-MCNC: 1.2 MG/DL (ref 0.8–1.3)
DEPRECATED RDW RBC AUTO: 15 % (ref 12.4–15.4)
EOSINOPHIL # BLD: 0.1 K/UL (ref 0–0.6)
EOSINOPHIL NFR BLD: 1.6 %
GFR SERPLBLD CREATININE-BSD FMLA CKD-EPI: 59 ML/MIN/{1.73_M2}
GLUCOSE SERPL-MCNC: 117 MG/DL (ref 70–99)
HCT VFR BLD AUTO: 36 % (ref 40.5–52.5)
HGB BLD-MCNC: 11.9 G/DL (ref 13.5–17.5)
LYMPHOCYTES # BLD: 1.3 K/UL (ref 1–5.1)
LYMPHOCYTES NFR BLD: 21.6 %
MAGNESIUM SERPL-MCNC: 2.1 MG/DL (ref 1.8–2.4)
MCH RBC QN AUTO: 31.2 PG (ref 26–34)
MCHC RBC AUTO-ENTMCNC: 33 G/DL (ref 31–36)
MCV RBC AUTO: 94.5 FL (ref 80–100)
MONOCYTES # BLD: 0.5 K/UL (ref 0–1.3)
MONOCYTES NFR BLD: 8.4 %
NEUTROPHILS # BLD: 4.2 K/UL (ref 1.7–7.7)
NEUTROPHILS NFR BLD: 67.8 %
PLATELET # BLD AUTO: 123 K/UL (ref 135–450)
PMV BLD AUTO: 11.4 FL (ref 5–10.5)
POTASSIUM SERPL-SCNC: 4.8 MMOL/L (ref 3.5–5.1)
PROT SERPL-MCNC: 6.7 G/DL (ref 6.4–8.2)
RBC # BLD AUTO: 3.8 M/UL (ref 4.2–5.9)
SODIUM SERPL-SCNC: 142 MMOL/L (ref 136–145)
TSH SERPL DL<=0.005 MIU/L-ACNC: 7.17 UIU/ML (ref 0.27–4.2)
WBC # BLD AUTO: 6.1 K/UL (ref 4–11)

## 2023-08-23 ENCOUNTER — TELEPHONE (OUTPATIENT)
Dept: INTERNAL MEDICINE CLINIC | Age: 88
End: 2023-08-23

## 2023-08-23 DIAGNOSIS — R53.81 PHYSICAL DEBILITY: ICD-10-CM

## 2023-08-23 DIAGNOSIS — I25.10 CORONARY ARTERY DISEASE INVOLVING NATIVE CORONARY ARTERY OF NATIVE HEART WITHOUT ANGINA PECTORIS: ICD-10-CM

## 2023-08-23 DIAGNOSIS — R53.81 PHYSICAL DECONDITIONING: Primary | ICD-10-CM

## 2023-08-23 NOTE — TELEPHONE ENCOUNTER
They are requesting an order for [facility]. He is much weaker over the last month.     PH:  [phone]    Please call and advise

## 2023-08-25 ENCOUNTER — TELEPHONE (OUTPATIENT)
Dept: INTERNAL MEDICINE CLINIC | Age: 88
End: 2023-08-25

## 2023-08-25 NOTE — TELEPHONE ENCOUNTER
----- Message from [facility] sent at 8/25/2023  3:32 PM EDT -----  Subject: Message to Provider    QUESTIONS  Information for Provider? David Payne is calling to let the doctor know that   the Pt passed away on 08/25/2023.  ---------------------------------------------------------------------------  --------------  Marlena Howe Addis  [phone]; OK to leave message on voicemail  ---------------------------------------------------------------------------  --------------  SCRIPT ANSWERS  Relationship to Patient? Spouse/Partner  Representative Name? David Payne  Is the representative on the Communication Release of Information (CHRIS)   form in Epic?  Yes

## 2023-09-05 ENCOUNTER — TELEPHONE (OUTPATIENT)
Dept: INTERNAL MEDICINE CLINIC | Age: 88
End: 2023-09-05

## 2023-09-05 NOTE — TELEPHONE ENCOUNTER
Patients wife is calling. She is asking where we are in the process of signing to the Death Certification.  Home said they sent the certificate over to us last week. Please call and advise.

## 2024-07-21 NOTE — TELEPHONE ENCOUNTER
Received call from Veronica Salas at [facility] with [employer]. Subjective: Caller states \"Abdominal discomfort\"     Current Symptoms: Upper abd \"discomfort\" with decreased appetite and PO intake. States \"my stomach is always full\". BM today and was \"normal\". Pt denies any CP, SOB. No N/V/D, no fever. Pt requesting to just speak with the MD. 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 Elaine Cash. Onset: 2 days    Associated Symptoms: reduced appetite    Pain Severity: \"discomfort\"    Temperature: Denies    What has been tried: Nothing    LMP: NA Pregnant: NA    Recommended disposition: See PCP within 24 Hours - Spoke with Edward Painter at [place] [address] in regards to patient requesting to speak directly with MD.     Care advice provided, patient verbalizes understanding; denies any other questions or concerns; instructed to call back for any new or worsening symptoms. Writer provided warm transfer to Edward Painter at [place] [address] for Patient to speak with MD     Attention Provider: Thank you for allowing me to participate in the care of your patient. The patient was connected to triage in response to information provided to the ECC/PSC. Please do not respond through this encounter as the response is not directed to a shared pool.         Reason for Disposition   [1] MILD pain (e.g., does not interfere with normal activities) AND [2] comes and goes (cramps) AND [3] present > 72 hours  (Exception: this same abdominal pain is a chronic symptom recurrent or ongoing AND present > 4 weeks)    Protocols used: ABDOMINAL PAIN - UPPER-ADULT- 3

## (undated) DEVICE — FORCEPS BX L240CM JAW DIA2.8MM L CAP W/ NDL MIC MESH TOOTH

## (undated) DEVICE — CLIPPING DEVICE: Brand: RESOLUTION CLIP

## (undated) DEVICE — CANNULA SAMP CO2 AD GRN 7FT CO2 AND 7FT O2 TBNG UNIV CONN

## (undated) DEVICE — SNARE ENDOSCP L240CM OD24MM LOOP W10MM RND INSUL STIFF BRAID

## (undated) DEVICE — TRAP,MUCUS SPECIMEN, 80CC: Brand: MEDLINE

## (undated) DEVICE — THE ARTICULATOR INJECTION NEEDLE IS A SINGLE USE, DISPOSABLE, FLEXIBLE SHEATH INJECTION NEEDLE USED FOR THE INJECTION OF VARIOUS TYPES OF MEDIA THROUGH FLEXIBLE ENDOSCOPES.: Brand: ARTICULATOR